# Patient Record
Sex: FEMALE | Race: WHITE | NOT HISPANIC OR LATINO | Employment: OTHER | URBAN - METROPOLITAN AREA
[De-identification: names, ages, dates, MRNs, and addresses within clinical notes are randomized per-mention and may not be internally consistent; named-entity substitution may affect disease eponyms.]

---

## 2017-08-24 ENCOUNTER — OFFICE VISIT (OUTPATIENT)
Dept: OBSTETRICS AND GYNECOLOGY | Facility: CLINIC | Age: 77
End: 2017-08-24

## 2017-08-24 VITALS — WEIGHT: 291 LBS | BODY MASS INDEX: 53.55 KG/M2 | HEIGHT: 62 IN

## 2017-08-24 DIAGNOSIS — Z13.9 SCREENING: ICD-10-CM

## 2017-08-24 DIAGNOSIS — R35.0 URINARY FREQUENCY: ICD-10-CM

## 2017-08-24 DIAGNOSIS — R32 URINARY INCONTINENCE, UNSPECIFIED TYPE: Primary | ICD-10-CM

## 2017-08-24 LAB
BILIRUB BLD-MCNC: NEGATIVE MG/DL
CLARITY, POC: CLEAR
COLOR UR: YELLOW
GLUCOSE UR STRIP-MCNC: NEGATIVE MG/DL
KETONES UR QL: NEGATIVE
LEUKOCYTE EST, POC: NEGATIVE
NITRITE UR-MCNC: NEGATIVE MG/ML
PH UR: 5 [PH] (ref 5–8)
PROT UR STRIP-MCNC: NEGATIVE MG/DL
RBC # UR STRIP: NEGATIVE /UL
SP GR UR: 1 (ref 1–1.03)
UROBILINOGEN UR QL: NORMAL

## 2017-08-24 PROCEDURE — 99203 OFFICE O/P NEW LOW 30 MIN: CPT | Performed by: OBSTETRICS & GYNECOLOGY

## 2017-08-24 PROCEDURE — 81002 URINALYSIS NONAUTO W/O SCOPE: CPT | Performed by: OBSTETRICS & GYNECOLOGY

## 2017-08-24 RX ORDER — FENTANYL 25 UG/H
1 PATCH TRANSDERMAL
Status: ON HOLD | COMMUNITY
End: 2019-04-16 | Stop reason: SDUPTHER

## 2017-08-24 RX ORDER — PREGABALIN 150 MG/1
150 CAPSULE ORAL 2 TIMES DAILY
Status: ON HOLD | COMMUNITY
End: 2020-05-19 | Stop reason: SDUPTHER

## 2017-08-24 RX ORDER — ALENDRONATE SODIUM 35 MG/1
35 TABLET ORAL
COMMUNITY
End: 2019-04-10

## 2017-08-24 RX ORDER — QUETIAPINE FUMARATE 50 MG/1
50 TABLET, FILM COATED ORAL NIGHTLY
COMMUNITY

## 2017-08-24 RX ORDER — LEVOTHYROXINE SODIUM 0.05 MG/1
50 TABLET ORAL DAILY
COMMUNITY

## 2017-08-24 RX ORDER — FLUOXETINE HYDROCHLORIDE 60 MG/1
60 TABLET, FILM COATED ORAL; ORAL DAILY
COMMUNITY

## 2017-08-24 RX ORDER — PANTOPRAZOLE SODIUM 40 MG/1
40 TABLET, DELAYED RELEASE ORAL DAILY
COMMUNITY

## 2017-08-24 RX ORDER — NYSTATIN 100000 [USP'U]/G
POWDER TOPICAL
COMMUNITY
End: 2019-04-10

## 2017-08-24 RX ORDER — ASPIRIN 81 MG/1
81 TABLET, CHEWABLE ORAL
COMMUNITY
Start: 2017-06-15

## 2017-08-24 RX ORDER — BUSPIRONE HYDROCHLORIDE 30 MG/1
30 TABLET ORAL 2 TIMES DAILY
COMMUNITY

## 2017-08-24 RX ORDER — HYOSCYAMINE SULFATE EXTENDED-RELEASE 0.38 MG/1
0.38 TABLET ORAL 2 TIMES DAILY
COMMUNITY
End: 2019-02-18 | Stop reason: HOSPADM

## 2017-08-24 RX ORDER — HYDROCODONE BITARTRATE AND ACETAMINOPHEN 7.5; 325 MG/1; MG/1
1 TABLET ORAL EVERY 6 HOURS PRN
Status: ON HOLD | COMMUNITY
End: 2019-04-16 | Stop reason: SDUPTHER

## 2017-08-24 RX ORDER — FERROUS SULFATE 325(65) MG
325 TABLET ORAL
COMMUNITY
End: 2019-04-10

## 2017-08-24 RX ORDER — TORSEMIDE 100 MG/1
TABLET ORAL
COMMUNITY
Start: 2017-03-13 | End: 2019-04-10

## 2017-08-24 RX ORDER — POTASSIUM CHLORIDE 750 MG/1
10 TABLET, EXTENDED RELEASE ORAL DAILY
COMMUNITY
End: 2019-04-10

## 2017-08-24 RX ORDER — LOPERAMIDE HYDROCHLORIDE 2 MG/1
2 CAPSULE ORAL 4 TIMES DAILY PRN
COMMUNITY
End: 2019-04-10

## 2017-08-24 RX ORDER — ERGOCALCIFEROL 1.25 MG/1
CAPSULE ORAL
COMMUNITY
Start: 2014-10-15 | End: 2019-04-10

## 2017-08-24 NOTE — PROGRESS NOTES
"PROBLEM VISIT    Chief Complaint: Urinary Incontinence      Rose Cheema is a 76 y.o. patient who presents with complaints of urinary incontinence that has gradually worsened throughout the yeas. She saw a urogyn 'years' ago and was dx with bladder spasms and has been taking levbid for years. If pt doesn't take med then she has increased bladder spasms. The med does not help with urinary incontinence. Pt reports that she dribbles urine all the time. When she stands her entire bladder will empty. At times she feels as though she cannot empty her bladder all the way except she has to strain. Hx of ELSIE. Had a BSO 3 years ago bc of ovarian cyst. Pt has a hx of bladder surgery with ELSIE. Has never been on a bladder medicine. Pt is not sexually active.   Chief Complaint   Patient presents with   • Urinary Frequency             The following portions of the patient's history were reviewed and updated as appropriate: allergies, current medications and problem list.    Review of Systems   Endocrine: Positive for polyuria.   Genitourinary: Negative for dysuria, pelvic pain and vaginal bleeding.       Ht 62\" (157.5 cm)  Wt 291 lb (132 kg)  Breastfeeding? No  BMI 53.22 kg/m2    Physical Exam   Constitutional: She appears well-developed and well-nourished. No distress.   Morbid obesity. Pt has difficulty walking    Genitourinary: There is no rash, tenderness, lesion or injury on the right labia. There is no rash, tenderness, lesion or injury on the left labia. Right adnexum displays no mass, no tenderness and no fullness. Left adnexum displays no mass, no tenderness and no fullness. No erythema, tenderness or bleeding in the vagina. No foreign body in the vagina. No signs of injury around the vagina. No vaginal discharge found.   Genitourinary Comments: Vaginal cuff palpated. No anterior prolapse noted. Urethra normal appearing.   Lymphadenopathy:   Bilateral LE lymphedema   Skin: She is not diaphoretic.   Psychiatric: She has " a normal mood and affect. Her behavior is normal. Judgment and thought content normal.   Vitals reviewed.        Assessment/Plan   Rose was seen today for urinary frequency.    Diagnoses and all orders for this visit:    Urinary incontinence, unspecified type  -     Urine Culture    Screening  -     POC Urinalysis Dipstick  -     Urine Culture    Urinary frequency      77yo with urinary incontinence and urinary frequency    Check UA/Ucx to evaluate for infection  Gave month worth of samples of Myrbetriq 25mg  Pt to fu in 4 weeks to see if med is helping.             Return in about 4 weeks (around 9/21/2017).      Maggi Proctor,     8/30/2017  1:23 PM

## 2017-08-27 LAB
BACTERIA UR CULT: ABNORMAL
BACTERIA UR CULT: ABNORMAL
OTHER ANTIBIOTIC SUSC ISLT: ABNORMAL

## 2017-08-27 RX ORDER — NITROFURANTOIN 25; 75 MG/1; MG/1
100 CAPSULE ORAL 2 TIMES DAILY
Qty: 14 CAPSULE | Refills: 0 | Status: SHIPPED | OUTPATIENT
Start: 2017-08-27 | End: 2017-09-03

## 2017-08-30 PROBLEM — R35.0 URINARY FREQUENCY: Status: ACTIVE | Noted: 2017-08-30

## 2017-09-21 ENCOUNTER — OFFICE VISIT (OUTPATIENT)
Dept: OBSTETRICS AND GYNECOLOGY | Facility: CLINIC | Age: 77
End: 2017-09-21

## 2017-09-21 VITALS — HEIGHT: 62 IN | WEIGHT: 291 LBS | BODY MASS INDEX: 53.55 KG/M2

## 2017-09-21 DIAGNOSIS — R35.0 URINARY FREQUENCY: ICD-10-CM

## 2017-09-21 DIAGNOSIS — R32 URINARY INCONTINENCE, UNSPECIFIED TYPE: Primary | ICD-10-CM

## 2017-09-21 PROCEDURE — 99213 OFFICE O/P EST LOW 20 MIN: CPT | Performed by: OBSTETRICS & GYNECOLOGY

## 2017-09-21 RX ORDER — FLUOXETINE HYDROCHLORIDE 20 MG/1
20 CAPSULE ORAL 2 TIMES DAILY
COMMUNITY
Start: 2017-09-08 | End: 2019-02-12

## 2017-09-21 RX ORDER — POTASSIUM CHLORIDE 750 MG/1
CAPSULE, EXTENDED RELEASE ORAL
COMMUNITY
Start: 2017-09-04 | End: 2019-02-12 | Stop reason: SDUPTHER

## 2017-09-21 RX ORDER — ALBUTEROL SULFATE 90 UG/1
AEROSOL, METERED RESPIRATORY (INHALATION) EVERY 4 HOURS PRN
Status: ON HOLD | COMMUNITY
Start: 2017-06-15 | End: 2020-05-14

## 2017-09-28 NOTE — PROGRESS NOTES
"PROBLEM VISIT    Chief Complaint: Urinary incontinence and urinary frequency      Rose Cheema is a 77 y.o. patient who presents for follow up of urinary incontinence and and urinary frequency. Pt was seen 1 montha ago and UCx revealed UTI. Pt took the antibiotics. Pt was started on Myrbetriq 25mg and reports that her symptoms are improved. She is losing less urine and her frequency is less. Pt is very please with her results. Pt reports no side effects from meds. Pt is interested in trying Myrbetriq 50mg.  Chief Complaint   Patient presents with   • Follow-up             The following portions of the patient's history were reviewed and updated as appropriate: allergies, current medications and problem list.    Review of Systems   Endocrine: Positive for polyuria.   Genitourinary: Positive for frequency. Negative for dysuria and flank pain.       Ht 62\" (157.5 cm)  Wt 291 lb (132 kg)  Breastfeeding? No  BMI 53.22 kg/m2    Physical Exam   Constitutional: She is oriented to person, place, and time. She appears well-developed and well-nourished. She appears distressed.   Pt is morbidly obese and uses a walker to assist her   Neurological: She is alert and oriented to person, place, and time.   Skin: She is diaphoretic.   Psychiatric: She has a normal mood and affect. Her behavior is normal. Judgment and thought content normal.   Vitals reviewed.        Assessment/Plan   Rose was seen today for follow-up.    Diagnoses and all orders for this visit:    Urinary incontinence, unspecified type    Urinary frequency    Other orders  -     Mirabegron ER (MYRBETRIQ) 50 MG tablet sustained-release 24 hour 24 hr tablet; Take 50 mg by mouth Daily.    76yo for fu of urinary frequency and incontinence    Pt is doing well on Myrbetriq 25mg daily.  Pt reports improvement in symptoms  No side effects on med noticed.  Will increase to Myrbetriq 50mg daily. Samples given and script written.  If pt is not happy or experiences urinary " retention then will return to Myrbetriq 25mg daily.  Pt agrees with plans.  FU 1 yr/prn             No Follow-up on file.      Maggi Proctor,     9/28/2017  5:16 AM

## 2018-10-16 RX ORDER — MIRABEGRON 50 MG/1
TABLET, FILM COATED, EXTENDED RELEASE ORAL
Qty: 30 TABLET | Refills: 10 | Status: SHIPPED | OUTPATIENT
Start: 2018-10-16 | End: 2019-11-06 | Stop reason: SDUPTHER

## 2019-02-12 ENCOUNTER — APPOINTMENT (OUTPATIENT)
Dept: CT IMAGING | Facility: HOSPITAL | Age: 79
End: 2019-02-12

## 2019-02-12 ENCOUNTER — APPOINTMENT (OUTPATIENT)
Dept: GENERAL RADIOLOGY | Facility: HOSPITAL | Age: 79
End: 2019-02-12

## 2019-02-12 ENCOUNTER — HOSPITAL ENCOUNTER (INPATIENT)
Facility: HOSPITAL | Age: 79
LOS: 6 days | Discharge: HOME HEALTH | End: 2019-02-18
Attending: EMERGENCY MEDICINE | Admitting: INTERNAL MEDICINE

## 2019-02-12 DIAGNOSIS — R79.89 ELEVATED LFTS: ICD-10-CM

## 2019-02-12 DIAGNOSIS — N20.0 KIDNEY STONE ON RIGHT SIDE: ICD-10-CM

## 2019-02-12 DIAGNOSIS — N39.0 ACUTE UTI: ICD-10-CM

## 2019-02-12 DIAGNOSIS — R60.0 BILATERAL LEG EDEMA: ICD-10-CM

## 2019-02-12 DIAGNOSIS — K56.600 PARTIAL SMALL BOWEL OBSTRUCTION (HCC): ICD-10-CM

## 2019-02-12 DIAGNOSIS — R10.84 GENERALIZED ABDOMINAL PAIN: Primary | ICD-10-CM

## 2019-02-12 DIAGNOSIS — K83.8 DILATION OF BILIARY TRACT: ICD-10-CM

## 2019-02-12 DIAGNOSIS — Z74.09 IMPAIRED FUNCTIONAL MOBILITY, BALANCE, GAIT, AND ENDURANCE: ICD-10-CM

## 2019-02-12 PROBLEM — M79.7 FIBROMYALGIA: Chronic | Status: ACTIVE | Noted: 2019-02-12

## 2019-02-12 PROBLEM — I27.20 PULMONARY HYPERTENSION (HCC): Chronic | Status: ACTIVE | Noted: 2019-02-12

## 2019-02-12 PROBLEM — Z87.19 HX SBO: Chronic | Status: ACTIVE | Noted: 2019-02-12

## 2019-02-12 LAB
ALBUMIN SERPL-MCNC: 3.1 G/DL (ref 3.5–5.2)
ALBUMIN/GLOB SERPL: 1 G/DL
ALP SERPL-CCNC: 565 U/L (ref 39–117)
ALT SERPL W P-5'-P-CCNC: 107 U/L (ref 1–33)
ANION GAP SERPL CALCULATED.3IONS-SCNC: 13.2 MMOL/L
APAP SERPL-MCNC: <5 MCG/ML (ref 10–30)
AST SERPL-CCNC: 78 U/L (ref 1–32)
BACTERIA UR QL AUTO: ABNORMAL /HPF
BASOPHILS # BLD AUTO: 0.01 10*3/MM3 (ref 0–0.2)
BASOPHILS NFR BLD AUTO: 0.1 % (ref 0–1.5)
BILIRUB CONJ SERPL-MCNC: 1.9 MG/DL (ref 0–0.3)
BILIRUB SERPL-MCNC: 3.1 MG/DL (ref 0.1–1.2)
BILIRUB UR QL STRIP: NEGATIVE
BUN BLD-MCNC: 26 MG/DL (ref 8–23)
BUN/CREAT SERPL: 25.7 (ref 7–25)
CALCIUM SPEC-SCNC: 8.7 MG/DL (ref 8.6–10.5)
CHLORIDE SERPL-SCNC: 100 MMOL/L (ref 98–107)
CLARITY UR: ABNORMAL
CO2 SERPL-SCNC: 24.8 MMOL/L (ref 22–29)
COLOR UR: ABNORMAL
CREAT BLD-MCNC: 1.01 MG/DL (ref 0.57–1)
CRP SERPL-MCNC: 9.12 MG/DL (ref 0–0.5)
DEPRECATED RDW RBC AUTO: 52.6 FL (ref 37–54)
EOSINOPHIL # BLD AUTO: 0.07 10*3/MM3 (ref 0–0.4)
EOSINOPHIL NFR BLD AUTO: 0.8 % (ref 0.3–6.2)
ERYTHROCYTE [DISTWIDTH] IN BLOOD BY AUTOMATED COUNT: 14.8 % (ref 12.3–15.4)
GFR SERPL CREATININE-BSD FRML MDRD: 53 ML/MIN/1.73
GGT SERPL-CCNC: 374 U/L (ref 5–36)
GLOBULIN UR ELPH-MCNC: 3 GM/DL
GLUCOSE BLD-MCNC: 102 MG/DL (ref 65–99)
GLUCOSE BLDC GLUCOMTR-MCNC: 108 MG/DL (ref 70–130)
GLUCOSE BLDC GLUCOMTR-MCNC: 109 MG/DL (ref 70–130)
GLUCOSE UR STRIP-MCNC: NEGATIVE MG/DL
HCT VFR BLD AUTO: 37.8 % (ref 34–46.6)
HGB BLD-MCNC: 12.2 G/DL (ref 12–15.9)
HGB UR QL STRIP.AUTO: NEGATIVE
HYALINE CASTS UR QL AUTO: ABNORMAL /LPF
IMM GRANULOCYTES # BLD AUTO: 0.03 10*3/MM3 (ref 0–0.05)
IMM GRANULOCYTES NFR BLD AUTO: 0.4 % (ref 0–0.5)
KETONES UR QL STRIP: NEGATIVE
LEUKOCYTE ESTERASE UR QL STRIP.AUTO: ABNORMAL
LYMPHOCYTES # BLD AUTO: 0.99 10*3/MM3 (ref 0.7–3.1)
LYMPHOCYTES NFR BLD AUTO: 11.8 % (ref 19.6–45.3)
MCH RBC QN AUTO: 31 PG (ref 26.6–33)
MCHC RBC AUTO-ENTMCNC: 32.3 G/DL (ref 31.5–35.7)
MCV RBC AUTO: 96.2 FL (ref 79–97)
MONOCYTES # BLD AUTO: 0.62 10*3/MM3 (ref 0.1–0.9)
MONOCYTES NFR BLD AUTO: 7.4 % (ref 5–12)
NEUTROPHILS # BLD AUTO: 6.69 10*3/MM3 (ref 1.4–7)
NEUTROPHILS NFR BLD AUTO: 79.5 % (ref 42.7–76)
NITRITE UR QL STRIP: NEGATIVE
NRBC BLD AUTO-RTO: 0 /100 WBC (ref 0–0)
NT-PROBNP SERPL-MCNC: 307.9 PG/ML (ref 0–1800)
PH UR STRIP.AUTO: 8.5 [PH] (ref 5–8)
PLATELET # BLD AUTO: 167 10*3/MM3 (ref 140–450)
PMV BLD AUTO: 11.4 FL (ref 6–12)
POTASSIUM BLD-SCNC: 3.6 MMOL/L (ref 3.5–5.2)
PROT SERPL-MCNC: 6.1 G/DL (ref 6–8.5)
PROT UR QL STRIP: ABNORMAL
RBC # BLD AUTO: 3.93 10*6/MM3 (ref 3.77–5.28)
RBC # UR: ABNORMAL /HPF
REF LAB TEST METHOD: ABNORMAL
SODIUM BLD-SCNC: 138 MMOL/L (ref 136–145)
SP GR UR STRIP: 1.01 (ref 1–1.03)
SQUAMOUS #/AREA URNS HPF: ABNORMAL /HPF
TROPONIN T SERPL-MCNC: <0.01 NG/ML (ref 0–0.03)
UROBILINOGEN UR QL STRIP: ABNORMAL
WBC NRBC COR # BLD: 8.41 10*3/MM3 (ref 3.4–10.8)
WBC UR QL AUTO: ABNORMAL /HPF

## 2019-02-12 PROCEDURE — 73560 X-RAY EXAM OF KNEE 1 OR 2: CPT

## 2019-02-12 PROCEDURE — 25010000002 IOPAMIDOL 61 % SOLUTION: Performed by: EMERGENCY MEDICINE

## 2019-02-12 PROCEDURE — 87077 CULTURE AEROBIC IDENTIFY: CPT | Performed by: EMERGENCY MEDICINE

## 2019-02-12 PROCEDURE — 81001 URINALYSIS AUTO W/SCOPE: CPT | Performed by: EMERGENCY MEDICINE

## 2019-02-12 PROCEDURE — 82248 BILIRUBIN DIRECT: CPT | Performed by: INTERNAL MEDICINE

## 2019-02-12 PROCEDURE — 87186 SC STD MICRODIL/AGAR DIL: CPT | Performed by: EMERGENCY MEDICINE

## 2019-02-12 PROCEDURE — 99285 EMERGENCY DEPT VISIT HI MDM: CPT

## 2019-02-12 PROCEDURE — 87086 URINE CULTURE/COLONY COUNT: CPT | Performed by: EMERGENCY MEDICINE

## 2019-02-12 PROCEDURE — 87040 BLOOD CULTURE FOR BACTERIA: CPT | Performed by: EMERGENCY MEDICINE

## 2019-02-12 PROCEDURE — 71046 X-RAY EXAM CHEST 2 VIEWS: CPT

## 2019-02-12 PROCEDURE — 36415 COLL VENOUS BLD VENIPUNCTURE: CPT | Performed by: EMERGENCY MEDICINE

## 2019-02-12 PROCEDURE — 82977 ASSAY OF GGT: CPT | Performed by: INTERNAL MEDICINE

## 2019-02-12 PROCEDURE — 83880 ASSAY OF NATRIURETIC PEPTIDE: CPT | Performed by: EMERGENCY MEDICINE

## 2019-02-12 PROCEDURE — 93010 ELECTROCARDIOGRAM REPORT: CPT | Performed by: INTERNAL MEDICINE

## 2019-02-12 PROCEDURE — 74177 CT ABD & PELVIS W/CONTRAST: CPT

## 2019-02-12 PROCEDURE — 82962 GLUCOSE BLOOD TEST: CPT

## 2019-02-12 PROCEDURE — 25010000002 PIPERACILLIN SOD-TAZOBACTAM PER 1 G: Performed by: EMERGENCY MEDICINE

## 2019-02-12 PROCEDURE — 86140 C-REACTIVE PROTEIN: CPT | Performed by: EMERGENCY MEDICINE

## 2019-02-12 PROCEDURE — 85025 COMPLETE CBC W/AUTO DIFF WBC: CPT | Performed by: EMERGENCY MEDICINE

## 2019-02-12 PROCEDURE — 93005 ELECTROCARDIOGRAM TRACING: CPT | Performed by: EMERGENCY MEDICINE

## 2019-02-12 PROCEDURE — 80307 DRUG TEST PRSMV CHEM ANLYZR: CPT | Performed by: EMERGENCY MEDICINE

## 2019-02-12 PROCEDURE — 84484 ASSAY OF TROPONIN QUANT: CPT | Performed by: EMERGENCY MEDICINE

## 2019-02-12 PROCEDURE — 80053 COMPREHEN METABOLIC PANEL: CPT | Performed by: EMERGENCY MEDICINE

## 2019-02-12 RX ORDER — SODIUM CHLORIDE 0.9 % (FLUSH) 0.9 %
1-10 SYRINGE (ML) INJECTION AS NEEDED
Status: DISCONTINUED | OUTPATIENT
Start: 2019-02-12 | End: 2019-02-18 | Stop reason: HOSPADM

## 2019-02-12 RX ORDER — NYSTATIN 100000 [USP'U]/G
POWDER TOPICAL EVERY 12 HOURS SCHEDULED
Status: DISCONTINUED | OUTPATIENT
Start: 2019-02-13 | End: 2019-02-18 | Stop reason: HOSPADM

## 2019-02-12 RX ORDER — DOCUSATE SODIUM 100 MG/1
100 CAPSULE, LIQUID FILLED ORAL 2 TIMES DAILY PRN
Status: DISCONTINUED | OUTPATIENT
Start: 2019-02-12 | End: 2019-02-18 | Stop reason: HOSPADM

## 2019-02-12 RX ORDER — HYDROMORPHONE HYDROCHLORIDE 1 MG/ML
0.5 INJECTION, SOLUTION INTRAMUSCULAR; INTRAVENOUS; SUBCUTANEOUS
Status: DISCONTINUED | OUTPATIENT
Start: 2019-02-12 | End: 2019-02-17

## 2019-02-12 RX ORDER — SODIUM CHLORIDE 0.9 % (FLUSH) 0.9 %
3 SYRINGE (ML) INJECTION EVERY 12 HOURS SCHEDULED
Status: DISCONTINUED | OUTPATIENT
Start: 2019-02-13 | End: 2019-02-18 | Stop reason: HOSPADM

## 2019-02-12 RX ORDER — SODIUM CHLORIDE 0.9 % (FLUSH) 0.9 %
10 SYRINGE (ML) INJECTION AS NEEDED
Status: DISCONTINUED | OUTPATIENT
Start: 2019-02-12 | End: 2019-02-12

## 2019-02-12 RX ORDER — FENTANYL 25 UG/H
1 PATCH TRANSDERMAL
Status: DISCONTINUED | OUTPATIENT
Start: 2019-02-13 | End: 2019-02-18 | Stop reason: HOSPADM

## 2019-02-12 RX ORDER — NALOXONE HCL 0.4 MG/ML
0.4 VIAL (ML) INJECTION
Status: DISCONTINUED | OUTPATIENT
Start: 2019-02-12 | End: 2019-02-17

## 2019-02-12 RX ORDER — CALCIUM CARBONATE 200(500)MG
1 TABLET,CHEWABLE ORAL 2 TIMES DAILY PRN
Status: DISCONTINUED | OUTPATIENT
Start: 2019-02-12 | End: 2019-02-18 | Stop reason: HOSPADM

## 2019-02-12 RX ORDER — SODIUM CHLORIDE 9 MG/ML
125 INJECTION, SOLUTION INTRAVENOUS CONTINUOUS
Status: DISCONTINUED | OUTPATIENT
Start: 2019-02-12 | End: 2019-02-12

## 2019-02-12 RX ORDER — ALBUTEROL SULFATE 2.5 MG/3ML
2.5 SOLUTION RESPIRATORY (INHALATION) EVERY 4 HOURS PRN
Status: DISCONTINUED | OUTPATIENT
Start: 2019-02-12 | End: 2019-02-18 | Stop reason: HOSPADM

## 2019-02-12 RX ORDER — SODIUM CHLORIDE 9 MG/ML
50 INJECTION, SOLUTION INTRAVENOUS CONTINUOUS
Status: DISCONTINUED | OUTPATIENT
Start: 2019-02-13 | End: 2019-02-14

## 2019-02-12 RX ORDER — QUETIAPINE FUMARATE 50 MG/1
50 TABLET, FILM COATED ORAL NIGHTLY
Status: DISCONTINUED | OUTPATIENT
Start: 2019-02-13 | End: 2019-02-18 | Stop reason: HOSPADM

## 2019-02-12 RX ORDER — BISACODYL 10 MG
10 SUPPOSITORY, RECTAL RECTAL DAILY PRN
Status: DISCONTINUED | OUTPATIENT
Start: 2019-02-12 | End: 2019-02-18 | Stop reason: HOSPADM

## 2019-02-12 RX ORDER — ASPIRIN 81 MG/1
81 TABLET, CHEWABLE ORAL DAILY
Status: DISCONTINUED | OUTPATIENT
Start: 2019-02-13 | End: 2019-02-18 | Stop reason: HOSPADM

## 2019-02-12 RX ORDER — PREGABALIN 75 MG/1
150 CAPSULE ORAL 2 TIMES DAILY
Status: DISCONTINUED | OUTPATIENT
Start: 2019-02-12 | End: 2019-02-18 | Stop reason: HOSPADM

## 2019-02-12 RX ORDER — BUSPIRONE HYDROCHLORIDE 15 MG/1
30 TABLET ORAL 2 TIMES DAILY
Status: DISCONTINUED | OUTPATIENT
Start: 2019-02-13 | End: 2019-02-18 | Stop reason: HOSPADM

## 2019-02-12 RX ORDER — ONDANSETRON 2 MG/ML
4 INJECTION INTRAMUSCULAR; INTRAVENOUS EVERY 6 HOURS PRN
Status: DISCONTINUED | OUTPATIENT
Start: 2019-02-12 | End: 2019-02-18 | Stop reason: HOSPADM

## 2019-02-12 RX ADMIN — TAZOBACTAM SODIUM AND PIPERACILLIN SODIUM 3.38 G: 375; 3 INJECTION, SOLUTION INTRAVENOUS at 22:38

## 2019-02-12 RX ADMIN — SODIUM CHLORIDE 125 ML/HR: 9 INJECTION, SOLUTION INTRAVENOUS at 19:49

## 2019-02-12 RX ADMIN — IOPAMIDOL 85 ML: 612 INJECTION, SOLUTION INTRAVENOUS at 18:51

## 2019-02-12 NOTE — ED PROVIDER NOTES
EMERGENCY DEPARTMENT ENCOUNTER    CHIEF COMPLAINT  Chief Complaint: Bilateral knee pain  History given by: Pt  History limited by: none  Room Number: 18/18  PMD: Cheng Guevara MD      HPI:  Pt is a 78 y.o. female who presents complaining of bilateral knee pain for several months. Per family, pt has been having a harder time getting around. Pt uses a walker to ambulate at home. Pt states she has been falling more the past 2 days. Pt denies head injury or LOC. Pt denies Hx of DM. Pt reports Hx of diffuse arthritis and sees pain management. Pt lives with . Pt was in [facility]  weeks ago for 5 days for SBO and pancreatitis. Hx of lymphedema. Also having abdominal pain today.         Duration:  Several months  Onset: gradual  Timing: constant  Location: bilateral knee  Radiation: none  Quality: pain  Intensity/Severity: mild  Progression: unchanged  Associated Symptoms: BLE swelling  Aggravating Factors: movement  Alleviating Factors: rest  Previous Episodes: Pt c/o bilateral knee pain for several months.   Treatment before arrival: none    PAST MEDICAL HISTORY  Active Ambulatory Problems     Diagnosis Date Noted   • Urinary incontinence 08/24/2017   • Urinary frequency 08/30/2017     Resolved Ambulatory Problems     Diagnosis Date Noted   • No Resolved Ambulatory Problems     Past Medical History:   Diagnosis Date   • Anemia    • Anxiety    • Arthritis    • CHF (congestive heart failure) (CMS/HCC)    • Coronary artery disease    • Depression    • Disease of thyroid gland    • Fibromyalgia    • GERD (gastroesophageal reflux disease)    • Hypertension    • Moderate tricuspid valve stenosis    • Osteoporosis    • Peripheral neuropathy    • Pulmonary hypertension (CMS/HCC)    • SBO (small bowel obstruction) (CMS/HCC)    • Stenosis of mitral valve        PAST SURGICAL HISTORY  Past Surgical History:   Procedure Laterality Date   • CARDIAC CATHETERIZATION     • CHOLECYSTECTOMY     • EXPLORATORY LAPAROTOMY      • GASTRIC BYPASS     • HERNIA REPAIR      inguinal and ventral   • HYSTERECTOMY     • OVARIAN CYST REMOVAL         FAMILY HISTORY  History reviewed. No pertinent family history.    SOCIAL HISTORY  Social History     Socioeconomic History   • Marital status: Single     Spouse name: Not on file   • Number of children: Not on file   • Years of education: Not on file   • Highest education level: Not on file   Social Needs   • Financial resource strain: Not on file   • Food insecurity - worry: Not on file   • Food insecurity - inability: Not on file   • Transportation needs - medical: Not on file   • Transportation needs - non-medical: Not on file   Occupational History   • Not on file   Tobacco Use   • Smoking status: Former Smoker   Substance and Sexual Activity   • Alcohol use: No   • Drug use: No   • Sexual activity: No   Other Topics Concern   • Not on file   Social History Narrative   • Not on file       ALLERGIES  Aspartame and Cephalexin    REVIEW OF SYSTEMS  Review of Systems   Constitutional: Negative for fever.   HENT: Negative for sore throat.    Eyes: Negative.    Respiratory: Negative for cough and shortness of breath.    Cardiovascular: Positive for leg swelling (BLE). Negative for chest pain.   Gastrointestinal: Negative for abdominal pain, diarrhea and vomiting.   Genitourinary: Negative for dysuria.   Musculoskeletal: Positive for arthralgias (bilateral knee pain for several months). Negative for neck pain.   Skin: Negative for rash.   Neurological: Negative for weakness, numbness and headaches.   Hematological: Negative.    Psychiatric/Behavioral: Negative.    All other systems reviewed and are negative.      PHYSICAL EXAM  ED Triage Vitals   Temp Heart Rate Resp BP SpO2   02/12/19 1317 02/12/19 1315 02/12/19 1315 02/12/19 1315 02/12/19 1317   97 °F (36.1 °C) 60 14 148/83 98 %      Temp src Heart Rate Source Patient Position BP Location FiO2 (%)   02/12/19 1317 -- -- -- --   Tympanic            Physical Exam   Constitutional: She is oriented to person, place, and time. No distress.   HENT:   Head: Normocephalic and atraumatic.   Eyes: EOM are normal. Pupils are equal, round, and reactive to light.   Neck: Normal range of motion. Neck supple.   Cardiovascular: Normal rate, regular rhythm and intact distal pulses.   Pulmonary/Chest: Effort normal and breath sounds normal. No respiratory distress. She has no wheezes. She has no rales.   Abdominal: Soft. Bowel sounds are normal. She exhibits no mass. There is tenderness (diffuse ). There is no rebound and no guarding.   Musculoskeletal: Normal range of motion. She exhibits edema (BLE).   Bilateral knee tenderness. No effusion.    Neurological: She is alert and oriented to person, place, and time. She has normal sensation and normal strength.   Skin: Skin is warm and dry. No rash noted. There is erythema (mild to BLE).   Psychiatric: Mood and affect normal.   Nursing note and vitals reviewed.      LAB RESULTS  Lab Results (last 24 hours)     Procedure Component Value Units Date/Time    POC Glucose Once [[id number]]  (Normal) Collected:  02/12/19 1321    Specimen:  Blood Updated:  02/12/19 1323     Glucose 108 mg/dL     POC Glucose Once [[id number]]  (Normal) Collected:  02/12/19 1336    Specimen:  Blood Updated:  02/12/19 1337     Glucose 109 mg/dL     Urinalysis With Microscopic If Indicated (No Culture) - Urine, Clean Catch [[id number]]  (Abnormal) Collected:  02/12/19 1422    Specimen:  Urine, Clean Catch Updated:  02/12/19 1506     Color, UA Dark Yellow     Appearance, UA Cloudy     pH, UA 8.5     Specific Gravity, UA 1.010     Glucose, UA Negative     Ketones, UA Negative     Bilirubin, UA Negative     Blood, UA Negative     Protein, UA Trace     Leuk Esterase, UA Large (3+)     Nitrite, UA Negative     Urobilinogen, UA 1.0 E.U./dL    Urinalysis, Microscopic Only - Urine, Clean Catch [[id number]]  (Abnormal) Collected:  02/12/19 1422    Specimen:  Urine,  Clean Catch Updated:  02/12/19 1506     RBC, UA 3-5 /HPF      WBC, UA 21-30 /HPF      Bacteria, UA 2+ /HPF      Squamous Epithelial Cells, UA 7-12 /HPF      Hyaline Casts, UA 7-12 /LPF      Methodology Manual Light Microscopy    Urine Culture - Urine, Urine, Clean Catch [[id number]] Collected:  02/12/19 1422    Specimen:  Urine, Clean Catch Updated:  02/12/19 1730    CBC & Differential [[id number]] Collected:  02/12/19 1454    Specimen:  Blood Updated:  02/12/19 1514    Narrative:       The following orders were created for panel order CBC & Differential.  Procedure                               Abnormality         Status                     ---------                               -----------         ------                     CBC Auto Differential[[id number]]        Abnormal            Final result                 Please view results for these tests on the individual orders.    Comprehensive Metabolic Panel [[id number]]  (Abnormal) Collected:  02/12/19 1454    Specimen:  Blood Updated:  02/12/19 1538     Glucose 102 mg/dL      BUN 26 mg/dL      Creatinine 1.01 mg/dL      Sodium 138 mmol/L      Potassium 3.6 mmol/L      Chloride 100 mmol/L      CO2 24.8 mmol/L      Calcium 8.7 mg/dL      Total Protein 6.1 g/dL      Albumin 3.10 g/dL      ALT (SGPT) 107 U/L      AST (SGOT) 78 U/L      Alkaline Phosphatase 565 U/L      Total Bilirubin 3.1 mg/dL      eGFR Non African Amer 53 mL/min/1.73      Globulin 3.0 gm/dL      A/G Ratio 1.0 g/dL      BUN/Creatinine Ratio 25.7     Anion Gap 13.2 mmol/L     Narrative:       The MDRD GFR formula is only valid for adults with stable renal function between ages 18 and 70.    C-reactive Protein [[id number]]  (Abnormal) Collected:  02/12/19 1454    Specimen:  Blood Updated:  02/12/19 1538     C-Reactive Protein 9.12 mg/dL     BNP [[id number]]  (Normal) Collected:  02/12/19 1454    Specimen:  Blood Updated:  02/12/19 1534     proBNP 307.9 pg/mL     Narrative:       Among patients with dyspnea,  NT-proBNP is highly sensitive for the detection of acute congestive heart failure. In addition NT-proBNP of <300 pg/ml effectively rules out acute congestive heart failure with 99% negative predictive value.    Troponin [[id number]]  (Normal) Collected:  02/12/19 1454    Specimen:  Blood Updated:  02/12/19 1538     Troponin T <0.010 ng/mL     Narrative:       Troponin T Reference Range:  <= 0.03 ng/mL-   Negative for AMI  >0.03 ng/mL-     Abnormal for myocardial necrosis.  Clinicians would have to utilize clinical acumen, EKG, Troponin and serial changes to determine if it is an Acute Myocardial Infarction or myocardial injury due to an underlying chronic condition.     CBC Auto Differential [[id number]]  (Abnormal) Collected:  02/12/19 1454    Specimen:  Blood Updated:  02/12/19 1514     WBC 8.41 10*3/mm3      RBC 3.93 10*6/mm3      Hemoglobin 12.2 g/dL      Hematocrit 37.8 %      MCV 96.2 fL      MCH 31.0 pg      MCHC 32.3 g/dL      RDW 14.8 %      RDW-SD 52.6 fl      MPV 11.4 fL      Platelets 167 10*3/mm3      Neutrophil % 79.5 %      Lymphocyte % 11.8 %      Monocyte % 7.4 %      Eosinophil % 0.8 %      Basophil % 0.1 %      Immature Grans % 0.4 %      Neutrophils, Absolute 6.69 10*3/mm3      Lymphocytes, Absolute 0.99 10*3/mm3      Monocytes, Absolute 0.62 10*3/mm3      Eosinophils, Absolute 0.07 10*3/mm3      Basophils, Absolute 0.01 10*3/mm3      Immature Grans, Absolute 0.03 10*3/mm3      nRBC 0.0 /100 WBC     Acetaminophen Level [[id number]]  (Abnormal) Collected:  02/12/19 1454    Specimen:  Blood Updated:  02/12/19 1846     Acetaminophen <5.0 mcg/mL           I ordered the above labs and reviewed the results    RADIOLOGY  CT Abdomen Pelvis With Contrast   Final Result           1. Proximal small bowel shows mild abnormal distention with air-fluid   levels, up to the level of anastomosis at the midline abdomen, may   represent early or partial obstruction. Ventral hernias containing small   portions of  bowel, as described. Colonic diverticulosis.   2. Increased biliary ductal dilatation could represent an obstructive   process does not identify identified on this exam, MRCP could be   obtained for further evaluation if indicated.    3. Nonobstructive right nephrolithiasis.   4. Appearance of skin thickening in the anterior pelvis, adjacent   stranding,  correlate clinically to exclude possibility of cellulitis.       Discussed by telephone Dr. Torres at 1909, 2/12/2019.       This report was finalized on 2/12/2019 7:14 PM by Dr. Benny Lerma M.D.          XR Knee 1 or 2 View Bilateral   Final Result   1. Negative chest x-ray.   2. Advanced osteophytic change of both knees. No acute abnormality   identified in either knee.       This report was finalized on 2/12/2019 6:07 PM by Dr. Terry Brothers M.D.          XR Chest 2 View   Final Result   1. Negative chest x-ray.   2. Advanced osteophytic change of both knees. No acute abnormality   identified in either knee.       This report was finalized on 2/12/2019 6:07 PM by Dr. Terry Brothers M.D.               I ordered the above noted radiological studies. Interpreted by radiologist. Discussed with radiologist (). Reviewed by me in PACS.       PROCEDURES  Procedures    EKG          EKG time: 1415  Rhythm/Rate: sinus tach, 110  P waves and OK: Nml  QRS, axis: LAD, LVH   ST and T waves: nonspecific ST changes     Interpreted Contemporaneously by me, independently viewed  No previous      PROGRESS AND CONSULTS       1728  Pt recheck. Notified pt of lab work, including CMP that shows elevated LFTs. Discussed plan to further evaluate with CT abd/pelvis. Pt is agreeable.     1912  Per Dr. Lerma, Radiology CT abd/pelvis shows Increased periductal dilatation of bile duct but can't see definite stone. proximal small bowel dilation in midline that could be early partial SBO. 1 cm non obstructed stone in right kidney. anterior pelvic wall skin thickening.      1928  Rechecked pt. Pt is resting comfortably. There is no erythema to abdominal wall. Pt states she can take Penicillin w/o difficulty. Notified pt of lab work, including UA that shows UTI, and CT abd/pevlis results. Discussed the plan to admit the pt for further workup. Pt understands and agrees with the plan, all questions answered.    1931  Ordered IV Zosyn    8:11 PM  Discussed with Dr. Trejo, [facility] who agrees to admit the pt.     MEDICAL DECISION MAKING  Results were reviewed/discussed with the patient and they were also made aware of online access. Pt also made aware that some labs, such as cultures, will not be resulted during ER visit and follow up with PMD is necessary.     MDM  Number of Diagnoses or Management Options  Acute UTI:   Bilateral leg edema:   Dilation of biliary tract:   Elevated LFTs:   Generalized abdominal pain:   Kidney stone on right side:   Partial small bowel obstruction (CMS/HCC)- early:      Amount and/or Complexity of Data Reviewed  Clinical lab tests: reviewed and ordered (CMP: ALT-107, ASST-78, Alkaline phosphatase-565; BNP-307; CRP-9.12; Acetaminophen <5; troponin<0.01; UA: 2+ bacteria, 3-5 RBC, 21-30 WBC;)  Tests in the radiology section of CPT®: reviewed and ordered (CT abd/pelvis shows Increased periductal dilatation of bile duct.  can't see definite stone. proximal small bowel dilation in midline that could be early partial SBO. 1 cm non obstructed stone in right kidney. anterior pelvic wall skin thickening.)  Tests in the medicine section of CPT®: ordered and reviewed (See EKG results in procedure. )  Discussion of test results with the performing providers: yes (Dr. Lerma, Radiology)  Independent visualization of images, tracings, or specimens: yes           DIAGNOSIS  Final diagnoses:   Generalized abdominal pain   Elevated LFTs   Dilation of biliary tract   Acute UTI   Partial small bowel obstruction (CMS/HCC)- early   Bilateral leg edema   Kidney stone on right side        DISPOSITION  ADMISSION    Discussed treatment plan and reason for admission with pt/family and admitting physician.  Pt/family voiced understanding of the plan for admission for further testing/treatment as needed.         Latest Documented Vital Signs:  As of 7:53 PM  BP- 142/86 HR- 101 Temp- 98.4 °F (36.9 °C) (Oral) O2 sat- 95%    --  Documentation assistance provided by blair Duran for Dr. Cooper.  Information recorded by the scribe was done at my direction and has been verified and validated by me.         Wilber Duran  02/12/19 1953       Wilber Duran  02/12/19 1956       Jordan Cooper MD  02/12/19 2014

## 2019-02-12 NOTE — ED TRIAGE NOTES
Pt to ED via EMS from home for right knee pain for several months, pain worse today. Also reports worsening bilateral lower extremity edema. EMS also states pt's blood glucose 78, ems gave oral glucose and blood sugar dropped to 58, pt given 1amp d50, blood sugar 97. Blood glucose 108 upon arrival to ED.

## 2019-02-13 ENCOUNTER — APPOINTMENT (OUTPATIENT)
Dept: MRI IMAGING | Facility: HOSPITAL | Age: 79
End: 2019-02-13

## 2019-02-13 PROBLEM — G89.4 CHRONIC PAIN SYNDROME: Chronic | Status: ACTIVE | Noted: 2019-02-13

## 2019-02-13 PROBLEM — E07.9 DISEASE OF THYROID GLAND: Chronic | Status: ACTIVE | Noted: 2019-02-13

## 2019-02-13 PROBLEM — N39.0 ACUTE UTI (URINARY TRACT INFECTION): Status: ACTIVE | Noted: 2019-02-13

## 2019-02-13 PROBLEM — D64.9 ANEMIA: Status: ACTIVE | Noted: 2019-02-13

## 2019-02-13 PROBLEM — E16.2 HYPOGLYCEMIA: Status: ACTIVE | Noted: 2019-02-13
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
## 2019-02-13 PROBLEM — Z90.49 HX OF CHOLECYSTECTOMY: Chronic | Status: ACTIVE | Noted: 2019-02-13

## 2019-02-13 LAB
ALBUMIN SERPL-MCNC: 2.6 G/DL (ref 3.5–5.2)
ALBUMIN/GLOB SERPL: 1.1 G/DL
ALP SERPL-CCNC: 430 U/L (ref 39–117)
ALT SERPL W P-5'-P-CCNC: 74 U/L (ref 1–33)
ANION GAP SERPL CALCULATED.3IONS-SCNC: 11.2 MMOL/L
AST SERPL-CCNC: 43 U/L (ref 1–32)
BASOPHILS # BLD AUTO: 0.01 10*3/MM3 (ref 0–0.2)
BASOPHILS NFR BLD AUTO: 0.2 % (ref 0–1.5)
BILIRUB SERPL-MCNC: 1.8 MG/DL (ref 0.1–1.2)
BUN BLD-MCNC: 19 MG/DL (ref 8–23)
BUN/CREAT SERPL: 24.4 (ref 7–25)
CALCIUM SPEC-SCNC: 7.9 MG/DL (ref 8.6–10.5)
CHLORIDE SERPL-SCNC: 103 MMOL/L (ref 98–107)
CK SERPL-CCNC: 107 U/L (ref 20–180)
CO2 SERPL-SCNC: 23.8 MMOL/L (ref 22–29)
CREAT BLD-MCNC: 0.78 MG/DL (ref 0.57–1)
DEPRECATED RDW RBC AUTO: 51 FL (ref 37–54)
EOSINOPHIL # BLD AUTO: 0.07 10*3/MM3 (ref 0–0.4)
EOSINOPHIL NFR BLD AUTO: 1.1 % (ref 0.3–6.2)
ERYTHROCYTE [DISTWIDTH] IN BLOOD BY AUTOMATED COUNT: 14.6 % (ref 12.3–15.4)
GFR SERPL CREATININE-BSD FRML MDRD: 72 ML/MIN/1.73
GLOBULIN UR ELPH-MCNC: 2.4 GM/DL
GLUCOSE BLD-MCNC: 91 MG/DL (ref 65–99)
GLUCOSE BLDC GLUCOMTR-MCNC: 115 MG/DL (ref 70–130)
GLUCOSE BLDC GLUCOMTR-MCNC: 83 MG/DL (ref 70–130)
GLUCOSE BLDC GLUCOMTR-MCNC: 84 MG/DL (ref 70–130)
GLUCOSE BLDC GLUCOMTR-MCNC: 89 MG/DL (ref 70–130)
HCT VFR BLD AUTO: 33.3 % (ref 34–46.6)
HGB BLD-MCNC: 10.4 G/DL (ref 12–15.9)
IMM GRANULOCYTES # BLD AUTO: 0.02 10*3/MM3 (ref 0–0.05)
IMM GRANULOCYTES NFR BLD AUTO: 0.3 % (ref 0–0.5)
LYMPHOCYTES # BLD AUTO: 1.29 10*3/MM3 (ref 0.7–3.1)
LYMPHOCYTES NFR BLD AUTO: 20.1 % (ref 19.6–45.3)
MAGNESIUM SERPL-MCNC: 1.9 MG/DL (ref 1.6–2.4)
MCH RBC QN AUTO: 30.2 PG (ref 26.6–33)
MCHC RBC AUTO-ENTMCNC: 31.2 G/DL (ref 31.5–35.7)
MCV RBC AUTO: 96.8 FL (ref 79–97)
MONOCYTES # BLD AUTO: 0.67 10*3/MM3 (ref 0.1–0.9)
MONOCYTES NFR BLD AUTO: 10.5 % (ref 5–12)
NEUTROPHILS # BLD AUTO: 4.35 10*3/MM3 (ref 1.4–7)
NEUTROPHILS NFR BLD AUTO: 67.8 % (ref 42.7–76)
NRBC BLD AUTO-RTO: 0 /100 WBC (ref 0–0)
PHOSPHATE SERPL-MCNC: 3.4 MG/DL (ref 2.5–4.5)
PLATELET # BLD AUTO: 151 10*3/MM3 (ref 140–450)
PMV BLD AUTO: 12.1 FL (ref 6–12)
POTASSIUM BLD-SCNC: 3.3 MMOL/L (ref 3.5–5.2)
PROT SERPL-MCNC: 5 G/DL (ref 6–8.5)
RBC # BLD AUTO: 3.44 10*6/MM3 (ref 3.77–5.28)
SODIUM BLD-SCNC: 138 MMOL/L (ref 136–145)
TSH SERPL DL<=0.05 MIU/L-ACNC: 0.71 MIU/ML (ref 0.27–4.2)
WBC NRBC COR # BLD: 6.41 10*3/MM3 (ref 3.4–10.8)

## 2019-02-13 PROCEDURE — 97162 PT EVAL MOD COMPLEX 30 MIN: CPT

## 2019-02-13 PROCEDURE — A9577 INJ MULTIHANCE: HCPCS | Performed by: HOSPITALIST

## 2019-02-13 PROCEDURE — 83735 ASSAY OF MAGNESIUM: CPT | Performed by: INTERNAL MEDICINE

## 2019-02-13 PROCEDURE — 84100 ASSAY OF PHOSPHORUS: CPT | Performed by: INTERNAL MEDICINE

## 2019-02-13 PROCEDURE — 97110 THERAPEUTIC EXERCISES: CPT

## 2019-02-13 PROCEDURE — 25010000002 HYDROMORPHONE PER 4 MG: Performed by: INTERNAL MEDICINE

## 2019-02-13 PROCEDURE — 97166 OT EVAL MOD COMPLEX 45 MIN: CPT | Performed by: OCCUPATIONAL THERAPIST

## 2019-02-13 PROCEDURE — 80053 COMPREHEN METABOLIC PANEL: CPT | Performed by: INTERNAL MEDICINE

## 2019-02-13 PROCEDURE — 99222 1ST HOSP IP/OBS MODERATE 55: CPT | Performed by: INTERNAL MEDICINE

## 2019-02-13 PROCEDURE — 82962 GLUCOSE BLOOD TEST: CPT

## 2019-02-13 PROCEDURE — 0 GADOBENATE DIMEGLUMINE 529 MG/ML SOLUTION: Performed by: HOSPITALIST

## 2019-02-13 PROCEDURE — 74183 MRI ABD W/O CNTR FLWD CNTR: CPT

## 2019-02-13 PROCEDURE — 85025 COMPLETE CBC W/AUTO DIFF WBC: CPT | Performed by: INTERNAL MEDICINE

## 2019-02-13 PROCEDURE — 84443 ASSAY THYROID STIM HORMONE: CPT | Performed by: INTERNAL MEDICINE

## 2019-02-13 PROCEDURE — 25010000003 POTASSIUM CHLORIDE 10 MEQ/100ML SOLUTION: Performed by: HOSPITALIST

## 2019-02-13 PROCEDURE — 99221 1ST HOSP IP/OBS SF/LOW 40: CPT | Performed by: SURGERY

## 2019-02-13 PROCEDURE — 94799 UNLISTED PULMONARY SVC/PX: CPT

## 2019-02-13 PROCEDURE — 25010000003 AMPICILLIN-SULBACTAM PER 1.5 G: Performed by: INTERNAL MEDICINE

## 2019-02-13 PROCEDURE — 97535 SELF CARE MNGMENT TRAINING: CPT | Performed by: OCCUPATIONAL THERAPIST

## 2019-02-13 PROCEDURE — 82550 ASSAY OF CK (CPK): CPT | Performed by: INTERNAL MEDICINE

## 2019-02-13 RX ORDER — POTASSIUM CHLORIDE 1.5 G/1.77G
40 POWDER, FOR SOLUTION ORAL AS NEEDED
Status: DISCONTINUED | OUTPATIENT
Start: 2019-02-13 | End: 2019-02-18 | Stop reason: HOSPADM

## 2019-02-13 RX ORDER — TORSEMIDE 20 MG/1
40 TABLET ORAL DAILY
Status: DISCONTINUED | OUTPATIENT
Start: 2019-02-13 | End: 2019-02-18 | Stop reason: HOSPADM

## 2019-02-13 RX ORDER — POTASSIUM CHLORIDE 7.45 MG/ML
10 INJECTION INTRAVENOUS
Status: DISCONTINUED | OUTPATIENT
Start: 2019-02-13 | End: 2019-02-18 | Stop reason: HOSPADM

## 2019-02-13 RX ORDER — NICOTINE POLACRILEX 4 MG
15 LOZENGE BUCCAL
Status: DISCONTINUED | OUTPATIENT
Start: 2019-02-13 | End: 2019-02-18 | Stop reason: HOSPADM

## 2019-02-13 RX ORDER — POTASSIUM CHLORIDE 750 MG/1
40 CAPSULE, EXTENDED RELEASE ORAL AS NEEDED
Status: DISCONTINUED | OUTPATIENT
Start: 2019-02-13 | End: 2019-02-18 | Stop reason: HOSPADM

## 2019-02-13 RX ORDER — DEXTROSE MONOHYDRATE 25 G/50ML
25 INJECTION, SOLUTION INTRAVENOUS
Status: DISCONTINUED | OUTPATIENT
Start: 2019-02-13 | End: 2019-02-18 | Stop reason: HOSPADM

## 2019-02-13 RX ADMIN — HYDROMORPHONE HYDROCHLORIDE 0.5 MG: 1 INJECTION, SOLUTION INTRAMUSCULAR; INTRAVENOUS; SUBCUTANEOUS at 18:51

## 2019-02-13 RX ADMIN — PREGABALIN 150 MG: 75 CAPSULE ORAL at 01:39

## 2019-02-13 RX ADMIN — SODIUM CHLORIDE, PRESERVATIVE FREE 3 ML: 5 INJECTION INTRAVENOUS at 22:52

## 2019-02-13 RX ADMIN — BUSPIRONE HYDROCHLORIDE 30 MG: 15 TABLET ORAL at 08:40

## 2019-02-13 RX ADMIN — PREGABALIN 150 MG: 75 CAPSULE ORAL at 22:32

## 2019-02-13 RX ADMIN — PREGABALIN 150 MG: 75 CAPSULE ORAL at 08:40

## 2019-02-13 RX ADMIN — NYSTATIN: 100000 POWDER TOPICAL at 01:39

## 2019-02-13 RX ADMIN — AMPICILLIN SODIUM AND SULBACTAM SODIUM 1.5 G: 1; .5 INJECTION, POWDER, FOR SOLUTION INTRAMUSCULAR; INTRAVENOUS at 15:12

## 2019-02-13 RX ADMIN — HYDROMORPHONE HYDROCHLORIDE 0.5 MG: 1 INJECTION, SOLUTION INTRAMUSCULAR; INTRAVENOUS; SUBCUTANEOUS at 11:28

## 2019-02-13 RX ADMIN — AMPICILLIN SODIUM AND SULBACTAM SODIUM 1.5 G: 1; .5 INJECTION, POWDER, FOR SOLUTION INTRAMUSCULAR; INTRAVENOUS at 03:44

## 2019-02-13 RX ADMIN — BUSPIRONE HYDROCHLORIDE 30 MG: 15 TABLET ORAL at 22:32

## 2019-02-13 RX ADMIN — SODIUM CHLORIDE 50 ML/HR: 9 INJECTION, SOLUTION INTRAVENOUS at 03:47

## 2019-02-13 RX ADMIN — SODIUM CHLORIDE, PRESERVATIVE FREE 3 ML: 5 INJECTION INTRAVENOUS at 09:00

## 2019-02-13 RX ADMIN — ASPIRIN 81 MG: 81 TABLET, CHEWABLE ORAL at 08:40

## 2019-02-13 RX ADMIN — POTASSIUM CHLORIDE 10 MEQ: 7.46 INJECTION, SOLUTION INTRAVENOUS at 17:18

## 2019-02-13 RX ADMIN — ALBUTEROL SULFATE 2.5 MG: 2.5 SOLUTION RESPIRATORY (INHALATION) at 09:02

## 2019-02-13 RX ADMIN — BUSPIRONE HYDROCHLORIDE 30 MG: 15 TABLET ORAL at 01:39

## 2019-02-13 RX ADMIN — AMPICILLIN SODIUM AND SULBACTAM SODIUM 1.5 G: 1; .5 INJECTION, POWDER, FOR SOLUTION INTRAMUSCULAR; INTRAVENOUS at 22:31

## 2019-02-13 RX ADMIN — TORSEMIDE 40 MG: 20 TABLET ORAL at 17:18

## 2019-02-13 RX ADMIN — POTASSIUM CHLORIDE 40 MEQ: 750 CAPSULE, EXTENDED RELEASE ORAL at 18:42

## 2019-02-13 RX ADMIN — NYSTATIN: 100000 POWDER TOPICAL at 22:32

## 2019-02-13 RX ADMIN — FENTANYL 1 PATCH: 25 PATCH TRANSDERMAL at 22:48

## 2019-02-13 RX ADMIN — GADOBENATE DIMEGLUMINE 20 ML: 529 INJECTION, SOLUTION INTRAVENOUS at 21:43

## 2019-02-13 RX ADMIN — AMPICILLIN SODIUM AND SULBACTAM SODIUM 1.5 G: 1; .5 INJECTION, POWDER, FOR SOLUTION INTRAMUSCULAR; INTRAVENOUS at 08:40

## 2019-02-13 RX ADMIN — NYSTATIN: 100000 POWDER TOPICAL at 09:00

## 2019-02-13 NOTE — PLAN OF CARE
Problem: Patient Care Overview  Goal: Plan of Care Review  Outcome: Ongoing (interventions implemented as appropriate)   02/13/19 1510   Coping/Psychosocial   Plan of Care Reviewed With patient   Plan of Care Review   Progress improving   OTHER   Outcome Summary Pt came into ED on 2/12 due to B knee pain, 2 recent falls and B LE swelling. Pt reports she amb at home with RW and needs Nicolette for ADLs such as bathing. Pt sat EOB Nicolette and was able to stand minAx2. Pt amb with walker and CGAx2 towards door, Pt c/o knee pain, worse on the R. Pt demo increase SOA during amb and when returning to bed. Pt would benefit from skilled PT in order to improve functional mobility and decreased endurance.

## 2019-02-13 NOTE — CONSULTS
[facility]  Initial Inpatient Consult Note    Referring Provider: Dr Trejo    Reason for Consultation: Abnormal CT scan of bile ducts, elevated liver enzymes    Subjective     History of present illness:    78 y.o. female with complicated PMHx. She presented to ER yesterday evening c/o knee pain and generalized weakness. Pt was reportedly hospitalized at [facility] last week for partial SBO, and for that reason Ct scan A/P was performed in ER.     Report as follows:    1. Proximal small bowel shows mild abnormal distention with air-fluid   levels, up to the level of anastomosis at the midline abdomen, may   represent early or partial obstruction. Ventral hernias containing small   portions of bowel, as described. Colonic diverticulosis.   2. Increased biliary ductal dilatation could represent an obstructive   process does not identify identified on this exam, MRCP could be   obtained for further evaluation if indicated.    3. Nonobstructive right nephrolithiasis.   4. Appearance of skin thickening in the anterior pelvis, adjacent   stranding,  correlate clinically to exclude possibility of cellulitis.     Pt was subsequently admitted for these CT findings.  Labs shows slight elevation of AST/ALT and TB of 1.8. ALP is 400.  She reports some vague lower abdominal pain, nothing localizable to RUQ.  She states she had medium sized BM last evening.  No nausea/vomiting.        Past Medical History:  Past Medical History:   Diagnosis Date   • Anemia    • Anxiety    • Arthritis    • CHF (congestive heart failure) (CMS/HCC)    • Coronary artery disease    • Depression    • Disease of thyroid gland    • Fibromyalgia    • GERD (gastroesophageal reflux disease)    • Hypertension    • Moderate tricuspid valve stenosis    • Osteoporosis    • Peripheral neuropathy    • Pulmonary hypertension (CMS/HCC)    • SBO (small bowel obstruction) (CMS/HCC)    • Stenosis of mitral valve      Past Surgical  History:  Past Surgical History:   Procedure Laterality Date   • CARDIAC CATHETERIZATION     • CHOLECYSTECTOMY     • EXPLORATORY LAPAROTOMY     • GASTRIC BYPASS     • HERNIA REPAIR      inguinal and ventral   • HYSTERECTOMY     • OVARIAN CYST REMOVAL        Social History:   Social History     Tobacco Use   • Smoking status: Former Smoker   Substance Use Topics   • Alcohol use: No      Family History:  History reviewed. No pertinent family history.    Home Meds:  Medications Prior to Admission   Medication Sig Dispense Refill Last Dose   • alendronate (FOSAMAX) 35 MG tablet Take 35 mg by mouth.      • aspirin 81 MG chewable tablet Chew 81 mg.      • busPIRone (BUSPAR) 30 MG tablet Take 30 mg by mouth 2 (Two) Times a Day.      • fentaNYL (DURAGESIC) 25 MCG/HR patch Place 1 patch on the skin.      • ferrous sulfate 325 (65 FE) MG tablet Take 325 mg by mouth Daily With Breakfast.      • FLUoxetine (PROzac) 40 MG capsule Take 60 mg by mouth 2 (Two) Times a Day.      • HYDROcodone-acetaminophen (NORCO) 7.5-325 MG per tablet Take 1 tablet by mouth Every 6 (Six) Hours As Needed.      • hyoscyamine (LEVBID) 0.375 MG 12 hr tablet Take 0.375 mg by mouth 2 (Two) Times a Day.      • loperamide (IMODIUM) 2 MG capsule Take 2 mg by mouth 4 (Four) Times a Day As Needed.      • MYRBETRIQ 50 MG tablet sustained-release 24 hour 24 hr tablet TAKE ONE TABLET BY MOUTH DAILY 30 tablet 10    • nystatin (nystatin) 505451 UNIT/GM powder Apply  topically to the appropriate area as directed.      • pantoprazole (PROTONIX) 40 MG EC tablet Take 40 mg by mouth Daily.      • potassium chloride (K-DUR,KLOR-CON) 10 MEQ CR tablet Take 10 mEq by mouth Daily.      • pregabalin (LYRICA) 150 MG capsule Take 150 mg by mouth 2 (Two) Times a Day.      • QUEtiapine (SEROquel) 50 MG tablet Take 50 mg by mouth Every Night.      • torsemide (DEMADEX) 100 MG tablet TAKE ONE-HALF TABLET BY MOUTH DAILY ALTERNATIING WITH ONE-HALF TABLET BY MOUTH TWICE A DAY EVERY  OTHER DAY      • VENTOLIN  (90 Base) MCG/ACT inhaler Every 4 (Four) Hours As Needed.      • vitamin D (ERGOCALCIFEROL) 10945 units capsule capsule TAKE ONE CAPSULE BY MOUTH EVERY MONDAY FOR VITAMIN SUPPLEMENT      • Calcium Carbonate-Vitamin D 500-125 MG-UNIT tablet Take  by mouth.      • levothyroxine (SYNTHROID, LEVOTHROID) 50 MCG tablet Take 50 mcg by mouth Daily.        Current Meds:     ampicillin-sulbactam 1.5 g Intravenous Q6H   aspirin 81 mg Oral Daily   busPIRone 30 mg Oral BID   fentaNYL 1 patch Transdermal Q3 Days   nystatin  Topical Q12H   pregabalin 150 mg Oral BID   QUEtiapine 50 mg Oral Nightly   sodium chloride 3 mL Intravenous Q12H     Allergies:  Allergies   Allergen Reactions   • Aspartame Other (See Comments)     CAUSES MIGRAINES   • Cephalexin Rash     Review of Systems  All systems were reviewed and negative except for:  Constitution:  positive for fatigue  Gastrointestinal: positive for  pain  Musculoskeletal: positive for  joint pain     Objective     Vital Signs  Temp:  [97 °F (36.1 °C)-99.5 °F (37.5 °C)] 98.4 °F (36.9 °C)  Heart Rate:  [] 82  Resp:  [14-20] 18  BP: (102-151)/(60-86) 117/73  Physical Exam:  General Appearance:    Alert, cooperative, in no acute distress   Head:    Normocephalic, without obvious abnormality, atraumatic   Eyes:            Lids and lashes normal, conjunctivae and sclerae normal, no   icterus   Throat:   No oral lesions, no thrush, oral mucosa moist   Neck:   No adenopathy, supple, trachea midline, no thyromegaly, no   carotid bruit, no JVD   Lungs:     Clear to auscultation,respirations regular, even and                   unlabored    Heart:    Regular rhythm and normal rate, normal S1 and S2, no            murmur, no gallop, no rub, no click   Chest Wall:    No abnormalities observed   Abdomen:     Morbidly obese, exam limited by body habitus.  Abdomen overall soft, non-surgical exam   Rectal:     Deferred   Extremities:   no edema, no cyanosis,  no redness   Skin:   No bleeding, bruising or rash   Lymph nodes:   No palpable adenopathy   Psychiatric:  Judgement and insight: normal   Orientation to person place and time: normal   Mood and affect: normal   Results Review:   I reviewed the patient's new clinical results.  I reviewed the patient's new imaging results and agree with the interpretation.    Results from last 7 days   Lab Units 02/13/19  0509 02/12/19  1454   WBC 10*3/mm3 6.41 8.41   HEMOGLOBIN g/dL 10.4* 12.2   HEMATOCRIT % 33.3* 37.8   PLATELETS 10*3/mm3 151 167     Results from last 7 days   Lab Units 02/13/19  0509 02/12/19  1454   SODIUM mmol/L 138 138   POTASSIUM mmol/L 3.3* 3.6   CHLORIDE mmol/L 103 100   CO2 mmol/L 23.8 24.8   BUN mg/dL 19 26*   CREATININE mg/dL 0.78 1.01*   CALCIUM mg/dL 7.9* 8.7   BILIRUBIN mg/dL 1.8* 3.1*   ALK PHOS U/L 430* 565*   ALT (SGPT) U/L 74* 107*   AST (SGOT) U/L 43* 78*   GLUCOSE mg/dL 91 102*         No results found for: LIPASE    Radiology:  CT Abdomen Pelvis With Contrast   Final Result           1. Proximal small bowel shows mild abnormal distention with air-fluid   levels, up to the level of anastomosis at the midline abdomen, may   represent early or partial obstruction. Ventral hernias containing small   portions of bowel, as described. Colonic diverticulosis.   2. Increased biliary ductal dilatation could represent an obstructive   process does not identify identified on this exam, MRCP could be   obtained for further evaluation if indicated.    3. Nonobstructive right nephrolithiasis.   4. Appearance of skin thickening in the anterior pelvis, adjacent   stranding,  correlate clinically to exclude possibility of cellulitis.       Discussed by telephone Dr. Torres at 1909, 2/12/2019.       This report was finalized on 2/12/2019 7:14 PM by Dr. Benny Lerma M.D.          XR Knee 1 or 2 View Bilateral   Final Result   1. Negative chest x-ray.   2. Advanced osteophytic change of both knees. No acute  abnormality   identified in either knee.       This report was finalized on 2/12/2019 6:07 PM by Dr. Terry Brothers M.D.          XR Chest 2 View   Final Result   1. Negative chest x-ray.   2. Advanced osteophytic change of both knees. No acute abnormality   identified in either knee.       This report was finalized on 2/12/2019 6:07 PM by Dr. eTrry Brothers M.D.              Assessment/Plan   Assessment:   1.  Elevated liver enzymes with dilated CBC on CT scan  2.  ? Partial SBO  3.  Morbid obesity with hx of remote Sophy-y gastric bypass  4.  Hx of IBS-D    Plan:   Will arrange for MRI/MRCP for further evaluation of biliary dilation on CT scan.  Her LFTs are improving since admission.  Her hx of Sophy gastric bypass will make ERCP technically difficult if required  Await surgical consultation regarding possible partial SBO, although clinically benign exam  Await records from [facility]    I discussed the patients findings and my recommendations with patient.         Krish Sierra M.D.  [facility]  [address] [place] [address]  Office: [phone]

## 2019-02-13 NOTE — PROGRESS NOTES
Discharge Planning Assessment  [facility]     Patient Name: Rose Cheema  MRN: [id number]  Today's Date: 2/13/2019    Admit Date: 2/12/2019    Discharge Needs Assessment     Row Name 02/13/19 1511       Living Environment    Lives With  spouse    Current Living Arrangements  home/apartment/condo    Primary Care Provided by  self    Provides Primary Care For  no one, unable/limited ability to care for self    Family Caregiver if Needed  child(arlene), adult    Family Caregiver Names  son helps with shopping and appointments Miriam [phone]    Quality of Family Relationships  helpful    Able to Return to Prior Arrangements  other (see comments) Will likely need SNF       Resource/Environmental Concerns    Resource/Environmental Concerns  none       Transition Planning    Patient/Family Anticipates Transition to  inpatient rehabilitation facility    Patient/Family Anticipated Services at Transition  skilled nursing    Transportation Anticipated  family or friend will provide       Discharge Needs Assessment    Readmission Within the Last 30 Days  no previous admission in last 30 days    Concerns to be Addressed  basic needs    Equipment Currently Used at Home  walker, rolling;bipap/cpap;nebulizer;grab bar;shower chair    Anticipated Changes Related to Illness  inability to care for self    Discharge Facility/Level of Care Needs  nursing facility, skilled        Discharge Plan     Row Name 02/13/19 1511       Plan    Plan  [facility] vs. return home with  and [facility] following.    Patient/Family in Agreement with Plan  yes    Plan Comments  Spoke with patient at bedside.  Patient lives with  but she states he is not well, and that they both struggle at home with ADL.  Son Miriam Argueta [phone] does shopping and takes them for appointments.  Patient has a walker, shower chair, grab bars, nebulizer and C-pap from [place].  Patient is current with [facility] - spoke with Rachelle and she will  follow.  Patient is interested in Meals on Wheels - Rachelle is aware and will make a note for SW.  Patient is aware that she will likely need SNF at [place].  She has been to [facility] in the past but does not want to return.  She is interested in [place] - spoke with Prerna and she will evaluate. Packet started and in CCP office.  CCP will follow.  BHumeniuk RN        Destination      Service Provider Request Status Selected Services Address Phone Number Fax Number    [facility] Pending - Request Sent N/A [address], [facility] [address] [phone]         Home Medical Care      Service Provider Request Status Selected Services Address Phone Number Fax Number    [facility] Accepted N/A [address], [address], [facility] [address] [phone]      [facility] Resources      No service coordination in this encounter.          Demographic Summary     Row Name 02/13/19 1509       General Information    Admission Type  inpatient    Arrived From  home    Referral Source  admission list    Reason for Consult  discharge planning    Preferred Language  English     Used During This Interaction  no        Functional Status     Row Name 02/13/19 1509       Functional Status    Usual Activity Tolerance  fair    Current Activity Tolerance  poor       Functional Status, IADL    Medications  independent    Meal Preparation  assistive person Asking about Meals on Wheels    Housekeeping  assistive person    Laundry  assistive person    Shopping  assistive person son drives for shopping and appointments       Mental Status    General Appearance WDL  WDL       Mental Status Summary    Recent Changes in Mental Status/Cognitive Functioning  no changes                Becky S. Humeniuk, RN

## 2019-02-13 NOTE — PROGRESS NOTES
"   LOS: 1 day   Patient Care Team:  Cheng Guevara MD as PCP - General (Internal Medicine)  Joe Clahoun MD as PCP - Claims Attributed    Chief Complaint: abd pain    Subjective     Feeling much better today. Had a BM earlier that seems to have relieved a lot of her pain. No N/V. Tolerating ice chips. She is chronically incontinent of urine. No dysuria today (urine is very dark). Worried that she is not receiving her daily diuretic.        Subjective:  Symptoms:  Improved.  She reports malaise, weakness and anorexia.  No shortness of breath, cough, chest pain, headache, chest pressure, diarrhea or anxiety.    Diet:  NPO.  No nausea or vomiting.    Activity level: Impaired due to weakness.    Pain:  She reports no pain.        History taken from: patient chart RN    Objective     Vital Signs  Temp:  [98.3 °F (36.8 °C)-99.5 °F (37.5 °C)] 98.3 °F (36.8 °C)  Heart Rate:  [] 80  Resp:  [15-20] 18  BP: (102-151)/(60-86) 128/71    Objective:  General Appearance:  Comfortable and in no acute distress.    Vital signs: (most recent): Blood pressure 128/71, pulse 80, temperature 98.3 °F (36.8 °C), temperature source Oral, resp. rate 18, height 157.5 cm (62\"), weight 124 kg (272 lb 6.4 oz), SpO2 94 %, not currently breastfeeding.  Vital signs are normal.  No fever.    Output: Producing urine and producing stool.    HEENT: Normal HEENT exam.    Lungs:  Normal effort and normal respiratory rate.  Breath sounds clear to auscultation.  She is not in respiratory distress.    Heart: Normal rate.  Regular rhythm.    Abdomen: Abdomen is soft.  (Morbidly obese).  Bowel sounds are normal.   There is no abdominal tenderness.     Extremities: There is dependent edema (1-2+ chronic in BLEs).    Pulses: Distal pulses are intact.    Neurological: Patient is alert and oriented to person, place and time.    Skin:  Warm and dry.  No rash.             Results Review:     I reviewed the patient's new clinical results.  I reviewed " the patient's new imaging results and agree with the interpretation.  I reviewed the patient's other test results and agree with the interpretation  I personally viewed and interpreted the patient's EKG/Telemetry data  Discussed with patient, RN, and CCP    Medication Review: reviewed and adjusted    Assessment/Plan       Generalized abdominal pain    Benign essential hypertension    Bilateral lower extremity edema (chronic)    COPD (chronic obstructive pulmonary disease) (CMS/HCC)    Obesity    Obstructive sleep apnea    Secondary pulmonary arterial hypertension (CMS/HCC)    Fibromyalgia    Hx SBO    Hx of cholecystectomy    Hypoglycemia    Fall    Disease of thyroid gland    Chronic pain syndrome    Acute UTI ([facility])    Anemia          Plan:   (Appreciate Dr. Sierra's attention to pt  MRCP pending re: elevated LFTs and dilated CBD on CT  Continue Unasyn for UTI, culture pending  Surg to see for possible pSBO on CT  Continue IVFs and NPO except for ice chips and sips with meds  Restart low dose of Torsemide  Replace K+ IV route, checked Mg++ and it is fine  Check anemia labs, suspect chronic disease and/or malabsorption given her h/o Sophy en Y bypass  ).       Terry Godoy MD  02/13/19  3:24 PM    Time: 25min

## 2019-02-13 NOTE — DISCHARGE PLACEMENT REQUEST
"Rose Cheema (78 y.o. Female)     Date of Birth Social Security Number Address Home Phone MRN    1940  [address] [address] [phone] [id number]    [employer] Marital Status          None Single       Admission Date Admission Type Admitting Provider Attending Provider Department, Room/Bed    2/12/19 Emergency Flori Trejo MD Benton, John B, MD [address], [address]    Discharge Date Discharge Disposition Discharge Destination                       Attending Provider:  Terry Godoy MD    Allergies:  Aspartame, Cephalexin    Isolation:  None   Infection:  None   Code Status:  No CPR    Ht:  157.5 cm (62\")   Wt:  124 kg (272 lb 6.4 oz)    Admission Cmt:  None   Principal Problem:  None                Active Insurance as of 2/12/2019     Primary Coverage     Payor Plan Insurance Group Employer/Plan Group    MEDICARE MEDICARE A & B      Payor Plan Address Payor Plan Phone Number Payor Plan Fax Number Effective Dates    [address] [phone]  5/1/2008 - None Entered    [facility] [address]       Subscriber Name Subscriber Birth Date Member ID       ROSE CHEEMA 1940 [id number]           Secondary Coverage     Payor Plan Insurance Group Employer/Plan Group    [facility][1] [id number]     Payor Plan Address Payor Plan Phone Number Payor Plan Fax Number Effective Dates    [address]   9/1/2004 - None Entered    [facility] [id number]       Subscriber Name Subscriber Birth Date Member ID       ROSE CHEEMA 1940 [id number]                 Emergency Contacts      (Rel.) Home Phone Work Phone Mobile Phone    Miriam Cheema (Son) [phone] -- --        "

## 2019-02-13 NOTE — H&P
"PCP: Cheng Guevara MD    Chief complaint   Chief Complaint   Patient presents with   • Knee Pain   • Edema     bilateral lower extremity swelling       HPI  Patient is a 78 y.o. female presents with history of CHF, fibromyalgia who presents to the ER with hypoglycemia, bilateral knee pain acute on chronic, abdominal pain, bilateral lower extremity swelling.   Hypoglycemia- \"EMS also states pt's blood glucose 78, ems gave oral glucose and blood sugar dropped to 58, pt given 1amp d50, blood sugar 97. Blood glucose 108 upon arrival to ED. \" pt does not have a history of diabetes.    Patient has degenerative joint disease and sees Davie/Andrea.  But patient states that she fell and her knees gave her a warning and she just slowly went to the floor due to the knee pain but no dizziness no syncope and no loss of consciousness.    The bilateral lower extremity swelling has been going on for years it got worse a year ago and she was diagnosed with lymphedema and that seems to be unchanged after further questioning.    Abdominal pain is cramping over her lower abdomen, and is somewhat new but worsened.  Come to find out agent has abdominal pain and was at [facility] a few days ago with nausea and vomiting and she said that she had a small bowel obstruction and they gave her IV antibiotics but she wasn't sure what for and then she went to a nursing home and then she is been at home with her  for 2 nights and now back.  He said a general surgeon saw her there.  She says that she has IBS and she normally takes antidiarrhea medications a couple times a day and then she took too much of it and then she was constipated and then she said she passed a stool that was the size her hand and then had some diarrhea after that.  But she has not had any bowel movements in the last 1-2 days and she normally goes multiple times a day and she had some nausea.      PAST MEDICAL HISTORY  Past Medical History:   Diagnosis Date "   • Anemia    • Anxiety    • Arthritis    • CHF (congestive heart failure) (CMS/HCC)    • Coronary artery disease    • Depression    • Disease of thyroid gland    • Fibromyalgia    • GERD (gastroesophageal reflux disease)    • Hypertension    • Moderate tricuspid valve stenosis    • Osteoporosis    • Peripheral neuropathy    • Pulmonary hypertension (CMS/HCC)    • SBO (small bowel obstruction) (CMS/HCC)    • Stenosis of mitral valve        PAST SURGICAL HISTORY  Past Surgical History:   Procedure Laterality Date   • CARDIAC CATHETERIZATION     • CHOLECYSTECTOMY     • EXPLORATORY LAPAROTOMY     • GASTRIC BYPASS     • HERNIA REPAIR      inguinal and ventral   • HYSTERECTOMY     • OVARIAN CYST REMOVAL         FAMILY HISTORY  History reviewed. No pertinent family history.    SOCIAL HISTORY  Social History     Tobacco Use   • Smoking status: Former Smoker   Substance Use Topics   • Alcohol use: No   • Drug use: No       MEDICATIONS:  Medications Prior to Admission   Medication Sig Dispense Refill Last Dose   • alendronate (FOSAMAX) 35 MG tablet Take 35 mg by mouth.      • aspirin 81 MG chewable tablet Chew 81 mg.      • busPIRone (BUSPAR) 30 MG tablet Take 30 mg by mouth 2 (Two) Times a Day.      • fentaNYL (DURAGESIC) 25 MCG/HR patch Place 1 patch on the skin.      • ferrous sulfate 325 (65 FE) MG tablet Take 325 mg by mouth Daily With Breakfast.      • FLUoxetine (PROzac) 40 MG capsule Take 60 mg by mouth 2 (Two) Times a Day.      • HYDROcodone-acetaminophen (NORCO) 7.5-325 MG per tablet Take 1 tablet by mouth Every 6 (Six) Hours As Needed.      • hyoscyamine (LEVBID) 0.375 MG 12 hr tablet Take 0.375 mg by mouth 2 (Two) Times a Day.      • loperamide (IMODIUM) 2 MG capsule Take 2 mg by mouth 4 (Four) Times a Day As Needed.      • MYRBETRIQ 50 MG tablet sustained-release 24 hour 24 hr tablet TAKE ONE TABLET BY MOUTH DAILY 30 tablet 10    • nystatin (nystatin) 722884 UNIT/GM powder Apply  topically to the appropriate  "area as directed.      • pantoprazole (PROTONIX) 40 MG EC tablet Take 40 mg by mouth Daily.      • potassium chloride (K-DUR,KLOR-CON) 10 MEQ CR tablet Take 10 mEq by mouth Daily.      • pregabalin (LYRICA) 150 MG capsule Take 150 mg by mouth 2 (Two) Times a Day.      • QUEtiapine (SEROquel) 50 MG tablet Take 50 mg by mouth Every Night.      • torsemide (DEMADEX) 100 MG tablet TAKE ONE-HALF TABLET BY MOUTH DAILY ALTERNATIING WITH ONE-HALF TABLET BY MOUTH TWICE A DAY EVERY OTHER DAY      • VENTOLIN  (90 Base) MCG/ACT inhaler Every 4 (Four) Hours As Needed.      • vitamin D (ERGOCALCIFEROL) 52962 units capsule capsule TAKE ONE CAPSULE BY MOUTH EVERY MONDAY FOR VITAMIN SUPPLEMENT      • Calcium Carbonate-Vitamin D 500-125 MG-UNIT tablet Take  by mouth.      • levothyroxine (SYNTHROID, LEVOTHROID) 50 MCG tablet Take 50 mcg by mouth Daily.          Allergies:  Aspartame and Cephalexin    Review of Systems:  Constipation, uses a walker, chronic back pain, dysuria since d/c, chronic bilater arthritis pain,   Negative for following (except as per HPI):  Constitution: chills, fevers,   Eyes: change of vision, loss of vision and discharge  ENT: ear drainage, ear ringing and facial trauma  Respiratory: cough, pleuritic pain, shortness of air  Cardiovascular: chest pressure, pain, lower extremity edema, palpitations  Gastrointestinal: , , nausea, vomiting,   Integument: rash and wound  Hematologic / Lymphatic: excessive bleeding and easy bruising  Musculoskeletal: joint pain, joint stiffness, joint swelling and muscle pain  Neurological: headaches, numbness, seizures and tremors  Behavioral / Psych: anxiety, depression and hallucinations         Vital Signs  Temp:  [97 °F (36.1 °C)-99.5 °F (37.5 °C)] 99.5 °F (37.5 °C)  Heart Rate:  [] 85  Resp:  [14-20] 20  BP: (102-151)/(60-86) 134/84  Flowsheet Rows      First Filed Value   Admission Height  157.5 cm (62\") Documented at 02/12/2019 1400   Admission Weight  127 kg " (280 lb) Documented at 02/12/2019 1400         Body mass index is 49.79 kg/m².    Physical Exam:  General Appearance:    Alert, cooperative, in no acute distress   Head:    Normocephalic, without obvious abnormality, atraumatic   Eyes:         conjunctivae and sclerae normal, no icterus, PERRLA   ENT:    Ears grossly intact, oral mucosa dry, dentures   Neck:   No adenopathy, supple, trachea midline,    Back:     Normal to inspection, range of motion normal   Lungs:     Clear to auscultation,respirations regular, even and                   unlabored    Heart:    Regular rhythm and normal rate,  no murmur, normal S1 and S2,   Abdomen:     Normal bowel sounds, no masses,  soft non-tender, non-distended,    Extremities:   Moves all extremities , no cyanosis, 2+ lymphedema with chronic hyperemia,             Pulses:   Pulses palpable and equal bilaterally   Skin:   No bleeding, rash, bruising ; varicose veins ble, surg scar over right ankle   Neurologic:    Psych:   Cranial nerves 2 - 12 grossly intact, sensation grossly intact,     Moves all extremities well, equal bilateral strength 3/5    Alert and Oriented x 3, flat Affect   LABS:  Admission on 02/12/2019   Component Date Value Ref Range Status   • Glucose 02/12/2019 108  70 - 130 mg/dL Final   • Glucose 02/12/2019 109  70 - 130 mg/dL Final   • Glucose 02/12/2019 102* 65 - 99 mg/dL Final   • BUN 02/12/2019 26* 8 - 23 mg/dL Final   • Creatinine 02/12/2019 1.01* 0.57 - 1.00 mg/dL Final   • Sodium 02/12/2019 138  136 - 145 mmol/L Final   • Potassium 02/12/2019 3.6  3.5 - 5.2 mmol/L Final   • Chloride 02/12/2019 100  98 - 107 mmol/L Final   • CO2 02/12/2019 24.8  22.0 - 29.0 mmol/L Final   • Calcium 02/12/2019 8.7  8.6 - 10.5 mg/dL Final   • Total Protein 02/12/2019 6.1  6.0 - 8.5 g/dL Final   • Albumin 02/12/2019 3.10* 3.50 - 5.20 g/dL Final   • ALT (SGPT) 02/12/2019 107* 1 - 33 U/L Final   • AST (SGOT) 02/12/2019 78* 1 - 32 U/L Final   • Alkaline Phosphatase 02/12/2019  565* 39 - 117 U/L Final   • Total Bilirubin 02/12/2019 3.1* 0.1 - 1.2 mg/dL Final   • eGFR Non African Amer 02/12/2019 53* >60 mL/min/1.73 Final   • Globulin 02/12/2019 3.0  gm/dL Final   • A/G Ratio 02/12/2019 1.0  g/dL Final   • BUN/Creatinine Ratio 02/12/2019 25.7* 7.0 - 25.0 Final   • Anion Gap 02/12/2019 13.2  mmol/L Final   • Color, UA 02/12/2019 Dark Yellow* Yellow, Straw Final   • Appearance, UA 02/12/2019 Cloudy* Clear Final   • pH, UA 02/12/2019 8.5* 5.0 - 8.0 Final   • Specific Gravity, UA 02/12/2019 1.010  1.005 - 1.030 Final   • Glucose, UA 02/12/2019 Negative  Negative Final   • Ketones, UA 02/12/2019 Negative  Negative Final   • Bilirubin, UA 02/12/2019 Negative  Negative Final   • Blood, UA 02/12/2019 Negative  Negative Final   • Protein, UA 02/12/2019 Trace* Negative Final   • Leuk Esterase, UA 02/12/2019 Large (3+)* Negative Final   • Nitrite, UA 02/12/2019 Negative  Negative Final   • Urobilinogen, UA 02/12/2019 1.0 E.U./dL  0.2 - 1.0 E.U./dL Final   • C-Reactive Protein 02/12/2019 9.12* 0.00 - 0.50 mg/dL Final   • proBNP 02/12/2019 307.9  0.0-1,800.0 pg/mL Final   • Troponin T 02/12/2019 <0.010  0.000 - 0.030 ng/mL Final   • WBC 02/12/2019 8.41  3.40 - 10.80 10*3/mm3 Final   • RBC 02/12/2019 3.93  3.77 - 5.28 10*6/mm3 Final   • Hemoglobin 02/12/2019 12.2  12.0 - 15.9 g/dL Final   • Hematocrit 02/12/2019 37.8  34.0 - 46.6 % Final   • MCV 02/12/2019 96.2  79.0 - 97.0 fL Final   • MCH 02/12/2019 31.0  26.6 - 33.0 pg Final   • MCHC 02/12/2019 32.3  31.5 - 35.7 g/dL Final   • RDW 02/12/2019 14.8  12.3 - 15.4 % Final   • RDW-SD 02/12/2019 52.6  37.0 - 54.0 fl Final   • MPV 02/12/2019 11.4  6.0 - 12.0 fL Final   • Platelets 02/12/2019 167  140 - 450 10*3/mm3 Final   • Neutrophil % 02/12/2019 79.5* 42.7 - 76.0 % Final   • Lymphocyte % 02/12/2019 11.8* 19.6 - 45.3 % Final   • Monocyte % 02/12/2019 7.4  5.0 - 12.0 % Final   • Eosinophil % 02/12/2019 0.8  0.3 - 6.2 % Final   • Basophil % 02/12/2019 0.1  0.0  - 1.5 % Final   • Immature Grans % 02/12/2019 0.4  0.0 - 0.5 % Final   • Neutrophils, Absolute 02/12/2019 6.69  1.40 - 7.00 10*3/mm3 Final   • Lymphocytes, Absolute 02/12/2019 0.99  0.70 - 3.10 10*3/mm3 Final   • Monocytes, Absolute 02/12/2019 0.62  0.10 - 0.90 10*3/mm3 Final   • Eosinophils, Absolute 02/12/2019 0.07  0.00 - 0.40 10*3/mm3 Final   • Basophils, Absolute 02/12/2019 0.01  0.00 - 0.20 10*3/mm3 Final   • Immature Grans, Absolute 02/12/2019 0.03  0.00 - 0.05 10*3/mm3 Final   • nRBC 02/12/2019 0.0  0.0 - 0.0 /100 WBC Final   • RBC, UA 02/12/2019 3-5* None Seen, 0-2 /HPF Final   • WBC, UA 02/12/2019 21-30* None Seen, 0-2 /HPF Final   • Bacteria, UA 02/12/2019 2+* None Seen /HPF Final   • Squamous Epithelial Cells, UA 02/12/2019 7-12* None Seen, 0-2 /HPF Final   • Hyaline Casts, UA 02/12/2019 7-12  None Seen /LPF Final   • Methodology 02/12/2019 Manual Light Microscopy   Final   • Acetaminophen 02/12/2019 <5.0* 10.0 - 30.0 mcg/mL Final       DIAGNOSTICS:  Xr Chest 2 View    Result Date: 2/12/2019  1. Negative chest x-ray. 2. Advanced osteophytic change of both knees. No acute abnormality identified in either knee.  This report was finalized on 2/12/2019 6:07 PM by Dr. Terry Brothers M.D.      Ct Abdomen Pelvis With Contrast    Result Date: 2/12/2019    1. Proximal small bowel shows mild abnormal distention with air-fluid levels, up to the level of anastomosis at the midline abdomen, may represent early or partial obstruction. Ventral hernias containing small portions of bowel, as described. Colonic diverticulosis. 2. Increased biliary ductal dilatation could represent an obstructive process does not identify identified on this exam, MRCP could be obtained for further evaluation if indicated. 3. Nonobstructive right nephrolithiasis. 4. Appearance of skin thickening in the anterior pelvis, adjacent stranding,  correlate clinically to exclude possibility of cellulitis.  Discussed by telephone Dr. Torres at  [id number], 2/12/2019.  This report was finalized on 2/12/2019 7:14 PM by Dr. Benny Lerma M.D.      Xr Knee 1 Or 2 View Bilateral    Result Date: 2/12/2019  1. Negative chest x-ray. 2. Advanced osteophytic change of both knees. No acute abnormality identified in either knee.  This report was finalized on 2/12/2019 6:07 PM by Dr. Terry Brothers M.D.             Results Review:   I reviewed the patient's new clinical results.  Discussed with ER physician  I personally viewed and interpreted the patient's EKG/Telemetry data  Old records reviewed     ASSESSMENT AND PLAN     + Generalized abdominal pain/  Hx SBO  -likely multifactorial  -poss early or partial bowel obstruction and patient possibly was diagnosed with that at [facility] last week (patient is a horrible historian and no records here yet)  -UTI- restart Zosyn in the ER due to patient having a cephalosporin allergy, we'll continue Unasyn  -h/o constipation/IBS- monitor  -consult gen surgery, monitor flatus, bm's , abdominal exams  -npo  -get old records to compare    +Elev LFT's, hyperbilirubinemia, Increased biliary ductal dilatation could represent an obstructive process /  Hx of cholecystectomy  -could be obstructive process  -will order GGT and fractionated bili  -get records from hospitalization a couple weeks ago  -consult GI  -Need to get an MRCP    +hypoglycemia  -Unclear the etiology  -Patient may not have been eating well since recent discharge  -monitor and place on hypoglycemia protocol    +  Pulmonary hypertension / COPD /Obstructive sleep apnea  -stable, continue medical management     + Benign essential hypertension/ Bilateral lower extremity edema/ Diastolic dysfunction    -appears to be chronic; bnp not elevated therefore will not diuresis and seems to be mostly lymphedema       + Fibromyalgia/Obesity    -sTable, seems that she goes to pain management as on a fentanyl patch      +DVT proph: scd  +Medical decision maker: son kayode    I discussed  the patients findings and my recommendations with the patient and/or family.  Please reference all orders placed.    Flori Trejo MD  02/12/19  11:21 PM

## 2019-02-13 NOTE — PLAN OF CARE
Problem: Patient Care Overview  Goal: Plan of Care Review   02/13/19 9368   Coping/Psychosocial   Plan of Care Reviewed With patient   Plan of Care Review   Progress improving   OTHER   Outcome Summary pt evaluated for OT, pt was dep w. toileting care in bed, pt could roll well to the side, but not up for getting to EOB as she wants her nebulizer tx first. Pt completed some AROM UE ex. she continues to benefit from OT to address self-care, strength, and endurance

## 2019-02-13 NOTE — THERAPY EVALUATION
Acute Care - Occupational Therapy Initial Evaluation  [facility]     Patient Name: Rose Cheema  : 1940  MRN: [id number]  Today's Date: 2019  Onset of Illness/Injury or Date of Surgery: 19          Admit Date: 2019       ICD-10-CM ICD-9-CM   1. Generalized abdominal pain R10.84 789.07   2. Elevated LFTs R94.5 790.6   3. Dilation of biliary tract K83.8 576.8   4. Acute UTI N39.0 599.0   5. Partial small bowel obstruction (CMS/HCC)- early K56.600 560.9   6. Bilateral leg edema R60.0 782.3   7. Kidney stone on right side N20.0 592.0     Patient Active Problem List   Diagnosis   • Urinary incontinence   • Urinary frequency   • Generalized abdominal pain   • Pulmonary hypertension    • Benign essential hypertension   • Bilateral lower extremity edema   • CAD (coronary artery disease), native coronary artery   • COPD (chronic obstructive pulmonary disease) (CMS/HCC)   • Diastolic dysfunction   • Obesity   • Obstructive sleep apnea   • Secondary pulmonary arterial hypertension (CMS/HCC)   • H/O: hysterectomy   • Fibromyalgia   • Hx SBO   • Hx of cholecystectomy   • Hypoglycemia   • Fall     Past Medical History:   Diagnosis Date   • Anemia    • Anxiety    • Arthritis    • CHF (congestive heart failure) (CMS/HCC)    • Coronary artery disease    • Depression    • Disease of thyroid gland    • Fibromyalgia    • GERD (gastroesophageal reflux disease)    • Hypertension    • Moderate tricuspid valve stenosis    • Osteoporosis    • Peripheral neuropathy    • Pulmonary hypertension (CMS/HCC)    • SBO (small bowel obstruction) (CMS/HCC)    • Stenosis of mitral valve      Past Surgical History:   Procedure Laterality Date   • CARDIAC CATHETERIZATION     • CHOLECYSTECTOMY     • EXPLORATORY LAPAROTOMY     • GASTRIC BYPASS     • HERNIA REPAIR      inguinal and ventral   • HYSTERECTOMY     • OVARIAN CYST REMOVAL            OT ASSESSMENT FLOWSHEET (last 72 hours)      Occupational Therapy Evaluation     Row  Name 02/13/19 1219                   OT Evaluation Time/Intention    Subjective Information  complains of wanting a breathing treatment part way through session  -        Document Type  evaluation  -        Mode of Treatment  individual therapy;occupational therapy  -        Patient Effort  adequate  -        Comment  when attempting to get pt up, pt then states she wants to stay in bed get breathing treatment and will get up later. pt apologizes to OT  -           General Information    Patient Profile Reviewed?  yes  -        Onset of Illness/Injury or Date of Surgery  02/12/19  -        Patient Observations  alert;cooperative;agree to therapy  -        Patient/Family Observations  pt supine in bed  -        Prior Level of Function  ADL's;transfer;min assist:;independent:  -        Equipment Currently Used at Home  walker, rolling  -        Pertinent History of Current Functional Problem  abdominal pain, LE swelling that she has had for years, B knee pain  -        Existing Precautions/Restrictions  fall  -        Benefits Reviewed  patient:;improve function;increase independence;increase strength;increase balance  -           Cognitive Assessment/Intervention- PT/OT    Orientation Status (Cognition)  oriented x 3  -        Follows Commands (Cognition)  follows one step commands;over 90% accuracy  -           Bed Mobility Assessment/Treatment    Bed Mobility Assessment/Treatment  rolling left;rolling right  -        Rolling Left [place] (Bed Mobility)  set up;minimum assist (75% patient effort);supervision  -        Rolling Right [place] (Bed Mobility)  set up;minimum assist (75% patient effort)  -        Comment (Bed Mobility)  started to get pt to EOB then states she would rather wait until she gets the nebulizer tx which  she does at home 4x a day  -           ADL Assessment/Intervention    BADL Assessment/Intervention  toileting  -           Toileting  Assessment/Training    [place] Level (Toileting)  toileting skills;set up;dependent (less than 25% patient effort);perform perineal hygiene;change pad/brief  -KP        Toileting Position  supine  -KP           General ROM    GENERAL ROM COMMENTS  R UE 2/3 L UE 8/8  -KP           MMT (Manual Muscle Testing)    General MMT Comments  R UE 3/5 L UE 3+/5  -KP           Motor Assessment/Interventions    Additional Documentation  Therapeutic Exercise Interventions (Group);Therapeutic Exercise (Group);Fine Motor Testing & Training (Group);Functional Endurance Training (Group)  -KP           Therapeutic Exercise    Upper Extremity (Therapeutic Exercise)  bicep curl, left;bicep curl, right  -KP        Upper Extremity Range of Motion (Therapeutic Exercise)  shoulder flexion/extension, left;shoulder flexion/extension, right  -KP        Exercise Type (Therapeutic Exercise)  AAROM (active assistive range of motion);AROM (active range of motion)  -KP        Position (Therapeutic Exercise)  supine  -KP        Sets/Reps (Therapeutic Exercise)  10x1  -KP        Expected Outcome (Therapeutic Exercise)  improve performance, BADLs;improve functional tolerance, self-care activity  -KP           Fine Motor Testing & Training    Comment, Fine Motor Coordination  opposition in tact  -KP           Functional Endurance Training    Comment, Functional Endurance  fair +  -KP           Sensory Assessment/Intervention    Sensory General Assessment  no sensation deficits identified  -KP           Positioning and Restraints    Pre-Treatment Position  in bed  -KP        Post Treatment Position  bed  -KP        In Bed  side lying right;call light within reach;encouraged to call for assist;exit alarm on  -KP           Pain Assessment    Additional Documentation  Pain Scale: Numbers Pre/Post-Treatment (Group)  -KP           Pain Scale: Numbers Pre/Post-Treatment    Pain Scale: Numbers, Pretreatment  0/10 - no pain  -KP        Pain Scale: Numbers,  Post-Treatment  0/10 - no pain  -KP           Plan of Care Review    Plan of Care Reviewed With  patient  -KP           Clinical Impression (OT)    Criteria for Skilled Therapeutic Interventions Met (OT Eval)  yes;treatment indicated  -KP        Rehab Potential (OT Eval)  good, to achieve stated therapy goals  -KP        Therapy Frequency (OT Eval)  5 times/wk  -KP        Anticipated Discharge Disposition (OT)  skilled nursing facility;inpatient rehabilitation facility;home with assist pending progress  -KP           OT Goals    Toileting Goal Selection (OT)  toileting, OT goal 1  -KP        Strength Goal Selection (OT)  strength, OT goal 1  -KP        Activity Tolerance Goal Selection (OT)  activity tolerance, OT goal 1  -KP        Additional Documentation  Activity Tolerance Goal Selection (OT) (Row);Strength Goal Selection (OT) (Row)  -KP           Toileting Goal 1 (OT)    Activity/Device (Toileting Goal 1, OT)  toileting skills, all;grab bar/safety frame  -KP        [place] Level/Cues Needed (Toileting Goal 1, OT)  set-up required;moderate assist (50-74% patient effort)  -KP        Time Frame (Toileting Goal 1, OT)  long term goal (LTG);by discharge  -KP        Progress/Outcome (Toileting Goal 1, OT)  goal ongoing  -KP           Strength Goal 1 (OT)    Strength Goal 1 (OT)  to incr B UE to 3+/5 to 4/5 to incr self-care skills and endurance  -KP        Time Frame (Strength Goal 1, OT)  long term goal (LTG);by discharge  -KP        Progress/Outcome (Strength Goal 1, OT)  goal ongoing  -KP            Activity Tolerance Goal 1 (OT)    Activity Tolerance Goal 1 (OT)  to incr self care skills  -KP        Activity Level (Endurance Goal 1, OT)  20 min activity  -KP        Time Frame (Activity Tolerance Goal 1, OT)  long term goal (LTG);by discharge  -KP        Progress/Outcome (Activity Tolerance Goal 1, OT)  goal ongoing  -KP          User Key  (r) = Recorded By, (t) = Taken By, (c) = Cosigned By    Initials Name  Effective Dates     Kelly Virgen OTFERNANDA 06/08/18 -          Occupational Therapy Education     Title: PT OT SLP Therapies (Done)     Topic: Occupational Therapy (Done)     Point: ADL training (Done)     Description: Instruct learner(s) on proper safety adaptation and remediation techniques during self care or transfers.   Instruct in proper use of assistive devices.    Learning Progress Summary           Patient Acceptance, E,TB, VU,DU by  at 2/13/2019 12:28 PM    Comment:  ed pt on role of OT, benefit of therapy, POC w. OT ed on safety w. ADLs                   Point: Precautions (Done)     Description: Instruct learner(s) on prescribed precautions during self-care and functional transfers.    Learning Progress Summary           Patient Acceptance, E,TB, VU,DU by  at 2/13/2019 12:28 PM    Comment:  ed pt on role of OT, benefit of therapy, POC w. OT ed on safety w. ADLs                   Point: Body mechanics (Done)     Description: Instruct learner(s) on proper positioning and spine alignment during self-care, functional mobility activities and/or exercises.    Learning Progress Summary           Patient Acceptance, E,TB, VU,DU by  at 2/13/2019 12:28 PM    Comment:  ed pt on role of OT, benefit of therapy, POC w. OT ed on safety w. ADLs                               User Key     Initials Effective Dates Name Provider Type Discipline     06/08/18 -  Kelly Virgen, OTR Occupational Therapist OT                  OT Recommendation and Plan  Outcome Summary/Treatment Plan (OT)  Anticipated Discharge Disposition (OT): skilled nursing facility, inpatient rehabilitation facility, home with assist(pending progress)  Therapy Frequency (OT Eval): 5 times/wk  Plan of Care Review  Plan of Care Reviewed With: patient  Plan of Care Reviewed With: patient  Outcome Summary: pt evaluated for OT, pt was dep w. toileting care in bed, pt could roll well to the side, but not up for getting to EOB as she wants  her nebulizer tx first. Pt completed some AROM UE ex. she continues to benefit from OT to address self-care, strength, and endurance    Outcome Measures     Row Name 02/13/19 1229             How much help from another is currently needed...    Putting on and taking off regular lower body clothing?  1  -KP      Bathing (including washing, rinsing, and drying)  1  -KP      Toileting (which includes using toilet bed pan or urinal)  1  -KP      Putting on and taking off regular upper body clothing  2  -KP      Taking care of personal grooming (such as brushing teeth)  3  -KP      Eating meals  3  -      Score  11  -         Functional Assessment    Outcome Measure Options  AM-PAC 6 Clicks Daily Activity (OT)  -        User Key  (r) = Recorded By, (t) = Taken By, (c) = Cosigned By    Initials Name Provider Type    Kelly Pereira OTR Occupational Therapist          Time Calculation:   Time Calculation- OT     Row Name 02/13/19 1230             Time Calculation- OT    OT Start Time  0836  -      OT Stop Time  0858  -      OT Time Calculation (min)  22 min  -      Total Timed Code Minutes- OT  15 minute(s)  -      OT Received On  02/13/19  -      OT Goal Re-Cert Due Date  02/20/19  -        User Key  (r) = Recorded By, (t) = Taken By, (c) = Cosigned By    Initials Name Provider Type    Kelly Pereira OTR Occupational Therapist        Therapy Suggested Charges     Code   Minutes Charges    None           Therapy Charges for Today     Code Description Service Date Service Provider Modifiers Qty    [id number]  OT SELF CARE/MGMT/TRAIN EA 15 MIN 2/13/2019 Kelly Virgen OTR GO 1    [id number]  OT EVAL MOD COMPLEXITY 2 2/13/2019 Kelly Virgen OTR GO 1               LAUREN Pike  2/13/2019

## 2019-02-13 NOTE — PLAN OF CARE
Problem: Fall Risk (Adult)  Goal: Identify Related Risk Factors and Signs and Symptoms  Outcome: Outcome(s) achieved Date Met: 02/13/19    Goal: Absence of Fall  Outcome: Ongoing (interventions implemented as appropriate)      Problem: Patient Care Overview  Goal: Plan of Care Review  Outcome: Ongoing (interventions implemented as appropriate)   02/13/19 0610   Coping/Psychosocial   Plan of Care Reviewed With patient   Plan of Care Review   Progress no change   OTHER   Outcome Summary Pt admitted from ED last night with several diagnoses. Among them abdominal pain, elevated LFTs, lower leg swelling, a possible bowel obstruction, and an acute UTI and kidney stone, GI and general surgery consulted, pt on fluids for now, given antibiotics, pt stable, no major complaints at present, will continue to monitor     Goal: Individualization and Mutuality  Outcome: Ongoing (interventions implemented as appropriate)    Goal: Discharge Needs Assessment  Outcome: Ongoing (interventions implemented as appropriate)    Goal: Interprofessional Rounds/Family Conf  Outcome: Ongoing (interventions implemented as appropriate)      Problem: Skin Injury Risk (Adult)  Goal: Identify Related Risk Factors and Signs and Symptoms  Outcome: Ongoing (interventions implemented as appropriate)    Goal: Skin Health and Integrity  Outcome: Ongoing (interventions implemented as appropriate)      Problem: Urinary Tract Infection (Adult)  Goal: Signs and Symptoms of Listed Potential Problems Will be Absent, Minimized or Managed (Urinary Tract Infection)  Outcome: Ongoing (interventions implemented as appropriate)

## 2019-02-13 NOTE — PLAN OF CARE
Problem: Fall Risk (Adult)  Goal: Absence of Fall  Outcome: Ongoing (interventions implemented as appropriate)      Problem: Patient Care Overview  Goal: Plan of Care Review  Outcome: Ongoing (interventions implemented as appropriate)   02/13/19 9576   Coping/Psychosocial   Plan of Care Reviewed With patient   Plan of Care Review   Progress improving   OTHER   Outcome Summary Pt reports decreased pain this afternoon after receiving dilaudid this AM. Pt up to bedside commode and had large bowel movement. Pt NPO, can have ice chips. Pt scheduled for MRCP. Pt started on torsemide. Replacing potassium per protocol.     Goal: Individualization and Mutuality  Outcome: Ongoing (interventions implemented as appropriate)    Goal: Discharge Needs Assessment  Outcome: Ongoing (interventions implemented as appropriate)    Goal: Interprofessional Rounds/Family Conf  Outcome: Ongoing (interventions implemented as appropriate)      Problem: Skin Injury Risk (Adult)  Goal: Identify Related Risk Factors and Signs and Symptoms  Outcome: Ongoing (interventions implemented as appropriate)    Goal: Skin Health and Integrity  Outcome: Ongoing (interventions implemented as appropriate)      Problem: Urinary Tract Infection (Adult)  Goal: Signs and Symptoms of Listed Potential Problems Will be Absent, Minimized or Managed (Urinary Tract Infection)  Outcome: Ongoing (interventions implemented as appropriate)

## 2019-02-13 NOTE — THERAPY EVALUATION
Acute Care - Physical Therapy Initial Evaluation  [facility]     Patient Name: Rose Cheema  : 1940  MRN: [id number]  Today's Date: 2019   Onset of Illness/Injury or Date of Surgery: (P) 19  Date of Referral to PT: (P) 19  Referring Physician: Godoy (P)      Admit Date: 2019    Visit Dx:     ICD-10-CM ICD-9-CM   1. Generalized abdominal pain R10.84 789.07   2. Elevated LFTs R94.5 790.6   3. Dilation of biliary tract K83.8 576.8   4. Acute UTI N39.0 599.0   5. Partial small bowel obstruction (CMS/HCC)- early K56.600 560.9   6. Bilateral leg edema R60.0 782.3   7. Kidney stone on right side N20.0 592.0   8. Impaired functional mobility, balance, gait, and endurance Z74.09 V49.89     Patient Active Problem List   Diagnosis   • Urinary incontinence   • Urinary frequency   • Generalized abdominal pain   • Pulmonary hypertension    • Benign essential hypertension   • Bilateral lower extremity edema   • CAD (coronary artery disease), native coronary artery   • COPD (chronic obstructive pulmonary disease) (CMS/HCC)   • Diastolic dysfunction   • Obesity   • Obstructive sleep apnea   • Secondary pulmonary arterial hypertension (CMS/HCC)   • H/O: hysterectomy   • Fibromyalgia   • Hx SBO   • Hx of cholecystectomy   • Hypoglycemia   • Fall     Past Medical History:   Diagnosis Date   • Anemia    • Anxiety    • Arthritis    • CHF (congestive heart failure) (CMS/HCC)    • Coronary artery disease    • Depression    • Disease of thyroid gland    • Fibromyalgia    • GERD (gastroesophageal reflux disease)    • Hypertension    • Moderate tricuspid valve stenosis    • Osteoporosis    • Peripheral neuropathy    • Pulmonary hypertension (CMS/HCC)    • SBO (small bowel obstruction) (CMS/HCC)    • Stenosis of mitral valve      Past Surgical History:   Procedure Laterality Date   • CARDIAC CATHETERIZATION     • CHOLECYSTECTOMY     • EXPLORATORY LAPAROTOMY     • GASTRIC BYPASS     • HERNIA REPAIR       inguinal and ventral   • HYSTERECTOMY     • OVARIAN CYST REMOVAL          PT ASSESSMENT (last 12 hours)      Physical Therapy Evaluation     Row Name 02/13/19 1443          PT Evaluation Time/Intention    Subjective Information  complains of;weakness;fatigue  (Pended)   -     Document Type  evaluation  (Pended)   -RM     Mode of Treatment  physical therapy  (Pended)   -RM     Patient Effort  good  (Pended)   -RM     Symptoms Noted During/After Treatment  shortness of breath;fatigue  (Pended)   -     Row Name 02/13/19 1443          General Information    Onset of Illness/Injury or Date of Surgery  02/12/19  (Pended)   -     Referring Physician  Walt  (Pended)   -RM     Patient Observations  alert;cooperative;agree to therapy  (Pended)   -RM     Patient/Family Observations  Pt supine in bed, no acute distress  (Pended)   -RM     Prior Level of Function  min assist:;gait;independent:;ADL's;transfer  (Pended)   -RM     Equipment Currently Used at Home  walker, rolling  (Pended)   -RM     Pertinent History of Current Functional Problem  Pt admitted due to B knee pain for months, two recent falls and B LE swelling.   (Pended)   -RM     Existing Precautions/Restrictions  fall  (Pended)   -RM     Barriers to Rehab  none identified  (Pended)   -     Row Name 02/13/19 1443          Relationship/Environment    Primary Source of Support/Comfort  spouse  (Pended)   -RM     Lives With  spouse  (Pended)   -RM     Family Caregiver if Needed  spouse  (Pended)   -RM     Row Name 02/13/19 1443          Resource/Environmental Concerns    Current Living Arrangements  home/apartment/condo  (Pended)   -     Row Name 02/13/19 1443          Cognitive Assessment/Intervention- PT/OT    Orientation Status (Cognition)  oriented x 3  (Pended)   -RM     Follows Commands (Cognition)  follows one step commands;75-90% accuracy  (Pended)   -RM     Safety Deficit (Cognitive)  safety precautions follow-through/compliance;safety precautions  awareness  (Pended)   -     Personal Safety Interventions  fall prevention program maintained;gait belt;nonskid shoes/slippers when out of bed;supervised activity  (Pended)   -     Row Name 02/13/19 1443          Safety Issues, Functional Mobility    Safety Issues Affecting Function (Mobility)  safety precautions follow-through/compliance;safety precaution awareness  (Pended)   -     Impairments Affecting Function (Mobility)  balance;endurance/activity tolerance;coordination;strength  (Pended)   -     Row Name 02/13/19 1443          Bed Mobility Assessment/Treatment    Bed Mobility Assessment/Treatment  sit-supine;supine-sit  (Pended)   -RM     Supine-Sit [place] (Bed Mobility)  minimum assist (75% patient effort)  (Pended)   -     Sit-Supine [place] (Bed Mobility)  minimum assist (75% patient effort);2 person assist  (Pended)   -     Bed Mobility, Safety Issues  decreased use of legs for bridging/pushing;decreased use of arms for pushing/pulling  (Pended)   -     Row Name 02/13/19 1443          Transfer Assessment/Treatment    Transfer Assessment/Treatment  sit-stand transfer;stand-sit transfer  (Pended)   -     Sit-Stand [place] (Transfers)  minimum assist (75% patient effort);2 person assist;nonverbal cues (demo/gesture);verbal cues  (Pended)   -     Stand-Sit [place] (Transfers)  nonverbal cues (demo/gesture);contact guard;verbal cues  (Pended)   -     Row Name 02/13/19 1443          Sit-Stand Transfer    Assistive Device (Sit-Stand Transfers)  walker, front-wheeled  (Pended)   -     Row Name 02/13/19 1443          Stand-Sit Transfer    Assistive Device (Stand-Sit Transfers)  walker, front-wheeled  (Pended)   -     Row Name 02/13/19 1443          Gait/Stairs Assessment/Training    Gait/Stairs Assessment/Training  gait/ambulation assistive device  (Pended)   -     [place] Level (Gait)  minimum assist (75% patient effort);2 person assist  (Pended)   -      Assistive Device (Gait)  walker, front-wheeled  (Pended)   -RM     Distance in Feet (Gait)  10  (Pended)   -RM     Pattern (Gait)  step-to  (Pended)   -RM     Deviations/Abnormal Patterns (Gait)  amanda decreased;gait speed decreased;stride length decreased;base of support, wide;bilateral deviations  (Pended)   -RM     Bilateral Gait Deviations  forward flexed posture;heel strike decreased;weight shift ability decreased  (Pended)   -RM     Comment (Gait/Stairs)  Pt demo forward lean and needs increased cues for saftey with RW. Pt reports increased weakness and pain in knees during amb. Pt needed standing rest break.  (Pended)   -RM     [facility] Name 02/13/19 1443          General ROM    GENERAL ROM COMMENTS  WFL  (Pended)   -RM     Row Name 02/13/19 1443          MMT (Manual Muscle Testing)    General MMT Comments  Gross weakness 3+/5  (Pended)   -RM     Row Name 02/13/19 1443          Motor Assessment/Intervention    Additional Documentation  Balance (Group)  (Pended)   -RM     Row Name 02/13/19 1443          Balance    Balance  static sitting balance;static standing balance  (Pended)   -RM     Row Name 02/13/19 1443          Static Sitting Balance    Level of [place] (Unsupported Sitting, Static Balance)  supervision  (Pended)   -RM     Sitting Position (Unsupported Sitting, Static Balance)  sitting on edge of bed  (Pended)   -RM     Time Able to Maintain Position (Unsupported Sitting, Static Balance)  2 to 3 minutes  (Pended)   -RM     Row Name 02/13/19 1443          Static Standing Balance    Level of [place] (Supported Standing, Static Balance)  contact guard assist;2 person assist  (Pended)   -RM     Time Able to Maintain Position (Supported Standing, Static Balance)  45 to 60 seconds  (Pended)   -RM     Assistive Device Utilized (Supported Standing, Static Balance)  walker, rolling  (Pended)   -RM     Row Name 02/13/19 1443          Pain Scale: Numbers Pre/Post-Treatment    Pain Scale: Numbers,  Pretreatment  7/10  (Pended)   -RM     Pain Scale: Numbers, Post-Treatment  7/10  (Pended)   -RM     Pain Location  knee  (Pended)   -RM     Pre/Post Treatment Pain Comment  Pt reports no pain at rest but pain in R knee during amb worse than L knee.  (Pended)   -RM     Row Name 02/13/19 1443          Plan of Care Review    Plan of Care Reviewed With  patient  (Pended)   -RM     Row Name 02/13/19 1443          Physical Therapy Clinical Impression    Date of Referral to PT  02/13/19  (Pended)   -RM     Patient/Family Goals Statement (PT Clinical Impression)  to return home  (Pended)   -RM     Criteria for Skilled Interventions Met (PT Clinical Impression)  treatment indicated  (Pended)   -RM     Impairments Found (describe specific impairments)  aerobic capacity/endurance;gait, locomotion, and balance;muscle performance  (Pended)   -RM     Functional Limitations in Following Categories (Describe Specific Limitations)  self-care;home management  (Pended)   -RM     Rehab Potential (PT Clinical Summary)  good, to achieve stated therapy goals  (Pended)   -RM     Row Name 02/13/19 1443          Physical Therapy Goals    Bed Mobility Goal Selection (PT)  bed mobility, PT goal 1  (Pended)   -RM     Transfer Goal Selection (PT)  transfer, PT goal 1  (Pended)   -RM     Gait Training Goal Selection (PT)  gait training, PT goal 1  (Pended)   -RM     [facility] Name 02/13/19 1443          Bed Mobility Goal 1 (PT)    Activity/Assistive Device (Bed Mobility Goal 1, PT)  bed mobility activities, all  (Pended)   -RM     [place] Level/Cues Needed (Bed Mobility Goal 1, PT)  supervision required  (Pended)   -RM     Time Frame (Bed Mobility Goal 1, PT)  1 week  (Pended)   -RM     Row Name 02/13/19 1443          Transfer Goal 1 (PT)    Activity/Assistive Device (Transfer Goal 1, PT)  sit-to-stand/stand-to-sit  (Pended)   -RM     [place] Level/Cues Needed (Transfer Goal 1, PT)  standby assist  (Pended)   -RM     Time Frame (Transfer  Goal 1, PT)  1 week  (Pended)   -RM     Row Name 02/13/19 1443          Gait Training Goal 1 (PT)    Activity/Assistive Device (Gait Training Goal 1, PT)  gait (walking locomotion);walker, rolling  (Pended)   -RM     [place] Level (Gait Training Goal 1, PT)  contact guard assist  (Pended)   -RM     Distance (Gait Goal 1, PT)  150  (Pended)   -RM     Time Frame (Gait Training Goal 1, PT)  1 week  (Pended)   -RM     Row Name 02/13/19 1443          Positioning and Restraints    Pre-Treatment Position  in bed  (Pended)   -RM     Post Treatment Position  bed  (Pended)   -RM     In Bed  notified nsg;supine;call light within reach;encouraged to call for assist;exit alarm on  (Pended)   -RM       User Key  (r) = Recorded By, (t) = Taken By, (c) = Cosigned By    Initials Name Provider Type     Evie Jennings, PT Student PT Student        Physical Therapy Education     Title: PT OT SLP Therapies (In Progress)     Topic: Physical Therapy (In Progress)     Point: Mobility training (Done)     Learning Progress Summary           Patient Acceptance, E,TB, VU by  at 2/13/2019  3:10 PM    Comment:  Pt educated on RW safety                   Point: Body mechanics (Done)     Learning Progress Summary           Patient Acceptance, E,TB, VU by  at 2/13/2019  3:10 PM    Comment:  Pt educated on RW safety                               User Key     Initials Effective Dates Name Provider Type [facility] 01/04/19 -  Evie Jennings, PT Student PT Student PT              PT Recommendation and Plan  Anticipated Discharge Disposition (PT): (P) home with home health, skilled nursing facility(Depending on progress)  Planned Therapy Interventions (PT Eval): (P) balance training, bed mobility training, gait training, home exercise program, patient/family education, strengthening, transfer training  Therapy Frequency (PT Clinical Impression): (P) daily  Outcome Summary/Treatment Plan (PT)  Anticipated Discharge Disposition (PT):  (P) home with home health, skilled nursing facility(Depending on progress)  Plan of Care Reviewed With: (P) patient  Progress: (P) improving  Outcome Summary: (P) Pt came into ED on 2/12 due to B knee pain, 2 recent falls and B LE swelling. Pt reports she amb at home with RW and needs Nicolette for ADLs such as bathing. Pt sat EOB Nicolette and was able to stand minAx2. Pt amb with walker and CGAx2 towards door, Pt c/o knee pain, worse on the R. Pt demo increase SOA during amb and when returning to bed. Pt would benefit from skilled PT in order to improve functional mobility and decreased endurance.   Outcome Measures     Row Name 02/13/19 1500 02/13/19 1229          How much help from another person do you currently need...    Turning from your back to your side while in flat bed without using bedrails?  3  (Pended)   -RM  --     Moving from lying on back to sitting on the side of a flat bed without bedrails?  2  (Pended)   -RM  --     Moving to and from a bed to a chair (including a wheelchair)?  3  (Pended)   -RM  --     Standing up from a chair using your arms (e.g., wheelchair, bedside chair)?  3  (Pended)   -RM  --     Climbing 3-5 steps with a railing?  1  (Pended)   -RM  --     To walk in hospital room?  3  (Pended)   -RM  --     AM-PAC 6 Clicks Score  15  (Pended)   -RM  --        How much help from another is currently needed...    Putting on and taking off regular lower body clothing?  --  1  -KP     Bathing (including washing, rinsing, and drying)  --  1  -KP     Toileting (which includes using toilet bed pan or urinal)  --  1  -KP     Putting on and taking off regular upper body clothing  --  2  -KP     Taking care of personal grooming (such as brushing teeth)  --  3  -KP     Eating meals  --  3  -KP     Score  --  11  -KP        Functional Assessment    Outcome Measure Options  AM-PAC 6 Clicks Basic Mobility (PT)  (Pended)   -RM  AM-PAC 6 Clicks Daily Activity (OT)  -KP       User Key  (r) = Recorded By, (t) =  Taken By, (c) = Cosigned By    Initials Name Provider Type    Kelly Pereira, OTR Occupational Therapist     Evie Jennings, PT Student PT Student         Time Calculation:   PT Charges     Row Name 02/13/19 1514             Time Calculation    Start Time  1443  (Pended)   -RM      Stop Time  1457  (Pended)   -RM      Time Calculation (min)  14 min  (Pended)   -RM      PT Received On  02/13/19  (Pended)   -RM      PT - Next Appointment  02/14/19  (Pended)   -RM      PT Goal Re-Cert Due Date  02/20/19  (Pended)   -RM         Time Calculation- PT    Total Timed Code Minutes- PT  8 minute(s)  (Pended)   -RM        User Key  (r) = Recorded By, (t) = Taken By, (c) = Cosigned By    Initials Name Provider Type     Evie Jennings, PT Student PT Student        Therapy Suggested Charges     Code   Minutes Charges    None           Therapy Charges for Today     Code Description Service Date Service Provider Modifiers Qty    74324361097 HC PT EVAL MOD COMPLEXITY 2 2/13/2019 Evie Jennings, PT Student GP 1    79019312042 HC PT THER PROC EA 15 MIN 2/13/2019 Evie Jennings, PT Student GP 1    74531625083 HC PT THER SUPP EA 15 MIN 2/13/2019 Evie Jennings, PT Student GP 1          PT G-Codes  Outcome Measure Options: (P) AM-PAC 6 Clicks Basic Mobility (PT)  AM-PAC 6 Clicks Score: (P) 15  Score: 11      Evie Jennings PT Student  2/13/2019

## 2019-02-13 NOTE — ED NOTES
Unable to obtain blood cultures.  Lab called for blood draw.  Will started antibiotics after blood cultures drawn.      Shantell Deleon RN  02/12/19 2007

## 2019-02-13 NOTE — CONSULTS
Inpatient General Surgery Consult  Consult performed by: Fernando Zapata Jr., MD  Consult ordered by: Flori Trejo MD          Patient Care Team:  Cheng Guevara MD as PCP - General (Internal Medicine)  Joe Calhoun MD as PCP - Claims Attributed    Chief complaint: Abdominal pain    Subjective     History of Present Illness     The patient is a very pleasant 78-year-old female who has had multiple abdominal surgeries including a gastric bypass, exploratory laparotomy, hysterectomy, ventral hernia repair followed by removal of infected mesh who was recently hospitalized at [facility] with a small bowel obstruction.  This resolved with conservative management and she was discharged to a nursing home.  She had a large bowel movement there and felt much better.  She then presented to the emergency room with knee pain.  She also admitted to abdominal pain and a CT scan of the abdomen and pelvis was performed that suggested a partial small bowel obstruction.  She has moved her bowels today and currently has no abdominal pain.  She is hungry.    Review of Systems   Constitutional: Positive for activity change and appetite change. Negative for fatigue and fever.   HENT: Negative for trouble swallowing and voice change.    Respiratory: Negative for chest tightness and shortness of breath.    Cardiovascular: Negative for chest pain and palpitations.   Gastrointestinal: Positive for abdominal pain and constipation. Negative for blood in stool, diarrhea, nausea and vomiting.   Endocrine: Negative for cold intolerance and heat intolerance.   Genitourinary: Negative for dysuria and flank pain.   Neurological: Negative for dizziness and light-headedness.   Hematological: Negative for adenopathy. Does not bruise/bleed easily.   Psychiatric/Behavioral: Negative for agitation and confusion.        Past Medical History:   Diagnosis Date   • Anemia    • Anxiety    • Arthritis    • CHF (congestive  heart failure) (CMS/HCC)    • Coronary artery disease    • Depression    • Disease of thyroid gland    • Fibromyalgia    • GERD (gastroesophageal reflux disease)    • Hypertension    • Moderate tricuspid valve stenosis    • Osteoporosis    • Peripheral neuropathy    • Pulmonary hypertension (CMS/HCC)    • SBO (small bowel obstruction) (CMS/HCC)    • Stenosis of mitral valve    ,   Past Surgical History:   Procedure Laterality Date   • CARDIAC CATHETERIZATION     • CHOLECYSTECTOMY     • EXPLORATORY LAPAROTOMY     • GASTRIC BYPASS     • HERNIA REPAIR      inguinal and ventral   • HYSTERECTOMY     • OVARIAN CYST REMOVAL     , History reviewed. No pertinent family history.,   Social History     Tobacco Use   • Smoking status: Former Smoker   Substance Use Topics   • Alcohol use: No   • Drug use: No    and Allergies:  Aspartame and Cephalexin    Objective      Vital Signs  Temp:  [98.3 °F (36.8 °C)-99.5 °F (37.5 °C)] 98.3 °F (36.8 °C)  Heart Rate:  [] 80  Resp:  [18-20] 18  BP: (117-151)/(71-86) 128/71    Physical Exam   Constitutional: She is oriented to person, place, and time. She appears well-developed and well-nourished.  Non-toxic appearance. No distress.   HENT:   Head: Normocephalic and atraumatic.   Eyes: EOM are normal. No scleral icterus. Right eye exhibits normal extraocular motion. Left eye exhibits normal extraocular motion.   Neck: Normal range of motion. Neck supple. No JVD present. No tracheal deviation present.   Cardiovascular: Normal rate and regular rhythm.   Pulmonary/Chest: Effort normal and breath sounds normal. No respiratory distress. She exhibits no tenderness.   Abdominal: Soft. Normal appearance and bowel sounds are normal. She exhibits no distension. There is no hepatosplenomegaly. There is no tenderness. No hernia.   Neurological: She is alert and oriented to person, place, and time.   Skin: Skin is warm and dry.   Psychiatric: She has a normal mood and affect. Her behavior is normal.  Judgment and thought content normal.       Results Review:    I reviewed the patient's new clinical results.        Assessment/Plan       Generalized abdominal pain    Benign essential hypertension    Bilateral lower extremity edema (chronic)    COPD (chronic obstructive pulmonary disease) (CMS/HCC)    Obesity    Obstructive sleep apnea    Secondary pulmonary arterial hypertension (CMS/HCC)    Fibromyalgia    Hx SBO    Hx of cholecystectomy    Hypoglycemia    Fall    Disease of thyroid gland    Chronic pain syndrome    Acute UTI ([place])    Anemia      Assessment & Plan     1.  Partial small bowel obstruction: The patient had a CT scan of the abdomen and pelvis findings suggesting a partial small bowel obstruction which may be a resolving small bowel obstruction.  At this point she is having bowel function with no clinical evidence of an ongoing small bowel obstruction.  A diet will be started.    I discussed the patients findings and my recommendations with patient    Fernando Zapata Jr., MD  02/13/19  6:09 PM

## 2019-02-14 LAB
ALBUMIN SERPL-MCNC: 2.9 G/DL (ref 3.5–5.2)
ALBUMIN/GLOB SERPL: 1 G/DL
ALP SERPL-CCNC: 445 U/L (ref 39–117)
ALT SERPL W P-5'-P-CCNC: 68 U/L (ref 1–33)
ANION GAP SERPL CALCULATED.3IONS-SCNC: 12.2 MMOL/L
AST SERPL-CCNC: 41 U/L (ref 1–32)
BACTERIA SPEC AEROBE CULT: ABNORMAL
BASOPHILS # BLD AUTO: 0.03 10*3/MM3 (ref 0–0.2)
BASOPHILS NFR BLD AUTO: 0.5 % (ref 0–1.5)
BILIRUB SERPL-MCNC: 1.6 MG/DL (ref 0.1–1.2)
BUN BLD-MCNC: 13 MG/DL (ref 8–23)
BUN/CREAT SERPL: 16.9 (ref 7–25)
CALCIUM SPEC-SCNC: 8.4 MG/DL (ref 8.6–10.5)
CHLORIDE SERPL-SCNC: 101 MMOL/L (ref 98–107)
CO2 SERPL-SCNC: 27.8 MMOL/L (ref 22–29)
CREAT BLD-MCNC: 0.77 MG/DL (ref 0.57–1)
DEPRECATED RDW RBC AUTO: 51.6 FL (ref 37–54)
EOSINOPHIL # BLD AUTO: 0.19 10*3/MM3 (ref 0–0.4)
EOSINOPHIL NFR BLD AUTO: 2.9 % (ref 0.3–6.2)
ERYTHROCYTE [DISTWIDTH] IN BLOOD BY AUTOMATED COUNT: 14.3 % (ref 12.3–15.4)
FERRITIN SERPL-MCNC: 728.6 NG/ML (ref 13–150)
FOLATE SERPL-MCNC: 19.93 NG/ML (ref 4.78–24.2)
GFR SERPL CREATININE-BSD FRML MDRD: 73 ML/MIN/1.73
GLOBULIN UR ELPH-MCNC: 3 GM/DL
GLUCOSE BLD-MCNC: 99 MG/DL (ref 65–99)
GLUCOSE BLDC GLUCOMTR-MCNC: 116 MG/DL (ref 70–130)
GLUCOSE BLDC GLUCOMTR-MCNC: 125 MG/DL (ref 70–130)
GLUCOSE BLDC GLUCOMTR-MCNC: 95 MG/DL (ref 70–130)
GLUCOSE BLDC GLUCOMTR-MCNC: 96 MG/DL (ref 70–130)
HCT VFR BLD AUTO: 37.2 % (ref 34–46.6)
HGB BLD-MCNC: 11.6 G/DL (ref 12–15.9)
IMM GRANULOCYTES # BLD AUTO: 0.05 10*3/MM3 (ref 0–0.05)
IMM GRANULOCYTES NFR BLD AUTO: 0.8 % (ref 0–0.5)
IRON 24H UR-MRATE: 55 MCG/DL (ref 37–145)
IRON SATN MFR SERPL: 30 % (ref 20–50)
LYMPHOCYTES # BLD AUTO: 1.03 10*3/MM3 (ref 0.7–3.1)
LYMPHOCYTES NFR BLD AUTO: 15.9 % (ref 19.6–45.3)
MCH RBC QN AUTO: 30.4 PG (ref 26.6–33)
MCHC RBC AUTO-ENTMCNC: 31.2 G/DL (ref 31.5–35.7)
MCV RBC AUTO: 97.6 FL (ref 79–97)
MONOCYTES # BLD AUTO: 0.65 10*3/MM3 (ref 0.1–0.9)
MONOCYTES NFR BLD AUTO: 10 % (ref 5–12)
NEUTROPHILS # BLD AUTO: 4.53 10*3/MM3 (ref 1.4–7)
NEUTROPHILS NFR BLD AUTO: 69.9 % (ref 42.7–76)
NRBC BLD AUTO-RTO: 0 /100 WBC (ref 0–0)
PLATELET # BLD AUTO: 145 10*3/MM3 (ref 140–450)
PMV BLD AUTO: 11.5 FL (ref 6–12)
POTASSIUM BLD-SCNC: 3.7 MMOL/L (ref 3.5–5.2)
PROT SERPL-MCNC: 5.9 G/DL (ref 6–8.5)
RBC # BLD AUTO: 3.81 10*6/MM3 (ref 3.77–5.28)
SODIUM BLD-SCNC: 141 MMOL/L (ref 136–145)
TIBC SERPL-MCNC: 185 MCG/DL
TRANSFERRIN SERPL-MCNC: 124 MG/DL (ref 200–360)
VIT B12 BLD-MCNC: >2000 PG/ML (ref 211–946)
WBC NRBC COR # BLD: 6.48 10*3/MM3 (ref 3.4–10.8)

## 2019-02-14 PROCEDURE — 82962 GLUCOSE BLOOD TEST: CPT

## 2019-02-14 PROCEDURE — 84466 ASSAY OF TRANSFERRIN: CPT | Performed by: HOSPITALIST

## 2019-02-14 PROCEDURE — 25010000003 AMPICILLIN-SULBACTAM PER 1.5 G: Performed by: INTERNAL MEDICINE

## 2019-02-14 PROCEDURE — 80053 COMPREHEN METABOLIC PANEL: CPT | Performed by: INTERNAL MEDICINE

## 2019-02-14 PROCEDURE — 82607 VITAMIN B-12: CPT | Performed by: HOSPITALIST

## 2019-02-14 PROCEDURE — 83540 ASSAY OF IRON: CPT | Performed by: HOSPITALIST

## 2019-02-14 PROCEDURE — 82728 ASSAY OF FERRITIN: CPT | Performed by: HOSPITALIST

## 2019-02-14 PROCEDURE — 82746 ASSAY OF FOLIC ACID SERUM: CPT | Performed by: HOSPITALIST

## 2019-02-14 PROCEDURE — 25010000002 HYDROMORPHONE PER 4 MG: Performed by: INTERNAL MEDICINE

## 2019-02-14 PROCEDURE — 85025 COMPLETE CBC W/AUTO DIFF WBC: CPT | Performed by: INTERNAL MEDICINE

## 2019-02-14 PROCEDURE — 99232 SBSQ HOSP IP/OBS MODERATE 35: CPT | Performed by: INTERNAL MEDICINE

## 2019-02-14 PROCEDURE — 97110 THERAPEUTIC EXERCISES: CPT

## 2019-02-14 PROCEDURE — 99231 SBSQ HOSP IP/OBS SF/LOW 25: CPT | Performed by: SURGERY

## 2019-02-14 RX ADMIN — AMPICILLIN SODIUM AND SULBACTAM SODIUM 1.5 G: 1; .5 INJECTION, POWDER, FOR SOLUTION INTRAMUSCULAR; INTRAVENOUS at 15:51

## 2019-02-14 RX ADMIN — NYSTATIN: 100000 POWDER TOPICAL at 10:00

## 2019-02-14 RX ADMIN — SODIUM CHLORIDE 50 ML/HR: 9 INJECTION, SOLUTION INTRAVENOUS at 13:21

## 2019-02-14 RX ADMIN — BUSPIRONE HYDROCHLORIDE 30 MG: 15 TABLET ORAL at 21:35

## 2019-02-14 RX ADMIN — PREGABALIN 150 MG: 75 CAPSULE ORAL at 10:00

## 2019-02-14 RX ADMIN — HYDROMORPHONE HYDROCHLORIDE 0.5 MG: 1 INJECTION, SOLUTION INTRAMUSCULAR; INTRAVENOUS; SUBCUTANEOUS at 11:52

## 2019-02-14 RX ADMIN — HYDROMORPHONE HYDROCHLORIDE 0.5 MG: 1 INJECTION, SOLUTION INTRAMUSCULAR; INTRAVENOUS; SUBCUTANEOUS at 23:17

## 2019-02-14 RX ADMIN — SODIUM CHLORIDE, PRESERVATIVE FREE 3 ML: 5 INJECTION INTRAVENOUS at 23:18

## 2019-02-14 RX ADMIN — ASPIRIN 81 MG: 81 TABLET, CHEWABLE ORAL at 10:01

## 2019-02-14 RX ADMIN — NYSTATIN: 100000 POWDER TOPICAL at 21:35

## 2019-02-14 RX ADMIN — TORSEMIDE 40 MG: 20 TABLET ORAL at 10:00

## 2019-02-14 RX ADMIN — AMPICILLIN SODIUM AND SULBACTAM SODIUM 1.5 G: 1; .5 INJECTION, POWDER, FOR SOLUTION INTRAMUSCULAR; INTRAVENOUS at 04:11

## 2019-02-14 RX ADMIN — AMPICILLIN SODIUM AND SULBACTAM SODIUM 1.5 G: 1; .5 INJECTION, POWDER, FOR SOLUTION INTRAMUSCULAR; INTRAVENOUS at 10:00

## 2019-02-14 RX ADMIN — SODIUM CHLORIDE, PRESERVATIVE FREE 3 ML: 5 INJECTION INTRAVENOUS at 10:00

## 2019-02-14 RX ADMIN — AMPICILLIN SODIUM AND SULBACTAM SODIUM 1.5 G: 1; .5 INJECTION, POWDER, FOR SOLUTION INTRAMUSCULAR; INTRAVENOUS at 21:35

## 2019-02-14 RX ADMIN — HYDROMORPHONE HYDROCHLORIDE 0.5 MG: 1 INJECTION, SOLUTION INTRAMUSCULAR; INTRAVENOUS; SUBCUTANEOUS at 04:11

## 2019-02-14 RX ADMIN — PREGABALIN 150 MG: 75 CAPSULE ORAL at 21:35

## 2019-02-14 RX ADMIN — BUSPIRONE HYDROCHLORIDE 30 MG: 15 TABLET ORAL at 10:00

## 2019-02-14 NOTE — PLAN OF CARE
Problem: Patient Care Overview  Goal: Plan of Care Review  Outcome: Ongoing (interventions implemented as appropriate)   02/14/19 7178   Coping/Psychosocial   Plan of Care Reviewed With patient   Plan of Care Review   Progress improving   OTHER   Outcome Summary Pt agreeable to therapy this pm. Pt reports some mild pain in her LEs but she reports has improved from this morning. Pt was able to stand with Nicolette and amb with CGAx2 with cues for walker saftey. Pt increased amb distance and reports increased fatigue after amb today. Will continue to progress as tolerated.

## 2019-02-14 NOTE — PLAN OF CARE
Problem: Fall Risk (Adult)  Goal: Absence of Fall  Outcome: Ongoing (interventions implemented as appropriate)      Problem: Patient Care Overview  Goal: Plan of Care Review  Outcome: Ongoing (interventions implemented as appropriate)   02/14/19 0521   Coping/Psychosocial   Plan of Care Reviewed With patient   Plan of Care Review   Progress no change   OTHER   Outcome Summary Pt only requested IV pain med once; MRCP completed; Pt up to bsc x 1; Purewick in place; no s/s of distress        Problem: Skin Injury Risk (Adult)  Goal: Identify Related Risk Factors and Signs and Symptoms  Outcome: Ongoing (interventions implemented as appropriate)    Goal: Skin Health and Integrity  Outcome: Ongoing (interventions implemented as appropriate)      Problem: Urinary Tract Infection (Adult)  Goal: Signs and Symptoms of Listed Potential Problems Will be Absent, Minimized or Managed (Urinary Tract Infection)  Outcome: Ongoing (interventions implemented as appropriate)

## 2019-02-14 NOTE — PROGRESS NOTES
Chief Complaint:    Partial small bowel obstruction    Subjective:    The patient is feeling much better.  Her biggest complaint is that she is fatigued and weak.  She is tolerating a diet and having bowel function.    Objective:    Temp:  [97.7 °F (36.5 °C)-98.5 °F (36.9 °C)] 97.7 °F (36.5 °C)  Heart Rate:  [80-86] 86  Resp:  [16-18] 16  BP: (128-144)/(67-90) 137/90    Physical Exam   Constitutional: She is cooperative. She does not appear ill. No distress.   Abdominal: Soft. There is generalized tenderness (Mildly tender).   Neurological: She is alert.       Results:    WBC is 6.48.  MRCP shows apparent choledocholithiasis.    Assessment/Plan:    The patient is feeling better with no evidence of an ongoing partial small bowel obstruction.  Her diet will be advanced.    The patient has apparent choledocholithiasis that is complicated by a previous gastric bypass.  Management will be left up to gastroenterology and hopefully an ERCP wasn't possible despite the altered anatomy.    I will follow peripherally.    Fernando Zapata Jr., M.D.

## 2019-02-14 NOTE — PROGRESS NOTES
[facility], [place], operated by [facility]  Inpatient Progress Note    Reason for Follow Up:  Abnormal CAT scan of bile ducts, elevated liver tests, partial small bowel obstruction    Subjective     Interval History:   surgery note reviewed, conservative management at this time    Current Facility-Administered Medications:   •  albuterol (PROVENTIL) nebulizer solution 0.083% 2.5 mg/3mL, 2.5 mg, Nebulization, Q4H PRN, Flori Trejo MD, 2.5 mg at 02/13/19 0902  •  ampicillin-sulbactam 1.5g/100 mL 0.9% NS (MBP), 1.5 g, Intravenous, Q6H, Flori Trejo MD, 1.5 g at 02/14/19 0411  •  aspirin chewable tablet 81 mg, 81 mg, Oral, Daily, Flori Trejo MD, 81 mg at 02/13/19 0840  •  bisacodyl (DULCOLAX) suppository 10 mg, 10 mg, Rectal, Daily PRN, Flori Trejo MD  •  busPIRone (BUSPAR) tablet 30 mg, 30 mg, Oral, BID, Flori Trejo MD, 30 mg at 02/13/19 2232  •  calcium carbonate (TUMS) chewable tablet 500 mg (200 mg elemental), 1 tablet, Oral, BID PRN, Flori Trejo MD  •  dextrose (D50W) 25 g/ 50mL Intravenous Solution 25 g, 25 g, Intravenous, Q15 Min PRN, Flori Trejo MD  •  dextrose (GLUTOSE) oral gel 15 g, 15 g, Oral, Q15 Min PRN, lFori Trejo MD  •  docusate sodium (COLACE) capsule 100 mg, 100 mg, Oral, BID PRN, Flori Trejo MD  •  fentaNYL (DURAGESIC) 25 MCG/HR patch 1 patch, 1 patch, Transdermal, Q3 Days, Flori Trejo MD, 1 patch at 02/13/19 2248  •  glucagon (human recombinant) (GLUCAGEN DIAGNOSTIC) injection 1 mg, 1 mg, Subcutaneous, PRN, Flori Trejo MD  •  HYDROmorphone (DILAUDID) injection 0.5 mg, 0.5 mg, Intravenous, Q3H PRN, 0.5 mg at 02/14/19 0411 **AND** naloxone (NARCAN) injection 0.4 mg, 0.4 mg, Intravenous, Q5 Min PRN, Flori Trejo MD  •  nystatin (MYCOSTATIN) powder, , Topical, Q12H, Flori Trejo MD  •  ondansetron (ZOFRAN) injection 4 mg, 4 mg, Intravenous, Q6H PRN, Neil  Flori Arnett MD  •  potassium chloride (MICRO-K) CR capsule 40 mEq, 40 mEq, Oral, PRN, 40 mEq at 02/13/19 1842 **OR** potassium chloride (KLOR-CON) packet 40 mEq, 40 mEq, Oral, PRN **OR** potassium chloride 10 mEq in 100 mL IVPB, 10 mEq, Intravenous, Q1H PRN, Terry Godoy MD, Last Rate: 100 mL/hr at 02/13/19 1718, 10 mEq at 02/13/19 1718  •  pregabalin (LYRICA) capsule 150 mg, 150 mg, Oral, BID, Flori Trejo MD, 150 mg at 02/13/19 2232  •  QUEtiapine (SEROquel) tablet 50 mg, 50 mg, Oral, Nightly, Floir Trejo MD  •  sodium chloride 0.9 % flush 1-10 mL, 1-10 mL, Intravenous, PRN, Flori Trejo MD  •  sodium chloride 0.9 % flush 3 mL, 3 mL, Intravenous, Q12H, Flori Trejo MD, 3 mL at 02/13/19 2252  •  sodium chloride 0.9 % infusion, 50 mL/hr, Intravenous, Continuous, Flori Trejo MD, Last Rate: 50 mL/hr at 02/13/19 0347, 50 mL/hr at 02/13/19 0347  •  torsemide (DEMADEX) tablet 40 mg, 40 mg, Oral, Daily, Terry Godoy MD, 40 mg at 02/13/19 1718  Review of Systems:    She has weakness and fatigue all other systems reviewed and negative    Objective     Vital Signs  Temp:  [97.7 °F (36.5 °C)-98.5 °F (36.9 °C)] 97.7 °F (36.5 °C)  Heart Rate:  [80-86] 86  Resp:  [16-18] 16  BP: (128-144)/(67-90) 137/90  Body mass index is 48.29 kg/m².    Intake/Output Summary (Last 24 hours) at 2/14/2019 0932  Last data filed at 2/14/2019 0632  Gross per 24 hour   Intake 100 ml   Output 1800 ml   Net -1700 ml     No intake/output data recorded.     Physical Exam:   General: patient awake, alert and cooperative   Eyes: Normal lids and lashes, no scleral icterus   Neck: supple, normal ROM   Skin: warm and dry, not jaundiced   Cardiovascular: regular rhythm and rate, no murmurs auscultated   Pulm: clear to auscultation bilaterally, regular and unlabored   Abdomen: soft, nontender, nondistended; normal bowel sounds   Rectal: deferred   Extremities: no rash or edema   Psychiatric:  Normal mood and behavior; memory intact     Results Review:     I reviewed the patient's new clinical results.    Results from last 7 days   Lab Units 02/14/19  0512 02/13/19  0509 02/12/19  1454   WBC 10*3/mm3 6.48 6.41 8.41   HEMOGLOBIN g/dL 11.6* 10.4* 12.2   HEMATOCRIT % 37.2 33.3* 37.8   PLATELETS 10*3/mm3 145 151 167     Results from last 7 days   Lab Units 02/14/19  0512 02/13/19  0509 02/12/19  1454   SODIUM mmol/L 141 138 138   POTASSIUM mmol/L 3.7 3.3* 3.6   CHLORIDE mmol/L 101 103 100   CO2 mmol/L 27.8 23.8 24.8   BUN mg/dL 13 19 26*   CREATININE mg/dL 0.77 0.78 1.01*   CALCIUM mg/dL 8.4* 7.9* 8.7   BILIRUBIN mg/dL 1.6* 1.8* 3.1*   ALK PHOS U/L 445* 430* 565*   ALT (SGPT) U/L 68* 74* 107*   AST (SGOT) U/L 41* 43* 78*   GLUCOSE mg/dL 99 91 102*         No results found for: LIPASE    Radiology:  CT Abdomen Pelvis With Contrast   Final Result           1. Proximal small bowel shows mild abnormal distention with air-fluid   levels, up to the level of anastomosis at the midline abdomen, may   represent early or partial obstruction. Ventral hernias containing small   portions of bowel, as described. Colonic diverticulosis.   2. Increased biliary ductal dilatation could represent an obstructive   process does not identify identified on this exam, MRCP could be   obtained for further evaluation if indicated.    3. Nonobstructive right nephrolithiasis.   4. Appearance of skin thickening in the anterior pelvis, adjacent   stranding,  correlate clinically to exclude possibility of cellulitis.       Discussed by telephone Dr. Torres at 1909, 2/12/2019.       This report was finalized on 2/12/2019 7:14 PM by Dr. Benny Lerma M.D.          XR Knee 1 or 2 View Bilateral   Final Result   1. Negative chest x-ray.   2. Advanced osteophytic change of both knees. No acute abnormality   identified in either knee.       This report was finalized on 2/12/2019 6:07 PM by Dr. Terry Brothers M.D.          XR Chest 2  View   Final Result   1. Negative chest x-ray.   2. Advanced osteophytic change of both knees. No acute abnormality   identified in either knee.       This report was finalized on 2/12/2019 6:07 PM by Dr. Terry Brothers M.D.          MRI abdomen w wo contrast mrcp    (Results Pending)       Assessment/Plan     Patient Active Problem List   Diagnosis   • Urinary incontinence   • Urinary frequency   • Generalized abdominal pain   • Pulmonary hypertension    • Benign essential hypertension   • Bilateral lower extremity edema (chronic)   • CAD (coronary artery disease), native coronary artery   • COPD (chronic obstructive pulmonary disease) (CMS/HCC)   • Diastolic dysfunction   • Obesity   • Obstructive sleep apnea   • Secondary pulmonary arterial hypertension (CMS/HCC)   • H/O: hysterectomy   • Fibromyalgia   • Hx SBO   • Hx of cholecystectomy   • Hypoglycemia   • Fall   • Disease of thyroid gland   • Chronic pain syndrome   • Acute UTI (Proteus)   • Anemia       Assessment:   1.  Elevated liver enzymes with dilated CBC on CT scan  2.  ? Partial SBO, Gen. surgery following  3.  Morbid obesity with hx of remote Sophy-y gastric bypass  4.  Hx of IBS-D     Plan:   Will arrange for MRI/MRCP for further evaluation of biliary dilation on CT scan.  Her LFTs are improving since admission... I  Believe it's been Done just has not been read  Her hx of Sophy gastric bypass will make ERCP technically difficult if required  Surgical consultation has been obtained and they are following her abdominal exam  Await records from [facility]  Follow liver tests daily    I discussed the patients findings and my recommendations with patient and nursing staff.    Krish Ritter MD

## 2019-02-14 NOTE — THERAPY TREATMENT NOTE
Acute Care - Physical Therapy Treatment Note  [facility]     Patient Name: Rose Cheema  : 1940  MRN: [id number]  Today's Date: 2019  Onset of Illness/Injury or Date of Surgery: 19  Date of Referral to PT: 19  Referring Physician: Walt    Admit Date: 2019    Visit Dx:    ICD-10-CM ICD-9-CM   1. Generalized abdominal pain R10.84 789.07   2. Elevated LFTs R94.5 790.6   3. Dilation of biliary tract K83.8 576.8   4. Acute UTI N39.0 599.0   5. Partial small bowel obstruction (CMS/HCC)- early K56.600 560.9   6. Bilateral leg edema R60.0 782.3   7. Kidney stone on right side N20.0 592.0   8. Impaired functional mobility, balance, gait, and endurance Z74.09 V49.89     Patient Active Problem List   Diagnosis   • Urinary incontinence   • Urinary frequency   • Generalized abdominal pain   • Pulmonary hypertension    • Benign essential hypertension   • Bilateral lower extremity edema (chronic)   • CAD (coronary artery disease), native coronary artery   • COPD (chronic obstructive pulmonary disease) (CMS/HCC)   • Diastolic dysfunction   • Obesity   • Obstructive sleep apnea   • Secondary pulmonary arterial hypertension (CMS/HCC)   • H/O: hysterectomy   • Fibromyalgia   • Hx SBO   • Hx of cholecystectomy   • Hypoglycemia   • Fall   • Disease of thyroid gland   • Chronic pain syndrome   • Acute UTI (Proteus)   • Anemia       Therapy Treatment    Rehabilitation Treatment Summary     Row Name 19 1522             Treatment Time/Intention    Discipline  physical therapist  (Pended)   -RM      Document Type  therapy note (daily note)  (Pended)   -RM      Subjective Information  complains of;pain  (Pended)   -RM      Mode of Treatment  physical therapy  (Pended)   -RM      Patient/Family Observations  Pt sitting in chair, no acute distress  (Pended)   -RM      Patient Effort  good  (Pended)   -RM      Existing Precautions/Restrictions  fall  (Pended)   -RM      Recorded by [RM] Evie Jennings,  PT Student 02/14/19 1543      Row Name 02/14/19 1522             Cognitive Assessment/Intervention- PT/OT    Orientation Status (Cognition)  oriented x 3  (Pended)   -RM      Follows Commands (Cognition)  follows one step commands  (Pended)   -RM      Safety Deficit (Cognitive)  safety precautions follow-through/compliance  (Pended)   -RM      Personal Safety Interventions  fall prevention program maintained;gait belt;nonskid shoes/slippers when out of bed;supervised activity  (Pended)   -RM      Recorded by [RM] Evie Jennings, PT Student 02/14/19 1543      Row Name 02/14/19 1522             Bed Mobility Assessment/Treatment    Supine-Sit [place] (Bed Mobility)  not tested  (Pended)   -RM      Sit-Supine [place] (Bed Mobility)  not tested  (Pended)   -RM      Recorded by [RM] Evie Jennings, PT Student 02/14/19 1543      Row Name 02/14/19 1522             Sit-Stand Transfer    Sit-Stand [place] (Transfers)  minimum assist (75% patient effort);contact guard;nonverbal cues (demo/gesture)  (Pended)   -RM      Assistive Device (Sit-Stand Transfers)  walker, front-wheeled  (Pended)   -RM      Recorded by [RM] Evie Jennings, PT Student 02/14/19 1543      Row Name 02/14/19 1522             Stand-Sit Transfer    Stand-Sit [place] (Transfers)  minimum assist (75% patient effort)  (Pended)   -RM      Assistive Device (Stand-Sit Transfers)  walker, front-wheeled  (Pended)   -RM      Recorded by [RM] Evie Jennings, PT Student 02/14/19 1543      Row Name 02/14/19 1522             Gait/Stairs Assessment/Training    Gait/Stairs Assessment/Training  gait/ambulation assistive device  (Pended)   -RM      [place] Level (Gait)  contact guard;2 person assist  (Pended)   -RM      Assistive Device (Gait)  walker, front-wheeled  (Pended)   -RM      Distance in Feet (Gait)  25  (Pended)   -RM      Pattern (Gait)  step-to  (Pended)   -RM      Deviations/Abnormal Patterns (Gait)  amanda decreased;gait  speed decreased;stride length decreased  (Pended)   -RM      Bilateral Gait Deviations  heel strike decreased;forward flexed posture;weight shift ability decreased  (Pended)   -RM      Comment (Gait/Stairs)  Pt demo increased step length compared to previous treatment. Pt demo increase amb distance but still needing safety cues.  (Pended)   -RM      Recorded by [] Evie Jennings, PT Student 02/14/19 1543      Row Name 02/14/19 1522             Positioning and Restraints    Pre-Treatment Position  sitting in chair/recliner  (Pended)   -RM      Post Treatment Position  chair  (Pended)   -RM      In Chair  sitting;call light within reach;encouraged to call for assist;with other staff  (Pended)   -RM      Recorded by [] Evie Jennings, PT Student 02/14/19 1543      Row Name 02/14/19 1522             Pain Assessment    Additional Documentation  Pain Scale: Word Pre/Post-Treatment (Group)  (Pended)   -RM      Recorded by [] Evie Jennings, PT Student 02/14/19 1543      Row Name 02/14/19 1522             Pain Scale: Word Pre/Post-Treatment    Pain: Word Scale, Pretreatment  2 - mild pain  (Pended)   -RM      Pain: Word Scale, Post-Treatment  2 - mild pain  (Pended)   -RM      Pre/Post Treatment Pain Comment  Pt pain with LE.  (Pended)   -RM      Recorded by [] Evie Jennings, PT Student 02/14/19 1545      Row Name 02/14/19 1522             Plan of Care Review    Plan of Care Reviewed With  patient  (Pended)   -RM      Recorded by [] Evie Jennings, PT Student 02/14/19 1543        User Key  (r) = Recorded By, (t) = Taken By, (c) = Cosigned By    Initials Name Effective Dates Discipline     Evie Jennings, PT Student 01/04/19 -  PT                   Physical Therapy Education     Title: PT OT SLP Therapies (In Progress)     Topic: Physical Therapy (In Progress)     Point: Mobility training (Done)     Learning Progress Summary           Patient Acceptance, E,TB, VU by  at 2/14/2019  3:45 PM     Comment:  RW safety    Acceptance, E,TB, VU by  at 2/13/2019  3:10 PM    Comment:  Pt educated on RW safety                   Point: Body mechanics (Done)     Learning Progress Summary           Patient Acceptance, E,TB, VU by  at 2/14/2019  3:45 PM    Comment:  RW safety    Acceptance, E,TB, VU by  at 2/13/2019  3:10 PM    Comment:  Pt educated on RW safety                               User Key     Initials Effective Dates Name Provider Type Discipline     01/04/19 -  Evie Jennings, PT Student PT Student PT                PT Recommendation and Plan  Anticipated Discharge Disposition (PT): home with home health, skilled nursing facility(Depending on progress)  Planned Therapy Interventions (PT Eval): balance training, bed mobility training, gait training, home exercise program, patient/family education, strengthening, transfer training  Therapy Frequency (PT Clinical Impression): daily  Outcome Summary/Treatment Plan (PT)  Anticipated Discharge Disposition (PT): home with home health, skilled nursing facility(Depending on progress)  Plan of Care Reviewed With: (P) patient  Progress: (P) improving  Outcome Summary: (P) Pt agreeable to therapy this pm. Pt reports some mild pain in her LEs but she reports has improved from this morning. Pt was able to stand with Nicolette and amb with CGAx2 with cues for walker saftey. Pt increased amb distance and reports increased fatigue after amb today. Will continue to progress as tolerated.   Outcome Measures     Row Name 02/14/19 1500 02/13/19 1500 02/13/19 1229       How much help from another person do you currently need...    Turning from your back to your side while in flat bed without using bedrails?  3  (Pended)   -RM  3  -EJ (r) RM (t) EJ (c)  --    Moving from lying on back to sitting on the side of a flat bed without bedrails?  2  (Pended)   -RM  2  -EJ (r) RM (t) EJ (c)  --    Moving to and from a bed to a chair (including a wheelchair)?  3  (Pended)   -RM  3   -EJ (r) RM (t) EJ (c)  --    Standing up from a chair using your arms (e.g., wheelchair, bedside chair)?  3  (Pended)   -RM  3  -EJ (r) RM (t) EJ (c)  --    Climbing 3-5 steps with a railing?  1  (Pended)   -RM  1  -EJ (r) RM (t) EJ (c)  --    To walk in hospital room?  3  (Pended)   -RM  3  -EJ (r) RM (t) EJ (c)  --    AM-PAC 6 Clicks Score  15  (Pended)   -RM  15  -EJ (r) RM (t)  --       How much help from another is currently needed...    Putting on and taking off regular lower body clothing?  --  --  1  -KP    Bathing (including washing, rinsing, and drying)  --  --  1  -KP    Toileting (which includes using toilet bed pan or urinal)  --  --  1  -KP    Putting on and taking off regular upper body clothing  --  --  2  -KP    Taking care of personal grooming (such as brushing teeth)  --  --  3  -KP    Eating meals  --  --  3  -KP    Score  --  --  11  -KP       Functional Assessment    Outcome Measure Options  AM-PAC 6 Clicks Basic Mobility (PT)  (Pended)   -RM  AM-PAC 6 Clicks Basic Mobility (PT)  -EJ (r) RM (t) EJ (c)  AM-PAC 6 Clicks Daily Activity (OT)  -KP      User Key  (r) = Recorded By, (t) = Taken By, (c) = Cosigned By    Initials Name Provider Type     Kelly Virgen, OTR Occupational Therapist    Jia Interiano, PT Physical Therapist    Evie Hope, PT Student PT Student         Time Calculation:   PT Charges     Row Name 02/14/19 1550             Time Calculation    Start Time  1522  (Pended)   -RM      Stop Time  1535  (Pended)   -RM      Time Calculation (min)  13 min  (Pended)   -RM      PT Received On  02/14/19  (Pended)   -RM      PT - Next Appointment  02/15/19  (Pended)   -RM        User Key  (r) = Recorded By, (t) = Taken By, (c) = Cosigned By    Initials Name Provider Type    Evie Hope, PT Student PT Student        Therapy Suggested Charges     Code   Minutes Charges    None           Therapy Charges for Today     Code Description Service Date Service  Provider Modifiers Qty    08516517830 HC PT EVAL MOD COMPLEXITY 2 2/13/2019 Evie Jennings, PT Student GP 1    57874138506 HC PT THER PROC EA 15 MIN 2/13/2019 Evie Jennings, PT Student GP 1    97667532964 HC PT THER SUPP EA 15 MIN 2/13/2019 Evie Jennings, PT Student GP 1    39218281439 HC PT THER PROC EA 15 MIN 2/14/2019 Evie Jennings, PT Student GP 1    41124746646 HC PT THER SUPP EA 15 MIN 2/14/2019 Evie Jennings, PT Student GP 1          PT G-Codes  Outcome Measure Options: (P) AM-PAC 6 Clicks Basic Mobility (PT)  AM-PAC 6 Clicks Score: (P) 15  Score: 11    Evie Jennings, PT Student  2/14/2019

## 2019-02-15 ENCOUNTER — ANESTHESIA EVENT (OUTPATIENT)
Dept: GASTROENTEROLOGY | Facility: HOSPITAL | Age: 79
End: 2019-02-15

## 2019-02-15 ENCOUNTER — APPOINTMENT (OUTPATIENT)
Dept: GENERAL RADIOLOGY | Facility: HOSPITAL | Age: 79
End: 2019-02-15

## 2019-02-15 ENCOUNTER — ANESTHESIA (OUTPATIENT)
Dept: GASTROENTEROLOGY | Facility: HOSPITAL | Age: 79
End: 2019-02-15

## 2019-02-15 LAB
ALBUMIN SERPL-MCNC: 2.7 G/DL (ref 3.5–5.2)
ALBUMIN/GLOB SERPL: 1 G/DL
ALP SERPL-CCNC: 537 U/L (ref 39–117)
ALT SERPL W P-5'-P-CCNC: 62 U/L (ref 1–33)
ANION GAP SERPL CALCULATED.3IONS-SCNC: 13 MMOL/L
AST SERPL-CCNC: 43 U/L (ref 1–32)
BASOPHILS # BLD AUTO: 0.02 10*3/MM3 (ref 0–0.2)
BASOPHILS NFR BLD AUTO: 0.3 % (ref 0–1.5)
BILIRUB SERPL-MCNC: 1 MG/DL (ref 0.1–1.2)
BUN BLD-MCNC: 11 MG/DL (ref 8–23)
BUN/CREAT SERPL: 14.1 (ref 7–25)
CALCIUM SPEC-SCNC: 8.1 MG/DL (ref 8.6–10.5)
CHLORIDE SERPL-SCNC: 100 MMOL/L (ref 98–107)
CO2 SERPL-SCNC: 28 MMOL/L (ref 22–29)
CREAT BLD-MCNC: 0.78 MG/DL (ref 0.57–1)
DEPRECATED RDW RBC AUTO: 49.7 FL (ref 37–54)
EOSINOPHIL # BLD AUTO: 0.21 10*3/MM3 (ref 0–0.4)
EOSINOPHIL NFR BLD AUTO: 3.3 % (ref 0.3–6.2)
ERYTHROCYTE [DISTWIDTH] IN BLOOD BY AUTOMATED COUNT: 14.2 % (ref 12.3–15.4)
GFR SERPL CREATININE-BSD FRML MDRD: 72 ML/MIN/1.73
GLOBULIN UR ELPH-MCNC: 2.8 GM/DL
GLUCOSE BLD-MCNC: 95 MG/DL (ref 65–99)
GLUCOSE BLDC GLUCOMTR-MCNC: 124 MG/DL (ref 70–130)
GLUCOSE BLDC GLUCOMTR-MCNC: 87 MG/DL (ref 70–130)
GLUCOSE BLDC GLUCOMTR-MCNC: 93 MG/DL (ref 70–130)
HCT VFR BLD AUTO: 36.6 % (ref 34–46.6)
HGB BLD-MCNC: 11.5 G/DL (ref 12–15.9)
IMM GRANULOCYTES # BLD AUTO: 0.03 10*3/MM3 (ref 0–0.05)
IMM GRANULOCYTES NFR BLD AUTO: 0.5 % (ref 0–0.5)
INR PPP: 1.03 (ref 0.9–1.1)
LYMPHOCYTES # BLD AUTO: 1.5 10*3/MM3 (ref 0.7–3.1)
LYMPHOCYTES NFR BLD AUTO: 23.9 % (ref 19.6–45.3)
MCH RBC QN AUTO: 30 PG (ref 26.6–33)
MCHC RBC AUTO-ENTMCNC: 31.4 G/DL (ref 31.5–35.7)
MCV RBC AUTO: 95.6 FL (ref 79–97)
MONOCYTES # BLD AUTO: 0.54 10*3/MM3 (ref 0.1–0.9)
MONOCYTES NFR BLD AUTO: 8.6 % (ref 5–12)
NEUTROPHILS # BLD AUTO: 3.97 10*3/MM3 (ref 1.4–7)
NEUTROPHILS NFR BLD AUTO: 63.4 % (ref 42.7–76)
NRBC BLD AUTO-RTO: 0 /100 WBC (ref 0–0)
PLATELET # BLD AUTO: 144 10*3/MM3 (ref 140–450)
PMV BLD AUTO: 11.7 FL (ref 6–12)
POTASSIUM BLD-SCNC: 3.4 MMOL/L (ref 3.5–5.2)
PROT SERPL-MCNC: 5.5 G/DL (ref 6–8.5)
PROTHROMBIN TIME: 13.3 SECONDS (ref 11.7–14.2)
RBC # BLD AUTO: 3.83 10*6/MM3 (ref 3.77–5.28)
SODIUM BLD-SCNC: 141 MMOL/L (ref 136–145)
WBC NRBC COR # BLD: 6.27 10*3/MM3 (ref 3.4–10.8)

## 2019-02-15 PROCEDURE — 25010000002 PROPOFOL 10 MG/ML EMULSION: Performed by: NURSE ANESTHETIST, CERTIFIED REGISTERED

## 2019-02-15 PROCEDURE — 0FJB8ZZ INSPECTION OF HEPATOBILIARY DUCT, VIA NATURAL OR ARTIFICIAL OPENING ENDOSCOPIC: ICD-10-PCS | Performed by: INTERNAL MEDICINE

## 2019-02-15 PROCEDURE — 25010000002 HYDROMORPHONE PER 4 MG: Performed by: INTERNAL MEDICINE

## 2019-02-15 PROCEDURE — 74328 X-RAY BILE DUCT ENDOSCOPY: CPT

## 2019-02-15 PROCEDURE — 85610 PROTHROMBIN TIME: CPT | Performed by: INTERNAL MEDICINE

## 2019-02-15 PROCEDURE — 85025 COMPLETE CBC W/AUTO DIFF WBC: CPT | Performed by: INTERNAL MEDICINE

## 2019-02-15 PROCEDURE — 99231 SBSQ HOSP IP/OBS SF/LOW 25: CPT | Performed by: SURGERY

## 2019-02-15 PROCEDURE — C1769 GUIDE WIRE: HCPCS | Performed by: INTERNAL MEDICINE

## 2019-02-15 PROCEDURE — 94799 UNLISTED PULMONARY SVC/PX: CPT

## 2019-02-15 PROCEDURE — 25010000003 AMPICILLIN-SULBACTAM PER 1.5 G: Performed by: INTERNAL MEDICINE

## 2019-02-15 PROCEDURE — 82962 GLUCOSE BLOOD TEST: CPT

## 2019-02-15 PROCEDURE — 80053 COMPREHEN METABOLIC PANEL: CPT | Performed by: INTERNAL MEDICINE

## 2019-02-15 PROCEDURE — 25010000002 IOPAMIDOL 61 % SOLUTION: Performed by: INTERNAL MEDICINE

## 2019-02-15 PROCEDURE — 43260 ERCP W/SPECIMEN COLLECTION: CPT | Performed by: INTERNAL MEDICINE

## 2019-02-15 RX ORDER — SODIUM CHLORIDE, SODIUM LACTATE, POTASSIUM CHLORIDE, CALCIUM CHLORIDE 600; 310; 30; 20 MG/100ML; MG/100ML; MG/100ML; MG/100ML
30 INJECTION, SOLUTION INTRAVENOUS CONTINUOUS PRN
Status: DISCONTINUED | OUTPATIENT
Start: 2019-02-15 | End: 2019-02-18 | Stop reason: HOSPADM

## 2019-02-15 RX ORDER — PROPOFOL 10 MG/ML
VIAL (ML) INTRAVENOUS CONTINUOUS PRN
Status: DISCONTINUED | OUTPATIENT
Start: 2019-02-15 | End: 2019-02-15

## 2019-02-15 RX ORDER — LIDOCAINE HYDROCHLORIDE 20 MG/ML
INJECTION, SOLUTION INFILTRATION; PERINEURAL AS NEEDED
Status: DISCONTINUED | OUTPATIENT
Start: 2019-02-15 | End: 2019-02-15 | Stop reason: SURG

## 2019-02-15 RX ORDER — IPRATROPIUM BROMIDE AND ALBUTEROL SULFATE 2.5; .5 MG/3ML; MG/3ML
3 SOLUTION RESPIRATORY (INHALATION)
Status: COMPLETED | OUTPATIENT
Start: 2019-02-15 | End: 2019-02-15

## 2019-02-15 RX ORDER — PROPOFOL 10 MG/ML
VIAL (ML) INTRAVENOUS CONTINUOUS PRN
Status: DISCONTINUED | OUTPATIENT
Start: 2019-02-15 | End: 2019-02-15 | Stop reason: SURG

## 2019-02-15 RX ADMIN — NYSTATIN: 100000 POWDER TOPICAL at 08:46

## 2019-02-15 RX ADMIN — TORSEMIDE 40 MG: 20 TABLET ORAL at 08:45

## 2019-02-15 RX ADMIN — PREGABALIN 150 MG: 75 CAPSULE ORAL at 20:14

## 2019-02-15 RX ADMIN — PREGABALIN 150 MG: 75 CAPSULE ORAL at 08:47

## 2019-02-15 RX ADMIN — AMPICILLIN SODIUM AND SULBACTAM SODIUM 1.5 G: 1; .5 INJECTION, POWDER, FOR SOLUTION INTRAMUSCULAR; INTRAVENOUS at 08:45

## 2019-02-15 RX ADMIN — FENTANYL 1 PATCH: 25 PATCH TRANSDERMAL at 13:56

## 2019-02-15 RX ADMIN — AMPICILLIN SODIUM AND SULBACTAM SODIUM 1.5 G: 1; .5 INJECTION, POWDER, FOR SOLUTION INTRAMUSCULAR; INTRAVENOUS at 20:14

## 2019-02-15 RX ADMIN — ASPIRIN 81 MG: 81 TABLET, CHEWABLE ORAL at 08:45

## 2019-02-15 RX ADMIN — SODIUM CHLORIDE, POTASSIUM CHLORIDE, SODIUM LACTATE AND CALCIUM CHLORIDE: 600; 310; 30; 20 INJECTION, SOLUTION INTRAVENOUS at 11:11

## 2019-02-15 RX ADMIN — AMPICILLIN SODIUM AND SULBACTAM SODIUM 1.5 G: 1; .5 INJECTION, POWDER, FOR SOLUTION INTRAMUSCULAR; INTRAVENOUS at 14:00

## 2019-02-15 RX ADMIN — SODIUM CHLORIDE, PRESERVATIVE FREE 3 ML: 5 INJECTION INTRAVENOUS at 20:15

## 2019-02-15 RX ADMIN — BUSPIRONE HYDROCHLORIDE 30 MG: 15 TABLET ORAL at 20:14

## 2019-02-15 RX ADMIN — SODIUM CHLORIDE, PRESERVATIVE FREE 3 ML: 5 INJECTION INTRAVENOUS at 08:46

## 2019-02-15 RX ADMIN — IPRATROPIUM BROMIDE AND ALBUTEROL SULFATE 3 ML: 2.5; .5 SOLUTION RESPIRATORY (INHALATION) at 10:59

## 2019-02-15 RX ADMIN — PROPOFOL 180 MCG/KG/MIN: 10 INJECTION, EMULSION INTRAVENOUS at 11:38

## 2019-02-15 RX ADMIN — NYSTATIN: 100000 POWDER TOPICAL at 20:15

## 2019-02-15 RX ADMIN — SODIUM CHLORIDE, POTASSIUM CHLORIDE, SODIUM LACTATE AND CALCIUM CHLORIDE 30 ML/HR: 600; 310; 30; 20 INJECTION, SOLUTION INTRAVENOUS at 10:56

## 2019-02-15 RX ADMIN — BUSPIRONE HYDROCHLORIDE 30 MG: 15 TABLET ORAL at 08:45

## 2019-02-15 RX ADMIN — LIDOCAINE HYDROCHLORIDE 60 MG: 20 INJECTION, SOLUTION INFILTRATION; PERINEURAL at 11:20

## 2019-02-15 RX ADMIN — HYDROMORPHONE HYDROCHLORIDE 0.5 MG: 1 INJECTION, SOLUTION INTRAMUSCULAR; INTRAVENOUS; SUBCUTANEOUS at 08:58

## 2019-02-15 RX ADMIN — HYDROMORPHONE HYDROCHLORIDE 0.5 MG: 1 INJECTION, SOLUTION INTRAMUSCULAR; INTRAVENOUS; SUBCUTANEOUS at 20:14

## 2019-02-15 RX ADMIN — AMPICILLIN SODIUM AND SULBACTAM SODIUM 1.5 G: 1; .5 INJECTION, POWDER, FOR SOLUTION INTRAMUSCULAR; INTRAVENOUS at 03:20

## 2019-02-15 NOTE — PLAN OF CARE
Problem: Fall Risk (Adult)  Goal: Absence of Fall  Outcome: Ongoing (interventions implemented as appropriate)      Problem: Patient Care Overview  Goal: Plan of Care Review  Outcome: Ongoing (interventions implemented as appropriate)   02/15/19 040   Coping/Psychosocial   Plan of Care Reviewed With patient   Plan of Care Review   Progress improving   OTHER   Outcome Summary Pt to get ERCP today; IV pain med given x 1; ERCP handout / education given; no c/o nausea; no s/s of distress       Problem: Skin Injury Risk (Adult)  Goal: Identify Related Risk Factors and Signs and Symptoms  Outcome: Outcome(s) achieved Date Met: 02/15/19    Goal: Skin Health and Integrity  Outcome: Ongoing (interventions implemented as appropriate)      Problem: Urinary Tract Infection (Adult)  Goal: Signs and Symptoms of Listed Potential Problems Will be Absent, Minimized or Managed (Urinary Tract Infection)  Outcome: Ongoing (interventions implemented as appropriate)

## 2019-02-15 NOTE — PROGRESS NOTES
"   LOS: 2 days   Patient Care Team:  Cheng Guevara MD as PCP - General (Internal Medicine)  Joe Calhoun MD as PCP - Claims Attributed    Chief Complaint: abd pain    Subjective     Feeling much better today.   Had MRI,       Knee Pain          Subjective:  Symptoms:  Improved.  She reports malaise, weakness and anorexia.  No shortness of breath, cough, chest pain, headache, chest pressure, diarrhea or anxiety.    Diet:  NPO.  No nausea or vomiting.    Activity level: Impaired due to weakness.    Pain:  She reports no pain.        History taken from: patient chart RN    Objective     Vital Signs  Temp:  [97.7 °F (36.5 °C)-98.5 °F (36.9 °C)] 97.7 °F (36.5 °C)  Heart Rate:  [83-86] 86  Resp:  [16-18] 16  BP: (123-144)/(67-90) 123/67    Objective:  General Appearance:  Comfortable and in no acute distress.    Vital signs: (most recent): Blood pressure 123/67, pulse 86, temperature 97.7 °F (36.5 °C), temperature source Oral, resp. rate 16, height 157.5 cm (62\"), weight 120 kg (264 lb), SpO2 93 %, not currently breastfeeding.  Vital signs are normal.  No fever.    Output: Producing urine and producing stool.    HEENT: Normal HEENT exam.    Lungs:  Normal effort and normal respiratory rate.  Breath sounds clear to auscultation.  She is not in respiratory distress.    Heart: Normal rate.  Regular rhythm.    Abdomen: Abdomen is soft.  (Morbidly obese).  Bowel sounds are normal.   There is no abdominal tenderness.     Extremities: There is dependent edema (1-2+ chronic in BLEs).    Pulses: Distal pulses are intact.    Neurological: Patient is alert and oriented to person, place and time.    Skin:  Warm and dry.  No rash.             Results Review:     I reviewed the patient's new clinical results.  I reviewed the patient's new imaging results and agree with the interpretation.  I reviewed the patient's other test results and agree with the interpretation  I personally viewed and interpreted the patient's " EKG/Telemetry data  Discussed with patient, RN, and CCP    Medication Review: reviewed and adjusted    Assessment/Plan       Generalized abdominal pain    Benign essential hypertension    Bilateral lower extremity edema (chronic)    COPD (chronic obstructive pulmonary disease) (CMS/HCC)    Obesity    Obstructive sleep apnea    Secondary pulmonary arterial hypertension (CMS/HCC)    Fibromyalgia    Hx SBO    Hx of cholecystectomy    Hypoglycemia    Fall    Disease of thyroid gland    Chronic pain syndrome    Acute UTI (Proteus)    Anemia          Plan:   (Appreciate Dr. Sierra's attention to pt  MRCP/ MRI done   Continue Unasyn for UTI, culture pending  Surg to see for possible pSBO on CT  Continue IVFs and NPO except for ice chips and sips with meds  Restart low dose of Torsemide  Replace K+ IV route, checked Mg++ and it is fine  Check anemia labs, suspect chronic disease and/or malabsorption given her h/o Sophy en Y bypass  ).       Aman Jackson MD  02/14/19  8:25 PM

## 2019-02-15 NOTE — PLAN OF CARE
Problem: Patient Care Overview  Goal: Plan of Care Review  Outcome: Ongoing (interventions implemented as appropriate)   02/14/19 3013   Coping/Psychosocial   Plan of Care Reviewed With patient   Plan of Care Review   Progress improving   OTHER   Outcome Summary Walked with PT, OOB to chair, hilary diet, plan for ERCP tomorrow.      Goal: Individualization and Mutuality  Outcome: Ongoing (interventions implemented as appropriate)    Goal: Discharge Needs Assessment  Outcome: Ongoing (interventions implemented as appropriate)    Goal: Interprofessional Rounds/Family Conf  Outcome: Ongoing (interventions implemented as appropriate)      Problem: Skin Injury Risk (Adult)  Goal: Identify Related Risk Factors and Signs and Symptoms  Outcome: Ongoing (interventions implemented as appropriate)    Goal: Skin Health and Integrity  Outcome: Ongoing (interventions implemented as appropriate)      Problem: Urinary Tract Infection (Adult)  Goal: Signs and Symptoms of Listed Potential Problems Will be Absent, Minimized or Managed (Urinary Tract Infection)  Outcome: Ongoing (interventions implemented as appropriate)

## 2019-02-15 NOTE — SIGNIFICANT NOTE
02/15/19 1344   Rehab Treatment   Discipline physical therapy assistant   Reason Treatment Not Performed patient/family declined treatment, not feeling well  (Pt politely declined treatment this afternoon. Pt just got back from Endo is wanting to rest. Will work with PT tomorrow. Check back with pt tomorrow.)   Recommendation   PT - Next Appointment 02/16/19

## 2019-02-15 NOTE — ANESTHESIA PREPROCEDURE EVALUATION
Anesthesia Evaluation     Patient summary reviewed and Nursing notes reviewed   NPO Solid Status: > 8 hours  NPO Liquid Status: < 2 hours           Airway   Mallampati: II  TM distance: >3 FB  Neck ROM: limited  Possible difficult intubation  Dental    (+) edentulous    Pulmonary - normal exam   (+) COPD, sleep apnea on CPAP, decreased breath sounds, wheezes,   Cardiovascular     ECG reviewed  Rhythm: regular  Rate: normal    (+) hypertension (denied), valvular problems/murmurs TI and MR, CHF,     ROS comment: Lymphedema  History of pulmonary htn    Neuro/Psych  (+) psychiatric history Anxiety and Depression,     GI/Hepatic/Renal/Endo    (+) morbid obesity,      Musculoskeletal     Abdominal    Substance History      OB/GYN          Other                        Anesthesia Plan    ASA 3     MAC     Anesthetic plan, all risks, benefits, and alternatives have been provided, discussed and informed consent has been obtained with: patient.

## 2019-02-15 NOTE — ANESTHESIA POSTPROCEDURE EVALUATION
"Patient: Rose Cheema    Procedure Summary     Date:  02/15/19 Room / Location:  [facility] ENDOSCOPY 5 / [facility] ENDOSCOPY    Anesthesia Start:  1111 Anesthesia Stop:  1224    Procedure:  ENDOSCOPIC RETROGRADE CHOLANGIOPANCREATOGRAPHY WITH PARTIAL CHOLANGIOGRAM (N/A ) Diagnosis:       Generalized abdominal pain      Bile duct calculus      (Generalized abdominal pain [R10.84])    Surgeon:  Gerald Herr MD Provider:  Heydi Roland MD    Anesthesia Type:  MAC ASA Status:  3          Anesthesia Type: MAC  Last vitals  BP   131/89 (02/15/19 1251)   Temp   36.8 °C (98.3 °F) (02/15/19 1042)   Pulse   79 (02/15/19 1251)   Resp   16 (02/15/19 1251)     SpO2   98 % (02/15/19 1251)     Post Anesthesia Care and Evaluation    Patient location during evaluation: PACU  Patient participation: complete - patient participated  Level of consciousness: awake  Pain score: 0  Pain management: adequate  Airway patency: patent  Anesthetic complications: No anesthetic complications    Cardiovascular status: acceptable  Respiratory status: acceptable  Hydration status: acceptable    Comments: Blood pressure 131/89, pulse 79, temperature 36.8 °C (98.3 °F), temperature source Oral, resp. rate 16, height 152.4 cm (60\"), weight 122 kg (270 lb), SpO2 98 %, not currently breastfeeding.    No anesthesia care post op    "

## 2019-02-15 NOTE — PROGRESS NOTES
Chief Complaint:    Choledocholithiasis    Subjective:    The patient is generally feeling well.  She had an ERCP this morning but the common bile duct could not be cleared based on the altered anatomy from the previous gastric bypass.    Objective:    Temp:  [97.6 °F (36.4 °C)-98.8 °F (37.1 °C)] 97.6 °F (36.4 °C)  Heart Rate:  [79-92] 83  Resp:  [11-16] 16  BP: (117-138)/(66-89) 138/87    Physical Exam   Constitutional: She is cooperative. She does not appear ill. No distress.   Abdominal: Soft. There is no tenderness.   Neurological: She is alert.       Results:    Liver function tests are improved with ALT 62, AST 43, alkaline phosphatase 53, total bilirubin 1.0.    Assessment/Plan:    The patient has choledocholithiasis but no evidence of complete common bile duct obstruction based on the improving LFTs.  The common bile duct could not be cleared with the ERCP today.  She has had multiple abdominal surgeries and previous cholecystectomy.  Since she has a benign abdomen and normalizing LFTs, if the choledocholithiasis needs to be cleared she may require referred to Dr. Martines.    Fernando Zapata Jr., M.D.

## 2019-02-15 NOTE — BRIEF OP NOTE
ENDOSCOPIC RETROGRADE CHOLANGIOPANCREATOGRAPHY  Progress Note    Rose Cheema  2/12/2019 - 2/15/2019    Pre-op Diagnosis:   Generalized abdominal pain [R10.84]       Post-Op Diagnosis Codes:     * Generalized abdominal pain [R10.84]     * Bile duct calculus [K80.50]    Procedure/CPT® Codes:      Procedure(s):  ENDOSCOPIC RETROGRADE CHOLANGIOPANCREATOGRAPHY WITH PARTIAL CHOLANGIOGRAM    Surgeon(s):  Gerald Herr MD    Anesthesia: Monitor Anesthesia Care    Staff:   Radiology Technologist: Kimmie Butt; Kirsten Marhsall, RRT  Endo Technician: Shari Carr  Endo Nurse: Bijal Ventura RN    Estimated Blood Loss: minimal    Urine Voided: * No values recorded between 2/15/2019 11:07 AM and 2/15/2019 12:23 PM *    Specimens:                None      Drains:   External Urinary Catheter (Active)   Site Assessment Clean;Skin intact 2/15/2019  8:58 AM   Application/Removal external catheter changed 2/15/2019  7:45 AM   Collection Container Wall suction 2/15/2019  8:58 AM   Securement Method Other (Comment) 2/15/2019  8:58 AM   Output (mL) 900 mL 2/15/2019 10:52 AM       Findings: ERCP performed using the [place] colonoscope.  Patient status post gastric bypass pylorus was reached superficial cannulation of the ampulla was performed showing a 6 x 3 mm stone distally but deep cannulation could not be achieved due to the angle of the scope at the ampulla and the path of the common duct.  Procedure was aborted.    Complications: None      Gerald Herr MD     Date: 2/15/2019  Time: 12:27 PM

## 2019-02-15 NOTE — PROGRESS NOTES
Name: Rose Cheema ADMIT: 2019   : 1940  PCP: Cheng Guevara MD    MRN: [id number] LOS: 3 days   AGE/SEX: 78 y.o. female    ROOM: [facility]/   Subjective   ERCP attempted today  No more new complaints        I have reviewed past medical history, family history, social history and allergies.  No changes from admission note.      Review of Systems   Constitutional: Positive for fatigue. Negative for fever.   Respiratory: Negative for shortness of breath and wheezing.    Gastrointestinal: Negative for nausea and vomiting.   Genitourinary: Negative for dysuria and urgency.          Objective   Vital Signs  Temp:  [97.6 °F (36.4 °C)-98.8 °F (37.1 °C)] 97.6 °F (36.4 °C)  Heart Rate:  [79-92] 83  Resp:  [11-16] 16  BP: (117-138)/(66-89) 138/87  SpO2:  [91 %-98 %] 91 %  on  Flow (L/min):  [4] 4;   Device (Oxygen Therapy): room air  Body mass index is 52.73 kg/m².    Intake/Output Summary (Last 24 hours) at 2/15/2019 1552  Last data filed at 2/15/2019 1505  Gross per 24 hour   Intake 1000 ml   Output 2650 ml   Net -1650 ml       Physical Exam   Constitutional: She is oriented to person, place, and time. She appears well-developed and well-nourished. No distress.   HENT:   Head: Normocephalic and atraumatic.   Eyes: Pupils are equal, round, and reactive to light. No scleral icterus.   Cardiovascular: Normal rate and regular rhythm.   Pulmonary/Chest: Effort normal. No respiratory distress. She has no decreased breath sounds. She has no wheezes.   Abdominal: Soft. Bowel sounds are normal. There is no hepatosplenomegaly.   Neurological: She is alert and oriented to person, place, and time.   Skin: No cyanosis. Nails show no clubbing.   Psychiatric: She has a normal mood and affect. Her behavior is normal.       Results Review:    Results from last 7 days   Lab Units 02/15/19  0312 19  0512 19  0509   WBC 10*3/mm3 6.27 6.48 6.41   HEMOGLOBIN g/dL 11.5* 11.6* 10.4*   HEMATOCRIT % 36.6 37.2 33.3*    PLATELETS 10*3/mm3 144 145 151     Results from last 7 days   Lab Units 02/15/19  0312 02/14/19  0512 02/13/19  0509   SODIUM mmol/L 141 141 138   POTASSIUM mmol/L 3.4* 3.7 3.3*   CHLORIDE mmol/L 100 101 103   CO2 mmol/L 28.0 27.8 23.8   BUN mg/dL 11 13 19   CREATININE mg/dL 0.78 0.77 0.78   CALCIUM mg/dL 8.1* 8.4* 7.9*   BILIRUBIN mg/dL 1.0 1.6* 1.8*   ALK PHOS U/L 537* 445* 430*   ALT (SGPT) U/L 62* 68* 74*   AST (SGOT) U/L 43* 41* 43*   GLUCOSE mg/dL 95 99 91     Results from last 7 days   Lab Units 02/15/19  0312   INR  1.03     Hemoglobin A1C:No results found for: HGBA1C  Glucose Range:  Glucose   Date/Time Value Ref Range Status   02/15/2019 1547 93 70 - 130 mg/dL Final   02/15/2019 0545 87 70 - 130 mg/dL Final   02/14/2019 2128 125 70 - 130 mg/dL Final   02/14/2019 1601 95 70 - 130 mg/dL Final   02/14/2019 1124 116 70 - 130 mg/dL Final   02/14/2019 0601 96 70 - 130 mg/dL Final         ampicillin-sulbactam 1.5 g Intravenous Q6H   aspirin 81 mg Oral Daily   busPIRone 30 mg Oral BID   fentaNYL 1 patch Transdermal Q3 Days   nystatin  Topical Q12H   pregabalin 150 mg Oral BID   QUEtiapine 50 mg Oral Nightly   sodium chloride 3 mL Intravenous Q12H   torsemide 40 mg Oral Daily       lactated ringers 30 mL/hr Last Rate: 30 mL/hr (02/15/19 1056)   Diet Regular; Cardiac  Assessment/Plan       Assessment/Plan      Active Hospital Problems    Diagnosis Date Noted   • **Generalized abdominal pain [R10.84] 02/12/2019   • Hx of cholecystectomy [Z90.49] 02/13/2019   • Hypoglycemia [E16.2] 02/13/2019   • Fall [W19.XXXA] 02/13/2019   • Disease of thyroid gland [E07.9] 02/13/2019   • Chronic pain syndrome [G89.4] 02/13/2019   • Acute UTI (Proteus) [N39.0] 02/13/2019   • Anemia [D64.9] 02/13/2019   • Fibromyalgia [M79.7] 02/12/2019   • Bilateral lower extremity edema (chronic) [R60.0] 05/17/2018   • Obstructive sleep apnea [G47.33] 09/22/2014   • Secondary pulmonary arterial hypertension (CMS/HCC) [I27.21] 09/22/2014   •  Obesity [E66.9] 09/22/2014   • COPD (chronic obstructive pulmonary disease) (CMS/HCC) [J44.9] 09/22/2014   • Benign essential hypertension [I10] 09/22/2014      Resolved Hospital Problems   No resolved problems to display.     ERCP attempted  Cholelithiasis  Continue to monitor labs and liver function  Urinary tract infection continue Unasyn for Proteus infection          Aman Jackson MD  [facility]  02/15/19

## 2019-02-16 LAB
ALBUMIN SERPL-MCNC: 2.7 G/DL (ref 3.5–5.2)
ALBUMIN/GLOB SERPL: 0.9 G/DL
ALP SERPL-CCNC: 462 U/L (ref 39–117)
ALT SERPL W P-5'-P-CCNC: 50 U/L (ref 1–33)
ANION GAP SERPL CALCULATED.3IONS-SCNC: 12.8 MMOL/L
AST SERPL-CCNC: 31 U/L (ref 1–32)
BASOPHILS # BLD AUTO: 0.03 10*3/MM3 (ref 0–0.2)
BASOPHILS NFR BLD AUTO: 0.5 % (ref 0–1.5)
BILIRUB SERPL-MCNC: 0.9 MG/DL (ref 0.1–1.2)
BUN BLD-MCNC: 9 MG/DL (ref 8–23)
BUN/CREAT SERPL: 10.6 (ref 7–25)
CALCIUM SPEC-SCNC: 8.1 MG/DL (ref 8.6–10.5)
CHLORIDE SERPL-SCNC: 97 MMOL/L (ref 98–107)
CO2 SERPL-SCNC: 30.2 MMOL/L (ref 22–29)
CREAT BLD-MCNC: 0.85 MG/DL (ref 0.57–1)
DEPRECATED RDW RBC AUTO: 49.2 FL (ref 37–54)
EOSINOPHIL # BLD AUTO: 0.31 10*3/MM3 (ref 0–0.4)
EOSINOPHIL NFR BLD AUTO: 4.9 % (ref 0.3–6.2)
ERYTHROCYTE [DISTWIDTH] IN BLOOD BY AUTOMATED COUNT: 13.9 % (ref 12.3–15.4)
GFR SERPL CREATININE-BSD FRML MDRD: 65 ML/MIN/1.73
GLOBULIN UR ELPH-MCNC: 3.1 GM/DL
GLUCOSE BLD-MCNC: 91 MG/DL (ref 65–99)
GLUCOSE BLDC GLUCOMTR-MCNC: 114 MG/DL (ref 70–130)
GLUCOSE BLDC GLUCOMTR-MCNC: 125 MG/DL (ref 70–130)
GLUCOSE BLDC GLUCOMTR-MCNC: 143 MG/DL (ref 70–130)
GLUCOSE BLDC GLUCOMTR-MCNC: 91 MG/DL (ref 70–130)
HCT VFR BLD AUTO: 37.1 % (ref 34–46.6)
HGB BLD-MCNC: 11.9 G/DL (ref 12–15.9)
IMM GRANULOCYTES # BLD AUTO: 0.04 10*3/MM3 (ref 0–0.05)
IMM GRANULOCYTES NFR BLD AUTO: 0.6 % (ref 0–0.5)
LYMPHOCYTES # BLD AUTO: 1.62 10*3/MM3 (ref 0.7–3.1)
LYMPHOCYTES NFR BLD AUTO: 25.7 % (ref 19.6–45.3)
MCH RBC QN AUTO: 31 PG (ref 26.6–33)
MCHC RBC AUTO-ENTMCNC: 32.1 G/DL (ref 31.5–35.7)
MCV RBC AUTO: 96.6 FL (ref 79–97)
MONOCYTES # BLD AUTO: 0.61 10*3/MM3 (ref 0.1–0.9)
MONOCYTES NFR BLD AUTO: 9.7 % (ref 5–12)
NEUTROPHILS # BLD AUTO: 3.69 10*3/MM3 (ref 1.4–7)
NEUTROPHILS NFR BLD AUTO: 58.6 % (ref 42.7–76)
NRBC BLD AUTO-RTO: 0 /100 WBC (ref 0–0)
PLATELET # BLD AUTO: 143 10*3/MM3 (ref 140–450)
PMV BLD AUTO: 11.3 FL (ref 6–12)
POTASSIUM BLD-SCNC: 3.5 MMOL/L (ref 3.5–5.2)
PROT SERPL-MCNC: 5.8 G/DL (ref 6–8.5)
RBC # BLD AUTO: 3.84 10*6/MM3 (ref 3.77–5.28)
SODIUM BLD-SCNC: 140 MMOL/L (ref 136–145)
WBC NRBC COR # BLD: 6.3 10*3/MM3 (ref 3.4–10.8)

## 2019-02-16 PROCEDURE — 94799 UNLISTED PULMONARY SVC/PX: CPT

## 2019-02-16 PROCEDURE — 82962 GLUCOSE BLOOD TEST: CPT

## 2019-02-16 PROCEDURE — 99232 SBSQ HOSP IP/OBS MODERATE 35: CPT | Performed by: INTERNAL MEDICINE

## 2019-02-16 PROCEDURE — 80053 COMPREHEN METABOLIC PANEL: CPT | Performed by: INTERNAL MEDICINE

## 2019-02-16 PROCEDURE — 85025 COMPLETE CBC W/AUTO DIFF WBC: CPT | Performed by: INTERNAL MEDICINE

## 2019-02-16 PROCEDURE — 25010000003 AMPICILLIN-SULBACTAM PER 1.5 G: Performed by: INTERNAL MEDICINE

## 2019-02-16 PROCEDURE — 99231 SBSQ HOSP IP/OBS SF/LOW 25: CPT | Performed by: SURGERY

## 2019-02-16 PROCEDURE — 25010000002 HYDROMORPHONE PER 4 MG: Performed by: INTERNAL MEDICINE

## 2019-02-16 PROCEDURE — 97110 THERAPEUTIC EXERCISES: CPT

## 2019-02-16 RX ADMIN — QUETIAPINE FUMARATE 50 MG: 50 TABLET, FILM COATED ORAL at 23:41

## 2019-02-16 RX ADMIN — SODIUM CHLORIDE, PRESERVATIVE FREE 3 ML: 5 INJECTION INTRAVENOUS at 08:25

## 2019-02-16 RX ADMIN — AMPICILLIN SODIUM AND SULBACTAM SODIUM 1.5 G: 1; .5 INJECTION, POWDER, FOR SOLUTION INTRAMUSCULAR; INTRAVENOUS at 02:33

## 2019-02-16 RX ADMIN — NYSTATIN: 100000 POWDER TOPICAL at 08:24

## 2019-02-16 RX ADMIN — TORSEMIDE 40 MG: 20 TABLET ORAL at 08:23

## 2019-02-16 RX ADMIN — ASPIRIN 81 MG: 81 TABLET, CHEWABLE ORAL at 08:23

## 2019-02-16 RX ADMIN — HYDROMORPHONE HYDROCHLORIDE 0.5 MG: 1 INJECTION, SOLUTION INTRAMUSCULAR; INTRAVENOUS; SUBCUTANEOUS at 06:36

## 2019-02-16 RX ADMIN — PREGABALIN 150 MG: 75 CAPSULE ORAL at 08:23

## 2019-02-16 RX ADMIN — BUSPIRONE HYDROCHLORIDE 30 MG: 15 TABLET ORAL at 08:23

## 2019-02-16 RX ADMIN — AMPICILLIN SODIUM AND SULBACTAM SODIUM 1.5 G: 1; .5 INJECTION, POWDER, FOR SOLUTION INTRAMUSCULAR; INTRAVENOUS at 08:24

## 2019-02-16 RX ADMIN — ALBUTEROL SULFATE 2.5 MG: 2.5 SOLUTION RESPIRATORY (INHALATION) at 07:08

## 2019-02-16 RX ADMIN — BUSPIRONE HYDROCHLORIDE 30 MG: 15 TABLET ORAL at 23:39

## 2019-02-16 RX ADMIN — POTASSIUM CHLORIDE 40 MEQ: 750 CAPSULE, EXTENDED RELEASE ORAL at 10:32

## 2019-02-16 RX ADMIN — POTASSIUM CHLORIDE 40 MEQ: 750 CAPSULE, EXTENDED RELEASE ORAL at 14:56

## 2019-02-16 RX ADMIN — SODIUM CHLORIDE, PRESERVATIVE FREE 3 ML: 5 INJECTION INTRAVENOUS at 23:40

## 2019-02-16 RX ADMIN — NYSTATIN: 100000 POWDER TOPICAL at 23:39

## 2019-02-16 RX ADMIN — PREGABALIN 150 MG: 75 CAPSULE ORAL at 23:39

## 2019-02-16 RX ADMIN — AMPICILLIN SODIUM AND SULBACTAM SODIUM 1.5 G: 1; .5 INJECTION, POWDER, FOR SOLUTION INTRAMUSCULAR; INTRAVENOUS at 14:58

## 2019-02-16 RX ADMIN — HYDROMORPHONE HYDROCHLORIDE 0.5 MG: 1 INJECTION, SOLUTION INTRAMUSCULAR; INTRAVENOUS; SUBCUTANEOUS at 18:46

## 2019-02-16 RX ADMIN — AMPICILLIN SODIUM AND SULBACTAM SODIUM 1.5 G: 1; .5 INJECTION, POWDER, FOR SOLUTION INTRAMUSCULAR; INTRAVENOUS at 23:40

## 2019-02-16 NOTE — PROGRESS NOTES
[facility]  Inpatient Progress Note    Reason for Follow Up:  Elevated liver enzymes, abnormal bile duct imaging    Subjective     Interval History:   Tolerating her breakfast.  No nausea or vomiting.  Liver enzymes better.  Only mild right upper quadrant pain.    Current Facility-Administered Medications:   •  albuterol (PROVENTIL) nebulizer solution 0.083% 2.5 mg/3mL, 2.5 mg, Nebulization, Q4H PRN, Flori Trejo MD, 2.5 mg at 02/16/19 0708  •  ampicillin-sulbactam 1.5g/100 mL 0.9% NS (MBP), 1.5 g, Intravenous, Q6H, Flori Trejo MD, 1.5 g at 02/16/19 0824  •  aspirin chewable tablet 81 mg, 81 mg, Oral, Daily, Flori Trejo MD, 81 mg at 02/16/19 0823  •  bisacodyl (DULCOLAX) suppository 10 mg, 10 mg, Rectal, Daily PRN, Flori Trejo MD  •  busPIRone (BUSPAR) tablet 30 mg, 30 mg, Oral, BID, Flori Trejo MD, 30 mg at 02/16/19 0823  •  calcium carbonate (TUMS) chewable tablet 500 mg (200 mg elemental), 1 tablet, Oral, BID PRN, Flori Trejo MD  •  dextrose (D50W) 25 g/ 50mL Intravenous Solution 25 g, 25 g, Intravenous, Q15 Min PRN, Flori Trejo MD  •  dextrose (GLUTOSE) oral gel 15 g, 15 g, Oral, Q15 Min PRN, Flori Trejo MD  •  docusate sodium (COLACE) capsule 100 mg, 100 mg, Oral, BID PRN, Flori Trejo MD  •  fentaNYL (DURAGESIC) 25 MCG/HR patch 1 patch, 1 patch, Transdermal, Q3 Days, Flori Trejo MD, 1 patch at 02/15/19 1356  •  glucagon (human recombinant) (GLUCAGEN DIAGNOSTIC) injection 1 mg, 1 mg, Subcutaneous, PRN, Flori Trejo MD  •  HYDROmorphone (DILAUDID) injection 0.5 mg, 0.5 mg, Intravenous, Q3H PRN, 0.5 mg at 02/16/19 0636 **AND** naloxone (NARCAN) injection 0.4 mg, 0.4 mg, Intravenous, Q5 Min PRN, Flori Trejo MD  •  lactated ringers infusion, 30 mL/hr, Intravenous, Continuous PRN, Gerald Herr MD, Last Rate: 30 mL/hr at 02/15/19 1056  •   nystatin (MYCOSTATIN) powder, , Topical, Q12H, Flori Trejo MD  •  ondansetron (ZOFRAN) injection 4 mg, 4 mg, Intravenous, Q6H PRN, Flori Trejo MD  •  potassium chloride (MICRO-K) CR capsule 40 mEq, 40 mEq, Oral, PRN, 40 mEq at 02/13/19 1842 **OR** potassium chloride (KLOR-CON) packet 40 mEq, 40 mEq, Oral, PRN **OR** potassium chloride 10 mEq in 100 mL IVPB, 10 mEq, Intravenous, Q1H PRN, Terry Godoy MD, Last Rate: 100 mL/hr at 02/13/19 1718, 10 mEq at 02/13/19 1718  •  pregabalin (LYRICA) capsule 150 mg, 150 mg, Oral, BID, Flori Trejo MD, 150 mg at 02/16/19 0823  •  QUEtiapine (SEROquel) tablet 50 mg, 50 mg, Oral, Nightly, Flori Trejo MD  •  sodium chloride 0.9 % flush 1-10 mL, 1-10 mL, Intravenous, PRN, Flori Trejo MD  •  sodium chloride 0.9 % flush 3 mL, 3 mL, Intravenous, Q12H, Flori Trejo MD, 3 mL at 02/16/19 0825  •  torsemide (DEMADEX) tablet 40 mg, 40 mg, Oral, Daily, Terry Godoy MD, 40 mg at 02/16/19 0823  Review of Systems:   All systems reviewed and negative except for: GI: RUQ pain    Objective     Vital Signs  Temp:  [97.6 °F (36.4 °C)-98.3 °F (36.8 °C)] 97.9 °F (36.6 °C)  Heart Rate:  [78-92] 89  Resp:  [11-16] 16  BP: (118-149)/(66-89) 133/66  Body mass index is 50.02 kg/m².    Intake/Output Summary (Last 24 hours) at 2/16/2019 0849  Last data filed at 2/16/2019 0634  Gross per 24 hour   Intake 640 ml   Output 2600 ml   Net -1960 ml     No intake/output data recorded.     Physical Exam:   General: patient awake, alert and cooperative   Eyes: Normal lids and lashes, no scleral icterus   Neck: supple, normal ROM   Skin: warm and dry, not jaundiced   Cardiovascular: regular rhythm and rate, no murmurs auscultated   Pulm: clear to auscultation bilaterally, regular and unlabored   Abdomen: soft, obese, slightly tender RUQ, nondistended; normal bowel sounds   Rectal: deferred   Extremities: no rash or edema   Psychiatric: Normal  mood and behavior; memory intact     Results Review:     I reviewed the patient's new clinical results.    Results from last 7 days   Lab Units 02/16/19  0639 02/15/19  0312 02/14/19  0512   WBC 10*3/mm3 6.30 6.27 6.48   HEMOGLOBIN g/dL 11.9* 11.5* 11.6*   HEMATOCRIT % 37.1 36.6 37.2   PLATELETS 10*3/mm3 143 144 145     Results from last 7 days   Lab Units 02/16/19  0355 02/15/19  0312 02/14/19  0512   SODIUM mmol/L 140 141 141   POTASSIUM mmol/L 3.5 3.4* 3.7   CHLORIDE mmol/L 97* 100 101   CO2 mmol/L 30.2* 28.0 27.8   BUN mg/dL 9 11 13   CREATININE mg/dL 0.85 0.78 0.77   CALCIUM mg/dL 8.1* 8.1* 8.4*   BILIRUBIN mg/dL 0.9 1.0 1.6*   ALK PHOS U/L 462* 537* 445*   ALT (SGPT) U/L 50* 62* 68*   AST (SGOT) U/L 31 43* 41*   GLUCOSE mg/dL 91 95 99     Results from last 7 days   Lab Units 02/15/19  0312   INR  1.03     No results found for: LIPASE    Radiology:  FL ercp biliary duct only   Final Result       As described.       This report was finalized on 2/15/2019 1:26 PM by Dr. Benny Lerma M.D.          MRI abdomen w wo contrast mrcp   Final Result   Limited exam secondary to extensive motion artifact. The   postcontrast images are nondiagnostic.   1. There is moderate intrahepatic biliary dilatation of the dilated   proximal common bile duct. At least 2 well-defined rounded filling   defects are seen within the mid common bile duct and consistent with   choledocholithiasis. Biliary sludge and/or small stones are also   suspected more distally. The ampulla is seen to bulge into the duodenum,   and this may be a normal variant or represent an ampullary polyp.    2. Status post gastric bypass with dilatation of the biliopancreatic   limb again identified.       These findings were discussed with Dr. Ritter by telephone.       This report was finalized on 2/15/2019 10:01 AM by Dr. Kamini Pineda M.D.          CT Abdomen Pelvis With Contrast   Final Result           1. Proximal small bowel shows mild abnormal  distention with air-fluid   levels, up to the level of anastomosis at the midline abdomen, may   represent early or partial obstruction. Ventral hernias containing small   portions of bowel, as described. Colonic diverticulosis.   2. Increased biliary ductal dilatation could represent an obstructive   process does not identify identified on this exam, MRCP could be   obtained for further evaluation if indicated.    3. Nonobstructive right nephrolithiasis.   4. Appearance of skin thickening in the anterior pelvis, adjacent   stranding,  correlate clinically to exclude possibility of cellulitis.       Discussed by telephone Dr. Torres at 1909, 2/12/2019.       This report was finalized on 2/12/2019 7:14 PM by Dr. Benny Lerma M.D.          XR Knee 1 or 2 View Bilateral   Final Result   1. Negative chest x-ray.   2. Advanced osteophytic change of both knees. No acute abnormality   identified in either knee.       This report was finalized on 2/12/2019 6:07 PM by Dr. Terry Brothers M.D.          XR Chest 2 View   Final Result   1. Negative chest x-ray.   2. Advanced osteophytic change of both knees. No acute abnormality   identified in either knee.       This report was finalized on 2/12/2019 6:07 PM by Dr. Terry Brothers M.D.              Assessment/Plan     Patient Active Problem List   Diagnosis   • Urinary incontinence   • Urinary frequency   • Generalized abdominal pain   • Pulmonary hypertension    • Benign essential hypertension   • Bilateral lower extremity edema (chronic)   • CAD (coronary artery disease), native coronary artery   • COPD (chronic obstructive pulmonary disease) (CMS/HCC)   • Diastolic dysfunction   • Obesity   • Obstructive sleep apnea   • Secondary pulmonary arterial hypertension (CMS/HCC)   • H/O: hysterectomy   • Fibromyalgia   • Hx SBO   • Hx of cholecystectomy   • Hypoglycemia   • Fall   • Disease of thyroid gland   • Chronic pain syndrome   • Acute UTI (Proteus)   • Anemia        Impression  1.  Elevated liver enzymes: Improving.  Secondary to choledocholithiasis.  Likely she has either passed the stones or they are no longer obstructing  2.  Questionable partial small bowel obstruction: Surgery has been following.  She is now tolerating a diet, suspect associated with above  3.  Obesity with a history of Sophy-en-Y gastric bypass: She was unable to have an ERCP pleaded yesterday due to her postsurgical anatomy  4.  Right upper quadrant pain: Likely secondary to choledocholithiasis, improving    Plan  Continue to monitor symptoms, CMP  If pain recurs or her liver enzymes worsen again she may need to be evaluated by Dr. Martines given the postsurgical complications of her anatomy as per recommendations by Dr. Zapata    GI will sign off but remain available as needed in this patient's care.  Thank you.      I discussed the patients findings and my recommendations with patient.    Rose Reilly MD

## 2019-02-16 NOTE — PROGRESS NOTES
Name: Rose Cheema ADMIT: 2019   : 1940  PCP: Cheng Guevara MD    MRN: [id number] LOS: 4 days   AGE/SEX: 78 y.o. female    ROOM: [facility]/   Subjective   ERCP attempted today  No more new complaints  She is tolerating diet      I have reviewed past medical history, family history, social history and allergies.  No changes from admission note.      Review of Systems   Constitutional: Positive for fatigue. Negative for fever.   Respiratory: Negative for shortness of breath and wheezing.    Gastrointestinal: Negative for nausea and vomiting.   Genitourinary: Negative for dysuria and urgency.          Objective   Vital Signs  Temp:  [97.9 °F (36.6 °C)-98.4 °F (36.9 °C)] 98.4 °F (36.9 °C)  Heart Rate:  [78-92] 87  Resp:  [14-16] 16  BP: (129-149)/(66-82) 129/75  SpO2:  [90 %-94 %] 94 %  on   ;   Device (Oxygen Therapy): room air  Body mass index is 50.02 kg/m².    Intake/Output Summary (Last 24 hours) at 2019 1418  Last data filed at 2019 1319  Gross per 24 hour   Intake --   Output 2300 ml   Net -2300 ml       Physical Exam   Constitutional: She is oriented to person, place, and time. She appears well-developed and well-nourished. No distress.   HENT:   Head: Normocephalic and atraumatic.   Eyes: Pupils are equal, round, and reactive to light. No scleral icterus.   Cardiovascular: Normal rate and regular rhythm.   Pulmonary/Chest: Effort normal. No respiratory distress. She has no decreased breath sounds. She has no wheezes.   Abdominal: Soft. Bowel sounds are normal. There is no hepatosplenomegaly.   Neurological: She is alert and oriented to person, place, and time.   Skin: No cyanosis. Nails show no clubbing.   Psychiatric: She has a normal mood and affect. Her behavior is normal.       Results Review:    Results from last 7 days   Lab Units 19  0639 02/15/19  0312 19  0512   WBC 10*3/mm3 6.30 6.27 6.48   HEMOGLOBIN g/dL 11.9* 11.5* 11.6*   HEMATOCRIT % 37.1 36.6 37.2    PLATELETS 10*3/mm3 143 144 145     Results from last 7 days   Lab Units 02/16/19  0355 02/15/19  0312 02/14/19  0512   SODIUM mmol/L 140 141 141   POTASSIUM mmol/L 3.5 3.4* 3.7   CHLORIDE mmol/L 97* 100 101   CO2 mmol/L 30.2* 28.0 27.8   BUN mg/dL 9 11 13   CREATININE mg/dL 0.85 0.78 0.77   CALCIUM mg/dL 8.1* 8.1* 8.4*   BILIRUBIN mg/dL 0.9 1.0 1.6*   ALK PHOS U/L 462* 537* 445*   ALT (SGPT) U/L 50* 62* 68*   AST (SGOT) U/L 31 43* 41*   GLUCOSE mg/dL 91 95 99     Results from last 7 days   Lab Units 02/15/19  0312   INR  1.03     Hemoglobin A1C:No results found for: HGBA1C  Glucose Range:  Glucose   Date/Time Value Ref Range Status   02/16/2019 1054 114 70 - 130 mg/dL Final   02/16/2019 0604 125 70 - 130 mg/dL Final   02/15/2019 2026 124 70 - 130 mg/dL Final   02/15/2019 1547 93 70 - 130 mg/dL Final   02/15/2019 0545 87 70 - 130 mg/dL Final   02/14/2019 2128 125 70 - 130 mg/dL Final         ampicillin-sulbactam 1.5 g Intravenous Q6H   aspirin 81 mg Oral Daily   busPIRone 30 mg Oral BID   fentaNYL 1 patch Transdermal Q3 Days   nystatin  Topical Q12H   pregabalin 150 mg Oral BID   QUEtiapine 50 mg Oral Nightly   sodium chloride 3 mL Intravenous Q12H   torsemide 40 mg Oral Daily       lactated ringers 30 mL/hr Last Rate: 30 mL/hr (02/15/19 1056)   Diet Regular; Cardiac  Assessment/Plan       Assessment/Plan      Active Hospital Problems    Diagnosis Date Noted   • **Generalized abdominal pain [R10.84] 02/12/2019   • Hx of cholecystectomy [Z90.49] 02/13/2019   • Hypoglycemia [E16.2] 02/13/2019   • Fall [W19.XXXA] 02/13/2019   • Disease of thyroid gland [E07.9] 02/13/2019   • Chronic pain syndrome [G89.4] 02/13/2019   • Acute UTI (Proteus) [N39.0] 02/13/2019   • Anemia [D64.9] 02/13/2019   • Fibromyalgia [M79.7] 02/12/2019   • Bilateral lower extremity edema (chronic) [R60.0] 05/17/2018   • Obstructive sleep apnea [G47.33] 09/22/2014   • Secondary pulmonary arterial hypertension (CMS/HCC) [I27.21] 09/22/2014   •  Obesity [E66.9] 09/22/2014   • COPD (chronic obstructive pulmonary disease) (CMS/HCC) [J44.9] 09/22/2014   • Benign essential hypertension [I10] 09/22/2014      Resolved Hospital Problems   No resolved problems to display.     ERCP attempted  Cholelithiasis  Continue to monitor labs and liver function  Urinary tract infection continue Unasyn for Proteus infection    Close to be discharged when cleared by surgery      Aman Jackson MD  [facility]  02/16/19

## 2019-02-16 NOTE — THERAPY TREATMENT NOTE
Acute Care - Physical Therapy Treatment Note  [facility]     Patient Name: Rose Cheema  : 1940  MRN: [id number]  Today's Date: 2019  Onset of Illness/Injury or Date of Surgery: 19  Date of Referral to PT: 19  Referring Physician: Walt    Admit Date: 2019    Visit Dx:    ICD-10-CM ICD-9-CM   1. Generalized abdominal pain R10.84 789.07   2. Elevated LFTs R94.5 790.6   3. Dilation of biliary tract K83.8 576.8   4. Acute UTI N39.0 599.0   5. Partial small bowel obstruction (CMS/HCC)- early K56.600 560.9   6. Bilateral leg edema R60.0 782.3   7. Kidney stone on right side N20.0 592.0   8. Impaired functional mobility, balance, gait, and endurance Z74.09 V49.89     Patient Active Problem List   Diagnosis   • Urinary incontinence   • Urinary frequency   • Generalized abdominal pain   • Pulmonary hypertension    • Benign essential hypertension   • Bilateral lower extremity edema (chronic)   • CAD (coronary artery disease), native coronary artery   • COPD (chronic obstructive pulmonary disease) (CMS/HCC)   • Diastolic dysfunction   • Obesity   • Obstructive sleep apnea   • Secondary pulmonary arterial hypertension (CMS/HCC)   • H/O: hysterectomy   • Fibromyalgia   • Hx SBO   • Hx of cholecystectomy   • Hypoglycemia   • Fall   • Disease of thyroid gland   • Chronic pain syndrome   • Acute UTI ([facility])   • Anemia       Therapy Treatment    Rehabilitation Treatment Summary     Row Name 19 1631             Treatment Time/Intention    Discipline  physical therapist  -CS      Document Type  therapy note (daily note)  -CS      Subjective Information  complains of;fatigue  -CS      Mode of Treatment  physical therapy  -CS      Patient/Family Observations  Pt sitting up in chair, no acute distress  -CS      Therapy Frequency (PT Clinical Impression)  daily  -CS      Patient Effort  good  -CS      Existing Precautions/Restrictions  fall  -CS      Recorded by [CS] Narinder Gonzalez, PT 19  1633      Row Name 02/16/19 1631             Cognitive Assessment/Intervention- PT/OT    Orientation Status (Cognition)  oriented x 3  -CS      Follows Commands (Cognition)  follows one step commands  -CS      Safety Deficit (Cognitive)  safety precautions awareness  -CS      Personal Safety Interventions  fall prevention program maintained;gait belt;nonskid shoes/slippers when out of bed;supervised activity  -CS      Recorded by [CS] Narinder Gonzalez, PT 02/16/19 1633      Row Name 02/16/19 1631             Transfer Assessment/Treatment    Transfer Assessment/Treatment  sit-stand transfer;stand-sit transfer  -CS      Recorded by [CS] Narinder Gonzalez, PT 02/16/19 1633      Row Name 02/16/19 1631             Sit-Stand Transfer    Sit-Stand Starr (Transfers)  minimum assist (75% patient effort);contact guard;nonverbal cues (demo/gesture)  -CS      Assistive Device (Sit-Stand Transfers)  walker, front-wheeled  -CS      Recorded by [CS] Narinder Gonzalez, PT 02/16/19 1633      Row Name 02/16/19 1631             Stand-Sit Transfer    Stand-Sit Starr (Transfers)  minimum assist (75% patient effort)  -CS      Assistive Device (Stand-Sit Transfers)  walker, front-wheeled  -CS      Recorded by [CS] Narinder Gonzalez, PT 02/16/19 1633      Row Name 02/16/19 1631             Gait/Stairs Assessment/Training    Gait/Stairs Assessment/Training  gait/ambulation assistive device  -CS      Starr Level (Gait)  contact guard;2 person assist  -CS      Assistive Device (Gait)  walker, front-wheeled  -CS      Distance in Feet (Gait)  25  -CS      Pattern (Gait)  step-to  -CS      Deviations/Abnormal Patterns (Gait)  amanda decreased;gait speed decreased;stride length decreased  -CS      Bilateral Gait Deviations  heel strike decreased;forward flexed posture;weight shift ability decreased  -CS      Recorded by [CS] Narinder Gonzalez, PT 02/16/19 1633      Row Name 02/16/19 1631             Positioning and Restraints     Pre-Treatment Position  sitting in chair/recliner  -CS      Post Treatment Position  chair  -CS      In Chair  reclined;with nsg;call light within reach;encouraged to call for assist  -CS      Recorded by [CS] Narinder Gonzalez, PT 02/16/19 1633      Row Name 02/16/19 1631             Pain Scale: Numbers Pre/Post-Treatment    Pain Intervention(s)  Repositioned;Ambulation/increased activity  -CS      Recorded by [CS] Narinder Gonzalez, PT 02/16/19 1633      Row Name 02/16/19 1631             Pain Scale: Word Pre/Post-Treatment    Pain: Word Scale, Pretreatment  2 - mild pain  -CS      Pain: Word Scale, Post-Treatment  2 - mild pain  -CS      Recorded by [CS] Narinder Gonzalez, PT 02/16/19 1633      Row Name 02/16/19 1631             Coping    Observed Emotional State  accepting;calm;cooperative  -CS      Verbalized Emotional State  acceptance  -CS      Recorded by [CS] Narinder Gonzalez, PT 02/16/19 1633      Row Name 02/16/19 1631             Plan of Care Review    Plan of Care Reviewed With  patient  -CS      Recorded by [] Narinder Gonzalez, PT 02/16/19 1633      Row Name 02/16/19 1631             Outcome Summary/Treatment Plan (PT)    Anticipated Discharge Disposition (PT)  home with home health;skilled nursing facility  -CS      Recorded by [ANDREEA] Narinder Gonzalez, PT 02/16/19 1633        User Key  (r) = Recorded By, (t) = Taken By, (c) = Cosigned By    Initials Name Effective Dates Discipline     Narinder Gonzalez, PT 05/14/18 -  PT                   Physical Therapy Education     Title: PT OT SLP Therapies (Done)     Topic: Physical Therapy (Done)     Point: Mobility training (Done)     Learning Progress Summary           Patient Acceptance, E,TB, VU by CS at 2/16/2019  4:33 PM    Acceptance, E,TB, VU by RM at 2/14/2019  3:45 PM    Comment:  RW safety    Acceptance, E,TB, VU by RM at 2/13/2019  3:10 PM    Comment:  Pt educated on RW safety                   Point: Home exercise program (Done)     Learning Progress  Summary           Patient Acceptance, E,TB, VU by  at 2/16/2019  4:33 PM                   Point: Body mechanics (Done)     Learning Progress Summary           Patient Acceptance, E,TB, VU by CS at 2/16/2019  4:33 PM    Acceptance, E,TB, VU by  at 2/14/2019  3:45 PM    Comment:  RW safety    Acceptance, E,TB, VU by RM at 2/13/2019  3:10 PM    Comment:  Pt educated on RW safety                   Point: Precautions (Done)     Learning Progress Summary           Patient Acceptance, E,TB, VU by  at 2/16/2019  4:33 PM                               User Key     Initials Effective Dates Name Provider Type Discipline     05/14/18 -  Narinder Gonzalez, PT Physical Therapist PT     01/04/19 -  Evie Jennings, MARTA Student PT Student PT                PT Recommendation and Plan  Anticipated Discharge Disposition (PT): home with home health, skilled nursing facility  Therapy Frequency (PT Clinical Impression): daily  Outcome Summary/Treatment Plan (PT)  Anticipated Discharge Disposition (PT): home with home health, skilled nursing facility  Plan of Care Reviewed With: patient  Outcome Summary: Pt walked to the door and back, tolerated well.   Outcome Measures     Row Name 02/16/19 1600 02/14/19 1500          How much help from another person do you currently need...    Turning from your back to your side while in flat bed without using bedrails?  3  -CS  3  -EJ (r) RM (t) EJ (c)     Moving from lying on back to sitting on the side of a flat bed without bedrails?  2  -CS  2  -EJ (r) RM (t) EJ (c)     Moving to and from a bed to a chair (including a wheelchair)?  3  -CS  3  -EJ (r) RM (t) EJ (c)     Standing up from a chair using your arms (e.g., wheelchair, bedside chair)?  3  -CS  3  -EJ (r) RM (t) EJ (c)     Climbing 3-5 steps with a railing?  1  -CS  1  -EJ (r) RM (t) EJ (c)     To walk in hospital room?  3  -CS  3  -EJ (r) RM (t) EJ (c)     AM-PAC 6 Clicks Score  15  -CS  15  -RB (r) RM (t)        Functional  Assessment    Outcome Measure Options  AM-PAC 6 Clicks Basic Mobility (PT)  -CS  AM-PAC 6 Clicks Basic Mobility (PT)  -EJ (r) RM (t) EJ (c)       User Key  (r) = Recorded By, (t) = Taken By, (c) = Cosigned By    Initials Name Provider Type    EJ Jia Montes, PT Physical Therapist    RB Maciej Garay Jr., RN Registered Nurse    CS Narinder Gonzalez, PT Physical Therapist    RM Evie Jennings, PT Student PT Student         Time Calculation:   PT Charges     Row Name 02/16/19 1634             Time Calculation    Start Time  1608  -CS      Stop Time  1620  -CS      Time Calculation (min)  12 min  -CS      PT Received On  02/16/19  -CS      PT - Next Appointment  02/17/19  -        User Key  (r) = Recorded By, (t) = Taken By, (c) = Cosigned By    Initials Name Provider Type    Narinder Chopra, PT Physical Therapist        Therapy Suggested Charges     Code   Minutes Charges    None           Therapy Charges for Today     Code Description Service Date Service Provider Modifiers Qty    02935689862 HC PT THER PROC EA 15 MIN 2/16/2019 Narinder Gonzalez, PT GP 1          PT G-Codes  Outcome Measure Options: AM-PAC 6 Clicks Basic Mobility (PT)  AM-PAC 6 Clicks Score: 15  Score: 11    Narinder Gonzalez PT  2/16/2019

## 2019-02-16 NOTE — PLAN OF CARE
Problem: Patient Care Overview  Goal: Plan of Care Review  Outcome: Ongoing (interventions implemented as appropriate)   02/16/19 8298   Coping/Psychosocial   Plan of Care Reviewed With patient   Plan of Care Review   Progress improving   OTHER   Outcome Summary IV pain med given 1x, IV abx continued, on 2L NC while sleeping, VSS, will continue to monitor       Problem: Skin Injury Risk (Adult)  Goal: Skin Health and Integrity  Outcome: Ongoing (interventions implemented as appropriate)      Problem: Urinary Tract Infection (Adult)  Goal: Signs and Symptoms of Listed Potential Problems Will be Absent, Minimized or Managed (Urinary Tract Infection)  Outcome: Ongoing (interventions implemented as appropriate)

## 2019-02-16 NOTE — PLAN OF CARE
Problem: Patient Care Overview  Goal: Plan of Care Review  Outcome: Ongoing (interventions implemented as appropriate)   02/16/19 1279   Coping/Psychosocial   Plan of Care Reviewed With patient   OTHER   Outcome Summary Pt walked to the door and back, tolerated well.

## 2019-02-17 PROBLEM — N39.0 ACUTE UTI (URINARY TRACT INFECTION): Status: RESOLVED | Noted: 2019-02-13 | Resolved: 2019-02-17

## 2019-02-17 LAB
ALBUMIN SERPL-MCNC: 2.7 G/DL (ref 3.5–5.2)
ALBUMIN/GLOB SERPL: 1 G/DL
ALP SERPL-CCNC: 391 U/L (ref 39–117)
ALT SERPL W P-5'-P-CCNC: 39 U/L (ref 1–33)
ANION GAP SERPL CALCULATED.3IONS-SCNC: 11.8 MMOL/L
AST SERPL-CCNC: 18 U/L (ref 1–32)
BACTERIA SPEC AEROBE CULT: NORMAL
BACTERIA SPEC AEROBE CULT: NORMAL
BASOPHILS # BLD AUTO: 0.02 10*3/MM3 (ref 0–0.2)
BASOPHILS NFR BLD AUTO: 0.3 % (ref 0–1.5)
BILIRUB SERPL-MCNC: 0.8 MG/DL (ref 0.1–1.2)
BUN BLD-MCNC: 12 MG/DL (ref 8–23)
BUN/CREAT SERPL: 15.6 (ref 7–25)
CALCIUM SPEC-SCNC: 8.3 MG/DL (ref 8.6–10.5)
CHLORIDE SERPL-SCNC: 101 MMOL/L (ref 98–107)
CO2 SERPL-SCNC: 30.2 MMOL/L (ref 22–29)
CREAT BLD-MCNC: 0.77 MG/DL (ref 0.57–1)
DEPRECATED RDW RBC AUTO: 49.3 FL (ref 37–54)
EOSINOPHIL # BLD AUTO: 0.27 10*3/MM3 (ref 0–0.4)
EOSINOPHIL NFR BLD AUTO: 4.5 % (ref 0.3–6.2)
ERYTHROCYTE [DISTWIDTH] IN BLOOD BY AUTOMATED COUNT: 13.8 % (ref 12.3–15.4)
GFR SERPL CREATININE-BSD FRML MDRD: 73 ML/MIN/1.73
GLOBULIN UR ELPH-MCNC: 2.8 GM/DL
GLUCOSE BLD-MCNC: 89 MG/DL (ref 65–99)
GLUCOSE BLDC GLUCOMTR-MCNC: 141 MG/DL (ref 70–130)
GLUCOSE BLDC GLUCOMTR-MCNC: 77 MG/DL (ref 70–130)
GLUCOSE BLDC GLUCOMTR-MCNC: 83 MG/DL (ref 70–130)
GLUCOSE BLDC GLUCOMTR-MCNC: 89 MG/DL (ref 70–130)
HCT VFR BLD AUTO: 35.3 % (ref 34–46.6)
HGB BLD-MCNC: 11.2 G/DL (ref 12–15.9)
IMM GRANULOCYTES # BLD AUTO: 0.02 10*3/MM3 (ref 0–0.05)
IMM GRANULOCYTES NFR BLD AUTO: 0.3 % (ref 0–0.5)
LYMPHOCYTES # BLD AUTO: 1.8 10*3/MM3 (ref 0.7–3.1)
LYMPHOCYTES NFR BLD AUTO: 30 % (ref 19.6–45.3)
MAGNESIUM SERPL-MCNC: 1.7 MG/DL (ref 1.6–2.4)
MCH RBC QN AUTO: 30.9 PG (ref 26.6–33)
MCHC RBC AUTO-ENTMCNC: 31.7 G/DL (ref 31.5–35.7)
MCV RBC AUTO: 97.2 FL (ref 79–97)
MONOCYTES # BLD AUTO: 0.77 10*3/MM3 (ref 0.1–0.9)
MONOCYTES NFR BLD AUTO: 12.8 % (ref 5–12)
NEUTROPHILS # BLD AUTO: 3.12 10*3/MM3 (ref 1.4–7)
NEUTROPHILS NFR BLD AUTO: 52.1 % (ref 42.7–76)
NRBC BLD AUTO-RTO: 0 /100 WBC (ref 0–0)
PLATELET # BLD AUTO: 154 10*3/MM3 (ref 140–450)
PMV BLD AUTO: 11.6 FL (ref 6–12)
POTASSIUM BLD-SCNC: 3.5 MMOL/L (ref 3.5–5.2)
POTASSIUM BLD-SCNC: 4.6 MMOL/L (ref 3.5–5.2)
PROT SERPL-MCNC: 5.5 G/DL (ref 6–8.5)
RBC # BLD AUTO: 3.63 10*6/MM3 (ref 3.77–5.28)
SODIUM BLD-SCNC: 143 MMOL/L (ref 136–145)
WBC NRBC COR # BLD: 6 10*3/MM3 (ref 3.4–10.8)

## 2019-02-17 PROCEDURE — 94799 UNLISTED PULMONARY SVC/PX: CPT

## 2019-02-17 PROCEDURE — 84132 ASSAY OF SERUM POTASSIUM: CPT | Performed by: INTERNAL MEDICINE

## 2019-02-17 PROCEDURE — 85025 COMPLETE CBC W/AUTO DIFF WBC: CPT | Performed by: INTERNAL MEDICINE

## 2019-02-17 PROCEDURE — 99231 SBSQ HOSP IP/OBS SF/LOW 25: CPT | Performed by: SURGERY

## 2019-02-17 PROCEDURE — 80053 COMPREHEN METABOLIC PANEL: CPT | Performed by: INTERNAL MEDICINE

## 2019-02-17 PROCEDURE — 83735 ASSAY OF MAGNESIUM: CPT | Performed by: INTERNAL MEDICINE

## 2019-02-17 PROCEDURE — 82962 GLUCOSE BLOOD TEST: CPT

## 2019-02-17 RX ORDER — LEVOTHYROXINE SODIUM 0.05 MG/1
50 TABLET ORAL
Status: DISCONTINUED | OUTPATIENT
Start: 2019-02-18 | End: 2019-02-18 | Stop reason: HOSPADM

## 2019-02-17 RX ORDER — PANTOPRAZOLE SODIUM 40 MG/1
40 TABLET, DELAYED RELEASE ORAL EVERY MORNING
Status: DISCONTINUED | OUTPATIENT
Start: 2019-02-17 | End: 2019-02-18 | Stop reason: HOSPADM

## 2019-02-17 RX ORDER — FLUOXETINE HYDROCHLORIDE 20 MG/1
40 CAPSULE ORAL DAILY
Status: DISCONTINUED | OUTPATIENT
Start: 2019-02-17 | End: 2019-02-18 | Stop reason: HOSPADM

## 2019-02-17 RX ORDER — HYDROCODONE BITARTRATE AND ACETAMINOPHEN 7.5; 325 MG/1; MG/1
1 TABLET ORAL EVERY 6 HOURS PRN
Status: DISCONTINUED | OUTPATIENT
Start: 2019-02-17 | End: 2019-02-18 | Stop reason: HOSPADM

## 2019-02-17 RX ORDER — OXYBUTYNIN CHLORIDE 10 MG/1
10 TABLET, EXTENDED RELEASE ORAL DAILY
Status: DISCONTINUED | OUTPATIENT
Start: 2019-02-17 | End: 2019-02-18 | Stop reason: HOSPADM

## 2019-02-17 RX ADMIN — ALBUTEROL SULFATE 2.5 MG: 2.5 SOLUTION RESPIRATORY (INHALATION) at 00:19

## 2019-02-17 RX ADMIN — PREGABALIN 150 MG: 75 CAPSULE ORAL at 09:30

## 2019-02-17 RX ADMIN — NYSTATIN: 100000 POWDER TOPICAL at 09:34

## 2019-02-17 RX ADMIN — PANTOPRAZOLE SODIUM 40 MG: 40 TABLET, DELAYED RELEASE ORAL at 14:38

## 2019-02-17 RX ADMIN — FLUOXETINE HYDROCHLORIDE 40 MG: 20 CAPSULE ORAL at 14:38

## 2019-02-17 RX ADMIN — NYSTATIN: 100000 POWDER TOPICAL at 21:34

## 2019-02-17 RX ADMIN — OXYBUTYNIN CHLORIDE 10 MG: 10 TABLET, EXTENDED RELEASE ORAL at 16:54

## 2019-02-17 RX ADMIN — ASPIRIN 81 MG: 81 TABLET, CHEWABLE ORAL at 09:30

## 2019-02-17 RX ADMIN — BUSPIRONE HYDROCHLORIDE 30 MG: 15 TABLET ORAL at 21:34

## 2019-02-17 RX ADMIN — HYDROCODONE BITARTRATE AND ACETAMINOPHEN 1 TABLET: 7.5; 325 TABLET ORAL at 16:54

## 2019-02-17 RX ADMIN — POTASSIUM CHLORIDE 40 MEQ: 750 CAPSULE, EXTENDED RELEASE ORAL at 13:06

## 2019-02-17 RX ADMIN — PREGABALIN 150 MG: 75 CAPSULE ORAL at 21:34

## 2019-02-17 RX ADMIN — SODIUM CHLORIDE, PRESERVATIVE FREE 3 ML: 5 INJECTION INTRAVENOUS at 09:30

## 2019-02-17 RX ADMIN — SODIUM CHLORIDE, PRESERVATIVE FREE 3 ML: 5 INJECTION INTRAVENOUS at 21:34

## 2019-02-17 RX ADMIN — BUSPIRONE HYDROCHLORIDE 30 MG: 15 TABLET ORAL at 09:30

## 2019-02-17 RX ADMIN — TORSEMIDE 40 MG: 20 TABLET ORAL at 09:30

## 2019-02-17 RX ADMIN — POTASSIUM CHLORIDE 40 MEQ: 750 CAPSULE, EXTENDED RELEASE ORAL at 06:50

## 2019-02-17 NOTE — PLAN OF CARE
Problem: Fall Risk (Adult)  Goal: Absence of Fall  Outcome: Ongoing (interventions implemented as appropriate)      Problem: Patient Care Overview  Goal: Plan of Care Review  Outcome: Ongoing (interventions implemented as appropriate)   02/17/19 0567   Coping/Psychosocial   Plan of Care Reviewed With patient   Plan of Care Review   Progress improving   OTHER   Outcome Summary no complaints of pain, IV abx continued, PRN breathing treatment given 1x, VSS, will continue to monitor       Problem: Skin Injury Risk (Adult)  Goal: Skin Health and Integrity  Outcome: Ongoing (interventions implemented as appropriate)      Problem: Urinary Tract Infection (Adult)  Goal: Signs and Symptoms of Listed Potential Problems Will be Absent, Minimized or Managed (Urinary Tract Infection)  Outcome: Ongoing (interventions implemented as appropriate)

## 2019-02-17 NOTE — PLAN OF CARE
Problem: Fall Risk (Adult)  Goal: Absence of Fall  Outcome: Ongoing (interventions implemented as appropriate)      Problem: Patient Care Overview  Goal: Plan of Care Review  Outcome: Ongoing (interventions implemented as appropriate)   02/17/19 8356   Coping/Psychosocial   Plan of Care Reviewed With patient   Plan of Care Review   Progress no change   OTHER   Outcome Summary Pain meds switched to home Norco 7.5mg, given x1. K+ replaced again. VSS, will continue to monitor.      Goal: Individualization and Mutuality  Outcome: Ongoing (interventions implemented as appropriate)    Goal: Interprofessional Rounds/Family Conf  Outcome: Ongoing (interventions implemented as appropriate)      Problem: Skin Injury Risk (Adult)  Goal: Skin Health and Integrity  Outcome: Ongoing (interventions implemented as appropriate)      Problem: Urinary Tract Infection (Adult)  Goal: Signs and Symptoms of Listed Potential Problems Will be Absent, Minimized or Managed (Urinary Tract Infection)  Outcome: Ongoing (interventions implemented as appropriate)

## 2019-02-17 NOTE — PROGRESS NOTES
Name: Rose Cheema ADMIT: 2019   : 1940  PCP: Cheng Guevara MD    MRN: [id number] LOS: 5 days   AGE/SEX: 78 y.o. female    ROOM: E4/   Subjective   ERCP attempted today  No more new complaints  She is tolerating diet      I have reviewed past medical history, family history, social history and allergies.  No changes from admission note.      Review of Systems   Constitutional: Positive for fatigue. Negative for fever.   Respiratory: Negative for shortness of breath and wheezing.    Gastrointestinal: Negative for nausea and vomiting.   Genitourinary: Negative for dysuria and urgency.          Objective   Vital Signs  Temp:  [97.5 °F (36.4 °C)-98.4 °F (36.9 °C)] 97.6 °F (36.4 °C)  Heart Rate:  [78-96] 96  Resp:  [12-16] 16  BP: (114-129)/(55-76) 123/55  SpO2:  [94 %] 94 %  on  Flow (L/min):  [2] 2;   Device (Oxygen Therapy): nasal cannula  Body mass index is 50.11 kg/m².    Intake/Output Summary (Last 24 hours) at 2019 1302  Last data filed at 2019 1015  Gross per 24 hour   Intake 570 ml   Output 1100 ml   Net -530 ml       Physical Exam   Constitutional: She is oriented to person, place, and time. She appears well-developed and well-nourished. No distress.   HENT:   Head: Normocephalic and atraumatic.   Eyes: Pupils are equal, round, and reactive to light. No scleral icterus.   Cardiovascular: Normal rate and regular rhythm.   Pulmonary/Chest: Effort normal. No respiratory distress. She has no decreased breath sounds. She has no wheezes.   Abdominal: Soft. Bowel sounds are normal. There is no hepatosplenomegaly.   Neurological: She is alert and oriented to person, place, and time.   Skin: No cyanosis. Nails show no clubbing.   Psychiatric: She has a normal mood and affect. Her behavior is normal.       Results Review:    Results from last 7 days   Lab Units 19  0511 19  0639 02/15/19  0312   WBC 10*3/mm3 6.00 6.30 6.27   HEMOGLOBIN g/dL 11.2* 11.9* 11.5*   HEMATOCRIT %  35.3 37.1 36.6   PLATELETS 10*3/mm3 154 143 144     Results from last 7 days   Lab Units 02/17/19  0511 02/16/19  0355 02/15/19  0312   SODIUM mmol/L 143 140 141   POTASSIUM mmol/L 3.5 3.5 3.4*   CHLORIDE mmol/L 101 97* 100   CO2 mmol/L 30.2* 30.2* 28.0   BUN mg/dL 12 9 11   CREATININE mg/dL 0.77 0.85 0.78   CALCIUM mg/dL 8.3* 8.1* 8.1*   BILIRUBIN mg/dL 0.8 0.9 1.0   ALK PHOS U/L 391* 462* 537*   ALT (SGPT) U/L 39* 50* 62*   AST (SGOT) U/L 18 31 43*   GLUCOSE mg/dL 89 91 95     Results from last 7 days   Lab Units 02/15/19  0312   INR  1.03     Hemoglobin A1C:No results found for: HGBA1C  Glucose Range:  Glucose   Date/Time Value Ref Range Status   02/17/2019 1132 89 70 - 130 mg/dL Final   02/17/2019 0618 77 70 - 130 mg/dL Final   02/16/2019 2048 91 70 - 130 mg/dL Final   02/16/2019 1618 143 (H) 70 - 130 mg/dL Final   02/16/2019 1054 114 70 - 130 mg/dL Final   02/16/2019 0604 125 70 - 130 mg/dL Final         aspirin 81 mg Oral Daily   busPIRone 30 mg Oral BID   fentaNYL 1 patch Transdermal Q3 Days   nystatin  Topical Q12H   pregabalin 150 mg Oral BID   QUEtiapine 50 mg Oral Nightly   sodium chloride 3 mL Intravenous Q12H   torsemide 40 mg Oral Daily       lactated ringers 30 mL/hr Last Rate: 30 mL/hr (02/15/19 1056)   Diet Regular; Cardiac  Assessment/Plan       Assessment/Plan      Active Hospital Problems    Diagnosis Date Noted   • **Generalized abdominal pain [R10.84] 02/12/2019   • Hx of cholecystectomy [Z90.49] 02/13/2019   • Hypoglycemia [E16.2] 02/13/2019   • Fall [W19.XXXA] 02/13/2019   • Disease of thyroid gland [E07.9] 02/13/2019   • Chronic pain syndrome [G89.4] 02/13/2019   • Anemia [D64.9] 02/13/2019   • Fibromyalgia [M79.7] 02/12/2019   • Bilateral lower extremity edema (chronic) [R60.0] 05/17/2018   • Obstructive sleep apnea [G47.33] 09/22/2014   • Secondary pulmonary arterial hypertension (CMS/HCC) [I27.21] 09/22/2014   • Obesity [E66.9] 09/22/2014   • COPD (chronic obstructive pulmonary disease)  (CMS/HCC) [J44.9] 09/22/2014   • Benign essential hypertension [I10] 09/22/2014      Resolved Hospital Problems    Diagnosis Date Noted Date Resolved   • Acute UTI ([facility]) [N39.0] 02/13/2019 02/17/2019     ERCP attempted  Cholelithiasis  Continue to monitor labs and liver function  Urinary tract infection, treated  Discharge on Monday with a follow-up with Dr Martines as a out patient      Aman Jackson MD  [facility]  02/17/19

## 2019-02-17 NOTE — PROGRESS NOTES
"General Surgery  Rose Cheema  1940 02/17/19    CC:  \"i'm doing much better\"      Reason for consult/admit: SBO    HPI:  Patient states she is doing much better.  Tolerating a regular diet.  Not having right upper quadrant pain.  States she believes her problem was related to the SBO, she having had one before.  She does say that occasionally she has right upper quadrant pain that might be attributable to her common duct stones.  She reiterates that she could not have surgery.  When i mention that she might be getting close to be able to be discharged, she states she will need rehab because she is weak.     Diet:  regular    Temp:  [97.5 °F (36.4 °C)-98.4 °F (36.9 °C)] 97.6 °F (36.4 °C)  Heart Rate:  [78-96] 96  Resp:  [12-16] 16  BP: (114-129)/(55-76) 123/55    Physical Exam   Constitutional: She appears well-developed and well-nourished.   HENT:   Dentures back in so no difficulty with speaking   Neck: Neck supple.   Cardiovascular: Normal rate.   Pulmonary/Chest: Effort normal. No stridor. She has no wheezes.   Abdominal: Soft. She exhibits no distension and no mass. There is no tenderness. There is no guarding. A hernia is present.   Neurological: She is alert.   Skin: Skin is warm.   Psychiatric: She has a normal mood and affect.     Results from last 7 days   Lab Units 02/17/19  0511   WBC 10*3/mm3 6.00   HEMOGLOBIN g/dL 11.2*   HEMATOCRIT % 35.3   PLATELETS 10*3/mm3 154         Results from last 7 days   Lab Units 02/17/19  0511 02/16/19  0639 02/15/19  0312 02/14/19  0512 02/13/19  0509 02/12/19  1454   WBC 10*3/mm3 6.00 6.30 6.27 6.48 6.41 8.41   HEMOGLOBIN g/dL 11.2* 11.9* 11.5* 11.6* 10.4* 12.2   HEMATOCRIT % 35.3 37.1 36.6 37.2 33.3* 37.8   PLATELETS 10*3/mm3 154 143 144 145 151 167     Results from last 7 days   Lab Units 02/17/19  0511 02/16/19  0355 02/15/19  0312   SODIUM mmol/L 143 140 141   POTASSIUM mmol/L 3.5 3.5 3.4*   CHLORIDE mmol/L 101 97* 100   CO2 mmol/L 30.2* 30.2* 28.0   BUN mg/dL " 12 9 11   CREATININE mg/dL 0.77 0.85 0.78   CALCIUM mg/dL 8.3* 8.1* 8.1*   BILIRUBIN mg/dL 0.8 0.9 1.0   ALK PHOS U/L 391* 462* 537*   ALT (SGPT) U/L 39* 50* 62*   AST (SGOT) U/L 18 31 43*   GLUCOSE mg/dL 89 91 95     Assessment:  · SBO resolving  · 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  LFT's improving, even more so today.  No acute issue.  · Multiple hernias with complex abdomen    Plan  · Agree with the patient that her presenting problem was the SBO, which she has resolved.  · Patient does not wish to have surgery.  She believes Dr Zapata and Carmenza are going to tell her what her next steps are as to the common duct stones.  I believe that Dr Zapata wants to refer to Dr Martines.  I don't think there is anything urgent.  Dr Zapata can discuss with her further when he returns Tuesday or in follow up in the office.  i'll be available tomorrow if needed.    Shilpa Hart MD  02/17/19

## 2019-02-18 VITALS
WEIGHT: 257.4 LBS | BODY MASS INDEX: 50.53 KG/M2 | TEMPERATURE: 98.1 F | HEART RATE: 95 BPM | RESPIRATION RATE: 16 BRPM | HEIGHT: 60 IN | OXYGEN SATURATION: 91 % | DIASTOLIC BLOOD PRESSURE: 82 MMHG | SYSTOLIC BLOOD PRESSURE: 123 MMHG

## 2019-02-18 LAB
ALBUMIN SERPL-MCNC: 2.6 G/DL (ref 3.5–5.2)
ALBUMIN/GLOB SERPL: 0.9 G/DL
ALP SERPL-CCNC: 348 U/L (ref 39–117)
ALT SERPL W P-5'-P-CCNC: 35 U/L (ref 1–33)
ANION GAP SERPL CALCULATED.3IONS-SCNC: 10.5 MMOL/L
AST SERPL-CCNC: 20 U/L (ref 1–32)
BASOPHILS # BLD AUTO: 0.02 10*3/MM3 (ref 0–0.2)
BASOPHILS NFR BLD AUTO: 0.3 % (ref 0–1.5)
BILIRUB SERPL-MCNC: 0.7 MG/DL (ref 0.1–1.2)
BUN BLD-MCNC: 14 MG/DL (ref 8–23)
BUN/CREAT SERPL: 15.7 (ref 7–25)
CALCIUM SPEC-SCNC: 8.6 MG/DL (ref 8.6–10.5)
CHLORIDE SERPL-SCNC: 101 MMOL/L (ref 98–107)
CO2 SERPL-SCNC: 27.5 MMOL/L (ref 22–29)
CREAT BLD-MCNC: 0.89 MG/DL (ref 0.57–1)
DEPRECATED RDW RBC AUTO: 50.5 FL (ref 37–54)
EOSINOPHIL # BLD AUTO: 0.26 10*3/MM3 (ref 0–0.4)
EOSINOPHIL NFR BLD AUTO: 4.3 % (ref 0.3–6.2)
ERYTHROCYTE [DISTWIDTH] IN BLOOD BY AUTOMATED COUNT: 13.9 % (ref 12.3–15.4)
GFR SERPL CREATININE-BSD FRML MDRD: 62 ML/MIN/1.73
GLOBULIN UR ELPH-MCNC: 2.8 GM/DL
GLUCOSE BLD-MCNC: 93 MG/DL (ref 65–99)
GLUCOSE BLDC GLUCOMTR-MCNC: 79 MG/DL (ref 70–130)
GLUCOSE BLDC GLUCOMTR-MCNC: 80 MG/DL (ref 70–130)
HCT VFR BLD AUTO: 37.9 % (ref 34–46.6)
HGB BLD-MCNC: 11.7 G/DL (ref 12–15.9)
IMM GRANULOCYTES # BLD AUTO: 0.04 10*3/MM3 (ref 0–0.05)
IMM GRANULOCYTES NFR BLD AUTO: 0.7 % (ref 0–0.5)
LYMPHOCYTES # BLD AUTO: 1.48 10*3/MM3 (ref 0.7–3.1)
LYMPHOCYTES NFR BLD AUTO: 24.6 % (ref 19.6–45.3)
MCH RBC QN AUTO: 31 PG (ref 26.6–33)
MCHC RBC AUTO-ENTMCNC: 30.9 G/DL (ref 31.5–35.7)
MCV RBC AUTO: 100.3 FL (ref 79–97)
MONOCYTES # BLD AUTO: 0.63 10*3/MM3 (ref 0.1–0.9)
MONOCYTES NFR BLD AUTO: 10.5 % (ref 5–12)
NEUTROPHILS # BLD AUTO: 3.59 10*3/MM3 (ref 1.4–7)
NEUTROPHILS NFR BLD AUTO: 59.6 % (ref 42.7–76)
NRBC BLD AUTO-RTO: 0 /100 WBC (ref 0–0)
PLATELET # BLD AUTO: 162 10*3/MM3 (ref 140–450)
PMV BLD AUTO: 11.5 FL (ref 6–12)
POTASSIUM BLD-SCNC: 4.1 MMOL/L (ref 3.5–5.2)
PROT SERPL-MCNC: 5.4 G/DL (ref 6–8.5)
RBC # BLD AUTO: 3.78 10*6/MM3 (ref 3.77–5.28)
SODIUM BLD-SCNC: 139 MMOL/L (ref 136–145)
WBC NRBC COR # BLD: 6.02 10*3/MM3 (ref 3.4–10.8)

## 2019-02-18 PROCEDURE — 94799 UNLISTED PULMONARY SVC/PX: CPT

## 2019-02-18 PROCEDURE — 97110 THERAPEUTIC EXERCISES: CPT

## 2019-02-18 PROCEDURE — 85025 COMPLETE CBC W/AUTO DIFF WBC: CPT | Performed by: INTERNAL MEDICINE

## 2019-02-18 PROCEDURE — 82962 GLUCOSE BLOOD TEST: CPT

## 2019-02-18 PROCEDURE — 80053 COMPREHEN METABOLIC PANEL: CPT | Performed by: INTERNAL MEDICINE

## 2019-02-18 RX ADMIN — ALBUTEROL SULFATE 2.5 MG: 2.5 SOLUTION RESPIRATORY (INHALATION) at 00:43

## 2019-02-18 RX ADMIN — FENTANYL 1 PATCH: 25 PATCH TRANSDERMAL at 08:49

## 2019-02-18 RX ADMIN — ASPIRIN 81 MG: 81 TABLET, CHEWABLE ORAL at 08:49

## 2019-02-18 RX ADMIN — FLUOXETINE HYDROCHLORIDE 40 MG: 20 CAPSULE ORAL at 08:49

## 2019-02-18 RX ADMIN — SODIUM CHLORIDE, PRESERVATIVE FREE 3 ML: 5 INJECTION INTRAVENOUS at 08:50

## 2019-02-18 RX ADMIN — TORSEMIDE 40 MG: 20 TABLET ORAL at 08:49

## 2019-02-18 RX ADMIN — BUSPIRONE HYDROCHLORIDE 30 MG: 15 TABLET ORAL at 08:49

## 2019-02-18 RX ADMIN — LEVOTHYROXINE SODIUM 50 MCG: 50 TABLET ORAL at 06:11

## 2019-02-18 RX ADMIN — PREGABALIN 150 MG: 75 CAPSULE ORAL at 09:27

## 2019-02-18 RX ADMIN — QUETIAPINE FUMARATE 50 MG: 50 TABLET, FILM COATED ORAL at 00:52

## 2019-02-18 RX ADMIN — NYSTATIN: 100000 POWDER TOPICAL at 08:50

## 2019-02-18 RX ADMIN — PANTOPRAZOLE SODIUM 40 MG: 40 TABLET, DELAYED RELEASE ORAL at 06:11

## 2019-02-18 RX ADMIN — OXYBUTYNIN CHLORIDE 10 MG: 10 TABLET, EXTENDED RELEASE ORAL at 08:49

## 2019-02-18 NOTE — SIGNIFICANT NOTE
02/18/19 1043   PT Discharge Summary   Reason for Discharge Discharge from facility   Discharge Destination Home with assist;Home with home health

## 2019-02-18 NOTE — PLAN OF CARE
Problem: Fall Risk (Adult)  Goal: Absence of Fall  Outcome: Ongoing (interventions implemented as appropriate)      Problem: Patient Care Overview  Goal: Plan of Care Review  Outcome: Ongoing (interventions implemented as appropriate)   02/18/19 5577   Coping/Psychosocial   Plan of Care Reviewed With patient   Plan of Care Review   Progress improving   OTHER   Outcome Summary VSS; no c/o pain or nausea; K+ WDL; no s/s of distress        Problem: Skin Injury Risk (Adult)  Goal: Skin Health and Integrity  Outcome: Ongoing (interventions implemented as appropriate)      Problem: Urinary Tract Infection (Adult)  Goal: Signs and Symptoms of Listed Potential Problems Will be Absent, Minimized or Managed (Urinary Tract Infection)  Outcome: Ongoing (interventions implemented as appropriate)

## 2019-02-18 NOTE — THERAPY DISCHARGE NOTE
Acute Care - Physical Therapy Treatment Note/Discharge  [facility]     Patient Name: Rose Cheema  : 1940  MRN: [id number]  Today's Date: 2019  Onset of Illness/Injury or Date of Surgery: 19  Date of Referral to PT: 19  Referring Physician: Walt    Admit Date: 2019    Visit Dx:    ICD-10-CM ICD-9-CM   1. Generalized abdominal pain R10.84 789.07   2. Elevated LFTs R94.5 790.6   3. Dilation of biliary tract K83.8 576.8   4. Acute UTI N39.0 599.0   5. Partial small bowel obstruction (CMS/HCC)- early K56.600 560.9   6. Bilateral leg edema R60.0 782.3   7. Kidney stone on right side N20.0 592.0   8. Impaired functional mobility, balance, gait, and endurance Z74.09 V49.89     Patient Active Problem List   Diagnosis   • Urinary incontinence   • Urinary frequency   • Generalized abdominal pain   • Pulmonary hypertension    • Benign essential hypertension   • Bilateral lower extremity edema (chronic)   • CAD (coronary artery disease), native coronary artery   • COPD (chronic obstructive pulmonary disease) (CMS/HCC)   • Diastolic dysfunction   • Obesity   • Obstructive sleep apnea   • Secondary pulmonary arterial hypertension (CMS/HCC)   • H/O: hysterectomy   • Fibromyalgia   • Hx SBO   • Hx of cholecystectomy   • Hypoglycemia   • Fall   • Disease of thyroid gland   • Chronic pain syndrome   • Anemia       Physical Therapy Education     Title: PT OT SLP Therapies (Resolved)     Topic: Physical Therapy (Resolved)     Point: Mobility training (Resolved)     Learning Progress Summary           Patient Acceptance, E,TB, VU by CS at 2019  4:33 PM    Acceptance, E,TB, VU by RM at 2019  3:45 PM    Comment:  RW safety    Acceptance, E,TB, VU by RM at 2019  3:10 PM    Comment:  Pt educated on RW safety                   Point: Home exercise program (Resolved)     Learning Progress Summary           Patient Acceptance, E,TB, VU by CS at 2019  4:33 PM                   Point: Body  mechanics (Resolved)     Learning Progress Summary           Patient Acceptance, E,TB, VU by CS at 2/16/2019  4:33 PM    Acceptance, E,TB, VU by  at 2/14/2019  3:45 PM    Comment:  RW safety    Acceptance, E,TB, VU by  at 2/13/2019  3:10 PM    Comment:  Pt educated on RW safety                   Point: Precautions (Resolved)     Learning Progress Summary           Patient Acceptance, E,TB, VU by  at 2/16/2019  4:33 PM                               User Key     Initials Effective Dates Name Provider Type Discipline     05/14/18 -  Narinder Gonzalez, PT Physical Therapist PT     01/04/19 -  Evie Jennings PT Student PT Student PT                Therapy Treatment  Rehabilitation Treatment Summary     Row Name 02/18/19 1100             Treatment Time/Intention    Discipline  physical therapist  -EJ      Document Type  therapy note (daily note);discharge treatment  -EJ      Subjective Information  no complaints  -EJ      Mode of Treatment  physical therapy  -EJ      Patient/Family Observations  sitting up in chair, son present in room  -EJ      Patient Effort  good  -EJ      Existing Precautions/Restrictions  fall  -EJ      Recorded by [EJ] Jia Montes, PT 02/18/19 1122      Row Name 02/18/19 1100             Cognitive Assessment/Intervention- PT/OT    Personal Safety Interventions  fall prevention program maintained;gait belt;nonskid shoes/slippers when out of bed;supervised activity  -EJ      Recorded by [EJ] Jia Montes, PT 02/18/19 1122      Row Name 02/18/19 1100             Bed Mobility Assessment/Treatment    Comment (Bed Mobility)  up in chair  -EJ      Recorded by [EJ] Jia Montes, PT 02/18/19 1122      Row Name 02/18/19 1100             Sit-Stand Transfer    Sit-Stand Hickman (Transfers)  verbal cues;contact guard  -EJ      Assistive Device (Sit-Stand Transfers)  walker, front-wheeled  -EJ      Recorded by [EJ] Jia Montes, PT 02/18/19 1122      Row Name 02/18/19  1100             Stand-Sit Transfer    Stand-Sit [place] (Transfers)  verbal cues;contact guard  -EJ      Assistive Device (Stand-Sit Transfers)  walker, front-wheeled  -EJ      Recorded by [EJ] Jia Montes, PT 02/18/19 1122      Row Name 02/18/19 1100             Gait/Stairs Assessment/Training    Pleasants Level (Gait)  verbal cues;contact guard  -EJ      Assistive Device (Gait)  walker, front-wheeled  -EJ      Distance in Feet (Gait)  70  -EJ      Deviations/Abnormal Patterns (Gait)  amanda decreased;base of support, wide;stride length decreased  -EJ      Bilateral Gait Deviations  forward flexed posture;heel strike decreased  -EJ      Comment (Gait/Stairs)  cues to step into walker when ambulating, cues for upright posture  -EJ      Recorded by [EJ] Jia Montes, PT 02/18/19 1122      Row Name 02/18/19 1100             Positioning and Restraints    Pre-Treatment Position  sitting in chair/recliner  -EJ      Post Treatment Position  chair  -EJ      In Chair  notified nsg;sitting;call light within reach;encouraged to call for assist;with family/caregiver  -EJ      Recorded by [EJ] Jia Montes, PT 02/18/19 1122      Row Name 02/18/19 1100             Pain Scale: Numbers Pre/Post-Treatment    Pain Scale: Numbers, Pretreatment  0/10 - no pain  -EJ      Pain Scale: Numbers, Post-Treatment  0/10 - no pain  -EJ      Recorded by [EJ] Jia Montes, PT 02/18/19 1122        User Key  (r) = Recorded By, (t) = Taken By, (c) = Cosigned By    Initials Name Effective Dates Discipline    EJ Jia Montes, PT 04/03/18 -  PT             PT Recommendation and Plan     Plan of Care Reviewed With: patient, son  Progress: improving  Outcome Summary: Pt DC to home today. She was able to increase ambulation distance and requiring less assistance with all mobility. Safety cues given for upright posture and to step into walker when ambulationg. Pt/son voice no concerns about DC to home. Pt does have  caregivers lined up to assist at home as needed.    Outcome Measures     Row Name 02/18/19 1100 02/16/19 1600          How much help from another person do you currently need...    Turning from your back to your side while in flat bed without using bedrails?  3  -EJ  3  -CS     Moving from lying on back to sitting on the side of a flat bed without bedrails?  3  -EJ  2  -CS     Moving to and from a bed to a chair (including a wheelchair)?  3  -EJ  3  -CS     Standing up from a chair using your arms (e.g., wheelchair, bedside chair)?  3  -EJ  3  -CS     Climbing 3-5 steps with a railing?  2  -EJ  1  -CS     To walk in hospital room?  3  -EJ  3  -CS     AM-PAC 6 Clicks Score  17  -EJ  15  -CS        Functional Assessment    Outcome Measure Options  AM-PAC 6 Clicks Basic Mobility (PT)  -EJ  AM-PAC 6 Clicks Basic Mobility (PT)  -CS       User Key  (r) = Recorded By, (t) = Taken By, (c) = Cosigned By    Initials Name Provider Type    Jia Interiano, PT Physical Therapist    CS Narinder Gonzalez, PT Physical Therapist           Time Calculation:   PT Charges     Row Name 02/18/19 1124             Time Calculation    Start Time  1100  -EJ      Stop Time  1118  -EJ      Time Calculation (min)  18 min  -EJ      PT Received On  02/18/19  -EJ        User Key  (r) = Recorded By, (t) = Taken By, (c) = Cosigned By    Initials Name Provider Type    Jia Interiano, PT Physical Therapist        Therapy Suggested Charges     Code   Minutes Charges    None             Therapy Charges for Today     Code Description Service Date Service Provider Modifiers Qty    90790956857 HC PT THER PROC EA 15 MIN 2/18/2019 Jia Montes, PT GP 1          PT G-Codes  Outcome Measure Options: AM-PAC 6 Clicks Basic Mobility (PT)  AM-PAC 6 Clicks Score: 17  Score: 11    PT Discharge Summary  Reason for Discharge: Discharge from facility  Discharge Destination: Home with assist, Home with home health    Jia Montes  PT  2/18/2019

## 2019-02-18 NOTE — PROGRESS NOTES
Continued Stay Note  [facility]     Patient Name: Rose Cheema  MRN: [id number]  Today's Date: 2/18/2019    Admit Date: 2/12/2019    Discharge Plan     Row Name 02/18/19 1120       Plan    Plan  Home with [facility]    Patient/Family in Agreement with Plan  yes    Plan Comments  Discharge order noted.  Spoke with pt and she wants to go home with [facility].  PT on floor and walked pt to assess home safety.  PT found pt safe to go home.  Pt used rolling walker prior to admission and will conitinue to use at home.  [facility] aware of discharge and will follow.  Eugene Singh RN-BC        Discharge Codes    No documentation.       Expected Discharge Date and Time     Expected Discharge Date Expected Discharge Time    Feb 18, 2019             Eugene Singh RN

## 2019-02-18 NOTE — PROGRESS NOTES
Case Management Discharge Note    Final Note: Pt discharged home with shamika HERNANDEZ.  Transported home by son in private vehicle    Destination      No service has been selected for the patient.      Durable Medical Equipment      No service has been selected for the patient.      Dialysis/Infusion      No service has been selected for the patient.      Home Medical Care - Selection Complete      Service Provider Request Status Selected Services Address Phone Number Fax Number    [facility] [address] [place], [address] [address] [phone]      Community Resources      No service has been selected for the patient.             Final Discharge Disposition Code: 06 - home with home health care

## 2019-02-18 NOTE — DISCHARGE SUMMARY
Name: Rose Cheema  Age: 78 y.o.  Sex: female  :  1940  MRN: [id number]         Primary Care Physician: Cheng Guevara MD      Date of Admission:  2019  Date of Discharge:  2019      CHIEF COMPLAINT     Generalized abdominal pain      DISCHARGE DIAGNOSIS  Active Hospital Problems    Diagnosis Date Noted   • **Generalized abdominal pain [R10.84] 2019   • Hx of cholecystectomy [Z90.49] 2019   • Hypoglycemia [E16.2] 2019   • Fall [W19.XXXA] 2019   • Disease of thyroid gland [E07.9] 2019   • Chronic pain syndrome [G89.4] 2019   • Anemia [D64.9] 2019   • Fibromyalgia [M79.7] 2019   • Bilateral lower extremity edema (chronic) [R60.0] 2018   • Obstructive sleep apnea [G47.33] 2014   • Secondary pulmonary arterial hypertension (CMS/HCC) [I27.21] 2014   • Obesity [E66.9] 2014   • COPD (chronic obstructive pulmonary disease) (CMS/HCC) [J44.9] 2014   • Benign essential hypertension [I10] 2014      Resolved Hospital Problems    Diagnosis Date Noted Date Resolved   • Acute UTI (Proteus) [N39.0] 2019       SECONDARY DIAGNOSES  Past Medical History:   Diagnosis Date   • Anemia    • Anxiety    • Arthritis    • CHF (congestive heart failure) (CMS/HCC)    • Coronary artery disease    • Depression    • Disease of thyroid gland    • Fibromyalgia    • GERD (gastroesophageal reflux disease)    • Hypertension    • Moderate tricuspid valve stenosis    • Osteoporosis    • Peripheral neuropathy    • Pulmonary hypertension (CMS/HCC)    • SBO (small bowel obstruction) (CMS/HCC)    • Stenosis of mitral valve        CONSULTS   Consulting Physician(s)     Provider Relationship Specialty    Fernando Zapata Jr., MD Consulting Physician General Surgery    Gerald Herr MD Consulting Physician Gastroenterology          PROCEDURES PERFORMED  MRI of Abd  CT of Abd  ERCP attempted but not successful due to distortion of  anatomy.  Patient had previous gastric bypass surgery        HOSPITAL COURSE  This is a 78-year-old female who was admitted with a history of having generalized abdominal pain and found to have UTI with Proteus which is been treated with antibiotics.  She received Unasyn and completed her antibiotic course.  In addition she also had CT changes consistent with small bowel obstruction but it resolved with conservative management.  She was noted to have elevated liver enzymes and dilated common bile duct.  She was seen by both surgery Dr. Fernando Zapata and Gerald Herr..  She underwent ERCP with showed stones but was unable to access because of the distortion of the bile duct due to her previous surgery.  Her liver enzymes have resolved and she has no evidence of obstruction.  Patient is tolerating diet and back to her baseline.  Dr. Fernando Zapata feels that she may in need to see Dr. Martines.  I have made a appointment for her to see Dr. Fernando Zapata in about a week in his office to make arrangements for follow-up with the Dr Martines      PHYSICAL EXAM  Temp:  [97.6 °F (36.4 °C)-98.1 °F (36.7 °C)] 98.1 °F (36.7 °C)  Heart Rate:  [85-95] 95  Resp:  [16] 16  BP: (104-123)/(61-82) 123/82  Body mass index is 50.27 kg/m².  Physical Exam  HEENT: Unremarkable, pupils are round equal and reacting to light   NECK: No lymphadenopathy, throat is clear,   RESPRATORY SYSTEM: Breath sounds are equal on both sides and are normal, no wheezes or crackles  CARDIOVASULAR SYSTEM: Heart rate is regular without murmur  ABDOMEN: Soft, no ascites, no hepatosplenomegaly.  EXTREMITIES: No cyanosis, clubbing or edema    CONDITION ON DISCHARGE  Stable.      DISCHARGE DISPOSITION   Home      ALLERGIES  Allergies   Allergen Reactions   • Aspartame Other (See Comments)     CAUSES MIGRAINES   • Cephalexin Rash     Tolerated piperacillin-tazobactam (Zosyn) and ampicillin-sulbactam (Unasyn) during Feb 2019 admission.       RECENT LABS  Results  from last 7 days   Lab Units 02/18/19  0513 02/17/19  0511 02/16/19  0639   WBC 10*3/mm3 6.02 6.00 6.30   HEMOGLOBIN g/dL 11.7* 11.2* 11.9*   HEMATOCRIT % 37.9 35.3 37.1   PLATELETS 10*3/mm3 162 154 143     Results from last 7 days   Lab Units 02/18/19  0513 02/17/19 2025 02/17/19  0511 02/16/19  0355   SODIUM mmol/L 139  --  143 140   POTASSIUM mmol/L 4.1 4.6 3.5 3.5   CHLORIDE mmol/L 101  --  101 97*   CO2 mmol/L 27.5  --  30.2* 30.2*   BUN mg/dL 14  --  12 9   CREATININE mg/dL 0.89  --  0.77 0.85   GLUCOSE mg/dL 93  --  89 91   CALCIUM mg/dL 8.6  --  8.3* 8.1*     Results from last 7 days   Lab Units 02/15/19  0312   INR  1.03       DIET;  Diet Order   Procedures   • Diet Regular; Cardiac       DISCHARGE MEDICATIONS     Your medication list      CONTINUE taking these medications      Instructions Last Dose Given Next Dose Due   alendronate 35 MG tablet  Commonly known as:  FOSAMAX      Take 35 mg by mouth.       aspirin 81 MG chewable tablet      Chew 81 mg.       busPIRone 30 MG tablet  Commonly known as:  BUSPAR      Take 30 mg by mouth 2 (Two) Times a Day.       Calcium Carbonate-Vitamin D 500-125 MG-UNIT tablet      Take  by mouth.       fentaNYL 25 MCG/HR patch  Commonly known as:  DURAGESIC      Place 1 patch on the skin.       ferrous sulfate 325 (65 FE) MG tablet      Take 325 mg by mouth Daily With Breakfast.       FLUoxetine 40 MG capsule  Commonly known as:  PROzac      Take 60 mg by mouth 2 (Two) Times a Day.       HYDROcodone-acetaminophen 7.5-325 MG per tablet  Commonly known as:  NORCO      Take 1 tablet by mouth Every 6 (Six) Hours As Needed.       levothyroxine 50 MCG tablet  Commonly known as:  SYNTHROID, LEVOTHROID      Take 50 mcg by mouth Daily.       loperamide 2 MG capsule  Commonly known as:  IMODIUM      Take 2 mg by mouth 4 (Four) Times a Day As Needed.       MYRBETRIQ 50 MG tablet sustained-release 24 hour 24 hr tablet  Generic drug:  Mirabegron ER      TAKE ONE TABLET BY MOUTH DAILY        nystatin 375019 UNIT/GM powder  Generic drug:  nystatin      Apply  topically to the appropriate area as directed.       pantoprazole 40 MG EC tablet  Commonly known as:  PROTONIX      Take 40 mg by mouth Daily.       potassium chloride 10 MEQ CR tablet  Commonly known as:  K-DUR,KLOR-CON      Take 10 mEq by mouth Daily.       pregabalin 150 MG capsule  Commonly known as:  LYRICA      Take 150 mg by mouth 2 (Two) Times a Day.       QUEtiapine 50 MG tablet  Commonly known as:  SEROquel      Take 50 mg by mouth Every Night.       torsemide 100 MG tablet  Commonly known as:  DEMADEX      TAKE ONE-HALF TABLET BY MOUTH DAILY ALTERNATIING WITH ONE-HALF TABLET BY MOUTH TWICE A DAY EVERY OTHER DAY       VENTOLIN  (90 Base) MCG/ACT inhaler  Generic drug:  albuterol sulfate HFA      Every 4 (Four) Hours As Needed.       vitamin D 05036 units capsule capsule  Commonly known as:  ERGOCALCIFEROL      TAKE ONE CAPSULE BY MOUTH EVERY MONDAY FOR VITAMIN SUPPLEMENT          STOP taking these medications    hyoscyamine 0.375 MG 12 hr tablet  Commonly known as:  LEVBID              No future appointments.  Follow-up Information     Cheng Guevara MD Follow up.    Specialty:  Internal Medicine  Contact information:  [address] [place] [address] [address]  [facility] [address] [phone]             Fernando Zapata Jr., MD Follow up in 1 week(s).    Specialty:  General Surgery  Contact information:  [facility]  [facility] [address]  [facility] [address] [phone]                      CODE STATUS  Code Status and Medical Interventions:   Ordered at: 02/13/19 0005     Limited Support to NOT Include:    Dialysis    Artificial Nutrition    Intubation     Code Status:    No CPR     Medical Interventions (Level of Support Prior to Arrest):    Limited           Aman Jackson MD  [facility]  02/18/19      Time: greater than 35 minutes.

## 2019-02-18 NOTE — PLAN OF CARE
Problem: Patient Care Overview  Goal: Plan of Care Review  Outcome: Outcome(s) achieved Date Met: 02/18/19 02/18/19 1122   Coping/Psychosocial   Plan of Care Reviewed With patient;son   Plan of Care Review   Progress improving   OTHER   Outcome Summary Pt DC to home today. She was able to increase ambulation distance and requiring less assistance with all mobility. Safety cues given for upright posture and to step into walker when ambulationg. Pt/son voice no concerns about DC to home. Pt does have caregivers lined up to assist at home as needed.

## 2019-02-19 ENCOUNTER — READMISSION MANAGEMENT (OUTPATIENT)
Dept: CALL CENTER | Facility: HOSPITAL | Age: 79
End: 2019-02-19

## 2019-02-20 ENCOUNTER — READMISSION MANAGEMENT (OUTPATIENT)
Dept: CALL CENTER | Facility: HOSPITAL | Age: 79
End: 2019-02-20

## 2019-02-20 NOTE — OUTREACH NOTE
Medical Week 1 Survey      Responses   Facility patient discharged from?  [place]   Does the patient have one of the following disease processes/diagnoses(primary or secondary)?  Other   Is there a successful TCM telephone encounter documented?  No   Week 1 attempt successful?  Yes   Call start time  1247   Call end time  1249   Discharge diagnosis  Generalized abdominal pain   Meds reviewed with patient/caregiver?  Yes   Is the patient having any side effects they believe may be caused by any medication additions or changes?  No   Does the patient have all medications ordered at discharge?  Yes   Is the patient taking all medications as directed (includes completed medication regime)?  Yes   Does the patient have a primary care provider?   Yes   Does the patient have an appointment with their PCP within 7 days of discharge?  Yes   Has the patient kept scheduled appointments due by today?  Yes   Comments  feb 26   What is the Home health agency?   [facility]   Psychosocial issues?  No   Did the patient receive a copy of their discharge instructions?  Yes   What is the patient's perception of their health status since discharge?  Improving   Is the patient/caregiver able to teach back signs and symptoms related to disease process for when to call PCP?  Yes   Is the patient/caregiver able to teach back signs and symptoms related to disease process for when to call 911?  Yes   Is the patient/caregiver able to teach back the hierarchy of who to call/visit for symptoms/problems? PCP, Specialist, Home health nurse, Urgent Care, ED, 911  Yes   Week 1 call completed?  Yes          Edelmira Jaeger RN

## 2019-02-20 NOTE — OUTREACH NOTE
Prep Survey      Responses   Facility patient discharged from?  [facility]   Is patient eligible?  Yes   Discharge diagnosis  Generalized abdominal pain   Does the patient have one of the following disease processes/diagnoses(primary or secondary)?  Other   Does the patient have Home health ordered?  Yes   What is the Home health agency?   [facility]    Is there a DME ordered?  No   Prep survey completed?  Yes          Nelly Tovar RN

## 2019-02-26 ENCOUNTER — TRANSCRIBE ORDERS (OUTPATIENT)
Dept: SURGERY | Facility: CLINIC | Age: 79
End: 2019-02-26

## 2019-02-26 ENCOUNTER — OFFICE VISIT (OUTPATIENT)
Dept: SURGERY | Facility: CLINIC | Age: 79
End: 2019-02-26

## 2019-02-26 VITALS — HEART RATE: 84 BPM | BODY MASS INDEX: 52.03 KG/M2 | WEIGHT: 266.4 LBS | OXYGEN SATURATION: 92 %

## 2019-02-26 DIAGNOSIS — K80.50 CHOLEDOCHOLITHIASIS: Primary | ICD-10-CM

## 2019-02-26 DIAGNOSIS — Z87.19 HX SBO: Primary | Chronic | ICD-10-CM

## 2019-02-26 PROCEDURE — 99214 OFFICE O/P EST MOD 30 MIN: CPT | Performed by: SURGERY

## 2019-02-28 NOTE — PROGRESS NOTES
Subjective   Rose Cheema is a 78 y.o. female who returns to the office after a recent hospitalization during which time she was diagnosed with choledocholithiasis.    History of Present Illness     The patient has multiple medical problems and multiple abdominal surgeries including a gastric bypass who was noted to have elevation of her liver function tests on recent hospitalization.  An MRCP was performed that showed choledocholithiasis.  Dr. Herr attempted an ERCP and was able to get to the ampulla of Vater despite her altered anatomy but could not deeply cannulate the common bile duct adequately to remove the common bile duct stones.  In the meantime, her liver function tests had slowly normalized.  She now returns to the office in follow-up for surgical options to relieve the choledocholithiasis.    The patient is currently having no abdominal pain.  Her appetite is good and her bowel function is normal.  She is not having any signs of jaundice nor dark urine.  She is debilitated with minimal mobility and has a number of medical problems.    Review of Systems   Constitutional: Positive for activity change. Negative for appetite change, fatigue and fever.   HENT: Negative for trouble swallowing and voice change.    Respiratory: Negative for chest tightness and shortness of breath.    Cardiovascular: Negative for chest pain and palpitations.   Gastrointestinal: Negative for abdominal pain, blood in stool, constipation, diarrhea, nausea and vomiting.   Endocrine: Negative for cold intolerance and heat intolerance.   Genitourinary: Negative for dysuria and flank pain.   Neurological: Negative for dizziness and light-headedness.   Hematological: Negative for adenopathy. Does not bruise/bleed easily.   Psychiatric/Behavioral: Negative for agitation and confusion.     Past Medical History:   Diagnosis Date   • Anemia    • Anxiety    • Arthritis    • CHF (congestive heart failure) (CMS/[facility])    • Coronary artery  disease    • Depression    • Disease of thyroid gland    • Fibromyalgia    • GERD (gastroesophageal reflux disease)    • Hypertension    • Moderate tricuspid valve stenosis    • Osteoporosis    • Peripheral neuropathy    • Pulmonary hypertension (CMS/HCC)    • SBO (small bowel obstruction) (CMS/HCC)    • Stenosis of mitral valve      Past Surgical History:   Procedure Laterality Date   • BILATERAL OOPHORECTOMY     • CARDIAC CATHETERIZATION     • CHOLECYSTECTOMY     • ERCP N/A 2/15/2019    Procedure: ENDOSCOPIC RETROGRADE CHOLANGIOPANCREATOGRAPHY WITH PARTIAL CHOLANGIOGRAM;  Surgeon: Gerald Herr MD;  Location: [facility] ENDOSCOPY;  Service: Gastroenterology   • EXPLORATORY LAPAROTOMY     • GASTRIC BYPASS     • HERNIA REPAIR      inguinal and ventral   • HYSTERECTOMY     • OVARIAN CYST REMOVAL       History reviewed. No pertinent family history.  Social History     Socioeconomic History   • Marital status:      Spouse name: Not on file   • Number of children: Not on file   • Years of education: Not on file   • Highest education level: Not on file   Social Needs   • Financial resource strain: Not on file   • Food insecurity - worry: Not on file   • Food insecurity - inability: Not on file   • Transportation needs - medical: Not on file   • Transportation needs - non-medical: Not on file   Occupational History   • Not on file   Tobacco Use   • Smoking status: Former Smoker   • Smokeless tobacco: Never Used   Substance and Sexual Activity   • Alcohol use: No   • Drug use: No   • Sexual activity: No   Other Topics Concern   • Not on file   Social History Narrative   • Not on file       Objective   Physical Exam   Constitutional: She is oriented to person, place, and time. She appears well-developed and well-nourished.  Non-toxic appearance.   Eyes: EOM are normal. No scleral icterus.   Pulmonary/Chest: Effort normal. No respiratory distress.   Abdominal: Soft. Normal appearance. There is no tenderness.    Neurological: She is alert and oriented to person, place, and time.   Skin: Skin is warm and dry.   Psychiatric: She has a normal mood and affect. Her behavior is normal. Judgment and thought content normal.       Assessment/Plan       The encounter diagnosis was Choledocholithiasis.    The patient has known choledocholithiasis but was not able to have a successful ERCP based on altered anatomy from a previous gastric bypass.  She is high risk for any surgical procedure.  There is no clinical evidence of ongoing choledocholithiasis and, therefore, no urgent need to decompress the common bile duct.  She will be referred to Dr. Martines for evaluation of the choledocholithiasis.  It is hoped that he will be able to successfully perform the ERCP if it is necessary.

## 2019-03-02 ENCOUNTER — READMISSION MANAGEMENT (OUTPATIENT)
Dept: CALL CENTER | Facility: HOSPITAL | Age: 79
End: 2019-03-02

## 2019-03-02 NOTE — OUTREACH NOTE
Medical Week 2 Survey      Responses   Facility patient discharged from?  [place]   Does the patient have one of the following disease processes/diagnoses(primary or secondary)?  Other   Week 2 attempt successful?  Yes   Call start time  0911   Call end time  0912   Meds reviewed with patient/caregiver?  Yes   Is the patient taking all medications as directed (includes completed medication regime)?  Yes   Has the patient kept scheduled appointments due by today?  Yes   What is the patient's perception of their health status since discharge?  Improving   Week 2 Call Completed?  Yes   Graduated  Yes   Did the patient feel the follow up calls were helpful during their recovery period?  Yes   Was the number of calls appropriate?  Yes          Padmini Rooney RN

## 2019-04-10 ENCOUNTER — APPOINTMENT (OUTPATIENT)
Dept: GENERAL RADIOLOGY | Facility: HOSPITAL | Age: 79
End: 2019-04-10

## 2019-04-10 ENCOUNTER — HOSPITAL ENCOUNTER (INPATIENT)
Facility: HOSPITAL | Age: 79
LOS: 5 days | Discharge: NURSING, REHAB OR LONG-TERM CARE | End: 2019-04-16
Attending: EMERGENCY MEDICINE | Admitting: INTERNAL MEDICINE

## 2019-04-10 DIAGNOSIS — A41.9 SEPSIS, DUE TO UNSPECIFIED ORGANISM: ICD-10-CM

## 2019-04-10 DIAGNOSIS — N39.0 URINARY TRACT INFECTION WITHOUT HEMATURIA, SITE UNSPECIFIED: ICD-10-CM

## 2019-04-10 DIAGNOSIS — J18.9 PNEUMONIA OF RIGHT LOWER LOBE DUE TO INFECTIOUS ORGANISM: Primary | ICD-10-CM

## 2019-04-10 DIAGNOSIS — J96.01 ACUTE RESPIRATORY FAILURE WITH HYPOXIA (HCC): ICD-10-CM

## 2019-04-10 DIAGNOSIS — B34.8 RHINOVIRUS INFECTION: ICD-10-CM

## 2019-04-10 LAB
ALBUMIN SERPL-MCNC: 3 G/DL (ref 3.5–5.2)
ALBUMIN/GLOB SERPL: 0.9 G/DL
ALP SERPL-CCNC: 96 U/L (ref 39–117)
ALT SERPL W P-5'-P-CCNC: 14 U/L (ref 1–33)
ANION GAP SERPL CALCULATED.3IONS-SCNC: 18.3 MMOL/L
AST SERPL-CCNC: 27 U/L (ref 1–32)
B PARAPERT DNA SPEC QL NAA+PROBE: NOT DETECTED
B PERT DNA SPEC QL NAA+PROBE: NOT DETECTED
BACTERIA UR QL AUTO: ABNORMAL /HPF
BILIRUB SERPL-MCNC: 0.8 MG/DL (ref 0.2–1.2)
BILIRUB UR QL STRIP: NEGATIVE
BUN BLD-MCNC: 13 MG/DL (ref 8–23)
BUN/CREAT SERPL: 14.3 (ref 7–25)
C PNEUM DNA NPH QL NAA+NON-PROBE: NOT DETECTED
CALCIUM SPEC-SCNC: 9.1 MG/DL (ref 8.6–10.5)
CHLORIDE SERPL-SCNC: 102 MMOL/L (ref 98–107)
CLARITY UR: CLEAR
CO2 SERPL-SCNC: 17.7 MMOL/L (ref 22–29)
COLOR UR: YELLOW
CREAT BLD-MCNC: 0.91 MG/DL (ref 0.57–1)
D-LACTATE SERPL-SCNC: 1.7 MMOL/L (ref 0.5–2)
D-LACTATE SERPL-SCNC: 2.2 MMOL/L (ref 0.5–2)
DEPRECATED RDW RBC AUTO: 55.8 FL (ref 37–54)
ERYTHROCYTE [DISTWIDTH] IN BLOOD BY AUTOMATED COUNT: 14.2 % (ref 12.3–15.4)
FLUAV H1 2009 PAND RNA NPH QL NAA+PROBE: NOT DETECTED
FLUAV H1 HA GENE NPH QL NAA+PROBE: NOT DETECTED
FLUAV H3 RNA NPH QL NAA+PROBE: NOT DETECTED
FLUAV SUBTYP SPEC NAA+PROBE: NOT DETECTED
FLUBV RNA ISLT QL NAA+PROBE: NOT DETECTED
GFR SERPL CREATININE-BSD FRML MDRD: 60 ML/MIN/1.73
GLOBULIN UR ELPH-MCNC: 3.3 GM/DL
GLUCOSE BLD-MCNC: 140 MG/DL (ref 65–99)
GLUCOSE UR STRIP-MCNC: NEGATIVE MG/DL
HADV DNA SPEC NAA+PROBE: NOT DETECTED
HCOV 229E RNA SPEC QL NAA+PROBE: NOT DETECTED
HCOV HKU1 RNA SPEC QL NAA+PROBE: NOT DETECTED
HCOV NL63 RNA SPEC QL NAA+PROBE: NOT DETECTED
HCOV OC43 RNA SPEC QL NAA+PROBE: NOT DETECTED
HCT VFR BLD AUTO: 39.8 % (ref 34–46.6)
HGB BLD-MCNC: 11.6 G/DL (ref 12–15.9)
HGB UR QL STRIP.AUTO: ABNORMAL
HMPV RNA NPH QL NAA+NON-PROBE: NOT DETECTED
HOLD SPECIMEN: NORMAL
HPIV1 RNA SPEC QL NAA+PROBE: NOT DETECTED
HPIV2 RNA SPEC QL NAA+PROBE: NOT DETECTED
HPIV3 RNA NPH QL NAA+PROBE: NOT DETECTED
HPIV4 P GENE NPH QL NAA+PROBE: NOT DETECTED
HYALINE CASTS UR QL AUTO: ABNORMAL /LPF
KETONES UR QL STRIP: ABNORMAL
LEUKOCYTE ESTERASE UR QL STRIP.AUTO: ABNORMAL
LYMPHOCYTES # BLD MANUAL: 0.52 10*3/MM3 (ref 0.7–3.1)
LYMPHOCYTES NFR BLD MANUAL: 2.8 % (ref 19.6–45.3)
LYMPHOCYTES NFR BLD MANUAL: 3.6 % (ref 5–12)
M PNEUMO IGG SER IA-ACNC: NOT DETECTED
MACROCYTES BLD QL SMEAR: ABNORMAL
MCH RBC QN AUTO: 30.8 PG (ref 26.6–33)
MCHC RBC AUTO-ENTMCNC: 29.1 G/DL (ref 31.5–35.7)
MCV RBC AUTO: 105.6 FL (ref 79–97)
MONOCYTES # BLD AUTO: 0.67 10*3/MM3 (ref 0.1–0.9)
NEUTROPHILS # BLD AUTO: 17.33 10*3/MM3 (ref 1.4–7)
NEUTROPHILS NFR BLD MANUAL: 93.6 % (ref 42.7–76)
NITRITE UR QL STRIP: POSITIVE
NT-PROBNP SERPL-MCNC: 3584 PG/ML (ref 5–1800)
PH UR STRIP.AUTO: <=5 [PH] (ref 5–8)
PLAT MORPH BLD: NORMAL
PLATELET # BLD AUTO: 150 10*3/MM3 (ref 140–450)
PMV BLD AUTO: 10.8 FL (ref 6–12)
POTASSIUM BLD-SCNC: 4.4 MMOL/L (ref 3.5–5.2)
PROCALCITONIN SERPL-MCNC: 0.56 NG/ML (ref 0.1–0.25)
PROT SERPL-MCNC: 6.3 G/DL (ref 6–8.5)
PROT UR QL STRIP: NEGATIVE
RBC # BLD AUTO: 3.77 10*6/MM3 (ref 3.77–5.28)
RBC # UR: ABNORMAL /HPF
REF LAB TEST METHOD: ABNORMAL
RHINOVIRUS RNA SPEC NAA+PROBE: DETECTED
RSV RNA NPH QL NAA+NON-PROBE: NOT DETECTED
SODIUM BLD-SCNC: 138 MMOL/L (ref 136–145)
SP GR UR STRIP: 1.02 (ref 1–1.03)
SQUAMOUS #/AREA URNS HPF: ABNORMAL /HPF
TROPONIN T SERPL-MCNC: <0.01 NG/ML (ref 0–0.03)
UROBILINOGEN UR QL STRIP: ABNORMAL
WBC MORPH BLD: NORMAL
WBC NRBC COR # BLD: 18.52 10*3/MM3 (ref 3.4–10.8)
WBC UR QL AUTO: ABNORMAL /HPF

## 2019-04-10 PROCEDURE — 80053 COMPREHEN METABOLIC PANEL: CPT | Performed by: EMERGENCY MEDICINE

## 2019-04-10 PROCEDURE — 87040 BLOOD CULTURE FOR BACTERIA: CPT | Performed by: EMERGENCY MEDICINE

## 2019-04-10 PROCEDURE — 87486 CHLMYD PNEUM DNA AMP PROBE: CPT | Performed by: EMERGENCY MEDICINE

## 2019-04-10 PROCEDURE — 85025 COMPLETE CBC W/AUTO DIFF WBC: CPT | Performed by: EMERGENCY MEDICINE

## 2019-04-10 PROCEDURE — 87581 M.PNEUMON DNA AMP PROBE: CPT | Performed by: EMERGENCY MEDICINE

## 2019-04-10 PROCEDURE — 25010000002 ENOXAPARIN PER 10 MG: Performed by: INTERNAL MEDICINE

## 2019-04-10 PROCEDURE — 94799 UNLISTED PULMONARY SVC/PX: CPT

## 2019-04-10 PROCEDURE — 36415 COLL VENOUS BLD VENIPUNCTURE: CPT

## 2019-04-10 PROCEDURE — 85007 BL SMEAR W/DIFF WBC COUNT: CPT | Performed by: EMERGENCY MEDICINE

## 2019-04-10 PROCEDURE — 81001 URINALYSIS AUTO W/SCOPE: CPT | Performed by: EMERGENCY MEDICINE

## 2019-04-10 PROCEDURE — 93005 ELECTROCARDIOGRAM TRACING: CPT | Performed by: EMERGENCY MEDICINE

## 2019-04-10 PROCEDURE — 71046 X-RAY EXAM CHEST 2 VIEWS: CPT

## 2019-04-10 PROCEDURE — 87633 RESP VIRUS 12-25 TARGETS: CPT | Performed by: EMERGENCY MEDICINE

## 2019-04-10 PROCEDURE — 25010000002 PIPERACILLIN SOD-TAZOBACTAM PER 1 G: Performed by: EMERGENCY MEDICINE

## 2019-04-10 PROCEDURE — 84484 ASSAY OF TROPONIN QUANT: CPT | Performed by: EMERGENCY MEDICINE

## 2019-04-10 PROCEDURE — G0378 HOSPITAL OBSERVATION PER HR: HCPCS

## 2019-04-10 PROCEDURE — 84145 PROCALCITONIN (PCT): CPT | Performed by: EMERGENCY MEDICINE

## 2019-04-10 PROCEDURE — 25010000002 PIPERACILLIN SOD-TAZOBACTAM PER 1 G: Performed by: INTERNAL MEDICINE

## 2019-04-10 PROCEDURE — 94640 AIRWAY INHALATION TREATMENT: CPT

## 2019-04-10 PROCEDURE — 83605 ASSAY OF LACTIC ACID: CPT | Performed by: EMERGENCY MEDICINE

## 2019-04-10 PROCEDURE — 87798 DETECT AGENT NOS DNA AMP: CPT | Performed by: EMERGENCY MEDICINE

## 2019-04-10 PROCEDURE — 99284 EMERGENCY DEPT VISIT MOD MDM: CPT

## 2019-04-10 PROCEDURE — 83880 ASSAY OF NATRIURETIC PEPTIDE: CPT | Performed by: EMERGENCY MEDICINE

## 2019-04-10 PROCEDURE — 25010000002 VANCOMYCIN 10 G RECONSTITUTED SOLUTION: Performed by: EMERGENCY MEDICINE

## 2019-04-10 PROCEDURE — 93010 ELECTROCARDIOGRAM REPORT: CPT | Performed by: INTERNAL MEDICINE

## 2019-04-10 RX ORDER — IPRATROPIUM BROMIDE AND ALBUTEROL SULFATE 2.5; .5 MG/3ML; MG/3ML
3 SOLUTION RESPIRATORY (INHALATION)
Status: DISCONTINUED | OUTPATIENT
Start: 2019-04-10 | End: 2019-04-16 | Stop reason: HOSPADM

## 2019-04-10 RX ORDER — HYDROCODONE BITARTRATE AND ACETAMINOPHEN 5; 325 MG/1; MG/1
1 TABLET ORAL EVERY 6 HOURS PRN
Status: DISCONTINUED | OUTPATIENT
Start: 2019-04-10 | End: 2019-04-16 | Stop reason: HOSPADM

## 2019-04-10 RX ORDER — IPRATROPIUM BROMIDE AND ALBUTEROL SULFATE 2.5; .5 MG/3ML; MG/3ML
3 SOLUTION RESPIRATORY (INHALATION)
Status: DISCONTINUED | OUTPATIENT
Start: 2019-04-10 | End: 2019-04-13

## 2019-04-10 RX ORDER — SODIUM CHLORIDE 0.9 % (FLUSH) 0.9 %
10 SYRINGE (ML) INJECTION AS NEEDED
Status: DISCONTINUED | OUTPATIENT
Start: 2019-04-10 | End: 2019-04-16 | Stop reason: HOSPADM

## 2019-04-10 RX ORDER — TORSEMIDE 100 MG/1
50 TABLET ORAL EVERY OTHER DAY
COMMUNITY
End: 2019-04-16 | Stop reason: HOSPADM

## 2019-04-10 RX ORDER — ACETAMINOPHEN 500 MG
1000 TABLET ORAL ONCE
Status: DISCONTINUED | OUTPATIENT
Start: 2019-04-10 | End: 2019-04-10

## 2019-04-10 RX ORDER — BISACODYL 10 MG
10 SUPPOSITORY, RECTAL RECTAL DAILY PRN
Status: DISCONTINUED | OUTPATIENT
Start: 2019-04-10 | End: 2019-04-16 | Stop reason: HOSPADM

## 2019-04-10 RX ORDER — SODIUM CHLORIDE 0.9 % (FLUSH) 0.9 %
3-10 SYRINGE (ML) INJECTION AS NEEDED
Status: DISCONTINUED | OUTPATIENT
Start: 2019-04-10 | End: 2019-04-16 | Stop reason: HOSPADM

## 2019-04-10 RX ORDER — ACETAMINOPHEN 325 MG/1
650 TABLET ORAL EVERY 4 HOURS PRN
Status: DISCONTINUED | OUTPATIENT
Start: 2019-04-10 | End: 2019-04-16 | Stop reason: HOSPADM

## 2019-04-10 RX ORDER — PREGABALIN 75 MG/1
150 CAPSULE ORAL EVERY 12 HOURS SCHEDULED
Status: DISCONTINUED | OUTPATIENT
Start: 2019-04-10 | End: 2019-04-16 | Stop reason: HOSPADM

## 2019-04-10 RX ORDER — BISACODYL 5 MG/1
5 TABLET, DELAYED RELEASE ORAL DAILY PRN
Status: DISCONTINUED | OUTPATIENT
Start: 2019-04-10 | End: 2019-04-16 | Stop reason: HOSPADM

## 2019-04-10 RX ORDER — ACETAMINOPHEN 650 MG/1
650 SUPPOSITORY RECTAL ONCE
Status: COMPLETED | OUTPATIENT
Start: 2019-04-10 | End: 2019-04-10

## 2019-04-10 RX ORDER — ALBUTEROL SULFATE 2.5 MG/3ML
2.5 SOLUTION RESPIRATORY (INHALATION) ONCE
Status: COMPLETED | OUTPATIENT
Start: 2019-04-10 | End: 2019-04-10

## 2019-04-10 RX ORDER — SODIUM CHLORIDE 0.9 % (FLUSH) 0.9 %
3 SYRINGE (ML) INJECTION EVERY 12 HOURS SCHEDULED
Status: DISCONTINUED | OUTPATIENT
Start: 2019-04-10 | End: 2019-04-16 | Stop reason: HOSPADM

## 2019-04-10 RX ORDER — TORSEMIDE 100 MG/1
50 TABLET ORAL DAILY
COMMUNITY

## 2019-04-10 RX ADMIN — SODIUM CHLORIDE, POTASSIUM CHLORIDE, SODIUM LACTATE AND CALCIUM CHLORIDE 500 ML: 600; 310; 30; 20 INJECTION, SOLUTION INTRAVENOUS at 12:51

## 2019-04-10 RX ADMIN — ACETAMINOPHEN 650 MG: 650 SUPPOSITORY RECTAL at 13:06

## 2019-04-10 RX ADMIN — ENOXAPARIN SODIUM 40 MG: 40 INJECTION SUBCUTANEOUS at 18:05

## 2019-04-10 RX ADMIN — PREGABALIN 150 MG: 75 CAPSULE ORAL at 20:02

## 2019-04-10 RX ADMIN — TAZOBACTAM SODIUM AND PIPERACILLIN SODIUM 3.38 G: 375; 3 INJECTION, SOLUTION INTRAVENOUS at 20:02

## 2019-04-10 RX ADMIN — IPRATROPIUM BROMIDE AND ALBUTEROL SULFATE 3 ML: 2.5; .5 SOLUTION RESPIRATORY (INHALATION) at 20:43

## 2019-04-10 RX ADMIN — IPRATROPIUM BROMIDE AND ALBUTEROL SULFATE 3 ML: 2.5; .5 SOLUTION RESPIRATORY (INHALATION) at 23:09

## 2019-04-10 RX ADMIN — TAZOBACTAM SODIUM AND PIPERACILLIN SODIUM 3.38 G: 375; 3 INJECTION, SOLUTION INTRAVENOUS at 14:14

## 2019-04-10 RX ADMIN — IPRATROPIUM BROMIDE AND ALBUTEROL SULFATE 3 ML: 2.5; .5 SOLUTION RESPIRATORY (INHALATION) at 16:33

## 2019-04-10 RX ADMIN — VANCOMYCIN HYDROCHLORIDE 2500 MG: 10 INJECTION, POWDER, LYOPHILIZED, FOR SOLUTION INTRAVENOUS at 15:17

## 2019-04-10 RX ADMIN — ALBUTEROL SULFATE 2.5 MG: 2.5 SOLUTION RESPIRATORY (INHALATION) at 13:25

## 2019-04-10 NOTE — PROGRESS NOTES
Clinical Pharmacy Services: Medication History    Rose Cheema is a 78 y.o. female presenting to [facility] for Pneumonia of right lower lobe due to infectious organism (CMS/[facility]) [J18.1]    She  has a past medical history of Anemia, Anxiety, Arthritis, CHF (congestive heart failure) (CMS/HCC), Coronary artery disease, Depression, Disease of thyroid gland, Fibromyalgia, GERD (gastroesophageal reflux disease), Hypertension, Moderate tricuspid valve stenosis, Osteoporosis, Peripheral neuropathy, Pulmonary hypertension (CMS/HCC), SBO (small bowel obstruction) (CMS/[facility]), and Stenosis of mitral valve.    Allergies as of 04/10/2019 - Reviewed 04/10/2019   Allergen Reaction Noted   • Aspartame Other (See Comments) 02/14/2014   • Cephalexin Rash 02/13/2014       Medication information was obtained from: pt med list  Pharmacy and Phone Number: [facility]     Prior to Admission Medications       Prescriptions Last Dose Informant Patient Reported? Taking?    aspirin 81 MG chewable tablet  Other Yes Yes    Chew 81 mg.    busPIRone (BUSPAR) 30 MG tablet  Other Yes Yes    Take 30 mg by mouth 2 (Two) Times a Day.    fentaNYL (DURAGESIC) 25 MCG/HR patch  Other Yes Yes    Place 1 patch on the skin as directed by provider Every 72 (Seventy-Two) Hours.    FLUoxetine (PROzac) 60 MG tablet  Other Yes Yes    Take 60 mg by mouth Daily.    HYDROcodone-acetaminophen (NORCO) 7.5-325 MG per tablet  Other Yes Yes    Take 1 tablet by mouth Every 6 (Six) Hours As Needed.    levothyroxine (SYNTHROID, LEVOTHROID) 50 MCG tablet  Other Yes Yes    Take 50 mcg by mouth Daily.    MYRBETRIQ 50 MG tablet sustained-release 24 hour 24 hr tablet  Other No Yes    TAKE ONE TABLET BY MOUTH DAILY    pantoprazole (PROTONIX) 40 MG EC tablet  Other Yes Yes    Take 40 mg by mouth Daily.    pregabalin (LYRICA) 150 MG capsule  Other Yes Yes    Take 150 mg by mouth 2 (Two) Times a Day.    QUEtiapine (SEROquel) 50 MG tablet  Other Yes Yes    Take  50 mg by mouth Every Night.    torsemide (DEMADEX) 100 MG tablet  Other Yes Yes    Take 50 mg by mouth Daily. Takes 50 mg daily and every other day takes a second 50 mg dose    torsemide (DEMADEX) 100 MG tablet  Other Yes Yes    Take 50 mg by mouth Every Other Day. Takes 50 mg daily and every other day takes a second 50 mg dose    umeclidinium-vilanterol (ANORO ELLIPTA) 62.5-25 MCG/INH aerosol powder  inhaler  Other Yes Yes    Inhale 2 puffs Daily.    VENTOLIN  (90 Base) MCG/ACT inhaler  Other Yes Yes    Every 4 (Four) Hours As Needed.                Medication notes:     This medication list is complete to the best of my knowledge as of 4/10/2019    Please call if questions.    Michelle Javier, PharmD, [facility]  4/10/2019 3:32 PM

## 2019-04-10 NOTE — ED PROVIDER NOTES
EMERGENCY DEPARTMENT ENCOUNTER    CHIEF COMPLAINT  Chief Complaint: SOA  History given by: patient, EMS  History limited by: nothing  Room Number: 10/10  PMD: Cheng Guevara MD      HPI:  Pt is a 78 y.o. female who presents complaining of SOA that began a few days ago and has been progressively worsening since then. Her SOA is worse on exertion. She also complains of a cough and worsening BLE edema. When EMS arrived, her sats were 82% on room air. They placed her on 4L and her sats increased to 94%. She does not typically wear O2 at baseline. They also gave Pt a duo-neb which helped minimally. She was not aware that she had a fever prior to presentation. She has no other complaints at this time.     Duration: several days  Onset: gradual  Timing: constant  Location: n/a  Radiation: n/a  Quality: n/a  Intensity/Severity: moderate  Progression: worsening  Associated Symptoms: cough, low O2 sats  Aggravating Factors: exertion  Alleviating Factors: rest  Previous Episodes: none  Treatment before arrival: Pt was placed on 4L of O2 and was given a duo-neb.    PAST MEDICAL HISTORY  Active Ambulatory Problems     Diagnosis Date Noted   • Urinary incontinence 08/24/2017   • Urinary frequency 08/30/2017   • Generalized abdominal pain 02/12/2019   • Pulmonary hypertension  02/12/2019   • Benign essential hypertension 09/22/2014   • Bilateral lower extremity edema (chronic) 05/17/2018   • CAD (coronary artery disease), native coronary artery 11/02/2015   • COPD (chronic obstructive pulmonary disease) (CMS/[facility]) 09/22/2014   • Diastolic dysfunction 12/22/2014   • Obesity 09/22/2014   • Obstructive sleep apnea 09/22/2014   • Secondary pulmonary arterial hypertension (CMS/[facility]) 09/22/2014   • H/O: hysterectomy 09/22/2014   • Fibromyalgia 02/12/2019   • Hx SBO 02/12/2019   • Hx of cholecystectomy 02/13/2019   • Hypoglycemia 02/13/2019   • Fall 02/13/2019   • Disease of thyroid gland 02/13/2019   • Chronic pain syndrome 02/13/2019    • Anemia 02/13/2019     Resolved Ambulatory Problems     Diagnosis Date Noted   • Acute UTI ([facility]) 02/13/2019     Past Medical History:   Diagnosis Date   • Anemia    • Anxiety    • Arthritis    • CHF (congestive heart failure) (CMS/HCC)    • Coronary artery disease    • Depression    • Disease of thyroid gland    • Fibromyalgia    • GERD (gastroesophageal reflux disease)    • Hypertension    • Moderate tricuspid valve stenosis    • Osteoporosis    • Peripheral neuropathy    • Pulmonary hypertension (CMS/HCC)    • SBO (small bowel obstruction) (CMS/HCC)    • Stenosis of mitral valve        PAST SURGICAL HISTORY  Past Surgical History:   Procedure Laterality Date   • BILATERAL OOPHORECTOMY     • CARDIAC CATHETERIZATION     • CHOLECYSTECTOMY     • ERCP N/A 2/15/2019    Procedure: ENDOSCOPIC RETROGRADE CHOLANGIOPANCREATOGRAPHY WITH PARTIAL CHOLANGIOGRAM;  Surgeon: Gerald Herr MD;  Location: [facility] ENDOSCOPY;  Service: Gastroenterology   • EXPLORATORY LAPAROTOMY     • GASTRIC BYPASS     • HERNIA REPAIR      inguinal and ventral   • HYSTERECTOMY     • OVARIAN CYST REMOVAL         FAMILY HISTORY  No family history on file.    SOCIAL HISTORY  Social History     Socioeconomic History   • Marital status:      Spouse name: Not on file   • Number of children: Not on file   • Years of education: Not on file   • Highest education level: Not on file   Tobacco Use   • Smoking status: Former Smoker   • Smokeless tobacco: Never Used   Substance and Sexual Activity   • Alcohol use: No   • Drug use: No   • Sexual activity: No       ALLERGIES  Aspartame and Cephalexin    REVIEW OF SYSTEMS  Review of Systems   Constitutional: Negative for fever.   HENT: Negative for sore throat.    Eyes: Negative.    Respiratory: Positive for cough and shortness of breath.         Low O2 sats   Cardiovascular: Positive for leg swelling (bilateral). Negative for chest pain.   Gastrointestinal: Negative for abdominal pain, diarrhea  and vomiting.   Genitourinary: Negative for dysuria.   Musculoskeletal: Negative for neck pain.   Skin: Negative for rash.   Neurological: Negative for weakness, numbness and headaches.   Hematological: Negative.    Psychiatric/Behavioral: Negative.    All other systems reviewed and are negative.      PHYSICAL EXAM  ED Triage Vitals [04/10/19 1209]   Temp Heart Rate Resp BP SpO2   (!) 101.2 °F (38.4 °C) 95 18 124/52 96 %      Temp src Heart Rate Source Patient Position BP Location FiO2 (%)   -- -- -- -- --       Physical Exam   Constitutional: She is oriented to person, place, and time. No distress.   HENT:   Head: Normocephalic and atraumatic.   Eyes: EOM are normal. Pupils are equal, round, and reactive to light.   Neck: Normal range of motion. Neck supple.   Cardiovascular: Normal rate, regular rhythm and normal heart sounds.   BLE edema   Pulmonary/Chest: She is in respiratory distress (mild). She has decreased breath sounds. She has rhonchi (scattered, bilaterally).   Abdominal: Soft. There is no tenderness. There is no rebound and no guarding.   obese   Musculoskeletal: Normal range of motion. She exhibits no edema.   Neurological: She is alert and oriented to person, place, and time. She has normal sensation and normal strength.   Skin: Skin is warm and dry. No rash noted.   Dry scaly skin with mild erythema to both lower legs.   Psychiatric: Mood and affect normal.   Nursing note and vitals reviewed.      LAB RESULTS  Lab Results (last 24 hours)     Procedure Component Value Units Date/Time    Lactic Acid, Plasma [[id number]]  (Abnormal) Collected:  04/10/19 1248    Specimen:  Blood Updated:  04/10/19 1327     Lactate 2.2 mmol/L     Lactic Acid, Reflex Timer (This will reflex a repeat order 3-3:15 hours after ordered.) [[id number]] Collected:  04/10/19 1248    Specimen:  Blood Updated:  04/10/19 1327    CBC & Differential [[id number]] Collected:  04/10/19 1249    Specimen:  Blood Updated:  04/10/19 1328     Narrative:       The following orders were created for panel order CBC & Differential.  Procedure                               Abnormality         Status                     ---------                               -----------         ------                     CBC Auto Differential[[id number]]        Abnormal            Final result                 Please view results for these tests on the individual orders.    Comprehensive Metabolic Panel [[id number]]  (Abnormal) Collected:  04/10/19 1249    Specimen:  Blood Updated:  04/10/19 1356     Glucose 140 mg/dL      BUN 13 mg/dL      Creatinine 0.91 mg/dL      Sodium 138 mmol/L      Potassium 4.4 mmol/L      Chloride 102 mmol/L      CO2 17.7 mmol/L      Calcium 9.1 mg/dL      Total Protein 6.3 g/dL      Albumin 3.00 g/dL      ALT (SGPT) 14 U/L      AST (SGOT) 27 U/L      Alkaline Phosphatase 96 U/L      Total Bilirubin 0.8 mg/dL      eGFR Non African Amer 60 mL/min/1.73      Globulin 3.3 gm/dL      A/G Ratio 0.9 g/dL      BUN/Creatinine Ratio 14.3     Anion Gap 18.3 mmol/L     Narrative:       GFR Normal >60  Chronic Kidney Disease <60  Kidney Failure <15    Troponin [[id number]]  (Normal) Collected:  04/10/19 1249    Specimen:  Blood Updated:  04/10/19 1356     Troponin T <0.010 ng/mL     Narrative:       Troponin T Reference Range:  <= 0.03 ng/mL-   Negative for AMI  >0.03 ng/mL-     Abnormal for myocardial necrosis.  Clinicians would have to utilize clinical acumen, EKG, Troponin and serial changes to determine if it is an Acute Myocardial Infarction or myocardial injury due to an underlying chronic condition.     BNP [[id number]]  (Abnormal) Collected:  04/10/19 1249    Specimen:  Blood Updated:  04/10/19 1331     proBNP 3,584.0 pg/mL     Narrative:       Among patients with dyspnea, NT-proBNP is highly sensitive for the detection of acute congestive heart failure. In addition NT-proBNP of <300 pg/ml effectively rules out acute congestive heart failure with 99% negative  "predictive value.    Blood Culture - Blood, Hand, Right [[id number]] Collected:  04/10/19 1249    Specimen:  Blood from Hand, Right Updated:  04/10/19 1256    Blood Culture - Blood, Arm, Right [[id number]] Collected:  04/10/19 1249    Specimen:  Blood from Arm, Right Updated:  04/10/19 1253    Procalcitonin [[id number]]  (Abnormal) Collected:  04/10/19 1249    Specimen:  Blood Updated:  04/10/19 1340     Procalcitonin 0.56 ng/mL     Narrative:       As a Marker for Sepsis (Non-Neonates):   1. <0.5 ng/mL represents a low risk of severe sepsis and/or septic shock.  1. >2 ng/mL represents a high risk of severe sepsis and/or septic shock.    As a Marker for Lower Respiratory Tract Infections that require antibiotic therapy:  PCT on Admission     Antibiotic Therapy             6-12 Hrs later  > 0.5                Strongly Recommended            >0.25 - <0.5         Recommended  0.1 - 0.25           Discouraged                   Remeasure/reassess PCT  <0.1                 Strongly Discouraged          Remeasure/reassess PCT      As 28 day mortality risk marker: \"Change in Procalcitonin Result\" (> 80 % or <=80 %) if Day 0 (or Day 1) and Day 4 values are available. Refer to http://www.[employer]-pct-calculator.com/   Change in PCT <=80 %   A decrease of PCT levels below or equal to 80 % defines a positive change in PCT test result representing a higher risk for 28-day all-cause mortality of patients diagnosed with severe sepsis or septic shock.  Change in PCT > 80 %   A decrease of PCT levels of more than 80 % defines a negative change in PCT result representing a lower risk for 28-day all-cause mortality of patients diagnosed with severe sepsis or septic shock.                CBC Auto Differential [[id number]]  (Abnormal) Collected:  04/10/19 1249    Specimen:  Blood Updated:  04/10/19 1328     WBC 18.52 10*3/mm3      RBC 3.77 10*6/mm3      Hemoglobin 11.6 g/dL      Hematocrit 39.8 %      .6 fL      MCH 30.8 pg      MCHC " 29.1 g/dL      RDW 14.2 %      RDW-SD 55.8 fl      MPV 10.8 fL      Platelets 150 10*3/mm3     Manual Differential [[id number]]  (Abnormal) Collected:  04/10/19 1249    Specimen:  Blood Updated:  04/10/19 1328     Neutrophil % 93.6 %      Lymphocyte % 2.8 %      Monocyte % 3.6 %      Neutrophils Absolute 17.33 10*3/mm3      Lymphocytes Absolute 0.52 10*3/mm3      Monocytes Absolute 0.67 10*3/mm3      Macrocytes Mod/2+     WBC Morphology Normal     Platelet Morphology Normal    Respiratory Panel, PCR - Swab, Nasopharynx [[id number]]  (Abnormal) Collected:  04/10/19 1307    Specimen:  Swab from Nasopharynx Updated:  04/10/19 1424     ADENOVIRUS, PCR Not Detected     Coronavirus 229E Not Detected     Coronavirus HKU1 Not Detected     Coronavirus NL63 Not Detected     Coronavirus OC43 Not Detected     Human Metapneumovirus Not Detected     Human Rhinovirus/Enterovirus Detected     Influenza B PCR Not Detected     Parainfluenza Virus 1 Not Detected     Parainfluenza Virus 2 Not Detected     Parainfluenza Virus 3 Not Detected     Parainfluenza Virus 4 Not Detected     Bordetella pertussis pcr Not Detected     Influenza A H1 2009 PCR Not Detected     Chlamydophila pneumoniae PCR Not Detected     Mycoplasma pneumo by PCR Not Detected     Influenza A PCR Not Detected     Influenza A H3 Not Detected     Influenza A H1 Not Detected     RSV, PCR Not Detected     Bordetella parapertussis PCR Not Detected    Urinalysis With Microscopic If Indicated (No Culture) - Urine, Catheter [[id number]]  (Abnormal) Collected:  04/10/19 1307    Specimen:  Urine, Catheter Updated:  04/10/19 1358     Color, UA Yellow     Appearance, UA Clear     pH, UA <=5.0     Specific Gravity, UA 1.018     Glucose, UA Negative     Ketones, UA 15 mg/dL (1+)     Bilirubin, UA Negative     Blood, UA Trace     Protein, UA Negative     Leuk Esterase, UA Small (1+)     Nitrite, UA Positive     Urobilinogen, UA 0.2 E.U./dL    Urinalysis, Microscopic Only - Urine,  Catheter [[id number]]  (Abnormal) Collected:  04/10/19 1307    Specimen:  Urine, Catheter Updated:  04/10/19 1401     RBC, UA 0-2 /HPF      WBC, UA 3-5 /HPF      Bacteria, UA 4+ /HPF      Squamous Epithelial Cells, UA 0-2 /HPF      Hyaline Casts, UA 3-6 /LPF      Methodology Automated Microscopy          I ordered the above labs and reviewed the results    RADIOLOGY  XR Chest 2 View   Final Result         Reviewed CXR which shows right lower lobe pneumonia. Independently viewed by me. Interpreted by radiologist.    I ordered the above noted radiological studies. Interpreted by radiologist. Reviewed by me in PACS.       PROCEDURES  Critical Care  Performed by: Senthil Young MD  Authorized by: Senthil Young MD     Critical care provider statement:     Critical care time (minutes):  35    Critical care was necessary to treat or prevent imminent or life-threatening deterioration of the following conditions:  Sepsis and respiratory failure    Critical care was time spent personally by me on the following activities:  Development of treatment plan with patient or surrogate, discussions with consultants, evaluation of patient's response to treatment, examination of patient, obtaining history from patient or surrogate, ordering and performing treatments and interventions, ordering and review of laboratory studies, ordering and review of radiographic studies, pulse oximetry, re-evaluation of patient's condition and review of old charts          EKG          EKG time: 1240  Rhythm/Rate: SR, 86  P waves and ID: normal  QRS, axis: LAD, normal QRS  ST and T waves: nonspecific T wave changes diffusely     Interpreted Contemporaneously by me, independently viewed  changed compared to prior 2/12/19, nonspecific T wave changes were not present at that time.    PROGRESS AND CONSULTS       1230  Ordered labs, EKG, UA, and CXR for further evaluation. Ordered proventil for her SOA, tylenol for her fever, and IV fluids for  hydration.    1405  Ordered zosyn and vancomycin to treat.    1422  Placed call to pulmonology for consult.   Rechecked Pt who is resting comfortably. Informed Pt that she has pneumonia in her right lower lung. Informed her that we have started abx and I have placed a call for her admission. Pt understands and agrees to all plans. All questions answered.   O2 sats 95% on 2L.    1450  Discussed Pt's case with Dr. Ramsey (pulmonology) who agrees to admit pt to telemetry for further evaluation and treatment.     MEDICAL DECISION MAKING  Results were reviewed/discussed with the patient and they were also made aware of online access. Pt also made aware that some labs, such as cultures, will not be resulted during ER visit and follow up with PMD is necessary.     MDM  Number of Diagnoses or Management Options  Acute respiratory failure with hypoxia (CMS/HCC):   Pneumonia of right lower lobe due to infectious organism (CMS/HCC):   Rhinovirus infection:   Sepsis, due to unspecified organism (CMS/HCC):   Urinary tract infection without hematuria, site unspecified:   Diagnosis management comments: Patient presented the ER complaint of shortness of breath and fever.  She was hypoxic on room air.  X-ray showed right lower lobe infiltrate.  Respiratory panel was positive for rhinovirus.  Patient was admitted here approximately 2 months ago and was given antibiotics at that time for UTI.  She will be treated for healthcare associated pneumonia with IV vancomycin and Zosyn.  Oxygen saturations were normal on 2-1/2 L.  Patient was not tachycardic or hypotensive.  She was given IV fluids.  Case was discussed with Dr. Ramsey and he agreed to admit the patient.  Critical care was performed on this patient.       Amount and/or Complexity of Data Reviewed  Clinical lab tests: ordered and reviewed (OwsWLG=2949.0, WBC=18.52, lactate=2.2 nitrite UA=positive, bacteria UA=4+, rhinovirus positive)  Tests in the radiology section of CPT®: ordered  and reviewed (CXR shows right lower lobe pneumonia.)  Tests in the medicine section of CPT®: ordered and reviewed (See EKG note.)  Decide to obtain previous medical records or to obtain history from someone other than the patient: yes  Obtain history from someone other than the patient: yes (EMS)  Review and summarize past medical records: yes (Pt was admitted 2/19 for abdominal pain, UTI and small bowel obstruction.)    Critical Care  Total time providing critical care: 30-74 minutes         DIAGNOSIS  Final diagnoses:   Pneumonia of right lower lobe due to infectious organism (CMS/HCC)   Urinary tract infection without hematuria, site unspecified   Acute respiratory failure with hypoxia (CMS/HCC)   Sepsis, due to unspecified organism (CMS/HCC)   Rhinovirus infection       DISPOSITION  ADMISSION    Discussed treatment plan and reason for admission with pt/family and admitting physician.  Pt/family voiced understanding of the plan for admission for further testing/treatment as needed.       Latest Documented Vital Signs:  As of 3:19 PM  BP- 157/69 HR- 91 Temp- 100 °F (37.8 °C) (Tympanic) O2 sat- 94%    --  Documentation assistance provided by blair Ceja for Dr. Young.  Information recorded by the scrmann was done at my direction and has been verified and validated by me.     Celeste Ceja  04/10/19 2296       Senthil Young MD  04/10/19 9129

## 2019-04-10 NOTE — ED NOTES
Pt to ED via EMS s/p 2 falls OOB this am,  reported to son she slid OOB onto floor onto butt, denies pain or RICHTER. Son states informed she fell again at 0900. Pt confused at baseline but more confused this am, slurred speech on arrival. States she may be more slurred d/t no dentures in. Discussed NPO status with patient and son. Pt is a/o x 2, +2 BLE edema with redness, dry MM. Denies urinary complaints but son reports recently admitted here for UTI.      Sejal Peraza, RN  04/10/19 2162

## 2019-04-10 NOTE — H&P
[place] [facility]  HISTORY AND PHYSICAL   [facility]        Patient Identification:  Name: Rose Cheema  Age: 78 y.o.  Sex: female  :  1940  MRN: [id number]                     Primary Care Physician: Cheng Guevara MD    Chief Complaint:  SOA    History of Present Illness:   78-year-old female presents with complaints of shortness of breath and cough.  Shortness of breath is moderate to severe with exertion.  Has also had associated increasing lower extremity edema.  Saturations in the emergency room 82% on room air.  Required 4 L to maintain saturations in the 90s.  She does not usually use oxygen at home.  Received DuoNeb treatment with minimal help.  Chest x-ray confirms pneumonia.  Will require admission to the hospital for further evaluation and treatment.  Recent hospitalization so we will treat as healthcare associated pneumonia.  Discussed with ER physician by telephone.    Past Medical History:  Past Medical History:   Diagnosis Date   • Anemia    • Anxiety    • Arthritis    • CHF (congestive heart failure) (CMS/HCC)    • Coronary artery disease    • Depression    • Disease of thyroid gland    • Fibromyalgia    • GERD (gastroesophageal reflux disease)    • Hypertension    • Moderate tricuspid valve stenosis    • Osteoporosis    • Peripheral neuropathy    • Pulmonary hypertension (CMS/HCC)    • SBO (small bowel obstruction) (CMS/HCC)    • Stenosis of mitral valve      Past Surgical History:  Past Surgical History:   Procedure Laterality Date   • BILATERAL OOPHORECTOMY     • CARDIAC CATHETERIZATION     • CHOLECYSTECTOMY     • ERCP N/A 2/15/2019    Procedure: ENDOSCOPIC RETROGRADE CHOLANGIOPANCREATOGRAPHY WITH PARTIAL CHOLANGIOGRAM;  Surgeon: Gerald Herr MD;  Location: [facility];  Service: Gastroenterology   • EXPLORATORY LAPAROTOMY     • GASTRIC BYPASS     • HERNIA REPAIR      inguinal and ventral   • HYSTERECTOMY     • OVARIAN CYST REMOVAL        Home  "Meds:    (Not in a hospital admission)    Allergies:  Allergies   Allergen Reactions   • Aspartame Other (See Comments)     CAUSES MIGRAINES   • Cephalexin Rash     Tolerated piperacillin-tazobactam (Zosyn) and ampicillin-sulbactam (Unasyn) during 2019 admission.     Immunizations:    There is no immunization history on file for this patient.  Social History:   Social History     Social History Narrative   • Not on file     Social History     Tobacco Use   • Smoking status: Former Smoker   • Smokeless tobacco: Never Used   Substance Use Topics   • Alcohol use: No     Family History:  No family history on file.     Review of Systems  Positive subjective fevers  Denies nausea or vomiting  No new vision or hearing changes  No chest pain  Positive cough with shortness of breath and wheezing  No diarrhea, hematemesis or hematochezia, no dysuria or frequency  No musculoskeletal complaints  No heat or cold intolerance  No skin rashes  No dizziness or confusion.  No seizure activity  No new anxiety or depression  12 system review of systems performed and all else negative    Objective:  tMax 24 hrs: Temp (24hrs), Av.2 °F (38.4 °C), Min:101.2 °F (38.4 °C), Max:101.2 °F (38.4 °C)    Vitals Ranges:   Temp:  [101.2 °F (38.4 °C)] 101.2 °F (38.4 °C)  Heart Rate:  [80-95] 86  Resp:  [18-24] 24  BP: (124-134)/(52-67) 130/67      Exam:  /67   Pulse 86   Temp (!) 101.2 °F (38.4 °C)   Resp 24   Ht 152.4 cm (60\")   Wt 128 kg (282 lb 6.4 oz)   SpO2 95%   BMI 55.15 kg/m²     GENERAL APPEARANCE:   · Well developed  · Well nourished  · No acute distress   EYES:    · PERRL                                                                           · Conjunctiva normal  · Sclera non-icteric.  HENT:   · Atraumatic, normocephalic  · External ears and nose normal  · Moist mucous membranes and no ulcers  NECK:  · Thyroid not enlarged  · Trachea midline   RESPIRATORY:    · Nonlabored breathing   · Coarse bilateral breath " sounds  · No rales.  Mild expiratory wheezing  · No dullness  CARDIOVASCULAR:    · Tachycardic  · Normal S1, S2  · No murmur  · Lower extremity edema: none    GI:   · Bowel sounds normal  · Abdomen soft , non-distended, non-tender  · No abdominal masses.    MUSCULOSKELETAL:  · Normal movement of extremities  · No tenderness, no deformities  · No clubbing or cyanosis   Skin:    · No visible rashes  · No palpable nodules  PSYCHIATRIC:  · Speech and behavior appropriate  · Normal mood and affect  · Oriented to person, place and time  NEUROLOGIC:  .    Data Review:  04/10/2019 1307  04/10/2019 1424  Respiratory Panel, PCR - Swab, Nasopharynx [[id number]]   (Abnormal)   Swab from Nasopharynx     Final result  ADENOVIRUS, PCR Not Detected   Coronavirus 229E Not Detected   Coronavirus HKU1 Not Detected   Coronavirus NL63 Not Detected   Coronavirus OC43 Not Detected   Human Metapneumovirus Not Detected   Human Rhinovirus/Enterovirus Detected Abnormal    Influenza B PCR Not Detected   Parainfluenza Virus 1 Not Detected   Parainfluenza Virus 2 Not Detected   Parainfluenza Virus 3 Not Detected   Parainfluenza Virus 4 Not Detected   Bordetella pertussis pcr Not Detected   Influenza A H1 2009 PCR Not Detected   Chlamydophila pneumoniae PCR Not Detected   Mycoplasma pneumo by PCR Not Detected   Influenza A PCR Not Detected   Influenza A H3 Not Detected   Influenza A H1 Not Detected   RSV, PCR Not Detected   Bordetella parapertussis PCR Not Detected             04/10/2019 1307  04/10/2019 1358  Urinalysis With Microscopic If Indicated (No Culture) - Urine, Catheter [[id number]]   (Abnormal)   Urine, Catheter     Final result  Color, UA Yellow   Appearance, UA Clear   pH, UA <=5.0   Specific Gravity, UA 1.018   Glucose, UA Negative   Ketones, UA 15 mg/dL (1+) Abnormal    Bilirubin, UA Negative   Blood, UA Trace Abnormal    Protein, UA Negative   Leukocytes, UA Small (1+) Abnormal    Nitrite, UA Positive Abnormal    Urobilinogen, UA  0.2 E.U./dL          04/10/2019 1307  04/10/2019 1401  Urinalysis, Microscopic Only - Urine, Catheter [[id number]]   (Abnormal)   Urine, Catheter     Final result  RBC, UA 0-2 /HPF   WBC, UA 3-5 Abnormal  /HPF   Bacteria, UA 4+ Abnormal  /HPF   Squamous Epithelial Cells, UA 0-2 /HPF   Hyaline Casts, UA 3-6 /LPF   Methodology: Automated Microscopy           04/10/2019 1249  04/10/2019 1356  Comprehensive Metabolic Panel [[id number]]    (Abnormal)   Blood     Final result  Glucose 140 Abnormally high  mg/dL   BUN 13 mg/dL   Creatinine 0.91 mg/dL   Sodium 138 mmol/L   Potassium 4.4 mmol/L   Chloride 102 mmol/L   CO2 17.7 Abnormally low  mmol/L   Calcium 9.1 mg/dL   Total Protein 6.3 g/dL   Albumin 3.00 Abnormally low  g/dL   ALT (SGPT) 14 U/L   AST (SGOT) 27 U/L   Alkaline Phosphatase 96 U/L   Total Bilirubin 0.8 mg/dL   eGFR Non  Am 60 Abnormally low  mL/min/1.73   Globulin 3.3 gm/dL   A/G Ratio 0.9 g/dL   BUN/Creatinine Ratio 14.3    Anion Gap 18.3 mmol/L          04/10/2019 1249  04/10/2019 1356  Troponin [[id number]]    Blood     Final result  Troponin T <0.010 ng/mL          04/10/2019 1249  04/10/2019 1331  BNP [[id number]]    (Abnormal)   Blood     Final result  proBNP 3,584.0 Abnormally high  pg/mL          04/10/2019 1249  04/10/2019 1256  Blood Culture - Blood, Hand, Right [[id number]]   Blood from Hand, Right     In process  No result data             04/10/2019 1249  04/10/2019 1253  Blood Culture - Blood, Arm, Right [[id number]]   Blood from Arm, Right     In process  No result data             04/10/2019 1249  04/10/2019 1340  Procalcitonin [[id number]]    (Abnormal)   Blood     Final result  Procalcitonin 0.56 Abnormally high  ng/mL          04/10/2019 1249  04/10/2019 1328  CBC Auto Differential [[id number]]   (Abnormal)   Blood     Final result  WBC 18.52 Abnormally high  10*3/mm3   RBC 3.77 10*6/mm3   Hemoglobin 11.6 Abnormally low  g/dL   Hematocrit 39.8 %   .6 Abnormally high  fL   MCH 30.8 pg    MCHC 29.1 Abnormally low  g/dL   RDW 14.2 %   RDW-SD 55.8 Abnormally high  fl   MPV 10.8 fL   Platelets 150 10*3/mm3          04/10/2019 1249  04/10/2019 1328  Manual Differential [[id number]]   (Abnormal)   Blood     Final result  Neutrophil Rel % 93.6 Abnormally high  %   Lymphocyte Rel % 2.8 Abnormally low  %   Monocyte Rel % 3.6 Abnormally low  %   Neutrophils Absolute 17.33 Abnormally high  10*3/mm3   Lymphocytes Absolute 0.52 Abnormally low  10*3/mm3   Monocytes Absolute 0.67 10*3/mm3   Macrocytes Mod/2+    WBC Morphology Normal    Platelet Morphology Normal           04/10/2019 1248  04/10/2019 1327  Lactic Acid, Plasma [[id number]]   (Abnormal)   Blood     Final result  Lactate 2.2 Critically high  mmol/L          04/10/2019 1248  04/10/2019 1327  Lactic Acid, Reflex Timer (This will reflex a repeat order 3-3:15 hours after ordered.) [[id number]]   Blood     In process  No result data               chest X-ray viewed confirms pneumonia    Assessment:  Acute hypoxemic respiratory failure  Healthcare associated pneumonia  Lactic acidosis and sepsis  URI with rhinovirus positive on respiratory viral panel  COPD with mild exacerbation  Pulmonary hypertension  Chronic CHF and mitral valve stenosis  Anxiety with depression and fibromyalgia    Plan:  Admit to the hospital.  Continue antibiotics for healthcare associated pneumonia coverage as initiated in the emergency room.  Narrow based on culture results.  Sputum and blood sent for culture.  Bronchodilators for wheezing.  Recheck lactic acid level in several hours.  DVT prophylaxis.    Joe Calhoun MD  [facility], [facility]  Pulmonary and Critical Care Medicine    4/10/2019  2:57 PM

## 2019-04-10 NOTE — PROGRESS NOTES
"Pharmacokinetic Consult - Vancomycin Dosing (Initial Note)    Rose Cheema is a 78 y.o. female 152.4 cm (60\") 128 kg (282 lb 6.4 oz)   Pharmacy consulted to dose vancomycin for Pneumonia x6 days.  Pharmacy dosing vancomycin per Dr. Calhoun's request.   Goal trough: 15 - 20 mcg/mL   Additional Abx Therapy: Piperacillin-tazobactam 3.375 IV Q8H EI for PNA (Day 1)    Anti-Infectives (From admission, onward)    Ordered     Dose/Rate Route Frequency Start Stop    04/10/19 1503  piperacillin-tazobactam (ZOSYN) 3.375 g in iso-osmotic dextrose 50 ml (premix)     Ordering Provider:  Joe Calhoun MD    3.375 g  over 4 Hours Intravenous Every 8 Hours 04/10/19 2130 04/17/19 2129    04/10/19 1503  piperacillin-tazobactam (ZOSYN) 3.375 g in iso-osmotic dextrose 50 ml (premix)     Ordering Provider:  Joe Calhoun MD    3.375 g  over 30 Minutes Intravenous Once 04/10/19 1505      04/10/19 1503  Pharmacy to dose vancomycin     Ordering Provider:  Joe Calhoun MD     Does not apply Continuous PRN 04/10/19 1500 04/16/19 1459    04/10/19 1405  piperacillin-tazobactam (ZOSYN) 3.375 g in iso-osmotic dextrose 50 ml (premix)     Ordering Provider:  Senthil Young MD    3.375 g  over 30 Minutes Intravenous Once 04/10/19 1407 04/10/19 1434    04/10/19 1405  vancomycin 2500 mg/500 mL 0.9% NS IVPB ([facility])     Ordering Provider:  Senthil Young MD    20 mg/kg × 128 kg Intravenous Once 04/10/19 1407             Relevant clinical data and objective history reviewed:    Lab Results   Component Value Date    CREATININE 0.91 04/10/2019     Estimated Creatinine Clearance: 65.2 mL/min (by C-G formula based on SCr of 0.91 mg/dL).    Lab Results   Component Value Date    WBC 18.52 (H) 04/10/2019     Temp Readings from Last 1 Encounters:   04/10/19 (!) 101.2 °F (38.4 °C)       Baseline culture/source/susceptibility:   4/10 Blood Culture (2/2): NGTD  4/10 RVP + for Rhino/Entero  4/10 Procal: 0.56  4/10 WBC: " 18.52    Radiology  4/10  Chest XR: Right lower lobe pneumonia    Assessment/Plan  Given the patient's PMH, clinical status (Tmax: 101.2, elevated RR), loading dose of 2500 mg given in the ED, weight, and age, will start vancomycin 2000 mg IV Q24H (15.6 mg/kg).  Vancomycin level due on 4/13 @1330. Recommend adequate hydration given additional administration of piperacillin-tazobactam. Will monitor serum creatinine and vancomycin levels and adjust as needed.Thank you Dr. Calhoun  for this consult.     Dion Mayfield, PharmD, BCPS  04/10/19 3:16 PM

## 2019-04-10 NOTE — PLAN OF CARE
Problem: Patient Care Overview  Goal: Plan of Care Review   04/10/19 1042   Coping/Psychosocial   Plan of Care Reviewed With patient;stephani   Plan of Care Review   Progress no change   OTHER   Outcome Summary patent admitted to the unit with dx of pna. no acute distress noted will continue to monitor.     Goal: Individualization and Mutuality  Outcome: Ongoing (interventions implemented as appropriate)    Goal: Discharge Needs Assessment  Outcome: Ongoing (interventions implemented as appropriate)    Goal: Interprofessional Rounds/Family Conf  Outcome: Ongoing (interventions implemented as appropriate)      Problem: Fall Risk (Adult)  Goal: Identify Related Risk Factors and Signs and Symptoms  Outcome: Ongoing (interventions implemented as appropriate)    Goal: Absence of Fall  Outcome: Ongoing (interventions implemented as appropriate)      Problem: Skin Injury Risk (Adult)  Goal: Identify Related Risk Factors and Signs and Symptoms  Outcome: Ongoing (interventions implemented as appropriate)    Goal: Skin Health and Integrity  Outcome: Ongoing (interventions implemented as appropriate)      Problem: Pain, Chronic (Adult)  Goal: Identify Related Risk Factors and Signs and Symptoms  Outcome: Ongoing (interventions implemented as appropriate)    Goal: Acceptable Pain/Comfort Level and Functional Ability  Outcome: Ongoing (interventions implemented as appropriate)      Problem: Pneumonia (Adult)  Goal: Signs and Symptoms of Listed Potential Problems Will be Absent, Minimized or Managed (Pneumonia)  Outcome: Ongoing (interventions implemented as appropriate)

## 2019-04-11 LAB
ANION GAP SERPL CALCULATED.3IONS-SCNC: 9.5 MMOL/L
BUN BLD-MCNC: 15 MG/DL (ref 8–23)
BUN/CREAT SERPL: 21.4 (ref 7–25)
CALCIUM SPEC-SCNC: 8 MG/DL (ref 8.6–10.5)
CHLORIDE SERPL-SCNC: 103 MMOL/L (ref 98–107)
CO2 SERPL-SCNC: 25.5 MMOL/L (ref 22–29)
CREAT BLD-MCNC: 0.7 MG/DL (ref 0.57–1)
DEPRECATED RDW RBC AUTO: 53.3 FL (ref 37–54)
ERYTHROCYTE [DISTWIDTH] IN BLOOD BY AUTOMATED COUNT: 14.3 % (ref 12.3–15.4)
GFR SERPL CREATININE-BSD FRML MDRD: 81 ML/MIN/1.73
GLUCOSE BLD-MCNC: 103 MG/DL (ref 65–99)
HCT VFR BLD AUTO: 32.3 % (ref 34–46.6)
HGB BLD-MCNC: 9.9 G/DL (ref 12–15.9)
MCH RBC QN AUTO: 30.7 PG (ref 26.6–33)
MCHC RBC AUTO-ENTMCNC: 30.7 G/DL (ref 31.5–35.7)
MCV RBC AUTO: 100 FL (ref 79–97)
PLATELET # BLD AUTO: 129 10*3/MM3 (ref 140–450)
PMV BLD AUTO: 11.4 FL (ref 6–12)
POTASSIUM BLD-SCNC: 3.2 MMOL/L (ref 3.5–5.2)
POTASSIUM BLD-SCNC: 4.2 MMOL/L (ref 3.5–5.2)
RBC # BLD AUTO: 3.23 10*6/MM3 (ref 3.77–5.28)
SODIUM BLD-SCNC: 138 MMOL/L (ref 136–145)
WBC NRBC COR # BLD: 16.48 10*3/MM3 (ref 3.4–10.8)

## 2019-04-11 PROCEDURE — 97110 THERAPEUTIC EXERCISES: CPT

## 2019-04-11 PROCEDURE — 25010000002 PIPERACILLIN SOD-TAZOBACTAM PER 1 G: Performed by: INTERNAL MEDICINE

## 2019-04-11 PROCEDURE — 84132 ASSAY OF SERUM POTASSIUM: CPT | Performed by: INTERNAL MEDICINE

## 2019-04-11 PROCEDURE — 80048 BASIC METABOLIC PNL TOTAL CA: CPT | Performed by: INTERNAL MEDICINE

## 2019-04-11 PROCEDURE — 36415 COLL VENOUS BLD VENIPUNCTURE: CPT | Performed by: INTERNAL MEDICINE

## 2019-04-11 PROCEDURE — 97162 PT EVAL MOD COMPLEX 30 MIN: CPT

## 2019-04-11 PROCEDURE — 92610 EVALUATE SWALLOWING FUNCTION: CPT

## 2019-04-11 PROCEDURE — 94799 UNLISTED PULMONARY SVC/PX: CPT

## 2019-04-11 PROCEDURE — 25010000002 VANCOMYCIN 10 G RECONSTITUTED SOLUTION: Performed by: INTERNAL MEDICINE

## 2019-04-11 PROCEDURE — 85027 COMPLETE CBC AUTOMATED: CPT | Performed by: INTERNAL MEDICINE

## 2019-04-11 PROCEDURE — 25010000002 ENOXAPARIN PER 10 MG: Performed by: INTERNAL MEDICINE

## 2019-04-11 RX ORDER — QUETIAPINE FUMARATE 50 MG/1
50 TABLET, FILM COATED ORAL NIGHTLY
Status: DISCONTINUED | OUTPATIENT
Start: 2019-04-11 | End: 2019-04-16 | Stop reason: HOSPADM

## 2019-04-11 RX ORDER — FLUOXETINE HYDROCHLORIDE 20 MG/1
60 CAPSULE ORAL DAILY
Status: DISCONTINUED | OUTPATIENT
Start: 2019-04-11 | End: 2019-04-16 | Stop reason: HOSPADM

## 2019-04-11 RX ORDER — BUSPIRONE HYDROCHLORIDE 15 MG/1
30 TABLET ORAL 2 TIMES DAILY
Status: DISCONTINUED | OUTPATIENT
Start: 2019-04-11 | End: 2019-04-16 | Stop reason: HOSPADM

## 2019-04-11 RX ORDER — LEVOTHYROXINE SODIUM 0.05 MG/1
50 TABLET ORAL DAILY
Status: DISCONTINUED | OUTPATIENT
Start: 2019-04-11 | End: 2019-04-16 | Stop reason: HOSPADM

## 2019-04-11 RX ORDER — NYSTATIN 100000 [USP'U]/G
POWDER TOPICAL EVERY 12 HOURS SCHEDULED
Status: DISCONTINUED | OUTPATIENT
Start: 2019-04-11 | End: 2019-04-16 | Stop reason: HOSPADM

## 2019-04-11 RX ORDER — OXYBUTYNIN CHLORIDE 10 MG/1
10 TABLET, EXTENDED RELEASE ORAL DAILY
Status: DISCONTINUED | OUTPATIENT
Start: 2019-04-11 | End: 2019-04-16 | Stop reason: HOSPADM

## 2019-04-11 RX ORDER — POTASSIUM CHLORIDE 7.45 MG/ML
10 INJECTION INTRAVENOUS
Status: DISCONTINUED | OUTPATIENT
Start: 2019-04-11 | End: 2019-04-16 | Stop reason: HOSPADM

## 2019-04-11 RX ORDER — POTASSIUM CHLORIDE 750 MG/1
40 CAPSULE, EXTENDED RELEASE ORAL AS NEEDED
Status: DISCONTINUED | OUTPATIENT
Start: 2019-04-11 | End: 2019-04-16 | Stop reason: HOSPADM

## 2019-04-11 RX ORDER — PANTOPRAZOLE SODIUM 40 MG/1
40 TABLET, DELAYED RELEASE ORAL DAILY
Status: DISCONTINUED | OUTPATIENT
Start: 2019-04-11 | End: 2019-04-16 | Stop reason: HOSPADM

## 2019-04-11 RX ORDER — POTASSIUM CHLORIDE 1.5 G/1.77G
40 POWDER, FOR SOLUTION ORAL AS NEEDED
Status: DISCONTINUED | OUTPATIENT
Start: 2019-04-11 | End: 2019-04-16 | Stop reason: HOSPADM

## 2019-04-11 RX ORDER — ASPIRIN 81 MG/1
81 TABLET, CHEWABLE ORAL DAILY
Status: DISCONTINUED | OUTPATIENT
Start: 2019-04-11 | End: 2019-04-16 | Stop reason: HOSPADM

## 2019-04-11 RX ADMIN — ENOXAPARIN SODIUM 40 MG: 40 INJECTION SUBCUTANEOUS at 15:08

## 2019-04-11 RX ADMIN — PREGABALIN 150 MG: 75 CAPSULE ORAL at 20:24

## 2019-04-11 RX ADMIN — ASPIRIN 81 MG: 81 TABLET, CHEWABLE ORAL at 15:08

## 2019-04-11 RX ADMIN — IPRATROPIUM BROMIDE AND ALBUTEROL SULFATE 3 ML: 2.5; .5 SOLUTION RESPIRATORY (INHALATION) at 07:06

## 2019-04-11 RX ADMIN — IPRATROPIUM BROMIDE AND ALBUTEROL SULFATE 3 ML: 2.5; .5 SOLUTION RESPIRATORY (INHALATION) at 15:04

## 2019-04-11 RX ADMIN — POTASSIUM CHLORIDE 40 MEQ: 750 CAPSULE, EXTENDED RELEASE ORAL at 15:07

## 2019-04-11 RX ADMIN — PANTOPRAZOLE SODIUM 40 MG: 40 TABLET, DELAYED RELEASE ORAL at 15:07

## 2019-04-11 RX ADMIN — POTASSIUM CHLORIDE 40 MEQ: 750 CAPSULE, EXTENDED RELEASE ORAL at 19:36

## 2019-04-11 RX ADMIN — NYSTATIN: 100000 POWDER TOPICAL at 20:25

## 2019-04-11 RX ADMIN — IPRATROPIUM BROMIDE AND ALBUTEROL SULFATE 3 ML: 2.5; .5 SOLUTION RESPIRATORY (INHALATION) at 19:23

## 2019-04-11 RX ADMIN — IPRATROPIUM BROMIDE AND ALBUTEROL SULFATE 3 ML: 2.5; .5 SOLUTION RESPIRATORY (INHALATION) at 23:12

## 2019-04-11 RX ADMIN — BUSPIRONE HYDROCHLORIDE 30 MG: 15 TABLET ORAL at 15:08

## 2019-04-11 RX ADMIN — OXYBUTYNIN 10 MG: 10 TABLET, FILM COATED, EXTENDED RELEASE ORAL at 15:08

## 2019-04-11 RX ADMIN — LEVOTHYROXINE SODIUM 50 MCG: 50 TABLET ORAL at 15:08

## 2019-04-11 RX ADMIN — FLUOXETINE HYDROCHLORIDE 60 MG: 20 CAPSULE ORAL at 15:08

## 2019-04-11 RX ADMIN — NYSTATIN: 100000 POWDER TOPICAL at 10:00

## 2019-04-11 RX ADMIN — HYDROCODONE BITARTRATE AND ACETAMINOPHEN 1 TABLET: 5; 325 TABLET ORAL at 19:36

## 2019-04-11 RX ADMIN — IPRATROPIUM BROMIDE AND ALBUTEROL SULFATE 3 ML: 2.5; .5 SOLUTION RESPIRATORY (INHALATION) at 03:58

## 2019-04-11 RX ADMIN — BUSPIRONE HYDROCHLORIDE 30 MG: 15 TABLET ORAL at 20:25

## 2019-04-11 RX ADMIN — QUETIAPINE FUMARATE 50 MG: 50 TABLET, FILM COATED ORAL at 20:24

## 2019-04-11 RX ADMIN — IPRATROPIUM BROMIDE AND ALBUTEROL SULFATE 3 ML: 2.5; .5 SOLUTION RESPIRATORY (INHALATION) at 10:33

## 2019-04-11 RX ADMIN — HYDROCODONE BITARTRATE AND ACETAMINOPHEN 1 TABLET: 5; 325 TABLET ORAL at 10:00

## 2019-04-11 RX ADMIN — HYDROCODONE BITARTRATE AND ACETAMINOPHEN 1 TABLET: 5; 325 TABLET ORAL at 03:56

## 2019-04-11 RX ADMIN — TAZOBACTAM SODIUM AND PIPERACILLIN SODIUM 3.38 G: 375; 3 INJECTION, SOLUTION INTRAVENOUS at 18:05

## 2019-04-11 RX ADMIN — PREGABALIN 150 MG: 75 CAPSULE ORAL at 10:00

## 2019-04-11 RX ADMIN — TAZOBACTAM SODIUM AND PIPERACILLIN SODIUM 3.38 G: 375; 3 INJECTION, SOLUTION INTRAVENOUS at 04:47

## 2019-04-11 RX ADMIN — VANCOMYCIN HYDROCHLORIDE 2000 MG: 10 INJECTION, POWDER, LYOPHILIZED, FOR SOLUTION INTRAVENOUS at 15:10

## 2019-04-11 RX ADMIN — Medication 1 DROP: at 20:25

## 2019-04-11 NOTE — PROGRESS NOTES
Discharge Planning Assessment  [facility]     Patient Name: Rose Cheema  MRN: [id number]  Today's Date: 4/11/2019    Admit Date: 4/10/2019    Discharge Needs Assessment     Row Name 04/11/19 0844       Living Environment    Lives With  spouse    Name(s) of Who Lives With Patient  Spouse  ( Austin Cheema)    Current Living Arrangements  home/apartment/condo    Primary Care Provided by  self    Provides Primary Care For  no one    Family Caregiver if Needed  spouse    Family Caregiver Names  Spouse  ( Austin Cheema) and  Son  ( Mario Cheema [phone])    Quality of Family Relationships  involved;supportive    Able to Return to Prior Arrangements  yes    Living Arrangement Comments  Pt lives in a single story house with spouse ( Austin Cheema)       Resource/Environmental Concerns    Resource/Environmental Concerns  none    Transportation Concerns  car, none       Transition Planning    Patient/Family Anticipates Transition to  home with family    Patient/Family Anticipated Services at Transition  none    Transportation Anticipated  family or friend will provide       Discharge Needs Assessment    Concerns to be Addressed  denies needs/concerns at this time    Equipment Currently Used at Home  bath bench;commode;grab bar;rollator;shower chair;walker, rolling    Anticipated Changes Related to Illness  none    Equipment Needed After Discharge  bath bench;commode;rollator;shower chair;walker, rolling    Offered/Gave Vendor List  no        Discharge Plan     Row Name 04/11/19 0848       Plan    Plan  Plan home with [employer] ZACHARY Grace RN    Plan Comments  FACE SHEET VERIFIED/ AMEZCUA SIGNED.  Spoke to pt at bedside.  Pt's PCP is Dr. Cheng Guevara.  Pt lives in a single story house with spouse  ( Austin Cheema).  Pt is independent with ADL's.  Pt has a bath bench, BSC, grab bars, rollator, shower chair, and rolling walker for home use.  Pt gets prescriptions at [facility] in [place].  Pt denies any issues affording  medications.  Pt is current with Natasha HERNANDEZBobo Post  [phone]) called to follow.  Pt has been in [facility].  Pt states she prefers not to return.  Plan home with Natasha Grace, DAYAN        Destination      No service coordination in this encounter.      Durable Medical Equipment      No service coordination in this encounter.      Dialysis/Infusion      No service coordination in this encounter.      Home Medical Care      Service Provider Request Status Selected Services Address Phone Number Fax Number    [facility] Pending - Request Sent N/A [address], [address], [place] [address] [phone]      Therapy      No service coordination in this encounter.      Community Resources      No service coordination in this encounter.          Demographic Summary     Row Name 04/11/19 0843       General Information    Admission Type  observation    Arrived From  emergency department    Required Notices Provided  Observation Status Notice    Referral Source  admission list    Reason for Consult  discharge planning    Preferred Language  English     Used During This Interaction  no        Functional Status     Row Name 04/11/19 0843       Functional Status    Usual Activity Tolerance  moderate    Current Activity Tolerance  fair       Functional Status, IADL    Medications  independent    Meal Preparation  independent    Housekeeping  independent    Laundry  independent    Shopping  independent       Mental Status    General Appearance WDL  WDL        Psychosocial    No documentation.       Abuse/Neglect    No documentation.       Legal    No documentation.       Substance Abuse    No documentation.       Patient Forms    No documentation.           Patti Grace, RN

## 2019-04-11 NOTE — PLAN OF CARE
"Problem: Patient Care Overview  Goal: Plan of Care Review   04/11/19 1526   Coping/Psychosocial   Plan of Care Reviewed With patient   Plan of Care Review   Progress improving   OTHER   Outcome Summary Pt seen for bedside swallow. Pt admitted to dysphagia, but uanble to describe difficulty. Pt reported, \"it's like I swallow without thinking\". SLP explained that the swallow reflex is often spontaneous. Oral holding appreciated with liquids, voice clear post swallow. Slow oral transit with puree, though judged to be functional. No oral residue post swallow. Rotary mastication pattern with mech soft and regular. Laryngeal elevation palpable and timely. Trace lingual post swallow with regular, patient cleared with liquid wash. Pt was observed to take large pills with applesauce without distress. Pt appears safe for regular and thins. If MD suspects silent aspiration, please order VFSS.          "

## 2019-04-11 NOTE — THERAPY EVALUATION
Acute Care - Physical Therapy Initial Evaluation  [facility]     Patient Name: Rose Cheema  : 1940  MRN: [id number]  Today's Date: 2019   Onset of Illness/Injury or Date of Surgery: 04/10/19            Admit Date: 4/10/2019    Visit Dx:     ICD-10-CM ICD-9-CM   1. Pneumonia of right lower lobe due to infectious organism (CMS/[facility]) J18.1 486   2. Urinary tract infection without hematuria, site unspecified N39.0 599.0   3. Acute respiratory failure with hypoxia (CMS/[facility]) J96.01 518.81   4. Sepsis, due to unspecified organism (CMS/[facility]) A41.9 038.9     995.91   5. Rhinovirus infection B34.8 079.3     Patient Active Problem List   Diagnosis   • Urinary incontinence   • Urinary frequency   • Generalized abdominal pain   • Pulmonary hypertension    • Benign essential hypertension   • Bilateral lower extremity edema (chronic)   • CAD (coronary artery disease), native coronary artery   • COPD (chronic obstructive pulmonary disease) (CMS/[facility])   • Diastolic dysfunction   • Obesity   • Obstructive sleep apnea   • Secondary pulmonary arterial hypertension (CMS/HCC)   • H/O: hysterectomy   • Fibromyalgia   • Hx SBO   • Hx of cholecystectomy   • Hypoglycemia   • Fall   • Disease of thyroid gland   • Chronic pain syndrome   • Anemia   • Pneumonia of right lower lobe due to infectious organism (CMS/HCC)     Past Medical History:   Diagnosis Date   • Anemia    • Anxiety    • Arthritis    • CHF (congestive heart failure) (CMS/HCC)    • Coronary artery disease    • Depression    • Disease of thyroid gland    • Fibromyalgia    • GERD (gastroesophageal reflux disease)    • Hypertension    • Moderate tricuspid valve stenosis    • Osteoporosis    • Peripheral neuropathy    • Pulmonary hypertension (CMS/HCC)    • SBO (small bowel obstruction) (CMS/[facility])    • Stenosis of mitral valve      Past Surgical History:   Procedure Laterality Date   • BILATERAL OOPHORECTOMY     • CARDIAC CATHETERIZATION     • CHOLECYSTECTOMY     • ERCP  N/A 2/15/2019    Procedure: ENDOSCOPIC RETROGRADE CHOLANGIOPANCREATOGRAPHY WITH PARTIAL CHOLANGIOGRAM;  Surgeon: Gerald Herr MD;  Location: [facility] ENDOSCOPY;  Service: Gastroenterology   • EXPLORATORY LAPAROTOMY     • GASTRIC BYPASS     • HERNIA REPAIR      inguinal and ventral   • HYSTERECTOMY     • OVARIAN CYST REMOVAL          PT ASSESSMENT (last 12 hours)      Physical Therapy Evaluation     Row Name 04/11/19 1146          PT Evaluation Time/Intention    Subjective Information  complains of;fatigue;weakness  -AR     Document Type  evaluation  -AR     Mode of Treatment  physical therapy  -AR     Patient Effort  good  -AR     Symptoms Noted During/After Treatment  fatigue  -AR     Row Name 04/11/19 1146          General Information    Patient Profile Reviewed?  yes  -AR     Onset of Illness/Injury or Date of Surgery  04/10/19  -AR     Prior Level of Function  min assist: rollator, WC for longer distances, assist w/ groceries, +fal  -AR     Equipment Currently Used at Home  rollator;hospital bed  -AR     Pertinent History of Current Functional Problem  pneumonia  -AR     Existing Precautions/Restrictions  fall  -AR     Barriers to Rehab  none identified  -AR     Row Name 04/11/19 1146          Relationship/Environment    Lives With  spouse  -AR     Row Name 04/11/19 1146          Resource/Environmental Concerns    Current Living Arrangements  home/apartment/condo  -AR     Row Name 04/11/19 1146          Cognitive Assessment/Intervention- PT/OT    Orientation Status (Cognition)  oriented to;person;place  -AR     Follows Commands (Cognition)  follows two step commands  -AR     Safety Deficit (Cognitive)  mild deficit  -AR     Row Name 04/11/19 1146          Bed Mobility Assessment/Treatment    Bed Mobility Assessment/Treatment  supine-sit;sit-supine  -AR     Supine-Sit [place] (Bed Mobility)  minimum assist (75% patient effort)  -AR     Sit-Supine [place] (Bed Mobility)  moderate assist (50%  patient effort)  -AR     Assistive Device (Bed Mobility)  bed rails;head of bed elevated  -AR     Row Name 04/11/19 1146          Transfer Assessment/Treatment    Transfer Assessment/Treatment  sit-stand transfer;stand-sit transfer  -AR     Comment (Transfers)  UE support on bedrail  -AR     Sit-Stand [place] (Transfers)  contact guard  -AR     Stand-Sit [place] (Transfers)  contact guard  -AR     Row Name 04/11/19 1146          Gait/Stairs Assessment/Training    Gait/Stairs Assessment/Training  gait pattern;distance ambulated  -AR     [place] Level (Gait)  minimum assist (75% patient effort)  -AR     Assistive Device (Gait)  -- bedrail  -AR     Distance in Feet (Gait)  1  -AR     Pattern (Gait)  swing-to  -AR     Deviations/Abnormal Patterns (Gait)  gait speed decreased;festinating/shuffling  -AR     Bilateral Gait Deviations  heel strike decreased;forward flexed posture  -AR     Row Name 04/11/19 1146          General ROM    GENERAL ROM COMMENTS  B LE WFL, R knee painful  -AR     Row Name 04/11/19 1146          MMT (Manual Muscle Testing)    General MMT Comments  B LE grossly >/=+3/5  -AR     Row Name 04/11/19 1146          Motor Assessment/Intervention    Additional Documentation  Balance (Group);Balance Interventions (Group)  -AR     Row Name 04/11/19 1146          Balance    Balance  static sitting balance;static standing balance  -AR     Row Name 04/11/19 1146          Static Sitting Balance    Level of [place] (Unsupported Sitting, Static Balance)  supervision  -AR     Sitting Position (Unsupported Sitting, Static Balance)  sitting on edge of bed  -AR     Time Able to Maintain Position (Unsupported Sitting, Static Balance)  more than 5 minutes  -AR     Row Name 04/11/19 1146          Static Standing Balance    Level of [place] (Supported Standing, Static Balance)  minimal assist, 75% patient effort  -AR     Time Able to Maintain Position (Supported Standing, Static Balance)  30 to 45  seconds  -AR     Assistive Device Utilized (Supported Standing, Static Balance)  -- bedrail  -AR     Row Name 04/11/19 1146          Pain Assessment    Additional Documentation  Pain Scale: Numbers Pre/Post-Treatment (Group)  -AR     Row Name 04/11/19 1146          Pain Scale: Numbers Pre/Post-Treatment    Pain Scale: Numbers, Pretreatment  3/10  -AR     Pain Scale: Numbers, Post-Treatment  3/10  -AR     Pain Location - Side  Right  -AR     Pain Location  knee  -AR     Pain Intervention(s)  Repositioned  -AR     Row Name 04/11/19 1146          Physical Therapy Clinical Impression    Patient/Family Goals Statement (PT Clinical Impression)  DC home  -AR     Criteria for Skilled Interventions Met (PT Clinical Impression)  yes  -AR     Pathology/Pathophysiology Noted (Describe Specifically for Each System)  musculoskeletal  -AR     Impairments Found (describe specific impairments)  aerobic capacity/endurance;gait, locomotion, and balance  -AR     Rehab Potential (PT Clinical Summary)  good, to achieve stated therapy goals  -AR     Row Name 04/11/19 1146          Vital Signs    O2 Delivery Pre Treatment  supplemental O2  -AR     Row Name 04/11/19 1146          Physical Therapy Goals    Bed Mobility Goal Selection (PT)  bed mobility, PT goal 1  -AR     Transfer Goal Selection (PT)  transfer, PT goal 1  -AR     Gait Training Goal Selection (PT)  gait training, PT goal 1  -AR     Row Name 04/11/19 1146          Bed Mobility Goal 1 (PT)    Activity/Assistive Device (Bed Mobility Goal 1, PT)  bed mobility activities, all  -AR     [place] Level/Cues Needed (Bed Mobility Goal 1, PT)  supervision required  -AR     Time Frame (Bed Mobility Goal 1, PT)  1 week  -AR     Row Name 04/11/19 1146          Transfer Goal 1 (PT)    Activity/Assistive Device (Transfer Goal 1, PT)  sit-to-stand/stand-to-sit;walker, rolling  -AR     [place] Level/Cues Needed (Transfer Goal 1, PT)  standby assist  -AR     Time Frame (Transfer Goal  1, PT)  1 week  -AR     Row Name 04/11/19 1146          Gait Training Goal 1 (PT)    Activity/Assistive Device (Gait Training Goal 1, PT)  gait (walking locomotion);walker, rolling  -AR     [place] Level (Gait Training Goal 1, PT)  contact guard assist  -AR     Distance (Gait Goal 1, PT)  100  -AR     Time Frame (Gait Training Goal 1, PT)  1 week  -AR     Row Name 04/11/19 1146          Positioning and Restraints    Pre-Treatment Position  in bed  -AR     Post Treatment Position  bed  -AR     In Bed  notified nsg;supine;call light within reach;encouraged to call for assist;exit alarm on;with family/caregiver  -AR       User Key  (r) = Recorded By, (t) = Taken By, (c) = Cosigned By    Initials Name Provider Type    AR Marixa Jones PT Physical Therapist        Physical Therapy Education     Title: PT OT SLP Therapies (In Progress)     Topic: Physical Therapy (In Progress)     Point: Mobility training (In Progress)     Learning Progress Summary           Patient Acceptance, E, NR by AR at 4/11/2019  1:49 PM                   Point: Home exercise program (In Progress)     Learning Progress Summary           Patient Acceptance, E, NR by AR at 4/11/2019  1:49 PM                   Point: Body mechanics (In Progress)     Learning Progress Summary           Patient Acceptance, E, NR by AR at 4/11/2019  1:49 PM                   Point: Precautions (In Progress)     Learning Progress Summary           Patient Acceptance, E, NR by AR at 4/11/2019  1:49 PM                               User Key     Initials Effective Dates Name Provider Type Discipline    AR 04/03/18 -  Marixa Jones, PT Physical Therapist PT              PT Recommendation and Plan  Anticipated Discharge Disposition (PT): skilled nursing facility  Planned Therapy Interventions (PT Eval): balance training, bed mobility training, gait training, home exercise program, patient/family education, ROM (range of motion), stair training, strengthening,  transfer training  Therapy Frequency (PT Clinical Impression): daily  Outcome Summary/Treatment Plan (PT)  Anticipated Discharge Disposition (PT): skilled nursing facility  Plan of Care Reviewed With: patient  Outcome Summary: Pt admitted 4/10 w/ pneumonia.  Pt reports living w/ spouse,  using rollator, h/o falls.  Today pt demonstrates generalized weakness, impaired activity tolerance and independence w/ mobility but able to stand at EOB w/ min A using bedrail.  Performed few exercises w/ education for HEP.  Educated on activity recommendations and PT POC while inpatient.  Currently recommend DC to SNU.       Time Calculation:   PT Charges     Row Name 04/11/19 1351             Time Calculation    Start Time  1148  -AR      Stop Time  1210  -AR      Time Calculation (min)  22 min  -AR      PT Received On  04/11/19  -AR      PT - Next Appointment  04/12/19  -AR      PT Goal Re-Cert Due Date  04/18/19  -AR        User Key  (r) = Recorded By, (t) = Taken By, (c) = Cosigned By    Initials Name Provider Type    AR Marixa Jones, PT Physical Therapist        Therapy Charges for Today     Code Description Service Date Service Provider Modifiers Qty    42185450374 HC PT EVAL MOD COMPLEXITY 2 4/11/2019 Marixa Jones, PT GP 1    25885737103 HC PT THER PROC EA 15 MIN 4/11/2019 Marixa Jones, PT GP 1          PT G-Codes  AM-PAC 6 Clicks Score: 12      Marixa Jones PT  4/11/2019

## 2019-04-11 NOTE — PROGRESS NOTES
Continued Stay Note  [facility]     Patient Name: Rose Cheema  MRN: [id number]  Today's Date: 4/11/2019    Admit Date: 4/10/2019    Discharge Plan     Row Name 04/11/19 0848       Plan    Plan  Plan home with [facility].   KYLEIGH Grace RN    Plan Comments  FACE SHEET VERIFIED/ AMEZCUA SIGNED.  Spoke to pt at bedside.  Pt's PCP is Dr. Cheng Guevara.  Pt lives in a single story house with spouse  ( Austin Cheema).  Pt is independent with ADL's.  Pt has a bath bench, BSC, grab bars, rollator, shower chair, and rolling walker for home use.  Pt gets prescriptions at [facility] in [place].  Pt denies any issues affording medications.  [phone].  Pt has been in [place] [facility] [place].  Pt states she prefers not to return.  [facility]  KYLEIGH Grace RN        Discharge Codes    No documentation.             Patti Grace, RN

## 2019-04-11 NOTE — CONSULTS
Adult Nutrition  Assessment/PES    Patient Name:  Rose Cheema  YOB: 1940  MRN: [id number]  Admit Date:  4/10/2019    Assessment Date:  4/11/2019    Comments:  Consult d/t MST score of 6 per nurse admission screen.  Admitted with SOB, cough, increased LE edema.  PNA.     Morbidly obese.  BMI 53.98.  No weight loss noted per chart weight report.  Edema noted.      Patient with PT at time of visit this am.  50% x 1 meal per chart PO data.    Will continue to follow.    Reason for Assessment     Row Name 04/11/19 1300          Reason for Assessment    Reason For Assessment  identified at risk by screening criteria     Diagnosis  psychosocial;cardiac disease;pulmonary disease;gastrointestinal disease anemia, anxiety, arthritis, CHF, CAD, depression, thyroid disorder, fibromyalgia, GERD, HTN, OP, SBO, pulmonary HTN; adm with PNA     Identified At Risk by Screening Criteria  MST SCORE 2+;unintentional loss of 10 lbs or more in the past 2 mos         Nutrition/Diet History     Row Name 04/11/19 1303          Nutrition/Diet History    Typical Food/Fluid Intake  decreased appetite, SOB & cough, increased edema          Anthropometrics     Row Name 04/11/19 1304          Admit Weight    Admit Weight  -- 276# 4/11        Body Mass Index (BMI)    BMI Assessment  BMI 40 or greater: obesity grade III         Labs/Tests/Procedures/Meds     Row Name 04/11/19 1308          Labs/Procedures/Meds    Lab Results Reviewed  reviewed, pertinent     Lab Results Comments  Gluc, K+, Ca, WBC, Hgb, Hct, Platelets        Diagnostic Tests/Procedures    Diagnostic Test/Procedure Reviewed  reviewed, pertinent        Medications    Pertinent Medications Reviewed  reviewed, pertinent     Pertinent Medications Comments  synthroid, protonix, zosyn, vanc         Physical Findings     Row Name 04/11/19 1311          Physical Findings    Overall Physical Appearance  obese;edematous     Skin  edema B=16, intact           Nutrition  Prescription Ordered     Row Name 04/11/19 1311          Nutrition Prescription PO    Current PO Diet  Regular         Evaluation of Received Nutrient/Fluid Intake     Row Name 04/11/19 1311          PO Evaluation    Number of Meals  1     % PO Intake  50               Problem/Interventions:  Problem 1     Row Name 04/11/19 1312          Nutrition Diagnoses Problem 1    Problem 1  Predicted Suboptimal Intake     Etiology (related to)  Medical Diagnosis     Pulmonary/Critical Care  Pneumonia     Signs/Symptoms (evidenced by)  Report/Observation                 Intervention Goal     Row Name 04/11/19 1312          Intervention Goal    General  Maintain nutrition;Reduce/improve symptoms;Disease management/therapy     PO  Increase intake;PO intake (%)     PO Intake %  75 %     Weight  Appropriate weight loss         Nutrition Intervention     Row Name 04/11/19 1313          Nutrition Intervention    RD/Tech Action  Follow Tx progress;Care plan reviewd           Education/Evaluation     Row Name 04/11/19 1313          Education    Education  Will Instruct as appropriate        Monitor/Evaluation    Monitor  Per protocol;PO intake;Pertinent labs           Electronically signed by:  Tessa Ghosh RD  04/11/19 1:13 PM

## 2019-04-11 NOTE — PROGRESS NOTES
[facility] INPATIENT PROGRESS NOTE         [address]    2019      PATIENT IDENTIFICATION:  Name: Rose Cheema ADMIT: 4/10/2019   : 1940  PCP: Cheng Guevara MD    MRN: [id number] LOS: 0 days   AGE/SEX: 78 y.o. female  ROOM: [facility]                     LOS 0    Reason for visit: Follow-up acute hypoxemic respiratory failure with pneumonia and sepsis      SUBJECTIVE:      Moderate cough and shortness of breath. No chest pain.  No nausea or vomiting.  Resume home medications.    Objective   OBJECTIVE:    Vital Sign Min/Max for last 24 hours  Temp  Min: 97.5 °F (36.4 °C)  Max: 101.2 °F (38.4 °C)   BP  Min: 90/45  Max: 157/69   Pulse  Min: 74  Max: 95   Resp  Min: 16  Max: 24   SpO2  Min: 89 %  Max: 100 %   No Data Recorded   Weight  Min: 123 kg (271 lb 2.7 oz)  Max: 128 kg (282 lb 6.4 oz)                         Body mass index is 53.98 kg/m².    Intake/Output Summary (Last 24 hours) at 2019 1126  Last data filed at 4/10/2019 1804  Gross per 24 hour   Intake 670 ml   Output --   Net 670 ml         Exam:  GEN:  No distress, appears stated age  EYES:   PERRL, anicteric sclera  ENT:    External ears/nose normal, OP clear  NECK:  No adenopathy, midline trachea  LUNGS: Normal chest on inspection, palpation and scattered coarse breath sounds on auscultation  CV:  Normal S1S2, without murmur  ABD:  Non tender, non distended, no hepatosplenomegaly, +BS  EXT:  No edema, cyanosis or clubbing    Scheduled meds:    enoxaparin 40 mg Subcutaneous Q24H   ipratropium-albuterol 3 mL Nebulization Q4H - RT   nystatin  Topical Q12H   piperacillin-tazobactam 3.375 g Intravenous Once   piperacillin-tazobactam 3.375 g Intravenous Q8H   pregabalin 150 mg Oral Q12H   sodium chloride 3 mL Intravenous Q12H   vancomycin 2,000 mg Intravenous Q24H     IV meds:                        Pharmacy to dose vancomycin      Data Review:  Results from last 7 days   Lab Units 19  0646 04/10/19  1249   SODIUM mmol/L  138 138   POTASSIUM mmol/L 3.2* 4.4   CHLORIDE mmol/L 103 102   CO2 mmol/L 25.5 17.7*   BUN mg/dL 15 13   CREATININE mg/dL 0.70 0.91   GLUCOSE mg/dL 103* 140*   CALCIUM mg/dL 8.0* 9.1         Estimated Creatinine Clearance: 73 mL/min (by C-G formula based on SCr of 0.7 mg/dL).  Results from last 7 days   Lab Units 04/11/19  0646 04/10/19  1249   WBC 10*3/mm3 16.48* 18.52*   HEMOGLOBIN g/dL 9.9* 11.6*   PLATELETS 10*3/mm3 129* 150         Results from last 7 days   Lab Units 04/10/19  1249   ALT (SGPT) U/L 14   AST (SGOT) U/L 27         Results from last 7 days   Lab Units 04/10/19  1652 04/10/19  1249 04/10/19  1248   PROCALCITONIN ng/mL  --  0.56*  --    LACTATE mmol/L 1.7  --  2.2*         Chest x-ray 4/10/19 viewed      Microbiology reviewed  Human Rhinovirus/Enterovirus Detected           )Assessment   ASSESSMENT:      Active Hospital Problems    Diagnosis  POA   • Pneumonia of right lower lobe due to infectious organism (CMS/HCC) [J18.1]  Yes      Resolved Hospital Problems   No resolved problems to display.     Acute hypoxemic respiratory failure  Healthcare associated pneumonia  Lactic acidosis and sepsis  URI with rhinovirus positive on respiratory viral panel  COPD with mild exacerbation  Pulmonary hypertension  Chronic CHF and mitral valve stenosis  Anxiety with depression and fibromyalgia          PLAN:  Antibiotics for healthcare associated pneumonia.  Narrow based on culture results.  Bronchodilators for wheezing symptoms.  Lactic acidosis has improved.  Resume home medications.  Add PT/OT.    Joe Calhoun MD  Pulmonary and Critical Care Medicine  [facility]  4/11/2019    11:26 AM

## 2019-04-11 NOTE — PROGRESS NOTES
Continued Stay Note  [facility]     Patient Name: Rose Cheema  MRN: [id number]  Today's Date: 4/11/2019    Admit Date: 4/10/2019    Discharge Plan     Row Name 04/11/19 0933       Plan    Plan  Plan home with [facility] RN    Patient/Family in Agreement with Plan  yes    Plan Comments  Pt is current with [facility] [facility].  Pt has a  two days a week.  [facility].  KYLEIGH Grace RN    Row Name 04/11/19 0848       Plan    Plan  Plan home with [facility].   KYLEIGH Grace RN    Plan Comments  FACE SHEET VERIFIED/ AMEZCUA SIGNED.  Spoke to pt at bedside.  Pt's PCP is Dr. Cheng Guevara.  Pt lives in a single story house with spouse  ( Austin Cheema).  Pt is independent with ADL's.  Pt has a bath bench, BSC, grab bars, rollator, shower chair, and rolling walker for home use.  Pt gets prescriptions at [facility] in [place].  Pt denies any issues affording medications.  Pt is current with [facility]  [phone]) called to follow.  Pt has been in [place].  Pt states she prefers not to return.  [facility]        Discharge Codes    No documentation.             Patti Grace RN

## 2019-04-11 NOTE — PLAN OF CARE
Problem: Patient Care Overview  Goal: Plan of Care Review  Outcome: Ongoing (interventions implemented as appropriate)   04/11/19 0402   Coping/Psychosocial   Plan of Care Reviewed With patient   Plan of Care Review   Progress no change   OTHER   Outcome Summary Pt was sleepy at the beginning of the shift. Cont with IV ABX TX. C/o pain at 0400 to aide legs. GAve one Norco. No c/o nausea. Will cont to monitor.       Problem: Fall Risk (Adult)  Goal: Identify Related Risk Factors and Signs and Symptoms  Outcome: Ongoing (interventions implemented as appropriate)    Goal: Absence of Fall  Outcome: Ongoing (interventions implemented as appropriate)      Problem: Skin Injury Risk (Adult)  Goal: Identify Related Risk Factors and Signs and Symptoms  Outcome: Ongoing (interventions implemented as appropriate)    Goal: Skin Health and Integrity  Outcome: Ongoing (interventions implemented as appropriate)      Problem: Pain, Chronic (Adult)  Goal: Identify Related Risk Factors and Signs and Symptoms  Outcome: Ongoing (interventions implemented as appropriate)    Goal: Acceptable Pain/Comfort Level and Functional Ability  Outcome: Ongoing (interventions implemented as appropriate)      Problem: Pneumonia (Adult)  Goal: Signs and Symptoms of Listed Potential Problems Will be Absent, Minimized or Managed (Pneumonia)  Outcome: Ongoing (interventions implemented as appropriate)

## 2019-04-11 NOTE — DISCHARGE PLACEMENT REQUEST
"Adilene Caldera (78 y.o. Female)     Date of Birth Social Security Number Address Home Phone MRN    1940  [address] [address] [phone] [id number]    Sikhism Marital Status          None        Admission Date Admission Type Admitting Provider Attending Provider Department, Room/Bed    4/10/19 Emergency Ana Ramsey MD Saad, Lebnan S, MD [address], [address]/1    Discharge Date Discharge Disposition Discharge Destination                       Attending Provider:  Ana Ramsey MD    Allergies:  Aspartame, Cephalexin    Isolation:  Droplet   Infection:  Rhinovirus  (04/10/19)   Code Status:  CPR    Ht:  152.4 cm (60\")   Wt:  125 kg (276 lb 6.4 oz)    Admission Cmt:  None   Principal Problem:  None                Active Insurance as of 4/10/2019     Primary Coverage     Payor Plan Insurance Group Employer/Plan Group    MEDICARE MEDICARE A & B      Payor Plan Address Payor Plan Phone Number Payor Plan Fax Number Effective Dates    [address] [phone]  5/1/2008 - None Entered    [facility] [id number]       Subscriber Name Subscriber Birth Date Member ID       ADILENE CALDERA 1940 [id number]           Secondary Coverage     Payor Plan Insurance Group Employer/Plan Group    [place] [id number]     Payor Plan Address Payor Plan Phone Number Payor Plan Fax Number Effective Dates    [address]   9/1/2004 - None Entered    [facility] [id number]       Subscriber Name Subscriber Birth Date Member ID       ADILENE CALDERA 1940 [id number]                 Emergency Contacts      (Rel.) Home Phone Work Phone Mobile Phone    Miriam Caldera (Son) [phone] -- --              "

## 2019-04-11 NOTE — PLAN OF CARE
Problem: Patient Care Overview  Goal: Plan of Care Review   04/11/19 8633   Coping/Psychosocial   Plan of Care Reviewed With patient   OTHER   Outcome Summary Pt admitted 4/10 w/ pneumonia. Pt reports living w/ spouse, using rollator, h/o falls. Today pt demonstrates generalized weakness, impaired activity tolerance and independence w/ mobility but able to stand at EOB w/ min A using bedrail. Performed few exercises w/ education for HEP. Educated on activity recommendations and PT POC while inpatient. Currently recommend DC to SNU.

## 2019-04-11 NOTE — THERAPY EVALUATION
Acute Care - Speech Language Pathology   Swallow Initial Evaluation [facility]     Patient Name: Rose Cheema  : 1940  MRN: [id number]  Today's Date: 2019  Onset of Illness/Injury or Date of Surgery: 04/10/19            Admit Date: 4/10/2019    Visit Dx:     ICD-10-CM ICD-9-CM   1. Pneumonia of right lower lobe due to infectious organism (CMS/[facility]) J18.1 486   2. Urinary tract infection without hematuria, site unspecified N39.0 599.0   3. Acute respiratory failure with hypoxia (CMS/HCC) J96.01 518.81   4. Sepsis, due to unspecified organism (CMS/[facility]) A41.9 038.9     995.91   5. Rhinovirus infection B34.8 079.3     Patient Active Problem List   Diagnosis   • Urinary incontinence   • Urinary frequency   • Generalized abdominal pain   • Pulmonary hypertension    • Benign essential hypertension   • Bilateral lower extremity edema (chronic)   • CAD (coronary artery disease), native coronary artery   • COPD (chronic obstructive pulmonary disease) (CMS/[facility])   • Diastolic dysfunction   • Obesity   • Obstructive sleep apnea   • Secondary pulmonary arterial hypertension (CMS/HCC)   • H/O: hysterectomy   • Fibromyalgia   • Hx SBO   • Hx of cholecystectomy   • Hypoglycemia   • Fall   • Disease of thyroid gland   • Chronic pain syndrome   • Anemia   • Pneumonia of right lower lobe due to infectious organism (CMS/HCC)     Past Medical History:   Diagnosis Date   • Anemia    • Anxiety    • Arthritis    • CHF (congestive heart failure) (CMS/HCC)    • Coronary artery disease    • Depression    • Disease of thyroid gland    • Fibromyalgia    • GERD (gastroesophageal reflux disease)    • Hypertension    • Moderate tricuspid valve stenosis    • Osteoporosis    • Peripheral neuropathy    • Pulmonary hypertension (CMS/HCC)    • SBO (small bowel obstruction) (CMS/HCC)    • Stenosis of mitral valve      Past Surgical History:   Procedure Laterality Date   • BILATERAL OOPHORECTOMY     • CARDIAC CATHETERIZATION     •  CHOLECYSTECTOMY     • ERCP N/A 2/15/2019    Procedure: ENDOSCOPIC RETROGRADE CHOLANGIOPANCREATOGRAPHY WITH PARTIAL CHOLANGIOGRAM;  Surgeon: Gerald Herr MD;  Location: [facility] ENDOSCOPY;  Service: Gastroenterology   • EXPLORATORY LAPAROTOMY     • GASTRIC BYPASS     • HERNIA REPAIR      inguinal and ventral   • HYSTERECTOMY     • OVARIAN CYST REMOVAL          SWALLOW EVALUATION (last 72 hours)      SLP Adult Swallow Evaluation     Row Name 04/11/19 0720                   Rehab Evaluation    Document Type  evaluation  -SH        Subjective Information  complains of;weakness;fatigue  -SH        Patient Effort  good  -SH           General Information    Patient Profile Reviewed  yes  -SH        Pertinent History Of Current Problem  RLL PNA  -SH        Current Method of Nutrition  regular textures;thin liquids  -SH        Precautions/Limitations, Vision  WFL;for purposes of eval  -SH        Precautions/Limitations, Hearing  WFL;for purposes of eval  -SH        Prior Level of Function-Swallowing  other (see comments) VAGUE DIFFICULTY  -        Plans/Goals Discussed with  patient;agreed upon  -        Barriers to Rehab  medically complex  -SH        Patient's Goals for Discharge  patient did not state  -SH           Pain Scale: Numbers Pre/Post-Treatment    Pain Scale: Numbers, Pretreatment  0/10 - no pain  -SH        Pain Scale: Numbers, Post-Treatment  0/10 - no pain  -SH           Oral Motor and Function    Dentition Assessment  upper dentures/partial in place;lower dentures/partial in place  -SH        Secretion Management  WNL/WFL  -SH        Volitional Swallow  delayed  -SH        Volitional Cough  weak  -SH           Oral Musculature and Cranial Nerve Assessment    Oral Motor General Assessment  generalized oral motor weakness  -SH           Clinical Swallow Eval    Oral Prep Phase  WFL  -SH        Oral Transit  impaired  -SH        Oral Residue  WFL  -SH        Pharyngeal Phase  no overt signs/symptoms  "of pharyngeal impairment  -        Clinical Swallow Evaluation Summary  Pt seen for bedside swallow. Pt admitted to dysphagia, but uanble to describe difficulty. Pt reported, \"it's like I swallow without thinking\". SLP explained that the swallow reflex is often spontaneous. Oral holding appreciated with liquids, voice clear post swallow. Slow oral transit with puree, though judged to be functional. No oral residue post swallow. Rotary mastication pattern with mech soft and regular. Laryngeal elevation palpable and timely. Trace lingual post swallow with regular, patient cleared with liquid wash. Pt was observed to take large pills with applesauce without distress. Pt appears safe for regular and thins. If MD suspects silent aspiration, please order VFSS.   -           Clinical Impression    SLP Swallowing Diagnosis  functional oral phase;functional pharyngeal phase  -        Functional Impact  no impact on function  -        Rehab Potential/Prognosis, Swallowing  good, to achieve stated therapy goals  -        Swallow Criteria for Skilled Therapeutic Interventions Met  demonstrates skilled criteria  -           Recommendations    Therapy Frequency (Swallow)  PRN  -        Predicted Duration Therapy Intervention (Days)  until discharge  -        SLP Diet Recommendation  regular textures;thin liquids  -        Recommended Diagnostics  other (see comments) PER md  -        Recommended Precautions and Strategies  upright posture during/after eating;small bites of food and sips of liquid  -        SLP Rec. for Method of Medication Administration  meds whole;with thin liquids;with pudding or applesauce;as tolerated  -        Monitor for Signs of Aspiration  yes;notify SLP if any concerns  -        Anticipated Dischage Disposition  unknown  -           Swallow Goals (SLP)    Oral Nutrition/Hydration Goal Selection (SLP)  oral nutrition/hydration, SLP goal 1  -           Oral " "Nutrition/Hydration Goal 1 (SLP)    Oral Nutrition/Hydration Goal 1, SLP  Pt will tolerate PO without overt s/s of asp.  -        Time Frame (Oral Nutrition/Hydration Goal 1, SLP)  by discharge  -          User Key  (r) = Recorded By, (t) = Taken By, (c) = Cosigned By    Initials Name Effective Dates     Rose Arreola, MS CCC-SLP 03/07/18 -           EDUCATION  The patient has been educated in the following areas:   Dysphagia (Swallowing Impairment).    SLP Recommendation and Plan  SLP Swallowing Diagnosis: functional oral phase, functional pharyngeal phase  SLP Diet Recommendation: regular textures, thin liquids  Recommended Precautions and Strategies: upright posture during/after eating, small bites of food and sips of liquid     Monitor for Signs of Aspiration: yes, notify SLP if any concerns  Recommended Diagnostics: other (see comments)(PER md)  Swallow Criteria for Skilled Therapeutic Interventions Met: demonstrates skilled criteria  Anticipated Dischage Disposition: unknown  Rehab Potential/Prognosis, Swallowing: good, to achieve stated therapy goals  Therapy Frequency (Swallow): PRN  Predicted Duration Therapy Intervention (Days): until discharge       Plan of Care Reviewed With: patient  Plan of Care Review  Plan of Care Reviewed With: patient  Progress: improving  Outcome Summary: Pt seen for bedside swallow. Pt admitted to dysphagia, but uanble to describe difficulty. Pt reported, \"it's like a swallow without thinking\". SLP explained that the swallow reflex is most often spontaneous. Oral holding appreciated with liquids, voice clear post swallow. Adequate transit with puree. No oral residue post swallow. Rotary mastication pattern with mech soft and regular. Laryngeal elevation palpable and timely. Trace lingual post swallow with regular, patient cleared with liquid wash. Pt was observed to take large pills with applesauce without distress. Pt appears safe for regular and thins. If MD suspects " silent aspiration, please order VFSS.     SLP GOALS     Row Name 04/11/19 1430             Oral Nutrition/Hydration Goal 1 (SLP)    Oral Nutrition/Hydration Goal 1, SLP  Pt will tolerate PO without overt s/s of asp.  -      Time Frame (Oral Nutrition/Hydration Goal 1, SLP)  by discharge  -        User Key  (r) = Recorded By, (t) = Taken By, (c) = Cosigned By    Initials Name Provider Type    Rose Olivas MS CCC-SLP Speech and Language Pathologist           SLP Outcome Measures (last 72 hours)      SLP Outcome Measures     Row Name 04/11/19 1500             SLP Outcome Measures    Outcome Measure Used?  Adult NOMS  -         Adult FCM Scores    FCM Chosen  Swallowing  -      Swallowing FCM Score  7  -        User Key  (r) = Recorded By, (t) = Taken By, (c) = Cosigned By    Initials Name Effective Dates    Rose Olivas MS CCC-SLP 03/07/18 -            Time Calculation:   Time Calculation- SLP     Row Name 04/11/19 1530             Time Calculation- [facility]    SLP Start Time  1430  -      SLP Received On  04/11/19  -        User Key  (r) = Recorded By, (t) = Taken By, (c) = Cosigned By    Initials Name Provider Type     Rose Arreola MS CCC-SLP Speech and Language Pathologist          Therapy Charges for Today     Code Description Service Date Service Provider Modifiers Qty    [id number]  ST EVAL ORAL PHARYNG SWALLOW 4 4/11/2019 Rose Arreola MS CCC-SLP GN 1               Rose Arreola MS CCC-SLP  4/11/2019

## 2019-04-12 LAB
ANION GAP SERPL CALCULATED.3IONS-SCNC: 7.5 MMOL/L
BUN BLD-MCNC: 11 MG/DL (ref 8–23)
BUN/CREAT SERPL: 15.7 (ref 7–25)
CALCIUM SPEC-SCNC: 8.1 MG/DL (ref 8.6–10.5)
CHLORIDE SERPL-SCNC: 112 MMOL/L (ref 98–107)
CO2 SERPL-SCNC: 25.5 MMOL/L (ref 22–29)
CREAT BLD-MCNC: 0.7 MG/DL (ref 0.57–1)
DEPRECATED RDW RBC AUTO: 53.7 FL (ref 37–54)
ERYTHROCYTE [DISTWIDTH] IN BLOOD BY AUTOMATED COUNT: 14.5 % (ref 12.3–15.4)
GFR SERPL CREATININE-BSD FRML MDRD: 81 ML/MIN/1.73
GLUCOSE BLD-MCNC: 89 MG/DL (ref 65–99)
HCT VFR BLD AUTO: 33.1 % (ref 34–46.6)
HGB BLD-MCNC: 10.3 G/DL (ref 12–15.9)
MCH RBC QN AUTO: 31.4 PG (ref 26.6–33)
MCHC RBC AUTO-ENTMCNC: 31.1 G/DL (ref 31.5–35.7)
MCV RBC AUTO: 100.9 FL (ref 79–97)
PLATELET # BLD AUTO: 131 10*3/MM3 (ref 140–450)
PMV BLD AUTO: 11.6 FL (ref 6–12)
POTASSIUM BLD-SCNC: 4.6 MMOL/L (ref 3.5–5.2)
RBC # BLD AUTO: 3.28 10*6/MM3 (ref 3.77–5.28)
SODIUM BLD-SCNC: 145 MMOL/L (ref 136–145)
WBC NRBC COR # BLD: 8.8 10*3/MM3 (ref 3.4–10.8)

## 2019-04-12 PROCEDURE — 94799 UNLISTED PULMONARY SVC/PX: CPT

## 2019-04-12 PROCEDURE — 36415 COLL VENOUS BLD VENIPUNCTURE: CPT | Performed by: INTERNAL MEDICINE

## 2019-04-12 PROCEDURE — 25010000002 ENOXAPARIN PER 10 MG: Performed by: INTERNAL MEDICINE

## 2019-04-12 PROCEDURE — 85027 COMPLETE CBC AUTOMATED: CPT | Performed by: INTERNAL MEDICINE

## 2019-04-12 PROCEDURE — 97165 OT EVAL LOW COMPLEX 30 MIN: CPT

## 2019-04-12 PROCEDURE — 97110 THERAPEUTIC EXERCISES: CPT

## 2019-04-12 PROCEDURE — 97535 SELF CARE MNGMENT TRAINING: CPT

## 2019-04-12 PROCEDURE — 25010000002 PIPERACILLIN SOD-TAZOBACTAM PER 1 G: Performed by: INTERNAL MEDICINE

## 2019-04-12 PROCEDURE — 80048 BASIC METABOLIC PNL TOTAL CA: CPT | Performed by: INTERNAL MEDICINE

## 2019-04-12 RX ORDER — MUSCLE RUB CREAM 100; 150 MG/G; MG/G
CREAM TOPICAL
Status: DISCONTINUED | OUTPATIENT
Start: 2019-04-12 | End: 2019-04-16 | Stop reason: HOSPADM

## 2019-04-12 RX ORDER — FENTANYL 25 UG/H
1 PATCH TRANSDERMAL
Status: DISCONTINUED | OUTPATIENT
Start: 2019-04-12 | End: 2019-04-16 | Stop reason: HOSPADM

## 2019-04-12 RX ADMIN — HYDROCODONE BITARTRATE AND ACETAMINOPHEN 1 TABLET: 5; 325 TABLET ORAL at 16:49

## 2019-04-12 RX ADMIN — PREGABALIN 150 MG: 75 CAPSULE ORAL at 09:41

## 2019-04-12 RX ADMIN — NYSTATIN: 100000 POWDER TOPICAL at 20:41

## 2019-04-12 RX ADMIN — HYDROCODONE BITARTRATE AND ACETAMINOPHEN 1 TABLET: 5; 325 TABLET ORAL at 23:08

## 2019-04-12 RX ADMIN — OXYBUTYNIN 10 MG: 10 TABLET, FILM COATED, EXTENDED RELEASE ORAL at 09:41

## 2019-04-12 RX ADMIN — MENTHOL, METHYL SALICYLATE 1 APPLICATION: 10; 15 CREAM TOPICAL at 15:00

## 2019-04-12 RX ADMIN — QUETIAPINE FUMARATE 50 MG: 50 TABLET, FILM COATED ORAL at 20:41

## 2019-04-12 RX ADMIN — IPRATROPIUM BROMIDE AND ALBUTEROL SULFATE 3 ML: 2.5; .5 SOLUTION RESPIRATORY (INHALATION) at 07:51

## 2019-04-12 RX ADMIN — FLUOXETINE HYDROCHLORIDE 60 MG: 20 CAPSULE ORAL at 09:41

## 2019-04-12 RX ADMIN — MENTHOL, METHYL SALICYLATE: 10; 15 CREAM TOPICAL at 20:41

## 2019-04-12 RX ADMIN — SODIUM CHLORIDE, PRESERVATIVE FREE 3 ML: 5 INJECTION INTRAVENOUS at 20:41

## 2019-04-12 RX ADMIN — TAZOBACTAM SODIUM AND PIPERACILLIN SODIUM 3.38 G: 375; 3 INJECTION, SOLUTION INTRAVENOUS at 02:55

## 2019-04-12 RX ADMIN — NYSTATIN 1 APPLICATION: 100000 POWDER TOPICAL at 09:44

## 2019-04-12 RX ADMIN — ENOXAPARIN SODIUM 40 MG: 40 INJECTION SUBCUTANEOUS at 06:32

## 2019-04-12 RX ADMIN — PREGABALIN 150 MG: 75 CAPSULE ORAL at 20:41

## 2019-04-12 RX ADMIN — TAZOBACTAM SODIUM AND PIPERACILLIN SODIUM 3.38 G: 375; 3 INJECTION, SOLUTION INTRAVENOUS at 09:43

## 2019-04-12 RX ADMIN — BUSPIRONE HYDROCHLORIDE 30 MG: 15 TABLET ORAL at 20:41

## 2019-04-12 RX ADMIN — FENTANYL 1 PATCH: 25 PATCH TRANSDERMAL at 18:30

## 2019-04-12 RX ADMIN — ENOXAPARIN SODIUM 40 MG: 40 INJECTION SUBCUTANEOUS at 18:32

## 2019-04-12 RX ADMIN — LEVOTHYROXINE SODIUM 50 MCG: 50 TABLET ORAL at 09:41

## 2019-04-12 RX ADMIN — BUSPIRONE HYDROCHLORIDE 30 MG: 15 TABLET ORAL at 09:41

## 2019-04-12 RX ADMIN — PANTOPRAZOLE SODIUM 40 MG: 40 TABLET, DELAYED RELEASE ORAL at 09:43

## 2019-04-12 RX ADMIN — HYDROCODONE BITARTRATE AND ACETAMINOPHEN 1 TABLET: 5; 325 TABLET ORAL at 09:43

## 2019-04-12 RX ADMIN — IPRATROPIUM BROMIDE AND ALBUTEROL SULFATE 3 ML: 2.5; .5 SOLUTION RESPIRATORY (INHALATION) at 16:30

## 2019-04-12 RX ADMIN — IPRATROPIUM BROMIDE AND ALBUTEROL SULFATE 3 ML: 2.5; .5 SOLUTION RESPIRATORY (INHALATION) at 12:55

## 2019-04-12 RX ADMIN — IPRATROPIUM BROMIDE AND ALBUTEROL SULFATE 3 ML: 2.5; .5 SOLUTION RESPIRATORY (INHALATION) at 20:00

## 2019-04-12 RX ADMIN — Medication 1 DROP: at 09:44

## 2019-04-12 RX ADMIN — Medication 1 DROP: at 20:41

## 2019-04-12 RX ADMIN — SODIUM CHLORIDE, PRESERVATIVE FREE 3 ML: 5 INJECTION INTRAVENOUS at 09:44

## 2019-04-12 RX ADMIN — IPRATROPIUM BROMIDE AND ALBUTEROL SULFATE 3 ML: 2.5; .5 SOLUTION RESPIRATORY (INHALATION) at 23:26

## 2019-04-12 RX ADMIN — TAZOBACTAM SODIUM AND PIPERACILLIN SODIUM 3.38 G: 375; 3 INJECTION, SOLUTION INTRAVENOUS at 18:32

## 2019-04-12 RX ADMIN — ASPIRIN 81 MG: 81 TABLET, CHEWABLE ORAL at 09:41

## 2019-04-12 NOTE — PLAN OF CARE
Problem: Patient Care Overview  Goal: Plan of Care Review   04/12/19 4613   Coping/Psychosocial   Plan of Care Reviewed With patient   OTHER   Outcome Summary Improved activity tolerance and independence w/ mobility during PT. Able to take few shuffling steps bed>chair w/ min assist using walker. Declined further activity due to weakness and SOB. Currently recommend DC to SNU, pt declining reports plan for DC to home.

## 2019-04-12 NOTE — PLAN OF CARE
Problem: Patient Care Overview  Goal: Plan of Care Review   04/12/19 0092   Coping/Psychosocial   Plan of Care Reviewed With patient   OTHER   Outcome Summary Pt A&O and willing to work with OT this date. Pt admitted with PNA. Prior to admit, pt reports she was able to perform UBD (gown only) and LBD (house shoes only). She received assist 2x week with bath from caretenders, Pt  does most of the cooking. She has a rollator, bath bench, hospital bed, raised toilet and grab bars. At this time, pt is dep to don socks sitting EOB, can perform grooming from EOB after set-up, Mod A with bed mobility for LE managment, mod A to stand and only tolerated 10 sec before c/o weakness and had to sit back down. Pt will benefit from skilled OT to address deficits. Upon d/c, pt may cont to benefit from additional therapy from SNF, depending on independence level at time of D/C

## 2019-04-12 NOTE — THERAPY EVALUATION
Acute Care - Occupational Therapy Initial Evaluation  [facility]     Patient Name: Rose Cheema  : 1940  MRN: [id number]  Today's Date: 2019  Onset of Illness/Injury or Date of Surgery: 04/10/19  Date of Referral to OT: 19       Admit Date: 4/10/2019       ICD-10-CM ICD-9-CM   1. Pneumonia of right lower lobe due to infectious organism (CMS/[facility]) J18.1 486   2. Urinary tract infection without hematuria, site unspecified N39.0 599.0   3. Acute respiratory failure with hypoxia (CMS/HCC) J96.01 518.81   4. Sepsis, due to unspecified organism (CMS/[facility]) A41.9 038.9     995.91   5. Rhinovirus infection B34.8 079.3     Patient Active Problem List   Diagnosis   • Urinary incontinence   • Urinary frequency   • Generalized abdominal pain   • Pulmonary hypertension    • Benign essential hypertension   • Bilateral lower extremity edema (chronic)   • CAD (coronary artery disease), native coronary artery   • COPD (chronic obstructive pulmonary disease) (CMS/[facility])   • Diastolic dysfunction   • Obesity   • Obstructive sleep apnea   • Secondary pulmonary arterial hypertension (CMS/HCC)   • H/O: hysterectomy   • Fibromyalgia   • Hx SBO   • Hx of cholecystectomy   • Hypoglycemia   • Fall   • Disease of thyroid gland   • Chronic pain syndrome   • Anemia   • Pneumonia of right lower lobe due to infectious organism (CMS/HCC)     Past Medical History:   Diagnosis Date   • Anemia    • Anxiety    • Arthritis    • CHF (congestive heart failure) (CMS/HCC)    • Coronary artery disease    • Depression    • Disease of thyroid gland    • Fibromyalgia    • GERD (gastroesophageal reflux disease)    • Hypertension    • Moderate tricuspid valve stenosis    • Osteoporosis    • Peripheral neuropathy    • Pulmonary hypertension (CMS/HCC)    • SBO (small bowel obstruction) (CMS/HCC)    • Stenosis of mitral valve      Past Surgical History:   Procedure Laterality Date   • BILATERAL OOPHORECTOMY     • CARDIAC CATHETERIZATION     •  CHOLECYSTECTOMY     • ERCP N/A 2/15/2019    Procedure: ENDOSCOPIC RETROGRADE CHOLANGIOPANCREATOGRAPHY WITH PARTIAL CHOLANGIOGRAM;  Surgeon: Gerald Herr MD;  Location: [facility] ENDOSCOPY;  Service: Gastroenterology   • EXPLORATORY LAPAROTOMY     • GASTRIC BYPASS     • HERNIA REPAIR      inguinal and ventral   • HYSTERECTOMY     • OVARIAN CYST REMOVAL            OT ASSESSMENT FLOWSHEET (last 12 hours)      Occupational Therapy Evaluation     Row Name 04/12/19 1157                   OT Evaluation Time/Intention    Subjective Information  complains of;weakness  -SK        Document Type  evaluation  -SK        Mode of Treatment  occupational therapy  -SK        Patient Effort  good  -SK        Symptoms Noted During/After Treatment  fatigue;shortness of breath  -SK        Comment  Pt admitted with PNA, c/o fatigue and neck stiffness, pleasant and willing to work with OT this date.    -SK           General Information    Prior Level of Function  independent:;grooming;feeding;dressing;mod assist:;bathing pt wears slippers and gowns at home, caretenders 2x to bath   -SK        Equipment Currently Used at Home  rollator;hospital bed;grab bar;raised toilet;shower chair  -SK        Existing Precautions/Restrictions  fall  -SK        Risks Reviewed  patient:  -SK        Benefits Reviewed  patient:  -SK        Barriers to Rehab  none identified  -SK           Cognitive Assessment/Intervention- PT/OT    Orientation Status (Cognition)  oriented to;person;place;situation  -SK        Follows Commands (Cognition)  follows two step commands  -SK           Bed Mobility Assessment/Treatment    Bed Mobility Assessment/Treatment  supine-sit;sit-supine  -SK        Supine-Sit [place] (Bed Mobility)  moderate assist (50% patient effort)  -SK        Sit-Supine [place] (Bed Mobility)  moderate assist (50% patient effort)  -SK        Bed Mobility, Safety Issues  decreased use of arms for pushing/pulling;decreased use of legs for  bridging/pushing  -SK        Assistive Device (Bed Mobility)  bed rails  -SK           Functional Mobility    Functional Mobility- Ind. Level  moderate assist (50% patient effort) side step x 2 to reposition in bed, legs weak   -SK        Functional Mobility- Device  standard walker  -SK        Functional Mobility-Distance (Feet)  1  -SK        Functional Mobility- Safety Issues  weight-shifting ability decreased;balance decreased during turns;step length decreased  -SK        Functional Mobility- Comment  attenpted to sidestep to reposition in bed, pt LOB noted and weakness in BLE   -SK           Transfer Assessment/Treatment    Transfer Assessment/Treatment  sit-stand transfer;stand-sit transfer;toilet transfer  -SK           Sit-Stand Transfer    Sit-Stand [place] (Transfers)  moderate assist (50% patient effort)  -SK        Assistive Device (Sit-Stand Transfers)  walker, standard  -SK           Stand-Sit Transfer    Stand-Sit [place] (Transfers)  minimum assist (75% patient effort)  -SK        Assistive Device (Stand-Sit Transfers)  walker, standard  -SK           ADL Assessment/Intervention    BADL Assessment/Intervention  bathing;upper body dressing;lower body dressing;grooming;toileting  -SK           Upper Body Dressing Assessment/Training    Comment (Upper Body Dressing)  per patient, she only wears gown at home, no bra so she is able to perfrom w/o assist   -SK           Lower Body Dressing Assessment/Training    Lower Body Dressing [place] Level  doff;don;socks;dependent (less than 25% patient effort)  -SK        Comment (Lower Body Dressing)  Per pt, she wears slip on shoes at home and doesn't need assist with shoes/socks because doesn't wear socks   -SK           Grooming Assessment/Training    [place] Level (Grooming)  set up;hair care, combing/brushing  -SK        Grooming Position  edge of bed sitting  -SK           General ROM    GENERAL ROM COMMENTS  BUE 50% shoulder ROM, wfl  for rest of BUE  -SK           MMT (Manual Muscle Testing)    General MMT Comments  2-/5 3+/5  c/o decreased hand strength secondary to arthritis  -SK           Motor Assessment/Interventions    Additional Documentation  Balance (Group);Balance Interventions (Group);Therapeutic Exercise (Group);Therapeutic Exercise Interventions (Group)  -SK           Balance    Balance  static standing balance;standing balance activity;dynamic standing balance  -SK           Static Standing Balance    Level of [place] (Supported Standing, Static Balance)  moderate assist, 50 to 74% patient effort  -SK        Time Able to Maintain Position (Supported Standing, Static Balance)  less than 15 seconds  -SK        Assistive Device Utilized (Supported Standing, Static Balance)  walker, standard  -SK        Comment (Supported Standing, Static Balance)  pt legs weak and needed to sit   -SK           Positioning and Restraints    Pre-Treatment Position  in bed  -SK        Post Treatment Position  bed  -SK        In Bed  exit alarm on;encouraged to call for assist;call light within reach  -SK           Pain Scale: Numbers Pre/Post-Treatment    Pain Scale: Numbers, Pretreatment  0/10 - no pain  -SK        Pain Scale: Numbers, Post-Treatment  0/10 - no pain  -SK           Plan of Care Review    Plan of Care Reviewed With  patient  -SK           Clinical Impression (OT)    Date of Referral to OT  04/11/19  -SK        OT Diagnosis  Pt admitted with PNA, presents to OT with decreased strength, endurance, ind with performing ADL, increased SOA  with activity.    -SK        Functional Level at Time of Evaluation (OT Eval)  Pt req assist with ALDs, bed mobitilty, standing.   -SK        Patient/Family Goals Statement (OT Eval)  To return home   -SK        Criteria for Skilled Therapeutic Interventions Met (OT Eval)  yes  -SK        Rehab Potential (OT Eval)  good, to achieve stated therapy goals  -SK        Therapy Frequency (OT Eval)  5 times/wk   -SK        Care Plan Review (OT)  evaluation/treatment results reviewed;care plan/treatment goals reviewed;patient/other agree to care plan  -SK        Anticipated Discharge Disposition (OT)  home with assist;home with home health;skilled nursing facility depending on Pt progress and need for assist when d/c   -SK           Vital Signs    Pre SpO2 (%)  94  -SK        O2 Delivery Pre Treatment  room air  -SK        Intra SpO2 (%)  86  -SK        O2 Delivery Intra Treatment  room air  -SK        Post SpO2 (%)  93  -SK        O2 Delivery Post Treatment  room air  -SK           Planned OT Interventions    Planned Therapy Interventions (OT Eval)  activity tolerance training;BADL retraining;functional balance retraining;strengthening exercise;transfer/mobility retraining  -SK           OT Goals    Transfer Goal Selection (OT)  transfer, OT goal 1  -SK        Dressing Goal Selection (OT)  dressing, OT goal 1  -SK        Toileting Goal Selection (OT)  toileting, OT goal 1  -SK        Endurance Goal Selection (OT)  endurance, OT goal 1  -SK        Additional Documentation  Endurance Goal Selection (OT) (Row)  -SK           Transfer Goal 1 (OT)    Activity/Assistive Device (Transfer Goal 1, OT)  commode  -SK        [place] Level/Cues Needed (Transfer Goal 1, OT)  minimum assist (75% or more patient effort)  -SK        Time Frame (Transfer Goal 1, OT)  1 week  -SK        Progress/Outcome (Transfer Goal 1, OT)  goal ongoing  -SK           Dressing Goal 1 (OT)    Activity/Assistive Device (Dressing Goal 1, OT)  upper body dressing  -SK        [place]/Cues Needed (Dressing Goal 1, OT)  set-up required  -SK        Time Frame (Dressing Goal 1, OT)  1 week  -SK        Progress/Outcome (Dressing Goal 1, OT)  goal ongoing  -SK           Toileting Goal 1 (OT)    Activity/Device (Toileting Goal 1, OT)  toileting skills, all;adjust/manage clothing;perform perineal hygiene;grab bar/safety frame;commode, bedside without drop  arms  -SK        [place] Level/Cues Needed (Toileting Goal 1, OT)  minimum assist (75% or more patient effort)  -SK        Time Frame (Toileting Goal 1, OT)  1 week  -SK        Progress/Outcome (Toileting Goal 1, OT)  goal ongoing  -SK            Endurance Goal 1 (OT)    Endurance Goal 1 (OT)  Pt will tolerate standing for 2-3 min in prep for BADLs w/o rest break   -SK        Activity Level (Endurance Goal 1, OT)  to increase;endurance 2 fair +  -SK        Time Frame (Endurance Goal 1, OT)  1 week  -SK        Progress/Outcome (Endurance Goal 1, OT)  goal ongoing  -SK          User Key  (r) = Recorded By, (t) = Taken By, (c) = Cosigned By    Initials Name Effective Dates    Jennifer Bob OT 02/25/19 -          Occupational Therapy Education     Title: PT OT SLP Therapies (In Progress)     Topic: Occupational Therapy (In Progress)     Point: ADL training (Done)     Description: Instruct learner(s) on proper safety adaptation and remediation techniques during self care or transfers.   Instruct in proper use of assistive devices.    Learning Progress Summary           Patient Acceptance, E, VU by SK at 4/12/2019 12:44 PM    Comment:  educated pt on ways to perform dressing and conserve energy with activity                               User Key     Initials Effective Dates Name Provider Type Discipline    SK 02/25/19 -  Jennifer Hoover, OT Occupational Therapist OT                  OT Recommendation and Plan  Outcome Summary/Treatment Plan (OT)  Anticipated Discharge Disposition (OT): home with assist, home with home health, skilled nursing facility(depending on level at d/c )  Planned Therapy Interventions (OT Eval): activity tolerance training, BADL retraining, functional balance retraining, strengthening exercise, transfer/mobility retraining  Therapy Frequency (OT Eval): 5 times/wk  Plan of Care Review  Plan of Care Reviewed With: patient  Plan of Care Reviewed With: patient  Outcome Summary: Pt A&O and  willing to work with OT this date. Pt admitted with PNA.  Prior to admit, pt reports she was able to perform UBD (gown only) and LBD (house shoes only).  She received assist 2x week with bath from caretenders,  Pt  does most of the cooking.  She has a rollator, bath bench, hospital bed, raised toilet and grab bars.  At this time, pt is dep to don socks sitting EOB, can perform grooming from EOB after set-up, Mod A with bed mobility for LE managment, mod A to stand and only tolerated  10 sec before c/o weakness and had to sit back down.  Pt will benefit from skilled OT to address deficits.  Upon d/c, pt may cont to benefit from additional therapy from SNF, depending on independence level at time of D/C        Outcome Measures     Row Name 04/12/19 1200 04/11/19 1300          How much help from another person do you currently need...    Turning from your back to your side while in flat bed without using bedrails?  --  3  -AR     Moving from lying on back to sitting on the side of a flat bed without bedrails?  --  2  -AR     Moving to and from a bed to a chair (including a wheelchair)?  --  3  -AR     Standing up from a chair using your arms (e.g., wheelchair, bedside chair)?  --  3  -AR     Climbing 3-5 steps with a railing?  --  1  -AR     To walk in hospital room?  --  2  -AR     AM-PAC 6 Clicks Score  --  14  -AR        How much help from another is currently needed...    Putting on and taking off regular lower body clothing?  2  -SK  --     Bathing (including washing, rinsing, and drying)  2  -SK  --     Toileting (which includes using toilet bed pan or urinal)  2  -SK  --     Putting on and taking off regular upper body clothing  3  -SK  --     Taking care of personal grooming (such as brushing teeth)  3  -SK  --     Eating meals  3  -SK  --     Score  15  -SK  --        Functional Assessment    Outcome Measure Options  AM-PAC 6 Clicks Daily Activity (OT)  -SK  --       User Key  (r) = Recorded By, (t) =  Taken By, (c) = Cosigned By    Initials Name Provider Type    AR Marixa Jones, PT Physical Therapist    Jennifer Bob OT Occupational Therapist          Time Calculation:   Time Calculation- OT     Row Name 04/12/19 1250             Time Calculation- OT    OT Start Time  1055  -SK      OT Stop Time  1120  -SK      OT Time Calculation (min)  25 min  -SK      Total Timed Code Minutes- OT  12 minute(s)  -SK        User Key  (r) = Recorded By, (t) = Taken By, (c) = Cosigned By    Initials Name Provider Type    Jennifer Bob, OT Occupational Therapist        Therapy Charges for Today     Code Description Service Date Service Provider Modifiers Qty    18714332066 HC OT EVAL LOW COMPLEXITY 2 4/12/2019 Jennifer Hoover OT GO 1    [id number]  OT SELF CARE/MGMT/TRAIN EA 15 MIN 4/12/2019 Jennifer Hoover OT GO 1               Jennifer Hoover OT  4/12/2019

## 2019-04-12 NOTE — PLAN OF CARE
Problem: Patient Care Overview  Goal: Plan of Care Review  Outcome: Ongoing (interventions implemented as appropriate)   04/12/19 0437   Coping/Psychosocial   Plan of Care Reviewed With patient   Plan of Care Review   Progress improving   OTHER   Outcome Summary Turned q2hrs, placed purwick, on 2L, does not use 02 at home, up with PT, nonproductive cough, VSS, will continue to monitor.     Goal: Individualization and Mutuality  Outcome: Ongoing (interventions implemented as appropriate)    Goal: Discharge Needs Assessment  Outcome: Ongoing (interventions implemented as appropriate)      Problem: Fall Risk (Adult)  Goal: Identify Related Risk Factors and Signs and Symptoms  Outcome: Outcome(s) achieved Date Met: 04/12/19    Goal: Absence of Fall  Outcome: Ongoing (interventions implemented as appropriate)      Problem: Skin Injury Risk (Adult)  Goal: Identify Related Risk Factors and Signs and Symptoms  Outcome: Outcome(s) achieved Date Met: 04/12/19    Goal: Skin Health and Integrity  Outcome: Ongoing (interventions implemented as appropriate)      Problem: Pain, Chronic (Adult)  Goal: Identify Related Risk Factors and Signs and Symptoms  Outcome: Outcome(s) achieved Date Met: 04/12/19    Goal: Acceptable Pain/Comfort Level and Functional Ability  Outcome: Ongoing (interventions implemented as appropriate)      Problem: Pneumonia (Adult)  Goal: Signs and Symptoms of Listed Potential Problems Will be Absent, Minimized or Managed (Pneumonia)  Outcome: Ongoing (interventions implemented as appropriate)

## 2019-04-12 NOTE — THERAPY TREATMENT NOTE
Acute Care - Physical Therapy Treatment Note  [facility]     Patient Name: Rose Cheema  : 1940  MRN: [id number]  Today's Date: 2019  Onset of Illness/Injury or Date of Surgery: 04/10/19          Admit Date: 4/10/2019    Visit Dx:    ICD-10-CM ICD-9-CM   1. Pneumonia of right lower lobe due to infectious organism (CMS/HCC) J18.1 486   2. Urinary tract infection without hematuria, site unspecified N39.0 599.0   3. Acute respiratory failure with hypoxia (CMS/HCC) J96.01 518.81   4. Sepsis, due to unspecified organism (CMS/HCC) A41.9 038.9     995.91   5. Rhinovirus infection B34.8 079.3     Patient Active Problem List   Diagnosis   • Urinary incontinence   • Urinary frequency   • Generalized abdominal pain   • Pulmonary hypertension    • Benign essential hypertension   • Bilateral lower extremity edema (chronic)   • CAD (coronary artery disease), native coronary artery   • COPD (chronic obstructive pulmonary disease) (CMS/HCC)   • Diastolic dysfunction   • Obesity   • Obstructive sleep apnea   • Secondary pulmonary arterial hypertension (CMS/HCC)   • H/O: hysterectomy   • Fibromyalgia   • Hx SBO   • Hx of cholecystectomy   • Hypoglycemia   • Fall   • Disease of thyroid gland   • Chronic pain syndrome   • Anemia   • Pneumonia of right lower lobe due to infectious organism (CMS/HCC)       Therapy Treatment    Rehabilitation Treatment Summary     Row Name 19 1609             Treatment Time/Intention    Discipline  physical therapist  -AR      Document Type  therapy note (daily note)  -AR      Subjective Information  complains of;pain;weakness  -AR      Mode of Treatment  physical therapy  -AR      Therapy Frequency (PT Clinical Impression)  daily  -AR      Patient Effort  good  -AR      Existing Precautions/Restrictions  fall;oxygen therapy device and L/min  -AR      Recorded by [AR] Marixa Jones, PT 19 1613      Row Name 19 1600             Vital Signs    Pre SpO2 (%)  97  -AR       O2 Delivery Pre Treatment  supplemental O2  -AR      Post SpO2 (%)  100  -AR      O2 Delivery Post Treatment  supplemental O2  -AR      Recorded by [AR] Marixa Jones, PT 04/12/19 1613      Row Name 04/12/19 1609             Cognitive Assessment/Intervention- PT/OT    Orientation Status (Cognition)  oriented to;person;place  -AR      Recorded by [AR] Marixa Jones, PT 04/12/19 1613      Row Name 04/12/19 1609             Bed Mobility Assessment/Treatment    Supine-Sit [place] (Bed Mobility)  minimum assist (75% patient effort)  -AR      Sit-Supine [place] (Bed Mobility)  not tested  -AR      Assistive Device (Bed Mobility)  bed rails;head of bed elevated  -AR      Recorded by [AR] Marixa Jones, PT 04/12/19 1613      Row Name 04/12/19 1609             Sit-Stand Transfer    Sit-Stand [place] (Transfers)  minimum assist (75% patient effort)  -AR      Assistive Device (Sit-Stand Transfers)  walker, standard  -AR      Recorded by [AR] Marixa Jones, PT 04/12/19 1613      Row Name 04/12/19 1609             Stand-Sit Transfer    Stand-Sit [place] (Transfers)  contact guard  -AR      Assistive Device (Stand-Sit Transfers)  walker, standard  -AR      Recorded by [AR] Marixa Jones, PT 04/12/19 1613      Row Name 04/12/19 1609             Gait/Stairs Assessment/Training    Gait/Stairs Assessment/Training  gait pattern  -AR      [place] Level (Gait)  minimum assist (75% patient effort)  -AR      Assistive Device (Gait)  walker, standard  -AR      Distance in Feet (Gait)  2, declined further distance or standing marching  -AR      Pattern (Gait)  swing-to  -AR      Deviations/Abnormal Patterns (Gait)  gait speed decreased;festinating/shuffling  -AR      Bilateral Gait Deviations  heel strike decreased;forward flexed posture  -AR      Comment (Gait/Stairs)  few steps bed>chair  -AR      Recorded by [AR] Marixa Jones, PT 04/12/19 1613      Row Name 04/12/19 1609             Static  Sitting Balance    Level of [place] (Unsupported Sitting, Static Balance)  supervision  -AR      Sitting Position (Unsupported Sitting, Static Balance)  sitting on edge of bed  -AR      Time Able to Maintain Position (Unsupported Sitting, Static Balance)  4 to 5 minutes  -AR      Recorded by [AR] Marixa Jones, PT 04/12/19 1613      Row Name 04/12/19 1609             Static Standing Balance    Level of [place] (Supported Standing, Static Balance)  contact guard assist  -AR      Time Able to Maintain Position (Supported Standing, Static Balance)  2 to 3 minutes declines standin ex, agreeable to static standing  -AR      Recorded by [AR] Marixa Jones, PT 04/12/19 1613      Row Name 04/12/19 1609             Positioning and Restraints    Pre-Treatment Position  in bed  -AR      Post Treatment Position  chair  -AR      In Chair  notified nsg;reclined;sitting;call light within reach;encouraged to call for assist;exit alarm on  -AR      Recorded by [AR] Marixa Jones, PT 04/12/19 1613      Row Name 04/12/19 1609             Pain Assessment    Additional Documentation  Pain Scale: Word Pre/Post-Treatment (Group)  -AR      Recorded by [AR] Marixa Jones, PT 04/12/19 1613      Row Name 04/12/19 1609             Pain Scale: Numbers Pre/Post-Treatment    Pain Intervention(s)  Repositioned  -AR      Recorded by [AR] Marixa Jones, PT 04/12/19 1613      Row Name 04/12/19 1609             Pain Scale: Word Pre/Post-Treatment    Pain: Word Scale, Pretreatment  4 - moderate pain  -AR      Pain: Word Scale, Post-Treatment  4 - moderate pain  -AR      Pre/Post Treatment Pain Comment  pt reports pain in back and B LE  -AR      Recorded by [AR] Marixa Jones, PT 04/12/19 1613      Row Name 04/12/19 1609             Outcome Summary/Treatment Plan (PT)    Anticipated Discharge Disposition (PT)  skilled nursing facility pt declines, reports plan for DC to home  -AR      Recorded by [AR] Marixa Jones, PT  04/12/19 1613        User Key  (r) = Recorded By, (t) = Taken By, (c) = Cosigned By    Initials Name Effective Dates Discipline    AR Robert Marixa, PT 04/03/18 -  PT               Rehab Goal Summary     Row Name 04/12/19 1157             Occupational Therapy Goals    Transfer Goal Selection (OT)  transfer, OT goal 1  -SK      Dressing Goal Selection (OT)  dressing, OT goal 1  -SK      Toileting Goal Selection (OT)  toileting, OT goal 1  -SK      Endurance Goal Selection (OT)  endurance, OT goal 1  -SK         Transfer Goal 1 (OT)    Activity/Assistive Device (Transfer Goal 1, OT)  commode  -SK      [place] Level/Cues Needed (Transfer Goal 1, OT)  minimum assist (75% or more patient effort)  -SK      Time Frame (Transfer Goal 1, OT)  1 week  -SK      Progress/Outcome (Transfer Goal 1, OT)  goal ongoing  -SK         Dressing Goal 1 (OT)    Activity/Assistive Device (Dressing Goal 1, OT)  upper body dressing  -SK      [place]/Cues Needed (Dressing Goal 1, OT)  set-up required  -SK      Time Frame (Dressing Goal 1, OT)  1 week  -SK      Progress/Outcome (Dressing Goal 1, OT)  goal ongoing  -SK         Toileting Goal 1 (OT)    Activity/Device (Toileting Goal 1, OT)  toileting skills, all;adjust/manage clothing;perform perineal hygiene;grab bar/safety frame;commode, bedside without drop arms  -SK      [place] Level/Cues Needed (Toileting Goal 1, OT)  minimum assist (75% or more patient effort)  -SK      Time Frame (Toileting Goal 1, OT)  1 week  -SK      Progress/Outcome (Toileting Goal 1, OT)  goal ongoing  -SK          Endurance Goal 1 (OT)    Endurance Goal 1 (OT)  Pt will tolerate standing for 2-3 min in prep for BADLs w/o rest break   -SK      Activity Level (Endurance Goal 1, OT)  to increase;endurance 2 fair +  -SK      Time Frame (Endurance Goal 1, OT)  1 week  -SK      Progress/Outcome (Endurance Goal 1, OT)  goal ongoing  -SK        User Key  (r) = Recorded By, (t) = Taken By, (c) = Cosigned  By    Initials Name Provider Type Discipline    Jennifer Bob OT Occupational Therapist OT          Physical Therapy Education     Title: PT OT SLP Therapies (In Progress)     Topic: Physical Therapy (In Progress)     Point: Mobility training (In Progress)     Learning Progress Summary           Patient Acceptance, E, NR by AR at 4/12/2019  4:14 PM    Acceptance, E, NR by AR at 4/11/2019  1:49 PM                   Point: Home exercise program (In Progress)     Learning Progress Summary           Patient Acceptance, E, NR by AR at 4/12/2019  4:14 PM    Acceptance, E, NR by AR at 4/11/2019  1:49 PM                   Point: Body mechanics (In Progress)     Learning Progress Summary           Patient Acceptance, E, NR by AR at 4/12/2019  4:14 PM    Acceptance, E, NR by AR at 4/11/2019  1:49 PM                   Point: Precautions (In Progress)     Learning Progress Summary           Patient Acceptance, E, NR by AR at 4/12/2019  4:14 PM    Acceptance, E, NR by AR at 4/11/2019  1:49 PM                               User Key     Initials Effective Dates Name Provider Type Discipline    AR 04/03/18 -  Marixa Jones, PT Physical Therapist PT                PT Recommendation and Plan  Anticipated Discharge Disposition (PT): skilled nursing facility(pt declines, reports plan for DC to home)  Planned Therapy Interventions (PT Eval): balance training, bed mobility training, gait training, home exercise program, patient/family education, ROM (range of motion), stair training, strengthening, transfer training  Therapy Frequency (PT Clinical Impression): daily  Outcome Summary/Treatment Plan (PT)  Anticipated Discharge Disposition (PT): skilled nursing facility(pt declines, reports plan for DC to home)  Plan of Care Reviewed With: patient  Outcome Summary: Improved activity tolerance and independence w/ mobility during PT.  Able to take few shuffling steps bed>chair w/ min assist using walker.  Declined further activity due  to weakness and SOB.  Currently recommend DC to SNU, pt declining reports plan for DC to home.   Outcome Measures     Row Name 04/12/19 1600 04/12/19 1200 04/11/19 1300       How much help from another person do you currently need...    Turning from your back to your side while in flat bed without using bedrails?  3  -AR  --  3  -AR    Moving from lying on back to sitting on the side of a flat bed without bedrails?  3  -AR  --  2  -AR    Moving to and from a bed to a chair (including a wheelchair)?  3  -AR  --  3  -AR    Standing up from a chair using your arms (e.g., wheelchair, bedside chair)?  3  -AR  --  3  -AR    Climbing 3-5 steps with a railing?  1  -AR  --  1  -AR    To walk in hospital room?  2  -AR  --  2  -AR    AM-PAC 6 Clicks Score  15  -AR  --  14  -AR       How much help from another is currently needed...    Putting on and taking off regular lower body clothing?  --  2  -SK  --    Bathing (including washing, rinsing, and drying)  --  2  -SK  --    Toileting (which includes using toilet bed pan or urinal)  --  2  -SK  --    Putting on and taking off regular upper body clothing  --  3  -SK  --    Taking care of personal grooming (such as brushing teeth)  --  3  -SK  --    Eating meals  --  3  -SK  --    Score  --  15  -SK  --       Functional Assessment    Outcome Measure Options  --  AM-PAC 6 Clicks Daily Activity (OT)  -SK  --      User Key  (r) = Recorded By, (t) = Taken By, (c) = Cosigned By    Initials Name Provider Type    Marixa Caro, PT Physical Therapist    Jennifer Bob, OT Occupational Therapist         Time Calculation:   PT Charges     Row Name 04/12/19 1615             Time Calculation    Start Time  1559  -AR      Stop Time  1615  -AR      Time Calculation (min)  16 min  -AR      PT Received On  04/12/19  -AR      PT - Next Appointment  04/13/19  -AR        User Key  (r) = Recorded By, (t) = Taken By, (c) = Cosigned By    Initials Name Provider Type    Marixa Caro, PT  Physical Therapist        Therapy Charges for Today     Code Description Service Date Service Provider Modifiers Qty    74560355158 HC PT EVAL MOD COMPLEXITY 2 4/11/2019 Marixa Jones, PT GP 1    89046674606 HC PT THER PROC EA 15 MIN 4/11/2019 Marixa Jones, PT GP 1    79719456746 HC PT THER PROC EA 15 MIN 4/12/2019 Marixa Jones, PT GP 1          PT G-Codes  Outcome Measure Options: AM-PAC 6 Clicks Daily Activity (OT)  AM-PAC 6 Clicks Score: 15  Score: 15    Marixa Jones, PT  4/12/2019

## 2019-04-12 NOTE — PLAN OF CARE
Problem: Patient Care Overview  Goal: Plan of Care Review  Outcome: Ongoing (interventions implemented as appropriate)   04/12/19 9782   Coping/Psychosocial   Plan of Care Reviewed With patient   Plan of Care Review   Progress improving   OTHER   Outcome Summary Pt balanced rest with activity throughout day. C/O pain, PO pain medication given with relief noted. Titi-Escobar ordered for muscle pain. Vanc D/C. Zosyn continued. Purewick in place. Pt to work with PT. Denies SOA, N/V/D. No acute s/s of distress noted. Monitored closely

## 2019-04-12 NOTE — PLAN OF CARE
Problem: Patient Care Overview  Goal: Plan of Care Review  Outcome: Ongoing (interventions implemented as appropriate)   04/12/19 6619   Plan of Care Review   Progress improving   OTHER   Outcome Summary Pt weaned to room air. Will continue to monitor.

## 2019-04-13 ENCOUNTER — APPOINTMENT (OUTPATIENT)
Dept: GENERAL RADIOLOGY | Facility: HOSPITAL | Age: 79
End: 2019-04-13

## 2019-04-13 LAB
ANION GAP SERPL CALCULATED.3IONS-SCNC: 8.7 MMOL/L
BUN BLD-MCNC: 8 MG/DL (ref 8–23)
BUN/CREAT SERPL: 11.9 (ref 7–25)
CALCIUM SPEC-SCNC: 8.1 MG/DL (ref 8.6–10.5)
CHLORIDE SERPL-SCNC: 108 MMOL/L (ref 98–107)
CO2 SERPL-SCNC: 26.3 MMOL/L (ref 22–29)
CREAT BLD-MCNC: 0.67 MG/DL (ref 0.57–1)
DEPRECATED RDW RBC AUTO: 54.5 FL (ref 37–54)
ERYTHROCYTE [DISTWIDTH] IN BLOOD BY AUTOMATED COUNT: 14.5 % (ref 12.3–15.4)
GFR SERPL CREATININE-BSD FRML MDRD: 86 ML/MIN/1.73
GLUCOSE BLD-MCNC: 83 MG/DL (ref 65–99)
HCT VFR BLD AUTO: 34.6 % (ref 34–46.6)
HGB BLD-MCNC: 10.5 G/DL (ref 12–15.9)
MCH RBC QN AUTO: 31.1 PG (ref 26.6–33)
MCHC RBC AUTO-ENTMCNC: 30.3 G/DL (ref 31.5–35.7)
MCV RBC AUTO: 102.4 FL (ref 79–97)
PLATELET # BLD AUTO: 145 10*3/MM3 (ref 140–450)
PMV BLD AUTO: 11.1 FL (ref 6–12)
POTASSIUM BLD-SCNC: 4.2 MMOL/L (ref 3.5–5.2)
RBC # BLD AUTO: 3.38 10*6/MM3 (ref 3.77–5.28)
SODIUM BLD-SCNC: 143 MMOL/L (ref 136–145)
WBC NRBC COR # BLD: 5.66 10*3/MM3 (ref 3.4–10.8)

## 2019-04-13 PROCEDURE — 71046 X-RAY EXAM CHEST 2 VIEWS: CPT

## 2019-04-13 PROCEDURE — 94799 UNLISTED PULMONARY SVC/PX: CPT

## 2019-04-13 PROCEDURE — 80048 BASIC METABOLIC PNL TOTAL CA: CPT | Performed by: INTERNAL MEDICINE

## 2019-04-13 PROCEDURE — 36415 COLL VENOUS BLD VENIPUNCTURE: CPT | Performed by: INTERNAL MEDICINE

## 2019-04-13 PROCEDURE — 25010000002 ENOXAPARIN PER 10 MG: Performed by: INTERNAL MEDICINE

## 2019-04-13 PROCEDURE — 97110 THERAPEUTIC EXERCISES: CPT

## 2019-04-13 PROCEDURE — 85027 COMPLETE CBC AUTOMATED: CPT | Performed by: INTERNAL MEDICINE

## 2019-04-13 PROCEDURE — 25010000002 PIPERACILLIN SOD-TAZOBACTAM PER 1 G: Performed by: INTERNAL MEDICINE

## 2019-04-13 RX ORDER — ARFORMOTEROL TARTRATE 15 UG/2ML
15 SOLUTION RESPIRATORY (INHALATION)
Status: DISCONTINUED | OUTPATIENT
Start: 2019-04-13 | End: 2019-04-16 | Stop reason: HOSPADM

## 2019-04-13 RX ADMIN — NYSTATIN 500000 UNITS: 100000 SUSPENSION ORAL at 12:32

## 2019-04-13 RX ADMIN — NYSTATIN: 100000 POWDER TOPICAL at 09:05

## 2019-04-13 RX ADMIN — Medication 1 DROP: at 08:03

## 2019-04-13 RX ADMIN — HYDROCODONE BITARTRATE AND ACETAMINOPHEN 1 TABLET: 5; 325 TABLET ORAL at 18:52

## 2019-04-13 RX ADMIN — TAZOBACTAM SODIUM AND PIPERACILLIN SODIUM 3.38 G: 375; 3 INJECTION, SOLUTION INTRAVENOUS at 18:45

## 2019-04-13 RX ADMIN — QUETIAPINE FUMARATE 50 MG: 50 TABLET, FILM COATED ORAL at 21:25

## 2019-04-13 RX ADMIN — Medication 1 DROP: at 21:25

## 2019-04-13 RX ADMIN — ARFORMOTEROL TARTRATE 15 MCG: 15 SOLUTION RESPIRATORY (INHALATION) at 19:17

## 2019-04-13 RX ADMIN — SODIUM CHLORIDE, PRESERVATIVE FREE 3 ML: 5 INJECTION INTRAVENOUS at 09:05

## 2019-04-13 RX ADMIN — NYSTATIN 500000 UNITS: 100000 SUSPENSION ORAL at 21:24

## 2019-04-13 RX ADMIN — NYSTATIN: 100000 POWDER TOPICAL at 21:25

## 2019-04-13 RX ADMIN — TAZOBACTAM SODIUM AND PIPERACILLIN SODIUM 3.38 G: 375; 3 INJECTION, SOLUTION INTRAVENOUS at 03:05

## 2019-04-13 RX ADMIN — ASPIRIN 81 MG: 81 TABLET, CHEWABLE ORAL at 08:00

## 2019-04-13 RX ADMIN — ENOXAPARIN SODIUM 40 MG: 40 INJECTION SUBCUTANEOUS at 06:03

## 2019-04-13 RX ADMIN — BUSPIRONE HYDROCHLORIDE 30 MG: 15 TABLET ORAL at 21:25

## 2019-04-13 RX ADMIN — MENTHOL, METHYL SALICYLATE: 10; 15 CREAM TOPICAL at 21:25

## 2019-04-13 RX ADMIN — HYDROCODONE BITARTRATE AND ACETAMINOPHEN 1 TABLET: 5; 325 TABLET ORAL at 12:32

## 2019-04-13 RX ADMIN — PREGABALIN 150 MG: 75 CAPSULE ORAL at 21:25

## 2019-04-13 RX ADMIN — FLUOXETINE HYDROCHLORIDE 60 MG: 20 CAPSULE ORAL at 08:01

## 2019-04-13 RX ADMIN — NYSTATIN 500000 UNITS: 100000 SUSPENSION ORAL at 17:49

## 2019-04-13 RX ADMIN — TORSEMIDE 50 MG: 20 TABLET ORAL at 12:30

## 2019-04-13 RX ADMIN — OXYBUTYNIN 10 MG: 10 TABLET, FILM COATED, EXTENDED RELEASE ORAL at 08:01

## 2019-04-13 RX ADMIN — PANTOPRAZOLE SODIUM 40 MG: 40 TABLET, DELAYED RELEASE ORAL at 08:01

## 2019-04-13 RX ADMIN — PREGABALIN 150 MG: 75 CAPSULE ORAL at 08:01

## 2019-04-13 RX ADMIN — LEVOTHYROXINE SODIUM 50 MCG: 50 TABLET ORAL at 08:00

## 2019-04-13 RX ADMIN — ENOXAPARIN SODIUM 40 MG: 40 INJECTION SUBCUTANEOUS at 17:49

## 2019-04-13 RX ADMIN — IPRATROPIUM BROMIDE AND ALBUTEROL SULFATE 3 ML: 2.5; .5 SOLUTION RESPIRATORY (INHALATION) at 07:39

## 2019-04-13 RX ADMIN — TAZOBACTAM SODIUM AND PIPERACILLIN SODIUM 3.38 G: 375; 3 INJECTION, SOLUTION INTRAVENOUS at 09:53

## 2019-04-13 RX ADMIN — SODIUM CHLORIDE, PRESERVATIVE FREE 3 ML: 5 INJECTION INTRAVENOUS at 21:25

## 2019-04-13 RX ADMIN — BUSPIRONE HYDROCHLORIDE 30 MG: 15 TABLET ORAL at 08:01

## 2019-04-13 NOTE — PROGRESS NOTES
[facility]  [phone]  Alexsander Gillis MD    Subjective:  LOS: 2    She appears to be comfortable.  Remains on supplemental oxygen.  No acute distress.  Denies major cough or wheezing.    Objective   Vital Signs past 24hrs    Temp range: Temp (24hrs), Av.2 °F (36.8 °C), Min:97.9 °F (36.6 °C), Max:98.5 °F (36.9 °C)    BP range: BP: (121-151)/(62-93) 142/68  Pulse range: Heart Rate:  [75-92] 75  Resp rate range: Resp:  [16-20] 16    Device (Oxygen Therapy): nasal cannulaFlow (L/min):  [1-4] 2  Oxygen range:SpO2:  [90 %-100 %] 98 %      125 kg (276 lb 6.4 oz); Body mass index is 53.98 kg/m².    Intake/Output Summary (Last 24 hours) at 2019 0902  Last data filed at 2019 0709  Gross per 24 hour   Intake 410 ml   Output 2500 ml   Net -2090 ml       Physical Exam   Constitutional: She appears well-developed.   Cardiovascular: Normal rate and regular rhythm.   No murmur heard.  Pulmonary/Chest: Effort normal. No respiratory distress. She has decreased breath sounds. She has no wheezes. She has no rales.   Abdominal: Soft. Bowel sounds are normal. She exhibits no mass. There is no tenderness.   Musculoskeletal: She exhibits no edema.   Skin: No rash noted.     Results Review:    I have reviewed the laboratory and imaging data since the last note by [facility] physician.  My annotations are noted in assessment and plan.    Medication Review:  I have reviewed the current MAR.  My annotations are noted in assessment and plan.       Plan   PCCM Problems  Acute hypoxemic respiratory failure  Healthcare associated pneumonia  Lactic acidosis and sepsis  URI with rhinovirus positive on respiratory viral panel  COPD with mild exacerbation  Pulmonary hypertension  Chronic CHF and mitral valve stenosis  Anxiety with depression and fibromyalgia  Chronic opioid use    Plan of Treatment  She appears clinically improved.  Continue with Zosyn today.  Check chest x-ray today.  If improved will plan to switch to oral  antibiotics tomorrow and possibly plan for discharge in 1 or 2 days.    She has chronic CHF and is on Demadex at home.  Resume same.    COPD with mild exacerbation on admission and now improved with no wheezing.  Stop every 4 hours nebs and add Brovana twice daily.    Pulmonary hypertension likely from underlying mitral stenosis.  No further workup at this time.    Patient is noted to be on chronic fentanyl patch which will further impair respiration.    She is not on oxygen at home.  Will check walking oximetry tomorrow for evaluation.    Physical therapy is recommended discharge to skilled nursing facility.  Patient has declined and wants to go home with home health.  Will clarify again close to time of discharge.    Alexsander Gillis MD  04/13/19  9:02 AM    Part of this note may be an electronic transcription/translation of spoken language to printed text using the Dragon Dictation System.

## 2019-04-13 NOTE — THERAPY TREATMENT NOTE
Acute Care - Physical Therapy Treatment Note  [facility]     Patient Name: Rose Cheema  : 1940  MRN: [id number]  Today's Date: 2019  Onset of Illness/Injury or Date of Surgery: 04/10/19          Admit Date: 4/10/2019    Visit Dx:    ICD-10-CM ICD-9-CM   1. Pneumonia of right lower lobe due to infectious organism (CMS/HCC) J18.1 486   2. Urinary tract infection without hematuria, site unspecified N39.0 599.0   3. Acute respiratory failure with hypoxia (CMS/HCC) J96.01 518.81   4. Sepsis, due to unspecified organism (CMS/HCC) A41.9 038.9     995.91   5. Rhinovirus infection B34.8 079.3     Patient Active Problem List   Diagnosis   • Urinary incontinence   • Urinary frequency   • Generalized abdominal pain   • Pulmonary hypertension    • Benign essential hypertension   • Bilateral lower extremity edema (chronic)   • CAD (coronary artery disease), native coronary artery   • COPD (chronic obstructive pulmonary disease) (CMS/HCC)   • Diastolic dysfunction   • Obesity   • Obstructive sleep apnea   • Secondary pulmonary arterial hypertension (CMS/HCC)   • H/O: hysterectomy   • Fibromyalgia   • Hx SBO   • Hx of cholecystectomy   • Hypoglycemia   • Fall   • Disease of thyroid gland   • Chronic pain syndrome   • Anemia   • Pneumonia of right lower lobe due to infectious organism (CMS/HCC)       Therapy Treatment    Rehabilitation Treatment Summary     Row Name 19 1236             Treatment Time/Intention    Discipline  physical therapist  -EE      Document Type  therapy note (daily note)  -EE      Subjective Information  complains of;pain;weakness  -EE      Mode of Treatment  physical therapy;individual therapy  -EE      Patient/Family Observations  Pt supine in bed in no acute distress  -EE      Existing Precautions/Restrictions  fall;oxygen therapy device and L/min  -EE      Recorded by [EE] Elida Chinchilla, PT 19 1250      Row Name 19 1236             Vital Signs    Pre SpO2 (%)  100  -EE       O2 Delivery Pre Treatment  supplemental O2  -EE      O2 Delivery Intra Treatment  supplemental O2  -EE      Post SpO2 (%)  100  -EE      O2 Delivery Post Treatment  supplemental O2  -EE      Pre Patient Position  Supine  -EE      Intra Patient Position  Standing  -EE      Post Patient Position  Sitting  -EE      Recorded by [EE] Elida Chinchilla, PT 04/13/19 1250      Row Name 04/13/19 1236             Cognitive Assessment/Intervention    Additional Documentation  Cognitive Assessment/Intervention (Group)  -EE      Recorded by [EE] Elida Chinchilla, PT 04/13/19 1250      Row Name 04/13/19 1236             Cognitive Assessment/Intervention- PT/OT    Orientation Status (Cognition)  oriented x 3  -EE      Follows Commands (Cognition)  follows two step commands  -EE      Safety Deficit (Cognitive)  mild deficit  -EE      Personal Safety Interventions  fall prevention program maintained;gait belt;muscle strengthening facilitated;nonskid shoes/slippers when out of bed;supervised activity  -EE      Recorded by [EE] Elida Chinchilla, PT 04/13/19 1250      Row Name 04/13/19 1236             Safety Issues, Functional Mobility    Impairments Affecting Function (Mobility)  balance;endurance/activity tolerance;strength  -EE      Recorded by [EE] Elida Chinchilla, PT 04/13/19 1250      Row Name 04/13/19 1236             Bed Mobility Assessment/Treatment    Supine-Sit [place] (Bed Mobility)  contact guard;verbal cues  -EE      Bed Mobility, Safety Issues  decreased use of arms for pushing/pulling;decreased use of legs for bridging/pushing  -EE      Assistive Device (Bed Mobility)  bed rails;head of bed elevated  -EE      Recorded by [EE] Elida Chinchilla, PT 04/13/19 1250      Row Name 04/13/19 1236             Sit-Stand Transfer    Sit-Stand [place] (Transfers)  minimum assist (75% patient effort);verbal cues  -EE      Assistive Device (Sit-Stand Transfers)  walker, front-wheeled  -EE      Recorded by [EE] Elida Chinchilla, PT 04/13/19 1250       Row Name 04/13/19 1236             Stand-Sit Transfer    Stand-Sit [place] (Transfers)  contact guard;verbal cues  -EE      Assistive Device (Stand-Sit Transfers)  walker, front-wheeled  -EE      Recorded by [EE] Elida Chinchilla, PT 04/13/19 1250      Row Name 04/13/19 1236             Gait/Stairs Assessment/Training    [place] Level (Gait)  minimum assist (75% patient effort);verbal cues  -EE      Assistive Device (Gait)  walker, front-wheeled  -EE      Distance in Feet (Gait)  5  -EE      Deviations/Abnormal Patterns (Gait)  amanda decreased;stride length decreased;festinating/shuffling  -EE      Bilateral Gait Deviations  forward flexed posture;heel strike decreased  -EE      Recorded by [EE] Elida Chinchilla, PT 04/13/19 1250      Row Name 04/13/19 1236             Motor Skills Assessment/Interventions    Additional Documentation  Therapeutic Exercise (Group)  -EE      Recorded by [EE] Elida Chinchilla, PT 04/13/19 1251      Row Name 04/13/19 1236             Therapeutic Exercise    Lower Extremity Range of Motion (Therapeutic Exercise)  ankle dorsiflexion/plantar flexion, bilateral  -EE      Exercise Type (Therapeutic Exercise)  AROM (active range of motion)  -EE      Sets/Reps (Therapeutic Exercise)  1/10  -EE      Recorded by [EE] Elida Chinchilla, PT 04/13/19 1251      Row Name 04/13/19 1236             Positioning and Restraints    Pre-Treatment Position  in bed  -EE      Post Treatment Position  chair  -EE      In Chair  reclined;call light within reach;encouraged to call for assist;exit alarm on;legs elevated;notified nsg  -EE      Recorded by [EE] Elida Chinchilla, PT 04/13/19 1250      Row Name 04/13/19 1236             Pain Scale: Numbers Pre/Post-Treatment    Pain Scale: Numbers, Pretreatment  5/10  -EE      Pain Scale: Numbers, Post-Treatment  5/10  -EE      Pain Location - Side  Left  -EE      Pain Location  neck  -EE      Pain Intervention(s)  Repositioned  -EE      Recorded by [EE] Elida Chinchilla, PT  04/13/19 1250        User Key  (r) = Recorded By, (t) = Taken By, (c) = Cosigned By    Initials Name Effective Dates Discipline    EE Elida Chinchilla, PT 04/03/18 -  PT                   Physical Therapy Education     Title: PT OT SLP Therapies (In Progress)     Topic: Physical Therapy (In Progress)     Point: Mobility training (In Progress)     Learning Progress Summary           Patient Acceptance, E,TB,D, NR by  at 4/13/2019 12:50 PM    Acceptance, E, NR by AR at 4/12/2019  4:14 PM    Acceptance, E, NR by AR at 4/11/2019  1:49 PM                   Point: Home exercise program (In Progress)     Learning Progress Summary           Patient Acceptance, E, NR by AR at 4/12/2019  4:14 PM    Acceptance, E, NR by AR at 4/11/2019  1:49 PM                   Point: Body mechanics (In Progress)     Learning Progress Summary           Patient Acceptance, E,TB,D, NR by  at 4/13/2019 12:50 PM    Acceptance, E, NR by AR at 4/12/2019  4:14 PM    Acceptance, E, NR by AR at 4/11/2019  1:49 PM                   Point: Precautions (In Progress)     Learning Progress Summary           Patient Acceptance, E, NR by AR at 4/12/2019  4:14 PM    Acceptance, E, NR by AR at 4/11/2019  1:49 PM                               User Key     Initials Effective Dates Name Provider Type Discipline     04/03/18 -  Elida Chinchilla, PT Physical Therapist PT    AR 04/03/18 -  Marixa Jones PT Physical Therapist PT                PT Recommendation and Plan     Plan of Care Reviewed With: patient  Outcome Summary: Pt able to maintain activity level; however, continues to fatigue quickly with upright mobility. Continue to recommend DC to SNF at this time, as pt currently only walking short distances in room. Will continue to strengthen and increase activity as tolerated.   Outcome Measures     Row Name 04/13/19 1200 04/12/19 1600 04/12/19 1200       How much help from another person do you currently need...    Turning from your back to your side while  in flat bed without using bedrails?  3  -EE  3  -AR  --    Moving from lying on back to sitting on the side of a flat bed without bedrails?  3  -EE  3  -AR  --    Moving to and from a bed to a chair (including a wheelchair)?  3  -EE  3  -AR  --    Standing up from a chair using your arms (e.g., wheelchair, bedside chair)?  3  -EE  3  -AR  --    Climbing 3-5 steps with a railing?  1  -EE  1  -AR  --    To walk in hospital room?  2  -EE  2  -AR  --    AM-PAC 6 Clicks Score  15  -EE  15  -AR  --       How much help from another is currently needed...    Putting on and taking off regular lower body clothing?  --  --  2  -SK    Bathing (including washing, rinsing, and drying)  --  --  2  -SK    Toileting (which includes using toilet bed pan or urinal)  --  --  2  -SK    Putting on and taking off regular upper body clothing  --  --  3  -SK    Taking care of personal grooming (such as brushing teeth)  --  --  3  -SK    Eating meals  --  --  3  -SK    Score  --  --  15  -SK       Functional Assessment    Outcome Measure Options  AM-PAC 6 Clicks Basic Mobility (PT)  -EE  --  AM-PAC 6 Clicks Daily Activity (OT)  -SK    Row Name 04/11/19 1300             How much help from another person do you currently need...    Turning from your back to your side while in flat bed without using bedrails?  3  -AR      Moving from lying on back to sitting on the side of a flat bed without bedrails?  2  -AR      Moving to and from a bed to a chair (including a wheelchair)?  3  -AR      Standing up from a chair using your arms (e.g., wheelchair, bedside chair)?  3  -AR      Climbing 3-5 steps with a railing?  1  -AR      To walk in hospital room?  2  -AR      AM-PAC 6 Clicks Score  14  -AR        User Key  (r) = Recorded By, (t) = Taken By, (c) = Cosigned By    Initials Name Provider Type    Elida Schulte, PT Physical Therapist    Marixa Caro, PT Physical Therapist    SK Jennifer Hoover, OT Occupational Therapist         Time  Calculation:   PT Charges     Row Name 04/13/19 1252             Time Calculation    Start Time  1155  -EE      Stop Time  1215  -EE      Time Calculation (min)  20 min  -EE      PT Received On  04/13/19  -EE      PT - Next Appointment  04/14/19  -EE         Time Calculation- PT    Total Timed Code Minutes- PT  20 minute(s)  -EE        User Key  (r) = Recorded By, (t) = Taken By, (c) = Cosigned By    Initials Name Provider Type    EE Elida Chinchilla, PT Physical Therapist        Therapy Charges for Today     Code Description Service Date Service Provider Modifiers Qty    23203387037 HC PT THER PROC EA 15 MIN 4/13/2019 Elida Chinchilla, PT GP 1          PT G-Codes  Outcome Measure Options: AM-PAC 6 Clicks Basic Mobility (PT)  AM-PAC 6 Clicks Score: 15  Score: 15    Elida Chinchilla PT  4/13/2019

## 2019-04-13 NOTE — PLAN OF CARE
Problem: Patient Care Overview  Goal: Plan of Care Review  Outcome: Ongoing (interventions implemented as appropriate)   04/13/19 7485   Coping/Psychosocial   Plan of Care Reviewed With patient   Plan of Care Review   Progress improving   OTHER   Outcome Summary Patient A&O. Vital signs are stable. On room air. Pt. complained of pain. Pain med given. No s/s of distress. Up in chair. Will continue to monitor.        Problem: Fall Risk (Adult)  Goal: Absence of Fall  Outcome: Ongoing (interventions implemented as appropriate)      Problem: Skin Injury Risk (Adult)  Goal: Skin Health and Integrity  Outcome: Ongoing (interventions implemented as appropriate)      Problem: Pain, Chronic (Adult)  Goal: Acceptable Pain/Comfort Level and Functional Ability  Outcome: Ongoing (interventions implemented as appropriate)      Problem: Pneumonia (Adult)  Goal: Signs and Symptoms of Listed Potential Problems Will be Absent, Minimized or Managed (Pneumonia)  Outcome: Ongoing (interventions implemented as appropriate)

## 2019-04-13 NOTE — PLAN OF CARE
Problem: Patient Care Overview  Goal: Plan of Care Review  Outcome: Ongoing (interventions implemented as appropriate)   04/13/19 0416   Coping/Psychosocial   Plan of Care Reviewed With patient   Plan of Care Review   Progress improving   OTHER   Outcome Summary Complained of pain X1, medicated with relief, drops 02 sats while sleeping, uses 1-2L at night, does not use 02 at home, turn q2hr, OOB to chair during days, IV ABX as ordered, VSS, will continue to monitor.     Goal: Individualization and Mutuality  Outcome: Ongoing (interventions implemented as appropriate)    Goal: Discharge Needs Assessment  Outcome: Ongoing (interventions implemented as appropriate)      Problem: Fall Risk (Adult)  Goal: Absence of Fall  Outcome: Ongoing (interventions implemented as appropriate)      Problem: Skin Injury Risk (Adult)  Goal: Skin Health and Integrity  Outcome: Ongoing (interventions implemented as appropriate)      Problem: Pneumonia (Adult)  Goal: Signs and Symptoms of Listed Potential Problems Will be Absent, Minimized or Managed (Pneumonia)  Outcome: Ongoing (interventions implemented as appropriate)

## 2019-04-13 NOTE — PLAN OF CARE
Problem: Patient Care Overview  Goal: Plan of Care Review   04/13/19 0800   Coping/Psychosocial   Plan of Care Reviewed With patient   OTHER   Outcome Summary Pt able to maintain activity level; however, continues to fatigue quickly with upright mobility. Continue to recommend DC to SNF at this time, as pt currently only walking short distances in room. Will continue to strengthen and increase activity as tolerated.

## 2019-04-14 LAB
ANION GAP SERPL CALCULATED.3IONS-SCNC: 12.3 MMOL/L
BUN BLD-MCNC: 11 MG/DL (ref 8–23)
BUN/CREAT SERPL: 12.8 (ref 7–25)
CALCIUM SPEC-SCNC: 8.5 MG/DL (ref 8.6–10.5)
CHLORIDE SERPL-SCNC: 103 MMOL/L (ref 98–107)
CO2 SERPL-SCNC: 27.7 MMOL/L (ref 22–29)
CREAT BLD-MCNC: 0.86 MG/DL (ref 0.57–1)
DEPRECATED RDW RBC AUTO: 51.1 FL (ref 37–54)
ERYTHROCYTE [DISTWIDTH] IN BLOOD BY AUTOMATED COUNT: 14 % (ref 12.3–15.4)
GFR SERPL CREATININE-BSD FRML MDRD: 64 ML/MIN/1.73
GLUCOSE BLD-MCNC: 83 MG/DL (ref 65–99)
HCT VFR BLD AUTO: 37.1 % (ref 34–46.6)
HGB BLD-MCNC: 11.9 G/DL (ref 12–15.9)
MCH RBC QN AUTO: 31.5 PG (ref 26.6–33)
MCHC RBC AUTO-ENTMCNC: 32.1 G/DL (ref 31.5–35.7)
MCV RBC AUTO: 98.1 FL (ref 79–97)
NT-PROBNP SERPL-MCNC: 2552 PG/ML (ref 5–1800)
PLATELET # BLD AUTO: 171 10*3/MM3 (ref 140–450)
PMV BLD AUTO: 11.3 FL (ref 6–12)
POTASSIUM BLD-SCNC: 4 MMOL/L (ref 3.5–5.2)
RBC # BLD AUTO: 3.78 10*6/MM3 (ref 3.77–5.28)
SODIUM BLD-SCNC: 143 MMOL/L (ref 136–145)
WBC NRBC COR # BLD: 5.57 10*3/MM3 (ref 3.4–10.8)

## 2019-04-14 PROCEDURE — 25010000002 ENOXAPARIN PER 10 MG: Performed by: INTERNAL MEDICINE

## 2019-04-14 PROCEDURE — 94799 UNLISTED PULMONARY SVC/PX: CPT

## 2019-04-14 PROCEDURE — 85027 COMPLETE CBC AUTOMATED: CPT | Performed by: INTERNAL MEDICINE

## 2019-04-14 PROCEDURE — 97110 THERAPEUTIC EXERCISES: CPT

## 2019-04-14 PROCEDURE — 36415 COLL VENOUS BLD VENIPUNCTURE: CPT | Performed by: INTERNAL MEDICINE

## 2019-04-14 PROCEDURE — 25010000002 PIPERACILLIN SOD-TAZOBACTAM PER 1 G: Performed by: INTERNAL MEDICINE

## 2019-04-14 PROCEDURE — 80048 BASIC METABOLIC PNL TOTAL CA: CPT | Performed by: INTERNAL MEDICINE

## 2019-04-14 PROCEDURE — 83880 ASSAY OF NATRIURETIC PEPTIDE: CPT | Performed by: INTERNAL MEDICINE

## 2019-04-14 RX ORDER — AMOXICILLIN AND CLAVULANATE POTASSIUM 875; 125 MG/1; MG/1
1 TABLET, FILM COATED ORAL EVERY 12 HOURS SCHEDULED
Status: DISCONTINUED | OUTPATIENT
Start: 2019-04-14 | End: 2019-04-16 | Stop reason: HOSPADM

## 2019-04-14 RX ADMIN — BUSPIRONE HYDROCHLORIDE 30 MG: 15 TABLET ORAL at 21:08

## 2019-04-14 RX ADMIN — NYSTATIN 500000 UNITS: 100000 SUSPENSION ORAL at 12:32

## 2019-04-14 RX ADMIN — QUETIAPINE FUMARATE 50 MG: 50 TABLET, FILM COATED ORAL at 21:08

## 2019-04-14 RX ADMIN — ENOXAPARIN SODIUM 40 MG: 40 INJECTION SUBCUTANEOUS at 06:14

## 2019-04-14 RX ADMIN — Medication 1 DROP: at 21:09

## 2019-04-14 RX ADMIN — NYSTATIN: 100000 POWDER TOPICAL at 21:09

## 2019-04-14 RX ADMIN — HYDROCODONE BITARTRATE AND ACETAMINOPHEN 1 TABLET: 5; 325 TABLET ORAL at 14:41

## 2019-04-14 RX ADMIN — SODIUM CHLORIDE, PRESERVATIVE FREE 3 ML: 5 INJECTION INTRAVENOUS at 21:10

## 2019-04-14 RX ADMIN — NYSTATIN: 100000 POWDER TOPICAL at 08:28

## 2019-04-14 RX ADMIN — ASPIRIN 81 MG: 81 TABLET, CHEWABLE ORAL at 08:26

## 2019-04-14 RX ADMIN — OXYBUTYNIN 10 MG: 10 TABLET, FILM COATED, EXTENDED RELEASE ORAL at 08:26

## 2019-04-14 RX ADMIN — NYSTATIN 500000 UNITS: 100000 SUSPENSION ORAL at 18:24

## 2019-04-14 RX ADMIN — LEVOTHYROXINE SODIUM 50 MCG: 50 TABLET ORAL at 08:26

## 2019-04-14 RX ADMIN — NYSTATIN 500000 UNITS: 100000 SUSPENSION ORAL at 08:27

## 2019-04-14 RX ADMIN — TAZOBACTAM SODIUM AND PIPERACILLIN SODIUM 3.38 G: 375; 3 INJECTION, SOLUTION INTRAVENOUS at 02:51

## 2019-04-14 RX ADMIN — SODIUM CHLORIDE, PRESERVATIVE FREE 3 ML: 5 INJECTION INTRAVENOUS at 08:29

## 2019-04-14 RX ADMIN — AMOXICILLIN AND CLAVULANATE POTASSIUM 1 TABLET: 875; 125 TABLET, FILM COATED ORAL at 21:09

## 2019-04-14 RX ADMIN — FLUOXETINE HYDROCHLORIDE 60 MG: 20 CAPSULE ORAL at 08:26

## 2019-04-14 RX ADMIN — TAZOBACTAM SODIUM AND PIPERACILLIN SODIUM 3.38 G: 375; 3 INJECTION, SOLUTION INTRAVENOUS at 10:10

## 2019-04-14 RX ADMIN — BUSPIRONE HYDROCHLORIDE 30 MG: 15 TABLET ORAL at 08:26

## 2019-04-14 RX ADMIN — TORSEMIDE 50 MG: 20 TABLET ORAL at 08:26

## 2019-04-14 RX ADMIN — ARFORMOTEROL TARTRATE 15 MCG: 15 SOLUTION RESPIRATORY (INHALATION) at 09:36

## 2019-04-14 RX ADMIN — PREGABALIN 150 MG: 75 CAPSULE ORAL at 21:08

## 2019-04-14 RX ADMIN — HYDROCODONE BITARTRATE AND ACETAMINOPHEN 1 TABLET: 5; 325 TABLET ORAL at 08:25

## 2019-04-14 RX ADMIN — PREGABALIN 150 MG: 75 CAPSULE ORAL at 08:26

## 2019-04-14 RX ADMIN — ARFORMOTEROL TARTRATE 15 MCG: 15 SOLUTION RESPIRATORY (INHALATION) at 20:02

## 2019-04-14 RX ADMIN — HYDROCODONE BITARTRATE AND ACETAMINOPHEN 1 TABLET: 5; 325 TABLET ORAL at 23:04

## 2019-04-14 RX ADMIN — ENOXAPARIN SODIUM 40 MG: 40 INJECTION SUBCUTANEOUS at 18:24

## 2019-04-14 RX ADMIN — PANTOPRAZOLE SODIUM 40 MG: 40 TABLET, DELAYED RELEASE ORAL at 08:27

## 2019-04-14 RX ADMIN — NYSTATIN 500000 UNITS: 100000 SUSPENSION ORAL at 21:08

## 2019-04-14 NOTE — PLAN OF CARE
Problem: Patient Care Overview  Goal: Plan of Care Review  Outcome: Ongoing (interventions implemented as appropriate)   04/14/19 2862   Coping/Psychosocial   Plan of Care Reviewed With patient   OTHER   Outcome Summary Medicated for pain X1 with relief, turn q2hr, up to chair TID, purewick in place, IV ABX as ordered, slept most of the night, VSS, will continue to monitor.     Goal: Individualization and Mutuality  Outcome: Ongoing (interventions implemented as appropriate)    Goal: Discharge Needs Assessment  Outcome: Ongoing (interventions implemented as appropriate)      Problem: Fall Risk (Adult)  Goal: Absence of Fall  Outcome: Ongoing (interventions implemented as appropriate)      Problem: Skin Injury Risk (Adult)  Goal: Skin Health and Integrity  Outcome: Ongoing (interventions implemented as appropriate)

## 2019-04-14 NOTE — THERAPY TREATMENT NOTE
Acute Care - Physical Therapy Treatment Note  [facility]     Patient Name: Rose Cheema  : 1940  MRN: [id number]  Today's Date: 2019  Onset of Illness/Injury or Date of Surgery: 04/10/19          Admit Date: 4/10/2019    Visit Dx:    ICD-10-CM ICD-9-CM   1. Pneumonia of right lower lobe due to infectious organism (CMS/[facility]) J18.1 486   2. Urinary tract infection without hematuria, site unspecified N39.0 599.0   3. Acute respiratory failure with hypoxia (CMS/[facility]) J96.01 518.81   4. Sepsis, due to unspecified organism (CMS/[facility]) A41.9 038.9     995.91   5. Rhinovirus infection B34.8 079.3     Patient Active Problem List   Diagnosis   • Urinary incontinence   • Urinary frequency   • Generalized abdominal pain   • Pulmonary hypertension    • Benign essential hypertension   • Bilateral lower extremity edema (chronic)   • CAD (coronary artery disease), native coronary artery   • COPD (chronic obstructive pulmonary disease) (CMS/[facility])   • Diastolic dysfunction   • Obesity   • Obstructive sleep apnea   • Secondary pulmonary arterial hypertension (CMS/HCC)   • H/O: hysterectomy   • Fibromyalgia   • Hx SBO   • Hx of cholecystectomy   • Hypoglycemia   • Fall   • Disease of thyroid gland   • Chronic pain syndrome   • Anemia   • Pneumonia of right lower lobe due to infectious organism (CMS/HCC)       Therapy Treatment    Rehabilitation Treatment Summary     Row Name 19 1313             Treatment Time/Intention    Discipline  physical therapist  -      Document Type  therapy note (daily note)  -      Subjective Information  no complaints  -      Mode of Treatment  physical therapy;individual therapy  -      Patient/Family Observations  pt sitting in chair; external catheter; no acute distress  -      Therapy Frequency (PT Clinical Impression)  daily  -      Patient Effort  good  -      Existing Precautions/Restrictions  fall  -      Treatment Considerations/Comments  Brief; pt reports she has  incontinence and knows when she transfers or shifts her weight she will urinate. If possible stand with external catheter still on suction then remove to ambulate with brief.  (Significant)   -KH      Recorded by [GERA] Kelly Sidhu, PT 04/14/19 1322      Row Name 04/14/19 1313             Cognitive Assessment/Intervention    Additional Documentation  Cognitive Assessment/Intervention (Group)  -KH      Recorded by [KH] Kelly Sidhu, PT 04/14/19 1322      Row Name 04/14/19 1313             Cognitive Assessment/Intervention- PT/OT    Affect/Mental Status (Cognitive)  WNL  -KH      Orientation Status (Cognition)  oriented x 4  -KH      Follows Commands (Cognition)  WNL  -KH      Cognitive Function (Cognitive)  WNL  -KH      Safety Deficit (Cognitive)  insight into deficits/self awareness  -KH      Personal Safety Interventions  fall prevention program maintained;gait belt;nonskid shoes/slippers when out of bed  -KH      Recorded by [KH] Kelly Sidhu, PT 04/14/19 1322      Row Name 04/14/19 1313             Safety Issues, Functional Mobility    Safety Issues Affecting Function (Mobility)  insight into deficits/self awareness  -KH      Impairments Affecting Function (Mobility)  balance;endurance/activity tolerance;strength  -KH      Recorded by [KH] Kelly Sidhu, PT 04/14/19 1322      Row Name 04/14/19 1313             Bed Mobility Assessment/Treatment    Supine-Sit [place] (Bed Mobility)  not tested  -KH      Sit-Supine [place] (Bed Mobility)  not tested  -KH      Comment (Bed Mobility)  up in chair  -KH      Recorded by [KH] Kelly Sidhu, PT 04/14/19 1322      Row Name 04/14/19 1313             Transfer Assessment/Treatment    Transfer Assessment/Treatment  sit-stand transfer;stand-sit transfer  -KH      Recorded by [KH] Kelly Sidhu, PT 04/14/19 1322      Row Name 04/14/19 1313             Sit-Stand Transfer    Sit-Stand [place] (Transfers)  contact guard;minimum assist (75%  patient effort);verbal cues;nonverbal cues (demo/gesture)  -      Assistive Device (Sit-Stand Transfers)  walker, front-wheeled  -KH      Recorded by [] Kelly Sidhu, PT 04/14/19 1322      Row Name 04/14/19 1313             Stand-Sit Transfer    Stand-Sit [place] (Transfers)  contact guard  -      Assistive Device (Stand-Sit Transfers)  walker, 4-wheeled  -KH      Recorded by [] Kelly Sidhu, PT 04/14/19 1322      Row Name 04/14/19 1313             Gait/Stairs Assessment/Training    Gait/Stairs Assessment/Training  gait/ambulation independence  -      [place] Level (Gait)  contact guard;minimum assist (75% patient effort);verbal cues;nonverbal cues (demo/gesture)  -      Assistive Device (Gait)  walker, front-wheeled  -KH      Distance in Feet (Gait)  40  -KH      Deviations/Abnormal Patterns (Gait)  amanda decreased;gait speed decreased;stride length decreased  -      Bilateral Gait Deviations  forward flexed posture  -      Comment (Gait/Stairs)  Pt's R knee buckled x1 but was able to recover since using RWX  (Significant)   -KH      Recorded by [] Kelly Sidhu, PT 04/14/19 1322      Row Name 04/14/19 1313             Motor Skills Assessment/Interventions    Additional Documentation  Balance (Group);Therapeutic Exercise (Group);Therapeutic Exercise Interventions (Group)  -KH      Recorded by [] Kelly Sidhu, PT 04/14/19 1322      Row Name 04/14/19 1313             Therapeutic Exercise    Lower Extremity (Therapeutic Exercise)  marching while standing  -      Exercise Type (Therapeutic Exercise)  AROM (active range of motion)  -KH      Position (Therapeutic Exercise)  standing  -KH      Sets/Reps (Therapeutic Exercise)  10  -KH      Recorded by [GERA] Kelly Sidhu, PT 04/14/19 1322      Row Name 04/14/19 1313             Balance    Balance  static standing balance  -KH      Recorded by [] Kelly Sidhu, PT 04/14/19 1322      Row Name 04/14/19 1313              Static Standing Balance    Level of [place] (Supported Standing, Static Balance)  supervision  -GERA      Time Able to Maintain Position (Supported Standing, Static Balance)  more than 5 minutes  -KH      Assistive Device Utilized (Supported Standing, Static Balance)  walker, rolling  -KH      Recorded by [GERA] Kelly Sidhu, PT 04/14/19 1322      Row Name 04/14/19 1313             Positioning and Restraints    Pre-Treatment Position  sitting in chair/recliner  -KH      Post Treatment Position  chair  -KH      In Chair  reclined;call light within reach;encouraged to call for assist;with nsg  -KH      Recorded by [GERA] Kelly Sidhu, PT 04/14/19 1322      Row Name 04/14/19 1313             Pain Assessment    Additional Documentation  Pain Scale: Numbers Pre/Post-Treatment (Group)  -GERA      Recorded by [GERA] Kelly Sidhu, PT 04/14/19 1322      Row Name 04/14/19 1313             Pain Scale: Numbers Pre/Post-Treatment    Pain Scale: Numbers, Pretreatment  0/10 - no pain  -GERA      Pain Scale: Numbers, Post-Treatment  0/10 - no pain  -GERA      Recorded by [GERA] Kelly Sidhu, PT 04/14/19 1322      Row Name 04/14/19 1313             Outcome Summary/Treatment Plan (PT)    Anticipated Discharge Disposition (PT)  skilled nursing facility;home with home health;home with assist pending pt progress  -      Recorded by [GERA] Kelly Sidhu, PT 04/14/19 1322        User Key  (r) = Recorded By, (t) = Taken By, (c) = Cosigned By    Initials Name Effective Dates Discipline    Kelly Bansal, PT 04/03/18 -  PT                   Physical Therapy Education     Title: PT OT SLP Therapies (In Progress)     Topic: Physical Therapy (In Progress)     Point: Mobility training (Done)     Learning Progress Summary           Patient Acceptance, E, VU by GERA at 4/14/2019  1:22 PM    Acceptance, E,TB,D, NR by RYAN at 4/13/2019 12:50 PM    Acceptance, E, NR by AR at 4/12/2019  4:14 PM    Acceptance, E, NR by AR at 4/11/2019  1:49 PM                    Point: Home exercise program (Done)     Learning Progress Summary           Patient Acceptance, E, VU by  at 4/14/2019  1:22 PM    Acceptance, E, NR by AR at 4/12/2019  4:14 PM    Acceptance, E, NR by AR at 4/11/2019  1:49 PM                   Point: Body mechanics (In Progress)     Learning Progress Summary           Patient Acceptance, E,TB,D, NR by  at 4/13/2019 12:50 PM    Acceptance, E, NR by AR at 4/12/2019  4:14 PM    Acceptance, E, NR by AR at 4/11/2019  1:49 PM                   Point: Precautions (In Progress)     Learning Progress Summary           Patient Acceptance, E, NR by AR at 4/12/2019  4:14 PM    Acceptance, E, NR by AR at 4/11/2019  1:49 PM                               User Key     Initials Effective Dates Name Provider Type Discipline     04/03/18 -  Elida Chinchilla, PT Physical Therapist PT     04/03/18 -  Kelly Sidhu, PT Physical Therapist PT    AR 04/03/18 -  Marixa Jones, PT Physical Therapist PT                PT Recommendation and Plan  Anticipated Discharge Disposition (PT): skilled nursing facility, home with home health, home with assist(pending pt progress)  Therapy Frequency (PT Clinical Impression): daily  Outcome Summary/Treatment Plan (PT)  Anticipated Discharge Disposition (PT): skilled nursing facility, home with home health, home with assist(pending pt progress)  Outcome Summary: Pt is progressing with therapy. Pt ambualted 40 ft. with RWX and CGA-min A . Pt performed prolonged standing and marching in standing with RWX. Pt will benefit from continued PT.   Outcome Measures     Row Name 04/14/19 1324 04/13/19 1200 04/12/19 1600       How much help from another person do you currently need...    Turning from your back to your side while in flat bed without using bedrails?  3  -KH  3  -EE  3  -AR    Moving from lying on back to sitting on the side of a flat bed without bedrails?  3  -KH  3  -EE  3  -AR    Moving to and from a bed to a chair  (including a wheelchair)?  3  -KH  3  -EE  3  -AR    Standing up from a chair using your arms (e.g., wheelchair, bedside chair)?  3  -KH  3  -EE  3  -AR    Climbing 3-5 steps with a railing?  1  -KH  1  -EE  1  -AR    To walk in hospital room?  3  -KH  2  -EE  2  -AR    AM-PAC 6 Clicks Score  16  -KH  15  -EE  15  -AR       Functional Assessment    Outcome Measure Options  AM-PAC 6 Clicks Basic Mobility (PT)  -KH  AM-PAC 6 Clicks Basic Mobility (PT)  -EE  --    Row Name 04/12/19 1200             How much help from another is currently needed...    Putting on and taking off regular lower body clothing?  2  -SK      Bathing (including washing, rinsing, and drying)  2  -SK      Toileting (which includes using toilet bed pan or urinal)  2  -SK      Putting on and taking off regular upper body clothing  3  -SK      Taking care of personal grooming (such as brushing teeth)  3  -SK      Eating meals  3  -SK      Score  15  -SK         Functional Assessment    Outcome Measure Options  AM-PAC 6 Clicks Daily Activity (OT)  -SK        User Key  (r) = Recorded By, (t) = Taken By, (c) = Cosigned By    Initials Name Provider Type    EE Elida Chinchilla, PT Physical Therapist    Kelly Bansal, PT Physical Therapist    Marixa Caro, PT Physical Therapist    SK Jennifer Hoover, OT Occupational Therapist         Time Calculation:   PT Charges     Row Name 04/14/19 1325             Time Calculation    Start Time  1126  -      Stop Time  1157  -      Time Calculation (min)  31 min  -      PT Received On  04/14/19  -      PT - Next Appointment  04/15/19  -        User Key  (r) = Recorded By, (t) = Taken By, (c) = Cosigned By    Initials Name Provider Type    Kelly Bansal, PT Physical Therapist        Therapy Charges for Today     Code Description Service Date Service Provider Modifiers Qty    15076542373 HC PT THER PROC EA 15 MIN 4/14/2019 Kelly Sidhu, PT GP 2          PT G-Codes  Outcome Measure Options:  AM-[facility] 6 Clicks Basic Mobility (PT)  -PAC 6 Clicks Score: 16  Score: 15    Kelly Sidhu, PT  4/14/2019

## 2019-04-14 NOTE — PROGRESS NOTES
[facility]  [phone]  Alexsander Gillis MD    Subjective:  LOS: 3    She feels better.  She is extremely keen not to go to rehab.  She was ambulating with a walker with the help of physical therapy when I walked into the room.    Objective   Vital Signs past 24hrs    Temp range: Temp (24hrs), Av.6 °F (36.4 °C), Min:97.3 °F (36.3 °C), Max:98.2 °F (36.8 °C)    BP range: BP: (121-169)/(66-95) 160/68  Pulse range: Heart Rate:  [61-92] 75  Resp rate range: Resp:  [14-18] 16    Device (Oxygen Therapy): nasal cannulaFlow (L/min):  [1.5] 1.5  Oxygen range:SpO2:  [95 %-100 %] 95 %      125 kg (276 lb 6.4 oz); Body mass index is 53.98 kg/m².    Intake/Output Summary (Last 24 hours) at 2019 1210  Last data filed at 2019 1028  Gross per 24 hour   Intake 770 ml   Output 4900 ml   Net -4130 ml       Physical Exam   Constitutional: She appears well-developed.   Cardiovascular: Normal rate and regular rhythm.   No murmur heard.  Pulmonary/Chest: Effort normal. No respiratory distress. She has decreased breath sounds. She has no wheezes. She has no rales.   Abdominal: Soft. Bowel sounds are normal. She exhibits no mass. There is no tenderness.   Musculoskeletal: She exhibits no edema.   Skin: No rash noted.     Results Review:    I have reviewed the laboratory and imaging data since the last note by [facility] physician.  My annotations are noted in assessment and plan.    Medication Review:  I have reviewed the current MAR.  My annotations are noted in assessment and plan.       Plan   PCCM Problems  Acute hypoxemic respiratory failure  Healthcare associated pneumonia  Lactic acidosis and sepsis  URI with rhinovirus positive on respiratory viral panel  COPD with mild exacerbation  Pulmonary hypertension  Chronic CHF and mitral valve stenosis  Anxiety with depression and fibromyalgia  Chronic opioid use    Plan of Treatment  Clinically improved.  On Zosyn.  Switch to Augmentin today.  Labs are better.  Finish out  total 7 days.    She has chronic CHF and is on Demadex at home.  Resumed same.    COPD with mild exacerbation on admission and now improved with no wheezing.  Stopped every 4 hours nebs and now Brovana twice daily.    Pulmonary hypertension likely from underlying mitral stenosis.  No further workup at this time.    Patient is noted to be on chronic fentanyl patch which will further impair respiration.    She is not on oxygen at home.  Will check walking oximetry for evaluation before discharge.    Physical therapy is recommended discharge to skilled nursing facility.  Patient has declined and wants to go home with home health.  Will check again with patient before discharge.    Alexsander Gillis MD  04/14/19  12:10 PM    Part of this note may be an electronic transcription/translation of spoken language to printed text using the Dragon Dictation System.

## 2019-04-14 NOTE — PLAN OF CARE
Problem: Patient Care Overview  Goal: Plan of Care Review   04/14/19 1323   OTHER   Outcome Summary Pt is progressing with therapy. Pt ambualted 40 ft. with RWX and CGA-min A . Pt performed prolonged standing and marching in standing with RWX. Pt will benefit from continued PT.

## 2019-04-14 NOTE — PROGRESS NOTES
[facility]  [phone]  Alexsander Gillis MD    Subjective:  LOS: 3    She appears to be comfortable.  Remains on supplemental oxygen.  No acute distress.  Denies major cough or wheezing.    Objective   Vital Signs past 24hrs    Temp range: Temp (24hrs), Av.6 °F (36.4 °C), Min:97.3 °F (36.3 °C), Max:98.2 °F (36.8 °C)    BP range: BP: (121-169)/(66-95) 160/68  Pulse range: Heart Rate:  [61-92] 75  Resp rate range: Resp:  [14-18] 16    Device (Oxygen Therapy): nasal cannulaFlow (L/min):  [1.5] 1.5  Oxygen range:SpO2:  [95 %-100 %] 95 %      125 kg (276 lb 6.4 oz); Body mass index is 53.98 kg/m².    Intake/Output Summary (Last 24 hours) at 2019 1146  Last data filed at 2019 1028  Gross per 24 hour   Intake 770 ml   Output 4900 ml   Net -4130 ml       Physical Exam   Constitutional: She appears well-developed.   Cardiovascular: Normal rate and regular rhythm.   No murmur heard.  Pulmonary/Chest: Effort normal. No respiratory distress. She has decreased breath sounds. She has no wheezes. She has no rales.   Abdominal: Soft. Bowel sounds are normal. She exhibits no mass. There is no tenderness.   Musculoskeletal: She exhibits no edema.   Skin: No rash noted.     Results Review:    I have reviewed the laboratory and imaging data since the last note by [facility] physician.  My annotations are noted in assessment and plan.    Medication Review:  I have reviewed the current MAR.  My annotations are noted in assessment and plan.       Plan   PCCM Problems  Acute hypoxemic respiratory failure  Healthcare associated pneumonia  Lactic acidosis and sepsis  URI with rhinovirus positive on respiratory viral panel  COPD with mild exacerbation  Pulmonary hypertension  Chronic CHF and mitral valve stenosis  Anxiety with depression and fibromyalgia  Chronic opioid use    Plan of Treatment  She appears clinically improved.  Continue with Zosyn today.  Check chest x-ray today.  If improved will plan to switch to oral  antibiotics tomorrow and possibly plan for discharge in 1 or 2 days.    She has chronic CHF and is on Demadex at home.  Resume same.    COPD with mild exacerbation on admission and now improved with no wheezing.  Stop every 4 hours nebs and add Brovana twice daily.    Pulmonary hypertension likely from underlying mitral stenosis.  No further workup at this time.    Patient is noted to be on chronic fentanyl patch which will further impair respiration.    She is not on oxygen at home.  Will check walking oximetry tomorrow for evaluation.    Physical therapy is recommended discharge to skilled nursing facility.  Patient has declined and wants to go home with home health.  Will clarify again close to time of discharge.    Alexsander Gillis MD  04/14/19  11:46 AM    Part of this note may be an electronic transcription/translation of spoken language to printed text using the Dragon Dictation System.

## 2019-04-14 NOTE — PLAN OF CARE
Problem: Patient Care Overview  Goal: Plan of Care Review  Outcome: Ongoing (interventions implemented as appropriate)   04/14/19 4412   Coping/Psychosocial   Plan of Care Reviewed With patient   Plan of Care Review   Progress no change   OTHER   Outcome Summary generalized pain, up to chair most of day, d/c IV abx     Goal: Individualization and Mutuality  Outcome: Ongoing (interventions implemented as appropriate)    Goal: Discharge Needs Assessment  Outcome: Ongoing (interventions implemented as appropriate)    Goal: Interprofessional Rounds/Family Conf  Outcome: Ongoing (interventions implemented as appropriate)      Problem: Fall Risk (Adult)  Goal: Absence of Fall  Outcome: Ongoing (interventions implemented as appropriate)      Problem: Skin Injury Risk (Adult)  Goal: Skin Health and Integrity  Outcome: Ongoing (interventions implemented as appropriate)      Problem: Pain, Chronic (Adult)  Goal: Acceptable Pain/Comfort Level and Functional Ability  Outcome: Ongoing (interventions implemented as appropriate)      Problem: Pneumonia (Adult)  Goal: Signs and Symptoms of Listed Potential Problems Will be Absent, Minimized or Managed (Pneumonia)  Outcome: Ongoing (interventions implemented as appropriate)

## 2019-04-15 LAB
ANION GAP SERPL CALCULATED.3IONS-SCNC: 12.1 MMOL/L
BACTERIA SPEC AEROBE CULT: NORMAL
BACTERIA SPEC AEROBE CULT: NORMAL
BUN BLD-MCNC: 13 MG/DL (ref 8–23)
BUN/CREAT SERPL: 13.3 (ref 7–25)
CALCIUM SPEC-SCNC: 8.3 MG/DL (ref 8.6–10.5)
CHLORIDE SERPL-SCNC: 102 MMOL/L (ref 98–107)
CO2 SERPL-SCNC: 28.9 MMOL/L (ref 22–29)
CREAT BLD-MCNC: 0.98 MG/DL (ref 0.57–1)
DEPRECATED RDW RBC AUTO: 50.7 FL (ref 37–54)
ERYTHROCYTE [DISTWIDTH] IN BLOOD BY AUTOMATED COUNT: 13.8 % (ref 12.3–15.4)
GFR SERPL CREATININE-BSD FRML MDRD: 55 ML/MIN/1.73
GLUCOSE BLD-MCNC: 86 MG/DL (ref 65–99)
HCT VFR BLD AUTO: 36.2 % (ref 34–46.6)
HGB BLD-MCNC: 11.1 G/DL (ref 12–15.9)
MCH RBC QN AUTO: 30.7 PG (ref 26.6–33)
MCHC RBC AUTO-ENTMCNC: 30.7 G/DL (ref 31.5–35.7)
MCV RBC AUTO: 100 FL (ref 79–97)
PLATELET # BLD AUTO: 191 10*3/MM3 (ref 140–450)
PMV BLD AUTO: 10.8 FL (ref 6–12)
POTASSIUM BLD-SCNC: 3.8 MMOL/L (ref 3.5–5.2)
RBC # BLD AUTO: 3.62 10*6/MM3 (ref 3.77–5.28)
SODIUM BLD-SCNC: 143 MMOL/L (ref 136–145)
WBC NRBC COR # BLD: 5.99 10*3/MM3 (ref 3.4–10.8)

## 2019-04-15 PROCEDURE — 94799 UNLISTED PULMONARY SVC/PX: CPT

## 2019-04-15 PROCEDURE — 25010000002 ENOXAPARIN PER 10 MG: Performed by: INTERNAL MEDICINE

## 2019-04-15 PROCEDURE — 80048 BASIC METABOLIC PNL TOTAL CA: CPT | Performed by: INTERNAL MEDICINE

## 2019-04-15 PROCEDURE — 97110 THERAPEUTIC EXERCISES: CPT

## 2019-04-15 PROCEDURE — 85027 COMPLETE CBC AUTOMATED: CPT | Performed by: INTERNAL MEDICINE

## 2019-04-15 RX ADMIN — LEVOTHYROXINE SODIUM 50 MCG: 50 TABLET ORAL at 09:54

## 2019-04-15 RX ADMIN — TORSEMIDE 50 MG: 20 TABLET ORAL at 09:51

## 2019-04-15 RX ADMIN — ENOXAPARIN SODIUM 40 MG: 40 INJECTION SUBCUTANEOUS at 06:47

## 2019-04-15 RX ADMIN — FENTANYL 1 PATCH: 25 PATCH TRANSDERMAL at 17:18

## 2019-04-15 RX ADMIN — NYSTATIN 500000 UNITS: 100000 SUSPENSION ORAL at 20:44

## 2019-04-15 RX ADMIN — BUSPIRONE HYDROCHLORIDE 30 MG: 15 TABLET ORAL at 20:43

## 2019-04-15 RX ADMIN — PREGABALIN 150 MG: 75 CAPSULE ORAL at 09:51

## 2019-04-15 RX ADMIN — ARFORMOTEROL TARTRATE 15 MCG: 15 SOLUTION RESPIRATORY (INHALATION) at 07:48

## 2019-04-15 RX ADMIN — QUETIAPINE FUMARATE 50 MG: 50 TABLET, FILM COATED ORAL at 20:43

## 2019-04-15 RX ADMIN — NYSTATIN 500000 UNITS: 100000 SUSPENSION ORAL at 14:09

## 2019-04-15 RX ADMIN — AMOXICILLIN AND CLAVULANATE POTASSIUM 1 TABLET: 875; 125 TABLET, FILM COATED ORAL at 09:51

## 2019-04-15 RX ADMIN — Medication 1 DROP: at 20:44

## 2019-04-15 RX ADMIN — NYSTATIN 500000 UNITS: 100000 SUSPENSION ORAL at 09:51

## 2019-04-15 RX ADMIN — Medication 1 DROP: at 09:55

## 2019-04-15 RX ADMIN — SODIUM CHLORIDE, PRESERVATIVE FREE 3 ML: 5 INJECTION INTRAVENOUS at 09:55

## 2019-04-15 RX ADMIN — ENOXAPARIN SODIUM 40 MG: 40 INJECTION SUBCUTANEOUS at 17:18

## 2019-04-15 RX ADMIN — ASPIRIN 81 MG: 81 TABLET, CHEWABLE ORAL at 09:51

## 2019-04-15 RX ADMIN — PREGABALIN 150 MG: 75 CAPSULE ORAL at 20:50

## 2019-04-15 RX ADMIN — NYSTATIN: 100000 POWDER TOPICAL at 20:44

## 2019-04-15 RX ADMIN — NYSTATIN 500000 UNITS: 100000 SUSPENSION ORAL at 17:19

## 2019-04-15 RX ADMIN — SODIUM CHLORIDE, PRESERVATIVE FREE 3 ML: 5 INJECTION INTRAVENOUS at 20:44

## 2019-04-15 RX ADMIN — AMOXICILLIN AND CLAVULANATE POTASSIUM 1 TABLET: 875; 125 TABLET, FILM COATED ORAL at 20:43

## 2019-04-15 RX ADMIN — PANTOPRAZOLE SODIUM 40 MG: 40 TABLET, DELAYED RELEASE ORAL at 09:51

## 2019-04-15 RX ADMIN — BUSPIRONE HYDROCHLORIDE 30 MG: 15 TABLET ORAL at 09:51

## 2019-04-15 RX ADMIN — HYDROCODONE BITARTRATE AND ACETAMINOPHEN 1 TABLET: 5; 325 TABLET ORAL at 23:22

## 2019-04-15 RX ADMIN — HYDROCODONE BITARTRATE AND ACETAMINOPHEN 1 TABLET: 5; 325 TABLET ORAL at 12:31

## 2019-04-15 RX ADMIN — FLUOXETINE HYDROCHLORIDE 60 MG: 20 CAPSULE ORAL at 09:51

## 2019-04-15 RX ADMIN — NYSTATIN: 100000 POWDER TOPICAL at 09:55

## 2019-04-15 RX ADMIN — OXYBUTYNIN 10 MG: 10 TABLET, FILM COATED, EXTENDED RELEASE ORAL at 09:51

## 2019-04-15 NOTE — PROGRESS NOTES
"  Daily Progress Note.   [address]  4/15/2019    Patient:  Name:  Rose Cheema  MRN:  [id number]  1940  78 y.o.  female         CC: follow up viral urti, bacterial pna, ahrf    Interval History:  Doing better today btu still fairly weak  No worsening cough  No chest pain  No fever overnight    ROS: No fever, no diarrhea, no chest pain  PMFSSH: no change    Physical Exam:  /64   Pulse 74   Temp 98.2 °F (36.8 °C) (Oral)   Resp 18   Ht 152.4 cm (60\")   Wt 125 kg (276 lb 6.4 oz)   SpO2 99%   BMI 53.98 kg/m²   Body mass index is 53.98 kg/m².    Intake/Output Summary (Last 24 hours) at 4/15/2019 1150  Last data filed at 4/15/2019 1136  Gross per 24 hour   Intake 210 ml   Output 3500 ml   Net -3290 ml     GENERAL:  nontoxic, Alert  EYES: EOMI, nonicteric sclera  NECK: trach midline, no thyromegaly noted no mass  HEAD/THROAT:  NCAT, OP clear no JVD MMM  PULMONARY:    CTAB, no wheeze, no crackles, no rhonchi, symmetric air entry  CARDIAC:  RRR no MRG, S1 S2  ABD: no HSM, no mass non tender BS+ obese  EXT: no c/c/e, pulses symmetric 2+ bilaterally  NEURO:  CNs II-XII intact, sensation intact  SKIN: warm and dry  PSYCH: appropriate mood, oriented  LYMPH: no cervical LAD      Data Review:  Notable Labs:  Results from last 7 days   Lab Units 04/15/19  0716 04/14/19  0815 04/13/19  0712 04/12/19  0717 04/11/19  0646 04/10/19  1249   WBC 10*3/mm3 5.99 5.57 5.66 8.80 16.48* 18.52*   HEMOGLOBIN g/dL 11.1* 11.9* 10.5* 10.3* 9.9* 11.6*   PLATELETS 10*3/mm3 191 171 145 131* 129* 150     Results from last 7 days   Lab Units 04/15/19  0716 04/14/19  0815 04/13/19  0712 04/12/19  0717 04/11/19  2211 04/11/19  0646 04/10/19  1249   SODIUM mmol/L 143 143 143 145  --  138 138   POTASSIUM mmol/L 3.8 4.0 4.2 4.6 4.2 3.2* 4.4   CHLORIDE mmol/L 102 103 108* 112*  --  103 102   CO2 mmol/L 28.9 27.7 26.3 25.5  --  25.5 17.7*   BUN mg/dL 13 11 8 11  --  15 13   CREATININE mg/dL 0.98 0.86 0.67 0.70  --  0.70 " 0.91   GLUCOSE mg/dL 86 83 83 89  --  103* 140*   CALCIUM mg/dL 8.3* 8.5* 8.1* 8.1*  --  8.0* 9.1   Estimated Creatinine Clearance: 59.6 mL/min (by C-G formula based on SCr of 0.98 mg/dL).    Imaging:  reviewed    Scheduled meds:      amoxicillin-clavulanate 1 tablet Oral Q12H   arformoterol 15 mcg Nebulization BID - RT   aspirin 81 mg Oral Daily   busPIRone 30 mg Oral BID   carbamide peroxide 1 drop Right Ear BID   enoxaparin 40 mg Subcutaneous Q12H   fentaNYL 1 patch Transdermal Q72H   FLUoxetine 60 mg Oral Daily   levothyroxine 50 mcg Oral Daily   nystatin 5 mL Oral 4x Daily   nystatin  Topical Q12H   oxybutynin XL 10 mg Oral Daily   pantoprazole 40 mg Oral Daily   pregabalin 150 mg Oral Q12H   QUEtiapine 50 mg Oral Nightly   sodium chloride 3 mL Intravenous Q12H   torsemide 50 mg Oral Daily       ASSESSMENT  /  PLAN:  PCCM Problems  Acute hypoxemic respiratory failure  Healthcare associated pneumonia  Lactic acidosis and sepsis  URI with rhinovirus positive on respiratory viral panel  COPD with mild exacerbation  Pulmonary hypertension  Chronic CHF and mitral valve stenosis  Anxiety with depression and fibromyalgia  Chronic opioid use      Cont augmentin complete 7 days  Cont diuretics  Placement - pt agreeable to go to facility for rehab per pt recs     Dc pending snf placement.        Saturnino Jj MD  [facility]  04/15/19  11:50 AM

## 2019-04-15 NOTE — DISCHARGE PLACEMENT REQUEST
"Rose Caldera (78 y.o. Female)     Date of Birth Social Security Number Address Home Phone MRN    1940  [id number] [address] [phone] [id number]    Tenriism Marital Status          Seventh Day Conf[employer]ianist        Admission Date Admission Type Admitting Provider Attending Provider Department, Room/Bed    4/10/19 Emergency Ana Ramsey MD Kellie, Brandon John, MD [address], [address]1    Discharge Date Discharge Disposition Discharge Destination                       Attending Provider:  Saturnino Jj MD    Allergies:  Aspartame, Cephalexin    Isolation:  Droplet   Infection:  Rhinovirus  (04/10/19)   Code Status:  CPR    Ht:  152.4 cm (60\")   Wt:  125 kg (276 lb 6.4 oz)    Admission Cmt:  None   Principal Problem:  None                Active Insurance as of 4/10/2019     Primary Coverage     Payor Plan Insurance Group Employer/Plan Group    MEDICARE MEDICARE A & B      Payor Plan Address Payor Plan Phone Number Payor Plan Fax Number Effective Dates    [address] [phone]  5/1/2008 - None Entered    [facility] [id number]       Subscriber Name Subscriber Birth Date Member ID       ROSE CALDERA 1940 [id number]           Secondary Coverage     Payor Plan Insurance Group Employer/Plan Group    [place] [id number]     Payor Plan Address Payor Plan Phone Number Payor Plan Fax Number Effective Dates    [address]   9/1/2004 - None Entered    [facility] [id number]       Subscriber Name Subscriber Birth Date Member ID       ROSE CALDERA 1940 [id number]                 Emergency Contacts      (Rel.) Home Phone Work Phone Mobile Phone    Mario Caldera (Son) [phone] -- --              "

## 2019-04-15 NOTE — PLAN OF CARE
Problem: Patient Care Overview  Goal: Plan of Care Review  Outcome: Ongoing (interventions implemented as appropriate)   04/15/19 4985   Coping/Psychosocial   Plan of Care Reviewed With patient   Plan of Care Review   Progress no change   OTHER   Outcome Summary pt cooperated with physcial therapy with assistance from walker, c/o pain in right knee, PRN pain meds given 1x this shift, room air, VSS. No s/s of distress. Will continue to monitor.        Problem: Fall Risk (Adult)  Goal: Absence of Fall  Outcome: Ongoing (interventions implemented as appropriate)      Problem: Skin Injury Risk (Adult)  Goal: Skin Health and Integrity  Outcome: Ongoing (interventions implemented as appropriate)      Problem: Pain, Chronic (Adult)  Goal: Acceptable Pain/Comfort Level and Functional Ability  Outcome: Ongoing (interventions implemented as appropriate)      Problem: Pneumonia (Adult)  Goal: Signs and Symptoms of Listed Potential Problems Will be Absent, Minimized or Managed (Pneumonia)  Outcome: Ongoing (interventions implemented as appropriate)

## 2019-04-15 NOTE — PRE-PROCEDURE NOTE
PROGRESS NOTE    Subjective:       Patient ID: Faisal Aguirre III is a 52 y.o. male.    Chief Complaint:  Follow-up and Results  follow up colon cancer    History of Present Illness:   Faisal Aguirre III is a 52 y.o. male who presents with a history of colon cancer.  Patient completed treatment five weeks ago.  No new complaints.        Family and Social history reviewed and is unchanged from 1/25/2017.       ROS:  Review of Systems   Constitutional: Negative for fever and unexpected weight change.   HENT: Negative for nosebleeds.    Respiratory: Negative for chest tightness and shortness of breath.    Cardiovascular: Negative for chest pain.   Gastrointestinal: Negative for abdominal pain and blood in stool.   Genitourinary: Negative for hematuria.   Skin: Negative for rash.   Hematological: Does not bruise/bleed easily.          Current Outpatient Prescriptions:     amlodipine (NORVASC) 10 MG tablet, , Disp: , Rfl:     AMLODIPINE BESYLATE, BULK, MISC, Take 10 mg by mouth once daily at 6am. , Disp: , Rfl:     capecitabine (XELODA) 150 MG tablet, Take 2,500 mg by mouth 2 (two) times daily. Takes for 14 days then off 7 days, Disp: , Rfl:     dronabinol (MARINOL) 10 MG capsule, Take 10 mg by mouth once daily., Disp: , Rfl:     finasteride (PROSCAR) 5 mg tablet, , Disp: , Rfl:     furosemide (LASIX) 40 MG tablet, Take 40 mg by mouth once daily. , Disp: , Rfl:     lactobacillus acidophilus & bulgar (LACTINEX) 100 million cell packet, Take 1 packet (1 each total) by mouth 2 (two) times daily., Disp: , Rfl:     MARINOL 5 mg capsule, , Disp: , Rfl:     omeprazole (PRILOSEC) 20 MG capsule, Take 20 mg by mouth once daily. , Disp: , Rfl:     ondansetron (ZOFRAN) 8 MG tablet, Take 8 mg by mouth every 8 (eight) hours., Disp: , Rfl: 0    potassium chloride (MICRO-K) 10 MEQ CpSR, Take 20 mEq by mouth once daily. , Disp: , Rfl:     promethazine (PHENERGAN) 25  4/15/2019 RT Note    Walking oximetry not completed yet.  Patient declined walk today, stating she felt really bad and did not think she could complete walk.  Patient's nurse states there are no plans for discharge at this time and patient just had a thoracentesis.  She states she will talk to Dr. Gillis and we will reevaluate exercise oximetry order.  EDELMIRA Tadeo, Respiratory Therapy   MG tablet, Take 25 mg by mouth every 6 (six) hours., Disp: , Rfl: 0    simvastatin (ZOCOR) 40 MG tablet, , Disp: , Rfl:     tamsulosin (FLOMAX) 0.4 mg Cp24, Take 0.4 mg by mouth once daily. , Disp: , Rfl:     venlafaxine (EFFEXOR-XR) 75 MG 24 hr capsule, , Disp: , Rfl:     VENLAFAXINE HCL (VENLAFAXINE ORAL), Take 150 mg by mouth once daily at 6am. , Disp: , Rfl:         Objective:       Physical Examination:     There were no vitals taken for this visit.    Physical Exam   Constitutional: He is oriented to person, place, and time. He appears well-developed and well-nourished.   HENT:   Head: Normocephalic and atraumatic.   Right Ear: External ear normal.   Left Ear: External ear normal.   Mouth/Throat: Oropharynx is clear and moist.   Eyes: Conjunctivae are normal. Pupils are equal, round, and reactive to light. No scleral icterus.   Neck: Normal range of motion. Neck supple.   Cardiovascular: Normal rate, regular rhythm and normal heart sounds.  Exam reveals no gallop and no friction rub.    No murmur heard.  Pulmonary/Chest: Effort normal and breath sounds normal. No respiratory distress. He has no rales. He exhibits no tenderness.   Abdominal: Soft. Bowel sounds are normal. He exhibits no distension and no mass. There is no hepatosplenomegaly. There is no tenderness. There is no rebound and no guarding.   Musculoskeletal: He exhibits no edema.   Lymphadenopathy:        Head (right side): No tonsillar adenopathy present.        Head (left side): No tonsillar adenopathy present.     He has no cervical adenopathy.     He has no axillary adenopathy.        Right: No supraclavicular adenopathy present.        Left: No supraclavicular adenopathy present.   Neurological: He is alert and oriented to person, place, and time.   Psychiatric: He has a normal mood and affect. His behavior is normal. Judgment and thought content normal.   Vitals reviewed.      Labs:   No results found for this or any previous visit (from  the past 336 hour(s)).  CMP  Sodium   Date Value Ref Range Status   10/09/2017 138 134 - 144 mmol/L      Potassium   Date Value Ref Range Status   10/09/2017 3.8 3.5 - 5.0 mmol/L      Chloride   Date Value Ref Range Status   10/09/2017 106 98 - 110 mmol/L      CO2   Date Value Ref Range Status   10/09/2017 22.5 (L) 22.8 - 31.6 mmol/L      Glucose   Date Value Ref Range Status   10/09/2017 98 70 - 99 mg/dL      BUN, Bld   Date Value Ref Range Status   10/09/2017 9 8 - 20 mg/dL      Creatinine   Date Value Ref Range Status   10/09/2017 0.82 0.60 - 1.40 mg/dL      Calcium   Date Value Ref Range Status   10/09/2017 9.3 7.7 - 10.4 mg/dL      Total Protein   Date Value Ref Range Status   10/09/2017 7.9 6.0 - 8.2 g/dL      Albumin   Date Value Ref Range Status   10/09/2017 4.0 3.1 - 4.7 g/dL      Total Bilirubin   Date Value Ref Range Status   10/09/2017 0.6 0.3 - 1.0 mg/dL      Alkaline Phosphatase   Date Value Ref Range Status   10/09/2017 80 40 - 104 IU/L      AST   Date Value Ref Range Status   10/09/2017 30 10 - 40 IU/L      ALT   Date Value Ref Range Status   05/11/2017 50 (H) 10 - 44 U/L Final     Anion Gap   Date Value Ref Range Status   05/11/2017 11 8 - 16 mmol/L Final     eGFR if    Date Value Ref Range Status   05/11/2017 >60 >60 mL/min/1.73 m^2 Final     eGFR if non    Date Value Ref Range Status   05/11/2017 >60 >60 mL/min/1.73 m^2 Final     Comment:     Calculation used to obtain the estimated glomerular filtration  rate (eGFR) is the CKD-EPI equation. Since race is unknown   in our information system, the eGFR values for   -American and Non--American patients are given   for each creatinine result.       No results found for: CEA  No results found for: PSA        Assessment/Plan:     Problem List Items Addressed This Visit     Malignant neoplasm of sigmoid colon (Chronic)     Patient is doing well.  Is complete therapy and PET scan is negative.  CEA is 2.1 and  anemia is improving.  Labs othewise look ok.  Will continue to watch patient with scans again in 3 months then repeat PET in 6 months given the initial hypermetabolism of the ? Lesions in the RLQ.  Discussed all this in detail with the patient today.           Relevant Orders    CT Abdomen Pelvis With Contrast    CT Chest With Contrast    CBC auto differential    Comprehensive metabolic panel    CEA          Discussion:   High complexity patient due to complex chemo regimen and high risk medications.     Return in about 3 months (around 2/28/2018).      Electronically signed by Delvin Martinez

## 2019-04-15 NOTE — PROGRESS NOTES
Adult Nutrition  Assessment/PES    Patient Name:  Rose Cheema  YOB: 1940  MRN: [id number]  Admit Date:  4/10/2019    Assessment Date:  4/15/2019    Comments:  Follow up.  Patient eating 100% at meals per chart PO data.  Will continue to follow as needed.    Reason for Assessment     Row Name 04/15/19 1619          Reason for Assessment    Reason For Assessment  follow-up protocol           Anthropometrics     Row Name 04/15/19 1619          Admit Weight    Admit Weight  -- 276# 4/11        Body Mass Index (BMI)    BMI Assessment  BMI 40 or greater: obesity grade III         Labs/Tests/Procedures/Meds     Row Name 04/15/19 1619          Labs/Procedures/Meds    Lab Results Reviewed  reviewed, pertinent     Lab Results Comments  Ca, Hgb, GFR        Diagnostic Tests/Procedures    Diagnostic Test/Procedure Reviewed  reviewed, pertinent        Medications    Pertinent Medications Reviewed  reviewed, pertinent     Pertinent Medications Comments  augmentin, synthroid, protonix, demadex         Physical Findings     Row Name 04/15/19 1620          Physical Findings    Overall Physical Appearance  obese           Nutrition Prescription Ordered     Row Name 04/15/19 1620          Nutrition Prescription PO    Current PO Diet  Regular         Evaluation of Received Nutrient/Fluid Intake     Row Name 04/15/19 1620          PO Evaluation    Number of Meals  3     % PO Intake  100               Problem/Interventions:            Intervention Goal     Row Name 04/15/19 1621          Intervention Goal    General  Maintain nutrition;Reduce/improve symptoms;Disease management/therapy     PO  Maintain intake     Weight  Appropriate weight loss         Nutrition Intervention     Row Name 04/15/19 1621          Nutrition Intervention    RD/Tech Action  Follow Tx progress;Care plan reviewd           Education/Evaluation     Row Name 04/15/19 1621          Education    Education  Will Instruct as appropriate         Monitor/Evaluation    Monitor  Per protocol;PO intake           Electronically signed by:  Tessa Ghosh RD  04/15/19 4:21 PM

## 2019-04-15 NOTE — THERAPY TREATMENT NOTE
Acute Care - Physical Therapy Treatment Note  [facility]     Patient Name: Rose Cheema  : 1940  MRN: [id number]  Today's Date: 4/15/2019  Onset of Illness/Injury or Date of Surgery: 04/10/19          Admit Date: 4/10/2019    Visit Dx:    ICD-10-CM ICD-9-CM   1. Pneumonia of right lower lobe due to infectious organism (CMS/[facility]) J18.1 486   2. Urinary tract infection without hematuria, site unspecified N39.0 599.0   3. Acute respiratory failure with hypoxia (CMS/HCC) J96.01 518.81   4. Sepsis, due to unspecified organism (CMS/[facility]) A41.9 038.9     995.91   5. Rhinovirus infection B34.8 079.3     Patient Active Problem List   Diagnosis   • Urinary incontinence   • Urinary frequency   • Generalized abdominal pain   • Pulmonary hypertension    • Benign essential hypertension   • Bilateral lower extremity edema (chronic)   • CAD (coronary artery disease), native coronary artery   • COPD (chronic obstructive pulmonary disease) (CMS/[facility])   • Diastolic dysfunction   • Obesity   • Obstructive sleep apnea   • Secondary pulmonary arterial hypertension (CMS/HCC)   • H/O: hysterectomy   • Fibromyalgia   • Hx SBO   • Hx of cholecystectomy   • Hypoglycemia   • Fall   • Disease of thyroid gland   • Chronic pain syndrome   • Anemia   • Pneumonia of right lower lobe due to infectious organism (CMS/HCC)       Therapy Treatment    Rehabilitation Treatment Summary     Row Name 04/15/19 1155             Treatment Time/Intention    Discipline  physical therapy assistant  -EH      Document Type  therapy note (daily note)  -EH      Subjective Information  no complaints  -EH      Mode of Treatment  physical therapy  -      Patient/Family Observations  pt reclined in chair  -      Care Plan Review  patient/other agree to care plan  -      Therapy Frequency (PT Clinical Impression)  daily  -EH      Patient Effort  good  -      Existing Precautions/Restrictions  fall  -      Treatment Considerations/Comments  pt requires  brief on before ambulation dut to pt being incontinent. pt will urinate upon standing.   (Significant)   -EH      Recorded by [] Esther Haley, [facility] 04/15/19 1159      Row Name 04/15/19 1159             Cognitive Assessment/Intervention- PT/OT    Orientation Status (Cognition)  oriented x 4  -EH      Follows Commands (Cognition)  WNL  -EH      Cognitive Function (Cognitive)  WNL  -EH      Safety Deficit (Cognitive)  insight into deficits/self awareness  -EH      Personal Safety Interventions  fall prevention program maintained;gait belt;nonskid shoes/slippers when out of bed  -EH      Recorded by [] Esther Haley, [facility] 04/15/19 1159      Row Name 04/15/19 1155             Bed Mobility Assessment/Treatment    Supine-Sit [place] (Bed Mobility)  not tested  -      Sit-Supine [place] (Bed Mobility)  not tested  -EH      Recorded by [] Esther Haley, [facility] 04/15/19 1159      Row Name 04/15/19 1158             Sit-Stand Transfer    Sit-Stand [place] (Transfers)  contact guard  -EH      Assistive Device (Sit-Stand Transfers)  walker, standard  -EH      Recorded by [] Esther Haley, [facility] 04/15/19 1159      Row Name 04/15/19 1158             Stand-Sit Transfer    Stand-Sit [place] (Transfers)  contact guard  -EH      Assistive Device (Stand-Sit Transfers)  walker, standard  -EH      Recorded by [] Esther Haley, [facility] 04/15/19 1159      Row Name 04/15/19 1155             Gait/Stairs Assessment/Training    [place]wich Level (Gait)  contact guard  -EH      Assistive Device (Gait)  walker, standard  -EH      Distance in Feet (Gait)  50  -EH      Deviations/Abnormal Patterns (Gait)  amanda decreased;stride length decreased;gait speed decreased  -EH      Bilateral Gait Deviations  forward flexed posture  -EH      Recorded by [] Esther Haley, [facility] 04/15/19 1159      Row Name 04/15/19 1154             Static Standing Balance    Level of [place] (Supported Standing, Static Balance)  supervision  -       Time Able to Maintain Position (Supported Standing, Static Balance)  more than 5 minutes  -      Assistive Device Utilized (Supported Standing, Static Balance)  walker, standard  -EH      Comment (Supported Standing, Static Balance)  pt stood while getting brief donned and doffed.   -      Recorded by [] Esther Haley, [facility] 04/15/19 1159      Row Name 04/15/19 1158             Positioning and Restraints    Pre-Treatment Position  sitting in chair/recliner  -EH      Post Treatment Position  chair  -EH      In Chair  reclined;call light within reach;encouraged to call for assist;with family/caregiver  -EH      Recorded by [] Esther Haley, [facility] 04/15/19 1159        User Key  (r) = Recorded By, (t) = Taken By, (c) = Cosigned By    Initials Name Effective Dates Discipline     Esther Haley, [facility] 08/19/18 -  PT                   Physical Therapy Education     Title: PT OT SLP Therapies (In Progress)     Topic: Physical Therapy (Done)     Point: Mobility training (Done)     Learning Progress Summary           Patient Acceptance, E,D, VU,NR by  at 4/15/2019 11:54 AM    Acceptance, E, VU by  at 4/14/2019  1:22 PM    Acceptance, E,TB,D, NR by EE at 4/13/2019 12:50 PM    Acceptance, E, NR by AR at 4/12/2019  4:14 PM    Acceptance, E, NR by AR at 4/11/2019  1:49 PM                   Point: Home exercise program (Done)     Learning Progress Summary           Patient Acceptance, E,D, VU,NR by  at 4/15/2019 11:54 AM    Acceptance, E, VU by  at 4/14/2019  1:22 PM    Acceptance, E, NR by AR at 4/12/2019  4:14 PM    Acceptance, E, NR by AR at 4/11/2019  1:49 PM                   Point: Body mechanics (Done)     Learning Progress Summary           Patient Acceptance, E,D, VU,NR by  at 4/15/2019 11:54 AM    Acceptance, E,TB,D, NR by EE at 4/13/2019 12:50 PM    Acceptance, E, NR by AR at 4/12/2019  4:14 PM    Acceptance, E, NR by AR at 4/11/2019  1:49 PM                   Point: Precautions (Done)     Learning  Progress Summary           Patient Acceptance, E,D, VU,NR by  at 4/15/2019 11:54 AM    Acceptance, E, NR by AR at 4/12/2019  4:14 PM    Acceptance, E, NR by AR at 4/11/2019  1:49 PM                               User Key     Initials Effective Dates Name Provider Type Discipline     04/03/18 -  Elida Chinchilla, PT Physical Therapist PT     04/03/18 -  Kelly Sidhu, PT Physical Therapist PT    AR 04/03/18 -  Marixa Jones, PT Physical Therapist PT     08/19/18 -  Esther Haley PTA Physical Therapy Assistant PT                PT Recommendation and Plan  Therapy Frequency (PT Clinical Impression): daily  Plan of Care Reviewed With: patient  Progress: improving  Outcome Summary: pt tolerated treatment with no complaints. Pt able to transfer sit to/from stand with CGA, standard walker. Pt ambulated 50 feet with standard walker, CGA. Pt able to stand for more than 5 minutes to get brief donned and doffed. Pt is progressing well with PT.   Outcome Measures     Row Name 04/15/19 1100 04/14/19 1324 04/13/19 1200       How much help from another person do you currently need...    Turning from your back to your side while in flat bed without using bedrails?  3  -EH  3  -KH  3  -EE    Moving from lying on back to sitting on the side of a flat bed without bedrails?  3  -  3  -KH  3  -EE    Moving to and from a bed to a chair (including a wheelchair)?  3  -  3  -KH  3  -EE    Standing up from a chair using your arms (e.g., wheelchair, bedside chair)?  3  -  3  -KH  3  -EE    Climbing 3-5 steps with a railing?  1  -  1  -KH  1  -EE    To walk in hospital room?  3  -EH  3  -KH  2  -EE    AM-PAC 6 Clicks Score  16  -  16  -KH  15  -EE       Functional Assessment    Outcome Measure Options  --  AM-PAC 6 Clicks Basic Mobility (PT)  -KH  AM-PAC 6 Clicks Basic Mobility (PT)  -EE    Row Name 04/12/19 1600             How much help from another person do you currently need...    Turning from your back to your side  while in flat bed without using bedrails?  3  -AR      Moving from lying on back to sitting on the side of a flat bed without bedrails?  3  -AR      Moving to and from a bed to a chair (including a wheelchair)?  3  -AR      Standing up from a chair using your arms (e.g., wheelchair, bedside chair)?  3  -AR      Climbing 3-5 steps with a railing?  1  -AR      To walk in hospital room?  2  -AR      AM-PAC 6 Clicks Score  15  -AR        User Key  (r) = Recorded By, (t) = Taken By, (c) = Cosigned By    Initials Name Provider Type    Elida Schulte, PT Physical Therapist     Kelly Sidhu, PT Physical Therapist    AR Marixa Jones, PT Physical Therapist    Esther Walton PTA Physical Therapy Assistant         Time Calculation:   PT Charges     Row Name 04/15/19 1153             Time Calculation    Start Time  1128  -      Stop Time  1151  -      Time Calculation (min)  23 min  -      PT Received On  04/15/19  -      PT - Next Appointment  04/16/19  -         Time Calculation- PT    Total Timed Code Minutes- PT  23 minute(s)  -        User Key  (r) = Recorded By, (t) = Taken By, (c) = Cosigned By    Initials Name Provider Type    Esther Walton PTA Physical Therapy Assistant        Therapy Charges for Today     Code Description Service Date Service Provider Modifiers Qty    [id number]  PT THER PROC EA 15 MIN 4/15/2019 Esther Haley PTA GP 2          PT G-Codes  Outcome Measure Options: AM-PAC 6 Clicks Basic Mobility (PT)  AM-PAC 6 Clicks Score: 16  Score: 15    Esther Haley PTA  4/15/2019

## 2019-04-15 NOTE — PLAN OF CARE
Problem: Patient Care Overview  Goal: Plan of Care Review  Outcome: Ongoing (interventions implemented as appropriate)   04/15/19 2328   Coping/Psychosocial   Plan of Care Reviewed With patient   Plan of Care Review   Progress improving   OTHER   Outcome Summary pt tolerated treatment with no complaints. Pt able to transfer sit to/from stand with CGA, standard walker. Pt ambulated 50 feet with standard walker, CGA. Pt able to stand for more than 5 minutes to get brief donned and doffed. Pt is progressing well with PT.

## 2019-04-15 NOTE — PROGRESS NOTES
Continued Stay Note  [facility]     Patient Name: Rose Cheema  MRN: [id number]  Today's Date: 4/15/2019    Admit Date: 4/10/2019    Discharge Plan     Row Name 04/15/19 1359       Plan    Plan  Plan [facility] for skilled care.   KYLEIGH Grace, RN    Patient/Family in Agreement with Plan  yes    Plan Comments  Spoke to pt at bedside.  Pt states MD suggests SNF.  Pt provided a SNF List and [facility].  Pt requested referral to [facility].  Kaila ( [phone]) called to follow.  Plan [facility] for skilled care.  KYLEIGH Grace RN        Discharge Codes    No documentation.             Patti Grace, RN

## 2019-04-16 VITALS
HEIGHT: 60 IN | RESPIRATION RATE: 18 BRPM | WEIGHT: 254.5 LBS | DIASTOLIC BLOOD PRESSURE: 73 MMHG | OXYGEN SATURATION: 92 % | TEMPERATURE: 97.2 F | BODY MASS INDEX: 49.97 KG/M2 | SYSTOLIC BLOOD PRESSURE: 138 MMHG | HEART RATE: 81 BPM

## 2019-04-16 LAB
ANION GAP SERPL CALCULATED.3IONS-SCNC: 12 MMOL/L
BUN BLD-MCNC: 16 MG/DL (ref 8–23)
BUN/CREAT SERPL: 19 (ref 7–25)
CALCIUM SPEC-SCNC: 8.2 MG/DL (ref 8.6–10.5)
CHLORIDE SERPL-SCNC: 100 MMOL/L (ref 98–107)
CO2 SERPL-SCNC: 30 MMOL/L (ref 22–29)
CREAT BLD-MCNC: 0.84 MG/DL (ref 0.57–1)
DEPRECATED RDW RBC AUTO: 49.1 FL (ref 37–54)
ERYTHROCYTE [DISTWIDTH] IN BLOOD BY AUTOMATED COUNT: 13.7 % (ref 12.3–15.4)
GFR SERPL CREATININE-BSD FRML MDRD: 66 ML/MIN/1.73
GLUCOSE BLD-MCNC: 87 MG/DL (ref 65–99)
HCT VFR BLD AUTO: 36.6 % (ref 34–46.6)
HGB BLD-MCNC: 11.5 G/DL (ref 12–15.9)
MCH RBC QN AUTO: 30.6 PG (ref 26.6–33)
MCHC RBC AUTO-ENTMCNC: 31.4 G/DL (ref 31.5–35.7)
MCV RBC AUTO: 97.3 FL (ref 79–97)
PLATELET # BLD AUTO: 192 10*3/MM3 (ref 140–450)
PMV BLD AUTO: 11.1 FL (ref 6–12)
POTASSIUM BLD-SCNC: 3.3 MMOL/L (ref 3.5–5.2)
RBC # BLD AUTO: 3.76 10*6/MM3 (ref 3.77–5.28)
SODIUM BLD-SCNC: 142 MMOL/L (ref 136–145)
WBC NRBC COR # BLD: 5.98 10*3/MM3 (ref 3.4–10.8)

## 2019-04-16 PROCEDURE — 80048 BASIC METABOLIC PNL TOTAL CA: CPT | Performed by: INTERNAL MEDICINE

## 2019-04-16 PROCEDURE — 97110 THERAPEUTIC EXERCISES: CPT

## 2019-04-16 PROCEDURE — 25010000002 ENOXAPARIN PER 10 MG: Performed by: INTERNAL MEDICINE

## 2019-04-16 PROCEDURE — 85027 COMPLETE CBC AUTOMATED: CPT | Performed by: INTERNAL MEDICINE

## 2019-04-16 PROCEDURE — 94799 UNLISTED PULMONARY SVC/PX: CPT

## 2019-04-16 PROCEDURE — 36415 COLL VENOUS BLD VENIPUNCTURE: CPT | Performed by: INTERNAL MEDICINE

## 2019-04-16 RX ORDER — ALBUTEROL SULFATE 2.5 MG/3ML
2.5 SOLUTION RESPIRATORY (INHALATION) 4 TIMES DAILY PRN
Qty: 375 VIAL | Refills: 0
Start: 2019-04-16

## 2019-04-16 RX ORDER — AMOXICILLIN AND CLAVULANATE POTASSIUM 875; 125 MG/1; MG/1
1 TABLET, FILM COATED ORAL EVERY 12 HOURS SCHEDULED
Qty: 4 TABLET | Refills: 0 | Status: SHIPPED | OUTPATIENT
Start: 2019-04-16 | End: 2019-04-18

## 2019-04-16 RX ORDER — HYDROCODONE BITARTRATE AND ACETAMINOPHEN 7.5; 325 MG/1; MG/1
1 TABLET ORAL EVERY 6 HOURS PRN
Qty: 10 TABLET | Refills: 0 | Status: SHIPPED | OUTPATIENT
Start: 2019-04-16 | End: 2020-05-19 | Stop reason: HOSPADM

## 2019-04-16 RX ORDER — FENTANYL 25 UG/H
1 PATCH TRANSDERMAL
Qty: 3 PATCH | Refills: 0 | Status: ON HOLD | OUTPATIENT
Start: 2019-04-16 | End: 2020-05-19 | Stop reason: SDUPTHER

## 2019-04-16 RX ADMIN — LEVOTHYROXINE SODIUM 50 MCG: 50 TABLET ORAL at 08:51

## 2019-04-16 RX ADMIN — HYDROCODONE BITARTRATE AND ACETAMINOPHEN 1 TABLET: 5; 325 TABLET ORAL at 16:52

## 2019-04-16 RX ADMIN — OXYBUTYNIN 10 MG: 10 TABLET, FILM COATED, EXTENDED RELEASE ORAL at 08:51

## 2019-04-16 RX ADMIN — PANTOPRAZOLE SODIUM 40 MG: 40 TABLET, DELAYED RELEASE ORAL at 08:51

## 2019-04-16 RX ADMIN — NYSTATIN 500000 UNITS: 100000 SUSPENSION ORAL at 12:23

## 2019-04-16 RX ADMIN — ASPIRIN 81 MG: 81 TABLET, CHEWABLE ORAL at 08:51

## 2019-04-16 RX ADMIN — PREGABALIN 150 MG: 75 CAPSULE ORAL at 08:51

## 2019-04-16 RX ADMIN — AMOXICILLIN AND CLAVULANATE POTASSIUM 1 TABLET: 875; 125 TABLET, FILM COATED ORAL at 08:51

## 2019-04-16 RX ADMIN — SODIUM CHLORIDE, PRESERVATIVE FREE 3 ML: 5 INJECTION INTRAVENOUS at 08:59

## 2019-04-16 RX ADMIN — TORSEMIDE 50 MG: 20 TABLET ORAL at 08:51

## 2019-04-16 RX ADMIN — NYSTATIN 500000 UNITS: 100000 SUSPENSION ORAL at 08:51

## 2019-04-16 RX ADMIN — BUSPIRONE HYDROCHLORIDE 30 MG: 15 TABLET ORAL at 08:51

## 2019-04-16 RX ADMIN — FLUOXETINE HYDROCHLORIDE 60 MG: 20 CAPSULE ORAL at 08:51

## 2019-04-16 RX ADMIN — ENOXAPARIN SODIUM 40 MG: 40 INJECTION SUBCUTANEOUS at 06:20

## 2019-04-16 RX ADMIN — NYSTATIN: 100000 POWDER TOPICAL at 08:51

## 2019-04-16 RX ADMIN — ARFORMOTEROL TARTRATE 15 MCG: 15 SOLUTION RESPIRATORY (INHALATION) at 11:27

## 2019-04-16 RX ADMIN — HYDROCODONE BITARTRATE AND ACETAMINOPHEN 1 TABLET: 5; 325 TABLET ORAL at 09:23

## 2019-04-16 NOTE — PLAN OF CARE
Problem: Patient Care Overview  Goal: Plan of Care Review   04/16/19 1102   Coping/Psychosocial   Plan of Care Reviewed With patient   OTHER   Outcome Summary Great progress towards goals during PT today. Able to ambulate 145' w/ contact assist using RW, still limited by fatigue and weakness. Reviewed activity/walking recommendations whie inpatient. Currently still recommend DC to SNU.

## 2019-04-16 NOTE — DISCHARGE SUMMARY
PHYSICIAN DISCHARGE SUMMARY                                                                          [facility]    Patient Identification:  Name: Rose Cheema  Age: 78 y.o.  Sex: female  :  1940  MRN: [id number]  Primary Care Physician: Cheng Guevara MD    Admit date: 4/10/2019  Discharge date and time: 2019  Discharged Condition: fair    Discharge Diagnoses:  Pneumonia of right lower lobe due to infectious organism (CMS/HCC)   Acute hypoxemic respiratory failure  Healthcare associated pneumonia  Lactic acidosis and sepsis  URI with rhinovirus positive on respiratory viral panel  COPD with mild exacerbation  Pulmonary hypertension  Chronic CHF and mitral valve stenosis  Anxiety with depression and fibromyalgia  Chronic opioid use        Hospital Course: Rose Cheema presented to [facility] with shortness of breath and cough.  Her saturation in the emergency room was 80% on room air she required 4 L to maintain saturation.  She was admitted to telemetry and treated with antibiotics and bronchodilators.  She was found to have rhinovirus positive respiratory viral panel.  She had viral sepsis and lactic acidosis.  She improved and her clinical course and was transitioned over to antibiotics by mouth.  She will be discharged to rehab facility for ongoing conditioning.    Consults:   IP CONSULT TO PULMONOLOGY  IP CONSULT TO CASE MANAGEMENT   IP CONSULT TO NUTRITION SERVICES  IP CONSULT TO SPIRITUAL CARE    Significant Diagnostic Studies:   Results from last 7 days   Lab Units 19  0626 04/15/19  0716 19  0815 19  0712 19  0717 19  0646 04/10/19  1249   WBC 10*3/mm3 5.98 5.99 5.57 5.66 8.80 16.48* 18.52*   HEMOGLOBIN g/dL 11.5* 11.1* 11.9* 10.5* 10.3* 9.9* 11.6*   PLATELETS 10*3/mm3 192 191 171 145 131* 129* 150     Results from last 7 days   Lab Units 19  0626  04/15/19  0716 04/14/19  0815 04/13/19  0712 04/12/19  0717 04/11/19  2211 04/11/19  0646 04/10/19  1249   SODIUM mmol/L 142 143 143 143 145  --  138 138   POTASSIUM mmol/L 3.3* 3.8 4.0 4.2 4.6 4.2 3.2* 4.4   CHLORIDE mmol/L 100 102 103 108* 112*  --  103 102   CO2 mmol/L 30.0* 28.9 27.7 26.3 25.5  --  25.5 17.7*   BUN mg/dL 16 13 11 8 11  --  15 13   CREATININE mg/dL 0.84 0.98 0.86 0.67 0.70  --  0.70 0.91   GLUCOSE mg/dL 87 86 83 83 89  --  103* 140*   CALCIUM mg/dL 8.2* 8.3* 8.5* 8.1* 8.1*  --  8.0* 9.1   Estimated Creatinine Clearance: 65.9 mL/min (by C-G formula based on SCr of 0.84 mg/dL).    Discharge Exam:  Temp:  [97.5 °F (36.4 °C)-98.1 °F (36.7 °C)] 97.9 °F (36.6 °C)  Heart Rate:  [67-94] 83  Resp:  [18-20] 20  BP: (113-148)/(48-81) 138/72  GENERAL:  nontoxic, Alert  EYES: EOMI, nonicteric sclera  NECK: trach midline, no thyromegaly noted no mass  HEAD/THROAT:  NCAT, OP clear no JVD MMM  PULMONARY:    CTAB, no wheeze, no crackles, no rhonchi, symmetric air entry  CARDIAC:  RRR no MRG, S1 S2  ABD: no HSM, no mass non tender BS+ obese  EXT: no c/c/e, pulses symmetric 2+ bilaterally  NEURO:  CNs II-XII intact, sensation intact  SKIN: warm and dry  PSYCH: appropriate mood, oriented  LYMPH: no cervical LAD         Disposition:  Skilled nursing facility    Patient Instructions:   See emar    Medication Reconciliation: Please see electronically completed Med Rec.    Total time spent discharging patient including evaluation, medication reconciliation, arranging follow up, and post hospitalization instructions and education total time exceeds 30 minutes.    Signed:  Saturnino Jj MD  4/16/2019  10:40 AM

## 2019-04-16 NOTE — PLAN OF CARE
Problem: Patient Care Overview  Goal: Plan of Care Review  Outcome: Ongoing (interventions implemented as appropriate)   04/16/19 0503   Coping/Psychosocial   Plan of Care Reviewed With patient   Plan of Care Review   Progress no change   OTHER   Outcome Summary pt resting well thru the night, sats staying above 90's on room air, no s/sx of discomfort, received x1 dose of prn pain med for c/o generalized pain. nad. nsr on tele. vss, nad, resp e/u. lungs sounds diminished but clear.      Goal: Individualization and Mutuality  Outcome: Ongoing (interventions implemented as appropriate)    Goal: Interprofessional Rounds/Family Conf  Outcome: Ongoing (interventions implemented as appropriate)      Problem: Fall Risk (Adult)  Goal: Absence of Fall  Outcome: Ongoing (interventions implemented as appropriate)      Problem: Skin Injury Risk (Adult)  Goal: Skin Health and Integrity  Outcome: Ongoing (interventions implemented as appropriate)      Problem: Pain, Chronic (Adult)  Goal: Acceptable Pain/Comfort Level and Functional Ability  Outcome: Ongoing (interventions implemented as appropriate)      Problem: Pneumonia (Adult)  Goal: Signs and Symptoms of Listed Potential Problems Will be Absent, Minimized or Managed (Pneumonia)  Outcome: Ongoing (interventions implemented as appropriate)

## 2019-04-16 NOTE — THERAPY TREATMENT NOTE
Acute Care - Physical Therapy Treatment Note  [facility]     Patient Name: Rose Cheema  : 1940  MRN: [id number]  Today's Date: 2019  Onset of Illness/Injury or Date of Surgery: 04/10/19          Admit Date: 4/10/2019    Visit Dx:    ICD-10-CM ICD-9-CM   1. Pneumonia of right lower lobe due to infectious organism (CMS/HCC) J18.1 486   2. Urinary tract infection without hematuria, site unspecified N39.0 599.0   3. Acute respiratory failure with hypoxia (CMS/HCC) J96.01 518.81   4. Sepsis, due to unspecified organism (CMS/HCC) A41.9 038.9     995.91   5. Rhinovirus infection B34.8 079.3     Patient Active Problem List   Diagnosis   • Urinary incontinence   • Urinary frequency   • Generalized abdominal pain   • Pulmonary hypertension    • Benign essential hypertension   • Bilateral lower extremity edema (chronic)   • CAD (coronary artery disease), native coronary artery   • COPD (chronic obstructive pulmonary disease) (CMS/HCC)   • Diastolic dysfunction   • Obesity   • Obstructive sleep apnea   • Secondary pulmonary arterial hypertension (CMS/HCC)   • H/O: hysterectomy   • Fibromyalgia   • Hx SBO   • Hx of cholecystectomy   • Hypoglycemia   • Fall   • Disease of thyroid gland   • Chronic pain syndrome   • Anemia   • Pneumonia of right lower lobe due to infectious organism (CMS/HCC)       Therapy Treatment    Rehabilitation Treatment Summary     Row Name 19 1058             Treatment Time/Intention    Discipline  physical therapist  -AR      Document Type  therapy note (daily note)  -AR      Subjective Information  no complaints  -AR      Mode of Treatment  physical therapy  -AR      Therapy Frequency (PT Clinical Impression)  daily  -AR      Patient Effort  good  -AR      Existing Precautions/Restrictions  fall  -AR      Recorded by [AR] Marixa Jones, PT 19 1102      Row Name 19 1058             Vital Signs    Pre SpO2 (%)  95  -AR      O2 Delivery Pre Treatment  room air  -AR       Intra SpO2 (%)  88  -AR      O2 Delivery Intra Treatment  room air  -AR      Post SpO2 (%)  92  -AR      O2 Delivery Post Treatment  room air  -AR      Recorded by [AR] Marixa Jones, PT 04/16/19 1102      Row Name 04/16/19 1058             Cognitive Assessment/Intervention- PT/OT    Orientation Status (Cognition)  oriented x 3  -AR      Follows Commands (Cognition)  WNL  -AR      Safety Deficit (Cognitive)  mild deficit  -AR      Recorded by [AR] Marixa Jones, PT 04/16/19 1102      Row Name 04/16/19 1058             Bed Mobility Assessment/Treatment    Comment (Bed Mobility)  not tested, up in chair  -AR      Recorded by [AR] Marixa Jones, PT 04/16/19 1102      Row Name 04/16/19 1058             Transfer Assessment/Treatment    Transfer Assessment/Treatment  sit-stand transfer;stand-sit transfer  -AR      Recorded by [AR] Marixa Jones, PT 04/16/19 1102      Row Name 04/16/19 1058             Sit-Stand Transfer    Sit-Stand [place] (Transfers)  contact guard  -AR      Assistive Device (Sit-Stand Transfers)  walker, front-wheeled  -AR      Recorded by [AR] Marixa Jones, PT 04/16/19 1102      Row Name 04/16/19 1058             Stand-Sit Transfer    Stand-Sit [place] (Transfers)  contact guard  -AR      Assistive Device (Stand-Sit Transfers)  walker, front-wheeled  -AR      Recorded by [AR] Marixa Jones, PT 04/16/19 1102      Row Name 04/16/19 1058             Gait/Stairs Assessment/Training    Gait/Stairs Assessment/Training  distance ambulated;gait pattern  -AR      [place] Level (Gait)  contact guard  -AR      Assistive Device (Gait)  walker, front-wheeled  -AR      Distance in Feet (Gait)  145  -AR      Pattern (Gait)  step-through  -AR      Deviations/Abnormal Patterns (Gait)  gait speed decreased;festinating/shuffling  -AR      Bilateral Gait Deviations  forward flexed posture  -AR      Comment (Gait/Stairs)  limited by fatigue  -AR      Recorded by [AR] Marixa Jones, PT  04/16/19 1102      Row Name 04/16/19 1058             Positioning and Restraints    Pre-Treatment Position  sitting in chair/recliner  -AR      Post Treatment Position  chair  -AR      In Chair  reclined;sitting;call light within reach;encouraged to call for assist;exit alarm on  -AR      Recorded by [AR] Marixa Jones, PT 04/16/19 1102      Row Name 04/16/19 1058             Pain Scale: Numbers Pre/Post-Treatment    Pain Scale: Numbers, Pretreatment  0/10 - no pain  -AR      Pain Scale: Numbers, Post-Treatment  0/10 - no pain  -AR      Pain Intervention(s)  Repositioned  -AR      Recorded by [AR] Marixa Jones, PT 04/16/19 1102      Row Name 04/16/19 1058             Outcome Summary/Treatment Plan (PT)    Anticipated Discharge Disposition (PT)  skilled nursing facility Currently recommend DC to SNu  -AR      Recorded by [AR] Marixa Jones, PT 04/16/19 1102        User Key  (r) = Recorded By, (t) = Taken By, (c) = Cosigned By    Initials Name Effective Dates Discipline    AR Marixa Jones, PT 04/03/18 -  PT                   Physical Therapy Education     Title: PT OT SLP Therapies (In Progress)     Topic: Physical Therapy (In Progress)     Point: Mobility training (In Progress)     Learning Progress Summary           Patient Acceptance, E, NR by AR at 4/16/2019 11:02 AM    Acceptance, E, VU by DANA at 4/15/2019  2:41 PM    Acceptance, E,D, VU,NR by  at 4/15/2019 11:54 AM    Acceptance, E, VU by GERA at 4/14/2019  1:22 PM    Acceptance, E,TB,D, NR by  at 4/13/2019 12:50 PM    Acceptance, E, NR by AR at 4/12/2019  4:14 PM    Acceptance, E, NR by AR at 4/11/2019  1:49 PM   Family Acceptance, E, VU by  at 4/15/2019  2:41 PM                   Point: Home exercise program (In Progress)     Learning Progress Summary           Patient Acceptance, E, NR by AR at 4/16/2019 11:02 AM    Acceptance, E, VU by  at 4/15/2019  2:41 PM    Acceptance, E,D, VU,NR by  at 4/15/2019 11:54 AM    Acceptance, E, VU by GERA  at 4/14/2019  1:22 PM    Acceptance, E, NR by AR at 4/12/2019  4:14 PM    Acceptance, E, NR by AR at 4/11/2019  1:49 PM   Family Acceptance, E, VU by  at 4/15/2019  2:41 PM                   Point: Body mechanics (In Progress)     Learning Progress Summary           Patient Acceptance, E, NR by AR at 4/16/2019 11:02 AM    Acceptance, E, VU by  at 4/15/2019  2:41 PM    Acceptance, E,D, VU,NR by  at 4/15/2019 11:54 AM    Acceptance, E,TB,D, NR by  at 4/13/2019 12:50 PM    Acceptance, E, NR by AR at 4/12/2019  4:14 PM    Acceptance, E, NR by AR at 4/11/2019  1:49 PM   Family Acceptance, E, VU by  at 4/15/2019  2:41 PM                   Point: Precautions (In Progress)     Learning Progress Summary           Patient Acceptance, E, NR by AR at 4/16/2019 11:02 AM    Acceptance, E, VU by  at 4/15/2019  2:41 PM    Acceptance, E,D, VU,NR by  at 4/15/2019 11:54 AM    Acceptance, E, NR by AR at 4/12/2019  4:14 PM    Acceptance, E, NR by AR at 4/11/2019  1:49 PM   Family Acceptance, E, VU by  at 4/15/2019  2:41 PM                               User Key     Initials Effective Dates Name Provider Type Discipline     04/03/18 -  Elida Chinchilla, PT Physical Therapist PT     04/03/18 -  Kelly Sidhu, PT Physical Therapist PT    AR 04/03/18 -  Marixa Jones, PT Physical Therapist PT     08/19/18 -  Esther Haley, PTA Physical Therapy Assistant PT     02/15/18 -  Rose Huber, RN Registered Nurse Nurse                PT Recommendation and Plan  Anticipated Discharge Disposition (PT): skilled nursing facility(Currently recommend DC to SNu)  Planned Therapy Interventions (PT Eval): balance training, bed mobility training, gait training, home exercise program, patient/family education, ROM (range of motion), stair training, strengthening, transfer training  Therapy Frequency (PT Clinical Impression): daily  Outcome Summary/Treatment Plan (PT)  Anticipated Discharge Disposition (PT): skilled nursing  facility(Currently recommend DC to SNu)  Plan of Care Reviewed With: patient  Outcome Summary: Great progress towards goals during PT today.  Able to ambulate 145' w/ contact assist using RW, still limited by fatigue and weakness.  Reviewed activity/walking recommendations whie inpatient. Currently still recommend DC to SNU.    Outcome Measures     Row Name 04/16/19 1100 04/15/19 1100 04/14/19 1324       How much help from another person do you currently need...    Turning from your back to your side while in flat bed without using bedrails?  3  -AR  3  -EH  3  -KH    Moving from lying on back to sitting on the side of a flat bed without bedrails?  3  -AR  3  -EH  3  -KH    Moving to and from a bed to a chair (including a wheelchair)?  3  -AR  3  -EH  3  -KH    Standing up from a chair using your arms (e.g., wheelchair, bedside chair)?  3  -AR  3  -EH  3  -KH    Climbing 3-5 steps with a railing?  2  -AR  1  -EH  1  -KH    To walk in hospital room?  3  -AR  3  -EH  3  -KH    AM-PAC 6 Clicks Score  17  -AR  16  -EH  16  -KH       Functional Assessment    Outcome Measure Options  --  --  AM-PAC 6 Clicks Basic Mobility (PT)  -KH    Row Name 04/13/19 1200             How much help from another person do you currently need...    Turning from your back to your side while in flat bed without using bedrails?  3  -EE      Moving from lying on back to sitting on the side of a flat bed without bedrails?  3  -EE      Moving to and from a bed to a chair (including a wheelchair)?  3  -EE      Standing up from a chair using your arms (e.g., wheelchair, bedside chair)?  3  -EE      Climbing 3-5 steps with a railing?  1  -EE      To walk in hospital room?  2  -EE      AM-PAC 6 Clicks Score  15  -EE         Functional Assessment    Outcome Measure Options  AM-PAC 6 Clicks Basic Mobility (PT)  -EE        User Key  (r) = Recorded By, (t) = Taken By, (c) = Cosigned By    Initials Name Provider Type    Elida Schulte, PT Physical  Therapist    Kelly Bansal, PT Physical Therapist    Marixa Caro, PT Physical Therapist    Esther Walton, PTA Physical Therapy Assistant         Time Calculation:   PT Charges     Row Name 04/16/19 1104             Time Calculation    Start Time  1049  -AR      Stop Time  1105  -AR      Time Calculation (min)  16 min  -AR      PT Received On  04/16/19  -AR      PT - Next Appointment  04/17/19  -AR        User Key  (r) = Recorded By, (t) = Taken By, (c) = Cosigned By    Initials Name Provider Type    Marixa Caro, PT Physical Therapist        Therapy Charges for Today     Code Description Service Date Service Provider Modifiers Qty    83356226557 HC PT THER PROC EA 15 MIN 4/16/2019 Marixa Jones, PT GP 1          PT G-Codes  Outcome Measure Options: AM-PAC 6 Clicks Basic Mobility (PT)  AM-PAC 6 Clicks Score: 17  Score: 15    Marixa Jones PT  4/16/2019

## 2019-04-16 NOTE — THERAPY TREATMENT NOTE
Acute Care - Occupational Therapy Treatment Note  [facility]     Patient Name: Rose Cheema  : 1940  MRN: [id number]  Today's Date: 2019  Onset of Illness/Injury or Date of Surgery: 04/10/19  Date of Referral to OT: 19       Admit Date: 4/10/2019       ICD-10-CM ICD-9-CM   1. Pneumonia of right lower lobe due to infectious organism (CMS/[facility]) J18.1 486   2. Urinary tract infection without hematuria, site unspecified N39.0 599.0   3. Acute respiratory failure with hypoxia (CMS/HCC) J96.01 518.81   4. Sepsis, due to unspecified organism (CMS/[facility]) A41.9 038.9     995.91   5. Rhinovirus infection B34.8 079.3     Patient Active Problem List   Diagnosis   • Urinary incontinence   • Urinary frequency   • Generalized abdominal pain   • Pulmonary hypertension    • Benign essential hypertension   • Bilateral lower extremity edema (chronic)   • CAD (coronary artery disease), native coronary artery   • COPD (chronic obstructive pulmonary disease) (CMS/[facility])   • Diastolic dysfunction   • Obesity   • Obstructive sleep apnea   • Secondary pulmonary arterial hypertension (CMS/HCC)   • H/O: hysterectomy   • Fibromyalgia   • Hx SBO   • Hx of cholecystectomy   • Hypoglycemia   • Fall   • Disease of thyroid gland   • Chronic pain syndrome   • Anemia   • Pneumonia of right lower lobe due to infectious organism (CMS/HCC)     Past Medical History:   Diagnosis Date   • Anemia    • Anxiety    • Arthritis    • CHF (congestive heart failure) (CMS/HCC)    • Coronary artery disease    • Depression    • Disease of thyroid gland    • Fibromyalgia    • GERD (gastroesophageal reflux disease)    • Hypertension    • Moderate tricuspid valve stenosis    • Osteoporosis    • Peripheral neuropathy    • Pulmonary hypertension (CMS/HCC)    • SBO (small bowel obstruction) (CMS/HCC)    • Stenosis of mitral valve      Past Surgical History:   Procedure Laterality Date   • BILATERAL OOPHORECTOMY     • CARDIAC CATHETERIZATION     •  "CHOLECYSTECTOMY     • ERCP N/A 2/15/2019    Procedure: ENDOSCOPIC RETROGRADE CHOLANGIOPANCREATOGRAPHY WITH PARTIAL CHOLANGIOGRAM;  Surgeon: Gerald Herr MD;  Location: [facility] ENDOSCOPY;  Service: Gastroenterology   • EXPLORATORY LAPAROTOMY     • GASTRIC BYPASS     • HERNIA REPAIR      inguinal and ventral   • HYSTERECTOMY     • OVARIAN CYST REMOVAL         Therapy Treatment    Rehabilitation Treatment Summary     Row Name 04/16/19 1244 04/16/19 1058          Treatment Time/Intention    Discipline  occupational therapist  -SK  physical therapist  -AR     Document Type  therapy note (daily note)  -SK  therapy note (daily note)  -AR     Subjective Information  no complaints  -SK  no complaints  -AR     Mode of Treatment  occupational therapy  -SK  physical therapy  -AR     Patient/Family Observations  \"I feel much stronger today.\"  -SK  --     Care Plan Review  care plan/treatment goals reviewed;patient/other agree to care plan  -SK  --     Therapy Frequency (PT Clinical Impression)  --  daily  -AR     Therapy Frequency (OT Eval)  5 times/wk  -SK  --     Patient Effort  good  -SK  good  -AR     Comment  Pt sitting up in chair with son in room, no signs of distress noted   -SK  --     Existing Precautions/Restrictions  fall  -SK  fall  -AR     Patient Response to Treatment  Pt sitting up in chair and willing to work with OT today, pt demo increased overall strength and endurance, tolerating BUE AROM ex, working on core strength and functional standing with only CGA with standard wx in front of her.    -SK  --     Recorded by [SK] Jennifer Hoover, OT 04/16/19 1251 [AR] Marixa Jones, PT 04/16/19 1102     Row Name 04/16/19 1244 04/16/19 1058          Vital Signs    Pre SpO2 (%)  95  -SK  95  -AR     O2 Delivery Pre Treatment  --  room air  -AR     Intra SpO2 (%)  89  -SK  88  -AR     O2 Delivery Intra Treatment  --  room air  -AR     Post SpO2 (%)  93  -SK  92  -AR     O2 Delivery Post Treatment  --  room air  " -AR     Recorded by [SK] Jennifer Hoover, OT 04/16/19 1251 [AR] Marixa Jones, PT 04/16/19 1102     Row Name 04/16/19 1244 04/16/19 1058          Cognitive Assessment/Intervention- PT/OT    Affect/Mental Status (Cognitive)  WNL  -SK  --     Orientation Status (Cognition)  oriented x 4  -SK  oriented x 3  -AR     Follows Commands (Cognition)  WNL  -SK  WNL  -AR     Cognitive Function (Cognitive)  WNL  -SK  --     Safety Deficit (Cognitive)  --  mild deficit  -AR     Personal Safety Interventions  gait belt;fall prevention program maintained;nonskid shoes/slippers when out of bed;supervised activity  -SK  --     Recorded by [SK] Jennifer Hoover, OT 04/16/19 1251 [AR] Marixa Jones, PT 04/16/19 1102     Row Name 04/16/19 1244             Safety Issues, Functional Mobility    Impairments Affecting Function (Mobility)  balance;endurance/activity tolerance;strength  -SK      Recorded by [SK] Jennifer Hoover, OT 04/16/19 1251      Row Name 04/16/19 1058             Bed Mobility Assessment/Treatment    Comment (Bed Mobility)  not tested, up in chair  -AR      Recorded by [AR] Marixa Jones, PT 04/16/19 1102      Row Name 04/16/19 1058             Transfer Assessment/Treatment    Transfer Assessment/Treatment  sit-stand transfer;stand-sit transfer  -AR      Recorded by [AR] Marixa Jones, PT 04/16/19 1102      Row Name 04/16/19 1244 04/16/19 1058          Sit-Stand Transfer    Sit-Stand [place] (Transfers)  contact guard  -SK  contact guard  -AR     Assistive Device (Sit-Stand Transfers)  walker, front-wheeled  -SK  walker, front-wheeled  -AR     Recorded by [SK] Jennifer Hoover, OT 04/16/19 1251 [AR] Marixa Jones, PT 04/16/19 1102     Row Name 04/16/19 1244 04/16/19 1058          Stand-Sit Transfer    Stand-Sit [place] (Transfers)  contact guard  -SK  contact guard  -AR     Assistive Device (Stand-Sit Transfers)  walker, front-wheeled  -SK  walker, front-wheeled  -AR     Recorded by [SK] [place]ne[place],  JANET Rodriguez 04/16/19 1251 [AR] Marixa Jones, PT 04/16/19 1102     Row Name 04/16/19 1058             Gait/Stairs Assessment/Training    Gait/Stairs Assessment/Training  distance ambulated;gait pattern  -AR      [place] Level (Gait)  contact guard  -AR      Assistive Device (Gait)  walker, front-wheeled  -AR      Distance in Feet (Gait)  145  -AR      Pattern (Gait)  step-through  -AR      Deviations/Abnormal Patterns (Gait)  gait speed decreased;festinating/shuffling  -AR      Bilateral Gait Deviations  forward flexed posture  -AR      Comment (Gait/Stairs)  limited by fatigue  -AR      Recorded by [AR] Marixa Jones, PT 04/16/19 1102      Row Name 04/16/19 1244             Therapeutic Exercise    Upper Extremity Range of Motion (Therapeutic Exercise)  shoulder flexion/extension, bilateral;shoulder abduction/adduction, bilateral;shoulder horizontal abduction/adduction, bilateral;elbow flexion/extension, bilateral  -SK      Exercise Type (Therapeutic Exercise)  AROM (active range of motion)  -SK      Position (Therapeutic Exercise)  seated  -SK      Sets/Reps (Therapeutic Exercise)  2x5  -SK      Expected Outcome (Therapeutic Exercise)  improve functional tolerance, self-care activity;improve performance, BADLs  -SK      Comment (Therapeutic Exercise)  Demonstrate increaesed strength and endurance this session, also focused on using core muscles to sit up tall   -SK      Recorded by [SK] Jennifer Hoover OT 04/16/19 1251      Row Name 04/16/19 1244             Static Standing Balance    Level of [place] (Supported Standing, Static Balance)  contact guard assist  -SK      Time Able to Maintain Position (Supported Standing, Static Balance)  3 to 4 minutes  -SK      Assistive Device Utilized (Supported Standing, Static Balance)  walker, rolling  -SK      Comment (Supported Standing, Static Balance)  improvements noted with static standing in prep for ADLs   -SK      Recorded by [SK] Jennifer Hoover, OT  04/16/19 1251      Row Name 04/16/19 1244 04/16/19 1058          Positioning and Restraints    Pre-Treatment Position  sitting in chair/recliner  -SK  sitting in chair/recliner  -AR     Post Treatment Position  chair  -SK  chair  -AR     In Chair  call light within reach;encouraged to call for assist;exit alarm on;with family/caregiver  -SK  reclined;sitting;call light within reach;encouraged to call for assist;exit alarm on  -AR     Recorded by [SK] Jennifer Hoover, OT 04/16/19 1251 [AR] Marixa Jones, PT 04/16/19 1102     Row Name 04/16/19 1244 04/16/19 1058          Pain Scale: Numbers Pre/Post-Treatment    Pain Scale: Numbers, Pretreatment  0/10 - no pain  -SK  0/10 - no pain  -AR     Pain Scale: Numbers, Post-Treatment  0/10 - no pain  -SK  0/10 - no pain  -AR     Pain Intervention(s)  --  Repositioned  -AR     Recorded by [SK] Jennifer Hoover, OT 04/16/19 1251 [AR] Marixa Jones, PT 04/16/19 1102     Row Name 04/16/19 1244             Outcome Summary/Treatment Plan (OT)    Daily Summary of Progress (OT)  progress toward functional goals is good  -SK      Barriers to Overall Progress (OT)  none  -SK      Plan for Continued Treatment (OT)  Pt sitting up in chair and willing to work with OT today, pt demo increased overall strength and endurance, tolerating BUE AROM ex, working on core strength and functional standing with only CGA with standard wx in front of her.  Pt will cont to benefit from skilled OT and recommend SNF at d/c to increase independence, activity tolerance and endurance upon returning home  -SK      Anticipated Discharge Disposition (OT)  skilled nursing facility  -SK      Recorded by [SK] Jennifer Hoover, OT 04/16/19 1251      Row Name 04/16/19 1058             Outcome Summary/Treatment Plan (PT)    Anticipated Discharge Disposition (PT)  skilled nursing facility Currently recommend DC to SNu  -AR      Recorded by [AR] Marixa Jones, PT 04/16/19 1102        User Key  (r) = Recorded By,  (t) = Taken By, (c) = Cosigned By    Initials Name Effective Dates Discipline    AR Marixa Jones, PT 04/03/18 -  PT    Jennifer Bob OT 02/25/19 -  OT             Occupational Therapy Education     Title: PT OT SLP Therapies (In Progress)     Topic: Occupational Therapy (Done)     Point: ADL training (Done)     Description: Instruct learner(s) on proper safety adaptation and remediation techniques during self care or transfers.   Instruct in proper use of assistive devices.    Learning Progress Summary           Patient Acceptance, E, VU by  at 4/15/2019  2:41 PM    Acceptance, E, VU by SK at 4/12/2019 12:44 PM    Comment:  educated pt on ways to perform dressing and conserve energy with activity   Family Acceptance, E, VU by  at 4/15/2019  2:41 PM                   Point: Home exercise program (Done)     Description: Instruct learner(s) on appropriate technique for monitoring, assisting and/or progressing therapeutic exercises/activities.    Learning Progress Summary           Patient Acceptance, E, VU by  at 4/15/2019  2:41 PM   Family Acceptance, E, VU by  at 4/15/2019  2:41 PM                   Point: Precautions (Done)     Description: Instruct learner(s) on prescribed precautions during self-care and functional transfers.    Learning Progress Summary           Patient Acceptance, E, VU by  at 4/15/2019  2:41 PM   Family Acceptance, E, VU by  at 4/15/2019  2:41 PM                   Point: Body mechanics (Done)     Description: Instruct learner(s) on proper positioning and spine alignment during self-care, functional mobility activities and/or exercises.    Learning Progress Summary           Patient Acceptance, E, VU by  at 4/15/2019  2:41 PM   Family Acceptance, E, VU by  at 4/15/2019  2:41 PM                               User Key     Initials Effective Dates Name Provider Type Discipline     02/15/18 -  Rose Huber, RN Registered Nurse Nurse    SK 02/25/19 -  Jennifer Hoover OT  Occupational Therapist OT                OT Recommendation and Plan  Outcome Summary/Treatment Plan (OT)  Daily Summary of Progress (OT): progress toward functional goals is good  Barriers to Overall Progress (OT): none  Plan for Continued Treatment (OT): Pt sitting up in chair and willing to work with OT today, pt demo increased overall strength and endurance, tolerating BUE AROM ex, working on core strength and functional standing with only CGA with standard wx in front of her.  Pt will cont to benefit from skilled OT and recommend SNF at d/c to increase independence, activity tolerance and endurance upon returning home  Anticipated Discharge Disposition (OT): skilled nursing facility  Planned Therapy Interventions (OT Eval): activity tolerance training, BADL retraining, functional balance retraining, strengthening exercise, transfer/mobility retraining  Therapy Frequency (OT Eval): 5 times/wk  Daily Summary of Progress (OT): progress toward functional goals is good  Plan of Care Review  Plan of Care Reviewed With: patient  Plan of Care Reviewed With: patient  Outcome Summary: Pt sitting up in chair and willing to work with OT today, pt demo increased overall strength and endurance, tolerating BUE AROM ex, working on core strength and functional standing with only CGA with standard wx in front of her.  Pt will cont to benefit from skilled OT and recommend SNF at d/c to increase independence, activity tolerance and endurance upon returning home.    Outcome Measures     Row Name 04/16/19 1200 04/16/19 1100 04/15/19 1100       How much help from another person do you currently need...    Turning from your back to your side while in flat bed without using bedrails?  --  3  -AR  3  -EH    Moving from lying on back to sitting on the side of a flat bed without bedrails?  --  3  -AR  3  -EH    Moving to and from a bed to a chair (including a wheelchair)?  --  3  -AR  3  -EH    Standing up from a chair using your arms  (e.g., wheelchair, bedside chair)?  --  3  -AR  3  -EH    Climbing 3-5 steps with a railing?  --  2  -AR  1  -EH    To walk in hospital room?  --  3  -AR  3  -EH    AM-PAC 6 Clicks Score  --  17  -AR  16  -EH       How much help from another is currently needed...    Putting on and taking off regular lower body clothing?  2  -SK  --  --    Bathing (including washing, rinsing, and drying)  3  -SK  --  --    Toileting (which includes using toilet bed pan or urinal)  3  -SK  --  --    Putting on and taking off regular upper body clothing  3  -SK  --  --    Taking care of personal grooming (such as brushing teeth)  3  -SK  --  --    Eating meals  4  -SK  --  --    Score  18  -SK  --  --    Row Name 04/14/19 1324             How much help from another person do you currently need...    Turning from your back to your side while in flat bed without using bedrails?  3  -KH      Moving from lying on back to sitting on the side of a flat bed without bedrails?  3  -KH      Moving to and from a bed to a chair (including a wheelchair)?  3  -KH      Standing up from a chair using your arms (e.g., wheelchair, bedside chair)?  3  -KH      Climbing 3-5 steps with a railing?  1  -KH      To walk in hospital room?  3  -KH      AM-PAC 6 Clicks Score  16  -KH         Functional Assessment    Outcome Measure Options  AM-PAC 6 Clicks Basic Mobility (PT)  -        User Key  (r) = Recorded By, (t) = Taken By, (c) = Cosigned By    Initials Name Provider Type     Kelly Sidhu, PT Physical Therapist    Mraixa Caro, PT Physical Therapist    EH Esther Haley, PTA Physical Therapy Assistant    SK Jennifer Hoover, OT Occupational Therapist           Time Calculation:   Time Calculation- OT     Row Name 04/16/19 1251             Time Calculation- OT    OT Start Time  1103  -SK      OT Stop Time  1121  -SK      OT Time Calculation (min)  18 min  -SK      Total Timed Code Minutes- OT  18 minute(s)  -SK        User Key  (r) = Recorded  By, (t) = Taken By, (c) = Cosigned By    Initials Name Provider Type    Jennifer Bob OT Occupational Therapist        Therapy Charges for Today     Code Description Service Date Service Provider Modifiers Qty    [id number] HC OT THER PROC EA 15 MIN 4/16/2019 Jennifer Hoover OT GO 1               Jennifer Hoover OT  4/16/2019

## 2019-04-16 NOTE — PLAN OF CARE
Problem: Patient Care Overview  Goal: Plan of Care Review   04/16/19 5022   Coping/Psychosocial   Plan of Care Reviewed With patient   Plan of Care Review   Progress improving   OTHER   Outcome Summary Pt sitting up in chair and willing to work with OT today, pt demo increased overall strength and endurance, tolerating BUE AROM ex, working on core strength and functional standing with only CGA with standard wx in front of her. Pt will cont to benefit from skilled OT and recommend SNF at d/c to increase independence, activity tolerance and endurance upon returning home.

## 2019-04-16 NOTE — PROGRESS NOTES
Continued Stay Note  [facility]     Patient Name: Rose Cheema  MRN: [id number]  Today's Date: 4/16/2019    Admit Date: 4/10/2019    Discharge Plan     Row Name 04/16/19 1434       Plan    Plan  Plan [facility] for skilled care.  KYLEIGH Grace RN    Patient/Family in Agreement with Plan  yes    Plan Comments  Call placed to [facility]  ( [phone]) regarding bed availability .   [facility]  states pt can be accepted today or tomorrow.  Plan [facility] for skilled care.  KYLEIGH Grace RN        Discharge Codes    No documentation.             Patti Grace, RN

## 2019-04-16 NOTE — NURSING NOTE
Called report to [facility], spoke with Katiuska GALLOWAY, report given. Prescriptions and AVS printed, copied and put in chart as well as sent with patient to facility. Patients son is providing transportation.

## 2019-04-17 NOTE — PROGRESS NOTES
Case Management Discharge Note    Final Note: Pt discharged to [facility] for skilled care on 4/16.   KYLEIGH Grace RN    Destination - Selection Complete      Service Provider Request Status Selected Services Address Phone Number Fax Number    [facility] Selected Skilled Nursing [address] [id number] [phone]      Durable Medical Equipment      No service has been selected for the patient.      Dialysis/Infusion      No service has been selected for the patient.      Home Medical Care      No service has been selected for the patient.      Therapy      No service has been selected for the patient.      Community Resources      No service has been selected for the patient.        Transportation Services  Other: Other(Private Auto)    Final Discharge Disposition Code: 03 - skilled nursing facility (SNF)

## 2019-05-07 ENCOUNTER — APPOINTMENT (OUTPATIENT)
Dept: GENERAL RADIOLOGY | Facility: HOSPITAL | Age: 79
End: 2019-05-07

## 2019-05-07 ENCOUNTER — HOSPITAL ENCOUNTER (INPATIENT)
Facility: HOSPITAL | Age: 79
LOS: 3 days | Discharge: HOME | End: 2019-05-10
Attending: EMERGENCY MEDICINE | Admitting: HOSPITALIST

## 2019-05-07 DIAGNOSIS — B96.20 E. COLI BACTEREMIA: ICD-10-CM
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
## 2019-05-07 DIAGNOSIS — R78.81 E. COLI BACTEREMIA: ICD-10-CM

## 2019-05-07 DIAGNOSIS — N39.0 ACUTE UTI: Primary | ICD-10-CM

## 2019-05-07 DIAGNOSIS — R53.1 GENERALIZED WEAKNESS: ICD-10-CM

## 2019-05-07 DIAGNOSIS — R50.9 FEVER AND CHILLS: ICD-10-CM

## 2019-05-07 LAB
ALBUMIN SERPL-MCNC: 4 G/DL (ref 3.5–5.2)
ALBUMIN/GLOB SERPL: 1.1 G/DL
ALP SERPL-CCNC: 150 U/L (ref 39–117)
ALT SERPL W P-5'-P-CCNC: 16 U/L (ref 1–33)
ANION GAP SERPL CALCULATED.3IONS-SCNC: 14.4 MMOL/L
AST SERPL-CCNC: 20 U/L (ref 1–32)
B PARAPERT DNA SPEC QL NAA+PROBE: NOT DETECTED
B PERT DNA SPEC QL NAA+PROBE: NOT DETECTED
BACTERIA UR QL AUTO: ABNORMAL /HPF
BASOPHILS # BLD AUTO: 0.03 10*3/MM3 (ref 0–0.2)
BASOPHILS NFR BLD AUTO: 0.3 % (ref 0–1.5)
BILIRUB SERPL-MCNC: 0.6 MG/DL (ref 0.2–1.2)
BILIRUB UR QL STRIP: NEGATIVE
BUN BLD-MCNC: 24 MG/DL (ref 8–23)
BUN/CREAT SERPL: 25 (ref 7–25)
C PNEUM DNA NPH QL NAA+NON-PROBE: NOT DETECTED
CALCIUM SPEC-SCNC: 9.5 MG/DL (ref 8.6–10.5)
CHLORIDE SERPL-SCNC: 95 MMOL/L (ref 98–107)
CLARITY UR: ABNORMAL
CO2 SERPL-SCNC: 27.6 MMOL/L (ref 22–29)
COLOR UR: YELLOW
CREAT BLD-MCNC: 0.96 MG/DL (ref 0.57–1)
D-LACTATE SERPL-SCNC: 1.1 MMOL/L (ref 0.5–2)
DEPRECATED RDW RBC AUTO: 48.8 FL (ref 37–54)
EOSINOPHIL # BLD AUTO: 0.11 10*3/MM3 (ref 0–0.4)
EOSINOPHIL NFR BLD AUTO: 0.9 % (ref 0.3–6.2)
ERYTHROCYTE [DISTWIDTH] IN BLOOD BY AUTOMATED COUNT: 13.7 % (ref 12.3–15.4)
FLUAV H1 2009 PAND RNA NPH QL NAA+PROBE: NOT DETECTED
FLUAV H1 HA GENE NPH QL NAA+PROBE: NOT DETECTED
FLUAV H3 RNA NPH QL NAA+PROBE: NOT DETECTED
FLUAV SUBTYP SPEC NAA+PROBE: NOT DETECTED
FLUBV RNA ISLT QL NAA+PROBE: NOT DETECTED
GFR SERPL CREATININE-BSD FRML MDRD: 57 ML/MIN/1.73
GLOBULIN UR ELPH-MCNC: 3.6 GM/DL
GLUCOSE BLD-MCNC: 106 MG/DL (ref 65–99)
GLUCOSE UR STRIP-MCNC: NEGATIVE MG/DL
HADV DNA SPEC NAA+PROBE: NOT DETECTED
HCOV 229E RNA SPEC QL NAA+PROBE: NOT DETECTED
HCOV HKU1 RNA SPEC QL NAA+PROBE: NOT DETECTED
HCOV NL63 RNA SPEC QL NAA+PROBE: NOT DETECTED
HCOV OC43 RNA SPEC QL NAA+PROBE: NOT DETECTED
HCT VFR BLD AUTO: 41.2 % (ref 34–46.6)
HGB BLD-MCNC: 12.9 G/DL (ref 12–15.9)
HGB UR QL STRIP.AUTO: ABNORMAL
HMPV RNA NPH QL NAA+NON-PROBE: NOT DETECTED
HPIV1 RNA SPEC QL NAA+PROBE: NOT DETECTED
HPIV2 RNA SPEC QL NAA+PROBE: NOT DETECTED
HPIV3 RNA NPH QL NAA+PROBE: NOT DETECTED
HPIV4 P GENE NPH QL NAA+PROBE: NOT DETECTED
HYALINE CASTS UR QL AUTO: ABNORMAL /LPF
IMM GRANULOCYTES # BLD AUTO: 0.08 10*3/MM3 (ref 0–0.05)
IMM GRANULOCYTES NFR BLD AUTO: 0.7 % (ref 0–0.5)
INR PPP: 1.03 (ref 0.9–1.1)
KETONES UR QL STRIP: NEGATIVE
LEUKOCYTE ESTERASE UR QL STRIP.AUTO: ABNORMAL
LIPASE SERPL-CCNC: 12 U/L (ref 13–60)
LYMPHOCYTES # BLD AUTO: 1.53 10*3/MM3 (ref 0.7–3.1)
LYMPHOCYTES NFR BLD AUTO: 12.8 % (ref 19.6–45.3)
M PNEUMO IGG SER IA-ACNC: NOT DETECTED
MCH RBC QN AUTO: 30.4 PG (ref 26.6–33)
MCHC RBC AUTO-ENTMCNC: 31.3 G/DL (ref 31.5–35.7)
MCV RBC AUTO: 97.2 FL (ref 79–97)
MONOCYTES # BLD AUTO: 0.99 10*3/MM3 (ref 0.1–0.9)
MONOCYTES NFR BLD AUTO: 8.3 % (ref 5–12)
NEUTROPHILS # BLD AUTO: 9.17 10*3/MM3 (ref 1.7–7)
NEUTROPHILS NFR BLD AUTO: 77 % (ref 42.7–76)
NITRITE UR QL STRIP: NEGATIVE
NRBC BLD AUTO-RTO: 0.1 /100 WBC (ref 0–0.2)
PH UR STRIP.AUTO: <=5 [PH] (ref 5–8)
PLATELET # BLD AUTO: 192 10*3/MM3 (ref 140–450)
PMV BLD AUTO: 11.4 FL (ref 6–12)
POTASSIUM BLD-SCNC: 3.5 MMOL/L (ref 3.5–5.2)
PROCALCITONIN SERPL-MCNC: 0.44 NG/ML (ref 0.1–0.25)
PROT SERPL-MCNC: 7.6 G/DL (ref 6–8.5)
PROT UR QL STRIP: NEGATIVE
PROTHROMBIN TIME: 13.2 SECONDS (ref 11.7–14.2)
RBC # BLD AUTO: 4.24 10*6/MM3 (ref 3.77–5.28)
RBC # UR: ABNORMAL /HPF
REF LAB TEST METHOD: ABNORMAL
RHINOVIRUS RNA SPEC NAA+PROBE: NOT DETECTED
RSV RNA NPH QL NAA+NON-PROBE: NOT DETECTED
SODIUM BLD-SCNC: 137 MMOL/L (ref 136–145)
SP GR UR STRIP: 1.01 (ref 1–1.03)
SQUAMOUS #/AREA URNS HPF: ABNORMAL /HPF
UROBILINOGEN UR QL STRIP: ABNORMAL
WBC NRBC COR # BLD: 11.91 10*3/MM3 (ref 3.4–10.8)
WBC UR QL AUTO: ABNORMAL /HPF

## 2019-05-07 PROCEDURE — 85025 COMPLETE CBC W/AUTO DIFF WBC: CPT | Performed by: EMERGENCY MEDICINE

## 2019-05-07 PROCEDURE — 87186 SC STD MICRODIL/AGAR DIL: CPT | Performed by: EMERGENCY MEDICINE

## 2019-05-07 PROCEDURE — 87150 DNA/RNA AMPLIFIED PROBE: CPT | Performed by: EMERGENCY MEDICINE

## 2019-05-07 PROCEDURE — 87581 M.PNEUMON DNA AMP PROBE: CPT | Performed by: EMERGENCY MEDICINE

## 2019-05-07 PROCEDURE — 87486 CHLMYD PNEUM DNA AMP PROBE: CPT | Performed by: EMERGENCY MEDICINE

## 2019-05-07 PROCEDURE — 25010000002 PIPERACILLIN SOD-TAZOBACTAM PER 1 G: Performed by: EMERGENCY MEDICINE

## 2019-05-07 PROCEDURE — 80053 COMPREHEN METABOLIC PANEL: CPT | Performed by: EMERGENCY MEDICINE

## 2019-05-07 PROCEDURE — 81001 URINALYSIS AUTO W/SCOPE: CPT | Performed by: EMERGENCY MEDICINE

## 2019-05-07 PROCEDURE — 83690 ASSAY OF LIPASE: CPT | Performed by: EMERGENCY MEDICINE

## 2019-05-07 PROCEDURE — 87798 DETECT AGENT NOS DNA AMP: CPT | Performed by: EMERGENCY MEDICINE

## 2019-05-07 PROCEDURE — 83605 ASSAY OF LACTIC ACID: CPT | Performed by: EMERGENCY MEDICINE

## 2019-05-07 PROCEDURE — 85610 PROTHROMBIN TIME: CPT | Performed by: EMERGENCY MEDICINE

## 2019-05-07 PROCEDURE — 71046 X-RAY EXAM CHEST 2 VIEWS: CPT

## 2019-05-07 PROCEDURE — 87086 URINE CULTURE/COLONY COUNT: CPT | Performed by: EMERGENCY MEDICINE

## 2019-05-07 PROCEDURE — 99284 EMERGENCY DEPT VISIT MOD MDM: CPT

## 2019-05-07 PROCEDURE — 87088 URINE BACTERIA CULTURE: CPT | Performed by: EMERGENCY MEDICINE

## 2019-05-07 PROCEDURE — 84145 PROCALCITONIN (PCT): CPT | Performed by: EMERGENCY MEDICINE

## 2019-05-07 PROCEDURE — 87040 BLOOD CULTURE FOR BACTERIA: CPT | Performed by: EMERGENCY MEDICINE

## 2019-05-07 PROCEDURE — 87633 RESP VIRUS 12-25 TARGETS: CPT | Performed by: EMERGENCY MEDICINE

## 2019-05-07 RX ORDER — HYDROCODONE BITARTRATE AND ACETAMINOPHEN 7.5; 325 MG/1; MG/1
1 TABLET ORAL ONCE
Status: COMPLETED | OUTPATIENT
Start: 2019-05-07 | End: 2019-05-07

## 2019-05-07 RX ORDER — SODIUM CHLORIDE 0.9 % (FLUSH) 0.9 %
10 SYRINGE (ML) INJECTION AS NEEDED
Status: DISCONTINUED | OUTPATIENT
Start: 2019-05-07 | End: 2019-05-10 | Stop reason: HOSPADM

## 2019-05-07 RX ORDER — ACETAMINOPHEN 500 MG
1000 TABLET ORAL ONCE
Status: COMPLETED | OUTPATIENT
Start: 2019-05-07 | End: 2019-05-07

## 2019-05-07 RX ADMIN — HYDROCODONE BITARTRATE AND ACETAMINOPHEN 1 TABLET: 7.5; 325 TABLET ORAL at 21:40

## 2019-05-07 RX ADMIN — ACETAMINOPHEN 1000 MG: 500 TABLET, FILM COATED ORAL at 20:54

## 2019-05-07 RX ADMIN — TAZOBACTAM SODIUM AND PIPERACILLIN SODIUM 3.38 G: 375; 3 INJECTION, SOLUTION INTRAVENOUS at 21:24

## 2019-05-08 PROBLEM — I87.2 VENOUS STASIS DERMATITIS OF BOTH LOWER EXTREMITIES: Chronic | Status: ACTIVE | Noted: 2019-05-08

## 2019-05-08 PROBLEM — R78.81 GRAM-NEGATIVE BACTEREMIA: Status: ACTIVE | Noted: 2019-05-08

## 2019-05-08 PROBLEM — I50.32 CHRONIC DIASTOLIC CHF (CONGESTIVE HEART FAILURE) (HCC): Chronic | Status: ACTIVE | Noted: 2019-05-08

## 2019-05-08 PROBLEM — F11.20 OPIOID DEPENDENCE (HCC): Chronic | Status: ACTIVE | Noted: 2019-05-08

## 2019-05-08 LAB
ANION GAP SERPL CALCULATED.3IONS-SCNC: 13.8 MMOL/L
BACTERIA BLD CULT: ABNORMAL
BUN BLD-MCNC: 22 MG/DL (ref 8–23)
BUN/CREAT SERPL: 21 (ref 7–25)
CALCIUM SPEC-SCNC: 8.6 MG/DL (ref 8.6–10.5)
CHLORIDE SERPL-SCNC: 98 MMOL/L (ref 98–107)
CO2 SERPL-SCNC: 27.2 MMOL/L (ref 22–29)
CREAT BLD-MCNC: 1.05 MG/DL (ref 0.57–1)
DEPRECATED RDW RBC AUTO: 47.2 FL (ref 37–54)
ERYTHROCYTE [DISTWIDTH] IN BLOOD BY AUTOMATED COUNT: 13.2 % (ref 12.3–15.4)
GFR SERPL CREATININE-BSD FRML MDRD: 51 ML/MIN/1.73
GLUCOSE BLD-MCNC: 91 MG/DL (ref 65–99)
HCT VFR BLD AUTO: 39.9 % (ref 34–46.6)
HGB BLD-MCNC: 12.5 G/DL (ref 12–15.9)
MCH RBC QN AUTO: 30.3 PG (ref 26.6–33)
MCHC RBC AUTO-ENTMCNC: 31.3 G/DL (ref 31.5–35.7)
MCV RBC AUTO: 96.8 FL (ref 79–97)
PLATELET # BLD AUTO: 174 10*3/MM3 (ref 140–450)
PMV BLD AUTO: 11.3 FL (ref 6–12)
POTASSIUM BLD-SCNC: 3.5 MMOL/L (ref 3.5–5.2)
RBC # BLD AUTO: 4.12 10*6/MM3 (ref 3.77–5.28)
SODIUM BLD-SCNC: 139 MMOL/L (ref 136–145)
WBC NRBC COR # BLD: 9.08 10*3/MM3 (ref 3.4–10.8)

## 2019-05-08 PROCEDURE — 85027 COMPLETE CBC AUTOMATED: CPT | Performed by: NURSE PRACTITIONER

## 2019-05-08 PROCEDURE — 25010000002 PIPERACILLIN SOD-TAZOBACTAM PER 1 G: Performed by: NURSE PRACTITIONER

## 2019-05-08 PROCEDURE — 97110 THERAPEUTIC EXERCISES: CPT

## 2019-05-08 PROCEDURE — 99223 1ST HOSP IP/OBS HIGH 75: CPT | Performed by: INTERNAL MEDICINE

## 2019-05-08 PROCEDURE — 97162 PT EVAL MOD COMPLEX 30 MIN: CPT

## 2019-05-08 PROCEDURE — 25010000002 PIPERACILLIN SOD-TAZOBACTAM PER 1 G: Performed by: INTERNAL MEDICINE

## 2019-05-08 PROCEDURE — 80048 BASIC METABOLIC PNL TOTAL CA: CPT | Performed by: NURSE PRACTITIONER

## 2019-05-08 PROCEDURE — 94760 N-INVAS EAR/PLS OXIMETRY 1: CPT

## 2019-05-08 PROCEDURE — 94799 UNLISTED PULMONARY SVC/PX: CPT

## 2019-05-08 PROCEDURE — 94640 AIRWAY INHALATION TREATMENT: CPT

## 2019-05-08 PROCEDURE — 87040 BLOOD CULTURE FOR BACTERIA: CPT | Performed by: INTERNAL MEDICINE

## 2019-05-08 RX ORDER — ONDANSETRON 4 MG/1
4 TABLET, FILM COATED ORAL EVERY 6 HOURS PRN
Status: DISCONTINUED | OUTPATIENT
Start: 2019-05-08 | End: 2019-05-10 | Stop reason: HOSPADM

## 2019-05-08 RX ORDER — ASPIRIN 81 MG/1
81 TABLET, CHEWABLE ORAL DAILY
Status: DISCONTINUED | OUTPATIENT
Start: 2019-05-08 | End: 2019-05-10 | Stop reason: HOSPADM

## 2019-05-08 RX ORDER — ACETAMINOPHEN 325 MG/1
650 TABLET ORAL EVERY 4 HOURS PRN
Status: DISCONTINUED | OUTPATIENT
Start: 2019-05-08 | End: 2019-05-10 | Stop reason: HOSPADM

## 2019-05-08 RX ORDER — FENTANYL 25 UG/H
1 PATCH TRANSDERMAL
Status: DISCONTINUED | OUTPATIENT
Start: 2019-05-08 | End: 2019-05-10 | Stop reason: HOSPADM

## 2019-05-08 RX ORDER — IPRATROPIUM BROMIDE AND ALBUTEROL SULFATE 2.5; .5 MG/3ML; MG/3ML
3 SOLUTION RESPIRATORY (INHALATION)
Status: DISCONTINUED | OUTPATIENT
Start: 2019-05-08 | End: 2019-05-10 | Stop reason: HOSPADM

## 2019-05-08 RX ORDER — ONDANSETRON 2 MG/ML
4 INJECTION INTRAMUSCULAR; INTRAVENOUS EVERY 6 HOURS PRN
Status: DISCONTINUED | OUTPATIENT
Start: 2019-05-08 | End: 2019-05-10 | Stop reason: HOSPADM

## 2019-05-08 RX ORDER — TRIAMCINOLONE ACETONIDE 1 MG/G
CREAM TOPICAL EVERY 12 HOURS SCHEDULED
Status: DISCONTINUED | OUTPATIENT
Start: 2019-05-08 | End: 2019-05-10 | Stop reason: HOSPADM

## 2019-05-08 RX ORDER — PANTOPRAZOLE SODIUM 40 MG/1
40 TABLET, DELAYED RELEASE ORAL DAILY
Status: DISCONTINUED | OUTPATIENT
Start: 2019-05-08 | End: 2019-05-10 | Stop reason: HOSPADM

## 2019-05-08 RX ORDER — QUETIAPINE FUMARATE 50 MG/1
50 TABLET, FILM COATED ORAL NIGHTLY
Status: DISCONTINUED | OUTPATIENT
Start: 2019-05-08 | End: 2019-05-10 | Stop reason: HOSPADM

## 2019-05-08 RX ORDER — SODIUM CHLORIDE 0.9 % (FLUSH) 0.9 %
3-10 SYRINGE (ML) INJECTION AS NEEDED
Status: DISCONTINUED | OUTPATIENT
Start: 2019-05-08 | End: 2019-05-10 | Stop reason: HOSPADM

## 2019-05-08 RX ORDER — FLUOXETINE HYDROCHLORIDE 20 MG/1
60 CAPSULE ORAL DAILY
Status: DISCONTINUED | OUTPATIENT
Start: 2019-05-08 | End: 2019-05-10 | Stop reason: HOSPADM

## 2019-05-08 RX ORDER — OXYBUTYNIN CHLORIDE 10 MG/1
10 TABLET, EXTENDED RELEASE ORAL DAILY
Status: DISCONTINUED | OUTPATIENT
Start: 2019-05-08 | End: 2019-05-10 | Stop reason: HOSPADM

## 2019-05-08 RX ORDER — LEVOTHYROXINE SODIUM 0.05 MG/1
50 TABLET ORAL
Status: DISCONTINUED | OUTPATIENT
Start: 2019-05-08 | End: 2019-05-10 | Stop reason: HOSPADM

## 2019-05-08 RX ORDER — SODIUM CHLORIDE 0.9 % (FLUSH) 0.9 %
3 SYRINGE (ML) INJECTION EVERY 12 HOURS SCHEDULED
Status: DISCONTINUED | OUTPATIENT
Start: 2019-05-08 | End: 2019-05-10 | Stop reason: HOSPADM

## 2019-05-08 RX ORDER — HYDROCODONE BITARTRATE AND ACETAMINOPHEN 7.5; 325 MG/1; MG/1
1 TABLET ORAL EVERY 6 HOURS PRN
Status: DISCONTINUED | OUTPATIENT
Start: 2019-05-08 | End: 2019-05-10 | Stop reason: HOSPADM

## 2019-05-08 RX ORDER — SODIUM CHLORIDE 0.9 % (FLUSH) 0.9 %
3 SYRINGE (ML) INJECTION EVERY 12 HOURS SCHEDULED
Status: DISCONTINUED | OUTPATIENT
Start: 2019-05-08 | End: 2019-05-08

## 2019-05-08 RX ORDER — PREGABALIN 75 MG/1
150 CAPSULE ORAL 2 TIMES DAILY
Status: DISCONTINUED | OUTPATIENT
Start: 2019-05-08 | End: 2019-05-10 | Stop reason: HOSPADM

## 2019-05-08 RX ORDER — BUSPIRONE HYDROCHLORIDE 15 MG/1
30 TABLET ORAL 2 TIMES DAILY
Status: DISCONTINUED | OUTPATIENT
Start: 2019-05-08 | End: 2019-05-10 | Stop reason: HOSPADM

## 2019-05-08 RX ORDER — IPRATROPIUM BROMIDE AND ALBUTEROL SULFATE 2.5; .5 MG/3ML; MG/3ML
3 SOLUTION RESPIRATORY (INHALATION) EVERY 4 HOURS PRN
Status: DISCONTINUED | OUTPATIENT
Start: 2019-05-08 | End: 2019-05-10 | Stop reason: HOSPADM

## 2019-05-08 RX ORDER — SODIUM CHLORIDE 0.9 % (FLUSH) 0.9 %
1-10 SYRINGE (ML) INJECTION AS NEEDED
Status: DISCONTINUED | OUTPATIENT
Start: 2019-05-08 | End: 2019-05-10 | Stop reason: HOSPADM

## 2019-05-08 RX ADMIN — TAZOBACTAM SODIUM AND PIPERACILLIN SODIUM 3.38 G: 375; 3 INJECTION, SOLUTION INTRAVENOUS at 03:37

## 2019-05-08 RX ADMIN — ASPIRIN 81 MG: 81 TABLET, CHEWABLE ORAL at 10:18

## 2019-05-08 RX ADMIN — TAZOBACTAM SODIUM AND PIPERACILLIN SODIUM 3.38 G: 375; 3 INJECTION, SOLUTION INTRAVENOUS at 10:30

## 2019-05-08 RX ADMIN — PREGABALIN 150 MG: 75 CAPSULE ORAL at 20:57

## 2019-05-08 RX ADMIN — TRIAMCINOLONE ACETONIDE: 1 CREAM TOPICAL at 20:57

## 2019-05-08 RX ADMIN — BUSPIRONE HYDROCHLORIDE 30 MG: 15 TABLET ORAL at 10:19

## 2019-05-08 RX ADMIN — BUSPIRONE HYDROCHLORIDE 30 MG: 15 TABLET ORAL at 20:57

## 2019-05-08 RX ADMIN — FENTANYL 1 PATCH: 25 PATCH TRANSDERMAL at 09:12

## 2019-05-08 RX ADMIN — TAZOBACTAM SODIUM AND PIPERACILLIN SODIUM 3.38 G: 375; 3 INJECTION, SOLUTION INTRAVENOUS at 17:17

## 2019-05-08 RX ADMIN — QUETIAPINE FUMARATE 50 MG: 50 TABLET, FILM COATED ORAL at 02:38

## 2019-05-08 RX ADMIN — IPRATROPIUM BROMIDE AND ALBUTEROL SULFATE 3 ML: 2.5; .5 SOLUTION RESPIRATORY (INHALATION) at 14:28

## 2019-05-08 RX ADMIN — PREGABALIN 150 MG: 75 CAPSULE ORAL at 09:12

## 2019-05-08 RX ADMIN — OXYBUTYNIN CHLORIDE 10 MG: 10 TABLET, EXTENDED RELEASE ORAL at 10:18

## 2019-05-08 RX ADMIN — TRIAMCINOLONE ACETONIDE: 1 CREAM TOPICAL at 10:18

## 2019-05-08 RX ADMIN — SODIUM CHLORIDE, PRESERVATIVE FREE 3 ML: 5 INJECTION INTRAVENOUS at 09:09

## 2019-05-08 RX ADMIN — PANTOPRAZOLE SODIUM 40 MG: 40 TABLET, DELAYED RELEASE ORAL at 10:16

## 2019-05-08 RX ADMIN — IPRATROPIUM BROMIDE AND ALBUTEROL SULFATE 3 ML: 2.5; .5 SOLUTION RESPIRATORY (INHALATION) at 23:28

## 2019-05-08 RX ADMIN — SODIUM CHLORIDE, PRESERVATIVE FREE 3 ML: 5 INJECTION INTRAVENOUS at 20:58

## 2019-05-08 RX ADMIN — LEVOTHYROXINE SODIUM 50 MCG: 50 TABLET ORAL at 15:01

## 2019-05-08 RX ADMIN — TORSEMIDE 50 MG: 20 TABLET ORAL at 10:19

## 2019-05-08 RX ADMIN — FLUOXETINE HYDROCHLORIDE 60 MG: 20 CAPSULE ORAL at 10:19

## 2019-05-08 RX ADMIN — ACETAMINOPHEN 650 MG: 325 TABLET, FILM COATED ORAL at 06:46

## 2019-05-08 RX ADMIN — HYDROCODONE BITARTRATE AND ACETAMINOPHEN 1 TABLET: 7.5; 325 TABLET ORAL at 17:19

## 2019-05-08 RX ADMIN — QUETIAPINE FUMARATE 50 MG: 50 TABLET, FILM COATED ORAL at 23:41

## 2019-05-09 PROBLEM — B96.20 E. COLI BACTEREMIA: Status: ACTIVE | Noted: 2019-05-09

## 2019-05-09 PROBLEM — E87.6 HYPOKALEMIA: Status: ACTIVE | Noted: 2019-05-09

## 2019-05-09 PROBLEM — R78.81 E. COLI BACTEREMIA: Status: ACTIVE | Noted: 2019-05-09

## 2019-05-09 LAB
ANION GAP SERPL CALCULATED.3IONS-SCNC: 10.8 MMOL/L
BACTERIA SPEC AEROBE CULT: ABNORMAL
BASOPHILS # BLD AUTO: 0.03 10*3/MM3 (ref 0–0.2)
BASOPHILS NFR BLD AUTO: 0.5 % (ref 0–1.5)
BUN BLD-MCNC: 21 MG/DL (ref 8–23)
BUN/CREAT SERPL: 19.4 (ref 7–25)
CALCIUM SPEC-SCNC: 7.7 MG/DL (ref 8.6–10.5)
CHLORIDE SERPL-SCNC: 92 MMOL/L (ref 98–107)
CO2 SERPL-SCNC: 29.2 MMOL/L (ref 22–29)
CREAT BLD-MCNC: 1.08 MG/DL (ref 0.57–1)
DEPRECATED RDW RBC AUTO: 48 FL (ref 37–54)
EOSINOPHIL # BLD AUTO: 0.14 10*3/MM3 (ref 0–0.4)
EOSINOPHIL NFR BLD AUTO: 2.2 % (ref 0.3–6.2)
ERYTHROCYTE [DISTWIDTH] IN BLOOD BY AUTOMATED COUNT: 13.4 % (ref 12.3–15.4)
GFR SERPL CREATININE-BSD FRML MDRD: 49 ML/MIN/1.73
GLUCOSE BLD-MCNC: 94 MG/DL (ref 65–99)
GLUCOSE BLDC GLUCOMTR-MCNC: 107 MG/DL (ref 70–130)
GLUCOSE BLDC GLUCOMTR-MCNC: 76 MG/DL (ref 70–130)
HCT VFR BLD AUTO: 32.7 % (ref 34–46.6)
HGB BLD-MCNC: 10.1 G/DL (ref 12–15.9)
IMM GRANULOCYTES # BLD AUTO: 0.03 10*3/MM3 (ref 0–0.05)
IMM GRANULOCYTES NFR BLD AUTO: 0.5 % (ref 0–0.5)
LYMPHOCYTES # BLD AUTO: 1.79 10*3/MM3 (ref 0.7–3.1)
LYMPHOCYTES NFR BLD AUTO: 28 % (ref 19.6–45.3)
MAGNESIUM SERPL-MCNC: 2.1 MG/DL (ref 1.6–2.4)
MCH RBC QN AUTO: 30.1 PG (ref 26.6–33)
MCHC RBC AUTO-ENTMCNC: 30.9 G/DL (ref 31.5–35.7)
MCV RBC AUTO: 97.6 FL (ref 79–97)
MONOCYTES # BLD AUTO: 0.8 10*3/MM3 (ref 0.1–0.9)
MONOCYTES NFR BLD AUTO: 12.5 % (ref 5–12)
NEUTROPHILS # BLD AUTO: 3.6 10*3/MM3 (ref 1.7–7)
NEUTROPHILS NFR BLD AUTO: 56.3 % (ref 42.7–76)
NRBC BLD AUTO-RTO: 0 /100 WBC (ref 0–0.2)
PLATELET # BLD AUTO: 156 10*3/MM3 (ref 140–450)
PMV BLD AUTO: 11.2 FL (ref 6–12)
POTASSIUM BLD-SCNC: 2.9 MMOL/L (ref 3.5–5.2)
RBC # BLD AUTO: 3.35 10*6/MM3 (ref 3.77–5.28)
SODIUM BLD-SCNC: 132 MMOL/L (ref 136–145)
WBC NRBC COR # BLD: 6.39 10*3/MM3 (ref 3.4–10.8)

## 2019-05-09 PROCEDURE — 82962 GLUCOSE BLOOD TEST: CPT

## 2019-05-09 PROCEDURE — 85025 COMPLETE CBC W/AUTO DIFF WBC: CPT | Performed by: HOSPITALIST

## 2019-05-09 PROCEDURE — 94799 UNLISTED PULMONARY SVC/PX: CPT

## 2019-05-09 PROCEDURE — 99232 SBSQ HOSP IP/OBS MODERATE 35: CPT | Performed by: INTERNAL MEDICINE

## 2019-05-09 PROCEDURE — 83735 ASSAY OF MAGNESIUM: CPT | Performed by: HOSPITALIST

## 2019-05-09 PROCEDURE — 80048 BASIC METABOLIC PNL TOTAL CA: CPT | Performed by: HOSPITALIST

## 2019-05-09 PROCEDURE — 25010000002 PIPERACILLIN SOD-TAZOBACTAM PER 1 G: Performed by: INTERNAL MEDICINE

## 2019-05-09 PROCEDURE — 97110 THERAPEUTIC EXERCISES: CPT

## 2019-05-09 RX ORDER — POTASSIUM CHLORIDE 750 MG/1
40 CAPSULE, EXTENDED RELEASE ORAL AS NEEDED
Status: DISCONTINUED | OUTPATIENT
Start: 2019-05-09 | End: 2019-05-10 | Stop reason: HOSPADM

## 2019-05-09 RX ORDER — POTASSIUM CHLORIDE 1.5 G/1.77G
40 POWDER, FOR SOLUTION ORAL AS NEEDED
Status: DISCONTINUED | OUTPATIENT
Start: 2019-05-09 | End: 2019-05-10 | Stop reason: HOSPADM

## 2019-05-09 RX ADMIN — SODIUM CHLORIDE, PRESERVATIVE FREE 3 ML: 5 INJECTION INTRAVENOUS at 20:31

## 2019-05-09 RX ADMIN — FLUOXETINE HYDROCHLORIDE 60 MG: 20 CAPSULE ORAL at 10:10

## 2019-05-09 RX ADMIN — ASPIRIN 81 MG: 81 TABLET, CHEWABLE ORAL at 10:10

## 2019-05-09 RX ADMIN — TAZOBACTAM SODIUM AND PIPERACILLIN SODIUM 3.38 G: 375; 3 INJECTION, SOLUTION INTRAVENOUS at 10:19

## 2019-05-09 RX ADMIN — OXYBUTYNIN CHLORIDE 10 MG: 10 TABLET, EXTENDED RELEASE ORAL at 10:10

## 2019-05-09 RX ADMIN — BUSPIRONE HYDROCHLORIDE 30 MG: 15 TABLET ORAL at 10:10

## 2019-05-09 RX ADMIN — TORSEMIDE 50 MG: 20 TABLET ORAL at 10:10

## 2019-05-09 RX ADMIN — POTASSIUM CHLORIDE 40 MEQ: 750 CAPSULE, EXTENDED RELEASE ORAL at 15:57

## 2019-05-09 RX ADMIN — QUETIAPINE FUMARATE 50 MG: 50 TABLET, FILM COATED ORAL at 23:52

## 2019-05-09 RX ADMIN — PREGABALIN 150 MG: 75 CAPSULE ORAL at 10:10

## 2019-05-09 RX ADMIN — PANTOPRAZOLE SODIUM 40 MG: 40 TABLET, DELAYED RELEASE ORAL at 10:10

## 2019-05-09 RX ADMIN — IPRATROPIUM BROMIDE AND ALBUTEROL SULFATE 3 ML: 2.5; .5 SOLUTION RESPIRATORY (INHALATION) at 20:51

## 2019-05-09 RX ADMIN — TAZOBACTAM SODIUM AND PIPERACILLIN SODIUM 3.38 G: 375; 3 INJECTION, SOLUTION INTRAVENOUS at 02:10

## 2019-05-09 RX ADMIN — POTASSIUM CHLORIDE 40 MEQ: 750 CAPSULE, EXTENDED RELEASE ORAL at 20:30

## 2019-05-09 RX ADMIN — HYDROCODONE BITARTRATE AND ACETAMINOPHEN 1 TABLET: 7.5; 325 TABLET ORAL at 10:10

## 2019-05-09 RX ADMIN — LEVOTHYROXINE SODIUM 50 MCG: 50 TABLET ORAL at 05:54

## 2019-05-09 RX ADMIN — TRIAMCINOLONE ACETONIDE: 1 CREAM TOPICAL at 20:32

## 2019-05-09 RX ADMIN — PREGABALIN 150 MG: 75 CAPSULE ORAL at 20:30

## 2019-05-09 RX ADMIN — TRIAMCINOLONE ACETONIDE 1 APPLICATION: 1 CREAM TOPICAL at 10:11

## 2019-05-09 RX ADMIN — HYDROCODONE BITARTRATE AND ACETAMINOPHEN 1 TABLET: 7.5; 325 TABLET ORAL at 20:40

## 2019-05-09 RX ADMIN — TAZOBACTAM SODIUM AND PIPERACILLIN SODIUM 3.38 G: 375; 3 INJECTION, SOLUTION INTRAVENOUS at 18:59

## 2019-05-09 RX ADMIN — BUSPIRONE HYDROCHLORIDE 30 MG: 15 TABLET ORAL at 20:30

## 2019-05-09 RX ADMIN — IPRATROPIUM BROMIDE AND ALBUTEROL SULFATE 3 ML: 2.5; .5 SOLUTION RESPIRATORY (INHALATION) at 09:27

## 2019-05-09 RX ADMIN — POTASSIUM CHLORIDE 40 MEQ: 750 CAPSULE, EXTENDED RELEASE ORAL at 10:09

## 2019-05-09 RX ADMIN — IPRATROPIUM BROMIDE AND ALBUTEROL SULFATE 3 ML: 2.5; .5 SOLUTION RESPIRATORY (INHALATION) at 15:48

## 2019-05-10 VITALS
HEART RATE: 87 BPM | WEIGHT: 268.4 LBS | TEMPERATURE: 97 F | SYSTOLIC BLOOD PRESSURE: 90 MMHG | RESPIRATION RATE: 18 BRPM | DIASTOLIC BLOOD PRESSURE: 57 MMHG | HEIGHT: 60 IN | OXYGEN SATURATION: 93 % | BODY MASS INDEX: 52.69 KG/M2

## 2019-05-10 PROBLEM — D63.8 ANEMIA OF CHRONIC DISEASE: Chronic | Status: ACTIVE | Noted: 2019-05-10

## 2019-05-10 LAB
ALBUMIN SERPL-MCNC: 2.6 G/DL (ref 3.5–5.2)
ALBUMIN/GLOB SERPL: 1 G/DL
ALP SERPL-CCNC: 89 U/L (ref 39–117)
ALT SERPL W P-5'-P-CCNC: 13 U/L (ref 1–33)
ANION GAP SERPL CALCULATED.3IONS-SCNC: 11.5 MMOL/L
AST SERPL-CCNC: 22 U/L (ref 1–32)
BACTERIA SPEC AEROBE CULT: ABNORMAL
BACTERIA SPEC AEROBE CULT: ABNORMAL
BASOPHILS # BLD AUTO: 0.02 10*3/MM3 (ref 0–0.2)
BASOPHILS NFR BLD AUTO: 0.4 % (ref 0–1.5)
BILIRUB SERPL-MCNC: 0.3 MG/DL (ref 0.2–1.2)
BUN BLD-MCNC: 19 MG/DL (ref 8–23)
BUN/CREAT SERPL: 18.4 (ref 7–25)
CALCIUM SPEC-SCNC: 8 MG/DL (ref 8.6–10.5)
CHLORIDE SERPL-SCNC: 101 MMOL/L (ref 98–107)
CO2 SERPL-SCNC: 28.5 MMOL/L (ref 22–29)
CREAT BLD-MCNC: 1.03 MG/DL (ref 0.57–1)
DEPRECATED RDW RBC AUTO: 48.9 FL (ref 37–54)
EOSINOPHIL # BLD AUTO: 0.21 10*3/MM3 (ref 0–0.4)
EOSINOPHIL NFR BLD AUTO: 4.1 % (ref 0.3–6.2)
ERYTHROCYTE [DISTWIDTH] IN BLOOD BY AUTOMATED COUNT: 13.3 % (ref 12.3–15.4)
FERRITIN SERPL-MCNC: 719 NG/ML (ref 13–150)
FOLATE SERPL-MCNC: 14.3 NG/ML (ref 4.78–24.2)
GFR SERPL CREATININE-BSD FRML MDRD: 52 ML/MIN/1.73
GLOBULIN UR ELPH-MCNC: 2.7 GM/DL
GLUCOSE BLD-MCNC: 89 MG/DL (ref 65–99)
GRAM STN SPEC: ABNORMAL
HCT VFR BLD AUTO: 31.7 % (ref 34–46.6)
HGB BLD-MCNC: 9.6 G/DL (ref 12–15.9)
IMM GRANULOCYTES # BLD AUTO: 0.02 10*3/MM3 (ref 0–0.05)
IMM GRANULOCYTES NFR BLD AUTO: 0.4 % (ref 0–0.5)
IRON 24H UR-MRATE: 43 MCG/DL (ref 37–145)
IRON SATN MFR SERPL: 23 % (ref 20–50)
LYMPHOCYTES # BLD AUTO: 1.49 10*3/MM3 (ref 0.7–3.1)
LYMPHOCYTES NFR BLD AUTO: 29 % (ref 19.6–45.3)
MCH RBC QN AUTO: 29.9 PG (ref 26.6–33)
MCHC RBC AUTO-ENTMCNC: 30.3 G/DL (ref 31.5–35.7)
MCV RBC AUTO: 98.8 FL (ref 79–97)
MONOCYTES # BLD AUTO: 0.57 10*3/MM3 (ref 0.1–0.9)
MONOCYTES NFR BLD AUTO: 11.1 % (ref 5–12)
NEUTROPHILS # BLD AUTO: 2.82 10*3/MM3 (ref 1.7–7)
NEUTROPHILS NFR BLD AUTO: 55 % (ref 42.7–76)
NRBC BLD AUTO-RTO: 0 /100 WBC (ref 0–0.2)
PLATELET # BLD AUTO: 158 10*3/MM3 (ref 140–450)
PMV BLD AUTO: 10.9 FL (ref 6–12)
POTASSIUM BLD-SCNC: 3.7 MMOL/L (ref 3.5–5.2)
PROT SERPL-MCNC: 5.3 G/DL (ref 6–8.5)
RBC # BLD AUTO: 3.21 10*6/MM3 (ref 3.77–5.28)
SODIUM BLD-SCNC: 141 MMOL/L (ref 136–145)
TIBC SERPL-MCNC: 189 MCG/DL (ref 298–536)
TRANSFERRIN SERPL-MCNC: 127 MG/DL (ref 200–360)
VIT B12 BLD-MCNC: 1544 PG/ML (ref 211–946)
WBC NRBC COR # BLD: 5.13 10*3/MM3 (ref 3.4–10.8)

## 2019-05-10 PROCEDURE — 85025 COMPLETE CBC W/AUTO DIFF WBC: CPT | Performed by: HOSPITALIST

## 2019-05-10 PROCEDURE — 84466 ASSAY OF TRANSFERRIN: CPT | Performed by: HOSPITALIST

## 2019-05-10 PROCEDURE — 25010000002 PIPERACILLIN SOD-TAZOBACTAM PER 1 G: Performed by: INTERNAL MEDICINE

## 2019-05-10 PROCEDURE — 82746 ASSAY OF FOLIC ACID SERUM: CPT | Performed by: HOSPITALIST

## 2019-05-10 PROCEDURE — 82607 VITAMIN B-12: CPT | Performed by: HOSPITALIST

## 2019-05-10 PROCEDURE — 80053 COMPREHEN METABOLIC PANEL: CPT | Performed by: HOSPITALIST

## 2019-05-10 PROCEDURE — 82728 ASSAY OF FERRITIN: CPT | Performed by: HOSPITALIST

## 2019-05-10 PROCEDURE — 94799 UNLISTED PULMONARY SVC/PX: CPT

## 2019-05-10 PROCEDURE — 99232 SBSQ HOSP IP/OBS MODERATE 35: CPT | Performed by: INTERNAL MEDICINE

## 2019-05-10 PROCEDURE — 83540 ASSAY OF IRON: CPT | Performed by: HOSPITALIST

## 2019-05-10 RX ORDER — LEVOFLOXACIN 750 MG/1
750 TABLET ORAL EVERY 24 HOURS
Qty: 4 TABLET | Refills: 0 | Status: SHIPPED | OUTPATIENT
Start: 2019-05-11 | End: 2019-05-15

## 2019-05-10 RX ORDER — LEVOFLOXACIN 750 MG/1
750 TABLET ORAL EVERY 24 HOURS
Status: DISCONTINUED | OUTPATIENT
Start: 2019-05-10 | End: 2019-05-10 | Stop reason: HOSPADM

## 2019-05-10 RX ADMIN — TAZOBACTAM SODIUM AND PIPERACILLIN SODIUM 3.38 G: 375; 3 INJECTION, SOLUTION INTRAVENOUS at 01:36

## 2019-05-10 RX ADMIN — BUSPIRONE HYDROCHLORIDE 30 MG: 15 TABLET ORAL at 08:10

## 2019-05-10 RX ADMIN — TAZOBACTAM SODIUM AND PIPERACILLIN SODIUM 3.38 G: 375; 3 INJECTION, SOLUTION INTRAVENOUS at 10:17

## 2019-05-10 RX ADMIN — PREGABALIN 150 MG: 75 CAPSULE ORAL at 08:10

## 2019-05-10 RX ADMIN — OXYBUTYNIN CHLORIDE 10 MG: 10 TABLET, EXTENDED RELEASE ORAL at 08:11

## 2019-05-10 RX ADMIN — FLUOXETINE HYDROCHLORIDE 60 MG: 20 CAPSULE ORAL at 08:11

## 2019-05-10 RX ADMIN — IPRATROPIUM BROMIDE AND ALBUTEROL SULFATE 3 ML: 2.5; .5 SOLUTION RESPIRATORY (INHALATION) at 08:49

## 2019-05-10 RX ADMIN — PANTOPRAZOLE SODIUM 40 MG: 40 TABLET, DELAYED RELEASE ORAL at 08:10

## 2019-05-10 RX ADMIN — ASPIRIN 81 MG: 81 TABLET, CHEWABLE ORAL at 08:10

## 2019-05-10 RX ADMIN — TORSEMIDE 50 MG: 20 TABLET ORAL at 08:11

## 2019-05-10 RX ADMIN — TRIAMCINOLONE ACETONIDE: 1 CREAM TOPICAL at 08:11

## 2019-05-10 RX ADMIN — LEVOTHYROXINE SODIUM 50 MCG: 50 TABLET ORAL at 06:46

## 2019-05-11 ENCOUNTER — READMISSION MANAGEMENT (OUTPATIENT)
Dept: CALL CENTER | Facility: HOSPITAL | Age: 79
End: 2019-05-11

## 2019-05-12 NOTE — OUTREACH NOTE
Prep Survey      Responses   Facility patient discharged from?  [facility]   Is patient eligible?  Yes   Discharge diagnosis  [place] UTI   Does the patient have one of the following disease processes/diagnoses(primary or secondary)?  Other   Does the patient have Home health ordered?  Yes   What is the Home health agency?   [facility]    Is there a DME ordered?  No   Prep survey completed?  Yes          Nelly Tovar RN

## 2019-05-13 ENCOUNTER — READMISSION MANAGEMENT (OUTPATIENT)
Dept: CALL CENTER | Facility: HOSPITAL | Age: 79
End: 2019-05-13

## 2019-05-13 LAB
BACTERIA SPEC AEROBE CULT: NORMAL
BACTERIA SPEC AEROBE CULT: NORMAL

## 2019-05-13 NOTE — OUTREACH NOTE
Medical Week 1 Survey      Responses   Facility patient discharged from?  [facility]   Does the patient have one of the following disease processes/diagnoses(primary or secondary)?  Other   Is there a successful TCM telephone encounter documented?  No   Week 1 attempt successful?  Yes   Call start time  1312   Call end time  1316   Discharge diagnosis  Acute UTI   Meds reviewed with patient/caregiver?  Yes   Is the patient having any side effects they believe may be caused by any medication additions or changes?  No   Does the patient have all medications ordered at discharge?  Yes   Is the patient taking all medications as directed (includes completed medication regime)?  Yes   Comments regarding appointments  appt with Dr Guevara on 5/20/19   Does the patient have a primary care provider?   Yes   Does the patient have an appointment with their PCP within 7 days of discharge?  Greater than 7 days   What is preventing the patient from scheduling follow up appointments within 7 days of discharge?  Earlier appointment not available   Nursing Interventions  Verified appointment date/time/provider   Has the patient kept scheduled appointments due by today?  N/A   What is the Home health agency?   [facility]    Has home health visited the patient within 72 hours of discharge?  Call prior to 72 hours   Psychosocial issues?  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  Yes   Nursing interventions  Reviewed instructions with patient   Is the patient/caregiver able to teach back signs and symptoms related to disease process for when to call PCP?  Yes   Is the patient/caregiver able to teach back signs and symptoms related to disease process for when to call 911?  Yes   Is the patient/caregiver able to teach back the hierarchy of who to call/visit for symptoms/problems? PCP, Specialist, Home health nurse, Urgent Care, ED, 911  Yes   Week 1 call completed?  Yes          Liliana CONRAD  Juan, RN

## 2019-05-22 ENCOUNTER — READMISSION MANAGEMENT (OUTPATIENT)
Dept: CALL CENTER | Facility: HOSPITAL | Age: 79
End: 2019-05-22

## 2019-05-22 NOTE — OUTREACH NOTE
Medical Week 2 Survey      Responses   Facility patient discharged from?  [facility]   Does the patient have one of the following disease processes/diagnoses(primary or secondary)?  Other   Week 2 attempt successful?  Yes   Call start time  1028   Discharge diagnosis  Acute UTI   Call end time  1033   Meds reviewed with patient/caregiver?  Yes   Is the patient having any side effects they believe may be caused by any medication additions or changes?  No   Does the patient have all medications ordered at discharge?  Yes   Is the patient taking all medications as directed (includes completed medication regime)?  Yes   Comments regarding appointments  appt with Dr Guevara on 5/27/19   Does the patient have a primary care provider?   Yes   Does the patient have an appointment with their PCP within 7 days of discharge?  Greater than 7 days   What is preventing the patient from scheduling follow up appointments within 7 days of discharge?  Earlier appointment not available   Has the patient kept scheduled appointments due by today?  N/A   What is the Home health agency?   [facility]    Has home health visited the patient within 72 hours of discharge?  Yes   Psychosocial issues?  No   Comments  pt states has had N and V in last 2 days, has decreased today, plans to discuss with  nurse today when she comes to collect U/A   Did the patient receive a copy of their discharge instructions?  Yes   Nursing interventions  Reviewed instructions with patient   What is the patient's perception of their health status since discharge?  Improving   Is the patient/caregiver able to teach back signs and symptoms related to disease process for when to call PCP?  Yes   Is the patient/caregiver able to teach back signs and symptoms related to disease process for when to call 911?  Yes   Is the patient/caregiver able to teach back the hierarchy of who to call/visit for symptoms/problems? PCP, Specialist, Home health nurse, Urgent Care, ED,  911  Yes   Additional teach back comments  pt states N and V may be symptom of kidney stone, states would not hesitate to go to ED if symptoms become worse, pt is RN   Week 2 Call Completed?  Yes          Liliana Martinez RN

## 2019-05-31 ENCOUNTER — READMISSION MANAGEMENT (OUTPATIENT)
Dept: CALL CENTER | Facility: HOSPITAL | Age: 79
End: 2019-05-31

## 2019-06-08 ENCOUNTER — READMISSION MANAGEMENT (OUTPATIENT)
Dept: CALL CENTER | Facility: HOSPITAL | Age: 79
End: 2019-06-08

## 2019-06-08 NOTE — OUTREACH NOTE
Medical Week 4 Survey      Responses   Facility patient discharged from?  [facility]   Does the patient have one of the following disease processes/diagnoses(primary or secondary)?  Other   Week 4 attempt successful?  Yes   Call start time  1726   Call end time  1727   Discharge diagnosis  Acute UTI   Meds reviewed with patient/caregiver?  Yes   Is the patient taking all medications as directed (includes completed medication regime)?  Yes   Has the patient kept scheduled appointments due by today?  Yes   Comments  Pt states doing fine.    What is the patient's perception of their health status since discharge?  Improving   Is the patient/caregiver able to teach back signs and symptoms related to disease process for when to call PCP?  Yes   Is the patient/caregiver able to teach back signs and symptoms related to disease process for when to call 911?  Yes   Is the patient/caregiver able to teach back the hierarchy of who to call/visit for symptoms/problems? PCP, Specialist, Home health nurse, Urgent Care, ED, 911  Yes   Week 4 Call Completed?  Yes   Would the patient like one additional call?  No   Graduated  Yes   Did the patient feel the follow up calls were helpful during their recovery period?  Yes   Was the number of calls appropriate?  Yes          Rocio Sommer RN

## 2019-08-03 NOTE — PROGRESS NOTES
[facility] INPATIENT PROGRESS NOTE         [address]    2019      PATIENT IDENTIFICATION:  Name: Rose Cheema ADMIT: 4/10/2019   : 1940  PCP: Cheng Guevara MD    MRN: [id number] LOS: 1 days   AGE/SEX: 78 y.o. female  ROOM: [facility]                     LOS 1    Reason for visit: Follow-up acute hypoxemic respiratory failure with pneumonia and sepsis      SUBJECTIVE:      Still with moderate shortness of breath and wheezing complaint. No chest pain. No productive cough. Complains of weakness. Starting to see physical therapy occupational therapy yesterday. If she’s continuing to improve at this rate may be able to be ready for discharge by .    Objective   OBJECTIVE:    Vital Sign Min/Max for last 24 hours  Temp  Min: 97.1 °F (36.2 °C)  Max: 97.4 °F (36.3 °C)   BP  Min: 105/56  Max: 120/63   Pulse  Min: 79  Max: 89   Resp  Min: 16  Max: 18   SpO2  Min: 91 %  Max: 100 %   No Data Recorded   No Data Recorded                         Body mass index is 53.98 kg/m².    Intake/Output Summary (Last 24 hours) at 2019 1111  Last data filed at 2019 0500  Gross per 24 hour   Intake 410 ml   Output 300 ml   Net 110 ml         Exam:  GEN:  No distress, appears stated age  EYES:   PERRL, anicteric sclera  ENT:    External ears/nose normal, OP clear  NECK:  No adenopathy, midline trachea  LUNGS: Normal chest on inspection, palpation and scattered coarse breath sounds on auscultation  CV:  Normal S1S2, without murmur  ABD:  Non tender, non distended, no hepatosplenomegaly, +BS  EXT:  No edema, cyanosis or clubbing    Scheduled meds:      aspirin 81 mg Oral Daily   busPIRone 30 mg Oral BID   carbamide peroxide 1 drop Right Ear BID   enoxaparin 40 mg Subcutaneous Q12H   FLUoxetine 60 mg Oral Daily   ipratropium-albuterol 3 mL Nebulization Q4H - RT   levothyroxine 50 mcg Oral Daily   nystatin  Topical Q12H   oxybutynin XL 10 mg Oral Daily   pantoprazole 40 mg Oral Daily    piperacillin-tazobactam 3.375 g Intravenous Once   piperacillin-tazobactam 3.375 g Intravenous Q8H   pregabalin 150 mg Oral Q12H   QUEtiapine 50 mg Oral Nightly   sodium chloride 3 mL Intravenous Q12H   vancomycin 2,000 mg Intravenous Q24H     IV meds:                          Pharmacy to dose vancomycin      Data Review:  Results from last 7 days   Lab Units 04/12/19  0717 04/11/19  2211 04/11/19  0646 04/10/19  1249   SODIUM mmol/L 145  --  138 138   POTASSIUM mmol/L 4.6 4.2 3.2* 4.4   CHLORIDE mmol/L 112*  --  103 102   CO2 mmol/L 25.5  --  25.5 17.7*   BUN mg/dL 11  --  15 13   CREATININE mg/dL 0.70  --  0.70 0.91   GLUCOSE mg/dL 89  --  103* 140*   CALCIUM mg/dL 8.1*  --  8.0* 9.1         Estimated Creatinine Clearance: 73 mL/min (by C-G formula based on SCr of 0.7 mg/dL).  Results from last 7 days   Lab Units 04/12/19  0717 04/11/19  0646 04/10/19  1249   WBC 10*3/mm3 8.80 16.48* 18.52*   HEMOGLOBIN g/dL 10.3* 9.9* 11.6*   PLATELETS 10*3/mm3 131* 129* 150         Results from last 7 days   Lab Units 04/10/19  1249   ALT (SGPT) U/L 14   AST (SGOT) U/L 27         Results from last 7 days   Lab Units 04/10/19  1652 04/10/19  1249 04/10/19  1248   PROCALCITONIN ng/mL  --  0.56*  --    LACTATE mmol/L 1.7  --  2.2*         Chest x-ray 4/10/19 viewed      Microbiology reviewed  Human Rhinovirus/Enterovirus Detected           )Assessment   ASSESSMENT:      Active Hospital Problems    Diagnosis  POA   • Pneumonia of right lower lobe due to infectious organism (CMS/HCC) [J18.1]  Yes      Resolved Hospital Problems   No resolved problems to display.     Acute hypoxemic respiratory failure  Healthcare associated pneumonia  Lactic acidosis and sepsis  URI with rhinovirus positive on respiratory viral panel  COPD with mild exacerbation  Pulmonary hypertension  Chronic CHF and mitral valve stenosis  Anxiety with depression and fibromyalgia          PLAN:  Antibiotics for healthcare associated pneumonia.  Narrow based on  culture results.  Bronchodilators for wheezing symptoms.  Lactic acidosis has improved.  PT/OT.    Joe Calhoun MD  Pulmonary and Critical Care Medicine  [facility], [facility]  4/12/2019    11:11 AM      home

## 2019-11-06 RX ORDER — MIRABEGRON 50 MG/1
TABLET, FILM COATED, EXTENDED RELEASE ORAL
Qty: 90 TABLET | Refills: 1 | Status: SHIPPED | OUTPATIENT
Start: 2019-11-06 | End: 2020-05-19 | Stop reason: HOSPADM

## 2020-05-14 ENCOUNTER — APPOINTMENT (OUTPATIENT)
Dept: CT IMAGING | Facility: HOSPITAL | Age: 80
End: 2020-05-14

## 2020-05-14 ENCOUNTER — APPOINTMENT (OUTPATIENT)
Dept: GENERAL RADIOLOGY | Facility: HOSPITAL | Age: 80
End: 2020-05-14

## 2020-05-14 ENCOUNTER — HOSPITAL ENCOUNTER (INPATIENT)
Facility: HOSPITAL | Age: 80
LOS: 5 days | Discharge: NURSING, REHAB OR LONG-TERM CARE | End: 2020-05-19
Attending: EMERGENCY MEDICINE | Admitting: INTERNAL MEDICINE

## 2020-05-14 DIAGNOSIS — F11.20 UNCOMPLICATED OPIOID DEPENDENCE (HCC): Chronic | ICD-10-CM

## 2020-05-14 DIAGNOSIS — J44.1 COPD WITH ACUTE EXACERBATION (HCC): ICD-10-CM

## 2020-05-14 DIAGNOSIS — M79.7 FIBROMYALGIA: Chronic | ICD-10-CM

## 2020-05-14 DIAGNOSIS — R09.02 HYPOXEMIA: ICD-10-CM

## 2020-05-14 DIAGNOSIS — R50.9 FEVER AND CHILLS: Primary | ICD-10-CM

## 2020-05-14 DIAGNOSIS — R06.03 ACUTE RESPIRATORY DISTRESS: ICD-10-CM

## 2020-05-14 PROBLEM — I50.33 ACUTE ON CHRONIC DIASTOLIC (CONGESTIVE) HEART FAILURE (HCC): Status: ACTIVE | Noted: 2019-05-08

## 2020-05-14 PROBLEM — E03.9 HYPOTHYROIDISM: Chronic | Status: ACTIVE | Noted: 2019-02-13

## 2020-05-14 PROBLEM — N18.30 CKD (CHRONIC KIDNEY DISEASE) STAGE 3, GFR 30-59 ML/MIN (HCC): Status: ACTIVE | Noted: 2020-05-14

## 2020-05-14 LAB
ALBUMIN SERPL-MCNC: 3.5 G/DL (ref 3.5–5.2)
ALBUMIN SERPL-MCNC: 4.5 G/DL (ref 3.5–5.2)
ALBUMIN/GLOB SERPL: 1.6 G/DL
ALP SERPL-CCNC: 130 U/L (ref 39–117)
ALP SERPL-CCNC: 140 U/L (ref 39–117)
ALT SERPL W P-5'-P-CCNC: 15 U/L (ref 1–33)
ALT SERPL W P-5'-P-CCNC: 24 U/L (ref 1–33)
ANION GAP SERPL CALCULATED.3IONS-SCNC: 10.3 MMOL/L (ref 5–15)
ANION GAP SERPL CALCULATED.3IONS-SCNC: 12.7 MMOL/L (ref 5–15)
APTT PPP: 33.2 SECONDS (ref 22.7–35.4)
AST SERPL-CCNC: 18 U/L (ref 1–32)
AST SERPL-CCNC: 46 U/L (ref 1–32)
B PARAPERT DNA SPEC QL NAA+PROBE: NOT DETECTED
B PERT DNA SPEC QL NAA+PROBE: NOT DETECTED
BACTERIA BLD CULT: ABNORMAL
BACTERIA UR QL AUTO: ABNORMAL /HPF
BASOPHILS # BLD AUTO: 0.03 10*3/MM3 (ref 0–0.2)
BASOPHILS NFR BLD AUTO: 0.2 % (ref 0–1.5)
BILIRUB CONJ SERPL-MCNC: 0.2 MG/DL (ref 0.2–0.3)
BILIRUB INDIRECT SERPL-MCNC: 0.3 MG/DL
BILIRUB SERPL-MCNC: 0.5 MG/DL (ref 0.2–1.2)
BILIRUB SERPL-MCNC: 0.5 MG/DL (ref 0.2–1.2)
BILIRUB UR QL STRIP: NEGATIVE
BOTTLE TYPE: ABNORMAL
BUN BLD-MCNC: 17 MG/DL (ref 8–23)
BUN BLD-MCNC: 17 MG/DL (ref 8–23)
BUN/CREAT SERPL: 16.8 (ref 7–25)
BUN/CREAT SERPL: 18.5 (ref 7–25)
C PNEUM DNA NPH QL NAA+NON-PROBE: NOT DETECTED
CALCIUM SPEC-SCNC: 8.3 MG/DL (ref 8.6–10.5)
CALCIUM SPEC-SCNC: 8.8 MG/DL (ref 8.6–10.5)
CHLORIDE SERPL-SCNC: 100 MMOL/L (ref 98–107)
CHLORIDE SERPL-SCNC: 105 MMOL/L (ref 98–107)
CLARITY UR: CLEAR
CO2 SERPL-SCNC: 21.7 MMOL/L (ref 22–29)
CO2 SERPL-SCNC: 25.3 MMOL/L (ref 22–29)
COLOR UR: YELLOW
CREAT BLD-MCNC: 0.92 MG/DL (ref 0.57–1)
CREAT BLD-MCNC: 1.01 MG/DL (ref 0.57–1)
D-LACTATE SERPL-SCNC: 1.6 MMOL/L (ref 0.5–2)
DEPRECATED RDW RBC AUTO: 45.5 FL (ref 37–54)
DEPRECATED RDW RBC AUTO: 48.1 FL (ref 37–54)
EOSINOPHIL # BLD AUTO: 0.07 10*3/MM3 (ref 0–0.4)
EOSINOPHIL NFR BLD AUTO: 0.5 % (ref 0.3–6.2)
ERYTHROCYTE [DISTWIDTH] IN BLOOD BY AUTOMATED COUNT: 12.7 % (ref 12.3–15.4)
ERYTHROCYTE [DISTWIDTH] IN BLOOD BY AUTOMATED COUNT: 13.1 % (ref 12.3–15.4)
FLUAV H1 2009 PAND RNA NPH QL NAA+PROBE: NOT DETECTED
FLUAV H1 HA GENE NPH QL NAA+PROBE: NOT DETECTED
FLUAV H3 RNA NPH QL NAA+PROBE: NOT DETECTED
FLUAV SUBTYP SPEC NAA+PROBE: NOT DETECTED
FLUBV RNA ISLT QL NAA+PROBE: NOT DETECTED
GFR SERPL CREATININE-BSD FRML MDRD: 53 ML/MIN/1.73
GFR SERPL CREATININE-BSD FRML MDRD: 59 ML/MIN/1.73
GLOBULIN UR ELPH-MCNC: 2.8 GM/DL
GLUCOSE BLD-MCNC: 146 MG/DL (ref 65–99)
GLUCOSE BLD-MCNC: 161 MG/DL (ref 65–99)
GLUCOSE UR STRIP-MCNC: NEGATIVE MG/DL
HADV DNA SPEC NAA+PROBE: NOT DETECTED
HCOV 229E RNA SPEC QL NAA+PROBE: NOT DETECTED
HCOV HKU1 RNA SPEC QL NAA+PROBE: NOT DETECTED
HCOV NL63 RNA SPEC QL NAA+PROBE: NOT DETECTED
HCOV OC43 RNA SPEC QL NAA+PROBE: NOT DETECTED
HCT VFR BLD AUTO: 38.5 % (ref 34–46.6)
HCT VFR BLD AUTO: 43.4 % (ref 34–46.6)
HGB BLD-MCNC: 12.6 G/DL (ref 12–15.9)
HGB BLD-MCNC: 14 G/DL (ref 12–15.9)
HGB UR QL STRIP.AUTO: NEGATIVE
HMPV RNA NPH QL NAA+NON-PROBE: NOT DETECTED
HPIV1 RNA SPEC QL NAA+PROBE: NOT DETECTED
HPIV2 RNA SPEC QL NAA+PROBE: NOT DETECTED
HPIV3 RNA NPH QL NAA+PROBE: NOT DETECTED
HPIV4 P GENE NPH QL NAA+PROBE: NOT DETECTED
HYALINE CASTS UR QL AUTO: ABNORMAL /LPF
IMM GRANULOCYTES # BLD AUTO: 0.06 10*3/MM3 (ref 0–0.05)
IMM GRANULOCYTES NFR BLD AUTO: 0.4 % (ref 0–0.5)
INR PPP: 1.02 (ref 0.9–1.1)
KETONES UR QL STRIP: ABNORMAL
LEUKOCYTE ESTERASE UR QL STRIP.AUTO: ABNORMAL
LYMPHOCYTES # BLD AUTO: 0.86 10*3/MM3 (ref 0.7–3.1)
LYMPHOCYTES NFR BLD AUTO: 5.6 % (ref 19.6–45.3)
M PNEUMO IGG SER IA-ACNC: NOT DETECTED
MCH RBC QN AUTO: 31.6 PG (ref 26.6–33)
MCH RBC QN AUTO: 31.6 PG (ref 26.6–33)
MCHC RBC AUTO-ENTMCNC: 32.3 G/DL (ref 31.5–35.7)
MCHC RBC AUTO-ENTMCNC: 32.7 G/DL (ref 31.5–35.7)
MCV RBC AUTO: 96.5 FL (ref 79–97)
MCV RBC AUTO: 98 FL (ref 79–97)
MONOCYTES # BLD AUTO: 0.82 10*3/MM3 (ref 0.1–0.9)
MONOCYTES NFR BLD AUTO: 5.3 % (ref 5–12)
NEUTROPHILS # BLD AUTO: 13.53 10*3/MM3 (ref 1.7–7)
NEUTROPHILS NFR BLD AUTO: 88 % (ref 42.7–76)
NITRITE UR QL STRIP: POSITIVE
NRBC BLD AUTO-RTO: 0 /100 WBC (ref 0–0.2)
NT-PROBNP SERPL-MCNC: 738.8 PG/ML (ref 5–1800)
PH UR STRIP.AUTO: 5.5 [PH] (ref 5–8)
PLATELET # BLD AUTO: 142 10*3/MM3 (ref 140–450)
PLATELET # BLD AUTO: 162 10*3/MM3 (ref 140–450)
PMV BLD AUTO: 10.5 FL (ref 6–12)
PMV BLD AUTO: 10.8 FL (ref 6–12)
POTASSIUM BLD-SCNC: 4.6 MMOL/L (ref 3.5–5.2)
POTASSIUM BLD-SCNC: 4.7 MMOL/L (ref 3.5–5.2)
PROCALCITONIN SERPL-MCNC: 0.13 NG/ML (ref 0.1–0.25)
PROCALCITONIN SERPL-MCNC: 3.17 NG/ML (ref 0.1–0.25)
PROT SERPL-MCNC: 6.4 G/DL (ref 6–8.5)
PROT SERPL-MCNC: 7.3 G/DL (ref 6–8.5)
PROT UR QL STRIP: NEGATIVE
PROTHROMBIN TIME: 13.1 SECONDS (ref 11.7–14.2)
RBC # BLD AUTO: 3.99 10*6/MM3 (ref 3.77–5.28)
RBC # BLD AUTO: 4.43 10*6/MM3 (ref 3.77–5.28)
RBC # UR: ABNORMAL /HPF
REF LAB TEST METHOD: ABNORMAL
RHINOVIRUS RNA SPEC NAA+PROBE: NOT DETECTED
RSV RNA NPH QL NAA+NON-PROBE: NOT DETECTED
SARS-COV-2 RNA RESP QL NAA+PROBE: NOT DETECTED
SODIUM BLD-SCNC: 137 MMOL/L (ref 136–145)
SODIUM BLD-SCNC: 138 MMOL/L (ref 136–145)
SP GR UR STRIP: 1.03 (ref 1–1.03)
SQUAMOUS #/AREA URNS HPF: ABNORMAL /HPF
TROPONIN T SERPL-MCNC: 0.01 NG/ML (ref 0–0.03)
UROBILINOGEN UR QL STRIP: ABNORMAL
WBC NRBC COR # BLD: 15.37 10*3/MM3 (ref 3.4–10.8)
WBC NRBC COR # BLD: 16.82 10*3/MM3 (ref 3.4–10.8)
WBC UR QL AUTO: ABNORMAL /HPF

## 2020-05-14 PROCEDURE — 94640 AIRWAY INHALATION TREATMENT: CPT

## 2020-05-14 PROCEDURE — 80076 HEPATIC FUNCTION PANEL: CPT | Performed by: INTERNAL MEDICINE

## 2020-05-14 PROCEDURE — 80053 COMPREHEN METABOLIC PANEL: CPT | Performed by: EMERGENCY MEDICINE

## 2020-05-14 PROCEDURE — 71250 CT THORAX DX C-: CPT

## 2020-05-14 PROCEDURE — 87077 CULTURE AEROBIC IDENTIFY: CPT | Performed by: EMERGENCY MEDICINE

## 2020-05-14 PROCEDURE — 87635 SARS-COV-2 COVID-19 AMP PRB: CPT | Performed by: EMERGENCY MEDICINE

## 2020-05-14 PROCEDURE — 93005 ELECTROCARDIOGRAM TRACING: CPT | Performed by: EMERGENCY MEDICINE

## 2020-05-14 PROCEDURE — 36415 COLL VENOUS BLD VENIPUNCTURE: CPT | Performed by: NURSE PRACTITIONER

## 2020-05-14 PROCEDURE — 71045 X-RAY EXAM CHEST 1 VIEW: CPT

## 2020-05-14 PROCEDURE — 25010000002 ENOXAPARIN PER 10 MG: Performed by: NURSE PRACTITIONER

## 2020-05-14 PROCEDURE — 84145 PROCALCITONIN (PCT): CPT | Performed by: INTERNAL MEDICINE

## 2020-05-14 PROCEDURE — 87086 URINE CULTURE/COLONY COUNT: CPT | Performed by: NURSE PRACTITIONER

## 2020-05-14 PROCEDURE — 99285 EMERGENCY DEPT VISIT HI MDM: CPT

## 2020-05-14 PROCEDURE — 94799 UNLISTED PULMONARY SVC/PX: CPT

## 2020-05-14 PROCEDURE — 85610 PROTHROMBIN TIME: CPT | Performed by: EMERGENCY MEDICINE

## 2020-05-14 PROCEDURE — 83880 ASSAY OF NATRIURETIC PEPTIDE: CPT | Performed by: EMERGENCY MEDICINE

## 2020-05-14 PROCEDURE — 87186 SC STD MICRODIL/AGAR DIL: CPT | Performed by: EMERGENCY MEDICINE

## 2020-05-14 PROCEDURE — 93010 ELECTROCARDIOGRAM REPORT: CPT | Performed by: INTERNAL MEDICINE

## 2020-05-14 PROCEDURE — 25010000002 PIPERACILLIN SOD-TAZOBACTAM PER 1 G: Performed by: INTERNAL MEDICINE

## 2020-05-14 PROCEDURE — 99223 1ST HOSP IP/OBS HIGH 75: CPT | Performed by: INTERNAL MEDICINE

## 2020-05-14 PROCEDURE — 81001 URINALYSIS AUTO W/SCOPE: CPT | Performed by: NURSE PRACTITIONER

## 2020-05-14 PROCEDURE — 0100U HC BIOFIRE FILMARRAY RESP PANEL 2: CPT | Performed by: EMERGENCY MEDICINE

## 2020-05-14 PROCEDURE — 87186 SC STD MICRODIL/AGAR DIL: CPT | Performed by: NURSE PRACTITIONER

## 2020-05-14 PROCEDURE — 25010000002 METHYLPREDNISOLONE PER 125 MG: Performed by: EMERGENCY MEDICINE

## 2020-05-14 PROCEDURE — 25010000002 ENOXAPARIN PER 10 MG: Performed by: INTERNAL MEDICINE

## 2020-05-14 PROCEDURE — 83605 ASSAY OF LACTIC ACID: CPT | Performed by: EMERGENCY MEDICINE

## 2020-05-14 PROCEDURE — 87040 BLOOD CULTURE FOR BACTERIA: CPT | Performed by: EMERGENCY MEDICINE

## 2020-05-14 PROCEDURE — 84484 ASSAY OF TROPONIN QUANT: CPT | Performed by: EMERGENCY MEDICINE

## 2020-05-14 PROCEDURE — 25010000002 METHYLPREDNISOLONE PER 40 MG: Performed by: NURSE PRACTITIONER

## 2020-05-14 PROCEDURE — 87088 URINE BACTERIA CULTURE: CPT | Performed by: NURSE PRACTITIONER

## 2020-05-14 PROCEDURE — 85730 THROMBOPLASTIN TIME PARTIAL: CPT | Performed by: EMERGENCY MEDICINE

## 2020-05-14 PROCEDURE — 85027 COMPLETE CBC AUTOMATED: CPT | Performed by: NURSE PRACTITIONER

## 2020-05-14 PROCEDURE — 84145 PROCALCITONIN (PCT): CPT | Performed by: EMERGENCY MEDICINE

## 2020-05-14 PROCEDURE — 87150 DNA/RNA AMPLIFIED PROBE: CPT | Performed by: EMERGENCY MEDICINE

## 2020-05-14 PROCEDURE — 85025 COMPLETE CBC W/AUTO DIFF WBC: CPT | Performed by: EMERGENCY MEDICINE

## 2020-05-14 RX ORDER — ASPIRIN 81 MG/1
81 TABLET, CHEWABLE ORAL DAILY
Status: DISCONTINUED | OUTPATIENT
Start: 2020-05-14 | End: 2020-05-19 | Stop reason: HOSPADM

## 2020-05-14 RX ORDER — SODIUM CHLORIDE 0.9 % (FLUSH) 0.9 %
10 SYRINGE (ML) INJECTION AS NEEDED
Status: DISCONTINUED | OUTPATIENT
Start: 2020-05-14 | End: 2020-05-19 | Stop reason: HOSPADM

## 2020-05-14 RX ORDER — ONDANSETRON 2 MG/ML
4 INJECTION INTRAMUSCULAR; INTRAVENOUS EVERY 6 HOURS PRN
Status: DISCONTINUED | OUTPATIENT
Start: 2020-05-14 | End: 2020-05-19 | Stop reason: HOSPADM

## 2020-05-14 RX ORDER — SODIUM CHLORIDE 0.9 % (FLUSH) 0.9 %
10 SYRINGE (ML) INJECTION EVERY 12 HOURS SCHEDULED
Status: DISCONTINUED | OUTPATIENT
Start: 2020-05-14 | End: 2020-05-19 | Stop reason: HOSPADM

## 2020-05-14 RX ORDER — PREGABALIN 100 MG/1
100 CAPSULE ORAL EVERY 12 HOURS SCHEDULED
Status: DISCONTINUED | OUTPATIENT
Start: 2020-05-14 | End: 2020-05-19 | Stop reason: HOSPADM

## 2020-05-14 RX ORDER — CALCIUM CARBONATE 200(500)MG
2 TABLET,CHEWABLE ORAL 2 TIMES DAILY PRN
Status: DISCONTINUED | OUTPATIENT
Start: 2020-05-14 | End: 2020-05-19 | Stop reason: HOSPADM

## 2020-05-14 RX ORDER — ACETAMINOPHEN 160 MG/5ML
650 SOLUTION ORAL EVERY 4 HOURS PRN
Status: DISCONTINUED | OUTPATIENT
Start: 2020-05-14 | End: 2020-05-17

## 2020-05-14 RX ORDER — OXYBUTYNIN CHLORIDE 10 MG/1
10 TABLET, EXTENDED RELEASE ORAL DAILY
Status: DISCONTINUED | OUTPATIENT
Start: 2020-05-14 | End: 2020-05-19 | Stop reason: HOSPADM

## 2020-05-14 RX ORDER — ONDANSETRON 4 MG/1
4 TABLET, FILM COATED ORAL EVERY 6 HOURS PRN
Status: DISCONTINUED | OUTPATIENT
Start: 2020-05-14 | End: 2020-05-19 | Stop reason: HOSPADM

## 2020-05-14 RX ORDER — LEVOTHYROXINE SODIUM 0.05 MG/1
50 TABLET ORAL
Status: DISCONTINUED | OUTPATIENT
Start: 2020-05-14 | End: 2020-05-19 | Stop reason: HOSPADM

## 2020-05-14 RX ORDER — FENTANYL 25 UG/H
1 PATCH TRANSDERMAL
Status: DISCONTINUED | OUTPATIENT
Start: 2020-05-14 | End: 2020-05-19 | Stop reason: HOSPADM

## 2020-05-14 RX ORDER — QUETIAPINE FUMARATE 50 MG/1
50 TABLET, FILM COATED ORAL NIGHTLY
Status: DISCONTINUED | OUTPATIENT
Start: 2020-05-14 | End: 2020-05-19 | Stop reason: HOSPADM

## 2020-05-14 RX ORDER — BUDESONIDE AND FORMOTEROL FUMARATE DIHYDRATE 160; 4.5 UG/1; UG/1
2 AEROSOL RESPIRATORY (INHALATION)
Status: DISCONTINUED | OUTPATIENT
Start: 2020-05-14 | End: 2020-05-19 | Stop reason: HOSPADM

## 2020-05-14 RX ORDER — NITROGLYCERIN 0.4 MG/1
0.4 TABLET SUBLINGUAL
Status: DISCONTINUED | OUTPATIENT
Start: 2020-05-14 | End: 2020-05-19 | Stop reason: HOSPADM

## 2020-05-14 RX ORDER — IPRATROPIUM BROMIDE AND ALBUTEROL SULFATE 2.5; .5 MG/3ML; MG/3ML
3 SOLUTION RESPIRATORY (INHALATION)
Status: DISCONTINUED | OUTPATIENT
Start: 2020-05-14 | End: 2020-05-14

## 2020-05-14 RX ORDER — HYDROCODONE BITARTRATE AND ACETAMINOPHEN 7.5; 325 MG/1; MG/1
1 TABLET ORAL EVERY 6 HOURS PRN
Status: DISCONTINUED | OUTPATIENT
Start: 2020-05-14 | End: 2020-05-19 | Stop reason: HOSPADM

## 2020-05-14 RX ORDER — IPRATROPIUM BROMIDE AND ALBUTEROL SULFATE 2.5; .5 MG/3ML; MG/3ML
3 SOLUTION RESPIRATORY (INHALATION) EVERY 4 HOURS PRN
Status: DISCONTINUED | OUTPATIENT
Start: 2020-05-14 | End: 2020-05-14

## 2020-05-14 RX ORDER — METHYLPREDNISOLONE SODIUM SUCCINATE 40 MG/ML
40 INJECTION, POWDER, LYOPHILIZED, FOR SOLUTION INTRAMUSCULAR; INTRAVENOUS EVERY 8 HOURS
Status: DISCONTINUED | OUTPATIENT
Start: 2020-05-14 | End: 2020-05-15

## 2020-05-14 RX ORDER — POTASSIUM CHLORIDE 750 MG/1
20 CAPSULE, EXTENDED RELEASE ORAL DAILY
Status: DISCONTINUED | OUTPATIENT
Start: 2020-05-14 | End: 2020-05-19 | Stop reason: HOSPADM

## 2020-05-14 RX ORDER — METHYLPREDNISOLONE SODIUM SUCCINATE 125 MG/2ML
125 INJECTION, POWDER, LYOPHILIZED, FOR SOLUTION INTRAMUSCULAR; INTRAVENOUS ONCE
Status: COMPLETED | OUTPATIENT
Start: 2020-05-14 | End: 2020-05-14

## 2020-05-14 RX ORDER — BUSPIRONE HYDROCHLORIDE 15 MG/1
30 TABLET ORAL 2 TIMES DAILY
Status: DISCONTINUED | OUTPATIENT
Start: 2020-05-14 | End: 2020-05-19 | Stop reason: HOSPADM

## 2020-05-14 RX ORDER — FLUOXETINE HYDROCHLORIDE 20 MG/1
60 CAPSULE ORAL DAILY
Status: DISCONTINUED | OUTPATIENT
Start: 2020-05-14 | End: 2020-05-19 | Stop reason: HOSPADM

## 2020-05-14 RX ORDER — POTASSIUM CHLORIDE 750 MG/1
30 CAPSULE, EXTENDED RELEASE ORAL DAILY
COMMUNITY

## 2020-05-14 RX ORDER — ACETAMINOPHEN 325 MG/1
650 TABLET ORAL EVERY 4 HOURS PRN
Status: DISCONTINUED | OUTPATIENT
Start: 2020-05-14 | End: 2020-05-19 | Stop reason: HOSPADM

## 2020-05-14 RX ORDER — BISACODYL 10 MG
10 SUPPOSITORY, RECTAL RECTAL DAILY PRN
Status: DISCONTINUED | OUTPATIENT
Start: 2020-05-14 | End: 2020-05-19 | Stop reason: HOSPADM

## 2020-05-14 RX ORDER — ACETAMINOPHEN 650 MG/1
650 SUPPOSITORY RECTAL EVERY 4 HOURS PRN
Status: DISCONTINUED | OUTPATIENT
Start: 2020-05-14 | End: 2020-05-17

## 2020-05-14 RX ADMIN — LEVOTHYROXINE SODIUM 50 MCG: 50 TABLET ORAL at 10:26

## 2020-05-14 RX ADMIN — TORSEMIDE 50 MG: 20 TABLET ORAL at 16:26

## 2020-05-14 RX ADMIN — HYDROCODONE BITARTRATE AND ACETAMINOPHEN 1 TABLET: 7.5; 325 TABLET ORAL at 11:28

## 2020-05-14 RX ADMIN — BUSPIRONE HYDROCHLORIDE 30 MG: 15 TABLET ORAL at 20:28

## 2020-05-14 RX ADMIN — TAZOBACTAM SODIUM AND PIPERACILLIN SODIUM 3.38 G: 375; 3 INJECTION, SOLUTION INTRAVENOUS at 16:28

## 2020-05-14 RX ADMIN — OXYBUTYNIN CHLORIDE 10 MG: 10 TABLET, EXTENDED RELEASE ORAL at 10:27

## 2020-05-14 RX ADMIN — HYDROCODONE BITARTRATE AND ACETAMINOPHEN 1 TABLET: 7.5; 325 TABLET ORAL at 21:43

## 2020-05-14 RX ADMIN — PREGABALIN 100 MG: 100 CAPSULE ORAL at 20:28

## 2020-05-14 RX ADMIN — BUDESONIDE AND FORMOTEROL FUMARATE DIHYDRATE 2 PUFF: 160; 4.5 AEROSOL RESPIRATORY (INHALATION) at 12:26

## 2020-05-14 RX ADMIN — SODIUM CHLORIDE, PRESERVATIVE FREE 10 ML: 5 INJECTION INTRAVENOUS at 03:35

## 2020-05-14 RX ADMIN — METHYLPREDNISOLONE SODIUM SUCCINATE 40 MG: 40 INJECTION, POWDER, FOR SOLUTION INTRAMUSCULAR; INTRAVENOUS at 10:28

## 2020-05-14 RX ADMIN — ASPIRIN 81 MG: 81 TABLET, CHEWABLE ORAL at 10:27

## 2020-05-14 RX ADMIN — METHYLPREDNISOLONE SODIUM SUCCINATE 125 MG: 125 INJECTION, POWDER, FOR SOLUTION INTRAMUSCULAR; INTRAVENOUS at 03:35

## 2020-05-14 RX ADMIN — POTASSIUM CHLORIDE 20 MEQ: 10 CAPSULE, COATED, EXTENDED RELEASE ORAL at 10:26

## 2020-05-14 RX ADMIN — SODIUM CHLORIDE, PRESERVATIVE FREE 10 ML: 5 INJECTION INTRAVENOUS at 11:28

## 2020-05-14 RX ADMIN — TORSEMIDE 50 MG: 20 TABLET ORAL at 10:27

## 2020-05-14 RX ADMIN — FLUOXETINE HYDROCHLORIDE 60 MG: 20 CAPSULE ORAL at 10:27

## 2020-05-14 RX ADMIN — TAZOBACTAM SODIUM AND PIPERACILLIN SODIUM 3.38 G: 375; 3 INJECTION, SOLUTION INTRAVENOUS at 11:28

## 2020-05-14 RX ADMIN — HYDROCODONE BITARTRATE AND ACETAMINOPHEN 1 TABLET: 7.5; 325 TABLET ORAL at 05:30

## 2020-05-14 RX ADMIN — ENOXAPARIN SODIUM 40 MG: 40 INJECTION SUBCUTANEOUS at 16:27

## 2020-05-14 RX ADMIN — ENOXAPARIN SODIUM 40 MG: 40 INJECTION SUBCUTANEOUS at 05:30

## 2020-05-14 RX ADMIN — FENTANYL 1 PATCH: 25 PATCH TRANSDERMAL at 16:28

## 2020-05-14 RX ADMIN — METHYLPREDNISOLONE SODIUM SUCCINATE 40 MG: 40 INJECTION, POWDER, FOR SOLUTION INTRAMUSCULAR; INTRAVENOUS at 20:29

## 2020-05-14 RX ADMIN — PREGABALIN 100 MG: 100 CAPSULE ORAL at 10:26

## 2020-05-14 RX ADMIN — BUSPIRONE HYDROCHLORIDE 30 MG: 15 TABLET ORAL at 10:28

## 2020-05-14 RX ADMIN — BUDESONIDE AND FORMOTEROL FUMARATE DIHYDRATE 2 PUFF: 160; 4.5 AEROSOL RESPIRATORY (INHALATION) at 17:18

## 2020-05-14 RX ADMIN — METHYLPREDNISOLONE SODIUM SUCCINATE 40 MG: 40 INJECTION, POWDER, FOR SOLUTION INTRAMUSCULAR; INTRAVENOUS at 05:29

## 2020-05-14 RX ADMIN — SODIUM CHLORIDE, PRESERVATIVE FREE 10 ML: 5 INJECTION INTRAVENOUS at 20:29

## 2020-05-14 NOTE — PROGRESS NOTES
"Pharmacy Consult - Lovenox    Rose Cheema has been consulted for pharmacy to dose enoxaparin for vte ppx  per Sejal Garcia, NELL request.         Relevant clinical data and objective history reviewed:  79 y.o. female 152.4 cm (60\")           Past Medical History:   Diagnosis Date   • Anemia    • Anxiety    • Arthritis    • CHF (congestive heart failure) (CMS/HCC)    • Coronary artery disease    • Depression    • Disease of thyroid gland    • Fibromyalgia    • GERD (gastroesophageal reflux disease)    • Hypertension    • Moderate tricuspid valve stenosis    • Osteoporosis    • Peripheral neuropathy    • Pulmonary hypertension (CMS/HCC)    • SBO (small bowel obstruction) (CMS/[facility])    • Stenosis of mitral valve      is allergic to aspartame and cephalexin.    Lab Results   Component Value Date    WBC 15.37 (H) 05/14/2020    HGB 14.0 05/14/2020    HCT 43.4 05/14/2020    MCV 98.0 (H) 05/14/2020     05/14/2020     Lab Results   Component Value Date    GLUCOSE 146 (H) 05/14/2020    CALCIUM 8.8 05/14/2020     05/14/2020    K 4.7 05/14/2020    CO2 25.3 05/14/2020     05/14/2020    BUN 17 05/14/2020    CREATININE 1.01 (H) 05/14/2020    EGFRIFNONA 53 (L) 05/14/2020    BCR 16.8 05/14/2020    ANIONGAP 12.7 05/14/2020       CrCl cannot be calculated (Unknown ideal weight.).       Assessment/Plan    Will start patient on 40mg  subcutaneous every 24  hours, adjusted for renal function. Labs to be ordered: scr . Pharmacy will continue to follow.     Fina Metcalf [facility]    "

## 2020-05-14 NOTE — CONSULTS
Referring Provider: NELL Watson    Subjective   History of present illness:    This very nice 79-year-old we are asked to provide evaluation and opinion regarding possible sepsis.    Patient reports that she was in her usual state of health until she developed acute onset of shaking chills yesterday associated with a fever to 102 and some mild worsening of her chronic dyspnea.  Due to her symptoms she sought care at the [facility] emergency department was found have a white count of 15,000.  She received analgesia and her shaking chills resolved and she has been afebrile and denies any additional shaking chills since that time.  She really feels about at baseline.  She has chronic lymphedema but this is not significantly changed as well as a incontinence of stool which is also not significantly changed.  Her neuropathy was bothering her quite a bit but that has calmed down again today.  She was started on steroids for potential COPD exacerbation.    Per review the past medical records, she has a history of gastric bypass and and 2019 was admitted with fever.  She was treated for Proteus UTI and also found to have stones that were unable to be accessed by ERCP but eventually passed.  She was also admitted in April for COPD exacerbation secondary to human rhinovirus/enterovirus and an May 2019 for E. coli bacteremia from an acute cystitis.    Past medical history: Coronary artery disease, anxiety/depression, hypothyroidism, fibromyalgia, GERD, hypertension, COPD, pulmonary hypertension, peripheral neuropathy, valvular heart disease, bilateral oophorectomy, gastric bypass, hernia repair, IBS    No family history of infectious diseases  Social history: She lives alone in [place], [place].  Recent  as her   2 weeks ago.  Non-smoker      Review of Systems  Pertinent items are noted in HPI, all other systems reviewed and negative    Objective     Physical Exam:   Vital  Signs   Temp:  [97.6 °F (36.4 °C)-102.5 °F (39.2 °C)] 98.5 °F (36.9 °C)  Heart Rate:  [62-80] 68  Resp:  [16-21] 18  BP: (110-144)/(62-93) 118/62    GENERAL: Awake and alert, in no acute distress.   HEENT: Oropharynx is clear. Hearing is grossly normal.   EYES: PERRL. No conjunctival injection. No lid lag.   LYMPHATICS: No lymphadenopathy of the neck or inguinal regions.   HEART: Regular rate and regular rhythm.  1-2+ peripheral edema.   LUNGS: Clear to auscultation anteriorly with normal respiratory effort.   GI: Soft, nontender, nondistended. No appreciable organomegaly.   SKIN: Warm and dry without cutaneous eruptions chronic venous stasis changes in the lower extremities  PSYCHIATRIC: Appropriate mood, affect, insight, and judgment.     Results Review:   I reviewed the patient's new clinical results.  Creatinine 0.92  White count 16.8  Hemoglobin 12.6  Platelets 142  Liver function test normal except for an alk phos of 140        Microbiology:  Blood cultures pending  Respiratory pathogen panel COVID-19 negative    Radiology:  Chest x-ray no acute abnormalities    Assessment/Plan   1.  Sepsis  2.  Possible COPD exacerbation      No clear source of infection.  Last year, she was hospitalized for viral COPD exacerbation, biliary sepsis and sepsis secondary to UTI.  She has no urinary symptoms and her liver function tests are normal so I think the latter 2 are unlikely.  We will repeat procalcitonin as well as liver function test to make sure these have not evolved.  Okay to hold on antibiotics for now given stability.  We will follow-up CT scan of the chest      Thank you for this consult.  We will continue to follow along and tailor antibiotics as the patient's clinical course evolves.      Delfino Pittman MD  05/14/20  8:52 AM

## 2020-05-14 NOTE — CONSULTS
CONSULT NOTE    Patient Identification:  Rose Cheema  79 y.o.  female  1940  [id number]            Requesting physician: Hospitalist    Reason for Consultation: COPD exacerbation    CC:     History of Present Illness:  79-year-old female with known history of COPD, chronic diastolic CHF pulmonary hypertension GERSON on CPAP and fibromyalgia follows my partner Dr. Calhoun admitted to the hospital with fever up to 102 started yesterday at home.  Patient tells me that she has chronic shortness of breath but she was worse in the last couple of days.  Also reports increased swelling of the lower extremities.  No purulent sputum no aspiration as such was reported.  No nausea vomiting diarrhea was reported.  Her respiratory viral panel and cover test were negative.      Review of Systems  Review of Systems   Constitutional: Positive for chills and fever.   HENT: Negative.    Eyes: Negative.    Respiratory: Positive for shortness of breath.    Cardiovascular: Positive for leg swelling (chronic BLE lymphedema).   Gastrointestinal: Negative.    Genitourinary: Positive for flank pain (left ).   Skin: Negative.    Neurological: Negative.    Psychiatric/Behavioral: Negative.  Past Medical History:  Past Medical History:   Diagnosis Date   • Anemia    • Anxiety    • Arthritis    • CHF (congestive heart failure) (CMS/HCC)    • Coronary artery disease    • Depression    • Disease of thyroid gland    • Fibromyalgia    • GERD (gastroesophageal reflux disease)    • Hypertension    • Moderate tricuspid valve stenosis    • Osteoporosis    • Peripheral neuropathy    • Pulmonary hypertension (CMS/HCC)    • SBO (small bowel obstruction) (CMS/HCC)    • Stenosis of mitral valve        Past Surgical History:  Past Surgical History:   Procedure Laterality Date   • BILATERAL OOPHORECTOMY     • CARDIAC CATHETERIZATION     • CHOLECYSTECTOMY     • ERCP N/A 2/15/2019    Procedure: ENDOSCOPIC RETROGRADE CHOLANGIOPANCREATOGRAPHY WITH PARTIAL  CHOLANGIOGRAM;  Surgeon: Gerald Herr MD;  Location: [facility] ENDOSCOPY;  Service: Gastroenterology   • EXPLORATORY LAPAROTOMY     • GASTRIC BYPASS     • HERNIA REPAIR      inguinal and ventral   • HYSTERECTOMY     • OVARIAN CYST REMOVAL          Home Meds:  Medications Prior to Admission   Medication Sig Dispense Refill Last Dose   • albuterol (PROVENTIL) (2.5 MG/3ML) 0.083% nebulizer solution Take 2.5 mg by nebulization 4 (Four) Times a Day As Needed for Wheezing. 375 vial 0 5/6/2019 at Unknown time   • aspirin 81 MG chewable tablet Chew 81 mg.   5/6/2019 at Unknown time   • busPIRone (BUSPAR) 30 MG tablet Take 30 mg by mouth 2 (Two) Times a Day.   5/6/2019 at Unknown time   • fentaNYL (DURAGESIC) 25 MCG/HR patch Place 1 patch on the skin as directed by provider Every 72 (Seventy-Two) Hours. 3 patch 0    • FLUoxetine (PROzac) 60 MG tablet Take 60 mg by mouth Daily.   5/6/2019 at Unknown time   • HYDROcodone-acetaminophen (NORCO) 7.5-325 MG per tablet Take 1 tablet by mouth Every 6 (Six) Hours As Needed for Severe Pain . 10 tablet 0 5/6/2019 at Unknown time   • levothyroxine (SYNTHROID, LEVOTHROID) 50 MCG tablet Take 50 mcg by mouth Daily.   5/6/2019 at Unknown time   • MYRBETRIQ 50 MG tablet sustained-release 24 hour 24 hr tablet TAKE ONE TABLET BY MOUTH DAILY 90 tablet 1    • pantoprazole (PROTONIX) 40 MG EC tablet Take 40 mg by mouth Daily.   5/6/2019 at Unknown time   • potassium chloride (MICRO-K) 10 MEQ CR capsule Take 20 mEq by mouth Daily.      • pregabalin (LYRICA) 150 MG capsule Take 150 mg by mouth 2 (Two) Times a Day.   5/6/2019 at Unknown time   • QUEtiapine (SEROquel) 50 MG tablet Take 50 mg by mouth Every Night.   5/6/2019 at Unknown time   • torsemide (DEMADEX) 100 MG tablet Take 50 mg by mouth Daily. Takes 50 mg daily and every other day takes a second 50 mg dose   5/6/2019 at Unknown time   • umeclidinium-vilanterol (ANORO ELLIPTA) 62.5-25 MCG/INH aerosol powder  inhaler Inhale 1 puff Daily.  "  5/6/2019 at Unknown time       Allergies:  Allergies   Allergen Reactions   • Aspartame Other (See Comments)     CAUSES MIGRAINES   • Cephalexin Rash     Tolerated piperacillin-tazobactam (Zosyn) and ampicillin-sulbactam (Unasyn) during Feb 2019 admission.       Social History:   Social History     Socioeconomic History   • Marital status:      Spouse name: Not on file   • Number of children: Not on file   • Years of education: Not on file   • Highest education level: Not on file   Tobacco Use   • Smoking status: Former Smoker   • Smokeless tobacco: Never Used   Substance and Sexual Activity   • Alcohol use: No   • Drug use: No   • Sexual activity: Never       Family History:  History reviewed. No pertinent family history.    Physical Exam:  /63 (BP Location: Left arm, Patient Position: Lying)   Pulse 65   Temp 98.7 °F (37.1 °C) (Oral)   Resp 16   Ht 152.4 cm (60\")   Wt 130 kg (286 lb 13.1 oz)   SpO2 95%   BMI 56.02 kg/m²  Body mass index is 56.02 kg/m². 95% 130 kg (286 lb 13.1 oz)  Physical Exam  Patient is examined using the personal protective equipment as per guidelines from infection control for this particular patient as enacted.  Hand hygiene was performed before and after patient interaction.  Well-developed normal body habitus  Eyes normal conjunctive a pupils reactive to light  ENT Mallampati between 3 and 4 normal nasal exam  Neck midline trachea no thyromegaly  Chest no labored breathing.  Diminished breath sounds no active wheezing or crackles at this time  CVS regular rate and rhythm 2+ lower extremity edema  Abdomen soft nontender hepatosplenomegaly  CNS intact normal sensory exam  Skin no rashes no nodules  Psych oriented to time place and person normal memory  Musculoskeletal no cyanosis no clubbing normal range of motion        LABS:  Lab Results   Component Value Date    CALCIUM 8.8 05/14/2020    PHOS 3.4 02/13/2019     Results from last 7 days   Lab Units 05/14/20  0156 "   SODIUM mmol/L 138   POTASSIUM mmol/L 4.7   CHLORIDE mmol/L 100   CO2 mmol/L 25.3   BUN mg/dL 17   CREATININE mg/dL 1.01*   GLUCOSE mg/dL 146*   CALCIUM mg/dL 8.8   WBC 10*3/mm3 15.37*   HEMOGLOBIN g/dL 14.0   PLATELETS 10*3/mm3 162   ALT (SGPT) U/L 15   AST (SGOT) U/L 18   PROBNP pg/mL 738.8   PROCALCITONIN ng/mL 0.13     Lab Results   Component Value Date    CKTOTAL 107 02/13/2019    TROPONINT 0.014 05/14/2020     Results from last 7 days   Lab Units 05/14/20  0156   TROPONIN T ng/mL 0.014         Results from last 7 days   Lab Units 05/14/20  0156   PROCALCITONIN ng/mL 0.13   LACTATE mmol/L 1.6         Results from last 7 days   Lab Units 05/14/20  0202   ADENOVIRUS DETECTION BY PCR  Not Detected   CORONAVIRUS 229E  Not Detected   CORONAVIRUS HKU1  Not Detected   CORONAVIRUS NL63  Not Detected   CORONAVIRUS OC43  Not Detected   HUMAN METAPNEUMOVIRUS  Not Detected   HUMAN RHINOVIRUS/ENTEROVIRUS  Not Detected   INFLUENZA B PCR  Not Detected   PARAINFLUENZA 1  Not Detected   PARAINFLUENZA VIRUS 2  Not Detected   PARAINFLUENZA VIRUS 3  Not Detected   PARAINFLUENZA VIRUS 4  Not Detected   BORDETELLA PERTUSSIS PCR  Not Detected   BORDETELLA PARAPERTUSSIS PCR  Not Detected   [id number]  Not Detected   CHLAMYDOPHILA PNEUMONIAE PCR  Not Detected   MYCOPLAMA PNEUMO PCR  Not Detected   INFLUENZA A H3  Not Detected   INFLUENZA A H1  Not Detected   RSV, PCR  Not Detected     Results from last 7 days   Lab Units 05/14/20  0156   INR  1.02         Lab Results   Component Value Date    TSH 0.708 02/13/2019     Estimated Creatinine Clearance: 58.4 mL/min (A) (by C-G formula based on SCr of 1.01 mg/dL (H)).         Imaging: I personally visualized the images of scans/x-rays performed within last 3 days.      Assessment:  Fever and chills  Acute exacerbation of COPD  Chronic diastolic CHF  Lower extremity edema  GERSON on CPAP  Suspect OHS  Morbid obesity  Chronic kidney disease  Fibromyalgia and opioid  dependence      Recommendations:  At this point we have a elderly female with known history of COPD follows my partner Dr. Calhoun.  Presented with fever but chest x-ray currently clear.  COVID-19 testing negative.  Procalcitonin noted to be negative.  Agree with current treatment of steroid and bronchodilators.  Do not see any indication for antibiotics from pulmonary point of view as such however she does have some leukocytosis and if she has persistent fever would recommend further work-up of fever including CT chest to evaluate further.  She does have increased swelling of lower extremity and currently on torsemide home dose.  This will be continued  GERSON on CPAP per patient.  She tells me she is religiously compliant with her home CPAP.  I have asked her to get her home CPAP and will resume her home CPAP and reviewed download  Long-term weight loss will be beneficial  Discussed plan of care in detail with the patient  We will continue to follow closely          Srini Bianchi MD  5/14/2020  06:16      Much of this encounter note is an electronic transcription/translation of spoken language to printed text using Dragon Software.

## 2020-05-14 NOTE — ED NOTES
Pt to triage via [employer] # [id number], for c/o short of air and fever. Pt alert and oriented. Pt denies any travel, although pt's son is . Pt provided with mask at triage. I wore face mask during patient encounter at triage desk.      Janice Talamantes RN  05/14/20 0141

## 2020-05-14 NOTE — ED NOTES
/  Nursing report ED to floor  Rose Cheema  79 y.o.  female    HPI (triage note):   Chief Complaint   Patient presents with   • Fever   • Shortness of Breath       Admitting doctor:   Mark Anthony Villanueva MD    Admitting diagnosis:   The primary encounter diagnosis was Fever and chills. Diagnoses of Acute respiratory distress, Hypoxemia, and COPD with acute exacerbation (CMS/HCC) were also pertinent to this visit.    Code status:   Current Code Status     Date Active Code Status Order ID Comments User Context       5/14/2020 0338 CPR [id number]  Sejal Garcia APRN ED       Questions for Current Code Status     Question Answer Comment    Code Status CPR     Medical Interventions (Level of Support Prior to Arrest) Full           Allergies:   Aspartame and Cephalexin    Weight:   There were no vitals filed for this visit.    Most recent vitals:   Vitals:    05/14/20 0315 05/14/20 0330 05/14/20 0340 05/14/20 0345   BP:  116/77 116/77    Pulse: 80 69 62 67   Resp:   16    Temp:   97.6 °F (36.4 °C)    TempSrc:   Tympanic    SpO2: 95% 94% 97% 93%   Height:           Active LDAs/IV Access:   Lines, Drains & Airways    Active LDAs     Name:   Placement date:   Placement time:   Site:   Days:    Peripheral IV 05/14/20 0154 Left Antecubital   05/14/20 0154    Antecubital   less than 1    External Urinary Catheter   05/14/20 0220    --   less than 1                Labs (abnormal labs have a star):   Labs Reviewed   COMPREHENSIVE METABOLIC PANEL - Abnormal; Notable for the following components:       Result Value    Glucose 146 (*)     Creatinine 1.01 (*)     Alkaline Phosphatase 140 (*)     eGFR Non  Amer 53 (*)     All other components within normal limits    Narrative:     GFR Normal >60  Chronic Kidney Disease <60  Kidney Failure <15     CBC WITH AUTO DIFFERENTIAL - Abnormal; Notable for the following components:    WBC 15.37 (*)     MCV 98.0 (*)     Neutrophil % 88.0 (*)     Lymphocyte % 5.6 (*)      "Neutrophils, Absolute 13.53 (*)     Immature Grans, Absolute 0.06 (*)     All other components within normal limits   RESPIRATORY PANEL, PCR - Normal    Narrative:     The coronavirus on the RVP is NOT COVID-19 and is NOT indicative of infection with COVID-19.    SARS-COV-2 PCR ([place] IN-HOUSE PERFORMED), NP SWAB IN TRANSPORT MEDIA - Normal   PROTIME-INR - Normal   APTT - Normal   BNP (IN-HOUSE) - Normal    Narrative:     Among patients with dyspnea, NT-proBNP is highly sensitive for the detection of acute congestive heart failure. In addition NT-proBNP of <300 pg/ml effectively rules out acute congestive heart failure with 99% negative predictive value.    Results may be falsely decreased if patient taking Biotin.     TROPONIN (IN-HOUSE) - Normal    Narrative:     Troponin T Reference Range:  <= 0.03 ng/mL-   Negative for AMI  >0.03 ng/mL-     Abnormal for myocardial necrosis.  Clinicians would have to utilize clinical acumen, EKG, Troponin and serial changes to determine if it is an Acute Myocardial Infarction or myocardial injury due to an underlying chronic condition.       Results may be falsely decreased if patient taking Biotin.     PROCALCITONIN - Normal    Narrative:     As a Marker for Sepsis (Non-Neonates):   1. <0.5 ng/mL represents a low risk of severe sepsis and/or septic shock.  1. >2 ng/mL represents a high risk of severe sepsis and/or septic shock.    As a Marker for Lower Respiratory Tract Infections that require antibiotic therapy:  PCT on Admission     Antibiotic Therapy             6-12 Hrs later  > 0.5                Strongly Recommended            >0.25 - <0.5         Recommended  0.1 - 0.25           Discouraged                   Remeasure/reassess PCT  <0.1                 Strongly Discouraged          Remeasure/reassess PCT      As 28 day mortality risk marker: \"Change in Procalcitonin Result\" (> 80 % or <=80 %) if Day 0 (or Day 1) and Day 4 values are available. Refer to " http://www.[facility]-pct-calculator.com/   Change in PCT <=80 %   A decrease of PCT levels below or equal to 80 % defines a positive change in PCT test result representing a higher risk for 28-day all-cause mortality of patients diagnosed with severe sepsis or septic shock.  Change in PCT > 80 %   A decrease of PCT levels of more than 80 % defines a negative change in PCT result representing a lower risk for 28-day all-cause mortality of patients diagnosed with severe sepsis or septic shock.                Results may be falsely decreased if patient taking Biotin.    LACTIC ACID, PLASMA - Normal   BLOOD CULTURE   BLOOD CULTURE   BASIC METABOLIC PANEL   CBC (NO DIFF)   URINALYSIS W/ CULTURE IF INDICATED   CBC AND DIFFERENTIAL    Narrative:     The following orders were created for panel order CBC & Differential.  Procedure                               Abnormality         Status                     ---------                               -----------         ------                     CBC Auto Differential[[id number]]        Abnormal            Final result                 Please view results for these tests on the individual orders.       EKG:   ECG 12 Lead   Preliminary Result   HEART RATE= 72  bpm   RR Interval= 834  ms   MD Interval=   ms   P Horizontal Axis=   deg   P Front Axis=   deg   QRSD Interval= 109  ms   QT Interval= 427  ms   QRS Axis= -45  deg   T Wave Axis= 29  deg   - ABNORMAL ECG -   Atrial fibrillation   Incomplete left bundle branch block   Anterior Q waves, possibly due to ILBBB   Electronically Signed By:    Date and Time of Study: 2020-05-14 02:06:17          Meds given in ED:   Medications   sodium chloride 0.9 % flush 10 mL (10 mL Intravenous Given 5/14/20 0335)   sodium chloride 0.9 % flush 10 mL (has no administration in time range)   sodium chloride 0.9 % flush 10 mL (has no administration in time range)   acetaminophen (TYLENOL) tablet 650 mg (has no administration in time range)     Or    acetaminophen (TYLENOL) 160 MG/5ML solution 650 mg (has no administration in time range)     Or   acetaminophen (TYLENOL) suppository 650 mg (has no administration in time range)   bisacodyl (DULCOLAX) suppository 10 mg (has no administration in time range)   ondansetron (ZOFRAN) tablet 4 mg (has no administration in time range)     Or   ondansetron (ZOFRAN) injection 4 mg (has no administration in time range)   calcium carbonate (TUMS) chewable tablet 500 mg (200 mg elemental) (has no administration in time range)   nitroglycerin (NITROSTAT) SL tablet 0.4 mg (has no administration in time range)   methylPREDNISolone sodium succinate (SOLU-Medrol) injection 125 mg (125 mg Intravenous Given 5/14/20 0660)       Imaging results:  No radiology results for the last day    Ambulatory status:   - bedrest    Social issues:   Social History     Socioeconomic History   • Marital status:      Spouse name: Not on file   • Number of children: Not on file   • Years of education: Not on file   • Highest education level: Not on file   Tobacco Use   • Smoking status: Former Smoker   • Smokeless tobacco: Never Used   Substance and Sexual Activity   • Alcohol use: No   • Drug use: No   • Sexual activity: Never        Kimmie Roldan RN  05/14/20 8357

## 2020-05-14 NOTE — H&P
Patient Name:  Rose Cheema  YOB: 1940  MRN:  [id number]  Admit Date:  5/14/2020  Patient Care Team:  Cheng Guevara MD as PCP - General (Internal Medicine)      Subjective   History Present Illness     Chief Complaint   Patient presents with   • Fever   • Shortness of Breath       Ms. Cheema is a 79 y.o. former smoker with a history of COPD, chronic diastolic congestive heart failure, pulmonary hypertension, coronary artery disease, obstructive sleep apnea, hypertension, hypothyroidism, and fibromyalgia that presents to [facility] complaining of fever, chills, and shortness of breath.  She reports the fevers began yesterday and reached 102 at the highest at home.  She reports chronic shortness of breath that has worsened since yesterday. She denies orthopnea, cough, and chest pain.  She reports chronic lymphedema, but denies increased swelling of the legs.  She denies loss of taste or smell and known sick contacts.  She reports incontinence, decreased urine, and left flank pain.  She describes the pain as intermittent, mild, and aching in nature.  She denies exacerbating and alleviating factors to the pain.   Work up in the ED revealed glucose 146, creat 1.01, GFR 53, alkaline phosphatase 140, and WBC 15.37.  RVP and COVID 19 tests were negative.  Chest x-ray was negative for an acute process.  Blood cultures were drawn and are pending.  She had a temperature of 102.5 on arrival to the ED.         History of Present Illness  Review of Systems   Constitutional: Positive for chills and fever.   HENT: Negative.    Eyes: Negative.    Respiratory: Positive for shortness of breath.    Cardiovascular: Positive for leg swelling (chronic BLE lymphedema).   Gastrointestinal: Negative.    Genitourinary: Positive for flank pain (left ).   Skin: Negative.    Neurological: Negative.    Psychiatric/Behavioral: Negative.         Personal History     Past Medical History:   Diagnosis Date   •  Anemia    • Anxiety    • Arthritis    • CHF (congestive heart failure) (CMS/HCC)    • Coronary artery disease    • Depression    • Disease of thyroid gland    • Fibromyalgia    • GERD (gastroesophageal reflux disease)    • Hypertension    • Moderate tricuspid valve stenosis    • Osteoporosis    • Peripheral neuropathy    • Pulmonary hypertension (CMS/HCC)    • SBO (small bowel obstruction) (CMS/HCC)    • Stenosis of mitral valve      Past Surgical History:   Procedure Laterality Date   • BILATERAL OOPHORECTOMY     • CARDIAC CATHETERIZATION     • CHOLECYSTECTOMY     • ERCP N/A 2/15/2019    Procedure: ENDOSCOPIC RETROGRADE CHOLANGIOPANCREATOGRAPHY WITH PARTIAL CHOLANGIOGRAM;  Surgeon: Gerald Herr MD;  Location: [facility] ENDOSCOPY;  Service: Gastroenterology   • EXPLORATORY LAPAROTOMY     • GASTRIC BYPASS     • HERNIA REPAIR      inguinal and ventral   • HYSTERECTOMY     • OVARIAN CYST REMOVAL       History reviewed. No pertinent family history.  Social History     Tobacco Use   • Smoking status: Former Smoker   • Smokeless tobacco: Never Used   Substance Use Topics   • Alcohol use: No   • Drug use: No       (Not in a hospital admission)  Allergies:    Allergies   Allergen Reactions   • Aspartame Other (See Comments)     CAUSES MIGRAINES   • Cephalexin Rash     Tolerated piperacillin-tazobactam (Zosyn) and ampicillin-sulbactam (Unasyn) during Feb 2019 admission.       Objective    Objective     Vital Signs  Temp:  [97.6 °F (36.4 °C)-102.5 °F (39.2 °C)] 97.6 °F (36.4 °C)  Heart Rate:  [62-80] 67  Resp:  [16-21] 16  BP: (116-144)/(73-93) 116/77  SpO2:  [93 %-100 %] 93 %  on  Flow (L/min):  [2-3] 2;   Device (Oxygen Therapy): nasal cannula  Body mass index is 52.42 kg/m².    Physical Exam   Constitutional: She is oriented to person, place, and time. She appears well-developed and well-nourished.   HENT:   Head: Normocephalic and atraumatic.   Eyes: Conjunctivae and EOM are normal.   Neck: Normal range of motion.  Neck supple.   Cardiovascular: Normal rate and regular rhythm.   No murmur heard.  Pulmonary/Chest: Effort normal. No respiratory distress. She has decreased breath sounds (throughout). She has rhonchi in the right lower field and the left lower field.   Abdominal: Soft. Bowel sounds are normal. She exhibits no distension. There is no tenderness.   Musculoskeletal: Normal range of motion. She exhibits edema (Chronic BLE lymphedema).   Neurological: She is alert and oriented to person, place, and time.   Skin: Skin is warm and dry.   Chronic venous changes of BLE   Psychiatric: She has a normal mood and affect. Her behavior is normal.   Nursing note and vitals reviewed.      Results Review:  I reviewed the patient's new clinical results.  I reviewed the patient's new imaging results and agree with the interpretation.  I reviewed the patient's other test results and agree with the interpretation  I personally viewed and interpreted the patient's EKG/Telemetry data  Discussed with ED provider.    Lab Results (last 24 hours)     Procedure Component Value Units Date/Time    CBC & Differential [[id number]] Collected:  05/14/20 0156    Specimen:  Blood Updated:  05/14/20 0214    Narrative:       The following orders were created for panel order CBC & Differential.  Procedure                               Abnormality         Status                     ---------                               -----------         ------                     CBC Auto Differential[[id number]]        Abnormal            Final result                 Please view results for these tests on the individual orders.    Comprehensive Metabolic Panel [[id number]]  (Abnormal) Collected:  05/14/20 0156    Specimen:  Blood Updated:  05/14/20 0237     Glucose 146 mg/dL      BUN 17 mg/dL      Creatinine 1.01 mg/dL      Sodium 138 mmol/L      Potassium 4.7 mmol/L      Chloride 100 mmol/L      CO2 25.3 mmol/L      Calcium 8.8 mg/dL      Total Protein 7.3 g/dL       Albumin 4.50 g/dL      ALT (SGPT) 15 U/L      AST (SGOT) 18 U/L      Alkaline Phosphatase 140 U/L      Total Bilirubin 0.5 mg/dL      eGFR Non African Amer 53 mL/min/1.73      Globulin 2.8 gm/dL      A/G Ratio 1.6 g/dL      BUN/Creatinine Ratio 16.8     Anion Gap 12.7 mmol/L     Narrative:       GFR Normal >60  Chronic Kidney Disease <60  Kidney Failure <15      Protime-INR [[id number]]  (Normal) Collected:  05/14/20 0156    Specimen:  Blood Updated:  05/14/20 0224     Protime 13.1 Seconds      INR 1.02    aPTT [[id number]]  (Normal) Collected:  05/14/20 0156    Specimen:  Blood Updated:  05/14/20 0224     PTT 33.2 seconds     BNP [[id number]]  (Normal) Collected:  05/14/20 0156    Specimen:  Blood Updated:  05/14/20 0235     proBNP 738.8 pg/mL     Narrative:       Among patients with dyspnea, NT-proBNP is highly sensitive for the detection of acute congestive heart failure. In addition NT-proBNP of <300 pg/ml effectively rules out acute congestive heart failure with 99% negative predictive value.    Results may be falsely decreased if patient taking Biotin.      Troponin [[id number]]  (Normal) Collected:  05/14/20 0156    Specimen:  Blood Updated:  05/14/20 0237     Troponin T 0.014 ng/mL     Narrative:       Troponin T Reference Range:  <= 0.03 ng/mL-   Negative for AMI  >0.03 ng/mL-     Abnormal for myocardial necrosis.  Clinicians would have to utilize clinical acumen, EKG, Troponin and serial changes to determine if it is an Acute Myocardial Infarction or myocardial injury due to an underlying chronic condition.       Results may be falsely decreased if patient taking Biotin.      Blood Culture - Blood, Arm, Left [[id number]] Collected:  05/14/20 0156    Specimen:  Blood from Arm, Left Updated:  05/14/20 0204    Procalcitonin [[id number]]  (Normal) Collected:  05/14/20 0156    Specimen:  Blood Updated:  05/14/20 0239     Procalcitonin 0.13 ng/mL     Narrative:       As a Marker for Sepsis (Non-Neonates):   1. <0.5  "ng/mL represents a low risk of severe sepsis and/or septic shock.  1. >2 ng/mL represents a high risk of severe sepsis and/or septic shock.    As a Marker for Lower Respiratory Tract Infections that require antibiotic therapy:  PCT on Admission     Antibiotic Therapy             6-12 Hrs later  > 0.5                Strongly Recommended            >0.25 - <0.5         Recommended  0.1 - 0.25           Discouraged                   Remeasure/reassess PCT  <0.1                 Strongly Discouraged          Remeasure/reassess PCT      As 28 day mortality risk marker: \"Change in Procalcitonin Result\" (> 80 % or <=80 %) if Day 0 (or Day 1) and Day 4 values are available. Refer to http://www.[facility] Healthcare[facility]ct-calculator.com/   Change in PCT <=80 %   A decrease of PCT levels below or equal to 80 % defines a positive change in PCT test result representing a higher risk for 28-day all-cause mortality of patients diagnosed with severe sepsis or septic shock.  Change in PCT > 80 %   A decrease of PCT levels of more than 80 % defines a negative change in PCT result representing a lower risk for 28-day all-cause mortality of patients diagnosed with severe sepsis or septic shock.                Results may be falsely decreased if patient taking Biotin.     Lactic Acid, Plasma [[id number]]  (Normal) Collected:  05/14/20 0156    Specimen:  Blood Updated:  05/14/20 0222     Lactate 1.6 mmol/L     CBC Auto Differential [[id number]]  (Abnormal) Collected:  05/14/20 0156    Specimen:  Blood Updated:  05/14/20 0214     WBC 15.37 10*3/mm3      RBC 4.43 10*6/mm3      Hemoglobin 14.0 g/dL      Hematocrit 43.4 %      MCV 98.0 fL      MCH 31.6 pg      MCHC 32.3 g/dL      RDW 13.1 %      RDW-SD 48.1 fl      MPV 10.5 fL      Platelets 162 10*3/mm3      Neutrophil % 88.0 %      Lymphocyte % 5.6 %      Monocyte % 5.3 %      Eosinophil % 0.5 %      Basophil % 0.2 %      Immature Grans % 0.4 %      Neutrophils, Absolute 13.53 10*3/mm3      Lymphocytes, " Absolute 0.86 10*3/mm3      Monocytes, Absolute 0.82 10*3/mm3      Eosinophils, Absolute 0.07 10*3/mm3      Basophils, Absolute 0.03 10*3/mm3      Immature Grans, Absolute 0.06 10*3/mm3      nRBC 0.0 /100 WBC     Respiratory Panel, PCR - Swab, Nasopharynx [[id number]]  (Normal) Collected:  05/14/20 0202    Specimen:  Swab from Nasopharynx Updated:  05/14/20 0319     ADENOVIRUS, PCR Not Detected     Coronavirus 229E Not Detected     Coronavirus HKU1 Not Detected     Coronavirus NL63 Not Detected     Coronavirus OC43 Not Detected     Human Metapneumovirus Not Detected     Human Rhinovirus/Enterovirus Not Detected     Influenza B PCR Not Detected     Parainfluenza Virus 1 Not Detected     Parainfluenza Virus 2 Not Detected     Parainfluenza Virus 3 Not Detected     Parainfluenza Virus 4 Not Detected     Bordetella pertussis pcr Not Detected     Influenza A H1 2009 PCR Not Detected     Chlamydophila pneumoniae PCR Not Detected     Mycoplasma pneumo by PCR Not Detected     Influenza A PCR Not Detected     Influenza A H3 Not Detected     Influenza A H1 Not Detected     RSV, PCR Not Detected     Bordetella parapertussis PCR Not Detected    Narrative:       The coronavirus on the RVP is NOT COVID-19 and is NOT indicative of infection with COVID-19.     SARS-CoV-2 PCR ([place] IN-HOUSE PERFORMED), NP SWAB IN TRANSPORT MEDIA - Swab, Nasopharynx [[id number]]  (Normal) Collected:  05/14/20 0202    Specimen:  Swab from Nasopharynx Updated:  05/14/20 0307     COVID19 Not Detected    Blood Culture - Blood, Hand, Left [[id number]] Collected:  05/14/20 0236    Specimen:  Blood from Hand, Left Updated:  05/14/20 0241          Imaging Results (Last 24 Hours)     Procedure Component Value Units Date/Time    XR Chest 1 View [[id number]] Collected:  05/14/20 0213     Updated:  05/14/20 0217    Narrative:       PORTABLE CHEST 05/14/2020 AT 1:53 AM     CLINICAL HISTORY: Dyspnea     Compared to the previous chest dated 05/07/2019.     The  lungs are somewhat poorly inflated but appear free of infiltrates.  There are no pleural effusions. The heart is mildly enlarged.     IMPRESSIONS: Mild cardiomegaly. No acute process is identified.     This report was finalized on 5/14/2020 2:14 AM by Dr. Jerome Duke M.D.                  ECG 12 Lead   Preliminary Result   HEART RATE= 72  bpm   RR Interval= 834  ms   MT Interval=   ms   P Horizontal Axis=   deg   P Front Axis=   deg   QRSD Interval= 109  ms   QT Interval= 427  ms   QRS Axis= -45  deg   T Wave Axis= 29  deg   - ABNORMAL ECG -   Atrial fibrillation   Incomplete left bundle branch block   Anterior Q waves, possibly due to ILBBB   Electronically Signed By:    Date and Time of Study: 2020-05-14 02:06:17           Assessment/Plan     Active Hospital Problems    Diagnosis POA   • **Fever and chills [R50.9] Yes   • Chronic diastolic CHF (congestive heart failure) (CMS/HCC) [I50.32] Yes   • Opioid dependence (CMS/HCC) [F11.20] Yes   • Hypothyroidism [E03.9] Unknown   • Pulmonary hypertension  [I27.20] Yes   • Fibromyalgia [M79.7] Yes   • CAD (coronary artery disease), native coronary artery [I25.10] Yes   • Benign essential hypertension [I10] Yes   • COPD (chronic obstructive pulmonary disease) (CMS/HCC) [J44.9] Yes   • Obstructive sleep apnea [G47.33] Yes     Fever and Chills  -No infiltrate seen on chest x-ray, will check CT of the chest  -Leukocytosis. She complains of mild left flank pain and decreased urine. Will check UA  -Procal and lactate ok  -Blood cultures pending  -Infectious disease consult  -RVP and COVID 19 test negative    COPD Exacerbation  -She received Solumedrol in the ED, will continue 40 mg IV q 8 H  -Continue home inhalers  -Initiate Symbicort until CT of the chest results  -Pulmonary consult  -IS  -Supplemental Oxygen as needed to keep sats greater than or equal to 92%    Chronic Diastolic Congestive Heart Failure/Hypertension  -No pleural effusion/vascular congestion on chest  x-ray  -Blood pressures stable  -Continue home Torsemide  -Daily weights  -Strict I&O  -Cardiac diet    Chronic Kidney Disease Stage 3  -Creat at baseline  -Avoid nephrotoxins  -Monitor renal function    Obstructive Sleep Apnea  -She did not bring her CPAP tp the hospital  -Continue with supplemental oxygen as needed    Hypothyroidism  -Continue Levothyroxine    Fibromyalgia with Opioid Dependence  -Continue home pain medication regimen    -I discussed the patients findings and my recommendations with patient.    VTE Prophylaxis - Lovenox 40 mg SC daily.  Code Status - Full code.       NELL Alexis  [facility]  05/14/20  04:01

## 2020-05-14 NOTE — PROGRESS NOTES
Discharge Planning Assessment  [facility]     Patient Name: Rose Cheema  MRN: [id number]  Today's Date: 5/14/2020    Admit Date: 5/14/2020    Discharge Needs Assessment     Row Name 05/14/20 1053       Living Environment    Lives With  alone    Current Living Arrangements  home/apartment/condo    Potentially Unsafe Housing Conditions  other (see comments) no concerns     Primary Care Provided by  self    Provides Primary Care For  no one    Family Caregiver if Needed  child(arlene), adult    Quality of Family Relationships  helpful;involved;supportive    Able to Return to Prior Arrangements  yes       Resource/Environmental Concerns    Resource/Environmental Concerns  none    Transportation Concerns  car, none       Transition Planning    Patient/Family Anticipates Transition to  home with help/services;inpatient rehabilitation facility    Patient/Family Anticipated Services at Transition  skilled nursing;home health care       Discharge Needs Assessment    Readmission Within the Last 30 Days  no previous admission in last 30 days    Concerns to be Addressed  care coordination/care conferences;discharge planning    Equipment Currently Used at Home  rollator;grab bar;shower chair    Anticipated Changes Related to Illness  none    Equipment Needed After Discharge  none    Outpatient/Agency/Support Group Needs  homecare agency;skilled nursing facility    Discharge Facility/Level of Care Needs  home with home health;nursing facility, skilled        Discharge Plan     Row Name 05/14/20 1053       Plan    Plan  Pending referral to [facility] vs. Home with [facility]      Provided Post Acute Provider Quality & Resource List?  Yes    Post Acute Provider Quality and Resource List  Home Health;Inpatient Rehab    Delivered To  Patient    Method of Delivery  In person    Patient/Family in Agreement with Plan  yes    Plan Comments  CCP met with patient at bedside. CCP role explained and discharge planning discussed. Face  sheet verified and IMM noted. Patient's PCP is Dr. Guevara. Patient lives alone, with one step to the entrance of her home. Patient's bedroom and bathroom are on the main level. Patient has grab bars and shower chair present in her bathroom. Patient uses a rollator for mobility. Patient has been to [facility] for SNF and has used [facility] health. Patient states she has a caregiver that comes in 2xs a week to assist her with housekeeping and bathing. Patient has CPAP and nebulizer at home. Patient states her spouse passed away two weeks ago and she has been discussing with her son about assisted living, independent living or long term care for the future. Patient states she does not feel like that is needed yet. Patient states if SNF is needed she would like [facility] and if she returns home would like to use [facility]. [facility] provided Medicare Ratings. Patient gave [facility] permission to speak with her son, Mario Cheema [phone]. CCP spoke with patient's son, he is in agreement for [facility] for short term rehab if it is needed. Patient's son states they have talked about assisted living but are not ready for the transition at this time. CCP offered resources; patient's son declined. CCP will follow for PT eval to determine if SNF is needed or if plan is home with [facility]. Minnie Bryan W         Destination      Coordination has not been started for this encounter.      Durable Medical Equipment      Coordination has not been started for this encounter.      Dialysis/Infusion      Coordination has not been started for this encounter.      Home Medical Care      Coordination has not been started for this encounter.      Therapy      Coordination has not been started for this encounter.      Community Resources      Coordination has not been started for this encounter.          Demographic Summary     Row Name 05/14/20 7638       General Information    Admission Type  inpatient    Arrived From   emergency department    Required Notices Provided  Important Message from Medicare    Referral Source  admission list    Reason for Consult  discharge planning    Preferred Language  English     Used During This Interaction  no        Functional Status     Row Name 05/14/20 1052       Functional Status    Usual Activity Tolerance  good    Current Activity Tolerance  moderate       Functional Status, IADL    Medications  assistive equipment    Meal Preparation  assistive equipment    Housekeeping  assistive equipment    Laundry  assistive equipment    Shopping  assistive equipment       Mental Status    General Appearance WDL  WDL       Mental Status Summary    Recent Changes in Mental Status/Cognitive Functioning  no changes        Psychosocial    No documentation.       Abuse/Neglect    No documentation.       Legal    No documentation.       Substance Abuse    No documentation.       Patient Forms    No documentation.           MEÑO Koch

## 2020-05-14 NOTE — PLAN OF CARE
Pt to floor complaints of soa with exertion and pain in legs (baseline). Vitals wnl. External cath placed. Will continue to monitor

## 2020-05-14 NOTE — DISCHARGE PLACEMENT REQUEST
"Adilene Caldera (79 y.o. Female)     Date of Birth Social Security Number Address Home Phone MRN    1940  [id number] [address] [phone] [id number]    Ep[employer]op[employer] Marital Status          [employer]ev[employer]        Admission Date Admission Type Admitting Provider Attending Provider Department, Room/Bed    5/14/20 Emergency Ra Daugherty MD Jackson, Alan David, MD [address], [address]    Discharge Date Discharge Disposition Discharge Destination                       Attending Provider:  Ra Daugherty MD    Allergies:  Aspartame, Cephalexin    Isolation:  None   Infection:  None   Code Status:  CPR    Ht:  152.4 cm (60\")   Wt:  130 kg (286 lb 13.1 oz)    Admission Cmt:  None   Principal Problem:  Fever and chills [R50.9]                 Active Insurance as of 5/14/2020     Primary Coverage     Payor Plan Insurance Group Employer/Plan Group    MEDICARE MEDICARE A & B      Payor Plan Address Payor Plan Phone Number Payor Plan Fax Number Effective Dates    [address] [phone]  5/1/2008 - None Entered    [facility] [id number]       Subscriber Name Subscriber Birth Date Member ID       ADILENE CALDERA 1940 [id number]           Secondary Coverage     Payor Plan Insurance Group Employer/Plan Group    [place] [id number]     Payor Plan Address Payor Plan Phone Number Payor Plan Fax Number Effective Dates    [address]   9/1/2004 - None Entered    [facility] [id number]       Subscriber Name Subscriber Birth Date Member ID       ADILENE CALDERA 1940 [id number]                 Emergency Contacts      (Rel.) Home Phone Work Phone Mobile Phone    Mario Caldera (Son) [phone] -- --            "

## 2020-05-14 NOTE — ED PROVIDER NOTES
EMERGENCY DEPARTMENT ENCOUNTER    CHIEF COMPLAINT  Chief Complaint: Fever/shortness of breath  History given by: Patient  History limited by: None  Room Number: 18/18  PMD: Cheng Guevara MD      HPI:  Pt is a 79 y.o. female who presents complaining of fevers and chills as well as shortness of breath.  The patient states the symptoms have been moderate in nature and gradual in onset and worsening throughout the evening tonight.  She denies chest pain or body aches.  Prior to arrival, she was given a nebulizer treatment and she took 2 Tylenol secondary to a fever at home.  She also has a history of COPD.  She states the shortness of breath has mildly improved following the treatment in route.  The patient has no known sick contacts.  The patient denies cough, chest pain, headache, blurry vision, dysuria or urinary symptoms.  She has had several episodes of COPD exacerbations requiring admission in the past as well as fever with UTI in the past.    PAST MEDICAL HISTORY  Active Ambulatory Problems     Diagnosis Date Noted   • Urinary incontinence 08/24/2017   • Urinary frequency 08/30/2017   • Generalized abdominal pain 02/12/2019   • Pulmonary hypertension  02/12/2019   • Benign essential hypertension 09/22/2014   • Bilateral lower extremity edema (chronic) 05/17/2018   • CAD (coronary artery disease), native coronary artery 11/02/2015   • COPD (chronic obstructive pulmonary disease) (CMS/HCC) 09/22/2014   • Diastolic dysfunction 12/22/2014   • Obesity 09/22/2014   • Obstructive sleep apnea 09/22/2014   • Secondary pulmonary arterial hypertension (CMS/[facility]) 09/22/2014   • H/O: hysterectomy 09/22/2014   • Fibromyalgia 02/12/2019   • Hx SBO 02/12/2019   • Hx of cholecystectomy 02/13/2019   • Hypoglycemia 02/13/2019   • Fall 02/13/2019   • Disease of thyroid gland 02/13/2019   • Chronic pain syndrome 02/13/2019   • Anemia 02/13/2019   • Pneumonia of right lower lobe due to infectious organism (CMS/[facility]) 04/10/2019   •  Acute UTI 05/07/2019   • Chronic diastolic CHF (congestive heart failure) (CMS/Former[facility]) 05/08/2019   • Opioid dependence (CMS/[facility]) 05/08/2019   • Venous stasis dermatitis of both lower extremities 05/08/2019   • Gram-negative bacteremia 05/08/2019   • Hypokalemia 05/09/2019   • E. coli bacteremia 05/09/2019   • Anemia of chronic disease 05/10/2019     Resolved Ambulatory Problems     Diagnosis Date Noted   • Acute UTI ([facility]) 02/13/2019     Past Medical History:   Diagnosis Date   • Anxiety    • Arthritis    • CHF (congestive heart failure) (CMS/[facility])    • Coronary artery disease    • Depression    • GERD (gastroesophageal reflux disease)    • Hypertension    • Moderate tricuspid valve stenosis    • Osteoporosis    • Peripheral neuropathy    • SBO (small bowel obstruction) (CMS/HCC)    • Stenosis of mitral valve        PAST SURGICAL HISTORY  Past Surgical History:   Procedure Laterality Date   • BILATERAL OOPHORECTOMY     • CARDIAC CATHETERIZATION     • CHOLECYSTECTOMY     • ERCP N/A 2/15/2019    Procedure: ENDOSCOPIC RETROGRADE CHOLANGIOPANCREATOGRAPHY WITH PARTIAL CHOLANGIOGRAM;  Surgeon: Gerald Herr MD;  Location: [facility] ENDOSCOPY;  Service: Gastroenterology   • EXPLORATORY LAPAROTOMY     • GASTRIC BYPASS     • HERNIA REPAIR      inguinal and ventral   • HYSTERECTOMY     • OVARIAN CYST REMOVAL         FAMILY HISTORY  History reviewed. No pertinent family history.    SOCIAL HISTORY  Social History     Socioeconomic History   • Marital status:      Spouse name: Not on file   • Number of children: Not on file   • Years of education: Not on file   • Highest education level: Not on file   Tobacco Use   • Smoking status: Former Smoker   • Smokeless tobacco: Never Used   Substance and Sexual Activity   • Alcohol use: No   • Drug use: No   • Sexual activity: Never       ALLERGIES  Aspartame and Cephalexin    REVIEW OF SYSTEMS  Review of Systems   Constitutional: Positive for chills and fever.   HENT:  Negative for sore throat.    Eyes: Negative.    Respiratory: Positive for shortness of breath. Negative for cough.    Cardiovascular: Negative for chest pain.   Gastrointestinal: Negative for abdominal pain, diarrhea and vomiting.   Genitourinary: Negative for dysuria.   Musculoskeletal: Negative for neck pain.   Skin: Negative for rash.   Allergic/Immunologic: Negative.    Neurological: Negative for weakness, numbness and headaches.   Hematological: Negative.    Psychiatric/Behavioral: Negative.    All other systems reviewed and are negative.      PHYSICAL EXAM  ED Triage Vitals   Temp Pulse Resp BP SpO2   -- -- -- -- --      Temp src Heart Rate Source Patient Position BP Location FiO2 (%)   -- -- -- -- --       Physical Exam   Constitutional: She is oriented to person, place, and time. No distress.   HENT:   Head: Normocephalic and atraumatic.   Eyes: Pupils are equal, round, and reactive to light. EOM are normal.   Neck: Normal range of motion. Neck supple.   Cardiovascular: Normal rate, regular rhythm and normal heart sounds.   Pulmonary/Chest: Effort normal. No respiratory distress. She has rhonchi in the right lower field and the left lower field.   Abdominal: Soft. There is no tenderness. There is no rebound and no guarding.   Musculoskeletal: Normal range of motion. She exhibits no edema.   Neurological: She is alert and oriented to person, place, and time. She has normal sensation and normal strength.   Skin: Skin is warm and dry. No rash noted.   Psychiatric: Mood and affect normal.   Nursing note and vitals reviewed.      LAB RESULTS  Lab Results (last 24 hours)     Procedure Component Value Units Date/Time    CBC & Differential [[id number]] Collected:  05/14/20 0156    Specimen:  Blood Updated:  05/14/20 0214    Narrative:       The following orders were created for panel order CBC & Differential.  Procedure                               Abnormality         Status                     ---------                                -----------         ------                     CBC Auto Differential[[id number]]        Abnormal            Final result                 Please view results for these tests on the individual orders.    Comprehensive Metabolic Panel [[id number]]  (Abnormal) Collected:  05/14/20 0156    Specimen:  Blood Updated:  05/14/20 0237     Glucose 146 mg/dL      BUN 17 mg/dL      Creatinine 1.01 mg/dL      Sodium 138 mmol/L      Potassium 4.7 mmol/L      Chloride 100 mmol/L      CO2 25.3 mmol/L      Calcium 8.8 mg/dL      Total Protein 7.3 g/dL      Albumin 4.50 g/dL      ALT (SGPT) 15 U/L      AST (SGOT) 18 U/L      Alkaline Phosphatase 140 U/L      Total Bilirubin 0.5 mg/dL      eGFR Non African Amer 53 mL/min/1.73      Globulin 2.8 gm/dL      A/G Ratio 1.6 g/dL      BUN/Creatinine Ratio 16.8     Anion Gap 12.7 mmol/L     Narrative:       GFR Normal >60  Chronic Kidney Disease <60  Kidney Failure <15      Protime-INR [[id number]]  (Normal) Collected:  05/14/20 0156    Specimen:  Blood Updated:  05/14/20 0224     Protime 13.1 Seconds      INR 1.02    aPTT [[id number]]  (Normal) Collected:  05/14/20 0156    Specimen:  Blood Updated:  05/14/20 0224     PTT 33.2 seconds     BNP [[id number]]  (Normal) Collected:  05/14/20 0156    Specimen:  Blood Updated:  05/14/20 0235     proBNP 738.8 pg/mL     Narrative:       Among patients with dyspnea, NT-proBNP is highly sensitive for the detection of acute congestive heart failure. In addition NT-proBNP of <300 pg/ml effectively rules out acute congestive heart failure with 99% negative predictive value.    Results may be falsely decreased if patient taking Biotin.      Troponin [[id number]]  (Normal) Collected:  05/14/20 0156    Specimen:  Blood Updated:  05/14/20 0237     Troponin T 0.014 ng/mL     Narrative:       Troponin T Reference Range:  <= 0.03 ng/mL-   Negative for AMI  >0.03 ng/mL-     Abnormal for myocardial necrosis.  Clinicians would have to utilize clinical  "acumen, EKG, Troponin and serial changes to determine if it is an Acute Myocardial Infarction or myocardial injury due to an underlying chronic condition.       Results may be falsely decreased if patient taking Biotin.      Blood Culture - Blood, Arm, Left [[id number]] Collected:  05/14/20 0156    Specimen:  Blood from Arm, Left Updated:  05/14/20 0204    Procalcitonin [[id number]]  (Normal) Collected:  05/14/20 0156    Specimen:  Blood Updated:  05/14/20 0239     Procalcitonin 0.13 ng/mL     Narrative:       As a Marker for Sepsis (Non-Neonates):   1. <0.5 ng/mL represents a low risk of severe sepsis and/or septic shock.  1. >2 ng/mL represents a high risk of severe sepsis and/or septic shock.    As a Marker for Lower Respiratory Tract Infections that require antibiotic therapy:  PCT on Admission     Antibiotic Therapy             6-12 Hrs later  > 0.5                Strongly Recommended            >0.25 - <0.5         Recommended  0.1 - 0.25           Discouraged                   Remeasure/reassess PCT  <0.1                 Strongly Discouraged          Remeasure/reassess PCT      As 28 day mortality risk marker: \"Change in Procalcitonin Result\" (> 80 % or <=80 %) if Day 0 (or Day 1) and Day 4 values are available. Refer to http://www.[facility]-pct-calculator.com/   Change in PCT <=80 %   A decrease of PCT levels below or equal to 80 % defines a positive change in PCT test result representing a higher risk for 28-day all-cause mortality of patients diagnosed with severe sepsis or septic shock.  Change in PCT > 80 %   A decrease of PCT levels of more than 80 % defines a negative change in PCT result representing a lower risk for 28-day all-cause mortality of patients diagnosed with severe sepsis or septic shock.                Results may be falsely decreased if patient taking Biotin.     Lactic Acid, Plasma [[id number]]  (Normal) Collected:  05/14/20 0156    Specimen:  Blood Updated:  05/14/20 0222     Lactate 1.6 " mmol/L     CBC Auto Differential [[id number]]  (Abnormal) Collected:  05/14/20 0156    Specimen:  Blood Updated:  05/14/20 0214     WBC 15.37 10*3/mm3      RBC 4.43 10*6/mm3      Hemoglobin 14.0 g/dL      Hematocrit 43.4 %      MCV 98.0 fL      MCH 31.6 pg      MCHC 32.3 g/dL      RDW 13.1 %      RDW-SD 48.1 fl      MPV 10.5 fL      Platelets 162 10*3/mm3      Neutrophil % 88.0 %      Lymphocyte % 5.6 %      Monocyte % 5.3 %      Eosinophil % 0.5 %      Basophil % 0.2 %      Immature Grans % 0.4 %      Neutrophils, Absolute 13.53 10*3/mm3      Lymphocytes, Absolute 0.86 10*3/mm3      Monocytes, Absolute 0.82 10*3/mm3      Eosinophils, Absolute 0.07 10*3/mm3      Basophils, Absolute 0.03 10*3/mm3      Immature Grans, Absolute 0.06 10*3/mm3      nRBC 0.0 /100 WBC     Respiratory Panel, PCR - Swab, Nasopharynx [[id number]]  (Normal) Collected:  05/14/20 0202    Specimen:  Swab from Nasopharynx Updated:  05/14/20 0319     ADENOVIRUS, PCR Not Detected     Coronavirus 229E Not Detected     Coronavirus HKU1 Not Detected     Coronavirus NL63 Not Detected     Coronavirus OC43 Not Detected     Human Metapneumovirus Not Detected     Human Rhinovirus/Enterovirus Not Detected     Influenza B PCR Not Detected     Parainfluenza Virus 1 Not Detected     Parainfluenza Virus 2 Not Detected     Parainfluenza Virus 3 Not Detected     Parainfluenza Virus 4 Not Detected     Bordetella pertussis pcr Not Detected     Influenza A H1 2009 PCR Not Detected     Chlamydophila pneumoniae PCR Not Detected     Mycoplasma pneumo by PCR Not Detected     Influenza A PCR Not Detected     Influenza A H3 Not Detected     Influenza A H1 Not Detected     RSV, PCR Not Detected     Bordetella parapertussis PCR Not Detected    Narrative:       The coronavirus on the RVP is NOT COVID-19 and is NOT indicative of infection with COVID-19.     SARS-CoV-2 PCR ([place] IN-HOUSE PERFORMED), NP SWAB IN TRANSPORT MEDIA - Swab, Nasopharynx [[id number]]  (Normal)  Collected:  05/14/20 0202    Specimen:  Swab from Nasopharynx Updated:  05/14/20 0307     COVID19 Not Detected    Blood Culture - Blood, Hand, Left [[id number]] Collected:  05/14/20 0236    Specimen:  Blood from Hand, Left Updated:  05/14/20 0241          I ordered the above labs and reviewed the results    RADIOLOGY  XR Chest 1 View   Final Result           I ordered the above noted radiological studies. Interpreted by radiologist.  Reviewed by me in PACS.       PROCEDURES  Procedures  EKG          EKG time: 0206  Rhythm/Rate: 72, atrial fib  P waves and WV: absent  QRS, axis: nml, nml  ST and T waves: nml     Interpreted Contemporaneously by me, independently viewed  unchanged compared to prior 4/10/19      PROGRESS AND CONSULTS     The patient was wearing a facemask upon entrance into the room and remained in such throughout their visit.  I was wearing PPE including a facemask as well as gloves at any point entering the room and throughout the visit    0250  Patient's breathing is much improved.  Her oxygen has been able to be weaned down to 2 L and sats are currently 96% on this 2 L.  Currently awaiting respiratory viral panel as well as COVID-19 testing.    0320  Patient states breathing continues to improve but she is still requiring oxygen as she is on 2 L by nasal cannula.  Discussed laboratory results showing a unremarkable chest x-ray as well as a negative COVID-19 testing.  Will give steroids secondary to the probable COPD exacerbation component to today's presentation.  Discussed the need for admission with the patient and she is agreeable.  Call will be placed [facility]    0330  Case discussed with NELL Watson for [facility] who agrees to admit the patient for Dr. Villanueva.  We did discuss and agreed upon discontinuation of her enhanced droplet isolation precautions      MEDICAL DECISION MAKING  Results were reviewed/discussed with the patient and they were also made aware of online access. Pt also made  aware that some labs, such as cultures, will not be resulted during ER visit and follow up with PMD is necessary.     MDM  Number of Diagnoses or Management Options  Acute respiratory distress:   COPD with acute exacerbation (CMS/HCC):   Fever and chills:   Hypoxemia:      Amount and/or Complexity of Data Reviewed  Clinical lab tests: ordered and reviewed  Tests in the radiology section of CPT®: ordered and reviewed  Tests in the medicine section of CPT®: ordered and reviewed  Review and summarize past medical records: yes (Patient was last admitted to the hospital on 5/7/2019 secondary to fever and E. coli bacteremia)  Discuss the patient with other providers: yes (NELL Watson for [facility] who will admit the patient for Dr. Villanueva)  Independent visualization of images, tracings, or specimens: yes (Unremarkable chest x-ray)           DIAGNOSIS  Final diagnoses:   Fever and chills   Acute respiratory distress   Hypoxemia   COPD with acute exacerbation (CMS/HCC)       DISPOSITION  ADMISSION    Discussed treatment plan and reason for admission with pt/family and admitting physician.  Pt/family voiced understanding of the plan for admission for further testing/treatment as needed.         Latest Documented Vital Signs:  As of 03:29  BP- 137/73 HR- 80 Temp- (!) 101.4 °F (38.6 °C) (Tympanic) O2 sat- 95%       Maikel Trejo MD  05/14/20 0330       Maikel Trejo MD  05/14/20 0331       Maikel Trejo MD  05/14/20 0339

## 2020-05-15 ENCOUNTER — APPOINTMENT (OUTPATIENT)
Dept: CT IMAGING | Facility: HOSPITAL | Age: 80
End: 2020-05-15

## 2020-05-15 PROBLEM — R78.81 BACTEREMIA: Status: ACTIVE | Noted: 2020-05-15

## 2020-05-15 LAB
ANION GAP SERPL CALCULATED.3IONS-SCNC: 15.9 MMOL/L (ref 5–15)
BUN BLD-MCNC: 22 MG/DL (ref 8–23)
BUN/CREAT SERPL: 20.4 (ref 7–25)
CALCIUM SPEC-SCNC: 8.3 MG/DL (ref 8.6–10.5)
CHLORIDE SERPL-SCNC: 98 MMOL/L (ref 98–107)
CO2 SERPL-SCNC: 23.1 MMOL/L (ref 22–29)
CREAT BLD-MCNC: 1.08 MG/DL (ref 0.57–1)
GFR SERPL CREATININE-BSD FRML MDRD: 49 ML/MIN/1.73
GLUCOSE BLD-MCNC: 175 MG/DL (ref 65–99)
NT-PROBNP SERPL-MCNC: 3331 PG/ML (ref 5–1800)
POTASSIUM BLD-SCNC: 4 MMOL/L (ref 3.5–5.2)
SODIUM BLD-SCNC: 137 MMOL/L (ref 136–145)

## 2020-05-15 PROCEDURE — 97162 PT EVAL MOD COMPLEX 30 MIN: CPT

## 2020-05-15 PROCEDURE — 25010000002 METHYLPREDNISOLONE PER 40 MG: Performed by: NURSE PRACTITIONER

## 2020-05-15 PROCEDURE — 83880 ASSAY OF NATRIURETIC PEPTIDE: CPT | Performed by: INTERNAL MEDICINE

## 2020-05-15 PROCEDURE — 25010000002 PIPERACILLIN SOD-TAZOBACTAM PER 1 G: Performed by: INTERNAL MEDICINE

## 2020-05-15 PROCEDURE — 80048 BASIC METABOLIC PNL TOTAL CA: CPT | Performed by: INTERNAL MEDICINE

## 2020-05-15 PROCEDURE — 94799 UNLISTED PULMONARY SVC/PX: CPT

## 2020-05-15 PROCEDURE — 99232 SBSQ HOSP IP/OBS MODERATE 35: CPT | Performed by: INTERNAL MEDICINE

## 2020-05-15 PROCEDURE — 25010000002 ENOXAPARIN PER 10 MG: Performed by: INTERNAL MEDICINE

## 2020-05-15 PROCEDURE — 25010000002 AMPICILLIN PER 500 MG: Performed by: INTERNAL MEDICINE

## 2020-05-15 PROCEDURE — 97110 THERAPEUTIC EXERCISES: CPT

## 2020-05-15 PROCEDURE — 25010000002 IOPAMIDOL 61 % SOLUTION: Performed by: INTERNAL MEDICINE

## 2020-05-15 PROCEDURE — 74177 CT ABD & PELVIS W/CONTRAST: CPT

## 2020-05-15 RX ADMIN — TAZOBACTAM SODIUM AND PIPERACILLIN SODIUM 4.5 G: 500; 4 INJECTION, SOLUTION INTRAVENOUS at 10:11

## 2020-05-15 RX ADMIN — SODIUM CHLORIDE, PRESERVATIVE FREE 10 ML: 5 INJECTION INTRAVENOUS at 09:18

## 2020-05-15 RX ADMIN — BUDESONIDE AND FORMOTEROL FUMARATE DIHYDRATE 2 PUFF: 160; 4.5 AEROSOL RESPIRATORY (INHALATION) at 07:55

## 2020-05-15 RX ADMIN — BUDESONIDE AND FORMOTEROL FUMARATE DIHYDRATE 2 PUFF: 160; 4.5 AEROSOL RESPIRATORY (INHALATION) at 20:18

## 2020-05-15 RX ADMIN — PREGABALIN 100 MG: 100 CAPSULE ORAL at 09:18

## 2020-05-15 RX ADMIN — AMPICILLIN SODIUM 2 G: 2 INJECTION, POWDER, FOR SOLUTION INTRAVENOUS at 17:42

## 2020-05-15 RX ADMIN — IOPAMIDOL 85 ML: 612 INJECTION, SOLUTION INTRAVENOUS at 13:58

## 2020-05-15 RX ADMIN — BUSPIRONE HYDROCHLORIDE 30 MG: 15 TABLET ORAL at 20:44

## 2020-05-15 RX ADMIN — PREGABALIN 100 MG: 100 CAPSULE ORAL at 20:44

## 2020-05-15 RX ADMIN — TAZOBACTAM SODIUM AND PIPERACILLIN SODIUM 3.38 G: 375; 3 INJECTION, SOLUTION INTRAVENOUS at 02:41

## 2020-05-15 RX ADMIN — SODIUM CHLORIDE, PRESERVATIVE FREE 10 ML: 5 INJECTION INTRAVENOUS at 20:44

## 2020-05-15 RX ADMIN — FLUOXETINE HYDROCHLORIDE 60 MG: 20 CAPSULE ORAL at 09:18

## 2020-05-15 RX ADMIN — POTASSIUM CHLORIDE 20 MEQ: 10 CAPSULE, COATED, EXTENDED RELEASE ORAL at 09:17

## 2020-05-15 RX ADMIN — AMPICILLIN SODIUM 2 G: 2 INJECTION, POWDER, FOR SOLUTION INTRAVENOUS at 12:46

## 2020-05-15 RX ADMIN — HYDROCODONE BITARTRATE AND ACETAMINOPHEN 1 TABLET: 7.5; 325 TABLET ORAL at 22:07

## 2020-05-15 RX ADMIN — ENOXAPARIN SODIUM 40 MG: 40 INJECTION SUBCUTANEOUS at 05:05

## 2020-05-15 RX ADMIN — TORSEMIDE 50 MG: 20 TABLET ORAL at 09:17

## 2020-05-15 RX ADMIN — ASPIRIN 81 MG: 81 TABLET, CHEWABLE ORAL at 09:18

## 2020-05-15 RX ADMIN — QUETIAPINE FUMARATE 50 MG: 50 TABLET, FILM COATED ORAL at 00:25

## 2020-05-15 RX ADMIN — LEVOTHYROXINE SODIUM 50 MCG: 50 TABLET ORAL at 05:05

## 2020-05-15 RX ADMIN — HYDROCODONE BITARTRATE AND ACETAMINOPHEN 1 TABLET: 7.5; 325 TABLET ORAL at 09:24

## 2020-05-15 RX ADMIN — BUSPIRONE HYDROCHLORIDE 30 MG: 15 TABLET ORAL at 09:18

## 2020-05-15 RX ADMIN — OXYBUTYNIN CHLORIDE 10 MG: 10 TABLET, EXTENDED RELEASE ORAL at 09:17

## 2020-05-15 RX ADMIN — ENOXAPARIN SODIUM 40 MG: 40 INJECTION SUBCUTANEOUS at 17:42

## 2020-05-15 RX ADMIN — METHYLPREDNISOLONE SODIUM SUCCINATE 40 MG: 40 INJECTION, POWDER, FOR SOLUTION INTRAMUSCULAR; INTRAVENOUS at 05:01

## 2020-05-15 NOTE — PLAN OF CARE
IV antx switched to Ampicillin for bacteremia. Afebrile. Voiding per purewick. CT Scan completed. Medicated for chronic joint pain x 1. Worked with PT. Telemetry SB.  Problem: Patient Care Overview  Goal: Plan of Care Review  Outcome: Ongoing (interventions implemented as appropriate)  Goal: Individualization and Mutuality  Outcome: Ongoing (interventions implemented as appropriate)  Goal: Discharge Needs Assessment  Outcome: Ongoing (interventions implemented as appropriate)  Goal: Interprofessional Rounds/Family Conf  Outcome: Ongoing (interventions implemented as appropriate)     Problem: Patient Care Overview  Goal: Plan of Care Review  Outcome: Ongoing (interventions implemented as appropriate)  Goal: Individualization and Mutuality  Outcome: Ongoing (interventions implemented as appropriate)  Goal: Discharge Needs Assessment  Outcome: Ongoing (interventions implemented as appropriate)  Goal: Interprofessional Rounds/Family Conf  Outcome: Ongoing (interventions implemented as appropriate)     Problem: Fall Risk (Adult)  Goal: Identify Related Risk Factors and Signs and Symptoms  Outcome: Ongoing (interventions implemented as appropriate)  Goal: Absence of Fall  Outcome: Ongoing (interventions implemented as appropriate)     Problem: Skin Injury Risk (Adult)  Goal: Identify Related Risk Factors and Signs and Symptoms  Outcome: Ongoing (interventions implemented as appropriate)  Goal: Skin Health and Integrity  Outcome: Ongoing (interventions implemented as appropriate)     Problem: Pain, Chronic (Adult)  Goal: Identify Related Risk Factors and Signs and Symptoms  Outcome: Ongoing (interventions implemented as appropriate)  Goal: Acceptable Pain/Comfort Level and Functional Ability  Outcome: Ongoing (interventions implemented as appropriate)     Problem: Sepsis/Septic Shock (Adult)  Goal: Signs and Symptoms of Listed Potential Problems Will be Absent, Minimized or Managed (Sepsis/Septic Shock)  Outcome: Ongoing  (interventions implemented as appropriate)

## 2020-05-15 NOTE — THERAPY EVALUATION
Patient Name: Rose Cheema  : 1940    MRN: [id number]                              Today's Date: 5/15/2020       Admit Date: 2020    Visit Dx:     ICD-10-CM ICD-9-CM   1. Fever and chills R50.9 780.60   2. Acute respiratory distress R06.03 518.82   3. Hypoxemia R09.02 799.02   4. COPD with acute exacerbation (CMS/[facility]) J44.1 491.21     Patient Active Problem List   Diagnosis   • Urinary incontinence   • Urinary frequency   • Generalized abdominal pain   • Pulmonary hypertension    • Benign essential hypertension   • Bilateral lower extremity edema (chronic)   • CAD (coronary artery disease), native coronary artery   • Chronic obstructive pulmonary disease with acute exacerbation (CMS/[facility])   • Diastolic dysfunction   • Obesity   • Obstructive sleep apnea   • Secondary pulmonary arterial hypertension (CMS/[facility])   • H/O: hysterectomy   • Fibromyalgia   • Hx SBO   • Hx of cholecystectomy   • Hypoglycemia   • Fall   • Hypothyroidism   • Chronic pain syndrome   • Anemia   • Pneumonia of right lower lobe due to infectious organism (CMS/[facility])   • Acute UTI   • Acute on chronic diastolic (congestive) heart failure (CMS/[facility])   • Opioid dependence (CMS/[facility])   • Venous stasis dermatitis of both lower extremities   • Gram-negative bacteremia   • Hypokalemia   • E. coli bacteremia   • Anemia of chronic disease   • Fever and chills   • CKD (chronic kidney disease) stage 3, GFR 30-59 ml/min (CMS/[facility])   • Bacteremia     Past Medical History:   Diagnosis Date   • Anemia    • Anxiety    • Arthritis    • CHF (congestive heart failure) (CMS/[facility])    • Coronary artery disease    • Depression    • Disease of thyroid gland    • Fibromyalgia    • GERD (gastroesophageal reflux disease)    • Hypertension    • Moderate tricuspid valve stenosis    • Osteoporosis    • Peripheral neuropathy    • Pulmonary hypertension (CMS/[facility])    • SBO (small bowel obstruction) (CMS/[facility])    • Stenosis of mitral valve      Past Surgical History:    Procedure Laterality Date   • BILATERAL OOPHORECTOMY     • CARDIAC CATHETERIZATION     • CHOLECYSTECTOMY     • ERCP N/A 2/15/2019    Procedure: ENDOSCOPIC RETROGRADE CHOLANGIOPANCREATOGRAPHY WITH PARTIAL CHOLANGIOGRAM;  Surgeon: Gerald Herr MD;  Location: [facility] ENDOSCOPY;  Service: Gastroenterology   • EXPLORATORY LAPAROTOMY     • GASTRIC BYPASS     • HERNIA REPAIR      inguinal and ventral   • HYSTERECTOMY     • OVARIAN CYST REMOVAL       General Information     Row Name 05/15/20 1259          PT Evaluation Time/Intention    Document Type  evaluation  -     Mode of Treatment  individual therapy;physical therapy  -     Row Name 05/15/20 1259          General Information    Prior Level of Function  independent:;gait;transfer;bed mobility walks with rollator, just short distances in home  -     Existing Precautions/Restrictions  fall  -     Barriers to Rehab  medically complex  -     Row Name 05/15/20 1259          Relationship/Environment    Lives With  alone pt reports her son and grandson are with her often  -     Row Name 05/15/20 1259          Resource/Environmental Concerns    Current Living Arrangements  home/apartment/condo  -     Row Name 05/15/20 1259          Cognitive Assessment/Intervention- PT/OT    Orientation Status (Cognition)  oriented x 3  -     Row Name 05/15/20 1259          Safety Issues, Functional Mobility    Impairments Affecting Function (Mobility)  balance;endurance/activity tolerance;strength;shortness of breath  -       User Key  (r) = Recorded By, (t) = Taken By, (c) = Cosigned By    Initials Name Provider Type     Ting Avila, PT Physical Therapist        Mobility     Row Name 05/15/20 1301          Bed Mobility Assessment/Treatment    Bed Mobility Assessment/Treatment  supine-sit;sit-supine  -     Supine-Sit [place] (Bed Mobility)  verbal cues;nonverbal cues (demo/gesture);minimum assist (75% patient effort)  -     Sit-Supine [place]  (Bed Mobility)  verbal cues;nonverbal cues (demo/gesture);minimum assist (75% patient effort)  -     Row Name 05/15/20 1301          Sit-Stand Transfer    Sit-Stand [place] (Transfers)  verbal cues;nonverbal cues (demo/gesture);minimum assist (75% patient effort)  -     Assistive Device (Sit-Stand Transfers)  walker, front-wheeled  -     Row Name 05/15/20 1301          Gait/Stairs Assessment/Training    [place] Level (Gait)  verbal cues;nonverbal cues (demo/gesture);minimum assist (75% patient effort)  -     Assistive Device (Gait)  walker, front-wheeled  -     Distance in Feet (Gait)  10  -     Deviations/Abnormal Patterns (Gait)  amanda decreased;gait speed decreased;stride length decreased  -     Bilateral Gait Deviations  forward flexed posture  -     Comment (Gait/Stairs)  pt reports she walks with FF posture and B knee flexion at baseline  -       User Key  (r) = Recorded By, (t) = Taken By, (c) = Cosigned By    Initials Name Provider Type     Ting Avila, PT Physical Therapist        Obj/Interventions     Row Name 05/15/20 1304          General ROM    GENERAL ROM COMMENTS  AROM WFL for age  -[facility] Name 05/15/20 1304          MMT (Manual Muscle Testing)    General MMT Comments  generalized weakness noted with functional mobility and gait, B LE grossly 3/5  -[facility] Name 05/15/20 1304          Therapeutic Exercise    Comment (Therapeutic Exercise)  10 reps B LE AP and LAQ  -[facility]     Row Name 05/15/20 1304          Static Standing Balance    Level of [place] (Supported Standing, Static Balance)  minimal assist, 75% patient effort  -     Assistive Device Utilized (Supported Standing, Static Balance)  walker, rolling  -     Row Name 05/15/20 1304          Dynamic Standing Balance    Level of [place], Reaches Outside Midline (Standing, Dynamic Balance)  minimal assist, 75% patient effort  -     Assistive Device Utilized (Supported Standing, Dynamic  Balance)  walker, rolling  -[facility]       User Key  (r) = Recorded By, (t) = Taken By, (c) = Cosigned By    Initials Name Provider Type     Ting Avila PT Physical Therapist        Goals/Plan     Row Name 05/15/20 1318          Bed Mobility Goal 1 (PT)    Activity/Assistive Device (Bed Mobility Goal 1, PT)  bed mobility activities, all  -[facility]     [place] Level/Cues Needed (Bed Mobility Goal 1, PT)  supervision required  -CH     Time Frame (Bed Mobility Goal 1, PT)  1 week  -     Row Name 05/15/20 1313          Transfer Goal 1 (PT)    Activity/Assistive Device (Transfer Goal 1, PT)  transfers, all;walker, rolling  -[facility]     [place] Level/Cues Needed (Transfer Goal 1, PT)  supervision required  -CH     Time Frame (Transfer Goal 1, PT)  1 week  -     Row Name 05/15/20 1313          Gait Training Goal 1 (PT)    Activity/Assistive Device (Gait Training Goal 1, PT)  gait (walking locomotion);walker, rolling  -CH     [place] Level (Gait Training Goal 1, PT)  supervision required  -CH     Distance (Gait Goal 1, PT)  100  -CH     Time Frame (Gait Training Goal 1, PT)  1 week  -       User Key  (r) = Recorded By, (t) = Taken By, (c) = Cosigned By    Initials Name Provider Type    Ting Pierre, PT Physical Therapist        Clinical Impression     Row Name 05/15/20 5493          Pain Assessment    Additional Documentation  Pain Scale: FACES Pre/Post-Treatment (Group)  -     Row Name 05/15/20 0312          Pain Scale: FACES Pre/Post-Treatment    Pain: FACES Scale, Pretreatment  2-->hurts little bit  -     Pain: FACES Scale, Post-Treatment  2-->hurts little bit  -     Pre/Post Treatment Pain Comment  Pt complains of LE pain due to neuropathy  -     Row Name 05/15/20 6017          Plan of Care Review    Plan of Care Reviewed With  patient  -     Outcome Summary  Pt is a 80 yo F who was admitted with SOA, fever and sepsis. Pt presents to PT with impaired functional mobility and gait  secondary to generalized weakness, impaired balance, and decreased activity tolerance. Pt may benefit from skilled PT to address strength, mobility, and gait.   -     Row Name 05/15/20 1306          Physical Therapy Clinical Impression    Patient/Family Goals Statement (PT Clinical Impression)  to return to [facility]  -     Criteria for Skilled Interventions Met (PT Clinical Impression)  treatment indicated  -     Rehab Potential (PT Clinical Summary)  good, to achieve stated therapy goals  -     Row Name 05/15/20 1306          Positioning and Restraints    Pre-Treatment Position  in bed  -     Post Treatment Position  bed  -     In Bed  supine;call light within reach;encouraged to call for assist;exit alarm on  -       User Key  (r) = Recorded By, (t) = Taken By, (c) = Cosigned By    Initials Name Provider Type    Ting Pierre PT Physical Therapist        Outcome Measures     Row Name 05/15/20 1315          How much help from another person do you currently need...    Turning from your back to your side while in flat bed without using bedrails?  3  -CH     Moving from lying on back to sitting on the side of a flat bed without bedrails?  3  -CH     Moving to and from a bed to a chair (including a wheelchair)?  3  -CH     Standing up from a chair using your arms (e.g., wheelchair, bedside chair)?  3  -CH     Climbing 3-5 steps with a railing?  1  -CH     To walk in hospital room?  2  -CH     AM-PAC 6 Clicks Score (PT)  15  [facility]     Row Name 05/15/20 1315          Functional Assessment    Outcome Measure Options  AM-PAC 6 Clicks Basic Mobility (PT)  -       User Key  (r) = Recorded By, (t) = Taken By, (c) = Cosigned By    Initials Name Provider Type    Ting Pierre PT Physical Therapist        Physical Therapy Education                 Title: PT OT SLP Therapies (Done)     Topic: Physical Therapy (Done)     Point: Mobility training (Done)     Description:   Instruct learner(s) on safety  and technique for assisting patient out of bed, chair or wheelchair.  Instruct in the proper use of assistive devices, such as walker, crutches, cane or brace.              Patient Friendly Description:   It's important to get you on your feet again, but we need to do so in a way that is safe for you. Falling has serious consequences, and your personal safety is the most important thing of all.        When it's time to get out of bed, one of us or a family member will sit next to you on the bed to give you support.     If your doctor or nurse tells you to use a walker, crutches, a cane, or a brace, be sure you use it every time you get out of bed, even if you think you don't need it.    Learning Progress Summary           Patient Acceptance, E,TB,D, VU,NR by  at 5/15/2020 1319                   Point: Home exercise program (Done)     Description:   Instruct learner(s) on appropriate technique for monitoring, assisting and/or progressing patient with therapeutic exercises and activities.              Learning Progress Summary           Patient Acceptance, E,TB,D, VU,NR by  at 5/15/2020 1319                   Point: Body mechanics (Done)     Description:   Instruct learner(s) on proper positioning and spine alignment for patient and/or caregiver during mobility tasks and/or exercises.              Learning Progress Summary           Patient Acceptance, E,TB,D, VU,NR by  at 5/15/2020 1319                   Point: Precautions (Done)     Description:   Instruct learner(s) on prescribed precautions during mobility and gait tasks              Learning Progress Summary           Patient Acceptance, E,TB,D, VU,NR by  at 5/15/2020 1319                               User Key     Initials Effective Dates Name Provider Type Discipline     04/03/18 -  Ting Avila, PT Physical Therapist PT              PT Recommendation and Plan  Planned Therapy Interventions (PT Eval): balance training, bed mobility training,  gait training, home exercise program, patient/family education, strengthening, transfer training  Outcome Summary/Treatment Plan (PT)  Anticipated Discharge Disposition (PT): skilled nursing facility(pending progress)  Plan of Care Reviewed With: patient  Outcome Summary: Pt is a 80 yo F who was admitted with SOA, fever and sepsis. Pt presents to PT with impaired functional mobility and gait secondary to generalized weakness, impaired balance, and decreased activity tolerance. Pt may benefit from skilled PT to address strength, mobility, and gait.      Time Calculation:   PT Charges     Row Name 05/15/20 1036             Time Calculation    Start Time  1004  -      Stop Time  1028  -      Time Calculation (min)  24 min  -      PT Received On  05/15/20  -      PT - Next Appointment  05/16/20  -      PT Goal Re-Cert Due Date  05/22/20  -         Time Calculation- PT    Total Timed Code Minutes- PT  16 minute(s)  -        User Key  (r) = Recorded By, (t) = Taken By, (c) = Cosigned By    Initials Name Provider Type     Ting Avila, PT Physical Therapist        Therapy Charges for Today     Code Description Service Date Service Provider Modifiers Qty    [id number]  PT EVAL MOD COMPLEXITY 2 5/15/2020 Ting Avila, PT GP 1    [id number]  PT THER PROC EA 15 MIN 5/15/2020 Ting Avila, PT GP 1          PT G-Codes  Outcome Measure Options: AM-PAC 6 Clicks Basic Mobility (PT)  AM-PAC 6 Clicks Score (PT): 15    Ting Avila PT  5/15/2020

## 2020-05-15 NOTE — PLAN OF CARE
Problem: Patient Care Overview  Goal: Plan of Care Review  Outcome: Ongoing (interventions implemented as appropriate)  Flowsheets  Taken 5/15/2020 0353  Progress: improving  Outcome Summary: Pt A &O, Q2 turned. Medicated per orders, IV steroids and abx cont. C/o pain treated with PRN pain meds x1. Purewick in place. No s/s of distress at this time, VSS, will cont to monitor.  Taken 5/15/2020 0025  Plan of Care Reviewed With: patient     Problem: Fall Risk (Adult)  Goal: Identify Related Risk Factors and Signs and Symptoms  Outcome: Ongoing (interventions implemented as appropriate)  Flowsheets (Taken 5/15/2020 0353)  Related Risk Factors (Fall Risk): environment unfamiliar     Problem: Fall Risk (Adult)  Goal: Absence of Fall  Outcome: Ongoing (interventions implemented as appropriate)  Flowsheets (Taken 5/15/2020 0353)  Absence of Fall: making progress toward outcome     Problem: Skin Injury Risk (Adult)  Goal: Identify Related Risk Factors and Signs and Symptoms  Outcome: Ongoing (interventions implemented as appropriate)  Flowsheets (Taken 5/15/2020 0353)  Related Risk Factors (Skin Injury Risk): advanced age; body weight extremes     Problem: Pain, Chronic (Adult)  Goal: Identify Related Risk Factors and Signs and Symptoms  Outcome: Ongoing (interventions implemented as appropriate)  Flowsheets (Taken 5/15/2020 0353)  Related Risk Factors (Chronic Pain): pain control inadequate  Signs and Symptoms (Chronic Pain): verbalization of pain descriptors     Problem: Pain, Chronic (Adult)  Goal: Acceptable Pain/Comfort Level and Functional Ability  Outcome: Ongoing (interventions implemented as appropriate)  Flowsheets (Taken 5/15/2020 0353)  Acceptable Pain/Comfort Level and Functional Ability: making progress toward outcome     Problem: Skin Injury Risk (Adult)  Goal: Skin Health and Integrity  Outcome: Ongoing (interventions implemented as appropriate)  Flowsheets (Taken 5/15/2020 0353)  Skin Health and Integrity:  making progress toward outcome

## 2020-05-15 NOTE — PLAN OF CARE
Problem: Patient Care Overview  Goal: Plan of Care Review  Flowsheets (Taken 5/15/2020 1308)  Plan of Care Reviewed With: patient  Outcome Summary: Pt is a 78 yo F who was admitted with SOA, fever and sepsis. Pt presents to PT with impaired functional mobility and gait secondary to generalized weakness, impaired balance, and decreased activity tolerance. Pt may benefit from skilled PT to address strength, mobility, and gait.   Note:   Patient was wearing a face mask during this therapy encounter. Therapist used appropriate personal protective equipment including mask and gloves.  Mask used was standard procedure mask. Appropriate PPE was worn during the entire therapy session. Hand hygiene was completed before and after therapy session. Patient is not in enhanced droplet precautions.

## 2020-05-15 NOTE — PROGRESS NOTES
Continued Stay Note  [facility]     Patient Name: Rose Cheema  MRN: [id number]  Today's Date: 5/15/2020    Admit Date: 5/14/2020    Discharge Plan     Row Name 05/15/20 1509       Plan    Plan Comments  Spoke with patient at bedside.  She is hoping to return home with [employer] at [place], but if SNF is needed she would like [facility].  Spoke with Kaila/[facility] and she is following.  Transfer packet started and in CCP office.  CCP will follow. Alexandra Lacey RN        Discharge Codes    No documentation.             Alexandra Lacey RN

## 2020-05-15 NOTE — PROGRESS NOTES
INFECTIOUS DISEASES PROGRESS NOTE    CC: Follow-up sepsis?    S:   Blood cultures positive for enterococcus.  She still having abdominal pain or dysuria.  No fevers or chills but has some sweats overnight    O:  Physical Exam:  Temp:  [97.8 °F (36.6 °C)-98 °F (36.7 °C)] 98 °F (36.7 °C)  Heart Rate:  [50-78] 50  Resp:  [17-18] 17  BP: (105-135)/(55-69) 135/69  Physical Exam   Constitutional: She appears well-developed. No distress.   Pulmonary/Chest: Effort normal.   Abdominal: Soft. She exhibits no distension. There is no tenderness.   Neurological: She is alert.   Skin: Skin is warm and dry.   Psychiatric: She has a normal mood and affect.        Diagnostics:    Creatinine 1.1    5/14 blood cultures 1/2 enterococcus  5/14 urine culture pending      Assessment/Plan   1.  Enterococcal septicemia: Patient now bacteremic with enterococcus.  I favor a potential biliary source given her history of choledocholithiasis.  That said her LFTs are normal.  She is no urinary symptoms to suggest UTI.  I think we should proceed with CT of the abdomen pelvis to look for nidus of infection.  I will switch her from the Zosyn to ampicillin 2 g IV every 6 hours.  Can we wean steroids now that we know this isn't primary pulmonary process?    2.  History of choledocholithiasis  3.  Morbid obesity with a BMI of 53, complicating above  Delfino Pittman MD  10:50 AM  05/15/20

## 2020-05-15 NOTE — PROGRESS NOTES
Tan Stewart MD                          [phone]      Patient ID:    Name:  Rose Cheema    MRN:  [id number]    1940   79 y.o.  female            Patient Care Team:  Cheng Guevara MD as PCP - General (Internal Medicine)    CC/ Reason for visit: Respiratory failure, COPD exacerbation, enterococcal bacteremia    Subjective: Pt seen and examined this AM. No acute overnight events noted. Doing better.  Still requiring some supplemental oxygen.  No further wheezing noted.  Not having any new fevers or chills.  Work-up was positive for bacteremia and antibiotics are being adjusted.  States that she is having issues with CPAP and this is being worked up by Dr. Duckworth as an outpatient.  Has not brought her home machine yet.    ROS: Denies any subjective fevers, syncope or presyncopal events, new neurological deficits, nausea or vomiting currently    Objective     Vital Signs past 24hrs    BP range: BP: (107-135)/(58-69) 126/58  Pulse range: Heart Rate:  [50-80] 80  Resp rate range: Resp:  [16-18] 16  Temp range: Temp (24hrs), Av.8 °F (36.6 °C), Min:97.6 °F (36.4 °C), Max:98 °F (36.7 °C)      Ventilator/Non-Invasive Ventilation Settings (From admission, onward)    None          Device (Oxygen Therapy): nasal cannula       123 kg (272 lb 0.8 oz); Body mass index is 53.13 kg/m².      Intake/Output Summary (Last 24 hours) at 5/15/2020 1716  Last data filed at 5/15/2020 1255  Gross per 24 hour   Intake 930 ml   Output 2000 ml   Net -1070 ml       PHYSICAL EXAM   Constitutional: Middle aged morbidly obese white female pt in bed, No acute respiratory distress, + accessory muscle use  Head: - NCAT  Eyes: No pallor.  Anicteric sclerae, EOMI.  ENMT:  Mallampati 4, no oral thrush.  Moist MM.   NECK: Trachea midline, No thyromegaly, no palpable cervical lymphadenopathy  Heart: RRR, no murmur. 1+ pedal edema   Lungs: LOUIS +, decreased breath sounds at the base, no wheezes/ crackles  heard    Abdomen: Soft.  Obese, no tenderness, guarding or rigidity. No palpable masses  Extremities: Extremities warm and well perfused. No cyanosis/ clubbing  Neuro: Conscious, answers appropriately, no gross focal neuro deficits  Psych: Mood and affect appropriate    Scheduled meds:    ampicillin 2 g Intravenous Q6H   aspirin 81 mg Oral Daily   budesonide-formoterol 2 puff Inhalation BID - RT   busPIRone 30 mg Oral BID   enoxaparin 40 mg Subcutaneous Q12H   fentaNYL 1 patch Transdermal Q72H   FLUoxetine 60 mg Oral Daily   levothyroxine 50 mcg Oral Q AM   oxybutynin XL 10 mg Oral Daily   potassium chloride 20 mEq Oral Daily   pregabalin 100 mg Oral Q12H   QUEtiapine 50 mg Oral Nightly   sodium chloride 10 mL Intravenous Q12H   torsemide 50 mg Oral Daily       IV meds:                           Data Review:      Results from last 7 days   Lab Units 05/15/20  0620 05/14/20  0645 05/14/20  0156   SODIUM mmol/L 137 137 138   POTASSIUM mmol/L 4.0 4.6 4.7   CHLORIDE mmol/L 98 105 100   CO2 mmol/L 23.1 21.7* 25.3   BUN mg/dL 22 17 17   CREATININE mg/dL 1.08* 0.92 1.01*   CALCIUM mg/dL 8.3* 8.3* 8.8   BILIRUBIN mg/dL  --  0.5 0.5   ALK PHOS U/L  --  130* 140*   ALT (SGPT) U/L  --  24 15   AST (SGOT) U/L  --  46* 18   GLUCOSE mg/dL 175* 161* 146*   WBC 10*3/mm3  --  16.82* 15.37*   HEMOGLOBIN g/dL  --  12.6 14.0   PLATELETS 10*3/mm3  --  142 162   INR   --   --  1.02   PROBNP pg/mL  --   --  738.8   PROCALCITONIN ng/mL  --  3.17* 0.13       Lab Results   Component Value Date    CALCIUM 8.3 (L) 05/15/2020    PHOS 3.4 02/13/2019       Results from last 7 days   Lab Units 05/14/20  0808 05/14/20  0236 05/14/20  0156   BLOODCX   --  No growth at 24 hours Enterococcus species*   URINECX  Culture in progress  --   --    BCIDPCR   --   --  Enterococcus spp, not VRE. Identification by BCID PCR.*              Results Review:    I have reviewed the relevant laboratory results and independently reviewed the chest imaging from this  hospitalization including the available echocardiogram reports personally and summarized it if/ when appropriate below    Assessment    Acute hypoxic respiratory failure  Acute COPD exacerbation  Enterococcal bacteremia  CKD  GERSON on CPAP  Suspected OHS  Chronic narcotic dependence  Super morbid obesity    PLAN:  All problems are new to me  Patient is oxygen requirement are likely from baseline COPD with abdominal issues. She did have some COPD exacerbation on admission and is doing well and we will wean down steroids and continue with bronchodilators  Persistent fevers likely from bacteremia which is being worked up by infectious diseases.  CT of the chest reviewed and does not show any acute concerns  Continue with home CPAP.  Asked her to contact her son to bring it here in the hospital.  States that she is having some persistent hypersomnia in spite of being on CPAP and this is being adjusted by Dr. Duckworth as an outpatient.  Can review a download once she gets her home machine to the hospital.  Will need to back off of strong pain medication given advanced age and multiple medical issues.  Weight loss is recommended    I have discussed my findings and recommendations with patient, nursing staff and primary care team.     Tan Stewart MD  5/15/2020

## 2020-05-15 NOTE — PROGRESS NOTES
Name: Rose Cheema ADMIT: 2020   : 1940  PCP: Cheng Guevara MD    MRN: [id number] LOS: 1 days   AGE/SEX: 79 y.o. female  ROOM: [facility]   Subjective   Chief Complaint   Patient presents with   • Fever   • Shortness of Breath     On IV abx  Dyspnea fair  Chronic swelling  On diuretics  Denies abd pain but does have history of strones    ROS  +f/c  No n/v  No cp/palp  +soa/cough    Objective   Vital Signs  Temp:  [97.8 °F (36.6 °C)-98 °F (36.7 °C)] 98 °F (36.7 °C)  Heart Rate:  [50-78] 50  Resp:  [17-18] 17  BP: (105-135)/(55-69) 135/69  SpO2:  [92 %-95 %] 93 %  on  Flow (L/min):  [2] 2;   Device (Oxygen Therapy): nasal cannula  Body mass index is 53.13 kg/m².    Elderly  Chronically ill  Physical Exam   Constitutional: She is oriented to person, place, and time.   HENT:   Head: Normocephalic and atraumatic.   Eyes: Conjunctivae are normal. No scleral icterus.   Neck: Neck supple. No tracheal deviation present.   Cardiovascular: Normal rate and regular rhythm.   Pulmonary/Chest: Effort normal and breath sounds normal.   Decreased bs at bases   Abdominal: Soft. There is no tenderness. There is no guarding.   Musculoskeletal: She exhibits edema (1+ with stasis dermatitis).   Neurological: She is alert and oriented to person, place, and time. She exhibits normal muscle tone.   Skin: Skin is warm and dry.   Psychiatric: She has a normal mood and affect. Her behavior is normal.       Results Review:       I reviewed the patient's new clinical results.  Results from last 7 days   Lab Units 20  0645 20  0156   WBC 10*3/mm3 16.82* 15.37*   HEMOGLOBIN g/dL 12.6 14.0   PLATELETS 10*3/mm3 142 162     Results from last 7 days   Lab Units 05/15/20  0620 20  0645 20  0156   SODIUM mmol/L 137 137 138   POTASSIUM mmol/L 4.0 4.6 4.7   CHLORIDE mmol/L 98 105 100   CO2 mmol/L 23.1 21.7* 25.3   BUN mg/dL 22 17 17   CREATININE mg/dL 1.08* 0.92 1.01*   GLUCOSE mg/dL 175* 161* 146*   Estimated  Creatinine Clearance: 52.7 mL/min (A) (by C-G formula based on SCr of 1.08 mg/dL (H)).  Results from last 7 days   Lab Units 05/14/20  0645 05/14/20  0156   ALBUMIN g/dL 3.50 4.50   BILIRUBIN mg/dL 0.5 0.5   ALK PHOS U/L 130* 140*   AST (SGOT) U/L 46* 18   ALT (SGPT) U/L 24 15     Results from last 7 days   Lab Units 05/15/20  0620 05/14/20  0645 05/14/20  0156   CALCIUM mg/dL 8.3* 8.3* 8.8   ALBUMIN g/dL  --  3.50 4.50     Results from last 7 days   Lab Units 05/14/20  0645 05/14/20  0156   PROCALCITONIN ng/mL 3.17* 0.13   LACTATE mmol/L  --  1.6       Coag   Results from last 7 days   Lab Units 05/14/20  0156   INR  1.02   APTT seconds 33.2     HbA1C No results found for: HGBA1C  Infection   Results from last 7 days   Lab Units 05/14/20  0808 05/14/20  0645 05/14/20  0236 05/14/20  0156   BLOODCX   --   --  No growth at 24 hours Enterococcus species*   URINECX  Culture in progress  --   --   --    BCIDPCR   --   --   --  Enterococcus spp, not VRE. Identification by BCID PCR.*   PROCALCITONIN ng/mL  --  3.17*  --  0.13     Radiology(recent) Ct Chest Without Contrast    Result Date: 5/14/2020  Linear scarring/atelectasis at the right lower lobe and right middle lobe and fine linear scar within the right upper lobe. There are no effusions or lymphadenopathy.  This report was finalized on 5/14/2020 1:40 PM by Dr. Bijal Delacruz M.D.      Troponin T   Date Value Ref Range Status   05/14/2020 0.014 0.000 - 0.030 ng/mL Final     No components found for: TSH;2      ampicillin 2 g Intravenous Q6H   aspirin 81 mg Oral Daily   budesonide-formoterol 2 puff Inhalation BID - RT   busPIRone 30 mg Oral BID   enoxaparin 40 mg Subcutaneous Q12H   fentaNYL 1 patch Transdermal Q72H   FLUoxetine 60 mg Oral Daily   levothyroxine 50 mcg Oral Q AM   oxybutynin XL 10 mg Oral Daily   potassium chloride 20 mEq Oral Daily   pregabalin 100 mg Oral Q12H   QUEtiapine 50 mg Oral Nightly   sodium chloride 10 mL Intravenous Q12H   torsemide 50 mg  Oral Daily      Diet Regular; Cardiac      Assessment/Plan      Active Hospital Problems    Diagnosis  POA   • **Bacteremia [R78.81]  Yes   • Fever and chills [R50.9]  Yes   • CKD (chronic kidney disease) stage 3, GFR 30-59 ml/min (CMS/[facility]) [N18.3]  Yes   • Acute on chronic diastolic (congestive) heart failure (CMS/[facility]) [I50.33]  Yes   • Opioid dependence (CMS/[facility]) [F11.20]  Yes   • Hypothyroidism [E03.9]  Yes   • Pulmonary hypertension  [I27.20]  Yes   • Fibromyalgia [M79.7]  Yes   • CAD (coronary artery disease), native coronary artery [I25.10]  Yes   • Benign essential hypertension [I10]  Yes   • Chronic obstructive pulmonary disease with acute exacerbation (CMS/[facility]) [J44.1]  Yes   • Obstructive sleep apnea [G47.33]  Yes   • Obesity [E66.9]  Yes      Resolved Hospital Problems   No resolved problems to display.       · +Blood cultures. On IV ABX, ID following. To get CT A/P  · On diuretics, monitor renal fx and volume status- a little volume up. Gave extra diuretics yesterday  · Pulmonary following, no wheezing will stop the IV steroids if pulmonary agrees  · Above medications  · Monitor labs closely  · PT    Thanks to specialists       DW RN      Ra Daugherty MD  [facility]  05/15/20  12:02 PM

## 2020-05-16 ENCOUNTER — APPOINTMENT (OUTPATIENT)
Dept: CARDIOLOGY | Facility: HOSPITAL | Age: 80
End: 2020-05-16

## 2020-05-16 LAB
ALBUMIN SERPL-MCNC: 3.4 G/DL (ref 3.5–5.2)
ALBUMIN/GLOB SERPL: 1.4 G/DL
ALP SERPL-CCNC: 99 U/L (ref 39–117)
ALT SERPL W P-5'-P-CCNC: 32 U/L (ref 1–33)
ANION GAP SERPL CALCULATED.3IONS-SCNC: 12.6 MMOL/L (ref 5–15)
AORTIC DIMENSIONLESS INDEX: 0.6 (DI)
AST SERPL-CCNC: 22 U/L (ref 1–32)
BH CV ECHO MEAS - ACS: 2.2 CM
BH CV ECHO MEAS - AO MAX PG (FULL): 5.3 MMHG
BH CV ECHO MEAS - AO MAX PG: 10.6 MMHG
BH CV ECHO MEAS - AO MEAN PG (FULL): 2.7 MMHG
BH CV ECHO MEAS - AO MEAN PG: 5.4 MMHG
BH CV ECHO MEAS - AO ROOT AREA (BSA CORRECTED): 1.3
BH CV ECHO MEAS - AO ROOT AREA: 6.4 CM^2
BH CV ECHO MEAS - AO ROOT DIAM: 2.9 CM
BH CV ECHO MEAS - AO V2 MAX: 162.9 CM/SEC
BH CV ECHO MEAS - AO V2 MEAN: 108.3 CM/SEC
BH CV ECHO MEAS - AO V2 VTI: 30.9 CM
BH CV ECHO MEAS - AVA(I,A): 2.3 CM^2
BH CV ECHO MEAS - AVA(I,D): 2.3 CM^2
BH CV ECHO MEAS - AVA(V,A): 2.6 CM^2
BH CV ECHO MEAS - AVA(V,D): 2.6 CM^2
BH CV ECHO MEAS - BSA(HAYCOCK): 2.4 M^2
BH CV ECHO MEAS - BSA: 2.1 M^2
BH CV ECHO MEAS - BZI_BMI: 52.7 KILOGRAMS/M^2
BH CV ECHO MEAS - BZI_METRIC_HEIGHT: 152.4 CM
BH CV ECHO MEAS - BZI_METRIC_WEIGHT: 122.5 KG
BH CV ECHO MEAS - EDV(CUBED): 158.8 ML
BH CV ECHO MEAS - EDV(MOD-SP2): 149 ML
BH CV ECHO MEAS - EDV(MOD-SP4): 162 ML
BH CV ECHO MEAS - EDV(TEICH): 142.2 ML
BH CV ECHO MEAS - EF(CUBED): 63.3 %
BH CV ECHO MEAS - EF(MOD-BP): 53 %
BH CV ECHO MEAS - EF(MOD-SP2): 51.7 %
BH CV ECHO MEAS - EF(MOD-SP4): 53.1 %
BH CV ECHO MEAS - EF(TEICH): 54.3 %
BH CV ECHO MEAS - ESV(CUBED): 58.2 ML
BH CV ECHO MEAS - ESV(MOD-SP2): 72 ML
BH CV ECHO MEAS - ESV(MOD-SP4): 76 ML
BH CV ECHO MEAS - ESV(TEICH): 64.9 ML
BH CV ECHO MEAS - FS: 28.4 %
BH CV ECHO MEAS - IVS/LVPW: 1
BH CV ECHO MEAS - IVSD: 0.82 CM
BH CV ECHO MEAS - LAT PEAK E' VEL: 14 CM/SEC
BH CV ECHO MEAS - LV DIASTOLIC VOL/BSA (35-75): 76.4 ML/M^2
BH CV ECHO MEAS - LV MASS(C)D: 159.1 GRAMS
BH CV ECHO MEAS - LV MASS(C)DI: 75 GRAMS/M^2
BH CV ECHO MEAS - LV MAX PG: 5.3 MMHG
BH CV ECHO MEAS - LV MEAN PG: 2.7 MMHG
BH CV ECHO MEAS - LV SYSTOLIC VOL/BSA (12-30): 35.8 ML/M^2
BH CV ECHO MEAS - LV V1 MAX: 114.9 CM/SEC
BH CV ECHO MEAS - LV V1 MEAN: 75.6 CM/SEC
BH CV ECHO MEAS - LV V1 VTI: 20 CM
BH CV ECHO MEAS - LVIDD: 5.4 CM
BH CV ECHO MEAS - LVIDS: 3.9 CM
BH CV ECHO MEAS - LVLD AP2: 7.9 CM
BH CV ECHO MEAS - LVLD AP4: 8 CM
BH CV ECHO MEAS - LVLS AP2: 6.8 CM
BH CV ECHO MEAS - LVLS AP4: 6.5 CM
BH CV ECHO MEAS - LVOT AREA (M): 3.5 CM^2
BH CV ECHO MEAS - LVOT AREA: 3.6 CM^2
BH CV ECHO MEAS - LVOT DIAM: 2.1 CM
BH CV ECHO MEAS - LVPWD: 0.8 CM
BH CV ECHO MEAS - MED PEAK E' VEL: 13 CM/SEC
BH CV ECHO MEAS - MV A DUR: 0.16 SEC
BH CV ECHO MEAS - MV A MAX VEL: 89.2 CM/SEC
BH CV ECHO MEAS - MV DEC SLOPE: 850.5 CM/SEC^2
BH CV ECHO MEAS - MV DEC TIME: 161 SEC
BH CV ECHO MEAS - MV E MAX VEL: 137 CM/SEC
BH CV ECHO MEAS - MV E/A: 1.5
BH CV ECHO MEAS - MV MAX PG: 12.8 MMHG
BH CV ECHO MEAS - MV MEAN PG: 4.2 MMHG
BH CV ECHO MEAS - MV P1/2T MAX VEL: 188.7 CM/SEC
BH CV ECHO MEAS - MV P1/2T: 65 MSEC
BH CV ECHO MEAS - MV V2 MAX: 179.2 CM/SEC
BH CV ECHO MEAS - MV V2 MEAN: 93.7 CM/SEC
BH CV ECHO MEAS - MV V2 VTI: 41.8 CM
BH CV ECHO MEAS - MVA P1/2T LCG: 1.2 CM^2
BH CV ECHO MEAS - MVA(P1/2T): 3.4 CM^2
BH CV ECHO MEAS - MVA(VTI): 1.7 CM^2
BH CV ECHO MEAS - PA ACC TIME: 0.12 SEC
BH CV ECHO MEAS - PA MAX PG (FULL): 3.4 MMHG
BH CV ECHO MEAS - PA MAX PG: 6.7 MMHG
BH CV ECHO MEAS - PA PR(ACCEL): 24.3 MMHG
BH CV ECHO MEAS - PA V2 MAX: 129.2 CM/SEC
BH CV ECHO MEAS - PVA(V,A): 2.1 CM^2
BH CV ECHO MEAS - PVA(V,D): 2.1 CM^2
BH CV ECHO MEAS - QP/QS: 0.71
BH CV ECHO MEAS - RAP SYSTOLE: 3 MMHG
BH CV ECHO MEAS - RV BASE (<4.1) - OBSOLETE: 3.9 CM
BH CV ECHO MEAS - RV MAX PG: 3.3 MMHG
BH CV ECHO MEAS - RV MEAN PG: 1.3 MMHG
BH CV ECHO MEAS - RV V1 MAX: 90.7 CM/SEC
BH CV ECHO MEAS - RV V1 MEAN: 50.6 CM/SEC
BH CV ECHO MEAS - RV V1 VTI: 17.2 CM
BH CV ECHO MEAS - RVOT AREA: 3 CM^2
BH CV ECHO MEAS - RVOT DIAM: 1.9 CM
BH CV ECHO MEAS - RVSP: 43 MMHG
BH CV ECHO MEAS - SI(AO): 93.5 ML/M^2
BH CV ECHO MEAS - SI(CUBED): 47.4 ML/M^2
BH CV ECHO MEAS - SI(LVOT): 34.1 ML/M^2
BH CV ECHO MEAS - SI(MOD-SP2): 36.3 ML/M^2
BH CV ECHO MEAS - SI(MOD-SP4): 40.6 ML/M^2
BH CV ECHO MEAS - SI(TEICH): 36.4 ML/M^2
BH CV ECHO MEAS - SV(AO): 198.2 ML
BH CV ECHO MEAS - SV(CUBED): 100.5 ML
BH CV ECHO MEAS - SV(LVOT): 72.3 ML
BH CV ECHO MEAS - SV(MOD-SP2): 77 ML
BH CV ECHO MEAS - SV(MOD-SP4): 86 ML
BH CV ECHO MEAS - SV(RVOT): 51.3 ML
BH CV ECHO MEAS - SV(TEICH): 77.3 ML
BH CV ECHO MEAS - TAPSE (>1.6): 2.1 CM2
BH CV ECHO MEAS - TR MAX VEL: 314.9 CM/SEC
BH CV ECHO MEASUREMENTS AVERAGE E/E' RATIO: 10.15
BH CV VAS BP RIGHT ARM: NORMAL MMHG
BH CV XLRA - RV BASE: 3.9 CM
BH CV XLRA - TDI S': 13 CM/SEC
BILIRUB SERPL-MCNC: 0.3 MG/DL (ref 0.2–1.2)
BUN BLD-MCNC: 26 MG/DL (ref 8–23)
BUN/CREAT SERPL: 25.7 (ref 7–25)
CALCIUM SPEC-SCNC: 7.8 MG/DL (ref 8.6–10.5)
CHLORIDE SERPL-SCNC: 102 MMOL/L (ref 98–107)
CO2 SERPL-SCNC: 27.4 MMOL/L (ref 22–29)
CREAT BLD-MCNC: 1.01 MG/DL (ref 0.57–1)
DEPRECATED RDW RBC AUTO: 43.6 FL (ref 37–54)
ERYTHROCYTE [DISTWIDTH] IN BLOOD BY AUTOMATED COUNT: 12.8 % (ref 12.3–15.4)
GFR SERPL CREATININE-BSD FRML MDRD: 53 ML/MIN/1.73
GLOBULIN UR ELPH-MCNC: 2.5 GM/DL
GLUCOSE BLD-MCNC: 110 MG/DL (ref 65–99)
HCT VFR BLD AUTO: 34 % (ref 34–46.6)
HGB BLD-MCNC: 11.4 G/DL (ref 12–15.9)
LEFT ATRIUM VOLUME INDEX: 30 ML/M2
LV EF 2D ECHO EST: 53 %
MAXIMAL PREDICTED HEART RATE: 143 BPM
MCH RBC QN AUTO: 31.7 PG (ref 26.6–33)
MCHC RBC AUTO-ENTMCNC: 33.5 G/DL (ref 31.5–35.7)
MCV RBC AUTO: 94.4 FL (ref 79–97)
PLATELET # BLD AUTO: 137 10*3/MM3 (ref 140–450)
PMV BLD AUTO: 11 FL (ref 6–12)
POTASSIUM BLD-SCNC: 3.8 MMOL/L (ref 3.5–5.2)
PROT SERPL-MCNC: 5.9 G/DL (ref 6–8.5)
RBC # BLD AUTO: 3.6 10*6/MM3 (ref 3.77–5.28)
SODIUM BLD-SCNC: 142 MMOL/L (ref 136–145)
STRESS TARGET HR: 122 BPM
WBC NRBC COR # BLD: 12.21 10*3/MM3 (ref 3.4–10.8)

## 2020-05-16 PROCEDURE — 97110 THERAPEUTIC EXERCISES: CPT

## 2020-05-16 PROCEDURE — 25010000002 PERFLUTREN (DEFINITY) 8.476 MG IN SODIUM CHLORIDE 0.9 % 10 ML INJECTION: Performed by: INTERNAL MEDICINE

## 2020-05-16 PROCEDURE — 93306 TTE W/DOPPLER COMPLETE: CPT

## 2020-05-16 PROCEDURE — 94799 UNLISTED PULMONARY SVC/PX: CPT

## 2020-05-16 PROCEDURE — 92610 EVALUATE SWALLOWING FUNCTION: CPT

## 2020-05-16 PROCEDURE — 99233 SBSQ HOSP IP/OBS HIGH 50: CPT | Performed by: INTERNAL MEDICINE

## 2020-05-16 PROCEDURE — 93306 TTE W/DOPPLER COMPLETE: CPT | Performed by: INTERNAL MEDICINE

## 2020-05-16 PROCEDURE — 85027 COMPLETE CBC AUTOMATED: CPT | Performed by: INTERNAL MEDICINE

## 2020-05-16 PROCEDURE — 25010000002 ENOXAPARIN PER 10 MG: Performed by: INTERNAL MEDICINE

## 2020-05-16 PROCEDURE — 25010000002 AMPICILLIN PER 500 MG: Performed by: INTERNAL MEDICINE

## 2020-05-16 PROCEDURE — 80053 COMPREHEN METABOLIC PANEL: CPT | Performed by: INTERNAL MEDICINE

## 2020-05-16 PROCEDURE — 87040 BLOOD CULTURE FOR BACTERIA: CPT | Performed by: INTERNAL MEDICINE

## 2020-05-16 RX ADMIN — HYDROCODONE BITARTRATE AND ACETAMINOPHEN 1 TABLET: 7.5; 325 TABLET ORAL at 08:36

## 2020-05-16 RX ADMIN — POTASSIUM CHLORIDE 20 MEQ: 10 CAPSULE, COATED, EXTENDED RELEASE ORAL at 08:37

## 2020-05-16 RX ADMIN — SODIUM CHLORIDE, PRESERVATIVE FREE 10 ML: 5 INJECTION INTRAVENOUS at 08:37

## 2020-05-16 RX ADMIN — PERFLUTREN 2 ML: 6.52 INJECTION, SUSPENSION INTRAVENOUS at 13:00

## 2020-05-16 RX ADMIN — BUSPIRONE HYDROCHLORIDE 30 MG: 15 TABLET ORAL at 08:36

## 2020-05-16 RX ADMIN — AMPICILLIN SODIUM 2 G: 2 INJECTION, POWDER, FOR SOLUTION INTRAVENOUS at 00:10

## 2020-05-16 RX ADMIN — AMPICILLIN SODIUM 2 G: 2 INJECTION, POWDER, FOR SOLUTION INTRAVENOUS at 05:02

## 2020-05-16 RX ADMIN — HYDROCODONE BITARTRATE AND ACETAMINOPHEN 1 TABLET: 7.5; 325 TABLET ORAL at 22:39

## 2020-05-16 RX ADMIN — SODIUM CHLORIDE, PRESERVATIVE FREE 10 ML: 5 INJECTION INTRAVENOUS at 22:41

## 2020-05-16 RX ADMIN — AMPICILLIN SODIUM 2 G: 2 INJECTION, POWDER, FOR SOLUTION INTRAVENOUS at 19:16

## 2020-05-16 RX ADMIN — FLUOXETINE HYDROCHLORIDE 60 MG: 20 CAPSULE ORAL at 08:36

## 2020-05-16 RX ADMIN — PREGABALIN 100 MG: 100 CAPSULE ORAL at 08:43

## 2020-05-16 RX ADMIN — OXYBUTYNIN CHLORIDE 10 MG: 10 TABLET, EXTENDED RELEASE ORAL at 08:36

## 2020-05-16 RX ADMIN — AMPICILLIN SODIUM 2 G: 2 INJECTION, POWDER, FOR SOLUTION INTRAVENOUS at 14:42

## 2020-05-16 RX ADMIN — QUETIAPINE FUMARATE 50 MG: 50 TABLET, FILM COATED ORAL at 20:46

## 2020-05-16 RX ADMIN — TORSEMIDE 50 MG: 20 TABLET ORAL at 08:36

## 2020-05-16 RX ADMIN — BUSPIRONE HYDROCHLORIDE 30 MG: 15 TABLET ORAL at 20:46

## 2020-05-16 RX ADMIN — PREGABALIN 100 MG: 100 CAPSULE ORAL at 20:46

## 2020-05-16 RX ADMIN — ENOXAPARIN SODIUM 40 MG: 40 INJECTION SUBCUTANEOUS at 05:02

## 2020-05-16 RX ADMIN — ASPIRIN 81 MG: 81 TABLET, CHEWABLE ORAL at 08:36

## 2020-05-16 RX ADMIN — LEVOTHYROXINE SODIUM 50 MCG: 50 TABLET ORAL at 05:02

## 2020-05-16 RX ADMIN — ENOXAPARIN SODIUM 40 MG: 40 INJECTION SUBCUTANEOUS at 18:33

## 2020-05-16 RX ADMIN — QUETIAPINE FUMARATE 50 MG: 50 TABLET, FILM COATED ORAL at 00:10

## 2020-05-16 RX ADMIN — BUDESONIDE AND FORMOTEROL FUMARATE DIHYDRATE 2 PUFF: 160; 4.5 AEROSOL RESPIRATORY (INHALATION) at 20:14

## 2020-05-16 NOTE — PROGRESS NOTES
"                                              LOS: 2 days   Patient Care Team:  Cheng Guevara MD as PCP - General (Internal Medicine)    Chief Complaint:  F/up COPD, hypoxia, GERSON and medical problems listed below    Subjective   Interval History  I reviewed the admission note, progress notes, PMH, PSH, Family hx, social history, imagings and prior records on this admission, summarized the finding in my note and formulated a transition of care plan.    Denies shortness of breath at rest or cough.  On oxygen 1 L/min only.  She does not use oxygen at home.    REVIEW OF SYSTEMS:   CARDIOVASCULAR: No chest pain, chest pressure or chest discomfort. No palpitations or edema.   RESPIRATORY: No shortness of breath, cough or sputum.   GASTROINTESTINAL: No anorexia, nausea, vomiting or diarrhea.  Has abdominal discomfort.  HEMATOLOGIC: No bleeding or bruising.     Ventilator/Non-Invasive Ventilation Settings (From admission, onward)    None                Physical Exam:     Vital Signs  Temp:  [97.2 °F (36.2 °C)-99.5 °F (37.5 °C)] 97.7 °F (36.5 °C)  Heart Rate:  [58-99] 62  Resp:  [16-18] 18  BP: (101-137)/(49-73) 101/49    Intake/Output Summary (Last 24 hours) at 5/16/2020 1907  Last data filed at 5/16/2020 0907  Gross per 24 hour   Intake --   Output 1310 ml   Net -1310 ml     Flowsheet Rows      First Filed Value   Admission Height  152.4 cm (60\") Documented at 05/14/2020 0141   Admission Weight  130 kg (286 lb 13.1 oz) Documented at 05/14/2020 0429          General Appearance:   Alert, cooperative, in no acute distress   ENMT:  Mallampati score 3, moist mucous membrane.  Dentures noted   Eyes:  Pupils equals and reactive to light. EOMI   Neck:   Delayed trachea midline. No thyromegaly.   Lungs:    Diminished breath sounds overall but equal bilaterally.  Very minimal crackles at the bases.  No wheezing.  Nonlabored breathing    Heart:   Regular rhythm and normal rate, normal S1 and S2, no         murmur   Skin:   No rash "   Abdomen:    Obese. Soft. No tenderness. No HSM.   Neuro:  Conscious, alert, oriented x3. Strength 5/5 in upper an lower  ext   Extremities:  Moves all extremities well, positive edema, no cyanosis, no       redness          Results Review:        Results from last 7 days   Lab Units 05/16/20  0617 05/15/20  0620 05/14/20  0645   SODIUM mmol/L 142 137 137   POTASSIUM mmol/L 3.8 4.0 4.6   CHLORIDE mmol/L 102 98 105   CO2 mmol/L 27.4 23.1 21.7*   BUN mg/dL 26* 22 17   CREATININE mg/dL 1.01* 1.08* 0.92   GLUCOSE mg/dL 110* 175* 161*   CALCIUM mg/dL 7.8* 8.3* 8.3*     Results from last 7 days   Lab Units 05/14/20  0156   TROPONIN T ng/mL 0.014     Results from last 7 days   Lab Units 05/16/20  0617 05/14/20  0645 05/14/20  0156   WBC 10*3/mm3 12.21* 16.82* 15.37*   HEMOGLOBIN g/dL 11.4* 12.6 14.0   HEMATOCRIT % 34.0 38.5 43.4   PLATELETS 10*3/mm3 137* 142 162     Results from last 7 days   Lab Units 05/14/20  0156   INR  1.02   APTT seconds 33.2     Results from last 7 days   Lab Units 05/15/20  0620   PROBNP pg/mL 3,331.0*       I reviewed the patient's new clinical results.  I personally viewed and interpreted the patient's CXR        Medication Review:     ampicillin 2 g Intravenous Q6H   aspirin 81 mg Oral Daily   budesonide-formoterol 2 puff Inhalation BID - RT   busPIRone 30 mg Oral BID   enoxaparin 40 mg Subcutaneous Q12H   fentaNYL 1 patch Transdermal Q72H   FLUoxetine 60 mg Oral Daily   levothyroxine 50 mcg Oral Q AM   oxybutynin XL 10 mg Oral Daily   potassium chloride 20 mEq Oral Daily   pregabalin 100 mg Oral Q12H   QUEtiapine 50 mg Oral Nightly   sodium chloride 10 mL Intravenous Q12H   torsemide 50 mg Oral Daily            Diagnostic imaging:  I personally and independently reviewed the following images:  CT chest 5/14/2020:  Right lower lobe atelectasis      Assessment   1. Acute hypoxic respiratory failure  2. Acute COPD exacerbation  3. Right lower lobe atelectasis  4. GERSON on CPAP  5. Suspected  OHS  6. Chronic narcotic dependence  7. Super morbid obesity    Pertinent medical problems:  · Enterococcal bacteremia  · CKD    All problems new to me    Plan     · Initiate incentive spirometry  · Symbicort for COPD.  Start DuoNeb 4 times a day as needed  · Oxygen by nasal cannula and titrate to wean off.  · Asked to bring the CPAP to use at night if possible  · DVT prophylaxis with Lovenox          Mayra Block MD  05/16/20  19:07          This note was dictated utilizing [employer] dictation

## 2020-05-16 NOTE — THERAPY TREATMENT NOTE
Patient Name: Rose Cheema  : 1940    MRN: [id number]                              Today's Date: 2020       Admit Date: 2020    Visit Dx:     ICD-10-CM ICD-9-CM   1. Fever and chills R50.9 780.60   2. Acute respiratory distress R06.03 518.82   3. Hypoxemia R09.02 799.02   4. COPD with acute exacerbation (CMS/[facility]) J44.1 491.21     Patient Active Problem List   Diagnosis   • Urinary incontinence   • Urinary frequency   • Generalized abdominal pain   • Pulmonary hypertension    • Benign essential hypertension   • Bilateral lower extremity edema (chronic)   • CAD (coronary artery disease), native coronary artery   • Chronic obstructive pulmonary disease with acute exacerbation (CMS/[facility])   • Diastolic dysfunction   • Obesity   • Obstructive sleep apnea   • Secondary pulmonary arterial hypertension (CMS/[facility])   • H/O: hysterectomy   • Fibromyalgia   • Hx SBO   • Hx of cholecystectomy   • Hypoglycemia   • Fall   • Hypothyroidism   • Chronic pain syndrome   • Anemia   • Pneumonia of right lower lobe due to infectious organism (CMS/[facility])   • Acute UTI   • Acute on chronic diastolic (congestive) heart failure (CMS/[facility])   • Opioid dependence (CMS/[facility])   • Venous stasis dermatitis of both lower extremities   • Gram-negative bacteremia   • Hypokalemia   • E. coli bacteremia   • Anemia of chronic disease   • Fever and chills   • CKD (chronic kidney disease) stage 3, GFR 30-59 ml/min (CMS/[facility])   • Bacteremia     Past Medical History:   Diagnosis Date   • Anemia    • Anxiety    • Arthritis    • CHF (congestive heart failure) (CMS/[facility])    • Coronary artery disease    • Depression    • Disease of thyroid gland    • Fibromyalgia    • GERD (gastroesophageal reflux disease)    • Hypertension    • Moderate tricuspid valve stenosis    • Osteoporosis    • Peripheral neuropathy    • Pulmonary hypertension (CMS/[facility])    • SBO (small bowel obstruction) (CMS/[facility])    • Stenosis of mitral valve      Past Surgical History:    Procedure Laterality Date   • BILATERAL OOPHORECTOMY     • CARDIAC CATHETERIZATION     • CHOLECYSTECTOMY     • ERCP N/A 2/15/2019    Procedure: ENDOSCOPIC RETROGRADE CHOLANGIOPANCREATOGRAPHY WITH PARTIAL CHOLANGIOGRAM;  Surgeon: Gerald Herr MD;  Location: [facility] ENDOSCOPY;  Service: Gastroenterology   • EXPLORATORY LAPAROTOMY     • GASTRIC BYPASS     • HERNIA REPAIR      inguinal and ventral   • HYSTERECTOMY     • OVARIAN CYST REMOVAL       General Information     Row Name 05/16/20 1215          PT Evaluation Time/Intention    Document Type  therapy note (daily note)  -PH     Mode of Treatment  physical therapy  -PH     Row Name 05/16/20 1215          Safety Issues, Functional Mobility    Impairments Affecting Function (Mobility)  balance;endurance/activity tolerance;shortness of breath;strength  -PH       User Key  (r) = Recorded By, (t) = Taken By, (c) = Cosigned By    Initials Name Provider Type    PH Sherly Munguia PTA Physical Therapy Assistant        Mobility     Row Name 05/16/20 1215          Bed Mobility Assessment/Treatment    Bed Mobility Assessment/Treatment  supine-sit  -PH     Supine-Sit [place] (Bed Mobility)  verbal cues;contact guard  -PH     Row Name 05/16/20 1215          Sit-Stand Transfer    Sit-Stand [place] (Transfers)  verbal cues;nonverbal cues (demo/gesture);contact guard;minimum assist (75% patient effort)  -PH     Assistive Device (Sit-Stand Transfers)  walker, front-wheeled  -PH     Row Name 05/16/20 1215          Gait/Stairs Assessment/Training    Gait/Stairs Assessment/Training  gait/ambulation independence  -PH     [place] Level (Gait)  verbal cues;nonverbal cues (demo/gesture);contact guard  -PH     Assistive Device (Gait)  walker, front-wheeled  -PH     Distance in Feet (Gait)  20' in room  -PH     Deviations/Abnormal Patterns (Gait)  amanda decreased;gait speed decreased;stride length decreased  -PH     Bilateral Gait Deviations  forward flexed  "posture  -PH     Comment (Gait/Stairs)  Pt reports have a \"twisted spine\" at baseline  -PH       User Key  (r) = Recorded By, (t) = Taken By, (c) = Cosigned By    Initials Name Provider Type    Sherly Casarez PTA Physical Therapy Assistant        Obj/Interventions     Row Name 05/16/20 1217          Therapeutic Exercise    Lower Extremity (Therapeutic Exercise)  marching while seated;LAQ (long arc quad), bilateral  -PH     Lower Extremity Range of Motion (Therapeutic Exercise)  ankle dorsiflexion/plantar flexion, bilateral  -PH     Sets/Reps (Therapeutic Exercise)  1/10  -PH     Row Name 05/16/20 1217          Static Sitting Balance    Level of Stark (Unsupported Sitting, Static Balance)  supervision  -PH     Sitting Position (Unsupported Sitting, Static Balance)  sitting on edge of bed  -PH     Time Able to Maintain Position (Unsupported Sitting, Static Balance)  2 to 3 minutes  -PH       User Key  (r) = Recorded By, (t) = Taken By, (c) = Cosigned By    Initials Name Provider Type     Sherly Munguia PTA Physical Therapy Assistant        Goals/Plan    No documentation.       Clinical Impression     Row Name 05/16/20 1218          Pain Scale: FACES Pre/Post-Treatment    Pain: FACES Scale, Pretreatment  0-->no hurt  -PH     Pain: FACES Scale, Post-Treatment  0-->no hurt  -PH     Pre/Post Treatment Pain Comment  No facial grimaces/pain noted  -PH     Row Name 05/16/20 1218          Plan of Care Review    Plan of Care Reviewed With  patient  -PH     Outcome Summary  Pt agreeable to PT and amb 20' in room on 1L O2 req CGA and use of fww. Pt limited in distance by fatigue. PT will continue to progress as pt tolerates.  -PH     Row Name 05/16/20 1218          Vital Signs    Pre SpO2 (%)  92  -PH     O2 Delivery Pre Treatment  supplemental O2  -PH     Intra SpO2 (%)  92  -PH     O2 Delivery Intra Treatment  supplemental O2  -PH     Post SpO2 (%)  92  -PH     O2 Delivery Post Treatment  " supplemental O2  -PH     Row Name 05/16/20 1218          Positioning and Restraints    Pre-Treatment Position  in bed  -PH     Post Treatment Position  chair  -PH     In Chair  reclined;call light within reach;encouraged to call for assist;exit alarm on  -PH       User Key  (r) = Recorded By, (t) = Taken By, (c) = Cosigned By    Initials Name Provider Type    Sherly Casarez PTA Physical Therapy Assistant        Outcome Measures     Row Name 05/16/20 1220          How much help from another person do you currently need...    Turning from your back to your side while in flat bed without using bedrails?  4  -PH     Moving from lying on back to sitting on the side of a flat bed without bedrails?  3  -PH     Moving to and from a bed to a chair (including a wheelchair)?  3  -PH     Standing up from a chair using your arms (e.g., wheelchair, bedside chair)?  3  -PH     Climbing 3-5 steps with a railing?  1  -PH     To walk in hospital room?  3  -PH     AM-PAC 6 Clicks Score (PT)  17  -PH     Row Name 05/16/20 1220          Functional Assessment    Outcome Measure Options  AM-PAC 6 Clicks Basic Mobility (PT)  -PH       User Key  (r) = Recorded By, (t) = Taken By, (c) = Cosigned By    Initials Name Provider Type     Sherly Munguia PTA Physical Therapy Assistant        Physical Therapy Education                 Title: PT OT SLP Therapies (Done)     Topic: Physical Therapy (Done)     Point: Mobility training (Done)     Description:   Instruct learner(s) on safety and technique for assisting patient out of bed, chair or wheelchair.  Instruct in the proper use of assistive devices, such as walker, crutches, cane or brace.              Patient Friendly Description:   It's important to get you on your feet again, but we need to do so in a way that is safe for you. Falling has serious consequences, and your personal safety is the most important thing of all.        When it's time to get out of bed, one of us  or a family member will sit next to you on the bed to give you support.     If your doctor or nurse tells you to use a walker, crutches, a cane, or a brace, be sure you use it every time you get out of bed, even if you think you don't need it.    Learning Progress Summary           Patient Acceptance, E,D, VU,NR by  at 5/16/2020 1221    Acceptance, E,TB,D, VU,NR by  at 5/15/2020 1319                   Point: Home exercise program (Done)     Description:   Instruct learner(s) on appropriate technique for monitoring, assisting and/or progressing patient with therapeutic exercises and activities.              Learning Progress Summary           Patient Acceptance, E,D, VU,NR by  at 5/16/2020 1221    Acceptance, E,TB,D, VU,NR by  at 5/15/2020 1319                   Point: Body mechanics (Done)     Description:   Instruct learner(s) on proper positioning and spine alignment for patient and/or caregiver during mobility tasks and/or exercises.              Learning Progress Summary           Patient Acceptance, E,D, VU,NR by  at 5/16/2020 1221    Acceptance, E,TB,D, VU,NR by  at 5/15/2020 1319                   Point: Precautions (Done)     Description:   Instruct learner(s) on prescribed precautions during mobility and gait tasks              Learning Progress Summary           Patient Acceptance, E,D, VU,NR by  at 5/16/2020 1221    Acceptance, E,TB,D, VU,NR by  at 5/15/2020 1319                               User Key     Initials Effective Dates Name Provider Type Discipline     04/03/18 -  Ting Avila, PT Physical Therapist PT     08/20/19 -  Sherly Munguia PTA Physical Therapy Assistant PT              PT Recommendation and Plan     Plan of Care Reviewed With: patient  Outcome Summary: Pt agreeable to PT and amb 20' in room on 1L O2 req CGA and use of fww. Pt limited in distance by fatigue. PT will continue to progress as pt tolerates.     Time Calculation:   PT Charges     Row Name  05/16/20 1222             Time Calculation    Start Time  1058  -PH      Stop Time  1121  -PH      Time Calculation (min)  23 min  -PH      PT Received On  05/16/20  -PH      PT - Next Appointment  05/17/20  -PH        User Key  (r) = Recorded By, (t) = Taken By, (c) = Cosigned By    Initials Name Provider Type    PH Sherly Munguia, CHRISTOPHER Physical Therapy Assistant        Therapy Charges for Today     Code Description Service Date Service Provider Modifiers Qty    56182976474 HC PT THER PROC EA 15 MIN 5/16/2020 Sherly Munguia PTA GP 2          PT G-Codes  Outcome Measure Options: AM-PAC 6 Clicks Basic Mobility (PT)  AM-PAC 6 Clicks Score (PT): 17    Sherly Munguia PTA  5/16/2020

## 2020-05-16 NOTE — PLAN OF CARE
Problem: Patient Care Overview  Goal: Plan of Care Review  Outcome: Ongoing (interventions implemented as appropriate)  Flowsheets (Taken 5/16/2020 1218)  Plan of Care Reviewed With: patient  Outcome Summary: Pt agreeable to PT and amb 20' in room on 1L O2 req CGA and use of fww. Pt limited in distance by fatigue. PT will continue to progress as pt tolerates.    Patient was wearing a face mask during this therapy encounter. Therapist used appropriate personal protective equipment including mask and gloves.  Mask used was standard procedure mask. Appropriate PPE was worn during the entire therapy session. Hand hygiene was completed before and after therapy session. Patient is not in enhanced droplet precautions.

## 2020-05-16 NOTE — PROGRESS NOTES
INFECTIOUS DISEASES PROGRESS NOTE    CC: Follow-up Enterococcus faecalis septicemia    S: Fever resolved. She says her fibromyalgia pain is worse today. She is tolerating ampicillin w/o rash.    ROS: no vomiting, no chest pain    O:  Physical Exam:  Temp:  [97.2 °F (36.2 °C)-99.5 °F (37.5 °C)] 97.9 °F (36.6 °C)  Heart Rate:  [58-99] 99  Resp:  [16-18] 17  BP: (126-137)/(58-73) 132/67  Physical Exam   Constitutional: She appears well-developed. No distress.   Pulmonary/Chest: Effort normal.   Abdominal: Soft. She exhibits no distension. There is no tenderness.   Neurological: She is alert.   Skin: Skin is warm and dry.   Psychiatric: She has a normal mood and affect.        Diagnostics:    CBC, BMP, micro reviewed today    Lab Results   Component Value Date    WBC 12.21 (H) 05/16/2020    HGB 11.4 (L) 05/16/2020    HCT 34.0 05/16/2020    MCV 94.4 05/16/2020     (L) 05/16/2020     Lab Results   Component Value Date    GLUCOSE 110 (H) 05/16/2020    CALCIUM 7.8 (L) 05/16/2020     05/16/2020    K 3.8 05/16/2020    CO2 27.4 05/16/2020     05/16/2020    BUN 26 (H) 05/16/2020    CREATININE 1.01 (H) 05/16/2020    EGFRIFNONA 53 (L) 05/16/2020    BCR 25.7 (H) 05/16/2020    ANIONGAP 12.6 05/16/2020     Micro:  5/14 blood cultures: Enterococcus faecalis (ampicillin-sensitive) in 1/2 sets  5/14 urine culture pending  5/14 COVID: negative  5/16 BCx: requested    Radiology (report reviewed)  CT A/P with colitis, possible diverticulitis, and small bowel enteritis of the duodenum; reactive lymphadenopathy    Assessment/Plan   1.  Enterococcal septicemia likely due to GI source based on CT imaging  2. History of choledocholithiasis  3.  Super obesity w/ BMI of 53, complicating above    She is now afebrile with WBC trending down. CT A/P with multiple sites of inflammation but no abscess. I suspect she had translocation of GI shawanda into her blood stream. Continue ampicillin 2 g IV q6h. Duration TBD. Repeat BCx today to  document sterility. Obtain TTE.     Estuardo Shipley MD  0934 AM  05/16/20

## 2020-05-16 NOTE — PLAN OF CARE
Problem: Patient Care Overview  Goal: Plan of Care Review  Flowsheets (Taken 5/16/2020 4432)  Plan of Care Reviewed With: patient  Note:   Clinical swallowing evaluation completed. Recommend to continue regular diet and thin liquids at this time with use of swallowing strategies of small bites/drinks, multiple swallows, and liquid wash. Meds whole in puree as pt request. Recommend VFSS to further evaluate.

## 2020-05-16 NOTE — THERAPY EVALUATION
Acute Care - Speech Language Pathology   Swallow Initial Evaluation [facility]     Patient Name: Rose Cheema  : 1940  MRN: [id number]  Today's Date: 2020               Admit Date: 2020    Visit Dx:     ICD-10-CM ICD-9-CM   1. Fever and chills R50.9 780.60   2. Acute respiratory distress R06.03 518.82   3. Hypoxemia R09.02 799.02   4. COPD with acute exacerbation (CMS/[facility]) J44.1 491.21     Patient Active Problem List   Diagnosis   • Urinary incontinence   • Urinary frequency   • Generalized abdominal pain   • Pulmonary hypertension    • Benign essential hypertension   • Bilateral lower extremity edema (chronic)   • CAD (coronary artery disease), native coronary artery   • Chronic obstructive pulmonary disease with acute exacerbation (CMS/[facility])   • Diastolic dysfunction   • Obesity   • Obstructive sleep apnea   • Secondary pulmonary arterial hypertension (CMS/[facility])   • H/O: hysterectomy   • Fibromyalgia   • Hx SBO   • Hx of cholecystectomy   • Hypoglycemia   • Fall   • Hypothyroidism   • Chronic pain syndrome   • Anemia   • Pneumonia of right lower lobe due to infectious organism (CMS/[facility])   • Acute UTI   • Acute on chronic diastolic (congestive) heart failure (CMS/[facility])   • Opioid dependence (CMS/[facility])   • Venous stasis dermatitis of both lower extremities   • Gram-negative bacteremia   • Hypokalemia   • E. coli bacteremia   • Anemia of chronic disease   • Fever and chills   • CKD (chronic kidney disease) stage 3, GFR 30-59 ml/min (CMS/[facility])   • Bacteremia     Past Medical History:   Diagnosis Date   • Anemia    • Anxiety    • Arthritis    • CHF (congestive heart failure) (CMS/[facility])    • Coronary artery disease    • Depression    • Disease of thyroid gland    • Fibromyalgia    • GERD (gastroesophageal reflux disease)    • Hypertension    • Moderate tricuspid valve stenosis    • Osteoporosis    • Peripheral neuropathy    • Pulmonary hypertension (CMS/[facility])    • SBO (small bowel obstruction) (CMS/[facility])     • Stenosis of mitral valve      Past Surgical History:   Procedure Laterality Date   • BILATERAL OOPHORECTOMY     • CARDIAC CATHETERIZATION     • CHOLECYSTECTOMY     • ERCP N/A 2/15/2019    Procedure: ENDOSCOPIC RETROGRADE CHOLANGIOPANCREATOGRAPHY WITH PARTIAL CHOLANGIOGRAM;  Surgeon: Gerald Herr MD;  Location: [facility] ENDOSCOPY;  Service: Gastroenterology   • EXPLORATORY LAPAROTOMY     • GASTRIC BYPASS     • HERNIA REPAIR      inguinal and ventral   • HYSTERECTOMY     • OVARIAN CYST REMOVAL          SWALLOW EVALUATION (last 72 hours)      SLP Adult Swallow Evaluation     Row Name 05/16/20 1200          Document Type  evaluation  -ML    Subjective Information  no complaints  -ML    Patient Observations  alert;cooperative  -ML    Patient/Family Observations  Pt sitting upright in chair upon entering room eating lunch meal.   -ML    Patient Effort  good  -ML    Symptoms Noted During/After Treatment  none  -ML          Patient Profile Reviewed  yes  -ML    Pertinent History Of Current Problem  Pt admitted with respiratory failure, COPD exacerbation, and enteroccal bactermemia. Pt's CXR was clear upon admission per chart.   -ML    Current Method of Nutrition  regular textures;thin liquids  -ML    Precautions/Limitations, Vision  WFL with corrective lenses  -ML    Precautions/Limitations, Hearing  WFL  -ML    Prior Level of Function-Communication  WFL  -ML    Prior Level of Function-Swallowing  no diet consistency restrictions uses small bites, liquid washes  -ML    Plans/Goals Discussed with  patient  -ML    Barriers to Rehab  none identified  -ML          Additional Documentation  Pain Scale: Numbers Pre/Post-Treatment (Group)  -ML          Pain Scale: Numbers, Pretreatment  0/10 - no pain  -ML    Pain Scale: Numbers, Post-Treatment  0/10 - no pain  -ML          Dentition Assessment  upper dentures/partial in place;lower dentures/partial in place  -ML    Secretion Management  WNL/WFL  -ML    Mucosal Quality   moist, healthy  -ML          Oral Motor General Assessment  WFL  -ML          Respiratory Support Currently in Use  room air  -ML    Eating/Swallowing Skills  self-fed  -ML    Positioning During Eating  upright in chair  -ML    Utensils Used  cup  -ML    Consistencies Trialed  regular textures;soft textures;pureed;thin liquids;chopped  -ML          Oral Prep Phase  -- prolonged  -ML    Oral Transit  WFL  -ML    Oral Residue  WFL  -ML    Pharyngeal Phase  suspected pharyngeal impairment  -ML    Esophageal Phase  -- possible impairment   -ML    Clinical Swallow Evaluation Summary  Clinical swallowing evaluation completed. Pt reports swallowing difficulty increasing throughout the last year and describes deficits as food getting stuck in her throat (indicating area at thyroid), coughing/choking on liquids at times, and needing to turn her head to the right to get food down. Pt says that very small bites and drinks, prolonged mastication, and liquid washes help her eating and drinking. Assessment completed during pt's lunch meal of roast with gravy, bread, applesauce, and thin liquids. Pt exhibited prolonged mastication of solids and piecemeal deglutition, however, was functional. Pt reported globus sensation with bread and meat, however, consecutive swallows and liquid washes were effective in clearing subjective report. No overt s/s of penetration/aspiration noted during any trials assessed this date. Pt very concerned with worsening swallowing function. Recommend VFSS to further evaluate swallowing function and risk of aspiration. Recommend to continue regular diet and thin liquids at this time with use of swallowing strategies of small bites/drinks, multiple swallows, and liquid wash. Meds whole in puree as pt request.   -ML          SLP Swallowing Diagnosis  suspected pharyngeal dysfunction  -ML    Functional Impact  risk of aspiration/pneumonia  -ML    Rehab Potential/Prognosis, Swallowing  good, to achieve stated  therapy goals  -ML    Swallow Criteria for Skilled Therapeutic Interventions Met  demonstrates skilled criteria  -ML          Therapy Frequency (Swallow)  PRN  -ML    Predicted Duration Therapy Intervention (Days)  until discharge  -ML    SLP Diet Recommendation  regular textures;thin liquids  -ML    Recommended Diagnostics  Videostroboscopy  -ML    Recommended Precautions and Strategies  upright posture during/after eating;small bites of food and sips of liquid;no straw;multiple swallows per bite of food liquid wash  -ML    SLP Rec. for Method of Medication Administration  meds whole;with pudding or applesauce  -ML    Monitor for Signs of Aspiration  yes;notify SLP if any concerns  -ML    Anticipated Dischage Disposition  home  -ML          Oral Nutrition/Hydration Goal Selection (SLP)  -- goals to be establishes after VFSS is completed  -ML      User Key  (r) = Recorded By, (t) = Taken By, (c) = Cosigned By    Initials Name Effective Dates    ML Jessie Quiroz, MS CCC-SLP 10/04/18 -           EDUCATION  The patient has been educated in the following areas:   Dysphagia (Swallowing Impairment).    SLP Recommendation and Plan  SLP Swallowing Diagnosis: suspected pharyngeal dysfunction  SLP Diet Recommendation: regular textures, thin liquids  Recommended Precautions and Strategies: upright posture during/after eating, small bites of food and sips of liquid, no straw, multiple swallows per bite of food(liquid wash)  SLP Rec. for Method of Medication Administration: meds whole, with pudding or applesauce     Monitor for Signs of Aspiration: yes, notify SLP if any concerns  Recommended Diagnostics: Videostroboscopy  Swallow Criteria for Skilled Therapeutic Interventions Met: demonstrates skilled criteria  Anticipated Dischage Disposition: home  Rehab Potential/Prognosis, Swallowing: good, to achieve stated therapy goals  Therapy Frequency (Swallow): PRN  Predicted Duration Therapy Intervention (Days): until  discharge       Plan of Care Reviewed With: patient         SLP Outcome Measures (last 72 hours)      SLP Outcome Measures     Row Name 05/16/20 1300             SLP Outcome Measures    Outcome Measure Used?  Adult NOMS  -ML         Adult FCM Scores    FCM Chosen  Swallowing  -ML      Swallowing FCM Score  6  -ML        User Key  (r) = Recorded By, (t) = Taken By, (c) = Cosigned By    Initials Name Effective Dates    Jessie Barr MS CCC-SLP 10/04/18 -            Time Calculation:   Time Calculation- SLP     Row Name 05/16/20 1323             Time Calculation- SLP    SLP Start Time  1200  -ML      SLP Received On  05/16/20  -ML        User Key  (r) = Recorded By, (t) = Taken By, (c) = Cosigned By    Initials Name Provider Type    Jessie Barr MS CCC-SLP Speech and Language Pathologist          Therapy Charges for Today     Code Description Service Date Service Provider Modifiers Qty    [id number] HC ST EVAL ORAL PHARYNG SWALLOW 3 5/16/2020 Jessie Quiroz MS CCC-SLP GN 1               Jessie Quiroz MS CCC-[place]  5/16/2020

## 2020-05-16 NOTE — PLAN OF CARE
Problem: Patient Care Overview  Goal: Plan of Care Review  Outcome: Ongoing (interventions implemented as appropriate)  Flowsheets  Taken 5/16/2020 0321  Progress: no change  Outcome Summary: Pt A &O, Q2 turned, on 1L O2, SB on monitor. Medictaed per orders, IV abx cont. Purewick in place. C/o pain, treated with PRN pain meds.  Taken 5/16/2020 0010  Plan of Care Reviewed With: patient     Problem: Fall Risk (Adult)  Goal: Identify Related Risk Factors and Signs and Symptoms  Outcome: Ongoing (interventions implemented as appropriate)  Flowsheets  Taken 5/15/2020 0353  Related Risk Factors (Fall Risk): environment unfamiliar  Taken 5/16/2020 0321  Signs and Symptoms (Fall Risk): presence of risk factors     Problem: Fall Risk (Adult)  Goal: Absence of Fall  Outcome: Ongoing (interventions implemented as appropriate)  Flowsheets (Taken 5/16/2020 0321)  Absence of Fall: making progress toward outcome     Problem: Skin Injury Risk (Adult)  Goal: Identify Related Risk Factors and Signs and Symptoms  Outcome: Ongoing (interventions implemented as appropriate)  Flowsheets (Taken 5/15/2020 0353)  Related Risk Factors (Skin Injury Risk): advanced age;body weight extremes     Problem: Skin Injury Risk (Adult)  Goal: Skin Health and Integrity  Outcome: Ongoing (interventions implemented as appropriate)  Flowsheets (Taken 5/16/2020 0321)  Skin Health and Integrity: making progress toward outcome     Problem: Pain, Chronic (Adult)  Goal: Identify Related Risk Factors and Signs and Symptoms  Outcome: Ongoing (interventions implemented as appropriate)  Flowsheets (Taken 5/15/2020 0353)  Related Risk Factors (Chronic Pain): pain control inadequate  Signs and Symptoms (Chronic Pain): verbalization of pain descriptors     Problem: Pain, Chronic (Adult)  Goal: Acceptable Pain/Comfort Level and Functional Ability  Outcome: Ongoing (interventions implemented as appropriate)  Flowsheets (Taken 5/16/2020 0321)  Acceptable Pain/Comfort  Level and Functional Ability: making progress toward outcome

## 2020-05-16 NOTE — PROGRESS NOTES
Name: Rose Cheema ADMIT: 2020   : 1940  PCP: Cheng Guevara MD    MRN: [id number] LOS: 2 days   AGE/SEX: 79 y.o. female  ROOM: [facility]   Subjective   Chief Complaint   Patient presents with   • Fever   • Shortness of Breath     On IV abx  Dyspnea fair- feels is at baseline  Chronic swelling  On diuretics      ROS  +f/c  No n/v  No cp/palp  +soa/-cough    Objective   Vital Signs  Temp:  [97.2 °F (36.2 °C)-99.5 °F (37.5 °C)] 97.9 °F (36.6 °C)  Heart Rate:  [58-99] 99  Resp:  [16-18] 17  BP: (126-137)/(58-73) 132/67  SpO2:  [92 %-94 %] 92 %  on  Flow (L/min):  [1-2] 1;   Device (Oxygen Therapy): nasal cannula  Body mass index is 52.83 kg/m².    Elderly  Chronically ill  Physical Exam   Constitutional: She is oriented to person, place, and time.   HENT:   Head: Normocephalic and atraumatic.   Eyes: Conjunctivae are normal. No scleral icterus.   Neck: Neck supple. No tracheal deviation present.   Cardiovascular: Normal rate and regular rhythm.   Pulmonary/Chest: Effort normal and breath sounds normal.   Decreased bs at bases   Abdominal: Soft. There is no tenderness. There is no guarding.   Musculoskeletal: She exhibits edema (1+ with stasis dermatitis).   Neurological: She is alert and oriented to person, place, and time. She exhibits normal muscle tone.   Skin: Skin is warm and dry.   Psychiatric: She has a normal mood and affect. Her behavior is normal.       Results Review:       I reviewed the patient's new clinical results.  Results from last 7 days   Lab Units 20  0617 20  0645 20  0156   WBC 10*3/mm3 12.21* 16.82* 15.37*   HEMOGLOBIN g/dL 11.4* 12.6 14.0   PLATELETS 10*3/mm3 137* 142 162     Results from last 7 days   Lab Units 20  0617 05/15/20  0620 20  0645 20  0156   SODIUM mmol/L 142 137 137 138   POTASSIUM mmol/L 3.8 4.0 4.6 4.7   CHLORIDE mmol/L 102 98 105 100   CO2 mmol/L 27.4 23.1 21.7* 25.3   BUN mg/dL 26* 22 17 17   CREATININE mg/dL 1.01* 1.08*  0.92 1.01*   GLUCOSE mg/dL 110* 175* 161* 146*   Estimated Creatinine Clearance: 56.3 mL/min (A) (by C-G formula based on SCr of 1.01 mg/dL (H)).  Results from last 7 days   Lab Units 05/16/20  0617 05/14/20  0645 05/14/20  0156   ALBUMIN g/dL 3.40* 3.50 4.50   BILIRUBIN mg/dL 0.3 0.5 0.5   ALK PHOS U/L 99 130* 140*   AST (SGOT) U/L 22 46* 18   ALT (SGPT) U/L 32 24 15     Results from last 7 days   Lab Units 05/16/20  0617 05/15/20  0620 05/14/20  0645 05/14/20  0156   CALCIUM mg/dL 7.8* 8.3* 8.3* 8.8   ALBUMIN g/dL 3.40*  --  3.50 4.50     Results from last 7 days   Lab Units 05/14/20  0645 05/14/20  0156   PROCALCITONIN ng/mL 3.17* 0.13   LACTATE mmol/L  --  1.6       Coag   Results from last 7 days   Lab Units 05/14/20  0156   INR  1.02   APTT seconds 33.2     HbA1C No results found for: HGBA1C  Infection   Results from last 7 days   Lab Units 05/14/20  0808 05/14/20  0645 05/14/20  0236 05/14/20  0156   BLOODCX   --   --  No growth at 2 days Enterococcus faecalis*   URINECX  >100,000 CFU/mL Escherichia coli*  --   --   --    BCIDPCR   --   --   --  Enterococcus spp, not VRE. Identification by BCID PCR.*   PROCALCITONIN ng/mL  --  3.17*  --  0.13     Radiology(recent) Ct Abdomen Pelvis With Contrast    Result Date: 5/15/2020  1.  Long segment of bowel wall thickening with surrounding fat stranding involving the sigmoid colon with differential considerations including infectious colitis such as C. difficile versus diverticulitis and correlation patient history is recommended. A stool sample may be helpful. 2.  Findings suggestive of mild enteritis involving the duodenum. 3.  Interval enlargement of multiple abdominopelvic lymph nodes since 02/12/2019. In the absence of known malignancy, findings are favored be reactive; however, they remain nonspecific. Correlation with patient history as well as continued attention on follow-up with CT abdomen and pelvis in 3 months is recommended to ensure stability. 4.  Other  findings as above.  This report was finalized on 5/15/2020 8:58 PM by Dr. Isaias Olivares M.D.      Troponin T   Date Value Ref Range Status   05/14/2020 0.014 0.000 - 0.030 ng/mL Final     No components found for: TSH;2      ampicillin 2 g Intravenous Q6H   aspirin 81 mg Oral Daily   budesonide-formoterol 2 puff Inhalation BID - RT   busPIRone 30 mg Oral BID   enoxaparin 40 mg Subcutaneous Q12H   fentaNYL 1 patch Transdermal Q72H   FLUoxetine 60 mg Oral Daily   levothyroxine 50 mcg Oral Q AM   oxybutynin XL 10 mg Oral Daily   potassium chloride 20 mEq Oral Daily   pregabalin 100 mg Oral Q12H   QUEtiapine 50 mg Oral Nightly   sodium chloride 10 mL Intravenous Q12H   torsemide 50 mg Oral Daily      Diet Regular; Cardiac      Assessment/Plan      Active Hospital Problems    Diagnosis  POA   • **Bacteremia [R78.81]  Yes   • Fever and chills [R50.9]  Yes   • CKD (chronic kidney disease) stage 3, GFR 30-59 ml/min (CMS/HCC) [N18.3]  Yes   • Acute on chronic diastolic (congestive) heart failure (CMS/HCC) [I50.33]  Yes   • Opioid dependence (CMS/HCC) [F11.20]  Yes   • Hypothyroidism [E03.9]  Yes   • Pulmonary hypertension  [I27.20]  Yes   • Fibromyalgia [M79.7]  Yes   • CAD (coronary artery disease), native coronary artery [I25.10]  Yes   • Benign essential hypertension [I10]  Yes   • Chronic obstructive pulmonary disease with acute exacerbation (CMS/HCC) [J44.1]  Yes   • Obstructive sleep apnea [G47.33]  Yes   • Obesity [E66.9]  Yes      Resolved Hospital Problems   No resolved problems to display.     78 yo with history of CKD, D.CHF and pulmonary hypertension, COPD who presents with fevers. Found to have enterococcus bacteremia likely from GI source.     · +Blood cultures. On IV ABX, ID following.   · 2d echo today to look at valves  · On diuretics, monitor renal fx and volume status- a little volume up. Gave extra diuretics 2 days ago. Currently feels dyspnea is at baseline  · Pulmonary following, no current  wheezing  · Above medications  · Monitor labs closely  · PT    Thanks to specialists       DW staff  Dispo- HH vs subacute rehab suspect in about 2 days    Ra Daugherty MD  [facility]  05/16/20  12:02 PM

## 2020-05-17 PROBLEM — A41.9 SEPSIS WITHOUT ACUTE ORGAN DYSFUNCTION (HCC): Status: ACTIVE | Noted: 2020-05-14

## 2020-05-17 PROBLEM — R82.71 ASYMPTOMATIC BACTERIURIA: Status: ACTIVE | Noted: 2020-05-17

## 2020-05-17 PROBLEM — A41.81 ENTEROCOCCAL SEPTICEMIA (HCC): Status: ACTIVE | Noted: 2020-05-17

## 2020-05-17 LAB
ANION GAP SERPL CALCULATED.3IONS-SCNC: 12.4 MMOL/L (ref 5–15)
BACTERIA SPEC AEROBE CULT: ABNORMAL
BUN BLD-MCNC: 26 MG/DL (ref 8–23)
BUN/CREAT SERPL: 23 (ref 7–25)
CALCIUM SPEC-SCNC: 7.4 MG/DL (ref 8.6–10.5)
CHLORIDE SERPL-SCNC: 101 MMOL/L (ref 98–107)
CO2 SERPL-SCNC: 30.6 MMOL/L (ref 22–29)
CREAT BLD-MCNC: 1.13 MG/DL (ref 0.57–1)
DEPRECATED RDW RBC AUTO: 44 FL (ref 37–54)
ERYTHROCYTE [DISTWIDTH] IN BLOOD BY AUTOMATED COUNT: 12.9 % (ref 12.3–15.4)
GFR SERPL CREATININE-BSD FRML MDRD: 47 ML/MIN/1.73
GLUCOSE BLD-MCNC: 86 MG/DL (ref 65–99)
HCT VFR BLD AUTO: 36.8 % (ref 34–46.6)
HGB BLD-MCNC: 11.6 G/DL (ref 12–15.9)
MCH RBC QN AUTO: 30.1 PG (ref 26.6–33)
MCHC RBC AUTO-ENTMCNC: 31.5 G/DL (ref 31.5–35.7)
MCV RBC AUTO: 95.3 FL (ref 79–97)
PLATELET # BLD AUTO: 143 10*3/MM3 (ref 140–450)
PMV BLD AUTO: 11 FL (ref 6–12)
POTASSIUM BLD-SCNC: 3.6 MMOL/L (ref 3.5–5.2)
RBC # BLD AUTO: 3.86 10*6/MM3 (ref 3.77–5.28)
SODIUM BLD-SCNC: 144 MMOL/L (ref 136–145)
WBC NRBC COR # BLD: 5.83 10*3/MM3 (ref 3.4–10.8)

## 2020-05-17 PROCEDURE — 80048 BASIC METABOLIC PNL TOTAL CA: CPT | Performed by: INTERNAL MEDICINE

## 2020-05-17 PROCEDURE — 25010000002 AMPICILLIN PER 500 MG: Performed by: INTERNAL MEDICINE

## 2020-05-17 PROCEDURE — 94799 UNLISTED PULMONARY SVC/PX: CPT

## 2020-05-17 PROCEDURE — 85027 COMPLETE CBC AUTOMATED: CPT | Performed by: INTERNAL MEDICINE

## 2020-05-17 PROCEDURE — 97110 THERAPEUTIC EXERCISES: CPT

## 2020-05-17 PROCEDURE — 99232 SBSQ HOSP IP/OBS MODERATE 35: CPT | Performed by: INTERNAL MEDICINE

## 2020-05-17 PROCEDURE — 25010000002 ENOXAPARIN PER 10 MG: Performed by: INTERNAL MEDICINE

## 2020-05-17 RX ADMIN — HYDROCODONE BITARTRATE AND ACETAMINOPHEN 1 TABLET: 7.5; 325 TABLET ORAL at 10:08

## 2020-05-17 RX ADMIN — AMPICILLIN SODIUM 2 G: 2 INJECTION, POWDER, FOR SOLUTION INTRAVENOUS at 01:15

## 2020-05-17 RX ADMIN — SODIUM CHLORIDE, PRESERVATIVE FREE 10 ML: 5 INJECTION INTRAVENOUS at 10:10

## 2020-05-17 RX ADMIN — BUDESONIDE AND FORMOTEROL FUMARATE DIHYDRATE 2 PUFF: 160; 4.5 AEROSOL RESPIRATORY (INHALATION) at 20:12

## 2020-05-17 RX ADMIN — ENOXAPARIN SODIUM 40 MG: 40 INJECTION SUBCUTANEOUS at 17:19

## 2020-05-17 RX ADMIN — FLUOXETINE HYDROCHLORIDE 60 MG: 20 CAPSULE ORAL at 10:07

## 2020-05-17 RX ADMIN — PREGABALIN 100 MG: 100 CAPSULE ORAL at 10:08

## 2020-05-17 RX ADMIN — BUSPIRONE HYDROCHLORIDE 30 MG: 15 TABLET ORAL at 21:23

## 2020-05-17 RX ADMIN — LEVOTHYROXINE SODIUM 50 MCG: 50 TABLET ORAL at 05:52

## 2020-05-17 RX ADMIN — ENOXAPARIN SODIUM 40 MG: 40 INJECTION SUBCUTANEOUS at 05:52

## 2020-05-17 RX ADMIN — BUDESONIDE AND FORMOTEROL FUMARATE DIHYDRATE 2 PUFF: 160; 4.5 AEROSOL RESPIRATORY (INHALATION) at 08:16

## 2020-05-17 RX ADMIN — AMPICILLIN SODIUM 2 G: 2 INJECTION, POWDER, FOR SOLUTION INTRAVENOUS at 17:18

## 2020-05-17 RX ADMIN — POTASSIUM CHLORIDE 20 MEQ: 10 CAPSULE, COATED, EXTENDED RELEASE ORAL at 10:07

## 2020-05-17 RX ADMIN — BUSPIRONE HYDROCHLORIDE 30 MG: 15 TABLET ORAL at 10:07

## 2020-05-17 RX ADMIN — FENTANYL 1 PATCH: 25 PATCH TRANSDERMAL at 17:20

## 2020-05-17 RX ADMIN — AMPICILLIN SODIUM 2 G: 2 INJECTION, POWDER, FOR SOLUTION INTRAVENOUS at 10:10

## 2020-05-17 RX ADMIN — AMPICILLIN SODIUM 2 G: 2 INJECTION, POWDER, FOR SOLUTION INTRAVENOUS at 21:24

## 2020-05-17 RX ADMIN — TORSEMIDE 50 MG: 20 TABLET ORAL at 10:07

## 2020-05-17 RX ADMIN — SODIUM CHLORIDE, PRESERVATIVE FREE 10 ML: 5 INJECTION INTRAVENOUS at 21:24

## 2020-05-17 RX ADMIN — PREGABALIN 100 MG: 100 CAPSULE ORAL at 21:26

## 2020-05-17 RX ADMIN — OXYBUTYNIN CHLORIDE 10 MG: 10 TABLET, EXTENDED RELEASE ORAL at 10:08

## 2020-05-17 RX ADMIN — ASPIRIN 81 MG: 81 TABLET, CHEWABLE ORAL at 10:08

## 2020-05-17 NOTE — THERAPY TREATMENT NOTE
Patient Name: Rose Cheema  : 1940    MRN: [id number]                              Today's Date: 2020       Admit Date: 2020    Visit Dx:     ICD-10-CM ICD-9-CM   1. Fever and chills R50.9 780.60   2. Acute respiratory distress R06.03 518.82   3. Hypoxemia R09.02 799.02   4. COPD with acute exacerbation (CMS/[facility]) J44.1 491.21     Patient Active Problem List   Diagnosis   • Urinary incontinence   • Urinary frequency   • Generalized abdominal pain   • Pulmonary hypertension    • Benign essential hypertension   • Bilateral lower extremity edema (chronic)   • CAD (coronary artery disease), native coronary artery   • Chronic obstructive pulmonary disease with acute exacerbation (CMS/[facility])   • Diastolic dysfunction   • Class 3 severe obesity with serious comorbidity and body mass index (BMI) of 50.0 to 59.9 in adult (CMS/[facility])   • Obstructive sleep apnea   • Secondary pulmonary arterial hypertension (CMS/[facility])   • H/O: hysterectomy   • Fibromyalgia   • Hx SBO   • Hx of cholecystectomy   • Hypoglycemia   • Fall   • Hypothyroidism   • Chronic pain syndrome   • Anemia   • Pneumonia of right lower lobe due to infectious organism (CMS/[facility])   • Acute UTI   • Acute on chronic diastolic (congestive) heart failure (CMS/[facility])   • Opioid dependence (CMS/[facility])   • Venous stasis dermatitis of both lower extremities   • Gram-negative bacteremia   • Hypokalemia   • E. coli bacteremia   • Anemia of chronic disease   • Sepsis without acute organ dysfunction - present on admit   • CKD (chronic kidney disease) stage 3, GFR 30-59 ml/min (CMS/[facility])   • Bacteremia   • Enterococcal septicemia (CMS/[facility])     Past Medical History:   Diagnosis Date   • Anemia    • Anxiety    • Arthritis    • CHF (congestive heart failure) (CMS/[facility])    • Coronary artery disease    • Depression    • Disease of thyroid gland    • Fibromyalgia    • GERD (gastroesophageal reflux disease)    • Hypertension    • Moderate tricuspid valve stenosis    •  Osteoporosis    • Peripheral neuropathy    • Pulmonary hypertension (CMS/HCC)    • SBO (small bowel obstruction) (CMS/HCC)    • Stenosis of mitral valve      Past Surgical History:   Procedure Laterality Date   • BILATERAL OOPHORECTOMY     • CARDIAC CATHETERIZATION     • CHOLECYSTECTOMY     • ERCP N/A 2/15/2019    Procedure: ENDOSCOPIC RETROGRADE CHOLANGIOPANCREATOGRAPHY WITH PARTIAL CHOLANGIOGRAM;  Surgeon: Gerald Herr MD;  Location: [facility] ENDOSCOPY;  Service: Gastroenterology   • EXPLORATORY LAPAROTOMY     • GASTRIC BYPASS     • HERNIA REPAIR      inguinal and ventral   • HYSTERECTOMY     • OVARIAN CYST REMOVAL       General Information     Row Name 05/17/20 1223          PT Evaluation Time/Intention    Document Type  therapy note (daily note)  -PH     Mode of Treatment  physical therapy  -     Row Name 05/17/20 1223          Safety Issues, Functional Mobility    Impairments Affecting Function (Mobility)  balance;endurance/activity tolerance;strength;shortness of breath  -PH       User Key  (r) = Recorded By, (t) = Taken By, (c) = Cosigned By    Initials Name Provider Type     Sherly Munguia PTA Physical Therapy Assistant        Mobility     Row Name 05/17/20 1223          Bed Mobility Assessment/Treatment    Comment (Bed Mobility)  Pt reported she had just had diarrhea and req a total cleanup and would rather not get out of bed. Pt agreeable to TE in bed today.  -PH     Row Name 05/17/20 1223          Bed-Chair Transfer    Bed-Chair [place] (Transfers)  not tested  -PH     Row Name 05/17/20 1223          Sit-Stand Transfer    Sit-Stand [place] (Transfers)  not tested  -PH     Row Name 05/17/20 1223          Gait/Stairs Assessment/Training    [place] Level (Gait)  not tested  -       User Key  (r) = Recorded By, (t) = Taken By, (c) = Cosigned By    Initials Name Provider Type     Sherly Munguia PTA Physical Therapy Assistant        Obj/Interventions     Row Name  05/17/20 1224          Therapeutic Exercise    Upper Extremity Range of Motion (Therapeutic Exercise)  elbow flexion/extension, bilateral;shoulder flexion/extension, bilateral;shoulder horizontal abduction/adduction, bilateral shoulder rolls  -PH     Lower Extremity (Therapeutic Exercise)  gluteal sets;heel slides, bilateral;quad sets, bilateral;SLR (straight leg raise), bilateral  -PH     Lower Extremity Range of Motion (Therapeutic Exercise)  hip abduction/adduction, bilateral;ankle dorsiflexion/plantar flexion, bilateral  -PH     Exercise Type (Therapeutic Exercise)  AROM (active range of motion)  -PH     Position (Therapeutic Exercise)  supine  -PH     Sets/Reps (Therapeutic Exercise)  1/10  -PH       User Key  (r) = Recorded By, (t) = Taken By, (c) = Cosigned By    Initials Name Provider Type    PH Sherly Munguia PTA Physical Therapy Assistant        Goals/Plan    No documentation.       Clinical Impression     Row Name 05/17/20 1225          Pain Scale: Numbers Pre/Post-Treatment    Pain Scale: Numbers, Pretreatment  0/10 - no pain  -PH     Row Name 05/17/20 1225          Plan of Care Review    Plan of Care Reviewed With  patient  -PH     Outcome Summary  Pt reported just getting cleaned up from having diarrhea and was tired. Pt agreeable to TE in bed w/ encouragement and performed 2/10 B UE and LE exercises. PT will continue to progress as pt tolerates.   -PH     Row Name 05/17/20 1225          Vital Signs    O2 Delivery Pre Treatment  supplemental O2  -PH     O2 Delivery Intra Treatment  supplemental O2  -PH     O2 Delivery Post Treatment  supplemental O2  -PH     Row Name 05/17/20 1225          Positioning and Restraints    Pre-Treatment Position  in bed  -PH     Post Treatment Position  bed  -PH     In Bed  fowlers;call light within reach;encouraged to call for assist;exit alarm on  -PH       User Key  (r) = Recorded By, (t) = Taken By, (c) = Cosigned By    Initials Name Provider Type    PH  Sherly Munguia PTA Physical Therapy Assistant        Outcome Measures     Row Name 05/17/20 1227          How much help from another person do you currently need...    Turning from your back to your side while in flat bed without using bedrails?  4  -PH     Moving from lying on back to sitting on the side of a flat bed without bedrails?  3  -PH     Moving to and from a bed to a chair (including a wheelchair)?  3  -PH     Standing up from a chair using your arms (e.g., wheelchair, bedside chair)?  3  -PH     Climbing 3-5 steps with a railing?  1  -PH     To walk in hospital room?  3  -PH     AM-PAC 6 Clicks Score (PT)  17  -PH     Row Name 05/17/20 1227          Functional Assessment    Outcome Measure Options  AM-PAC 6 Clicks Basic Mobility (PT)  -PH       User Key  (r) = Recorded By, (t) = Taken By, (c) = Cosigned By    Initials Name Provider Type    PH Sherly Munguia PTA Physical Therapy Assistant        Physical Therapy Education                 Title: PT OT SLP Therapies (Done)     Topic: Physical Therapy (Done)     Point: Mobility training (Done)     Description:   Instruct learner(s) on safety and technique for assisting patient out of bed, chair or wheelchair.  Instruct in the proper use of assistive devices, such as walker, crutches, cane or brace.              Patient Friendly Description:   It's important to get you on your feet again, but we need to do so in a way that is safe for you. Falling has serious consequences, and your personal safety is the most important thing of all.        When it's time to get out of bed, one of us or a family member will sit next to you on the bed to give you support.     If your doctor or nurse tells you to use a walker, crutches, a cane, or a brace, be sure you use it every time you get out of bed, even if you think you don't need it.    Learning Progress Summary           Patient Acceptance, E,D, VU,NR by  at 5/16/2020 1221    Acceptance, E,TB,D,  VU,NR by  at 5/15/2020 1319                   Point: Home exercise program (Done)     Description:   Instruct learner(s) on appropriate technique for monitoring, assisting and/or progressing patient with therapeutic exercises and activities.              Learning Progress Summary           Patient Acceptance, E,D, VU,NR by  at 5/17/2020 1228    Acceptance, E,D, VU,NR by  at 5/16/2020 1221    Acceptance, E,TB,D, VU,NR by  at 5/15/2020 1319                   Point: Body mechanics (Done)     Description:   Instruct learner(s) on proper positioning and spine alignment for patient and/or caregiver during mobility tasks and/or exercises.              Learning Progress Summary           Patient Acceptance, E,D, VU,NR by  at 5/16/2020 1221    Acceptance, E,TB,D, VU,NR by  at 5/15/2020 1319                   Point: Precautions (Done)     Description:   Instruct learner(s) on prescribed precautions during mobility and gait tasks              Learning Progress Summary           Patient Acceptance, E,D, VU,NR by  at 5/16/2020 1221    Acceptance, E,TB,D, VU,NR by  at 5/15/2020 1319                               User Key     Initials Effective Dates Name Provider Type Discipline     04/03/18 -  Ting Avila, PT Physical Therapist PT     08/20/19 -  Sherly Munguia PTA Physical Therapy Assistant PT              PT Recommendation and Plan     Outcome Summary/Treatment Plan (PT)  Anticipated Discharge Disposition (PT): skilled nursing facility  Plan of Care Reviewed With: patient  Outcome Summary: Pt reported just getting cleaned up from having diarrhea and was tired. Pt agreeable to TE in bed w/ encouragement and performed 2/10 B UE and LE exercises. PT will continue to progress as pt tolerates.      Time Calculation:   PT Charges     Row Name 05/17/20 1220             Time Calculation    Start Time  1107  -PH      Stop Time  1130  -PH      Time Calculation (min)  23 min  -PH      PT Received On   05/17/20  -      PT - Next Appointment  05/18/20  -        User Key  (r) = Recorded By, (t) = Taken By, (c) = Cosigned By    Initials Name Provider Type    Sherly Casarez PTA Physical Therapy Assistant        Therapy Charges for Today     Code Description Service Date Service Provider Modifiers Qty    17036932159 HC PT THER PROC EA 15 MIN 5/16/2020 Sherly Munugia PTA GP 2    23005720630 HC PT THER PROC EA 15 MIN 5/17/2020 Sherly Munguia PTA GP 2          PT G-Codes  Outcome Measure Options: AM-PAC 6 Clicks Basic Mobility (PT)  AM-PAC 6 Clicks Score (PT): Chun Munguia PTA  5/17/2020

## 2020-05-17 NOTE — PLAN OF CARE
Problem: Patient Care Overview  Goal: Plan of Care Review  Outcome: Ongoing (interventions implemented as appropriate)  Flowsheets (Taken 5/17/2020 1225)  Plan of Care Reviewed With: patient  Outcome Summary: Pt reported just getting cleaned up from having diarrhea and was tired. Pt agreeable to TE in bed w/ encouragement and performed 2/10 B UE and LE exercises. PT will continue to progress as pt tolerates.     Patient was wearing a face mask during this therapy encounter. Therapist used appropriate personal protective equipment including gown, mask and gloves.  Mask used was standard procedure mask. Appropriate PPE was worn during the entire therapy session. Hand hygiene was completed before and after therapy session. Patient is not in enhanced droplet precautions.

## 2020-05-17 NOTE — PROGRESS NOTES
"                                              LOS: 3 days   Patient Care Team:  Cheng Guevara MD as PCP - General (Internal Medicine)    Chief Complaint:  F/up COPD, hypoxia, GERSON and medical problems listed below    Subjective   Interval History  On 2 L oxygen.  Denies shortness of breath at rest.  Has mild cough but no sputum production.    REVIEW OF SYSTEMS:   CARDIOVASCULAR: No chest pain, chest pressure or chest discomfort. No palpitations or edema.   GASTROINTESTINAL: No anorexia, nausea, vomiting or diarrhea.  Has abdominal discomfort.      Ventilator/Non-Invasive Ventilation Settings (From admission, onward)    None                Physical Exam:     Vital Signs  Temp:  [97.5 °F (36.4 °C)-98.1 °F (36.7 °C)] 97.5 °F (36.4 °C)  Heart Rate:  [60-70] 70  Resp:  [18] 18  BP: (101-124)/(29-63) 103/29    Intake/Output Summary (Last 24 hours) at 5/17/2020 1545  Last data filed at 5/17/2020 1350  Gross per 24 hour   Intake 720 ml   Output 1800 ml   Net -1080 ml     Flowsheet Rows      First Filed Value   Admission Height  152.4 cm (60\") Documented at 05/14/2020 0141   Admission Weight  130 kg (286 lb 13.1 oz) Documented at 05/14/2020 0429          General Appearance:   Alert, cooperative, in no acute distress   ENMT:  Mallampati score 3, moist mucous membrane.  Dentures noted   Eyes:  Pupils equals and reactive to light. EOMI   Neck:   Delayed trachea midline. No thyromegaly.   Lungs:    Minimal crackles at the bases with very faint expiratory wheezing bilaterally.    Heart:   Regular rhythm and normal rate, normal S1 and S2, no         murmur   Skin:   No rash   Abdomen:    Obese. Soft. No tenderness. No HSM.   Neuro:  Conscious, alert, oriented x3. Strength 5/5 in upper an lower  ext   Extremities:  Moves all extremities well, positive edema, no cyanosis, no       redness          Results Review:        Results from last 7 days   Lab Units 05/17/20  0529 05/16/20  0617 05/15/20  0620   SODIUM mmol/L 144 142 137   "   POTASSIUM mmol/L 3.6 3.8 4.0   CHLORIDE mmol/L 101 102 98   CO2 mmol/L 30.6* 27.4 23.1   BUN mg/dL 26* 26* 22   CREATININE mg/dL 1.13* 1.01* 1.08*   GLUCOSE mg/dL 86 110* 175*   CALCIUM mg/dL 7.4* 7.8* 8.3*     Results from last 7 days   Lab Units 05/14/20  0156   TROPONIN T ng/mL 0.014     Results from last 7 days   Lab Units 05/17/20  0529 05/16/20  0617 05/14/20  0645   WBC 10*3/mm3 5.83 12.21* 16.82*   HEMOGLOBIN g/dL 11.6* 11.4* 12.6   HEMATOCRIT % 36.8 34.0 38.5   PLATELETS 10*3/mm3 143 137* 142     Results from last 7 days   Lab Units 05/14/20  0156   INR  1.02   APTT seconds 33.2     Results from last 7 days   Lab Units 05/15/20  0620   PROBNP pg/mL 3,331.0*       I reviewed the patient's new clinical results.  I personally viewed and interpreted the patient's CXR        Medication Review:     ampicillin 2 g Intravenous Q6H   aspirin 81 mg Oral Daily   budesonide-formoterol 2 puff Inhalation BID - RT   busPIRone 30 mg Oral BID   enoxaparin 40 mg Subcutaneous Q12H   fentaNYL 1 patch Transdermal Q72H   FLUoxetine 60 mg Oral Daily   levothyroxine 50 mcg Oral Q AM   oxybutynin XL 10 mg Oral Daily   potassium chloride 20 mEq Oral Daily   pregabalin 100 mg Oral Q12H   QUEtiapine 50 mg Oral Nightly   sodium chloride 10 mL Intravenous Q12H   torsemide 50 mg Oral Daily            Diagnostic imaging:  I personally and independently reviewed the following images:  CT chest 5/14/2020:  Right lower lobe atelectasis      Assessment   1. Acute hypoxic respiratory failure  2. Acute COPD exacerbation  3. Right lower lobe atelectasis  4. GERSON on CPAP  5. Suspected OHS  6. Chronic narcotic dependence  7. Super morbid obesity    Pertinent medical problems:  · Enterococcal bacteremia  · CKD      Plan     · Initiate incentive spirometry.  It has not been provided  · Symbicort for COPD and DuoNeb 4 times a day as needed  · Asked to bring the CPAP to use at night if possible  · Titrate to wean off oxygen.  I took her off and will  observe her SPO2.  · DVT prophylaxis with Lovenox    Disposition: Skilled nursing unit      Mayra Block MD  05/17/20  15:45          This note was dictated utilizing Dragon dictation

## 2020-05-17 NOTE — PROGRESS NOTES
Name: Rose Cheema ADMIT: 2020   : 1940  PCP: Cheng Guveara MD    MRN: [id number] LOS: 3 days   AGE/SEX: 79 y.o. female  ROOM: [facility]     Subjective   Subjective   Overall feels pretty well.  Has IBS and thinks loose stool today due to that.  No nausea or vomiting.  No diarrhea yesterday.    Review of Systems   Gastrointestinal: Positive for diarrhea. Negative for abdominal pain.        Objective   Objective   Vital Signs  Temp:  [97.7 °F (36.5 °C)-98.4 °F (36.9 °C)] 98.1 °F (36.7 °C)  Heart Rate:  [58-70] 70  Resp:  [18] 18  BP: (101-124)/(49-64) 124/63  SpO2:  [91 %-94 %] 93 %  on  Flow (L/min):  [1-2] 2;   Device (Oxygen Therapy): nasal cannula;humidified  Body mass index is 52.87 kg/m².  Physical Exam   Constitutional: She is oriented to person, place, and time.   HENT:   Head: Normocephalic and atraumatic.   Eyes: Conjunctivae are normal. No scleral icterus.   Neck: Neck supple. No tracheal deviation present.   Cardiovascular: Normal rate and regular rhythm.   Pulmonary/Chest: Effort normal and breath sounds normal.   Decreased bs at bases   Abdominal: Soft. There is no tenderness. There is no guarding.   Musculoskeletal: She exhibits edema (1+ with stasis dermatitis).   Neurological: She is alert and oriented to person, place, and time. She exhibits normal muscle tone.   Skin: Skin is warm and dry.   Psychiatric: She has a normal mood and affect. Her behavior is normal.       Results Review:       I reviewed the patient's new clinical results.  Results from last 7 days   Lab Units 20  0529 20  0617 20  0645 20  0156   WBC 10*3/mm3 5.83 12.21* 16.82* 15.37*   HEMOGLOBIN g/dL 11.6* 11.4* 12.6 14.0   PLATELETS 10*3/mm3 143 137* 142 162     Results from last 7 days   Lab Units 20  0529 20  0617 05/15/20  0620 20  0645   SODIUM mmol/L 144 142 137 137   POTASSIUM mmol/L 3.6 3.8 4.0 4.6   CHLORIDE mmol/L 101 102 98 105   CO2 mmol/L 30.6* 27.4 23.1 21.7*      BUN mg/dL 26* 26* 22 17   CREATININE mg/dL 1.13* 1.01* 1.08* 0.92   GLUCOSE mg/dL 86 110* 175* 161*   Estimated Creatinine Clearance: 50.4 mL/min (A) (by C-G formula based on SCr of 1.13 mg/dL (H)).  Results from last 7 days   Lab Units 05/16/20  0617 05/14/20  0645 05/14/20  0156   ALBUMIN g/dL 3.40* 3.50 4.50   BILIRUBIN mg/dL 0.3 0.5 0.5   ALK PHOS U/L 99 130* 140*   AST (SGOT) U/L 22 46* 18   ALT (SGPT) U/L 32 24 15     Results from last 7 days   Lab Units 05/17/20  0529 05/16/20  0617 05/15/20  0620 05/14/20  0645 05/14/20  0156   CALCIUM mg/dL 7.4* 7.8* 8.3* 8.3* 8.8   ALBUMIN g/dL  --  3.40*  --  3.50 4.50     Results from last 7 days   Lab Units 05/14/20  0645 05/14/20  0156   PROCALCITONIN ng/mL 3.17* 0.13   LACTATE mmol/L  --  1.6       Adult Transthoracic Echo Complete W/ Cont if Necessary Per Protocol  · Left ventricular systolic function is normal. Calculated EF = 53.0%.   Estimated EF was in agreement with the calculated EF. Estimated EF = 53%.   Normal left ventricular cavity size and wall thickness noted. Wall motion   assessment as below Left ventricular diastolic function was indeterminate.  · The following segments are hypokinetic: apex.  · Severe MAC is present. Mild mitral valve regurgitation is present.  · Mild to moderate tricuspid valve regurgitation is present. Estimated   right ventricular systolic pressure from tricuspid regurgitation is mildly   elevated (35-45 mmHg). Calculated right ventricular systolic pressure from   tricuspid regurgitation is 43 mmHg.  · Right ventricular cavity is borderline dilated.           ampicillin 2 g Intravenous Q6H   aspirin 81 mg Oral Daily   budesonide-formoterol 2 puff Inhalation BID - RT   busPIRone 30 mg Oral BID   enoxaparin 40 mg Subcutaneous Q12H   fentaNYL 1 patch Transdermal Q72H   FLUoxetine 60 mg Oral Daily   levothyroxine 50 mcg Oral Q AM   oxybutynin XL 10 mg Oral Daily   potassium chloride 20 mEq Oral Daily   pregabalin 100 mg Oral Q12H    QUEtiapine 50 mg Oral Nightly   sodium chloride 10 mL Intravenous Q12H   torsemide 50 mg Oral Daily      Diet Regular; Cardiac       Assessment/Plan     Active Hospital Problems    Diagnosis  POA   • **Enterococcal septicemia (CMS/HCC) [A41.81]  Yes   • Asymptomatic bacteriuria [R82.71]  Yes   • Bacteremia [R78.81]  Yes   • Sepsis without acute organ dysfunction - present on admit [A41.9]  Yes   • CKD (chronic kidney disease) stage 3, GFR 30-59 ml/min (CMS/HCC) [N18.3]  Yes   • Acute on chronic diastolic (congestive) heart failure (CMS/HCC) [I50.33]  Yes   • Opioid dependence (CMS/HCC) [F11.20]  Yes   • Hypothyroidism [E03.9]  Yes   • Pulmonary hypertension  [I27.20]  Yes   • CAD (coronary artery disease), native coronary artery [I25.10]  Yes   • Benign essential hypertension [I10]  Yes   • Chronic obstructive pulmonary disease with acute exacerbation (CMS/HCC) [J44.1]  Yes   • Obstructive sleep apnea [G47.33]  Yes   • Class 3 severe obesity with serious comorbidity and body mass index (BMI) of 50.0 to 59.9 in adult (CMS/[facility]) [E66.01, Z68.43]  Not Applicable      Resolved Hospital Problems   No resolved problems to display.       79 y.o. female admitted with Enterococcal septicemia (CMS/HCC).    · Antibiotics per ID.  On ampicillin for enterococcal bacteremia presumably from GI source.  · Note also asymptomatic bacteriuria E. coli with ESBL.  · WBC has normalized.  · Renal function stable.  On torsemide and KCl.  · Pulmonary following.  · Hold on further evaluation for diarrhea, consider sending for C. difficile if worsens or change in condition.  · Lovenox 40 mg SC every 12 for DVT prophylaxis.  · Full code.  · Discussed with patient and nursing staff.  · Anticipate discharge to SNU facility next week.      Reynaldo Shabazz MD  [facility]  05/17/20  10:22

## 2020-05-17 NOTE — PROGRESS NOTES
INFECTIOUS DISEASES PROGRESS NOTE    CC: Follow-up Enterococcus faecalis septicemia    S: No fever. WBC has normalized. She says she is feeling much better overall today. She had a TTE yesterday that did not show vegetation. She had a positive urine culture but denies dysuria and says her urine is clear.     ROS: no vomiting, no chest pain    O:  Physical Exam:  Temp:  [97.7 °F (36.5 °C)-98.4 °F (36.9 °C)] 98.1 °F (36.7 °C)  Heart Rate:  [58-99] 62  Resp:  [18] 18  BP: (101-124)/(49-64) 124/63  Physical Exam   Constitutional: She appears well-developed. No distress.   Pulmonary/Chest: Effort normal.   Abdominal: Soft. She exhibits no distension. There is no tenderness.   Neurological: She is alert.   Skin: Skin is warm and dry.   Psychiatric: She has a normal mood and affect.        Diagnostics:    CBC, BMP, micro reviewed today  Lab Results   Component Value Date    WBC 5.83 05/17/2020    HGB 11.6 (L) 05/17/2020    HCT 36.8 05/17/2020    MCV 95.3 05/17/2020     05/17/2020     Lab Results   Component Value Date    GLUCOSE 86 05/17/2020    CALCIUM 7.4 (L) 05/17/2020     05/17/2020    K 3.6 05/17/2020    CO2 30.6 (H) 05/17/2020     05/17/2020    BUN 26 (H) 05/17/2020    CREATININE 1.13 (H) 05/17/2020    EGFRIFNONA 47 (L) 05/17/2020    BCR 23.0 05/17/2020    ANIONGAP 12.4 05/17/2020     Micro:  5/14 blood cultures: Enterococcus faecalis (ampicillin-sensitive) in 1/2 sets  5/14 urine culture: > 100k E coli  5/14 COVID: negative  5/16 BCx: NGTD    Radiology (report reviewed)  TTE negative for vegetations    Assessment/Plan   1.  Enterococcal septicemia likely due to GI source based on CT imaging  2. History of choledocholithiasis  3.  Super obesity w/ BMI of 53, complicating above  4. Asymptomatic bacteriuria    Continue ampicillin 2 g IV q6. Duration TBD. Repeat blood cultures are NGTD. TTE negative for vegetations. She is afebrile and WBC down to normal. She has a positive urine culture but no  symptoms which leads us to a diagnosis of asymptomatic bacteriuria.     Estuardo Shipley MD  0811 AM  05/17/20

## 2020-05-18 ENCOUNTER — APPOINTMENT (OUTPATIENT)
Dept: GENERAL RADIOLOGY | Facility: HOSPITAL | Age: 80
End: 2020-05-18

## 2020-05-18 LAB
ANION GAP SERPL CALCULATED.3IONS-SCNC: 11.4 MMOL/L (ref 5–15)
BACTERIA SPEC AEROBE CULT: ABNORMAL
BUN BLD-MCNC: 21 MG/DL (ref 8–23)
BUN/CREAT SERPL: 23.1 (ref 7–25)
CALCIUM SPEC-SCNC: 7.6 MG/DL (ref 8.6–10.5)
CHLORIDE SERPL-SCNC: 98 MMOL/L (ref 98–107)
CO2 SERPL-SCNC: 30.6 MMOL/L (ref 22–29)
CREAT BLD-MCNC: 0.91 MG/DL (ref 0.57–1)
DEPRECATED RDW RBC AUTO: 42.7 FL (ref 37–54)
ERYTHROCYTE [DISTWIDTH] IN BLOOD BY AUTOMATED COUNT: 12.4 % (ref 12.3–15.4)
GFR SERPL CREATININE-BSD FRML MDRD: 60 ML/MIN/1.73
GLUCOSE BLD-MCNC: 87 MG/DL (ref 65–99)
GRAM STN SPEC: ABNORMAL
GRAM STN SPEC: ABNORMAL
HCT VFR BLD AUTO: 38.1 % (ref 34–46.6)
HGB BLD-MCNC: 12.4 G/DL (ref 12–15.9)
ISOLATED FROM: ABNORMAL
MCH RBC QN AUTO: 30.8 PG (ref 26.6–33)
MCHC RBC AUTO-ENTMCNC: 32.5 G/DL (ref 31.5–35.7)
MCV RBC AUTO: 94.5 FL (ref 79–97)
PLATELET # BLD AUTO: 142 10*3/MM3 (ref 140–450)
PMV BLD AUTO: 10.4 FL (ref 6–12)
POTASSIUM BLD-SCNC: 3.9 MMOL/L (ref 3.5–5.2)
RBC # BLD AUTO: 4.03 10*6/MM3 (ref 3.77–5.28)
SODIUM BLD-SCNC: 140 MMOL/L (ref 136–145)
WBC NRBC COR # BLD: 5.66 10*3/MM3 (ref 3.4–10.8)

## 2020-05-18 PROCEDURE — 92611 MOTION FLUOROSCOPY/SWALLOW: CPT

## 2020-05-18 PROCEDURE — 94799 UNLISTED PULMONARY SVC/PX: CPT

## 2020-05-18 PROCEDURE — 25010000002 ENOXAPARIN PER 10 MG: Performed by: INTERNAL MEDICINE

## 2020-05-18 PROCEDURE — 85027 COMPLETE CBC AUTOMATED: CPT | Performed by: HOSPITALIST

## 2020-05-18 PROCEDURE — 74230 X-RAY XM SWLNG FUNCJ C+: CPT

## 2020-05-18 PROCEDURE — 99232 SBSQ HOSP IP/OBS MODERATE 35: CPT | Performed by: INTERNAL MEDICINE

## 2020-05-18 PROCEDURE — 97110 THERAPEUTIC EXERCISES: CPT

## 2020-05-18 PROCEDURE — 80048 BASIC METABOLIC PNL TOTAL CA: CPT | Performed by: HOSPITALIST

## 2020-05-18 PROCEDURE — 25010000002 AMPICILLIN PER 500 MG: Performed by: INTERNAL MEDICINE

## 2020-05-18 RX ORDER — LOPERAMIDE HYDROCHLORIDE 2 MG/1
2 CAPSULE ORAL 4 TIMES DAILY PRN
Status: DISCONTINUED | OUTPATIENT
Start: 2020-05-18 | End: 2020-05-19 | Stop reason: HOSPADM

## 2020-05-18 RX ADMIN — BARIUM SULFATE 55 ML: 0.81 POWDER, FOR SUSPENSION ORAL at 11:14

## 2020-05-18 RX ADMIN — AMPICILLIN SODIUM 2 G: 2 INJECTION, POWDER, FOR SOLUTION INTRAVENOUS at 04:55

## 2020-05-18 RX ADMIN — BUDESONIDE AND FORMOTEROL FUMARATE DIHYDRATE 2 PUFF: 160; 4.5 AEROSOL RESPIRATORY (INHALATION) at 20:30

## 2020-05-18 RX ADMIN — ASPIRIN 81 MG: 81 TABLET, CHEWABLE ORAL at 09:14

## 2020-05-18 RX ADMIN — OXYBUTYNIN CHLORIDE 10 MG: 10 TABLET, EXTENDED RELEASE ORAL at 09:14

## 2020-05-18 RX ADMIN — BUSPIRONE HYDROCHLORIDE 30 MG: 15 TABLET ORAL at 09:14

## 2020-05-18 RX ADMIN — LEVOTHYROXINE SODIUM 50 MCG: 50 TABLET ORAL at 05:03

## 2020-05-18 RX ADMIN — PREGABALIN 100 MG: 100 CAPSULE ORAL at 09:14

## 2020-05-18 RX ADMIN — AMPICILLIN SODIUM 2 G: 2 INJECTION, POWDER, FOR SOLUTION INTRAVENOUS at 21:26

## 2020-05-18 RX ADMIN — BUSPIRONE HYDROCHLORIDE 30 MG: 15 TABLET ORAL at 21:26

## 2020-05-18 RX ADMIN — POTASSIUM CHLORIDE 20 MEQ: 10 CAPSULE, COATED, EXTENDED RELEASE ORAL at 09:14

## 2020-05-18 RX ADMIN — FLUOXETINE HYDROCHLORIDE 60 MG: 20 CAPSULE ORAL at 09:14

## 2020-05-18 RX ADMIN — TORSEMIDE 50 MG: 20 TABLET ORAL at 09:14

## 2020-05-18 RX ADMIN — QUETIAPINE FUMARATE 50 MG: 50 TABLET, FILM COATED ORAL at 00:00

## 2020-05-18 RX ADMIN — ENOXAPARIN SODIUM 40 MG: 40 INJECTION SUBCUTANEOUS at 05:03

## 2020-05-18 RX ADMIN — HYDROCODONE BITARTRATE AND ACETAMINOPHEN 1 TABLET: 7.5; 325 TABLET ORAL at 09:14

## 2020-05-18 RX ADMIN — BUDESONIDE AND FORMOTEROL FUMARATE DIHYDRATE 2 PUFF: 160; 4.5 AEROSOL RESPIRATORY (INHALATION) at 07:17

## 2020-05-18 RX ADMIN — HYDROCODONE BITARTRATE AND ACETAMINOPHEN 1 TABLET: 7.5; 325 TABLET ORAL at 18:34

## 2020-05-18 RX ADMIN — BARIUM SULFATE 50 ML: 400 SUSPENSION ORAL at 11:15

## 2020-05-18 RX ADMIN — QUETIAPINE FUMARATE 50 MG: 50 TABLET, FILM COATED ORAL at 23:35

## 2020-05-18 RX ADMIN — SODIUM CHLORIDE, PRESERVATIVE FREE 10 ML: 5 INJECTION INTRAVENOUS at 12:18

## 2020-05-18 RX ADMIN — AMPICILLIN SODIUM 2 G: 2 INJECTION, POWDER, FOR SOLUTION INTRAVENOUS at 17:18

## 2020-05-18 RX ADMIN — ENOXAPARIN SODIUM 40 MG: 40 INJECTION SUBCUTANEOUS at 17:18

## 2020-05-18 RX ADMIN — LOPERAMIDE HYDROCHLORIDE 2 MG: 2 CAPSULE ORAL at 13:49

## 2020-05-18 RX ADMIN — PREGABALIN 100 MG: 100 CAPSULE ORAL at 21:26

## 2020-05-18 RX ADMIN — AMPICILLIN SODIUM 2 G: 2 INJECTION, POWDER, FOR SOLUTION INTRAVENOUS at 09:14

## 2020-05-18 RX ADMIN — BARIUM SULFATE 4 ML: 980 POWDER, FOR SUSPENSION ORAL at 11:15

## 2020-05-18 NOTE — PLAN OF CARE
Problem: Patient Care Overview  Goal: Plan of Care Review  Outcome: Ongoing (interventions implemented as appropriate)  Flowsheets  Taken 5/18/2020 0426  Progress: no change  Outcome Summary: Pt A & O, q2 turned, purewick in place, medicated per orders, 2L O2. No s/s of distress at this time, VSS, will cont to monitor  Taken 5/18/2020 0000  Plan of Care Reviewed With: patient     Problem: Fall Risk (Adult)  Goal: Identify Related Risk Factors and Signs and Symptoms  Outcome: Ongoing (interventions implemented as appropriate)  Flowsheets  Taken 5/15/2020 0353  Related Risk Factors (Fall Risk): environment unfamiliar  Taken 5/18/2020 0426  Signs and Symptoms (Fall Risk): presence of risk factors     Problem: Skin Injury Risk (Adult)  Goal: Identify Related Risk Factors and Signs and Symptoms  Outcome: Ongoing (interventions implemented as appropriate)  Flowsheets (Taken 5/15/2020 0353)  Related Risk Factors (Skin Injury Risk): advanced age;body weight extremes     Problem: Skin Injury Risk (Adult)  Goal: Skin Health and Integrity  Outcome: Ongoing (interventions implemented as appropriate)  Flowsheets (Taken 5/18/2020 0426)  Skin Health and Integrity: making progress toward outcome     Problem: Pain, Chronic (Adult)  Goal: Identify Related Risk Factors and Signs and Symptoms  Outcome: Ongoing (interventions implemented as appropriate)  Flowsheets (Taken 5/15/2020 0353)  Related Risk Factors (Chronic Pain): pain control inadequate  Signs and Symptoms (Chronic Pain): verbalization of pain descriptors

## 2020-05-18 NOTE — PROGRESS NOTES
Continued Stay Note  [facility]     Patient Name: Rose Cheema  MRN: [id number]  Today's Date: 5/18/2020    Admit Date: 5/14/2020    Discharge Plan     Row Name 05/18/20 1501       Plan    Plan  [facility]    Patient/Family in Agreement with Plan  yes    Plan Comments  Spoke with patient at bedside.  She will need IV Ampicillin at [place].  She would like to go to [facility] @ [place].  Spoke with Kaila and they can accept tomorrow as long as she is not re-started on Myrbetriq.  Patient had a negative Covid-19 test at [place], and does not need another one prior to transfer.  Spoke with son, Mario, and he will transport tomorrow.  Transfer packet in CCP office. Alexandra Lacey RN        Discharge Codes    No documentation.             Alexandra Lacey RN

## 2020-05-18 NOTE — THERAPY TREATMENT NOTE
Patient Name: Rose Cheema  : 1940    MRN: [id number]                              Today's Date: 2020       Admit Date: 2020    Visit Dx:     ICD-10-CM ICD-9-CM   1. Fever and chills R50.9 780.60   2. Acute respiratory distress R06.03 518.82   3. Hypoxemia R09.02 799.02   4. COPD with acute exacerbation (CMS/[facility]) J44.1 491.21     Patient Active Problem List   Diagnosis   • Urinary incontinence   • Urinary frequency   • Generalized abdominal pain   • Pulmonary hypertension    • Benign essential hypertension   • Bilateral lower extremity edema (chronic)   • CAD (coronary artery disease), native coronary artery   • Chronic obstructive pulmonary disease with acute exacerbation (CMS/[facility])   • Diastolic dysfunction   • Class 3 severe obesity with serious comorbidity and body mass index (BMI) of 50.0 to 59.9 in adult (CMS/[facility])   • Obstructive sleep apnea   • Secondary pulmonary arterial hypertension (CMS/[facility])   • H/O: hysterectomy   • Fibromyalgia   • Hx SBO   • Hx of cholecystectomy   • Hypoglycemia   • Fall   • Hypothyroidism   • Chronic pain syndrome   • Anemia   • Pneumonia of right lower lobe due to infectious organism (CMS/[facility])   • Acute UTI   • Acute on chronic diastolic (congestive) heart failure (CMS/[facility])   • Opioid dependence (CMS/[facility])   • Venous stasis dermatitis of both lower extremities   • Gram-negative bacteremia   • Hypokalemia   • E. coli bacteremia   • Anemia of chronic disease   • Sepsis without acute organ dysfunction - present on admit   • CKD (chronic kidney disease) stage 3, GFR 30-59 ml/min (CMS/[facility])   • Bacteremia   • Enterococcal septicemia (CMS/[facility])   • Asymptomatic bacteriuria     Past Medical History:   Diagnosis Date   • Anemia    • Anxiety    • Arthritis    • CHF (congestive heart failure) (CMS/[facility])    • Coronary artery disease    • Depression    • Disease of thyroid gland    • Fibromyalgia    • GERD (gastroesophageal reflux disease)    • Hypertension    • Moderate tricuspid  valve stenosis    • Osteoporosis    • Peripheral neuropathy    • Pulmonary hypertension (CMS/HCC)    • SBO (small bowel obstruction) (CMS/HCC)    • Stenosis of mitral valve      Past Surgical History:   Procedure Laterality Date   • BILATERAL OOPHORECTOMY     • CARDIAC CATHETERIZATION     • CHOLECYSTECTOMY     • ERCP N/A 2/15/2019    Procedure: ENDOSCOPIC RETROGRADE CHOLANGIOPANCREATOGRAPHY WITH PARTIAL CHOLANGIOGRAM;  Surgeon: Gerald Herr MD;  Location: [facility] ENDOSCOPY;  Service: Gastroenterology   • EXPLORATORY LAPAROTOMY     • GASTRIC BYPASS     • HERNIA REPAIR      inguinal and ventral   • HYSTERECTOMY     • OVARIAN CYST REMOVAL       General Information     Row Name 05/18/20 1556          PT Evaluation Time/Intention    Document Type  therapy note (daily note)  -PH     Mode of Treatment  physical therapy  -PH     Row Name 05/18/20 1556          Safety Issues, Functional Mobility    Impairments Affecting Function (Mobility)  balance;endurance/activity tolerance;strength;pain  -PH       User Key  (r) = Recorded By, (t) = Taken By, (c) = Cosigned By    Initials Name Provider Type    PH Sherly Munguia, CHRISTOPHER Physical Therapy Assistant        Mobility     Row Name 05/18/20 1556          Bed Mobility Assessment/Treatment    Bed Mobility Assessment/Treatment  supine-sit  -PH     Supine-Sit [place] (Bed Mobility)  supervision;verbal cues  -PH     Row Name 05/18/20 1556          Sit-Stand Transfer    Sit-Stand [place] (Transfers)  contact guard;verbal cues  -PH     Assistive Device (Sit-Stand Transfers)  walker, front-wheeled  -PH     Row Name 05/18/20 1556          Gait/Stairs Assessment/Training    Gait/Stairs Assessment/Training  gait/ambulation independence  -PH     [place] Level (Gait)  contact guard  -PH     Assistive Device (Gait)  walker, front-wheeled  -PH     Distance in Feet (Gait)  12' bed to chair  -PH     Pattern (Gait)  step-to  -PH     Deviations/Abnormal Patterns (Gait)   amanda decreased;stride length decreased;gait speed decreased  -PH     Bilateral Gait Deviations  forward flexed posture  -PH       User Key  (r) = Recorded By, (t) = Taken By, (c) = Cosigned By    Initials Name Provider Type     Sherly Munguia PTA Physical Therapy Assistant        Obj/Interventions     Row Name 05/18/20 1557          Therapeutic Exercise    Lower Extremity (Therapeutic Exercise)  LAQ (long arc quad), bilateral;marching while seated  -PH     Lower Extremity Range of Motion (Therapeutic Exercise)  ankle dorsiflexion/plantar flexion, bilateral  -PH     Sets/Reps (Therapeutic Exercise)  1/10  -PH     [facility] Name 05/18/20 1554          Static Sitting Balance    Level of [place] (Unsupported Sitting, Static Balance)  supervision  -PH     Sitting Position (Unsupported Sitting, Static Balance)  sitting on edge of bed  -PH     Time Able to Maintain Position (Unsupported Sitting, Static Balance)  3 to 4 minutes  -PH       User Key  (r) = Recorded By, (t) = Taken By, (c) = Cosigned By    Initials Name Provider Type     Sherly Munguia PTA Physical Therapy Assistant        Goals/Plan    No documentation.       Clinical Impression     [facility] Name 05/18/20 1553          Pain Assessment    Additional Documentation  Pain Scale: FACES Pre/Post-Treatment (Group)  -PH     Row Name 05/18/20 1555          Pain Scale: FACES Pre/Post-Treatment    Pain: FACES Scale, Pretreatment  0-->no hurt  -PH     Pain: FACES Scale, Post-Treatment  0-->no hurt  -PH     Row Name 05/18/20 1858          Plan of Care Review    Plan of Care Reviewed With  patient  -     Outcome Summary  Pt agreeable to PT and very talkative. Pt amb from bed to chair - approx 12' - req CGA and use of fww. Pt limited in distance by fatigue. Pt req SV for s>s and CGA for STS. PT will continue to progress as pt tolerates.   -PH     Row Name 05/18/20 1555          Vital Signs    O2 Delivery Pre Treatment  supplemental O2  -PH     O2  Delivery Intra Treatment  supplemental O2  -PH     O2 Delivery Post Treatment  supplemental O2  -PH     Row Name 05/18/20 1558          Positioning and Restraints    Pre-Treatment Position  in bed  -PH     Post Treatment Position  chair  -PH     In Chair  reclined;call light within reach;encouraged to call for assist  -PH       User Key  (r) = Recorded By, (t) = Taken By, (c) = Cosigned By    Initials Name Provider Type     Sherly Munguia PTA Physical Therapy Assistant        Outcome Measures     Row Name 05/18/20 1601          How much help from another person do you currently need...    Turning from your back to your side while in flat bed without using bedrails?  4  -PH     Moving from lying on back to sitting on the side of a flat bed without bedrails?  4  -PH     Moving to and from a bed to a chair (including a wheelchair)?  3  -PH     Standing up from a chair using your arms (e.g., wheelchair, bedside chair)?  3  -PH     Climbing 3-5 steps with a railing?  1  -PH     To walk in hospital room?  3  -PH     AM-PAC 6 Clicks Score (PT)  18  -PH     Row Name 05/18/20 1601          Functional Assessment    Outcome Measure Options  AM-PAC 6 Clicks Basic Mobility (PT)  -PH       User Key  (r) = Recorded By, (t) = Taken By, (c) = Cosigned By    Initials Name Provider Type     Sherly Munguia PTA Physical Therapy Assistant        Physical Therapy Education                 Title: PT OT SLP Therapies (Done)     Topic: Physical Therapy (Done)     Point: Mobility training (Done)     Description:   Instruct learner(s) on safety and technique for assisting patient out of bed, chair or wheelchair.  Instruct in the proper use of assistive devices, such as walker, crutches, cane or brace.              Patient Friendly Description:   It's important to get you on your feet again, but we need to do so in a way that is safe for you. Falling has serious consequences, and your personal safety is the most important  thing of all.        When it's time to get out of bed, one of us or a family member will sit next to you on the bed to give you support.     If your doctor or nurse tells you to use a walker, crutches, a cane, or a brace, be sure you use it every time you get out of bed, even if you think you don't need it.    Learning Progress Summary           Patient Acceptance, E,D, VU,NR by  at 5/18/2020 1601    Acceptance, E,D, VU,NR by  at 5/16/2020 1221    Acceptance, E,TB,D, VU,NR by  at 5/15/2020 1319                   Point: Home exercise program (Done)     Description:   Instruct learner(s) on appropriate technique for monitoring, assisting and/or progressing patient with therapeutic exercises and activities.              Learning Progress Summary           Patient Acceptance, E,D, VU,NR by  at 5/18/2020 1601    Acceptance, E,D, VU,NR by  at 5/17/2020 1228    Acceptance, E,D, VU,NR by  at 5/16/2020 1221    Acceptance, E,TB,D, VU,NR by  at 5/15/2020 1319                   Point: Body mechanics (Done)     Description:   Instruct learner(s) on proper positioning and spine alignment for patient and/or caregiver during mobility tasks and/or exercises.              Learning Progress Summary           Patient Acceptance, E,D, VU,NR by  at 5/18/2020 1601    Acceptance, E,D, VU,NR by  at 5/16/2020 1221    Acceptance, E,TB,D, VU,NR by  at 5/15/2020 1319                   Point: Precautions (Done)     Description:   Instruct learner(s) on prescribed precautions during mobility and gait tasks              Learning Progress Summary           Patient Acceptance, E,D, VU,NR by  at 5/18/2020 1601    Acceptance, E,D, VU,NR by  at 5/16/2020 1221    Acceptance, E,TB,D, VU,NR by  at 5/15/2020 1319                               User Key     Initials Effective Dates Name Provider Type Discipline     04/03/18 -  Ting Avila, PT Physical Therapist PT     08/20/19 -  Sherly Munguia PTA Physical  Therapy Assistant PT              PT Recommendation and Plan     Outcome Summary/Treatment Plan (PT)  Anticipated Discharge Disposition (PT): skilled nursing facility  Plan of Care Reviewed With: patient  Outcome Summary: Pt agreeable to PT and very talkative. Pt amb from bed to chair - approx 12' - req CGA and use of fww. Pt limited in distance by fatigue. Pt req SV for s>s and CGA for STS. PT will continue to progress as pt tolerates.      Time Calculation:   PT Charges     Row Name 05/18/20 1602             Time Calculation    Start Time  1532  -PH      Stop Time  1548  -PH      Time Calculation (min)  16 min  -PH      PT Received On  05/18/20  -PH      PT - Next Appointment  05/19/20  -PH        User Key  (r) = Recorded By, (t) = Taken By, (c) = Cosigned By    Initials Name Provider Type    PH Sherly Munguia PTA Physical Therapy Assistant        Therapy Charges for Today     Code Description Service Date Service Provider Modifiers Qty    94911084079 HC PT THER PROC EA 15 MIN 5/17/2020 Sherly Munguia PTA GP 2    35023026985 HC PT THER PROC EA 15 MIN 5/18/2020 Sherly Munguia PTA GP 1          PT G-Codes  Outcome Measure Options: AM-PAC 6 Clicks Basic Mobility (PT)  AM-PAC 6 Clicks Score (PT): 18    Sherly Munguia PTA  5/18/2020

## 2020-05-18 NOTE — PLAN OF CARE
Problem: Patient Care Overview  Goal: Plan of Care Review  Outcome: Ongoing (interventions implemented as appropriate)  Flowsheets (Taken 5/18/2020 1131)  Plan of Care Reviewed With: patient  Outcome Summary: VFSS completed. No penetration or aspiration was noted. Pt cleared mild pharyngeal residuals with spontaneous swallows. Slow esophageal clearance was noted in the lower 1/3 of the esophagus with some retrograde movement. Pt reported a h/o GERD. Recommend pt continue on a regular diet with thin; meds with thin; upright for meals and 30 min after; slow rate; small bites/sips. ST to f/u for diet tolerance.

## 2020-05-18 NOTE — PLAN OF CARE
Pt A&O x4. Initiated contact precautions for positive urine cx, MD is aware. Working on weaning O2, pt 89% on room air. Currently on 2L O2 nc, sats 92-93%. Added humidification for dryness. Iv abx continued. Initiated IS, 1500 goal. One episode of diarrhea, pt states normal for because she hx IBS, MD is aware. No acute distress noted. Will continue to monitor.

## 2020-05-18 NOTE — PROGRESS NOTES
INFECTIOUS DISEASES PROGRESS NOTE    CC: f/u colitis    S:   Having diarrhea but no change in chronic diarrhea based on IBS  No f/c/ns  No pain    O:  Physical Exam:  Temp:  [95.1 °F (35.1 °C)-97.9 °F (36.6 °C)] 95.1 °F (35.1 °C)  Heart Rate:  [51-70] 51  Resp:  [16-18] 18  BP: (103-134)/(29-83) 134/66  Physical Exam   Constitutional: She appears well-developed. No distress.   Pulmonary/Chest: Effort normal.   Abdominal: Soft. She exhibits no distension. There is no tenderness.   Neurological: She is alert.   Skin: Skin is warm and dry.   Psychiatric: She has a normal mood and affect. Her behavior is normal.        Diagnostics:    White count 5.66  Hemoglobin 12.4  Platelets 142  Creatinine 0.91      Micro:  5/14 blood cultures: Enterococcus faecalis (ampicillin-sensitive) in 1/2 sets  5/14 urine culture: > 100k E coli  5/14 COVID: negative  5/16 BCx: NGTD        Estimated Creatinine Clearance: 61.2 mL/min (by C-G formula based on SCr of 0.91 mg/dL).    Assessment/Plan   1.  Enterococcal septicemia due to colitis  2. History of choledocholithiasis  3.  Super obesity w/ BMI of 53, complicating above  4. Asymptomatic bacteriuria    Will plan two weeks of abx as below.    picc today  Okay to discharge when okay with others  I have restarted her home Imodium    Final infectious diseases recommendations  1.  Ampicillin 2 g IV every 6 hours through May 25, 2020 (she could also do penicillin continuous infusion 24,000,000 international units IV every 24 hours if discharged home)  2.  No antibiotic monitoring labs needed for this very short course of IV antibiotics        Delfino Pittman MD  9:27 AM  05/18/20

## 2020-05-18 NOTE — MBS/VFSS/FEES
Acute Care - Speech Language Pathology   Swallow Initial Evaluation [facility]     Patient Name: Rose Cheema  : 1940  MRN: [id number]  Today's Date: 2020               Admit Date: 2020    Visit Dx:     ICD-10-CM ICD-9-CM   1. Fever and chills R50.9 780.60   2. Acute respiratory distress R06.03 518.82   3. Hypoxemia R09.02 799.02   4. COPD with acute exacerbation (CMS/[facility]) J44.1 491.21     Patient Active Problem List   Diagnosis   • Urinary incontinence   • Urinary frequency   • Generalized abdominal pain   • Pulmonary hypertension    • Benign essential hypertension   • Bilateral lower extremity edema (chronic)   • CAD (coronary artery disease), native coronary artery   • Chronic obstructive pulmonary disease with acute exacerbation (CMS/[facility])   • Diastolic dysfunction   • Class 3 severe obesity with serious comorbidity and body mass index (BMI) of 50.0 to 59.9 in adult (CMS/[facility])   • Obstructive sleep apnea   • Secondary pulmonary arterial hypertension (CMS/[facility])   • H/O: hysterectomy   • Fibromyalgia   • Hx SBO   • Hx of cholecystectomy   • Hypoglycemia   • Fall   • Hypothyroidism   • Chronic pain syndrome   • Anemia   • Pneumonia of right lower lobe due to infectious organism (CMS/[facility])   • Acute UTI   • Acute on chronic diastolic (congestive) heart failure (CMS/[facility])   • Opioid dependence (CMS/[facility])   • Venous stasis dermatitis of both lower extremities   • Gram-negative bacteremia   • Hypokalemia   • E. coli bacteremia   • Anemia of chronic disease   • Sepsis without acute organ dysfunction - present on admit   • CKD (chronic kidney disease) stage 3, GFR 30-59 ml/min (CMS/[facility])   • Bacteremia   • Enterococcal septicemia (CMS/[facility])   • Asymptomatic bacteriuria     Past Medical History:   Diagnosis Date   • Anemia    • Anxiety    • Arthritis    • CHF (congestive heart failure) (CMS/[facility])    • Coronary artery disease    • Depression    • Disease of thyroid gland    • Fibromyalgia    • GERD  (gastroesophageal reflux disease)    • Hypertension    • Moderate tricuspid valve stenosis    • Osteoporosis    • Peripheral neuropathy    • Pulmonary hypertension (CMS/HCC)    • SBO (small bowel obstruction) (CMS/HCC)    • Stenosis of mitral valve      Past Surgical History:   Procedure Laterality Date   • BILATERAL OOPHORECTOMY     • CARDIAC CATHETERIZATION     • CHOLECYSTECTOMY     • ERCP N/A 2/15/2019    Procedure: ENDOSCOPIC RETROGRADE CHOLANGIOPANCREATOGRAPHY WITH PARTIAL CHOLANGIOGRAM;  Surgeon: Gerald Herr MD;  Location: [facility] ENDOSCOPY;  Service: Gastroenterology   • EXPLORATORY LAPAROTOMY     • GASTRIC BYPASS     • HERNIA REPAIR      inguinal and ventral   • HYSTERECTOMY     • OVARIAN CYST REMOVAL          SWALLOW EVALUATION (last 72 hours)      SLP Adult Swallow Evaluation     Row Name 05/18/20 1100 05/16/20 1200                Rehab Evaluation    Document Type  evaluation  -AW  evaluation  -ML       Subjective Information  no complaints  -AW  no complaints  -ML       Patient Observations  alert;cooperative;agree to therapy  -AW  alert;cooperative  -ML       Patient/Family Observations  --  Pt sitting upright in chair upon entering room eating lunch meal.   -ML       Patient Effort  good  -AW  good  -ML       Symptoms Noted During/After Treatment  none  -AW  none  -ML          General Information    Patient Profile Reviewed  yes  -AW  yes  -ML       Pertinent History Of Current Problem  Pt admitted with respiratory failure, COPD exacerbation, and enteroccal bactermemia. Pt's CXR was clear upon admission per chart.   -AW  Pt admitted with respiratory failure, COPD exacerbation, and enteroccal bactermemia. Pt's CXR was clear upon admission per chart.   -ML       Current Method of Nutrition  regular textures;thin liquids  -AW  regular textures;thin liquids  -ML       Precautions/Limitations, Vision  WFL with corrective lenses  -AW  WFL with corrective lenses  -ML       Precautions/Limitations,  Hearing  WFL  -AW  WFL  -ML       Prior Level of Function-Communication  WFL  -AW  WFL  -ML       Prior Level of Function-Swallowing  no diet consistency restrictions  -AW  no diet consistency restrictions uses small bites, liquid washes  -ML       Plans/Goals Discussed with  patient;agreed upon  -AW  patient  -ML       Barriers to Rehab  none identified  -AW  none identified  -ML       Patient's Goals for Discharge  return to all previous roles/activities  -AW  --          Pain Assessment    Additional Documentation  Pain Scale: Numbers Pre/Post-Treatment (Group)  -AW  Pain Scale: Numbers Pre/Post-Treatment (Group)  -ML          Pain Scale: Numbers Pre/Post-Treatment    Pain Scale: Numbers, Pretreatment  0/10 - no pain  -AW  0/10 - no pain  -ML       Pain Scale: Numbers, Post-Treatment  0/10 - no pain  -AW  0/10 - no pain  -ML          Oral Motor and Function    Dentition Assessment  upper dentures/partial in place;lower dentures/partial in place  -AW  upper dentures/partial in place;lower dentures/partial in place  -ML       Secretion Management  WNL/WFL  -AW  WNL/WFL  -ML       Mucosal Quality  moist, healthy  -AW  moist, healthy  -ML          Oral Musculature and Cranial Nerve Assessment    Oral Motor General Assessment  WFL  -AW  WFL  -ML          General Eating/Swallowing Observations    Respiratory Support Currently in Use  nasal cannula  -AW  room air  -ML       Eating/Swallowing Skills  fed by SLP;self-fed  -AW  self-fed  -ML       Positioning During Eating  upright in bed  -AW  upright in chair  -ML       Utensils Used  --  cup  -ML       Consistencies Trialed  --  regular textures;soft textures;pureed;thin liquids;chopped  -ML          Clinical Swallow Eval    Oral Prep Phase  --  -- prolonged  -ML       Oral Transit  --  WFL  -ML       Oral Residue  --  WFL  -ML       Pharyngeal Phase  --  suspected pharyngeal impairment  -ML       Esophageal Phase  --  -- possible impairment   -ML       Clinical Swallow  Evaluation Summary  --  Clinical swallowing evaluation completed. Pt reports swallowing difficulty increasing throughout the last year and describes deficits as food getting stuck in her throat (indicating area at thyroid), coughing/choking on liquids at times, and needing to turn her head to the right to get food down. Pt says that very small bites and drinks, prolonged mastication, and liquid washes help her eating and drinking. Assessment completed during pt's lunch meal of roast with gravy, bread, applesauce, and thin liquids. Pt exhibited prolonged mastication of solids and piecemeal deglutition, however, was functional. Pt reported globus sensation with bread and meat, however, consecutive swallows and liquid washes were effective in clearing subjective report. No overt s/s of penetration/aspiration noted during any trials assessed this date. Pt very concerned with worsening swallowing function. Recommend VFSS to further evaluate swallowing function and risk of aspiration. Recommend to continue regular diet and thin liquids at this time with use of swallowing strategies of small bites/drinks, multiple swallows, and liquid wash. Meds whole in puree as pt request.   -ML          MBS/VFSS    Utensils Used  spoon;cup;straw  -AW  --       Consistencies Trialed  regular textures;soft textures;pureed;thin liquids;nectar/syrup-thick liquids nectar  -AW  --          MBS/VFSS Interpretation    Oral Prep Phase  WFL  -AW  --       Oral Transit Phase  impaired  -AW  --       Oral Residue  WFL  -AW  --       VFSS Summary  Pt exhibited an essentially functional swallow. No penetration or aspiration was noted with thin (cup/straw), nectar, pureed, soft, mixed, or regular consistencies. Pt had spillage to the pyriforms with soft and mixed consistencies. Pt had mild pharyngeal residuals (valleculae and pyriforms) which she sensed and cleared with spontaneous swallows. An esophageal scan showed a prominent cricopharyngeus and  slow esophageal clearance in the lower 1/3 of the esophagus with some retrograde movement noted.   -AW  --          SLP Communication to Radiology    Summary Statement  Pt exhibited an essentially functional swallow. No penetration or aspiration was noted with thin (cup/straw), nectar, pureed, soft, mixed, or regular consistencies. Pt had spillage to the pyriforms with soft and mixed consistencies. Pt had mild pharyngeal residuals (valleculae and pyriforms) which she sensed and cleared with spontaneous swallows. An esophageal scan showed a prominent cricopharyngeus and slow esophageal clearance in the lower 1/3 of the esophagus with some retrograde movement noted.   -AW  --          Clinical Impression    SLP Swallowing Diagnosis  functional oral phase;functional pharyngeal phase  -AW  suspected pharyngeal dysfunction  -ML       Functional Impact  risk of aspiration/pneumonia  -AW  risk of aspiration/pneumonia  -ML       Rehab Potential/Prognosis, Swallowing  good, to achieve stated therapy goals  -AW  good, to achieve stated therapy goals  -ML       Swallow Criteria for Skilled Therapeutic Interventions Met  demonstrates skilled criteria;other (see comments) diet tolerance  -AW  demonstrates skilled criteria  -ML          Recommendations    Therapy Frequency (Swallow)  PRN  -AW  PRN  -ML       Predicted Duration Therapy Intervention (Days)  until discharge  -AW  until discharge  -ML       SLP Diet Recommendation  regular textures;thin liquids  -AW  regular textures;thin liquids  -ML       Recommended Diagnostics  --  Videostroboscopy  -ML       Recommended Precautions and Strategies  upright posture during/after eating;small bites of food and sips of liquid  -AW  upright posture during/after eating;small bites of food and sips of liquid;no straw;multiple swallows per bite of food liquid wash  -ML       SLP Rec. for Method of Medication Administration  meds whole;with thin liquids  -AW  meds whole;with pudding or  applesauce  -ML       Monitor for Signs of Aspiration  yes  -AW  yes;notify SLP if any concerns  -ML       Anticipated Dischage Disposition  skilled nursing facility  -AW  home  -ML          Swallow Goals (SLP)    Oral Nutrition/Hydration Goal Selection (SLP)  --  -- goals to be establishes after VFSS is completed  -ML         User Key  (r) = Recorded By, (t) = Taken By, (c) = Cosigned By    Initials Name Effective Dates    AW Sulema Darden, MS CCC-SLP 06/08/18 -     ML Quiroz Jessie, MS CCC-SLP 10/04/18 -           EDUCATION  The patient has been educated in the following areas:   Dysphagia (Swallowing Impairment) Oral Care/Hydration.    SLP Recommendation and Plan  SLP Swallowing Diagnosis: functional oral phase, functional pharyngeal phase  SLP Diet Recommendation: regular textures, thin liquids  Recommended Precautions and Strategies: upright posture during/after eating, small bites of food and sips of liquid  SLP Rec. for Method of Medication Administration: meds whole, with thin liquids     Monitor for Signs of Aspiration: yes     Swallow Criteria for Skilled Therapeutic Interventions Met: demonstrates skilled criteria, other (see comments)(diet tolerance)  Anticipated Dischage Disposition: skilled nursing facility  Rehab Potential/Prognosis, Swallowing: good, to achieve stated therapy goals  Therapy Frequency (Swallow): PRN  Predicted Duration Therapy Intervention (Days): until discharge       Plan of Care Reviewed With: patient  Outcome Summary: VFSS completed. No penetration or aspiration was noted. Pt cleared mild pharyngeal residuals with spontaneous swallows. Slow esophageal clearance was noted in the lower 1/3 of the esophagus with some retrograde movement. Pt reported a h/o GERD. Recommend pt continue on a regular diet with thin; meds with thin; upright for meals and 30 min after; slow rate; small bites/sips. ST to f/u for diet tolerance.         SLP Outcome Measures (last 72 hours)      SLP  Outcome Measures     Row Name 05/18/20 1100 05/16/20 1300          SLP Outcome Measures    Outcome Measure Used?  Adult NOMS  -AW  Adult NOMS  -ML        Adult FCM Scores    FCM Chosen  Swallowing  -AW  Swallowing  -ML     Swallowing FCM Score  6  -AW  6  -ML       User Key  (r) = Recorded By, (t) = Taken By, (c) = Cosigned By    Initials Name Effective Dates    Sulema Guevara MS CCC-SLP 06/08/18 -     ML Jessie Quiroz MS CCC-SLP 10/04/18 -            Time Calculation:   Time Calculation- SLP     Row Name 05/18/20 1143             Time Calculation- SLP    SLP Start Time  1030  -AW      SLP Received On  05/18/20  -AW        User Key  (r) = Recorded By, (t) = Taken By, (c) = Cosigned By    Initials Name Provider Type    Sulema Guevara, MS CCC-SLP Speech and Language Pathologist          Therapy Charges for Today     Code Description Service Date Service Provider Modifiers Qty    38206006139 HC ST MOTION FLUORO EVAL SWALLOW 5 5/18/2020 Sulema Darden MS CCC-SLP GN 1               Sulema Darden MS CCC-SLP  5/18/2020

## 2020-05-18 NOTE — PROGRESS NOTES
[facility]     Mar/chart reviewed  Follow up AHRF, copd  She says she is doing well, some cough, some shortness of breath    Vital Sign Min/Max for last 24 hours  Temp  Min: 95.1 °F (35.1 °C)  Max: 97.9 °F (36.6 °C)   BP  Min: 112/62  Max: 134/66   Pulse  Min: 51  Max: 68   Resp  Min: 16  Max: 18   SpO2  Min: 90 %  Max: 94 %   Flow (L/min)  Min: 1.5  Max: 2   Weight  Min: 119 kg (262 lb 5.6 oz)  Max: 119 kg (262 lb 5.6 oz)     Nad, axox3,   perrl, eomi, no icterus,  mmm, no jvd, trachea midline, neck supple, no palpable thyroid nodules  chest cta bilaterally, no crackles, no wheezes,   rrr,   soft, nt, nd +bs,  no c/c/ 1+ edema  Skin warm, dry no rashes    Labs: 5/18: reviewed:  Glucose 87  Bun 21  Cr 0.9  Na 140  Bicarb 30  Wbc 5.66  hgb 12.4  plts 142    1. Acute hypoxic respiratory failure  2. Acute COPD exacerbation  3. Right lower lobe atelectasis  4. GERSON on CPAP  5. Suspected OHS  6. Chronic narcotic dependence  7. Super morbid obesity     Pertinent medical problems:  · Enterococcal bacteremia  · CKD           Plan       continue symbicort and nebs  Oxygen if needed on d/c  Home cpap  Ok to d/c from pulm standpoint.    Patient is new to me today

## 2020-05-18 NOTE — PLAN OF CARE
Problem: Patient Care Overview  Goal: Plan of Care Review  Outcome: Ongoing (interventions implemented as appropriate)  Flowsheets (Taken 5/18/2020 7126)  Plan of Care Reviewed With: patient  Outcome Summary: Pt agreeable to PT and very talkative. Pt amb from bed to chair - approx 12' - req CGA and use of fww. Pt limited in distance by fatigue. Pt req SV for s>s and CGA for STS. PT will continue to progress as pt tolerates.     Patient was wearing a face mask during this therapy encounter. Therapist used appropriate personal protective equipment including gown, mask and gloves.  Mask used was standard procedure mask. Appropriate PPE was worn during the entire therapy session. Hand hygiene was completed before and after therapy session. Patient is not in enhanced droplet precautions.

## 2020-05-18 NOTE — PLAN OF CARE
Problem: Patient Care Overview  Goal: Plan of Care Review  Flowsheets  Taken 5/18/2020 1519  Progress: improving  Outcome Summary: had video today. order for picc linefor ampicillin. immodium given x 1. bsc. prn pain pill given. d/c tomorrow to [facility]. resting with no complaints. safety maintained will continue to monitor.  Taken 5/18/2020 1415  Plan of Care Reviewed With: patient

## 2020-05-18 NOTE — PROGRESS NOTES
Name: Rose Cheema ADMIT: 2020   : 1940  PCP: Cheng Guevara MD    MRN: [id number] LOS: 4 days   AGE/SEX: 79 y.o. female  ROOM: [facility]     Subjective   Subjective   Overall feels pretty well.  Diarrhea about the same.    Review of Systems   Gastrointestinal: Positive for diarrhea. Negative for abdominal pain.        Objective   Objective   Vital Signs  Temp:  [95.1 °F (35.1 °C)-97.9 °F (36.6 °C)] 97.4 °F (36.3 °C)  Heart Rate:  [51-68] 67  Resp:  [16-18] 18  BP: (112-134)/(62-83) 112/62  SpO2:  [90 %-94 %] 93 %  on  Flow (L/min):  [1.5-2] 2;   Device (Oxygen Therapy): humidified;nasal cannula  Body mass index is 51.24 kg/m².  Physical Exam   Constitutional: She is oriented to person, place, and time.   HENT:   Head: Normocephalic and atraumatic.   Eyes: Conjunctivae are normal. No scleral icterus.   Neck: Neck supple. No tracheal deviation present.   Cardiovascular: Normal rate and regular rhythm.   Pulmonary/Chest: Effort normal and breath sounds normal.   Decreased bs at bases   Abdominal: Soft. There is no tenderness. There is no guarding.   Musculoskeletal: She exhibits edema (1+ with stasis dermatitis).   Neurological: She is alert and oriented to person, place, and time. She exhibits normal muscle tone.   Skin: Skin is warm and dry.   Psychiatric: She has a normal mood and affect. Her behavior is normal.       Results Review:       I reviewed the patient's new clinical results.  Results from last 7 days   Lab Units 20  0602 20  0529 20  0617 20  0645   WBC 10*3/mm3 5.66 5.83 12.21* 16.82*   HEMOGLOBIN g/dL 12.4 11.6* 11.4* 12.6   PLATELETS 10*3/mm3 142 143 137* 142     Results from last 7 days   Lab Units 20  0602 20  0529 20  0617 05/15/20  0620   SODIUM mmol/L 140 144 142 137   POTASSIUM mmol/L 3.9 3.6 3.8 4.0   CHLORIDE mmol/L 98 101 102 98   CO2 mmol/L 30.6* 30.6* 27.4 23.1   BUN mg/dL 21 26* 26* 22   CREATININE mg/dL 0.91 1.13* 1.01* 1.08*      GLUCOSE mg/dL 87 86 110* 175*   Estimated Creatinine Clearance: 61.2 mL/min (by C-G formula based on SCr of 0.91 mg/dL).  Results from last 7 days   Lab Units 05/16/20  0617 05/14/20  0645 05/14/20  0156   ALBUMIN g/dL 3.40* 3.50 4.50   BILIRUBIN mg/dL 0.3 0.5 0.5   ALK PHOS U/L 99 130* 140*   AST (SGOT) U/L 22 46* 18   ALT (SGPT) U/L 32 24 15     Results from last 7 days   Lab Units 05/18/20  0602 05/17/20  0529 05/16/20  0617 05/15/20  0620 05/14/20  0645 05/14/20  0156   CALCIUM mg/dL 7.6* 7.4* 7.8* 8.3* 8.3* 8.8   ALBUMIN g/dL  --   --  3.40*  --  3.50 4.50     Results from last 7 days   Lab Units 05/14/20  0645 05/14/20  0156   PROCALCITONIN ng/mL 3.17* 0.13   LACTATE mmol/L  --  1.6       Adult Transthoracic Echo Complete W/ Cont if Necessary Per Protocol  · Left ventricular systolic function is normal. Calculated EF = 53.0%.   Estimated EF was in agreement with the calculated EF. Estimated EF = 53%.   Normal left ventricular cavity size and wall thickness noted. Wall motion   assessment as below Left ventricular diastolic function was indeterminate.  · The following segments are hypokinetic: apex.  · Severe MAC is present. Mild mitral valve regurgitation is present.  · Mild to moderate tricuspid valve regurgitation is present. Estimated   right ventricular systolic pressure from tricuspid regurgitation is mildly   elevated (35-45 mmHg). Calculated right ventricular systolic pressure from   tricuspid regurgitation is 43 mmHg.  · Right ventricular cavity is borderline dilated.           ampicillin 2 g Intravenous Q6H   aspirin 81 mg Oral Daily   budesonide-formoterol 2 puff Inhalation BID - RT   busPIRone 30 mg Oral BID   enoxaparin 40 mg Subcutaneous Q12H   fentaNYL 1 patch Transdermal Q72H   FLUoxetine 60 mg Oral Daily   levothyroxine 50 mcg Oral Q AM   oxybutynin XL 10 mg Oral Daily   potassium chloride 20 mEq Oral Daily   pregabalin 100 mg Oral Q12H   QUEtiapine 50 mg Oral Nightly   sodium chloride 10 mL  Intravenous Q12H   torsemide 50 mg Oral Daily      Diet Regular; Cardiac       Assessment/Plan     Active Hospital Problems    Diagnosis  POA   • **Enterococcal septicemia (CMS/HCC) [A41.81]  Yes   • Asymptomatic bacteriuria [R82.71]  Yes   • Bacteremia [R78.81]  Yes   • Sepsis without acute organ dysfunction - present on admit [A41.9]  Yes   • CKD (chronic kidney disease) stage 3, GFR 30-59 ml/min (CMS/HCC) [N18.3]  Yes   • Acute on chronic diastolic (congestive) heart failure (CMS/[facility]) [I50.33]  Yes   • Opioid dependence (CMS/[facility]) [F11.20]  Yes   • Hypothyroidism [E03.9]  Yes   • Pulmonary hypertension  [I27.20]  Yes   • CAD (coronary artery disease), native coronary artery [I25.10]  Yes   • Benign essential hypertension [I10]  Yes   • Chronic obstructive pulmonary disease with acute exacerbation (CMS/HCC) [J44.1]  Yes   • Obstructive sleep apnea [G47.33]  Yes   • Class 3 severe obesity with serious comorbidity and body mass index (BMI) of 50.0 to 59.9 in adult (CMS/[facility]) [E66.01, Z68.43]  Not Applicable      Resolved Hospital Problems   No resolved problems to display.       79 y.o. female admitted with Enterococcal septicemia (CMS/[facility]).    · Antibiotics per ID.  On ampicillin for enterococcal bacteremia presumably from GI source.  · PICC line ordered.  · Note also asymptomatic bacteriuria E. coli with ESBL.  · Renal function stable.  On torsemide and KCl.  · Pulmonary following.  · Lovenox 40 mg SC every 12 for DVT prophylaxis.  · Full code.  · Discussed with patient and nursing staff.  · Anticipate discharge to SNU facility tomorrow.      Reynaldo Shabazz MD  [facility]  05/18/20  18:03

## 2020-05-19 ENCOUNTER — APPOINTMENT (OUTPATIENT)
Dept: GENERAL RADIOLOGY | Facility: HOSPITAL | Age: 80
End: 2020-05-19

## 2020-05-19 VITALS
DIASTOLIC BLOOD PRESSURE: 82 MMHG | HEART RATE: 55 BPM | OXYGEN SATURATION: 96 % | WEIGHT: 261.69 LBS | SYSTOLIC BLOOD PRESSURE: 155 MMHG | BODY MASS INDEX: 51.38 KG/M2 | RESPIRATION RATE: 18 BRPM | TEMPERATURE: 97.3 F | HEIGHT: 60 IN

## 2020-05-19 LAB — BACTERIA SPEC AEROBE CULT: NORMAL

## 2020-05-19 PROCEDURE — 25010000002 ENOXAPARIN PER 10 MG: Performed by: INTERNAL MEDICINE

## 2020-05-19 PROCEDURE — 94799 UNLISTED PULMONARY SVC/PX: CPT

## 2020-05-19 PROCEDURE — C1751 CATH, INF, PER/CENT/MIDLINE: HCPCS

## 2020-05-19 PROCEDURE — 25010000003 LIDOCAINE 1 % SOLUTION: Performed by: RADIOLOGY

## 2020-05-19 PROCEDURE — 02HV33Z INSERTION OF INFUSION DEVICE INTO SUPERIOR VENA CAVA, PERCUTANEOUS APPROACH: ICD-10-PCS | Performed by: RADIOLOGY

## 2020-05-19 PROCEDURE — 99232 SBSQ HOSP IP/OBS MODERATE 35: CPT | Performed by: INTERNAL MEDICINE

## 2020-05-19 PROCEDURE — 25010000002 AMPICILLIN PER 500 MG: Performed by: INTERNAL MEDICINE

## 2020-05-19 RX ORDER — LIDOCAINE HYDROCHLORIDE 10 MG/ML
10 INJECTION, SOLUTION INFILTRATION; PERINEURAL ONCE
Status: COMPLETED | OUTPATIENT
Start: 2020-05-19 | End: 2020-05-19

## 2020-05-19 RX ORDER — HYDROCODONE BITARTRATE AND ACETAMINOPHEN 7.5; 325 MG/1; MG/1
1 TABLET ORAL EVERY 6 HOURS PRN
Qty: 12 TABLET | Refills: 0 | Status: SHIPPED | OUTPATIENT
Start: 2020-05-19 | End: 2020-05-19

## 2020-05-19 RX ORDER — LOPERAMIDE HYDROCHLORIDE 2 MG/1
2 CAPSULE ORAL 4 TIMES DAILY PRN
Start: 2020-05-19 | End: 2020-09-18 | Stop reason: HOSPADM

## 2020-05-19 RX ORDER — PREGABALIN 150 MG/1
150 CAPSULE ORAL 2 TIMES DAILY
Qty: 6 CAPSULE | Refills: 0 | Status: SHIPPED | OUTPATIENT
Start: 2020-05-19

## 2020-05-19 RX ORDER — SODIUM CHLORIDE 0.9 % (FLUSH) 0.9 %
20 SYRINGE (ML) INJECTION AS NEEDED
Status: CANCELLED | OUTPATIENT
Start: 2020-05-19

## 2020-05-19 RX ORDER — OXYBUTYNIN CHLORIDE 10 MG/1
10 TABLET, EXTENDED RELEASE ORAL DAILY
Start: 2020-05-20

## 2020-05-19 RX ORDER — SODIUM CHLORIDE 0.9 % (FLUSH) 0.9 %
10 SYRINGE (ML) INJECTION AS NEEDED
Status: CANCELLED | OUTPATIENT
Start: 2020-05-19

## 2020-05-19 RX ORDER — HYDROCODONE BITARTRATE AND ACETAMINOPHEN 7.5; 325 MG/1; MG/1
1 TABLET ORAL EVERY 6 HOURS PRN
Qty: 12 TABLET | Refills: 0
Start: 2020-05-19

## 2020-05-19 RX ORDER — SODIUM CHLORIDE 0.9 % (FLUSH) 0.9 %
10 SYRINGE (ML) INJECTION EVERY 12 HOURS SCHEDULED
Status: CANCELLED | OUTPATIENT
Start: 2020-05-19

## 2020-05-19 RX ORDER — FENTANYL 25 UG/H
1 PATCH TRANSDERMAL
Qty: 2 PATCH | Refills: 0 | Status: SHIPPED | OUTPATIENT
Start: 2020-05-19

## 2020-05-19 RX ADMIN — AMPICILLIN SODIUM 2 G: 2 INJECTION, POWDER, FOR SOLUTION INTRAVENOUS at 15:05

## 2020-05-19 RX ADMIN — AMPICILLIN SODIUM 2 G: 2 INJECTION, POWDER, FOR SOLUTION INTRAVENOUS at 09:39

## 2020-05-19 RX ADMIN — TORSEMIDE 50 MG: 20 TABLET ORAL at 08:58

## 2020-05-19 RX ADMIN — LOPERAMIDE HYDROCHLORIDE 2 MG: 2 CAPSULE ORAL at 09:38

## 2020-05-19 RX ADMIN — FLUOXETINE HYDROCHLORIDE 60 MG: 20 CAPSULE ORAL at 08:58

## 2020-05-19 RX ADMIN — POTASSIUM CHLORIDE 20 MEQ: 10 CAPSULE, COATED, EXTENDED RELEASE ORAL at 08:58

## 2020-05-19 RX ADMIN — ENOXAPARIN SODIUM 40 MG: 40 INJECTION SUBCUTANEOUS at 05:06

## 2020-05-19 RX ADMIN — SODIUM CHLORIDE, PRESERVATIVE FREE 10 ML: 5 INJECTION INTRAVENOUS at 08:59

## 2020-05-19 RX ADMIN — OXYBUTYNIN CHLORIDE 10 MG: 10 TABLET, EXTENDED RELEASE ORAL at 08:58

## 2020-05-19 RX ADMIN — HYDROCODONE BITARTRATE AND ACETAMINOPHEN 1 TABLET: 7.5; 325 TABLET ORAL at 08:58

## 2020-05-19 RX ADMIN — BUSPIRONE HYDROCHLORIDE 30 MG: 15 TABLET ORAL at 08:58

## 2020-05-19 RX ADMIN — LEVOTHYROXINE SODIUM 50 MCG: 50 TABLET ORAL at 05:06

## 2020-05-19 RX ADMIN — ASPIRIN 81 MG: 81 TABLET, CHEWABLE ORAL at 08:58

## 2020-05-19 RX ADMIN — LOPERAMIDE HYDROCHLORIDE 2 MG: 2 CAPSULE ORAL at 15:12

## 2020-05-19 RX ADMIN — LIDOCAINE HYDROCHLORIDE 7 ML: 10 INJECTION, SOLUTION INFILTRATION; PERINEURAL at 13:57

## 2020-05-19 RX ADMIN — PREGABALIN 100 MG: 100 CAPSULE ORAL at 08:58

## 2020-05-19 RX ADMIN — HYDROCODONE BITARTRATE AND ACETAMINOPHEN 1 TABLET: 7.5; 325 TABLET ORAL at 15:12

## 2020-05-19 RX ADMIN — AMPICILLIN SODIUM 2 G: 2 INJECTION, POWDER, FOR SOLUTION INTRAVENOUS at 04:59

## 2020-05-19 RX ADMIN — BUDESONIDE AND FORMOTEROL FUMARATE DIHYDRATE 2 PUFF: 160; 4.5 AEROSOL RESPIRATORY (INHALATION) at 08:49

## 2020-05-19 NOTE — NURSING NOTE
Attempted to place PICC at bedside and was unsuccessful. Pt will need to have PICC placed in fluro. Nurse informed

## 2020-05-19 NOTE — PLAN OF CARE
Problem: Patient Care Overview  Goal: Plan of Care Review  Outcome: Ongoing (interventions implemented as appropriate)  Flowsheets (Taken 5/19/2020 0441)  Progress: improving  Plan of Care Reviewed With: patient  Outcome Summary: Pt A & O, up with x1 assist, purewick in place, on 2L O2. Medicated per orders, IV abx cont. PICC to be placed today with poss d/c. No s/s of distress at this time, VSS, will cont to monitor.     Problem: Fall Risk (Adult)  Goal: Absence of Fall  Outcome: Ongoing (interventions implemented as appropriate)  Flowsheets (Taken 5/19/2020 0441)  Absence of Fall: making progress toward outcome     Problem: Skin Injury Risk (Adult)  Goal: Skin Health and Integrity  Outcome: Ongoing (interventions implemented as appropriate)  Flowsheets (Taken 5/19/2020 0441)  Skin Health and Integrity: making progress toward outcome     Problem: Pain, Chronic (Adult)  Goal: Acceptable Pain/Comfort Level and Functional Ability  Outcome: Ongoing (interventions implemented as appropriate)  Flowsheets (Taken 5/19/2020 0441)  Acceptable Pain/Comfort Level and Functional Ability: making progress toward outcome

## 2020-05-19 NOTE — DISCHARGE SUMMARY
Patient Name: Rose Cheema  : 1940  MRN: [id number]    Date of Admission: 2020  Date of Discharge:  2020  Primary Care Physician: Cheng Guevara MD      Chief Complaint:   Fever and Shortness of Breath      Discharge Diagnoses     Active Hospital Problems    Diagnosis  POA   • **Enterococcal septicemia (CMS/Formerly [facility]) [A41.81]  Yes   • Asymptomatic bacteriuria [R82.71]  Yes   • Bacteremia [R78.81]  Yes   • Sepsis without acute organ dysfunction - present on admit [A41.9]  Yes   • CKD (chronic kidney disease) stage 3, GFR 30-59 ml/min (CMS/Formerly [facility]) [N18.3]  Yes   • Acute on chronic diastolic (congestive) heart failure (CMS/Formerly [facility]) [I50.33]  Yes   • Opioid dependence (CMS/Formerly [facility]) [F11.20]  Yes   • Hypothyroidism [E03.9]  Yes   • Pulmonary hypertension  [I27.20]  Yes   • CAD (coronary artery disease), native coronary artery [I25.10]  Yes   • Benign essential hypertension [I10]  Yes   • Chronic obstructive pulmonary disease with acute exacerbation (CMS/Formerly [facility]) [J44.1]  Yes   • Obstructive sleep apnea [G47.33]  Yes   • Class 3 severe obesity with serious comorbidity and body mass index (BMI) of 50.0 to 59.9 in adult (CMS/Formerly [facility]) [E66.01, Z68.43]  Not Applicable      Resolved Hospital Problems   No resolved problems to display.        Hospital Course     Ms. Cheema is a 79 y.o. female with a history of CKD, diastolic congestive heart failure, COPD, HTN and pulmonary HTN who presented to [facility] initially complaining of fever, chills and shortness of breath.  Please see the admitting history and physical for further details.  She was found to be septic and was admitted to the hospital for further evaluation and treatment.  Pulmonology saw in consultation for COPD exacerbation and infectious disease saw in consultation and tailored antibiotics. Her blood cultures grew positive enterococcus species thought to be from a GI source. Repeat cultures have been NGTD. An echocardiogram was ordered and  demonstrated normal LV systolic function, EF 53%, severe MAC present, mild mitral valve regurgitation and moderate tricuspid valve regurgitation, RVSP 43 mmHg.  Her urinalysis showed 4+ bacteria and 6-12 WBC, she had no urinary symptoms. Her urine culture grew ESBL E. Coli.  She will need to remain on IV ampicillin 2g q6h thru 5/25/2020. A PICC line was placed prior to discharge. COPD was treated with oxygen, bronchodilators, duo-nebs and steroids. She was able to wean to room air by the time of discharge. She has remained afebrile and labs are normalized. She will be discharged to a skilled nursing facility today in stable condition.    Day of Discharge     no new complaints or events overnight.    She denies chest pain, palpitations, SOA, edema, fever, chills, nausea vomiting.    Physical Exam:  Temp:  [97.3 °F (36.3 °C)-97.7 °F (36.5 °C)] 97.3 °F (36.3 °C)  Heart Rate:  [51-67] 55  Resp:  [16-18] 18  BP: (108-155)/(51-82) 155/82  Body mass index is 51.11 kg/m².  Physical Exam   Constitutional: She is oriented to person, place, and time. She appears well-developed and well-nourished.   HENT:   Head: Normocephalic and atraumatic.   Eyes: Conjunctivae and EOM are normal.   Neck: Normal range of motion. Neck supple.   Cardiovascular: Normal rate and regular rhythm.   Pulmonary/Chest: Effort normal. No respiratory distress.   Abdominal: Soft. Bowel sounds are normal.   Musculoskeletal: Normal range of motion. She exhibits no edema.   Neurological: She is alert and oriented to person, place, and time.   Skin: Skin is warm and dry.   Psychiatric: She has a normal mood and affect. Her behavior is normal.   Nursing note and vitals reviewed.      Consultants     Consult Orders (all) (From admission, onward)     Start     Ordered    05/14/20 0400  Inpatient Pulmonology Consult  Once     Specialty:  Pulmonary Disease  Provider:  Joe Calhoun MD    05/14/20 0400    05/14/20 0336  Inpatient Infectious Diseases Consult   Once     Specialty:  Infectious Diseases  Provider:  Estuardo Shipley MD    05/14/20 0338              Procedures     Imaging Results (All)     Procedure Component Value Units Date/Time    FL Video Swallow With Speech Single Contrast [[id number]] Resulted:  05/18/20 1111     Updated:  05/18/20 1115    CT Abdomen Pelvis With Contrast [[id number]] Collected:  05/15/20 1550     Updated:  05/15/20 2101    Narrative:       CT ABDOMEN AND PELVIS WITH IV CONTRAST     HISTORY: Nausea, vomiting, diarrhea.     TECHNIQUE: Radiation dose reduction techniques were utilized, including  automated exposure control and exposure modulation based on body size.   3 mm images were obtained through the abdomen and pelvis after the  administration of IV contrast.      COMPARISON: CT chest 05/14/2020 and 02/12/2019.     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 There are no findings of small bowel obstruction. There is mild  wall thickening involving the descending portion of the duodenum as well  as the adjacent common bile duct. The appendix is unremarkable.     There is mild-to-moderate intra and extrahepatic biliary ductal dilation  status post cholecystectomy, improved since 02/12/2019. The pancreas,  spleen and adrenal glands have an unremarkable postcontrast CT  appearance. Right renal calculus measures 0.9 cm. There is no  hydronephrosis.     There has been interval enlargement of multiple abdominopelvic nodes  with index adenopathy as below:  *  Left periaortic node just above the left renal collecting system  measures 1.6 cm in short axis dimension (previously 1.1 cm).  *  Left external iliac node measuring 1.1 cm in short axis dimension  (previously 0.8 cm).     Colonic diverticulosis is present. There is a long segment of bowel wall  thickening involving the sigmoid colon with increased surrounding fat  stranding in the pelvis when compared to 02/12/2019. Midline incisional  hernia  contains multiple loops of large bowel without findings of  obstruction. There is no free intraperitoneal air or fluid.     There are no suspicious lytic or blastic osseous lesions. Mild  anterolisthesis of L4 on L5 is present.     The bladder is unremarkable for its degree of distention. The uterus is  surgically absent.       Impression:       1.  Long segment of bowel wall thickening with surrounding fat stranding  involving the sigmoid colon with differential considerations including  infectious colitis such as C. difficile versus diverticulitis and  correlation patient history is recommended. A stool sample may be  helpful.  2.  Findings suggestive of mild enteritis involving the duodenum.  3.  Interval enlargement of multiple abdominopelvic lymph nodes since  02/12/2019. In the absence of known malignancy, findings are favored be  reactive; however, they remain nonspecific. Correlation with patient  history as well as continued attention on follow-up with CT abdomen and  pelvis in 3 months is recommended to ensure stability.  4.  Other findings as above.     This report was finalized on 5/15/2020 8:58 PM by Dr. Isaias Olivares M.D.       CT Chest Without Contrast [[id number]] Collected:  05/14/20 1221     Updated:  05/14/20 1343    Narrative:       CT CHEST WITHOUT CONTRAST     HISTORY: 79-year-old female with shortness of breath.     TECHNIQUE: Radiation dose reduction techniques were utilized, including  automated exposure control and exposure modulation based on body size.   3 mm images were obtained through the chest without the administration  of IV contrast. Compared with multiple recent chest radiographs. There  is no previous chest CT for comparison.     FINDINGS: There is linear atelectasis or scar at the right lower lobe,  image 53. There is linear atelectasis and slight volume loss at the  medial segment of the right middle lobe. There is a fine linear scar at  the right upper lobe. There are very  mild dependent atelectatic changes.  There are no pleural or pericardial effusions. There is no  lymphadenopathy within the chest. There is no hiatal hernia. Gastric  bypass changes are noted at the visualized upper abdomen.       Impression:       Linear scarring/atelectasis at the right lower lobe and  right middle lobe and fine linear scar within the right upper lobe.  There are no effusions or lymphadenopathy.     This report was finalized on 5/14/2020 1:40 PM by Dr. Bijal Delacruz M.D.       XR Chest 1 View [[id number]] Collected:  05/14/20 0213     Updated:  05/14/20 0217    Narrative:       PORTABLE CHEST 05/14/2020 AT 1:53 AM     CLINICAL HISTORY: Dyspnea     Compared to the previous chest dated 05/07/2019.     The lungs are somewhat poorly inflated but appear free of infiltrates.  There are no pleural effusions. The heart is mildly enlarged.     IMPRESSIONS: Mild cardiomegaly. No acute process is identified.     This report was finalized on 5/14/2020 2:14 AM by Dr. Jerome Duke M.D.             Pertinent Labs     Results from last 7 days   Lab Units 05/18/20  0602 05/17/20  0529 05/16/20  0617 05/14/20  0645   WBC 10*3/mm3 5.66 5.83 12.21* 16.82*   HEMOGLOBIN g/dL 12.4 11.6* 11.4* 12.6   PLATELETS 10*3/mm3 142 143 137* 142     Results from last 7 days   Lab Units 05/18/20  0602 05/17/20  0529 05/16/20  0617 05/15/20  0620   SODIUM mmol/L 140 144 142 137   POTASSIUM mmol/L 3.9 3.6 3.8 4.0   CHLORIDE mmol/L 98 101 102 98   CO2 mmol/L 30.6* 30.6* 27.4 23.1   BUN mg/dL 21 26* 26* 22   CREATININE mg/dL 0.91 1.13* 1.01* 1.08*   GLUCOSE mg/dL 87 86 110* 175*   Estimated Creatinine Clearance: 61.2 mL/min (by C-G formula based on SCr of 0.91 mg/dL).  Results from last 7 days   Lab Units 05/16/20  0617 05/14/20  0645 05/14/20  0156   ALBUMIN g/dL 3.40* 3.50 4.50   BILIRUBIN mg/dL 0.3 0.5 0.5   ALK PHOS U/L 99 130* 140*   AST (SGOT) U/L 22 46* 18   ALT (SGPT) U/L 32 24 15     Results from last 7 days   Lab Units  05/18/20  0602 05/17/20  0529 05/16/20  0617 05/15/20  0620 05/14/20  0645 05/14/20  0156   CALCIUM mg/dL 7.6* 7.4* 7.8* 8.3* 8.3* 8.8   ALBUMIN g/dL  --   --  3.40*  --  3.50 4.50       Results from last 7 days   Lab Units 05/15/20  0620 05/14/20  0156   TROPONIN T ng/mL  --  0.014   PROBNP pg/mL 3,331.0* 738.8           Invalid input(s): LDLCALC  Results from last 7 days   Lab Units 05/16/20  1022 05/16/20  1014 05/14/20  0808 05/14/20  0236 05/14/20  0156   BLOODCX  No growth at 3 days No growth at 3 days  --  No growth at 5 days Enterococcus faecalis*   URINECX   --   --  >100,000 CFU/mL Escherichia coli ESBL*  --   --    BCIDPCR   --   --   --   --  Enterococcus spp, not VRE. Identification by BCID PCR.*       Test Results Pending at Discharge      Order Current Status    Blood Culture - Blood, Arm, Right Preliminary result    Blood Culture - Blood, Hand, Left Preliminary result          Discharge Details        Discharge Medications      New Medications      Instructions Start Date   ampicillin   2 g, Intravenous, Every 6 Hours      loperamide 2 MG capsule  Commonly known as:  IMODIUM   2 mg, Oral, 4 Times Daily PRN      oxybutynin XL 10 MG 24 hr tablet  Commonly known as:  DITROPAN-XL  Replaces:  Myrbetriq 50 MG tablet sustained-release 24 hour 24 hr tablet   10 mg, Oral, Daily   Start Date:  May 20, 2020        Continue These Medications      Instructions Start Date   albuterol (2.5 MG/3ML) 0.083% nebulizer solution  Commonly known as:  PROVENTIL   2.5 mg, Nebulization, 4 Times Daily PRN      aspirin 81 MG chewable tablet   81 mg, Oral      busPIRone 30 MG tablet  Commonly known as:  BUSPAR   30 mg, Oral, 2 Times Daily      fentaNYL 25 MCG/HR patch  Commonly known as:  DURAGESIC   1 patch, Transdermal, Every 72 Hours      FLUoxetine 60 MG tablet  Commonly known as:  PROzac   60 mg, Oral, Daily      HYDROcodone-acetaminophen 7.5-325 MG per tablet  Commonly known as:  NORCO   1 tablet, Oral, Every 6 Hours  PRN      levothyroxine 50 MCG tablet  Commonly known as:  SYNTHROID, LEVOTHROID   50 mcg, Oral, Daily      pantoprazole 40 MG EC tablet  Commonly known as:  PROTONIX   40 mg, Oral, Daily      potassium chloride 10 MEQ CR capsule  Commonly known as:  MICRO-K   20 mEq, Oral, Daily      pregabalin 150 MG capsule  Commonly known as:  LYRICA   150 mg, Oral, 2 Times Daily      QUEtiapine 50 MG tablet  Commonly known as:  SEROquel   50 mg, Oral, Nightly      torsemide 100 MG tablet  Commonly known as:  DEMADEX   50 mg, Oral, Daily, Takes 50 mg daily and every other day takes a second 50 mg dose      umeclidinium-vilanterol 62.5-25 MCG/INH aerosol powder  inhaler  Commonly known as:  Anoro Ellipta   1 puff, Inhalation, Daily - RT         Stop These Medications    Myrbetriq 50 MG tablet sustained-release 24 hour 24 hr tablet  Generic drug:  Mirabegron ER  Replaced by:  oxybutynin XL 10 MG 24 hr tablet            Allergies   Allergen Reactions   • Aspartame Other (See Comments)     CAUSES MIGRAINES   • Cephalexin Rash     Tolerated piperacillin-tazobactam (Zosyn) and ampicillin-sulbactam (Unasyn) during Feb 2019 admission.         Discharge Disposition:  Skilled Nursing Facility (DC - External)    Discharge Diet:  Diet Order   Procedures   • Diet Regular; Cardiac       Discharge Activity:   Activity Instructions     Activity as Tolerated            CODE STATUS:    Code Status and Medical Interventions:   Ordered at: 05/14/20 0338     Code Status:    CPR     Medical Interventions (Level of Support Prior to Arrest):    Full       No future appointments.  Additional Instructions for the Follow-ups that You Need to Schedule     Discharge Follow-up with PCP   As directed       Currently Documented PCP:    Cheng Guevara MD    PCP Phone Number:    [phone]     Follow Up Details:  1-2 weeks            Contact information for follow-up providers     Cheng Guevara MD .    Specialty:  Internal Medicine  Why:  1-2  weeks  Contact information:  [address] [place] [address] [address]  [facility] [address]  [phone]                   Contact information for after-discharge care     Destination     [facility] .    Service:  Skilled Nursing  Contact information:  [address]  [facility] [address] [phone]                             Additional Instructions for the Follow-ups that You Need to Schedule     Discharge Follow-up with PCP   As directed       Currently Documented PCP:    Cheng Guevara MD    PCP Phone Number:    [phone]     Follow Up Details:  1-2 weeks           Time Spent on Discharge:  Greater than 30 minutes      NELL Villalobos  [facility]  05/19/20  12:06 PM      Brief Attending Discharge Attestation      I have seen and examined the patient, performing an independent face-to-face diagnostic evaluation with plan of care reviewed and developed with the advanced practice registered nurse (APRN).      Please review the above discharge plan of care which I have reviewed and developed with the APRN.        Brief Summary Statement/Hospital Course and Discharge Plan:   Ms. Cheema is a 79 y.o. female who presented to [facility] initially complaining of fever and chills.  She was found to have sepsis in the ED and was admitted to the hospital for further evaluation and treatment.  She was seen by infectious disease and blood cultures eventually were positive for enterococcus.  [place] to be GI in origin.  ID has recommended 2 weeks of IV ampicillin to be completed on 3/25/2020.  She got a PICC line today and is going to rehab.  See above for further details on hospital stay and orders.     Vital Signs:   Temp:  [97.3 °F (36.3 °C)-97.7 °F (36.5 °C)] 97.3 °F (36.3 °C)  Heart Rate:  [51-56] 55  Resp:  [16-18] 18  BP: (108-155)/(51-82) 155/82  Constitutional: alert, cooperative and no distress  Neck: no JVD  Respiratory: clear to auscultation,  unlabored  Cardiovascular: regular rate and rhythm, S1, S2 normal, no murmur  Abdominal: soft, non-tender, non-distended; BS normal; no masses or organomegaly  Musculoskeletal: trace edema BLE  Neurological: alert and oriented x 3, strength and sensation grossly normal        Total time spent coordinating/providing care on day of discharge: 35 minutes        Electronically signed by Reynaldo Shabazz MD, 05/19/20, 2:43 PM.

## 2020-05-19 NOTE — PROGRESS NOTES
[facility]      Mar/chart reviewed  Follow up AHRF, copd  She says she is doing well, some cough, some shortness of breath    Vital Sign Min/Max for last 24 hours  Temp  Min: 97.3 °F (36.3 °C)  Max: 97.7 °F (36.5 °C)   BP  Min: 108/51  Max: 155/82   Pulse  Min: 51  Max: 67   Resp  Min: 16  Max: 18   SpO2  Min: 90 %  Max: 96 %   Flow (L/min)  Min: 1.5  Max: 2   Weight  Min: 119 kg (261 lb 11 oz)  Max: 119 kg (261 lb 11 oz)     Nad, axox3,   perrl, eomi, no icterus,  mmm, no jvd, trachea midline, neck supple, no palpable thyroid nodules  chest cta bilaterally, no crackles, no wheezes,   rrr,   soft, nt, nd +bs,  no c/c/ 1+ edema  Skin warm, dry no rashes     Labs: 5/18: reviewed: nothing new for 5/19  Glucose 87  Bun 21  Cr 0.9  Na 140  Bicarb 30  Wbc 5.66  hgb 12.4  plts 142     1. Acute hypoxic respiratory failure  2. Acute COPD exacerbation  3. Right lower lobe atelectasis  4. GERSON on CPAP  5. Suspected OHS  6. Chronic narcotic dependence  7. Super morbid obesity     Pertinent medical problems:  · Enterococcal bacteremia  · CKD           Plan       continue symbicort and nebs  Oxygen if needed on d/c  Home cpap  Ok to d/c from pulm standpoint.

## 2020-05-19 NOTE — PROGRESS NOTES
INFECTIOUS DISEASES PROGRESS NOTE    CC: f/u colitis    S:   She has not had a PICC line in place yet.  She still having some diarrhea but no fevers or chills or night sweats    O:  Physical Exam:  Temp:  [97.3 °F (36.3 °C)-97.7 °F (36.5 °C)] 97.3 °F (36.3 °C)  Heart Rate:  [51-67] 55  Resp:  [16-18] 18  BP: (108-155)/(51-82) 155/82  Physical Exam   Constitutional: She appears well-developed. No distress.   Pulmonary/Chest: Effort normal.   Neurological: She is alert.   Psychiatric: She has a normal mood and affect. Her behavior is normal.            Micro:  5/14 blood cultures: Enterococcus faecalis (ampicillin-sensitive) in 1/2 sets  5/14 urine culture: > 100k E coli  5/14 COVID: negative  5/16 BCx: NGTD        Estimated Creatinine Clearance: 61.2 mL/min (by C-G formula based on SCr of 0.91 mg/dL).    Assessment/Plan   1.  Enterococcal septicemia due to colitis  2. History of choledocholithiasis  3.  Super obesity w/ BMI of 53, complicating above  4. Asymptomatic bacteriuria    Seems to be doing reasonably well.  Continue ampicillin as below to complete 2 weeks of therapy.  Hopeful for a PICC line and then discharged later today    Final infectious diseases recommendations  1.  Ampicillin 2 g IV every 6 hours through May 25, 2020 (she could also do penicillin continuous infusion 24,000,000 international units IV every 24 hours if discharged home)  2.  No antibiotic monitoring labs needed for this very short course of IV antibiotics        Delfino Pittman MD  9:27 AM  05/19/20

## 2020-05-19 NOTE — PROGRESS NOTES
Continued Stay Note  [facility]     Patient Name: Rose Cheema  MRN: [id number]  Today's Date: 5/19/2020    Admit Date: 5/14/2020    Discharge Plan     Row Name 05/19/20 1321       Plan    Plan  Skilled bed at [facility]    Plan Comments  Discharge orders noted in Epic. CCP called patient's son Mario Cheema [phone] who confirmed he could provide transportation to [facility] for the patient today. RN will call patient's son when patient is ready. CCP spoke to [facility] who confirmed bed is available today and the facility will be able to provide IV Ampicillin as long as patient has picc line. CCP faxed discharge summary to [facility]. RN was given discharge packet. CCP will follow for any additional needs. Janae WILDER        Discharge Codes    No documentation.       Expected Discharge Date and Time     Expected Discharge Date Expected Discharge Time    May 19, 2020             LEONEL Villafana

## 2020-05-20 NOTE — PROGRESS NOTES
Case Management Discharge Note      Final Note: Skilled bed at [facility]    Provided Post Acute Provider Quality & Resource List?: Yes  Post Acute Provider Quality and Resource List: Home Health, Inpatient Rehab  Delivered To: Patient  Method of Delivery: In person    Destination - Selection Complete      Service Provider Request Status Selected Services Address Phone Number Fax Number    [facility] Selected Skilled Nursing [address] [facility] [id number] [phone]      Durable Medical Equipment      No service has been selected for the patient.      Dialysis/Infusion      No service has been selected for the patient.      Home Medical Care      No service has been selected for the patient.      Therapy      No service has been selected for the patient.      Community Resources      No service has been selected for the patient.        Transportation Services  Private: Car    Final Discharge Disposition Code: 03 - skilled nursing facility (SNF)

## 2020-05-21 LAB
BACTERIA SPEC AEROBE CULT: NORMAL
BACTERIA SPEC AEROBE CULT: NORMAL

## 2020-06-12 RX ORDER — MIRABEGRON 50 MG/1
TABLET, FILM COATED, EXTENDED RELEASE ORAL
Qty: 30 TABLET | Refills: 0 | OUTPATIENT
Start: 2020-06-12

## 2020-08-05 RX ORDER — MIRABEGRON 50 MG/1
TABLET, FILM COATED, EXTENDED RELEASE ORAL
Qty: 30 TABLET | Refills: 0 | Status: SHIPPED | OUTPATIENT
Start: 2020-08-05 | End: 2020-09-08

## 2020-09-08 RX ORDER — MIRABEGRON 50 MG/1
TABLET, FILM COATED, EXTENDED RELEASE ORAL
Qty: 30 TABLET | Refills: 0 | Status: SHIPPED | OUTPATIENT
Start: 2020-09-08 | End: 2020-09-18 | Stop reason: HOSPADM

## 2020-09-14 ENCOUNTER — APPOINTMENT (OUTPATIENT)
Dept: CT IMAGING | Facility: HOSPITAL | Age: 80
End: 2020-09-14

## 2020-09-14 ENCOUNTER — HOSPITAL ENCOUNTER (INPATIENT)
Facility: HOSPITAL | Age: 80
LOS: 3 days | Discharge: HOME HEALTH | End: 2020-09-18
Attending: EMERGENCY MEDICINE | Admitting: INTERNAL MEDICINE

## 2020-09-14 DIAGNOSIS — K56.609 SMALL BOWEL OBSTRUCTION (HCC): Primary | ICD-10-CM

## 2020-09-14 LAB
ALBUMIN SERPL-MCNC: 4.2 G/DL (ref 3.5–5.2)
ALBUMIN/GLOB SERPL: 1.5 G/DL
ALP SERPL-CCNC: 140 U/L (ref 39–117)
ALT SERPL W P-5'-P-CCNC: 15 U/L (ref 1–33)
ANION GAP SERPL CALCULATED.3IONS-SCNC: 8.8 MMOL/L (ref 5–15)
AST SERPL-CCNC: 18 U/L (ref 1–32)
BACTERIA UR QL AUTO: ABNORMAL /HPF
BASOPHILS # BLD AUTO: 0.01 10*3/MM3 (ref 0–0.2)
BASOPHILS NFR BLD AUTO: 0.1 % (ref 0–1.5)
BILIRUB SERPL-MCNC: 0.5 MG/DL (ref 0–1.2)
BILIRUB UR QL STRIP: NEGATIVE
BUN SERPL-MCNC: 15 MG/DL (ref 8–23)
BUN/CREAT SERPL: 14.7 (ref 7–25)
CALCIUM SPEC-SCNC: 8.8 MG/DL (ref 8.6–10.5)
CHLORIDE SERPL-SCNC: 106 MMOL/L (ref 98–107)
CLARITY UR: ABNORMAL
CO2 SERPL-SCNC: 26.2 MMOL/L (ref 22–29)
COLOR UR: YELLOW
CREAT SERPL-MCNC: 1.02 MG/DL (ref 0.57–1)
DEPRECATED RDW RBC AUTO: 45.5 FL (ref 37–54)
EOSINOPHIL # BLD AUTO: 0.1 10*3/MM3 (ref 0–0.4)
EOSINOPHIL NFR BLD AUTO: 1.2 % (ref 0.3–6.2)
ERYTHROCYTE [DISTWIDTH] IN BLOOD BY AUTOMATED COUNT: 12.5 % (ref 12.3–15.4)
GFR SERPL CREATININE-BSD FRML MDRD: 53 ML/MIN/1.73
GLOBULIN UR ELPH-MCNC: 2.8 GM/DL
GLUCOSE SERPL-MCNC: 135 MG/DL (ref 65–99)
GLUCOSE UR STRIP-MCNC: NEGATIVE MG/DL
HCT VFR BLD AUTO: 43.9 % (ref 34–46.6)
HGB BLD-MCNC: 13.8 G/DL (ref 12–15.9)
HGB UR QL STRIP.AUTO: ABNORMAL
HYALINE CASTS UR QL AUTO: ABNORMAL /LPF
IMM GRANULOCYTES # BLD AUTO: 0.03 10*3/MM3 (ref 0–0.05)
IMM GRANULOCYTES NFR BLD AUTO: 0.4 % (ref 0–0.5)
KETONES UR QL STRIP: NEGATIVE
LEUKOCYTE ESTERASE UR QL STRIP.AUTO: ABNORMAL
LIPASE SERPL-CCNC: 19 U/L (ref 13–60)
LYMPHOCYTES # BLD AUTO: 0.8 10*3/MM3 (ref 0.7–3.1)
LYMPHOCYTES NFR BLD AUTO: 9.9 % (ref 19.6–45.3)
MCH RBC QN AUTO: 30.9 PG (ref 26.6–33)
MCHC RBC AUTO-ENTMCNC: 31.4 G/DL (ref 31.5–35.7)
MCV RBC AUTO: 98.4 FL (ref 79–97)
MONOCYTES # BLD AUTO: 0.56 10*3/MM3 (ref 0.1–0.9)
MONOCYTES NFR BLD AUTO: 6.9 % (ref 5–12)
NEUTROPHILS NFR BLD AUTO: 6.62 10*3/MM3 (ref 1.7–7)
NEUTROPHILS NFR BLD AUTO: 81.5 % (ref 42.7–76)
NITRITE UR QL STRIP: POSITIVE
NRBC BLD AUTO-RTO: 0 /100 WBC (ref 0–0.2)
PH UR STRIP.AUTO: <=5 [PH] (ref 5–8)
PLATELET # BLD AUTO: 147 10*3/MM3 (ref 140–450)
PMV BLD AUTO: 10.3 FL (ref 6–12)
POTASSIUM SERPL-SCNC: 4.5 MMOL/L (ref 3.5–5.2)
PROT SERPL-MCNC: 7 G/DL (ref 6–8.5)
PROT UR QL STRIP: NEGATIVE
RBC # BLD AUTO: 4.46 10*6/MM3 (ref 3.77–5.28)
RBC # UR: ABNORMAL /HPF
REF LAB TEST METHOD: ABNORMAL
SODIUM SERPL-SCNC: 141 MMOL/L (ref 136–145)
SP GR UR STRIP: 1.02 (ref 1–1.03)
SQUAMOUS #/AREA URNS HPF: ABNORMAL /HPF
TROPONIN T SERPL-MCNC: <0.01 NG/ML (ref 0–0.03)
UROBILINOGEN UR QL STRIP: ABNORMAL
WBC # BLD AUTO: 8.12 10*3/MM3 (ref 3.4–10.8)
WBC UR QL AUTO: ABNORMAL /HPF

## 2020-09-14 PROCEDURE — 99285 EMERGENCY DEPT VISIT HI MDM: CPT

## 2020-09-14 PROCEDURE — 93010 ELECTROCARDIOGRAM REPORT: CPT | Performed by: INTERNAL MEDICINE

## 2020-09-14 PROCEDURE — 25010000002 MORPHINE PER 10 MG: Performed by: EMERGENCY MEDICINE

## 2020-09-14 PROCEDURE — 84484 ASSAY OF TROPONIN QUANT: CPT | Performed by: PHYSICIAN ASSISTANT

## 2020-09-14 PROCEDURE — 80053 COMPREHEN METABOLIC PANEL: CPT | Performed by: PHYSICIAN ASSISTANT

## 2020-09-14 PROCEDURE — 74177 CT ABD & PELVIS W/CONTRAST: CPT

## 2020-09-14 PROCEDURE — 83690 ASSAY OF LIPASE: CPT | Performed by: PHYSICIAN ASSISTANT

## 2020-09-14 PROCEDURE — 25010000002 ONDANSETRON PER 1 MG: Performed by: EMERGENCY MEDICINE

## 2020-09-14 PROCEDURE — 87077 CULTURE AEROBIC IDENTIFY: CPT | Performed by: INTERNAL MEDICINE

## 2020-09-14 PROCEDURE — 25010000002 IOPAMIDOL 61 % SOLUTION: Performed by: EMERGENCY MEDICINE

## 2020-09-14 PROCEDURE — 87186 SC STD MICRODIL/AGAR DIL: CPT | Performed by: INTERNAL MEDICINE

## 2020-09-14 PROCEDURE — 87086 URINE CULTURE/COLONY COUNT: CPT | Performed by: INTERNAL MEDICINE

## 2020-09-14 PROCEDURE — 93005 ELECTROCARDIOGRAM TRACING: CPT | Performed by: PHYSICIAN ASSISTANT

## 2020-09-14 PROCEDURE — 81001 URINALYSIS AUTO W/SCOPE: CPT | Performed by: PHYSICIAN ASSISTANT

## 2020-09-14 PROCEDURE — 85025 COMPLETE CBC W/AUTO DIFF WBC: CPT | Performed by: PHYSICIAN ASSISTANT

## 2020-09-14 RX ORDER — MORPHINE SULFATE 2 MG/ML
2 INJECTION, SOLUTION INTRAMUSCULAR; INTRAVENOUS ONCE
Status: COMPLETED | OUTPATIENT
Start: 2020-09-14 | End: 2020-09-14

## 2020-09-14 RX ORDER — NITROFURANTOIN 25; 75 MG/1; MG/1
100 CAPSULE ORAL ONCE
Status: COMPLETED | OUTPATIENT
Start: 2020-09-14 | End: 2020-09-15

## 2020-09-14 RX ORDER — SODIUM CHLORIDE 0.9 % (FLUSH) 0.9 %
10 SYRINGE (ML) INJECTION AS NEEDED
Status: DISCONTINUED | OUTPATIENT
Start: 2020-09-14 | End: 2020-09-18 | Stop reason: HOSPADM

## 2020-09-14 RX ORDER — ONDANSETRON 2 MG/ML
4 INJECTION INTRAMUSCULAR; INTRAVENOUS ONCE
Status: COMPLETED | OUTPATIENT
Start: 2020-09-14 | End: 2020-09-14

## 2020-09-14 RX ADMIN — MORPHINE SULFATE 2 MG: 2 INJECTION, SOLUTION INTRAMUSCULAR; INTRAVENOUS at 22:28

## 2020-09-14 RX ADMIN — SODIUM CHLORIDE 500 ML: 9 INJECTION, SOLUTION INTRAVENOUS at 20:27

## 2020-09-14 RX ADMIN — IOPAMIDOL 100 ML: 612 INJECTION, SOLUTION INTRAVENOUS at 23:17

## 2020-09-14 RX ADMIN — ONDANSETRON 4 MG: 2 INJECTION INTRAMUSCULAR; INTRAVENOUS at 20:29

## 2020-09-14 RX ADMIN — MORPHINE SULFATE 2 MG: 2 INJECTION, SOLUTION INTRAMUSCULAR; INTRAVENOUS at 20:44

## 2020-09-14 NOTE — ED TRIAGE NOTES
Pt to ED from home per [facility] with reports of vomiting and upper quadrant abd pain x24 hours.  Pt has had this same episode four times in past month.      Pt has not eaten since yesterday because she vomits every time she eats.      All triage performed with this RN wearing appropriate PPE.  Pt placed in mask upon arrival to ED.

## 2020-09-15 PROBLEM — K56.609 SBO (SMALL BOWEL OBSTRUCTION): Status: ACTIVE | Noted: 2020-09-15

## 2020-09-15 PROBLEM — R11.2 NAUSEA & VOMITING: Status: ACTIVE | Noted: 2020-09-15

## 2020-09-15 PROBLEM — K56.609 SMALL BOWEL OBSTRUCTION (HCC): Status: ACTIVE | Noted: 2020-09-15

## 2020-09-15 LAB
ANION GAP SERPL CALCULATED.3IONS-SCNC: 6.1 MMOL/L (ref 5–15)
B PARAPERT DNA SPEC QL NAA+PROBE: NOT DETECTED
B PERT DNA SPEC QL NAA+PROBE: NOT DETECTED
BASOPHILS # BLD AUTO: 0.01 10*3/MM3 (ref 0–0.2)
BASOPHILS NFR BLD AUTO: 0.2 % (ref 0–1.5)
BUN SERPL-MCNC: 16 MG/DL (ref 8–23)
BUN/CREAT SERPL: 16.7 (ref 7–25)
C PNEUM DNA NPH QL NAA+NON-PROBE: NOT DETECTED
CALCIUM SPEC-SCNC: 7.9 MG/DL (ref 8.6–10.5)
CHLORIDE SERPL-SCNC: 108 MMOL/L (ref 98–107)
CO2 SERPL-SCNC: 26.9 MMOL/L (ref 22–29)
CREAT SERPL-MCNC: 0.96 MG/DL (ref 0.57–1)
DEPRECATED RDW RBC AUTO: 44.4 FL (ref 37–54)
EOSINOPHIL # BLD AUTO: 0.09 10*3/MM3 (ref 0–0.4)
EOSINOPHIL NFR BLD AUTO: 1.7 % (ref 0.3–6.2)
ERYTHROCYTE [DISTWIDTH] IN BLOOD BY AUTOMATED COUNT: 12.5 % (ref 12.3–15.4)
FLUAV H1 2009 PAND RNA NPH QL NAA+PROBE: NOT DETECTED
FLUAV H1 HA GENE NPH QL NAA+PROBE: NOT DETECTED
FLUAV H3 RNA NPH QL NAA+PROBE: NOT DETECTED
FLUAV SUBTYP SPEC NAA+PROBE: NOT DETECTED
FLUBV RNA ISLT QL NAA+PROBE: NOT DETECTED
GFR SERPL CREATININE-BSD FRML MDRD: 56 ML/MIN/1.73
GLUCOSE SERPL-MCNC: 98 MG/DL (ref 65–99)
HADV DNA SPEC NAA+PROBE: NOT DETECTED
HCOV 229E RNA SPEC QL NAA+PROBE: NOT DETECTED
HCOV HKU1 RNA SPEC QL NAA+PROBE: NOT DETECTED
HCOV NL63 RNA SPEC QL NAA+PROBE: NOT DETECTED
HCOV OC43 RNA SPEC QL NAA+PROBE: NOT DETECTED
HCT VFR BLD AUTO: 36.4 % (ref 34–46.6)
HGB BLD-MCNC: 11.9 G/DL (ref 12–15.9)
HMPV RNA NPH QL NAA+NON-PROBE: NOT DETECTED
HPIV1 RNA SPEC QL NAA+PROBE: NOT DETECTED
HPIV2 RNA SPEC QL NAA+PROBE: NOT DETECTED
HPIV3 RNA NPH QL NAA+PROBE: NOT DETECTED
HPIV4 P GENE NPH QL NAA+PROBE: NOT DETECTED
IMM GRANULOCYTES # BLD AUTO: 0.02 10*3/MM3 (ref 0–0.05)
IMM GRANULOCYTES NFR BLD AUTO: 0.4 % (ref 0–0.5)
LYMPHOCYTES # BLD AUTO: 0.87 10*3/MM3 (ref 0.7–3.1)
LYMPHOCYTES NFR BLD AUTO: 16.2 % (ref 19.6–45.3)
M PNEUMO IGG SER IA-ACNC: NOT DETECTED
MCH RBC QN AUTO: 31.3 PG (ref 26.6–33)
MCHC RBC AUTO-ENTMCNC: 32.7 G/DL (ref 31.5–35.7)
MCV RBC AUTO: 95.8 FL (ref 79–97)
MONOCYTES # BLD AUTO: 0.58 10*3/MM3 (ref 0.1–0.9)
MONOCYTES NFR BLD AUTO: 10.8 % (ref 5–12)
NEUTROPHILS NFR BLD AUTO: 3.79 10*3/MM3 (ref 1.7–7)
NEUTROPHILS NFR BLD AUTO: 70.7 % (ref 42.7–76)
NRBC BLD AUTO-RTO: 0 /100 WBC (ref 0–0.2)
PLATELET # BLD AUTO: 125 10*3/MM3 (ref 140–450)
PMV BLD AUTO: 10.4 FL (ref 6–12)
POTASSIUM SERPL-SCNC: 4.4 MMOL/L (ref 3.5–5.2)
RBC # BLD AUTO: 3.8 10*6/MM3 (ref 3.77–5.28)
RHINOVIRUS RNA SPEC NAA+PROBE: NOT DETECTED
RSV RNA NPH QL NAA+NON-PROBE: NOT DETECTED
SARS-COV-2 RNA NPH QL NAA+NON-PROBE: NOT DETECTED
SODIUM SERPL-SCNC: 141 MMOL/L (ref 136–145)
WBC # BLD AUTO: 5.36 10*3/MM3 (ref 3.4–10.8)

## 2020-09-15 PROCEDURE — 25010000002 PIPERACILLIN SOD-TAZOBACTAM PER 1 G: Performed by: INTERNAL MEDICINE

## 2020-09-15 PROCEDURE — 94799 UNLISTED PULMONARY SVC/PX: CPT

## 2020-09-15 PROCEDURE — 94640 AIRWAY INHALATION TREATMENT: CPT

## 2020-09-15 PROCEDURE — 0202U NFCT DS 22 TRGT SARS-COV-2: CPT | Performed by: PHYSICIAN ASSISTANT

## 2020-09-15 PROCEDURE — 99221 1ST HOSP IP/OBS SF/LOW 40: CPT | Performed by: SURGERY

## 2020-09-15 PROCEDURE — 25010000002 MORPHINE PER 10 MG: Performed by: INTERNAL MEDICINE

## 2020-09-15 PROCEDURE — 85025 COMPLETE CBC W/AUTO DIFF WBC: CPT | Performed by: NURSE PRACTITIONER

## 2020-09-15 PROCEDURE — 25010000002 ENOXAPARIN PER 10 MG: Performed by: INTERNAL MEDICINE

## 2020-09-15 PROCEDURE — 80048 BASIC METABOLIC PNL TOTAL CA: CPT | Performed by: NURSE PRACTITIONER

## 2020-09-15 RX ORDER — FENTANYL 25 UG/H
1 PATCH TRANSDERMAL
Status: DISCONTINUED | OUTPATIENT
Start: 2020-09-15 | End: 2020-09-18 | Stop reason: HOSPADM

## 2020-09-15 RX ORDER — FLUOXETINE HYDROCHLORIDE 20 MG/1
60 CAPSULE ORAL DAILY
Status: DISCONTINUED | OUTPATIENT
Start: 2020-09-15 | End: 2020-09-18 | Stop reason: HOSPADM

## 2020-09-15 RX ORDER — BUSPIRONE HYDROCHLORIDE 15 MG/1
30 TABLET ORAL 2 TIMES DAILY
Status: DISCONTINUED | OUTPATIENT
Start: 2020-09-15 | End: 2020-09-18 | Stop reason: HOSPADM

## 2020-09-15 RX ORDER — SODIUM CHLORIDE 9 MG/ML
75 INJECTION, SOLUTION INTRAVENOUS CONTINUOUS
Status: DISCONTINUED | OUTPATIENT
Start: 2020-09-15 | End: 2020-09-16

## 2020-09-15 RX ORDER — L.ACID,PARA/B.BIFIDUM/S.THERM 8B CELL
1 CAPSULE ORAL DAILY
Status: DISCONTINUED | OUTPATIENT
Start: 2020-09-15 | End: 2020-09-18 | Stop reason: HOSPADM

## 2020-09-15 RX ORDER — MORPHINE SULFATE 2 MG/ML
2 INJECTION, SOLUTION INTRAMUSCULAR; INTRAVENOUS EVERY 4 HOURS PRN
Status: DISCONTINUED | OUTPATIENT
Start: 2020-09-15 | End: 2020-09-18 | Stop reason: HOSPADM

## 2020-09-15 RX ORDER — SODIUM CHLORIDE 0.9 % (FLUSH) 0.9 %
10 SYRINGE (ML) INJECTION EVERY 12 HOURS SCHEDULED
Status: DISCONTINUED | OUTPATIENT
Start: 2020-09-15 | End: 2020-09-18 | Stop reason: HOSPADM

## 2020-09-15 RX ORDER — NITROGLYCERIN 0.4 MG/1
0.4 TABLET SUBLINGUAL
Status: DISCONTINUED | OUTPATIENT
Start: 2020-09-15 | End: 2020-09-18 | Stop reason: HOSPADM

## 2020-09-15 RX ORDER — ASPIRIN 81 MG/1
81 TABLET, CHEWABLE ORAL DAILY
Status: DISCONTINUED | OUTPATIENT
Start: 2020-09-15 | End: 2020-09-18 | Stop reason: HOSPADM

## 2020-09-15 RX ORDER — ONDANSETRON 2 MG/ML
4 INJECTION INTRAMUSCULAR; INTRAVENOUS EVERY 6 HOURS PRN
Status: DISCONTINUED | OUTPATIENT
Start: 2020-09-15 | End: 2020-09-18 | Stop reason: HOSPADM

## 2020-09-15 RX ORDER — HYDROCODONE BITARTRATE AND ACETAMINOPHEN 7.5; 325 MG/1; MG/1
1 TABLET ORAL EVERY 6 HOURS PRN
Status: DISCONTINUED | OUTPATIENT
Start: 2020-09-15 | End: 2020-09-18 | Stop reason: HOSPADM

## 2020-09-15 RX ORDER — PREGABALIN 75 MG/1
150 CAPSULE ORAL 2 TIMES DAILY
Status: DISCONTINUED | OUTPATIENT
Start: 2020-09-15 | End: 2020-09-18 | Stop reason: HOSPADM

## 2020-09-15 RX ORDER — ACETAMINOPHEN 650 MG/1
650 SUPPOSITORY RECTAL EVERY 4 HOURS PRN
Status: DISCONTINUED | OUTPATIENT
Start: 2020-09-15 | End: 2020-09-18 | Stop reason: HOSPADM

## 2020-09-15 RX ORDER — IPRATROPIUM BROMIDE AND ALBUTEROL SULFATE 2.5; .5 MG/3ML; MG/3ML
3 SOLUTION RESPIRATORY (INHALATION)
Status: DISCONTINUED | OUTPATIENT
Start: 2020-09-15 | End: 2020-09-15

## 2020-09-15 RX ORDER — MORPHINE SULFATE 2 MG/ML
4 INJECTION, SOLUTION INTRAMUSCULAR; INTRAVENOUS EVERY 4 HOURS PRN
Status: DISCONTINUED | OUTPATIENT
Start: 2020-09-15 | End: 2020-09-18 | Stop reason: HOSPADM

## 2020-09-15 RX ORDER — ACETAMINOPHEN 160 MG/5ML
650 SOLUTION ORAL EVERY 4 HOURS PRN
Status: DISCONTINUED | OUTPATIENT
Start: 2020-09-15 | End: 2020-09-18 | Stop reason: HOSPADM

## 2020-09-15 RX ORDER — ACETAMINOPHEN 325 MG/1
650 TABLET ORAL EVERY 4 HOURS PRN
Status: DISCONTINUED | OUTPATIENT
Start: 2020-09-15 | End: 2020-09-18 | Stop reason: HOSPADM

## 2020-09-15 RX ORDER — PANTOPRAZOLE SODIUM 40 MG/1
40 TABLET, DELAYED RELEASE ORAL DAILY
Status: DISCONTINUED | OUTPATIENT
Start: 2020-09-15 | End: 2020-09-18 | Stop reason: HOSPADM

## 2020-09-15 RX ORDER — SODIUM CHLORIDE 0.9 % (FLUSH) 0.9 %
10 SYRINGE (ML) INJECTION AS NEEDED
Status: DISCONTINUED | OUTPATIENT
Start: 2020-09-15 | End: 2020-09-18 | Stop reason: HOSPADM

## 2020-09-15 RX ORDER — IPRATROPIUM BROMIDE AND ALBUTEROL SULFATE 2.5; .5 MG/3ML; MG/3ML
3 SOLUTION RESPIRATORY (INHALATION)
Status: DISCONTINUED | OUTPATIENT
Start: 2020-09-15 | End: 2020-09-18 | Stop reason: HOSPADM

## 2020-09-15 RX ORDER — LEVOTHYROXINE SODIUM 0.05 MG/1
50 TABLET ORAL DAILY
Status: DISCONTINUED | OUTPATIENT
Start: 2020-09-15 | End: 2020-09-18 | Stop reason: HOSPADM

## 2020-09-15 RX ORDER — QUETIAPINE FUMARATE 50 MG/1
50 TABLET, FILM COATED ORAL NIGHTLY
Status: DISCONTINUED | OUTPATIENT
Start: 2020-09-15 | End: 2020-09-18 | Stop reason: HOSPADM

## 2020-09-15 RX ORDER — CEFTRIAXONE SODIUM 1 G/50ML
1 INJECTION, SOLUTION INTRAVENOUS EVERY 24 HOURS
Status: DISCONTINUED | OUTPATIENT
Start: 2020-09-15 | End: 2020-09-15

## 2020-09-15 RX ORDER — ALBUTEROL SULFATE 2.5 MG/3ML
2.5 SOLUTION RESPIRATORY (INHALATION) 4 TIMES DAILY PRN
Status: DISCONTINUED | OUTPATIENT
Start: 2020-09-15 | End: 2020-09-18 | Stop reason: HOSPADM

## 2020-09-15 RX ADMIN — FLUOXETINE HYDROCHLORIDE 60 MG: 20 CAPSULE ORAL at 14:08

## 2020-09-15 RX ADMIN — BUSPIRONE HYDROCHLORIDE 30 MG: 15 TABLET ORAL at 14:08

## 2020-09-15 RX ADMIN — TAZOBACTAM SODIUM AND PIPERACILLIN SODIUM 3.38 G: 375; 3 INJECTION, SOLUTION INTRAVENOUS at 09:31

## 2020-09-15 RX ADMIN — NITROFURANTOIN MONOHYDRATE/MACROCRYSTALLINE 100 MG: 25; 75 CAPSULE ORAL at 00:58

## 2020-09-15 RX ADMIN — TAZOBACTAM SODIUM AND PIPERACILLIN SODIUM 3.38 G: 375; 3 INJECTION, SOLUTION INTRAVENOUS at 23:52

## 2020-09-15 RX ADMIN — IPRATROPIUM BROMIDE AND ALBUTEROL SULFATE 3 ML: 2.5; .5 SOLUTION RESPIRATORY (INHALATION) at 16:31

## 2020-09-15 RX ADMIN — SODIUM CHLORIDE, PRESERVATIVE FREE 10 ML: 5 INJECTION INTRAVENOUS at 23:45

## 2020-09-15 RX ADMIN — MORPHINE SULFATE 4 MG: 2 INJECTION, SOLUTION INTRAMUSCULAR; INTRAVENOUS at 10:43

## 2020-09-15 RX ADMIN — ENOXAPARIN SODIUM 40 MG: 40 INJECTION SUBCUTANEOUS at 14:09

## 2020-09-15 RX ADMIN — LEVOTHYROXINE SODIUM 50 MCG: 50 TABLET ORAL at 14:08

## 2020-09-15 RX ADMIN — ASPIRIN 81 MG: 81 TABLET, CHEWABLE ORAL at 14:08

## 2020-09-15 RX ADMIN — PREGABALIN 150 MG: 75 CAPSULE ORAL at 23:39

## 2020-09-15 RX ADMIN — FENTANYL 1 PATCH: 25 PATCH TRANSDERMAL at 14:12

## 2020-09-15 RX ADMIN — TAZOBACTAM SODIUM AND PIPERACILLIN SODIUM 3.38 G: 375; 3 INJECTION, SOLUTION INTRAVENOUS at 15:38

## 2020-09-15 RX ADMIN — PANTOPRAZOLE SODIUM 40 MG: 40 TABLET, DELAYED RELEASE ORAL at 14:08

## 2020-09-15 RX ADMIN — QUETIAPINE FUMARATE 50 MG: 50 TABLET, FILM COATED ORAL at 23:39

## 2020-09-15 RX ADMIN — PREGABALIN 150 MG: 75 CAPSULE ORAL at 14:11

## 2020-09-15 RX ADMIN — MORPHINE SULFATE 2 MG: 2 INJECTION, SOLUTION INTRAMUSCULAR; INTRAVENOUS at 15:34

## 2020-09-15 RX ADMIN — BUSPIRONE HYDROCHLORIDE 30 MG: 15 TABLET ORAL at 23:39

## 2020-09-15 RX ADMIN — SODIUM CHLORIDE 75 ML/HR: 9 INJECTION, SOLUTION INTRAVENOUS at 14:00

## 2020-09-15 RX ADMIN — MORPHINE SULFATE 2 MG: 2 INJECTION, SOLUTION INTRAMUSCULAR; INTRAVENOUS at 21:40

## 2020-09-15 RX ADMIN — Medication 1 CAPSULE: at 14:08

## 2020-09-15 RX ADMIN — IPRATROPIUM BROMIDE AND ALBUTEROL SULFATE 3 ML: 2.5; .5 SOLUTION RESPIRATORY (INHALATION) at 20:45

## 2020-09-15 NOTE — H&P
Patient Name:  Rose Cheema  YOB: 1940  MRN:  [id number]  Admit Date:  9/14/2020  Patient Care Team:  Cheng Guevara MD as PCP - General (Internal Medicine)      Subjective   History Present Illness     Chief Complaint   Patient presents with   • Vomiting   • Nausea     History of Present Illness   Ms. Cheema is a 79-year-old female with history of hypertension, COPD, lower extremity edema, diastolic dysfunction, fibromyalgia, hypothyroidism, opiate dependency, COPD, obesity, CKD stage III who presents to the emergency room with nausea, vomiting, abdominal pain.  Patient states she started having some nausea and abdominal pain about 24 hours ago, has gradually worsened.  She describes the pain is epigastric and left upper quadrant stabbing pain that is intermittent during the day.  She is also had about 4 similar episodes over the last month that have resolved on their own, she has not sought treatment scheduled for now.  She does have a history of small bowel obstruction in the past.  She does states she has irritable bowel syndrome with chronic diarrhea and has had no change in her bowel pattern, denies any blood in her stool, she has had some mild dysuria, but no fever or other urinary tract symptoms.  She denies any fever at home, no cough, no shortness of breath, no exposure to COVID-19 that she is aware of.  Today she did have some nausea and had a small amount of vomiting, but today is the first time she has had the vomiting, she has had associated nausea with the other episodes in the past month.  In the emergency room patient troponin was negative, glucose 135, sodium 141, creatinine 1.02, BUN 15, lipase 19, white blood cell count 8.110, hemoglobin 13.8, hematocrit 43.9.  Urinalysis shows positive nitrites, large amount of leukocytes, too numerous to count white blood cells, 4+ bacteria, 0-2 squamous epithelial cells, urine culture is pending.  CT scan of her abdomen shows dilated  small bowel with other segments of decompressed small bowel, findings are consistent with small bowel obstruction.  Status post cholecystectomy and hysterectomy, there is also an 11 mm nonobstructing right renal stone.  EKG showed normal sinus rhythm.  COVID-19 testing is pending for admission, patient is low suspicion.    Review of Systems   Constitutional: Positive for appetite change. Negative for activity change, chills (poor and nausea), fatigue and fever.   HENT: Negative for nosebleeds and trouble swallowing.    Eyes: Negative for photophobia, redness and visual disturbance.   Respiratory: Negative for cough, chest tightness, shortness of breath and wheezing.    Cardiovascular: Negative for chest pain, palpitations and leg swelling.   Gastrointestinal: Positive for abdominal distention, abdominal pain (epigastric pain for about 24 hours, with sharp left side pains.  she has also had 4 other episodes this month), nausea and vomiting (small amount of vomit today).        She has chronic diarrhea and has seen no change in her bowel pattern   Endocrine: Negative.    Genitourinary: Negative.    Musculoskeletal: Negative for gait problem and joint swelling.   Skin: Negative.    Neurological: Positive for weakness (genralized, baseline). Negative for dizziness, seizures, speech difficulty, light-headedness and headaches.   Hematological: Negative.    Psychiatric/Behavioral: Negative for behavioral problems and confusion.        Personal History     Past Medical History:   Diagnosis Date   • Anemia    • Anxiety    • Arthritis    • CHF (congestive heart failure) (CMS/[facility])    • Coronary artery disease    • Depression    • Disease of thyroid gland    • Fibromyalgia    • GERD (gastroesophageal reflux disease)    • Hypertension    • Moderate tricuspid valve stenosis    • Osteoporosis    • Peripheral neuropathy    • Pulmonary hypertension (CMS/HCC)    • SBO (small bowel obstruction) (CMS/[facility])    • Stenosis of mitral valve       Past Surgical History:   Procedure Laterality Date   • BILATERAL OOPHORECTOMY     • CARDIAC CATHETERIZATION     • CHOLECYSTECTOMY     • ERCP N/A 2/15/2019    Procedure: ENDOSCOPIC RETROGRADE CHOLANGIOPANCREATOGRAPHY WITH PARTIAL CHOLANGIOGRAM;  Surgeon: Gerald Herr MD;  Location: [facility] ENDOSCOPY;  Service: Gastroenterology   • EXPLORATORY LAPAROTOMY     • GASTRIC BYPASS     • HERNIA REPAIR      inguinal and ventral   • HYSTERECTOMY     • OVARIAN CYST REMOVAL       History reviewed. No pertinent family history.  Social History     Tobacco Use   • Smoking status: Former Smoker   • Smokeless tobacco: Never Used   Substance Use Topics   • Alcohol use: No   • Drug use: No     No current facility-administered medications on file prior to encounter.      Current Outpatient Medications on File Prior to Encounter   Medication Sig Dispense Refill   • albuterol (PROVENTIL) (2.5 MG/3ML) 0.083% nebulizer solution Take 2.5 mg by nebulization 4 (Four) Times a Day As Needed for Wheezing. 375 vial 0   • aspirin 81 MG chewable tablet Chew 81 mg.     • busPIRone (BUSPAR) 30 MG tablet Take 30 mg by mouth 2 (Two) Times a Day.     • fentaNYL (DURAGESIC) 25 MCG/HR patch Place 1 patch on the skin as directed by provider Every 72 (Seventy-Two) Hours. 2 patch 0   • FLUoxetine (PROzac) 60 MG tablet Take 60 mg by mouth Daily.     • HYDROcodone-acetaminophen (NORCO) 7.5-325 MG per tablet Take 1 tablet by mouth Every 6 (Six) Hours As Needed for Severe Pain . 12 tablet 0   • levothyroxine (SYNTHROID, LEVOTHROID) 50 MCG tablet Take 50 mcg by mouth Daily.     • loperamide (IMODIUM) 2 MG capsule Take 1 capsule by mouth 4 (Four) Times a Day As Needed for Diarrhea.     • MYRBETRIQ 50 MG tablet sustained-release 24 hour 24 hr tablet TAKE ONE TABLET BY MOUTH DAILY 30 tablet 0   • oxybutynin XL (DITROPAN-XL) 10 MG 24 hr tablet Take 1 tablet by mouth Daily.     • pantoprazole (PROTONIX) 40 MG EC tablet Take 40 mg by mouth Daily.     •  potassium chloride (MICRO-K) 10 MEQ CR capsule Take 20 mEq by mouth Daily.     • pregabalin (LYRICA) 150 MG capsule Take 1 capsule by mouth 2 (Two) Times a Day. 6 capsule 0   • QUEtiapine (SEROquel) 50 MG tablet Take 50 mg by mouth Every Night.     • torsemide (DEMADEX) 100 MG tablet Take 50 mg by mouth Daily. Takes 50 mg daily and every other day takes a second 50 mg dose     • umeclidinium-vilanterol (ANORO ELLIPTA) 62.5-25 MCG/INH aerosol powder  inhaler Inhale 1 puff Daily.       Allergies   Allergen Reactions   • Aspartame Other (See Comments)     CAUSES MIGRAINES   • Cephalexin Rash     Tolerated piperacillin-tazobactam (Zosyn) and ampicillin-sulbactam (Unasyn) during Feb 2019 admission.       Objective    Objective     Vital Signs  Temp:  [98.2 °F (36.8 °C)] 98.2 °F (36.8 °C)  Heart Rate:  [70-76] 76  Resp:  [18] 18  BP: (115-136)/(55-92) 115/55  SpO2:  [96 %-99 %] 99 %  on  Flow (L/min):  [2] 2;   Device (Oxygen Therapy): nasal cannula  Body mass index is 51.24 kg/m².    Physical Exam  Vitals signs and nursing note reviewed.   Constitutional:       General: She is not in acute distress.     Appearance: She is obese. She is ill-appearing.   HENT:      Head: Normocephalic.   Neck:      Musculoskeletal: Normal range of motion.      Vascular: No JVD.   Cardiovascular:      Rate and Rhythm: Normal rate and regular rhythm.      Heart sounds: Normal heart sounds.      Comments: Normal sinus rhythm on the monitor with heart rate 72 during my exam, no acute distress.  She does have some bilateral lower extremity edema that is baseline  Pulmonary:      Effort: Pulmonary effort is normal.      Breath sounds: Normal breath sounds.      Comments: Patient is on 2 L of oxygen with sats 97% on room air, no acute distress.  Lung sounds clear.  Abdominal:      General: Bowel sounds are normal. There is no distension.      Palpations: Abdomen is soft.      Tenderness: There is abdominal tenderness in the epigastric area and  left upper quadrant. There is guarding. There is no rebound.   Musculoskeletal: Normal range of motion.   Skin:     General: Skin is warm and dry.      Capillary Refill: Capillary refill takes less than 2 seconds.      Comments: Bilateral lower extremity edema, patient states is baseline as well as some chronic color changes in bilateral lower leg redness, the patient states at baseline is not been any worse, she denies any drainage, no drainage seen at this time.   Neurological:      Mental Status: She is alert and oriented to person, place, and time.      Comments: No focal neuro deficits   Psychiatric:         Attention and Perception: Attention normal.         Mood and Affect: Mood normal.         Behavior: Behavior normal.         Thought Content: Thought content normal.         Cognition and Memory: Cognition normal.         Judgment: Judgment normal.         Results Review:  I reviewed the patient's new clinical results.  I reviewed the patient's new imaging results and agree with the interpretation.  I reviewed the patient's other test results and agree with the interpretation  I personally viewed and interpreted the patient's EKG/Telemetry data  Discussed with ED provider.    Lab Results (last 24 hours)     Procedure Component Value Units Date/Time    Urinalysis With Microscopic If Indicated (No Culture) - Urine, Catheter [[id number]]  (Abnormal) Collected: 09/14/20 2136    Specimen: Urine, Catheter Updated: 09/14/20 2154     Color, UA Yellow     Appearance, UA Cloudy     pH, UA <=5.0     Specific Gravity, UA 1.017     Glucose, UA Negative     Ketones, UA Negative     Bilirubin, UA Negative     Blood, UA Trace     Protein, UA Negative     Leuk Esterase, UA Large (3+)     Nitrite, UA Positive     Urobilinogen, UA 0.2 E.U./dL    Urinalysis, Microscopic Only - Urine, Catheter [[id number]]  (Abnormal) Collected: 09/14/20 2136    Specimen: Urine, Catheter Updated: 09/14/20 2154     RBC, UA 0-2 /HPF      WBC, UA  Too Numerous to Count /HPF      Bacteria, UA 4+ /HPF      Squamous Epithelial Cells, UA 0-2 /HPF      Hyaline Casts, UA 0-2 /LPF      Methodology Automated Microscopy    CBC & Differential [[id number]]  (Abnormal) Collected: 09/14/20 2211    Specimen: Blood Updated: 09/14/20 2227    Narrative:      The following orders were created for panel order CBC & Differential.  Procedure                               Abnormality         Status                     ---------                               -----------         ------                     CBC Auto Differential[[id number]]        Abnormal            Final result                 Please view results for these tests on the individual orders.    Comprehensive Metabolic Panel [[id number]]  (Abnormal) Collected: 09/14/20 2211    Specimen: Blood Updated: 09/14/20 2247     Glucose 135 mg/dL      BUN 15 mg/dL      Creatinine 1.02 mg/dL      Sodium 141 mmol/L      Potassium 4.5 mmol/L      Chloride 106 mmol/L      CO2 26.2 mmol/L      Calcium 8.8 mg/dL      Total Protein 7.0 g/dL      Albumin 4.20 g/dL      ALT (SGPT) 15 U/L      AST (SGOT) 18 U/L      Alkaline Phosphatase 140 U/L      Total Bilirubin 0.5 mg/dL      eGFR Non African Amer 53 mL/min/1.73      Globulin 2.8 gm/dL      A/G Ratio 1.5 g/dL      BUN/Creatinine Ratio 14.7     Anion Gap 8.8 mmol/L     Narrative:      GFR Normal >60  Chronic Kidney Disease <60  Kidney Failure <15      Lipase [[id number]]  (Normal) Collected: 09/14/20 2211    Specimen: Blood Updated: 09/14/20 2247     Lipase 19 U/L     Troponin [[id number]]  (Normal) Collected: 09/14/20 2211    Specimen: Blood Updated: 09/14/20 2247     Troponin T <0.010 ng/mL     Narrative:      Troponin T Reference Range:  <= 0.03 ng/mL-   Negative for AMI  >0.03 ng/mL-     Abnormal for myocardial necrosis.  Clinicians would have to utilize clinical acumen, EKG, Troponin and serial changes to determine if it is an Acute Myocardial Infarction or myocardial injury due to an  underlying chronic condition.       Results may be falsely decreased if patient taking Biotin.      CBC Auto Differential [[id number]]  (Abnormal) Collected: 09/14/20 2211    Specimen: Blood Updated: 09/14/20 2227     WBC 8.12 10*3/mm3      RBC 4.46 10*6/mm3      Hemoglobin 13.8 g/dL      Hematocrit 43.9 %      MCV 98.4 fL      MCH 30.9 pg      MCHC 31.4 g/dL      RDW 12.5 %      RDW-SD 45.5 fl      MPV 10.3 fL      Platelets 147 10*3/mm3      Neutrophil % 81.5 %      Lymphocyte % 9.9 %      Monocyte % 6.9 %      Eosinophil % 1.2 %      Basophil % 0.1 %      Immature Grans % 0.4 %      Neutrophils, Absolute 6.62 10*3/mm3      Lymphocytes, Absolute 0.80 10*3/mm3      Monocytes, Absolute 0.56 10*3/mm3      Eosinophils, Absolute 0.10 10*3/mm3      Basophils, Absolute 0.01 10*3/mm3      Immature Grans, Absolute 0.03 10*3/mm3      nRBC 0.0 /100 WBC     COVID PRE-OP / PRE-PROCEDURE SCREENING ORDER (NO ISOLATION) - Swab, Nasopharynx [[id number]] Collected: 09/15/20 0102    Specimen: Swab from Nasopharynx Updated: 09/15/20 0112    Narrative:      The following orders were created for panel order COVID PRE-OP / PRE-PROCEDURE SCREENING ORDER (NO ISOLATION) - Swab, Nasopharynx.  Procedure                               Abnormality         Status                     ---------                               -----------         ------                     Respiratory Panel PCR w/...[[id number]]                      In process                   Please view results for these tests on the individual orders.    Respiratory Panel PCR w/COVID-19(SARS-CoV-2) NIKKI/JOHN/GRIFFIN/PAD/[facility] In-House, NP Swab in UTM/VTM, 3-4 HR TAT - Swab, Nasopharynx [[id number]] Collected: 09/15/20 0102    Specimen: Swab from Nasopharynx Updated: 09/15/20 0112          Imaging Results (Last 24 Hours)     Procedure Component Value Units Date/Time    CT Abdomen Pelvis With Contrast [[id number]] Collected: 09/15/20 0027     Updated: 09/15/20 0028    Narrative:      CT OF  THE ABDOMEN AND PELVIS WITH CONTRAST 09/14/2020     HISTORY: Abdominal pain. Vomiting.     TECHNIQUE: Axial images were obtained from the lung bases to the  symphysis pubis after intravenous contrast. No oral contrast was given.     FINDINGS: There is some minimal linear probable scarring in the right  lung base also seen on the previous study of 05/15/2020.     Gallbladder has been removed. There is a mild compensatory biliary  dilatation. The liver, spleen and adrenals appear unremarkable. There is  an approximately 11 mm nonobstructing right renal stone. Left kidney  appears unremarkable. Pancreas is mildly atrophic.     There are surgical clips from previous gastric bypass procedure. There  are multiple segments of moderately severely dilated small bowel with  other segments of decompressed small bowel. Findings are consistent with  a small bowel obstruction. The colon appears decompressed. Uterus has  been removed. There is some increased attenuation in the anterior  subcutaneous tissues of the lower abdomen and pelvis, this may be from  previous hernia repair.       Impression:      1. Dilated small bowel with other segments of decompressed small bowel.  Findings are consistent with a small bowel obstruction.  2. Status post cholecystectomy and hysterectomy. Patient also appears to  have had a previous hernia repair.  3. An 11 mm nonobstructing right renal stone.     Radiation dose reduction techniques were utilized, including automated  exposure control and exposure modulation based on body size.                Results for orders placed during the hospital encounter of 05/14/20   Adult Transthoracic Echo Complete W/ Cont if Necessary Per Protocol    Narrative · Left ventricular systolic function is normal. Calculated EF = 53.0%.   Estimated EF was in agreement with the calculated EF. Estimated EF = 53%.   Normal left ventricular cavity size and wall thickness noted. Wall motion   assessment as below Left  ventricular diastolic function was indeterminate.  · The following segments are hypokinetic: apex.  · Severe MAC is present. Mild mitral valve regurgitation is present.  · Mild to moderate tricuspid valve regurgitation is present. Estimated   right ventricular systolic pressure from tricuspid regurgitation is mildly   elevated (35-45 mmHg). Calculated right ventricular systolic pressure from   tricuspid regurgitation is 43 mmHg.  · Right ventricular cavity is borderline dilated.          ECG 12 Lead   Preliminary Result   HEART RATE= 76  bpm   RR Interval= 824  ms   AL Interval= 178  ms   P Horizontal Axis= -5  deg   P Front Axis= 67  deg   QRSD Interval= 115  ms   QT Interval= 473  ms   QRS Axis= -29  deg   T Wave Axis= 87  deg   - ABNORMAL ECG -   Sinus rhythm   Atrial premature complex   Nonspecific intraventricular conduction delay   Low voltage, precordial leads   Prolonged QT interval   Electronically Signed By:    Date and Time of Study: 2020-09-14 20:29:53           Assessment/Plan     Active Hospital Problems    Diagnosis POA   • **SBO (small bowel obstruction) (CMS/[facility]) [K56.609] Yes   • Nausea & vomiting [R11.2] Unknown   • CKD (chronic kidney disease) stage 3, GFR 30-59 ml/min (CMS/[facility]) [N18.3] Yes   • Opioid dependence (CMS/[facility]) [F11.20] Yes   • Hypothyroidism [E03.9] Yes   • Chronic pain syndrome [G89.4] Yes   • Acute UTI ([facility]) [N39.0] Unknown   • Fibromyalgia [M79.7] Yes   • Hx SBO [Z87.19] Not Applicable   • Bilateral lower extremity edema (chronic) [R60.0] Yes   • CAD (coronary artery disease), native coronary artery [I25.10] Yes   • Diastolic dysfunction [I51.89] Yes   • Benign essential hypertension [I10] Yes   • Obstructive sleep apnea [G47.33] Yes   • Chronic obstructive pulmonary disease with acute exacerbation (CMS/[facility]) [J44.1] Yes   • Class 3 severe obesity with serious comorbidity and body mass index (BMI) of 50.0 to 59.9 in adult (CMS/[facility]) [E66.01, Z68.43] Not Applicable     Ms. Cheema  is a 79-year-old female with history of hypertension, COPD, lower extremity edema, diastolic dysfunction, fibromyalgia, hypothyroidism, opiate dependency, COPD, obesity, CKD stage III who presents to the emergency room with nausea, vomiting, abdominal pain.     Small bowel obstruction/nausea vomiting  -N.p.o., may have some medications with sips of water  -Consult general surgery  -IV fluids at 75 cc an hour, monitor fluid volume closely given patient's history of diastolic dysfunction and lower extremity edema.  -Nausea control overnight    Urinary tract infection  -Urine cultures pending  -Patient given 1 dose of Macrobid in the emergency room, will start Rocephin IV while waiting culture results, given her n.p.o. status.    COPD/obstructive sleep apnea  -Oxygen to keep sats above 92%  -Telemetry unit for continuous pulse ox    Hypothyroidism/fibromyalgia/CAD/hypertension  -Chronic conditions stable at this time  -We will start some p.o. medications when med rec complete, will hold some medications given patient's n.p.o. status  -Telemetry unit for monitoring        · I discussed the patient's findings and my recommendations with patient and ED provider.    VTE Prophylaxis - SCDs.  Code Status - Full code.       NELL Patel  [facility]  09/15/20  01:31 EDT

## 2020-09-15 NOTE — ED PROVIDER NOTES
I have supervised the care provided by the midlevel provider.    We have discussed this patient's history, physical exam, and treatment plan.   I have reviewed the note and have personally examined the patient and agree with the plan of care.  See attached attending note.  My personal findings are below:    Patient complains of nausea, vomiting, diarrhea, and epigastric pain that began 24 hours ago.  Pain is sharp.  She denies fever, chills, chest pain, shortness of breath, or dysuria.  Patient has had several similar episodes in the past month.    On exam: Mucous membranes.  Heart is regular.  Lungs are clear.  Abdomen is obese.  There is mild epigastric tenderness without rebound or guarding.    Plan is to obtain labs, CT abdomen/pelvis and administer IV fluids and IV medications.    EKG          EKG time: 2029  Rhythm/Rate: Sinus rhythm with PAC, rate 76  P waves and [place]: Normal  QRS, axis: IVCD, LAD  ST and T waves: Nonspecific ST changes laterally  Prolonged QTI    Interpreted Contemporaneously by me, independently viewed  EKG is changed compared to prior EKG done 5/14/2020.  EDELMIRA campbell was present at that time    12:15 AM: Results of the CT abdomen/pelvis discussed with Dr. powell (radiologist).  Images independently viewed by me.  There is a small bowel obstruction.  Patient will be admitted.     Senthil Young MD  09/15/20 0018

## 2020-09-15 NOTE — PROGRESS NOTES
Pharmacy consult to dose Zosyn    Day: 1  Consult For: Dr ANA Villanueva  Treating: UTI  Duration: 5 days    Rose Cheema is a 79 y.o. female 119 kg (262 lb 5.6 oz).     Anti-Infectives (From admission, onward)      Ordered     Dose/Rate Route Frequency Start Stop    09/15/20 0811  piperacillin-tazobactam (ZOSYN) 3.375 g in iso-osmotic dextrose 50 ml (premix)     Ordering Provider: Mark Anthony Villanueva MD    3.375 g  over 4 Hours Intravenous Every 8 Hours 09/15/20 1400 09/20/20 1359    09/15/20 0811  piperacillin-tazobactam (ZOSYN) 3.375 g in iso-osmotic dextrose 50 ml (premix)     Ordering Provider: Mark Anthony Villanueva MD    3.375 g  over 30 Minutes Intravenous Once 09/15/20 0813      09/15/20 0749  Pharmacy to Dose Zosyn     Ordering Provider: Mark Anthony Villanueva MD     Does not apply Continuous PRN 09/15/20 0747 09/20/20 0746    09/14/20 2221  nitrofurantoin (macrocrystal-monohydrate) (MACROBID) capsule 100 mg     Ordering Provider: Robert Fox III, PA    100 mg Oral Once 09/14/20 2220 09/15/20 0058             Estimated Creatinine Clearance: 58 mL/min (by C-G formula based on SCr of 0.96 mg/dL).  Creatinine   Date Value Ref Range Status   09/15/2020 0.96 0.57 - 1.00 mg/dL Final   09/14/2020 1.02 (H) 0.57 - 1.00 mg/dL Final     WBC   Date Value Ref Range Status   09/15/2020 5.36 3.40 - 10.80 10*3/mm3 Final   09/14/2020 8.12 3.40 - 10.80 10*3/mm3 Final     Temp Readings from Last 2 Encounters:   09/14/20 98.2 °F (36.8 °C) (Tympanic)   05/19/20 97.3 °F (36.3 °C) (Oral)       Baseline cultures:  9/15 UC pending  9/15 RVP neg, COVID, not detected      PLAN:  Treating UTI, UC pending, CrCl= 58ml/min    Will being Zosyn 3.375gm IV q 8hrs EI    Will DC Pharmacy Consult to Dose Zosyn, and will continue to follow renally for adjustments in dosage regimen.    Heydi Low, PharmD, BCACP  09/15/20 08:15 EDT

## 2020-09-15 NOTE — CONSULTS
Inpatient General Surgery Consult  Consult performed by: Fernando Zapata Jr., MD  Consult ordered by: Sarah Tadeo APRN          Patient Care Team:  Cheng Guevara MD as PCP - General (Internal Medicine)    Chief complaint: Abdominal pain with nausea and vomiting    Subjective     History of Present Illness     The patient is a very pleasant 79-year-old female with multiple medical problems who is a relatively poor historian.  She has had a previous gastric bypass, hysterectomy, ventral hernia repair followed by removal of infected mesh who has had problems with recurrent small bowel obstruction.  She was seen in February of last year with a small bowel obstruction that resolved with conservative management.    She has had intermittent abdominal pain with nausea and minimal vomiting over the past several weeks.  She also had chronic diarrhea over that same period of time.  Over the past 2 days or so she has had worse abdominal pain with increased nausea and vomiting and decreased diarrhea.  She has been constipated.  A CT scan of the abdomen and pelvis was performed in the emergency room that shows dilated small bowel with decompressed distal small bowel suggesting a small bowel obstruction.    She has not had an NG tube in place and is not having any nausea or vomiting at this time.  She has not passed flatus or had a bowel movement.    Review of Systems   Constitutional: Negative for fatigue and fever.   Respiratory: Negative for chest tightness and shortness of breath.    Cardiovascular: Negative for chest pain and palpitations.   Gastrointestinal: Positive for abdominal pain, nausea and vomiting. Negative for blood in stool, constipation and diarrhea.        Past Medical History:   Diagnosis Date   • Anemia    • Anxiety    • Arthritis    • CHF (congestive heart failure) (CMS/[facility])    • Coronary artery disease    • Depression    • Disease of thyroid gland    • Fibromyalgia    • GERD (gastroesophageal  reflux disease)    • Hypertension    • Moderate tricuspid valve stenosis    • Osteoporosis    • Peripheral neuropathy    • Pulmonary hypertension (CMS/HCC)    • SBO (small bowel obstruction) (CMS/HCC)    • Stenosis of mitral valve    ,   Past Surgical History:   Procedure Laterality Date   • BILATERAL OOPHORECTOMY     • CARDIAC CATHETERIZATION     • CHOLECYSTECTOMY     • ERCP N/A 2/15/2019    Procedure: ENDOSCOPIC RETROGRADE CHOLANGIOPANCREATOGRAPHY WITH PARTIAL CHOLANGIOGRAM;  Surgeon: Gerald Herr MD;  Location: [facility] ENDOSCOPY;  Service: Gastroenterology   • EXPLORATORY LAPAROTOMY     • GASTRIC BYPASS     • HERNIA REPAIR      inguinal and ventral   • HYSTERECTOMY     • OVARIAN CYST REMOVAL     , History reviewed. No pertinent family history.,   Social History     Tobacco Use   • Smoking status: Former Smoker   • Smokeless tobacco: Never Used   Substance Use Topics   • Alcohol use: No   • Drug use: No     E-cigarette/Vaping     E-cigarette/Vaping Substances     E-cigarette/Vaping Devices        and Allergies:  Aspartame and Cephalexin    Objective      Vital Signs  Temp:  [98.2 °F (36.8 °C)-99.7 °F (37.6 °C)] 99.7 °F (37.6 °C)  Heart Rate:  [62-83] 83  Resp:  [18-20] 20  BP: (104-141)/(48-92) 112/60    Physical Exam  Constitutional:       Appearance: She is not toxic-appearing.   Eyes:      General: No scleral icterus.  Pulmonary:      Effort: Pulmonary effort is normal. No respiratory distress.   Abdominal:      General: Bowel sounds are decreased. There is distension.      Palpations: Abdomen is soft.      Tenderness: There is no abdominal tenderness.   Skin:     General: Skin is warm and dry.   Neurological:      Mental Status: She is alert and oriented to person, place, and time.   Psychiatric:         Mood and Affect: Mood normal.         Results Review:    I reviewed the patient's new clinical results.        Assessment/Plan       SBO (small bowel obstruction) (CMS/HCC)    Benign essential  hypertension    Bilateral lower extremity edema (chronic)    CAD (coronary artery disease), native coronary artery    Chronic obstructive pulmonary disease with acute exacerbation (CMS/[facility])    Diastolic dysfunction    Class 3 severe obesity with serious comorbidity and body mass index (BMI) of 50.0 to 59.9 in adult (CMS/[facility])    Obstructive sleep apnea    Fibromyalgia    Hx SBO    Hypothyroidism    Chronic pain syndrome    Acute UTI ([facility])    Opioid dependence (CMS/[facility])    CKD (chronic kidney disease) stage 3, GFR 30-59 ml/min (CMS/[facility])    Nausea & vomiting      Assessment & Plan     1.  Small bowel obstruction: The patient has suggestion of a small bowel obstruction based on the CT scan of the abdomen and pelvis.  She is not having any nausea or vomiting at this time and should not need an NG tube placed yet.  We will follow her conservatively and make decisions based upon her clinical course.    I discussed the patients findings and my recommendations with patient    Fernando Zapata Jr., MD  09/15/20  17:35 EDT

## 2020-09-15 NOTE — PROGRESS NOTES
Clinical Pharmacy Services: Medication History    Rose Cheema is a 79 y.o. female presenting to [facility] for Small bowel obstruction (CMS/HCC) [K56.069]    She  has a past medical history of Anemia, Anxiety, Arthritis, CHF (congestive heart failure) (CMS/HCC), Coronary artery disease, Depression, Disease of thyroid gland, Fibromyalgia, GERD (gastroesophageal reflux disease), Hypertension, Moderate tricuspid valve stenosis, Osteoporosis, Peripheral neuropathy, Pulmonary hypertension (CMS/HCC), SBO (small bowel obstruction) (CMS/[facility]), and Stenosis of mitral valve.    Allergies as of 09/14/2020 - Reviewed 09/14/2020   Allergen Reaction Noted    Aspartame Other (See Comments) 02/14/2014    Cephalexin Rash 02/13/2014       Medication information was obtained from: patient, pharmacy and [facility]  Pharmacy and Phone Number: [phone]    Prior to Admission Medications       Prescriptions Last Dose Informant Patient Reported? Taking?    albuterol (PROVENTIL) (2.5 MG/3ML) 0.083% nebulizer solution 9/14/2020 Self No Yes    Take 2.5 mg by nebulization 4 (Four) Times a Day As Needed for Wheezing.    aspirin 81 MG chewable tablet 9/14/2020 Other Yes Yes    Chew 81 mg.    busPIRone (BUSPAR) 30 MG tablet 9/14/2020 Other Yes Yes    Take 30 mg by mouth 2 (Two) Times a Day.    calcium carbonate-cholecalciferol 500-400 MG-UNIT tablet tablet  Self Yes Yes    Take 1 tablet by mouth Daily.    FLUoxetine (PROzac) 60 MG tablet 9/14/2020 Other Yes Yes    Take 60 mg by mouth Daily.    HYDROcodone-acetaminophen (NORCO) 7.5-325 MG per tablet 9/14/2020 [facility] No Yes    Take 1 tablet by mouth Every 6 (Six) Hours As Needed for Severe Pain .    levothyroxine (SYNTHROID, LEVOTHROID) 50 MCG tablet 9/14/2020 Other Yes Yes    Take 50 mcg by mouth Daily.    loperamide (IMODIUM) 2 MG capsule Past Week Self No Yes    Take 1 capsule by mouth 4 (Four) Times a Day As Needed for Diarrhea.    Multiple Vitamins-Minerals (EYE VITAMINS PO)   Self Yes Yes    Take 1 each by mouth.    MYRBETRIQ 50 MG tablet sustained-release 24 hour 24 hr tablet 9/14/2020 Pharmacy No Yes    TAKE ONE TABLET BY MOUTH DAILY    pantoprazole (PROTONIX) 40 MG EC tablet 9/14/2020 Other Yes Yes    Take 40 mg by mouth Daily.    potassium chloride (MICRO-K) 10 MEQ CR capsule 9/14/2020 Self Yes Yes    Take 30 mEq by mouth Daily.    Probiotic Product (ALIGN PO)  Self Yes Yes    Take 2 each by mouth Daily.    torsemide (DEMADEX) 100 MG tablet Past Week Other Yes Yes    Take 50 mg by mouth Daily. Takes 50 mg daily and every other day takes a second 50 mg dose    umeclidinium-vilanterol (ANORO ELLIPTA) 62.5-25 MCG/INH aerosol powder  inhaler 9/14/2020 Self No Yes    Inhale 1 puff Daily.    fentaNYL (DURAGESIC) 25 MCG/HR patch 9/14/2020 [facility] No No    Place 1 patch on the skin as directed by provider Every 72 (Seventy-Two) Hours.    oxybutynin XL (DITROPAN-XL) 10 MG 24 hr tablet  Self No No    Take 1 tablet by mouth Daily.    pregabalin (LYRICA) 150 MG capsule  [facility] No No    Take 1 capsule by mouth 2 (Two) Times a Day.    QUEtiapine (SEROquel) 50 MG tablet 9/13/2020 Other Yes No    Take 50 mg by mouth Every Night.              Medication notes: Added Align, multivitamins and calclium+ D per patient. She no longer takes Oxybutynin, confirmed with pharmacy. Narcotics are confirmed via [facility] report (Request # : [id number]).    This medication list is complete to the best of my knowledge as of 9/15/2020    Please call if questions.    Miguel Gautam, [facility] #[id number]  9/15/2020 10:50 EDT

## 2020-09-15 NOTE — ED PROVIDER NOTES
EMERGENCY DEPARTMENT ENCOUNTER    Room Number:  03/03  Date of encounter:  9/15/2020  PCP: Cheng Guevara MD  Historian: Patient      HPI:  Chief Complaint: Abdominal pain nausea and vomiting  A complete HPI/ROS/PMH/PSH/SH/FH are unobtainable due to: Nothing    Context: Rose Cheema is a 79 y.o. female who presents to the ED c/o abdominal pain, nausea, vomiting that began approximately 24 hours ago.  Patient states this is however the fourth time she has had an episode like this in the past month.  She describes the pain as a sharp 8 out of 10 pain located in the epigastric region of the abdomen and nonradiating.  She denies abnormal bowel movements but states there has been some mild nonbloody vomiting as well as nausea loss of appetite.  She denies associated chest pain, shortness of breath, fever, chills at this time.  There are no other issues to report.    A brief review the patient's medical record shows that she has a history of small bowel obstruction, essential hypertension, pulmonary hypertension, coronary artery disease, COPD, and is morbidly obese.  Patient was last admitted to this hospital May 14, 2024 COPD with acute exacerbation, hypoxemia, acute respiratory distress, fever chills.      PAST MEDICAL HISTORY  Active Ambulatory Problems     Diagnosis Date Noted   • Urinary incontinence 08/24/2017   • Urinary frequency 08/30/2017   • Generalized abdominal pain 02/12/2019   • Pulmonary hypertension  02/12/2019   • Benign essential hypertension 09/22/2014   • Bilateral lower extremity edema (chronic) 05/17/2018   • CAD (coronary artery disease), native coronary artery 11/02/2015   • Chronic obstructive pulmonary disease with acute exacerbation (CMS/[facility]) 09/22/2014   • Diastolic dysfunction 12/22/2014   • Class 3 severe obesity with serious comorbidity and body mass index (BMI) of 50.0 to 59.9 in adult (CMS/[facility]) 09/22/2014   • Obstructive sleep apnea 09/22/2014   • Secondary pulmonary arterial  hypertension (CMS/[facility]) 09/22/2014   • H/O: hysterectomy 09/22/2014   • Fibromyalgia 02/12/2019   • Hx SBO 02/12/2019   • Hx of cholecystectomy 02/13/2019   • Hypoglycemia 02/13/2019   • Fall 02/13/2019   • Hypothyroidism 02/13/2019   • Chronic pain syndrome 02/13/2019   • Acute UTI (Proteus) 02/13/2019   • Anemia 02/13/2019   • Pneumonia of right lower lobe due to infectious organism 04/10/2019   • Acute UTI 05/07/2019   • Acute on chronic diastolic (congestive) heart failure (CMS/HCC) 05/08/2019   • Opioid dependence (CMS/HCC) 05/08/2019   • Venous stasis dermatitis of both lower extremities 05/08/2019   • Gram-negative bacteremia 05/08/2019   • Hypokalemia 05/09/2019   • E. coli bacteremia 05/09/2019   • Anemia of chronic disease 05/10/2019   • Sepsis without acute organ dysfunction - present on admit 05/14/2020   • CKD (chronic kidney disease) stage 3, GFR 30-59 ml/min (CMS/HCC) 05/14/2020   • Bacteremia 05/15/2020   • Enterococcal septicemia (CMS/HCC) 05/17/2020   • Asymptomatic bacteriuria 05/17/2020   • Small bowel obstruction (CMS/HCC) 09/15/2020     Resolved Ambulatory Problems     Diagnosis Date Noted   • No Resolved Ambulatory Problems     Past Medical History:   Diagnosis Date   • Anxiety    • Arthritis    • CHF (congestive heart failure) (CMS/[facility])    • Coronary artery disease    • Depression    • Disease of thyroid gland    • GERD (gastroesophageal reflux disease)    • Hypertension    • Moderate tricuspid valve stenosis    • Osteoporosis    • Peripheral neuropathy    • SBO (small bowel obstruction) (CMS/HCC)    • Stenosis of mitral valve          PAST SURGICAL HISTORY  Past Surgical History:   Procedure Laterality Date   • BILATERAL OOPHORECTOMY     • CARDIAC CATHETERIZATION     • CHOLECYSTECTOMY     • ERCP N/A 2/15/2019    Procedure: ENDOSCOPIC RETROGRADE CHOLANGIOPANCREATOGRAPHY WITH PARTIAL CHOLANGIOGRAM;  Surgeon: Gerald Herr MD;  Location: [facility] ENDOSCOPY;  Service: Gastroenterology   •  EXPLORATORY LAPAROTOMY     • GASTRIC BYPASS     • HERNIA REPAIR      inguinal and ventral   • HYSTERECTOMY     • OVARIAN CYST REMOVAL           FAMILY HISTORY  History reviewed. No pertinent family history.      SOCIAL HISTORY  Social History     Socioeconomic History   • Marital status:      Spouse name: Not on file   • Number of children: Not on file   • Years of education: Not on file   • Highest education level: Not on file   Tobacco Use   • Smoking status: Former Smoker   • Smokeless tobacco: Never Used   Substance and Sexual Activity   • Alcohol use: No   • Drug use: No   • Sexual activity: Never         ALLERGIES  Aspartame and Cephalexin        REVIEW OF SYSTEMS  Review of Systems   Constitutional: Negative for chills and fever.   HENT: Negative.    Eyes: Negative.    Respiratory: Negative for cough and shortness of breath.    Cardiovascular: Negative for chest pain and palpitations.   Gastrointestinal: Positive for abdominal pain, nausea and vomiting. Negative for blood in stool and diarrhea.   Genitourinary: Negative for dysuria, flank pain and hematuria.   Musculoskeletal: Negative.    Skin: Negative.    Neurological: Negative for dizziness, light-headedness and headaches.   Psychiatric/Behavioral: Negative.         All systems reviewed and negative except for those discussed in HPI.       PHYSICAL EXAM    I have reviewed the triage vital signs and nursing notes.    ED Triage Vitals [09/14/20 1953]   Temp Heart Rate Resp BP SpO2   98.2 °F (36.8 °C) 70 18 136/92 96 %      Temp src Heart Rate Source Patient Position BP Location FiO2 (%)   Tympanic Monitor Sitting Right arm --       Physical Exam  GENERAL: Chronically ill-appearing, obese, appears to be uncomfortable  HENT: nares patent, NCAT, mucous membranes questionably dry  EYES: no scleral icterus, conjunctiva not pale  CV: regular rhythm, regular rate, heart sounds normal  RESPIRATORY: normal effort, lungs cta b  ABDOMEN: TTP diffusely across  lower abdomen, no guarding, no rebound, no obvious intra-abdominal mass.  MUSCULOSKELETAL: no deformity  NEURO: alert, moves all extremities, follows commands  SKIN: warm, dry        LAB RESULTS  Recent Results (from the past 24 hour(s))   Urinalysis With Microscopic If Indicated (No Culture) - Urine, Catheter    Collection Time: 09/14/20  9:36 PM    Specimen: Urine, Catheter   Result Value Ref Range    Color, UA Yellow Yellow, Straw    Appearance, UA Cloudy (A) Clear    pH, UA <=5.0 5.0 - 8.0    Specific Gravity, UA 1.017 1.005 - 1.030    Glucose, UA Negative Negative    Ketones, UA Negative Negative    Bilirubin, UA Negative Negative    Blood, UA Trace (A) Negative    Protein, UA Negative Negative    Leuk Esterase, UA Large (3+) (A) Negative    Nitrite, UA Positive (A) Negative    Urobilinogen, UA 0.2 E.U./dL 0.2 - 1.0 E.U./dL   Urinalysis, Microscopic Only - Urine, Catheter    Collection Time: 09/14/20  9:36 PM    Specimen: Urine, Catheter   Result Value Ref Range    RBC, UA 0-2 None Seen, 0-2 /HPF    WBC, UA Too Numerous to Count (A) None Seen, 0-2 /HPF    Bacteria, UA 4+ (A) None Seen /HPF    Squamous Epithelial Cells, UA 0-2 None Seen, 0-2 /HPF    Hyaline Casts, UA 0-2 None Seen /LPF    Methodology Automated Microscopy    Comprehensive Metabolic Panel    Collection Time: 09/14/20 10:11 PM    Specimen: Blood   Result Value Ref Range    Glucose 135 (H) 65 - 99 mg/dL    BUN 15 8 - 23 mg/dL    Creatinine 1.02 (H) 0.57 - 1.00 mg/dL    Sodium 141 136 - 145 mmol/L    Potassium 4.5 3.5 - 5.2 mmol/L    Chloride 106 98 - 107 mmol/L    CO2 26.2 22.0 - 29.0 mmol/L    Calcium 8.8 8.6 - 10.5 mg/dL    Total Protein 7.0 6.0 - 8.5 g/dL    Albumin 4.20 3.50 - 5.20 g/dL    ALT (SGPT) 15 1 - 33 U/L    AST (SGOT) 18 1 - 32 U/L    Alkaline Phosphatase 140 (H) 39 - 117 U/L    Total Bilirubin 0.5 0.0 - 1.2 mg/dL    eGFR Non African Amer 53 (L) >60 mL/min/1.73    Globulin 2.8 gm/dL    A/G Ratio 1.5 g/dL    BUN/Creatinine Ratio 14.7  7.0 - 25.0    Anion Gap 8.8 5.0 - 15.0 mmol/L   Lipase    Collection Time: 09/14/20 10:11 PM    Specimen: Blood   Result Value Ref Range    Lipase 19 13 - 60 U/L   Troponin    Collection Time: 09/14/20 10:11 PM    Specimen: Blood   Result Value Ref Range    Troponin T <0.010 0.000 - 0.030 ng/mL   CBC Auto Differential    Collection Time: 09/14/20 10:11 PM    Specimen: Blood   Result Value Ref Range    WBC 8.12 3.40 - 10.80 10*3/mm3    RBC 4.46 3.77 - 5.28 10*6/mm3    Hemoglobin 13.8 12.0 - 15.9 g/dL    Hematocrit 43.9 34.0 - 46.6 %    MCV 98.4 (H) 79.0 - 97.0 fL    MCH 30.9 26.6 - 33.0 pg    MCHC 31.4 (L) 31.5 - 35.7 g/dL    RDW 12.5 12.3 - 15.4 %    RDW-SD 45.5 37.0 - 54.0 fl    MPV 10.3 6.0 - 12.0 fL    Platelets 147 140 - 450 10*3/mm3    Neutrophil % 81.5 (H) 42.7 - 76.0 %    Lymphocyte % 9.9 (L) 19.6 - 45.3 %    Monocyte % 6.9 5.0 - 12.0 %    Eosinophil % 1.2 0.3 - 6.2 %    Basophil % 0.1 0.0 - 1.5 %    Immature Grans % 0.4 0.0 - 0.5 %    Neutrophils, Absolute 6.62 1.70 - 7.00 10*3/mm3    Lymphocytes, Absolute 0.80 0.70 - 3.10 10*3/mm3    Monocytes, Absolute 0.56 0.10 - 0.90 10*3/mm3    Eosinophils, Absolute 0.10 0.00 - 0.40 10*3/mm3    Basophils, Absolute 0.01 0.00 - 0.20 10*3/mm3    Immature Grans, Absolute 0.03 0.00 - 0.05 10*3/mm3    nRBC 0.0 0.0 - 0.2 /100 WBC   Respiratory Panel PCR w/COVID-19(SARS-CoV-2) NIKKI/JOHN/GRIFFIN/PAD/COR/MAD In-House, NP Swab in [facility], 3-4 HR TAT - Swab, Nasopharynx    Collection Time: 09/15/20  1:02 AM    Specimen: Nasopharynx; Swab   Result Value Ref Range    ADENOVIRUS, PCR Not Detected Not Detected    Coronavirus 229E Not Detected Not Detected    Coronavirus HKU1 Not Detected Not Detected    Coronavirus NL63 Not Detected Not Detected    Coronavirus OC43 Not Detected Not Detected    COVID19 Not Detected Not Detected - Ref. Range    Human Metapneumovirus Not Detected Not Detected    Human Rhinovirus/Enterovirus Not Detected Not Detected    Influenza A PCR Not Detected Not Detected     Influenza A H1 Not Detected Not Detected    Influenza A H1 2009 PCR Not Detected Not Detected    Influenza A H3 Not Detected Not Detected    Influenza B PCR Not Detected Not Detected    Parainfluenza Virus 1 Not Detected Not Detected    Parainfluenza Virus 2 Not Detected Not Detected    Parainfluenza Virus 3 Not Detected Not Detected    Parainfluenza Virus 4 Not Detected Not Detected    RSV, PCR Not Detected Not Detected    Bordetella pertussis pcr Not Detected Not Detected    Bordetella parapertussis PCR Not Detected Not Detected    Chlamydophila pneumoniae PCR Not Detected Not Detected    Mycoplasma pneumo by PCR Not Detected Not Detected   Basic Metabolic Panel    Collection Time: 09/15/20  6:03 AM    Specimen: Blood   Result Value Ref Range    Glucose 98 65 - 99 mg/dL    BUN 16 8 - 23 mg/dL    Creatinine 0.96 0.57 - 1.00 mg/dL    Sodium 141 136 - 145 mmol/L    Potassium 4.4 3.5 - 5.2 mmol/L    Chloride 108 (H) 98 - 107 mmol/L    CO2 26.9 22.0 - 29.0 mmol/L    Calcium 7.9 (L) 8.6 - 10.5 mg/dL    eGFR Non African Amer 56 (L) >60 mL/min/1.73    BUN/Creatinine Ratio 16.7 7.0 - 25.0    Anion Gap 6.1 5.0 - 15.0 mmol/L   CBC Auto Differential    Collection Time: 09/15/20  6:03 AM    Specimen: Blood   Result Value Ref Range    WBC 5.36 3.40 - 10.80 10*3/mm3    RBC 3.80 3.77 - 5.28 10*6/mm3    Hemoglobin 11.9 (L) 12.0 - 15.9 g/dL    Hematocrit 36.4 34.0 - 46.6 %    MCV 95.8 79.0 - 97.0 fL    MCH 31.3 26.6 - 33.0 pg    MCHC 32.7 31.5 - 35.7 g/dL    RDW 12.5 12.3 - 15.4 %    RDW-SD 44.4 37.0 - 54.0 fl    MPV 10.4 6.0 - 12.0 fL    Platelets 125 (L) 140 - 450 10*3/mm3    Neutrophil % 70.7 42.7 - 76.0 %    Lymphocyte % 16.2 (L) 19.6 - 45.3 %    Monocyte % 10.8 5.0 - 12.0 %    Eosinophil % 1.7 0.3 - 6.2 %    Basophil % 0.2 0.0 - 1.5 %    Immature Grans % 0.4 0.0 - 0.5 %    Neutrophils, Absolute 3.79 1.70 - 7.00 10*3/mm3    Lymphocytes, Absolute 0.87 0.70 - 3.10 10*3/mm3    Monocytes, Absolute 0.58 0.10 - 0.90 10*3/mm3     Eosinophils, Absolute 0.09 0.00 - 0.40 10*3/mm3    Basophils, Absolute 0.01 0.00 - 0.20 10*3/mm3    Immature Grans, Absolute 0.02 0.00 - 0.05 10*3/mm3    nRBC 0.0 0.0 - 0.2 /100 WBC       Ordered the above labs and independently reviewed the results.        RADIOLOGY  Ct Abdomen Pelvis With Contrast    Result Date: 9/15/2020  CT OF THE ABDOMEN AND PELVIS WITH CONTRAST 09/14/2020  HISTORY: Abdominal pain. Vomiting.  TECHNIQUE: Axial images were obtained from the lung bases to the symphysis pubis after intravenous contrast. No oral contrast was given.  FINDINGS: There is some minimal linear probable scarring in the right lung base also seen on the previous study of 05/15/2020.  Gallbladder has been removed. There is a mild compensatory biliary dilatation. The liver, spleen and adrenals appear unremarkable. There is an approximately 11 mm nonobstructing right renal stone. Left kidney appears unremarkable. Pancreas is mildly atrophic.  There are surgical clips from previous gastric bypass procedure. There are multiple segments of moderately severely dilated small bowel with other segments of decompressed small bowel. Findings are consistent with a small bowel obstruction. The colon appears decompressed. Uterus has been removed. There is some increased attenuation in the anterior subcutaneous tissues of the lower abdomen and pelvis, this may be from previous hernia repair.      1. Dilated small bowel with other segments of decompressed small bowel. Findings are consistent with a small bowel obstruction. 2. Status post cholecystectomy and hysterectomy. Patient also appears to have had a previous hernia repair. 3. An 11 mm nonobstructing right renal stone.  Radiation dose reduction techniques were utilized, including automated exposure control and exposure modulation based on body size.  This report was finalized on 9/15/2020 4:10 AM by Dr. Robert Bridges M.D.        I ordered the above noted radiological studies.  Reviewed by me and discussed with radiologist.  See dictation for official radiology interpretation.      PROCEDURES    Procedures      MEDICATIONS GIVEN IN ER    Medications   sodium chloride 0.9 % flush 10 mL (has no administration in time range)   sodium chloride 0.9 % flush 10 mL (has no administration in time range)   sodium chloride 0.9 % flush 10 mL (has no administration in time range)   nitroglycerin (NITROSTAT) SL tablet 0.4 mg (has no administration in time range)   sodium chloride 0.9 % infusion (has no administration in time range)   acetaminophen (TYLENOL) tablet 650 mg (has no administration in time range)     Or   acetaminophen (TYLENOL) 160 MG/5ML solution 650 mg (has no administration in time range)     Or   acetaminophen (TYLENOL) suppository 650 mg (has no administration in time range)   ondansetron (ZOFRAN) injection 4 mg (has no administration in time range)   cefTRIAXone (ROCEPHIN) IVPB 1 g (has no administration in time range)   sodium chloride 0.9 % bolus 500 mL (0 mL Intravenous Stopped 9/14/20 2346)   ondansetron (ZOFRAN) injection 4 mg (4 mg Intravenous Given 9/14/20 2029)   morphine injection 2 mg (2 mg Intravenous Given 9/14/20 2044)   morphine injection 2 mg (2 mg Intravenous Given 9/14/20 2228)   nitrofurantoin (macrocrystal-monohydrate) (MACROBID) capsule 100 mg (100 mg Oral Given 9/15/20 0058)   iopamidol (ISOVUE-300) 61 % injection 100 mL (100 mL Intravenous Given by Other 9/14/20 2317)         PROGRESS, DATA ANALYSIS, CONSULTS, AND MEDICAL DECISION MAKING    All labs have been independently reviewed by me.  All radiology studies have been reviewed by me and discussed with radiologist dictating the report.   EKG's independently viewed and interpreted by me.  Discussion below represents my analysis of pertinent findings related to patient's condition, differential diagnosis, treatment plan and final disposition.    DDX includes but is not limited to: Acute small bowel obstruction,  ileus, acute pancreatitis infectious/inflammatory colitis, diverticulitis, gastroenteritis, epiploic appendagitis, appendicitis.  CT abdomen pelvis with IV contrast confirms acute small bowel obstruction.  Lab work unremarkable outside questionable UTI.  Patient is not actively vomiting at this time and clinically and NG tube is not indicated.  Will make n.p.o. treat with IV fluids and call [facility] to discuss admission at this time.    ED Course as of Sep 15 0716   Tue Sep 15, 2020   0054 I viewed the patient's CT abdomen pelvis with IV contrast.  Findings appear consistent with a small bowel obstruction.  Will confirm this with the radiologist's official report.    [RC]   0710 WBC, UA(!): Too Numerous to Count [RC]   0710 Bacteria, UA(!): 4+ [RC]   0711 WBC: 5.36 [RC]   0711 RBC: 3.80 [RC]   0711 Hemoglobin(!): 11.9 [RC]   0711 Hematocrit: 36.4 [RC]   0711 Glucose(!): 135 [RC]   0711 BUN: 15 [RC]   0711 Creatinine(!): 1.02 [RC]   0711 Sodium: 141 [RC]   0711 Potassium: 4.5 [RC]   0711 CO2: 26.2 [RC]   0711 Anion Gap: 8.8 [RC]   0711 Lipase: 19 [RC]   0711 Troponin T: <0.010 [RC]      ED Course User Index  [RC] Robert Fox III, PA       Patient was placed in face mask in first look. Patient was wearing facemask when I entered the room and throughout our encounter. I wore full protective equipment throughout this patient encounter including a face mask, and gloves. Hand hygiene was performed before donning protective equipment and after removal when leaving the room.    AS OF 07:16 EDT VITALS:    BP - 113/78  HR - 71  TEMP - 98.2 °F (36.8 °C) (Tympanic)  O2 SATS - 94%        DIAGNOSIS  Final diagnoses:   Small bowel obstruction (CMS/HCC)         DISPOSITION  ADMISSION    Discussed treatment plan and reason for admission with pt/family and admitting physician.  Pt/family voiced understanding of the plan for admission for further testing/treatment as needed.                Robert Fox III,  PA  09/15/20 0716

## 2020-09-16 ENCOUNTER — APPOINTMENT (OUTPATIENT)
Dept: GENERAL RADIOLOGY | Facility: HOSPITAL | Age: 80
End: 2020-09-16

## 2020-09-16 PROBLEM — K56.609 SMALL BOWEL OBSTRUCTION: Status: RESOLVED | Noted: 2020-09-15 | Resolved: 2020-09-16

## 2020-09-16 PROBLEM — K56.609 SBO (SMALL BOWEL OBSTRUCTION) (HCC): Status: RESOLVED | Noted: 2020-09-15 | Resolved: 2020-09-16

## 2020-09-16 PROBLEM — R11.2 NAUSEA & VOMITING: Status: RESOLVED | Noted: 2020-09-15 | Resolved: 2020-09-16

## 2020-09-16 LAB
ANION GAP SERPL CALCULATED.3IONS-SCNC: 13 MMOL/L (ref 5–15)
BUN SERPL-MCNC: 12 MG/DL (ref 8–23)
BUN/CREAT SERPL: 12.8 (ref 7–25)
CALCIUM SPEC-SCNC: 7.9 MG/DL (ref 8.6–10.5)
CHLORIDE SERPL-SCNC: 106 MMOL/L (ref 98–107)
CO2 SERPL-SCNC: 17 MMOL/L (ref 22–29)
CREAT SERPL-MCNC: 0.94 MG/DL (ref 0.57–1)
DEPRECATED RDW RBC AUTO: 42.4 FL (ref 37–54)
ERYTHROCYTE [DISTWIDTH] IN BLOOD BY AUTOMATED COUNT: 12 % (ref 12.3–15.4)
GFR SERPL CREATININE-BSD FRML MDRD: 58 ML/MIN/1.73
GLUCOSE SERPL-MCNC: 67 MG/DL (ref 65–99)
HCT VFR BLD AUTO: 34.5 % (ref 34–46.6)
HGB BLD-MCNC: 11.5 G/DL (ref 12–15.9)
MCH RBC QN AUTO: 32.1 PG (ref 26.6–33)
MCHC RBC AUTO-ENTMCNC: 33.3 G/DL (ref 31.5–35.7)
MCV RBC AUTO: 96.4 FL (ref 79–97)
PLATELET # BLD AUTO: 117 10*3/MM3 (ref 140–450)
PMV BLD AUTO: 10.6 FL (ref 6–12)
POTASSIUM SERPL-SCNC: 4.6 MMOL/L (ref 3.5–5.2)
RBC # BLD AUTO: 3.58 10*6/MM3 (ref 3.77–5.28)
SODIUM SERPL-SCNC: 136 MMOL/L (ref 136–145)
TSH SERPL DL<=0.05 MIU/L-ACNC: 1.75 UIU/ML (ref 0.27–4.2)
WBC # BLD AUTO: 4.65 10*3/MM3 (ref 3.4–10.8)

## 2020-09-16 PROCEDURE — 25010000002 PIPERACILLIN SOD-TAZOBACTAM PER 1 G: Performed by: INTERNAL MEDICINE

## 2020-09-16 PROCEDURE — 36415 COLL VENOUS BLD VENIPUNCTURE: CPT | Performed by: INTERNAL MEDICINE

## 2020-09-16 PROCEDURE — 85027 COMPLETE CBC AUTOMATED: CPT | Performed by: INTERNAL MEDICINE

## 2020-09-16 PROCEDURE — 25010000002 MORPHINE PER 10 MG: Performed by: INTERNAL MEDICINE

## 2020-09-16 PROCEDURE — 80048 BASIC METABOLIC PNL TOTAL CA: CPT | Performed by: INTERNAL MEDICINE

## 2020-09-16 PROCEDURE — 74019 RADEX ABDOMEN 2 VIEWS: CPT

## 2020-09-16 PROCEDURE — 84443 ASSAY THYROID STIM HORMONE: CPT | Performed by: INTERNAL MEDICINE

## 2020-09-16 PROCEDURE — 97162 PT EVAL MOD COMPLEX 30 MIN: CPT

## 2020-09-16 PROCEDURE — 94799 UNLISTED PULMONARY SVC/PX: CPT

## 2020-09-16 PROCEDURE — 25010000002 ENOXAPARIN PER 10 MG: Performed by: INTERNAL MEDICINE

## 2020-09-16 PROCEDURE — 99231 SBSQ HOSP IP/OBS SF/LOW 25: CPT | Performed by: SURGERY

## 2020-09-16 PROCEDURE — 97110 THERAPEUTIC EXERCISES: CPT

## 2020-09-16 RX ORDER — TORSEMIDE 100 MG/1
100 TABLET ORAL
Status: DISCONTINUED | OUTPATIENT
Start: 2020-09-16 | End: 2020-09-17

## 2020-09-16 RX ORDER — DIAPER,BRIEF,INFANT-TODD,DISP
EACH MISCELLANEOUS EVERY 12 HOURS SCHEDULED
Status: DISCONTINUED | OUTPATIENT
Start: 2020-09-16 | End: 2020-09-18 | Stop reason: HOSPADM

## 2020-09-16 RX ADMIN — FLUOXETINE HYDROCHLORIDE 60 MG: 20 CAPSULE ORAL at 08:35

## 2020-09-16 RX ADMIN — LEVOTHYROXINE SODIUM 50 MCG: 50 TABLET ORAL at 08:35

## 2020-09-16 RX ADMIN — PREGABALIN 150 MG: 75 CAPSULE ORAL at 23:19

## 2020-09-16 RX ADMIN — TAZOBACTAM SODIUM AND PIPERACILLIN SODIUM 3.38 G: 375; 3 INJECTION, SOLUTION INTRAVENOUS at 16:58

## 2020-09-16 RX ADMIN — BUSPIRONE HYDROCHLORIDE 30 MG: 15 TABLET ORAL at 08:34

## 2020-09-16 RX ADMIN — IPRATROPIUM BROMIDE AND ALBUTEROL SULFATE 3 ML: 2.5; .5 SOLUTION RESPIRATORY (INHALATION) at 10:44

## 2020-09-16 RX ADMIN — MORPHINE SULFATE 4 MG: 2 INJECTION, SOLUTION INTRAMUSCULAR; INTRAVENOUS at 08:35

## 2020-09-16 RX ADMIN — ENOXAPARIN SODIUM 40 MG: 40 INJECTION SUBCUTANEOUS at 16:58

## 2020-09-16 RX ADMIN — MORPHINE SULFATE 4 MG: 2 INJECTION, SOLUTION INTRAMUSCULAR; INTRAVENOUS at 12:48

## 2020-09-16 RX ADMIN — HYDROCODONE BITARTRATE AND ACETAMINOPHEN 1 TABLET: 7.5; 325 TABLET ORAL at 16:59

## 2020-09-16 RX ADMIN — IPRATROPIUM BROMIDE AND ALBUTEROL SULFATE 3 ML: 2.5; .5 SOLUTION RESPIRATORY (INHALATION) at 19:38

## 2020-09-16 RX ADMIN — TAZOBACTAM SODIUM AND PIPERACILLIN SODIUM 3.38 G: 375; 3 INJECTION, SOLUTION INTRAVENOUS at 07:41

## 2020-09-16 RX ADMIN — SODIUM CHLORIDE, PRESERVATIVE FREE 10 ML: 5 INJECTION INTRAVENOUS at 23:19

## 2020-09-16 RX ADMIN — PANTOPRAZOLE SODIUM 40 MG: 40 TABLET, DELAYED RELEASE ORAL at 08:36

## 2020-09-16 RX ADMIN — SODIUM CHLORIDE, PRESERVATIVE FREE 10 ML: 5 INJECTION INTRAVENOUS at 08:36

## 2020-09-16 RX ADMIN — TAZOBACTAM SODIUM AND PIPERACILLIN SODIUM 3.38 G: 375; 3 INJECTION, SOLUTION INTRAVENOUS at 23:19

## 2020-09-16 RX ADMIN — Medication 1 CAPSULE: at 08:35

## 2020-09-16 RX ADMIN — IPRATROPIUM BROMIDE AND ALBUTEROL SULFATE 3 ML: 2.5; .5 SOLUTION RESPIRATORY (INHALATION) at 14:40

## 2020-09-16 RX ADMIN — QUETIAPINE FUMARATE 50 MG: 50 TABLET, FILM COATED ORAL at 23:18

## 2020-09-16 RX ADMIN — BUSPIRONE HYDROCHLORIDE 30 MG: 15 TABLET ORAL at 23:19

## 2020-09-16 RX ADMIN — PREGABALIN 150 MG: 75 CAPSULE ORAL at 08:36

## 2020-09-16 RX ADMIN — ASPIRIN 81 MG: 81 TABLET, CHEWABLE ORAL at 08:35

## 2020-09-16 RX ADMIN — IPRATROPIUM BROMIDE AND ALBUTEROL SULFATE 3 ML: 2.5; .5 SOLUTION RESPIRATORY (INHALATION) at 06:38

## 2020-09-16 RX ADMIN — HYDROCORTISONE: 1 CREAM TOPICAL at 23:20

## 2020-09-16 RX ADMIN — TORSEMIDE 100 MG: 100 TABLET ORAL at 16:58

## 2020-09-16 NOTE — PLAN OF CARE
Goal Outcome Evaluation:  Plan of Care Reviewed With: patient   Medicated once for abdominal pain with good relief. Denies nausea. Rested well. No acute distress noted.

## 2020-09-16 NOTE — SIGNIFICANT NOTE
09/16/20 1519   OTHER   Discipline occupational therapist   Rehab Time/Intention   Session Not Performed patient/family declined evaluation  (Pt declined OT eval/tx today due to pain. RN notified. Will attempt to f/u tomorrow.)   Recommendation   OT - Next Appointment 09/17/20

## 2020-09-16 NOTE — PROGRESS NOTES
Discharge Planning Assessment  [facility]     Patient Name: Rose Cheema  MRN: [id number]  Today's Date: 9/16/2020    Admit Date: 9/14/2020    Discharge Needs Assessment     Row Name 09/16/20 0967       Living Environment    Lives With  alone    Current Living Arrangements  home/apartment/condo    Primary Care Provided by  self    Provides Primary Care For  no one    Family Caregiver if Needed  child(arlene), adult    Family Caregiver Names  Son  (Sharan Nagel [phone])    Quality of Family Relationships  involved;supportive    Able to Return to Prior Arrangements  yes    Living Arrangement Comments  PT lives alone in a single story house.       Resource/Environmental Concerns    Resource/Environmental Concerns  none       Transition Planning    Patient/Family Anticipates Transition to  inpatient rehabilitation facility    Patient/Family Anticipated Services at Transition  none    Transportation Anticipated  family or friend will provide       Discharge Needs Assessment    Current Outpatient/Agency/Support Group  skilled nursing facility    Equipment Currently Used at Home  bath bench;cpap;grab bar;hospital bed;rollator    Concerns to be Addressed  denies needs/concerns at this time    Anticipated Changes Related to Illness  none    Equipment Needed After Discharge  bath bench;cpap;grab bar, tub/shower;hospital bed;rollator        Discharge Plan     Row Name 09/16/20 0915       Plan    Plan  Plan home with  or [facility] for skilled care.  KYLEIGH Grace RN    Patient/Family in Agreement with Plan  yes    Plan Comments  FACE SHEET VERIFIED/ IM LETTER SIGNED.  Spoke to pt at bedside. Pt's PCP is Dr. Cheng Guevara.   Pt's next of kin is her son  ( Mario Cheema [phone]).   Pt lives alone in a single story house.  Pt is independent with ADL's.  Pt has a bath bench, C PAP, grab bar, hospital bed and rollator for home use.  Pt gets prescriptions at [facility] in [place].  Pt denies any issues affording  medications.  Pt is not current with HH.   Pt has been in [facility] in [facility] and [facility] for skilled care.  Pt requesting a referral to [facility] for skilled care if needed.  Kaila  ( [phone]) called to follow for [facility].   Plan home with  or [facility] for skilled care.  KYLEIGH Grace RN        Continued Care and Services - Admitted Since 9/14/2020     Destination     Service Provider Request Status Selected Services Address Phone Fax    [facility]  Pending - Request Sent N/A [id number] [facility] [id number] [phone]                Demographic Summary     Row Name 09/16/20 0934       General Information    Admission Type  inpatient    Arrived From  emergency department    Required Notices Provided  Important Message from Medicare    Referral Source  admission list    Reason for Consult  discharge planning    Preferred Language  English     Used During This Interaction  no        Functional Status     Row Name 09/16/20 0934       Functional Status    Usual Activity Tolerance  good    Current Activity Tolerance  moderate       Functional Status, IADL    Medications  independent    Meal Preparation  independent    Housekeeping  independent    Laundry  independent    Shopping  independent       Mental Status    General Appearance WDL  WDL       Mental Status Summary    Recent Changes in Mental Status/Cognitive Functioning  no changes        Psychosocial    No documentation.       Abuse/Neglect    No documentation.       Legal    No documentation.       Substance Abuse    No documentation.       Patient Forms    No documentation.           Patti Grace, RN

## 2020-09-16 NOTE — PLAN OF CARE
Problem: Adult Inpatient Plan of Care  Goal: Plan of Care Review  Flowsheets  Taken 9/16/2020 5407  Plan of Care Reviewed With: patient  Outcome Summary: Pt. is a 79 year old Female admitted to the hospital with a SBO and c/o nausea. Pt. reports that prior to admission she was independent with functional mobility and use of Rwx for ambulation. Pt. currently presents with decreased strength, decreased balance, and decreased tolerance to functional activity. Pt. will benefit from skilled inpt. P.T. to address her functional deficits and to assist pt. in regaining her maximum level of independence with functional mobility.  Taken 9/16/2020 0679  Plan of Care Reviewed With: patient   Patient was wearing a face mask during this therapy encounter. Therapist used appropriate personal protective equipment including eye protection, mask, and gloves.  Mask used was standard procedure mask. Appropriate PPE was worn during the entire therapy session. Hand hygiene was completed before and after therapy session. Patient is not in enhanced droplet precautions.

## 2020-09-16 NOTE — PLAN OF CARE
Goal Outcome Evaluation:  Plan of Care Reviewed With: patient     Patient medicated for pain with moderate relief. VSS continue to monitor

## 2020-09-16 NOTE — DISCHARGE PLACEMENT REQUEST
"Rose Caldera (79 y.o. Female)     Date of Birth Social Security Number Address Home Phone MRN    1940  [id number] [address] [phone] [id number]    Holiness Marital Status          [employer]        Admission Date Admission Type Admitting Provider Attending Provider Department, Room/Bed    9/14/20 Emergency Maciej Burton MD Hayden, Juliana, MD [address], [address]/1    Discharge Date Discharge Disposition Discharge Destination                       Attending Provider: Maria Antonia Ellington MD    Allergies: Aspartame, Cephalexin    Isolation: None   Infection: None   Code Status: CPR    Ht: 160 cm (63\")   Wt: 128 kg (281 lb 8.4 oz)    Admission Cmt: None   Principal Problem: SBO (small bowel obstruction) (CMS/[facility]) [K56.609]                 Active Insurance as of 9/14/2020     Primary Coverage     Payor Plan Insurance Group Employer/Plan Group    MEDICARE MEDICARE A & B      Payor Plan Address Payor Plan Phone Number Payor Plan Fax Number Effective Dates    [address] [phone]  5/1/2008 - None Entered    [facility] [id number]       Subscriber Name Subscriber Birth Date Member ID       ROSE CALDERA 1940 [id number]           Secondary Coverage     Payor Plan Insurance Group Employer/Plan Group    [address] [place] [id number]     Payor Plan Address Payor Plan Phone Number Payor Plan Fax Number Effective Dates    [address]   9/1/2004 - None Entered    [facility] [id number]       Subscriber Name Subscriber Birth Date Member ID       ROSE CALDERA 1940 [id number]                 Emergency Contacts      (Rel.) Home Phone Work Phone Mobile Phone    Mario Caldera (Son) [phone] -- --              "

## 2020-09-16 NOTE — PROGRESS NOTES
Chief Complaint:    Small bowel obstruction    Subjective:    The patient is generally feeling better with no nausea or vomiting.  She has not passed flatus or had a bowel movement.    Objective:    Temp:  [97.9 °F (36.6 °C)-98.5 °F (36.9 °C)] 98.1 °F (36.7 °C)  Heart Rate:  [61-83] 73  Resp:  [14-22] 14  BP: (102-125)/(45-63) 125/58    Physical Exam  Constitutional:       General: She is not in acute distress.  Abdominal:      Palpations: Abdomen is soft.      Tenderness: There is no abdominal tenderness.   Psychiatric:         Behavior: Behavior is cooperative.         Results:    WBC is 4.65.    Assessment/Plan:    The patient has a small obstruction based on her original CT scan of the abdomen and pelvis.  She has not had any nausea or vomiting and is clinically stable but has not had any bowel function.  When evaluation was discussed with her, including a small bowel follow-through, she refused any studies that may lead to surgery because she does not want to have a surgical procedure.  We will not perform a small bowel follow-through based on her wishes but, instead, we will plan to recheck abdominal x-rays.    Fernando Zapata Jr., M.D.

## 2020-09-16 NOTE — PROGRESS NOTES
Continued Stay Note  [facility]     Patient Name: Rose Cheema  MRN: [id number]  Today's Date: 9/16/2020    Admit Date: 9/14/2020    Discharge Plan     Row Name 09/16/20 0940       Plan    Plan  Plan home with  or [facility] for skilled care.  KYLEIGH Grace RN    Patient/Family in Agreement with Plan  yes    Plan Comments  FACE SHEET VERIFIED/ IM LETTER SIGNED.  Spoke to pt at bedside. Pt's PCP is Dr. Cheng Guevara.   Pt's next of kin is her son  ( Mario Cheema [phone]).   Pt lives alone in a single story house.  Pt is independent with ADL's.  Pt has a bath bench, C PAP, grab bar, hospital bed and rollator for home use.  Pt gets prescriptions at [facility] in [place].  Pt denies any issues affording medications.  Pt is not current with HH.   Pt has been in [facility] in [facility] and [facility] for skilled care.  Pt requesting a referral to [facility] for skilled care if needed.  Kaila  ( [phone]) called to follow for [facility].   Plan home with  or [facility] for skilled care.  KYLEIGH Grace RN        Discharge Codes    No documentation.             Patti Grace, RN

## 2020-09-16 NOTE — PROGRESS NOTES
Name: Rose Cheema ADMIT: 2020   : 1940  PCP: Cheng Guevara MD    MRN: [id number] LOS: 1 days   AGE/SEX: 79 y.o. female  ROOM: [facility]     Subjective   Subjective   Saw the patient this morning.  No bowel movement but had had flatus.  No more nausea or vomiting.  No abdominal pain.  Belly feels back to normal    Review of Systems     Objective   Objective   Vital Signs  Temp:  [97.9 °F (36.6 °C)-98.5 °F (36.9 °C)] 98.1 °F (36.7 °C)  Heart Rate:  [61-81] 73  Resp:  [14-22] 14  BP: (102-125)/(45-63) 125/58  SpO2:  [91 %-99 %] 99 %  on  Flow (L/min):  [1-2] 2;   Device (Oxygen Therapy): nasal cannula  Body mass index is 49.87 kg/m².  Physical Exam  Vitals signs and nursing note reviewed.   Constitutional:       General: She is not in acute distress.     Appearance: She is obese. She is not ill-appearing, toxic-appearing or diaphoretic.      Comments: Morbidly obese.  Looks a little older than age   HENT:      Head: Normocephalic.      Nose: Nose normal.      Mouth/Throat:      Pharynx: No oropharyngeal exudate.   Neck:      Musculoskeletal: Neck supple.   Cardiovascular:      Rate and Rhythm: Normal rate and regular rhythm.      Heart sounds: Murmur present.      Comments: Grade 3/6 systolic murmur right and left sternal borders  Pulmonary:      Effort: No respiratory distress.      Breath sounds: No stridor. No wheezing, rhonchi or rales.      Comments: Diminished but clear  Abdominal:      General: Bowel sounds are normal. There is no distension.      Palpations: Abdomen is soft.      Tenderness: There is no abdominal tenderness.      Comments: Obese.  No hepatosplenomegaly   Musculoskeletal:      Right lower leg: Edema present.      Left lower leg: Edema present.      Comments: 2+ edema at the ankles   Skin:     General: Skin is warm.      Findings: Rash present.      Comments: Increase erythema along both shins   Neurological:      Mental Status: She is alert.         Results Review:       I  reviewed the patient's new clinical results.  Results from last 7 days   Lab Units 09/16/20  1126 09/15/20  0603 09/14/20  2211   WBC 10*3/mm3 4.65 5.36 8.12   HEMOGLOBIN g/dL 11.5* 11.9* 13.8   PLATELETS 10*3/mm3 117* 125* 147     Results from last 7 days   Lab Units 09/16/20  0939 09/15/20  0603 09/14/20  2211   SODIUM mmol/L 136 141 141   POTASSIUM mmol/L 4.6 4.4 4.5   CHLORIDE mmol/L 106 108* 106   CO2 mmol/L 17.0* 26.9 26.2   BUN mg/dL 12 16 15   CREATININE mg/dL 0.94 0.96 1.02*   GLUCOSE mg/dL 67 98 135*   Estimated Creatinine Clearance: 65.4 mL/min (by C-G formula based on SCr of 0.94 mg/dL).  Results from last 7 days   Lab Units 09/14/20  2211   ALBUMIN g/dL 4.20   BILIRUBIN mg/dL 0.5   ALK PHOS U/L 140*   AST (SGOT) U/L 18   ALT (SGPT) U/L 15     Results from last 7 days   Lab Units 09/16/20  0939 09/15/20  0603 09/14/20  2211   CALCIUM mg/dL 7.9* 7.9* 8.8   ALBUMIN g/dL  --   --  4.20       No results found for: HGBA1C, POCGLU    aspirin, 81 mg, Oral, Daily  busPIRone, 30 mg, Oral, BID  enoxaparin, 40 mg, Subcutaneous, Q24H  fentaNYL, 1 patch, Transdermal, Q72H  FLUoxetine, 60 mg, Oral, Daily  hydrocortisone, , Topical, Q12H  [START ON 9/17/2020] influenza vaccine, 0.5 mL, Intramuscular, Once  ipratropium-albuterol, 3 mL, Nebulization, 4x Daily - RT  lactobacillus acidophilus, 1 capsule, Oral, Daily  levothyroxine, 50 mcg, Oral, Daily  pantoprazole, 40 mg, Oral, Daily  piperacillin-tazobactam, 3.375 g, Intravenous, Q8H  pregabalin, 150 mg, Oral, BID  QUEtiapine, 50 mg, Oral, Nightly  sodium chloride, 10 mL, Intravenous, Q12H  torsemide, 100 mg, Oral, BID       Diet Full Liquid       Assessment/Plan     Active Hospital Problems    Diagnosis  POA   • CKD (chronic kidney disease) stage 3, GFR 30-59 ml/min (CMS/HCC) [N18.3]  Yes   • Opioid dependence (CMS/HCC) [F11.20]  Yes   • Hypothyroidism [E03.9]  Yes   • Chronic pain syndrome [G89.4]  Yes   • Fibromyalgia [M79.7]  Yes   • Hx SBO [Z87.19]  Not Applicable      • Bilateral lower extremity edema (chronic) [R60.0]  Yes   • CAD (coronary artery disease), native coronary artery [I25.10]  Yes   • Diastolic dysfunction [I51.89]  Yes   • Benign essential hypertension [I10]  Yes   • Obstructive sleep apnea [G47.33]  Yes   • Chronic obstructive pulmonary disease with acute exacerbation (CMS/[facility]) [J44.1]  Yes   • Class 3 severe obesity with serious comorbidity and body mass index (BMI) of 50.0 to 59.9 in adult (CMS/[facility]) [E66.01, Z68.43]  Not Applicable      Resolved Hospital Problems    Diagnosis Date Resolved POA   • **SBO (small bowel obstruction) (CMS/[facility]) [K56.609] 09/16/2020 Yes   • Nausea & vomiting [R11.2] 09/16/2020 Unknown   • Small bowel obstruction (CMS/[facility]) [K56.609] 09/16/2020 Yes       · Partial small bowel obstruction.  Some improvement.  Input from Dr. Zapata noted.  Patient not interested in additional studies.  Recheck KUB.  I did start her on liquids.  · Acute on chronic diastolic congestive heart failure.  Demadex restarted at home dose.  IV fluids discontinued  · COPD, pulmonary hypertension and sleep apnea.  Continue mini nebs  · Asymptomatic bacteriuria.  I discontinued Zosyn  · Weakness.  Continue physical therapy.  · Thrombocytopenia, reactive.  Recheck geneva Ellington MD  [facility]  09/16/20  17:38 EDT

## 2020-09-16 NOTE — THERAPY EVALUATION
Patient Name: Rose Cheema  : 1940    MRN: [id number]                              Today's Date: 2020       Admit Date: 2020    Visit Dx:     ICD-10-CM ICD-9-CM   1. Small bowel obstruction (CMS/HCC)  K56.609 560.9     Patient Active Problem List   Diagnosis   • Urinary incontinence   • Urinary frequency   • Generalized abdominal pain   • Pulmonary hypertension    • Benign essential hypertension   • Bilateral lower extremity edema (chronic)   • CAD (coronary artery disease), native coronary artery   • Chronic obstructive pulmonary disease with acute exacerbation (CMS/HCC)   • Diastolic dysfunction   • Class 3 severe obesity with serious comorbidity and body mass index (BMI) of 50.0 to 59.9 in adult (CMS/[facility])   • Obstructive sleep apnea   • Secondary pulmonary arterial hypertension (CMS/HCC)   • H/O: hysterectomy   • Fibromyalgia   • Hx SBO   • Hx of cholecystectomy   • Hypoglycemia   • Fall   • Hypothyroidism   • Chronic pain syndrome   • Acute UTI (Proteus)   • Anemia   • Pneumonia of right lower lobe due to infectious organism   • Acute UTI   • Acute on chronic diastolic (congestive) heart failure (CMS/HCC)   • Opioid dependence (CMS/[facility])   • Venous stasis dermatitis of both lower extremities   • Gram-negative bacteremia   • Hypokalemia   • E. coli bacteremia   • Anemia of chronic disease   • Sepsis without acute organ dysfunction - present on admit   • CKD (chronic kidney disease) stage 3, GFR 30-59 ml/min (CMS/[facility])   • Bacteremia   • Enterococcal septicemia (CMS/[facility])   • Asymptomatic bacteriuria   • SBO (small bowel obstruction) (CMS/[facility])   • Nausea & vomiting   • Small bowel obstruction (CMS/HCC)     Past Medical History:   Diagnosis Date   • Anemia    • Anxiety    • Arthritis    • CHF (congestive heart failure) (CMS/[facility])    • Coronary artery disease    • Depression    • Disease of thyroid gland    • Fibromyalgia    • GERD (gastroesophageal reflux disease)    • Hypertension    • Moderate  tricuspid valve stenosis    • Osteoporosis    • Peripheral neuropathy    • Pulmonary hypertension (CMS/HCC)    • SBO (small bowel obstruction) (CMS/HCC)    • Stenosis of mitral valve      Past Surgical History:   Procedure Laterality Date   • BILATERAL OOPHORECTOMY     • CARDIAC CATHETERIZATION     • CHOLECYSTECTOMY     • ERCP N/A 2/15/2019    Procedure: ENDOSCOPIC RETROGRADE CHOLANGIOPANCREATOGRAPHY WITH PARTIAL CHOLANGIOGRAM;  Surgeon: Gerald Herr MD;  Location: [facility] ENDOSCOPY;  Service: Gastroenterology   • EXPLORATORY LAPAROTOMY     • GASTRIC BYPASS     • HERNIA REPAIR      inguinal and ventral   • HYSTERECTOMY     • OVARIAN CYST REMOVAL       General Information     Row Name 09/16/20 1440          Physical Therapy Time and Intention    Document Type  evaluation Pt. admitted to hospital with nausea and SBO.  -MS     Mode of Treatment  physical therapy;individual therapy  -MS     Row Name 09/16/20 1440          General Information    Patient Profile Reviewed  yes  -MS     Prior Level of Function  independent: With use of Rwx.  -MS     Existing Precautions/Restrictions  (S) fall;oxygen therapy device and L/min Exit alarm  -MS     Barriers to Rehab  none identified  -MS     Row Name 09/16/20 1440          Cognition    Orientation Status (Cognition)  oriented x 3  -MS     Row Name 09/16/20 1440          Safety Issues, Functional Mobility    Comment, Safety Issues/Impairments (Mobility)  Gait belt used for safety.  -MS       User Key  (r) = Recorded By, (t) = Taken By, (c) = Cosigned By    Initials Name Provider Type    MS Mason Ward, PT Physical Therapist        Mobility     Row Name 09/16/20 1443          Bed Mobility    Bed Mobility  supine-sit;sit-supine  -MS     Supine-Sit [place] (Bed Mobility)  minimum assist (75% patient effort)  -MS     Sit-Supine [place] (Bed Mobility)  minimum assist (75% patient effort)  -MS     Assistive Device (Bed Mobility)  draw sheet  -MS     Row Name  09/16/20 1443          Sit-Stand Transfer    Sit-Stand [place] (Transfers)  minimum assist (75% patient effort)  -MS     Assistive Device (Sit-Stand Transfers)  walker, front-wheeled  -MS     Row Name 09/16/20 1443          Gait/Stairs (Locomotion)    [place] Level (Gait)  contact guard  -MS     Assistive Device (Gait)  walker, front-wheeled  -MS     Distance in Feet (Gait)  25 feet  -MS     Deviations/Abnormal Patterns (Gait)  amanda decreased  -MS     Bilateral Gait Deviations  forward flexed posture  -MS     Comment (Gait/Stairs)  Verbal/tactile cues for posture correction. Limited by overall fatigue and weakness.  -MS       User Key  (r) = Recorded By, (t) = Taken By, (c) = Cosigned By    Initials Name Provider Type    Mason Monroy, PT Physical Therapist        Obj/Interventions     Row Name 09/16/20 1445          Range of Motion Comprehensive    Comment, General Range of Motion  B Shld (Imp. 25%); BLE (WFL's)  -MS     Row Name 09/16/20 1445          Strength Comprehensive (MMT)    Comment, General Manual Muscle Testing (MMT) Assessment  B Shld (3-/5); BLE (3/5)  -MS     Row Name 09/16/20 1445          Motor Skills    Therapeutic Exercise  -- BLE ther. ex. program x 10 reps completed (Ankle Pumps, Hip Flexion, LAQ's)  -MS       User Key  (r) = Recorded By, (t) = Taken By, (c) = Cosigned By    Initials Name Provider Type    Mason Monroy, PT Physical Therapist        Goals/Plan     Row Name 09/16/20 1448          Bed Mobility Goal 1 (PT)    Activity/Assistive Device (Bed Mobility Goal 1, PT)  bed mobility activities, all  -MS     [place] Level/Cues Needed (Bed Mobility Goal 1, PT)  independent  -MS     Time Frame (Bed Mobility Goal 1, PT)  long term goal (LTG);5 days  -MS     Row Name 09/16/20 1448          Transfer Goal 1 (PT)    Activity/Assistive Device (Transfer Goal 1, PT)  transfers, all;walker, rolling  -MS     [place] Level/Cues Needed (Transfer Goal 1, PT)  independent   -MS     Time Frame (Transfer Goal 1, PT)  long term goal (LTG);5 days  -MS     Row Name 09/16/20 1448          Gait Training Goal 1 (PT)    Activity/Assistive Device (Gait Training Goal 1, PT)  gait (walking locomotion);walker, rolling  -MS     [place] Level (Gait Training Goal 1, PT)  independent  -MS     Distance (Gait Training Goal 1, PT)  70 feet  -MS     Time Frame (Gait Training Goal 1, PT)  long term goal (LTG);5 days  -MS       User Key  (r) = Recorded By, (t) = Taken By, (c) = Cosigned By    Initials Name Provider Type    Mason Monroy, PT Physical Therapist        Clinical Impression     Row Name 09/16/20 1446          Pain    Additional Documentation  Pain Scale: Numbers Pre/Post-Treatment (Group)  -MS     Row Name 09/16/20 1446          Pain Scale: Numbers Pre/Post-Treatment    Pretreatment Pain Rating  0/10 - no pain  -MS     Posttreatment Pain Rating  0/10 - no pain  -MS     Row Name 09/16/20 1446          Plan of Care Review    Plan of Care Reviewed With  patient  -MS     Row Name 09/16/20 1446          Therapy Assessment/Plan (PT)    Rehab Potential (PT)  good, to achieve stated therapy goals  -MS     Criteria for Skilled Interventions Met (PT)  skilled treatment is necessary  -MS     Row Name 09/16/20 1446          Positioning and Restraints    Pre-Treatment Position  in bed  -MS     Post Treatment Position  bed  -MS     In Bed  notified nsg;supine;call light within reach;encouraged to call for assist;exit alarm on All lines intact.  -MS       User Key  (r) = Recorded By, (t) = Taken By, (c) = Cosigned By    Initials Name Provider Type    Mason Monroy, PT Physical Therapist        Outcome Measures     Row Name 09/16/20 1446          How much help from another person do you currently need...    Turning from your back to your side while in flat bed without using bedrails?  3  -MS     Moving from lying on back to sitting on the side of a flat bed without bedrails?  3  -MS      Moving to and from a bed to a chair (including a wheelchair)?  3  -MS     Standing up from a chair using your arms (e.g., wheelchair, bedside chair)?  3  -MS     Climbing 3-5 steps with a railing?  3  -MS     To walk in hospital room?  3  -MS     AM-PAC 6 Clicks Score (PT)  18  -MS     Row Name 09/16/20 1449          Functional Assessment    Outcome Measure Options  AM-PAC 6 Clicks Basic Mobility (PT)  -MS       User Key  (r) = Recorded By, (t) = Taken By, (c) = Cosigned By    Initials Name Provider Type    MS Mason Ward, PT Physical Therapist        Physical Therapy Education                 Title: PT OT SLP Therapies (Done)     Topic: Physical Therapy (Done)     Point: Mobility training (Done)     Learning Progress Summary           Patient Acceptance, E,D, VU,NR by MS at 9/16/2020 1449                   Point: Home exercise program (Done)     Learning Progress Summary           Patient Acceptance, E,D, VU,NR by MS at 9/16/2020 1449                   Point: Body mechanics (Done)     Learning Progress Summary           Patient Acceptance, E,D, VU,NR by MS at 9/16/2020 1449                   Point: Precautions (Done)     Learning Progress Summary           Patient Acceptance, E,D, VU,NR by MS at 9/16/2020 1449                               User Key     Initials Effective Dates Name Provider Type Discipline    MS 04/03/18 -  Mason Ward, PT Physical Therapist PT              PT Recommendation and Plan  Planned Therapy Interventions (PT): balance training, bed mobility training, gait training, home exercise program, postural re-education, patient/family education, strengthening, transfer training  Plan of Care Reviewed With: patient  Outcome Summary: Pt. is a 79 year old Female admitted to the hospital with a SBO and c/o nausea. Pt. reports that prior to admission she was independent with functional mobility and use of Rwx for ambulation. Pt. currently presents with decreased strength, decreased balance,  and decreased tolerance to functional activity. Pt. will benefit from skilled inpt. P.T. to address her functional deficits and to assist pt. in regaining her maximum level of independence with functional mobility.     Time Calculation:   PT Charges     Row Name 09/16/20 1452             Time Calculation    Start Time  1415  -MS      Stop Time  1435  -MS      Time Calculation (min)  20 min  -MS      PT Received On  09/16/20  -MS      PT - Next Appointment  09/17/20  -MS      PT Goal Re-Cert Due Date  09/21/20  -MS         Time Calculation- PT    Total Timed Code Minutes- PT  18 minute(s)  -MS        User Key  (r) = Recorded By, (t) = Taken By, (c) = Cosigned By    Initials Name Provider Type    Mason Monroy, PT Physical Therapist        Therapy Charges for Today     Code Description Service Date Service Provider Modifiers Qty    85789270815 HC PT EVAL MOD COMPLEXITY 1 9/16/2020 Mason Ward, PT GP 1    49405716212 HC PT THER PROC EA 15 MIN 9/16/2020 Mason Ward, PT GP 1          PT G-Codes  Outcome Measure Options: AM-PAC 6 Clicks Basic Mobility (PT)  AM-PAC 6 Clicks Score (PT): 18    Mason Ward PT  9/16/2020

## 2020-09-17 ENCOUNTER — APPOINTMENT (OUTPATIENT)
Dept: GENERAL RADIOLOGY | Facility: HOSPITAL | Age: 80
End: 2020-09-17

## 2020-09-17 LAB
ANION GAP SERPL CALCULATED.3IONS-SCNC: 10.6 MMOL/L (ref 5–15)
BACTERIA SPEC AEROBE CULT: ABNORMAL
BUN SERPL-MCNC: 13 MG/DL (ref 8–23)
BUN/CREAT SERPL: 13.3 (ref 7–25)
CALCIUM SPEC-SCNC: 8.3 MG/DL (ref 8.6–10.5)
CHLORIDE SERPL-SCNC: 101 MMOL/L (ref 98–107)
CO2 SERPL-SCNC: 28.4 MMOL/L (ref 22–29)
CREAT SERPL-MCNC: 0.98 MG/DL (ref 0.57–1)
DEPRECATED RDW RBC AUTO: 45.7 FL (ref 37–54)
ERYTHROCYTE [DISTWIDTH] IN BLOOD BY AUTOMATED COUNT: 12.5 % (ref 12.3–15.4)
GFR SERPL CREATININE-BSD FRML MDRD: 55 ML/MIN/1.73
GLUCOSE SERPL-MCNC: 96 MG/DL (ref 65–99)
HCT VFR BLD AUTO: 38.5 % (ref 34–46.6)
HGB BLD-MCNC: 12.3 G/DL (ref 12–15.9)
MCH RBC QN AUTO: 31.5 PG (ref 26.6–33)
MCHC RBC AUTO-ENTMCNC: 31.9 G/DL (ref 31.5–35.7)
MCV RBC AUTO: 98.7 FL (ref 79–97)
PLATELET # BLD AUTO: 127 10*3/MM3 (ref 140–450)
PMV BLD AUTO: 11.5 FL (ref 6–12)
POTASSIUM SERPL-SCNC: 4.6 MMOL/L (ref 3.5–5.2)
RBC # BLD AUTO: 3.9 10*6/MM3 (ref 3.77–5.28)
SODIUM SERPL-SCNC: 140 MMOL/L (ref 136–145)
WBC # BLD AUTO: 5.11 10*3/MM3 (ref 3.4–10.8)

## 2020-09-17 PROCEDURE — G0008 ADMIN INFLUENZA VIRUS VAC: HCPCS | Performed by: INTERNAL MEDICINE

## 2020-09-17 PROCEDURE — 97166 OT EVAL MOD COMPLEX 45 MIN: CPT | Performed by: OCCUPATIONAL THERAPIST

## 2020-09-17 PROCEDURE — 97110 THERAPEUTIC EXERCISES: CPT

## 2020-09-17 PROCEDURE — 97535 SELF CARE MNGMENT TRAINING: CPT | Performed by: OCCUPATIONAL THERAPIST

## 2020-09-17 PROCEDURE — 80048 BASIC METABOLIC PNL TOTAL CA: CPT | Performed by: INTERNAL MEDICINE

## 2020-09-17 PROCEDURE — 74018 RADEX ABDOMEN 1 VIEW: CPT

## 2020-09-17 PROCEDURE — 85027 COMPLETE CBC AUTOMATED: CPT | Performed by: INTERNAL MEDICINE

## 2020-09-17 PROCEDURE — 94799 UNLISTED PULMONARY SVC/PX: CPT

## 2020-09-17 PROCEDURE — 25010000002 PIPERACILLIN SOD-TAZOBACTAM PER 1 G: Performed by: INTERNAL MEDICINE

## 2020-09-17 PROCEDURE — 25010000002 INFLUENZA VAC SPLIT QUAD 0.5 ML SUSPENSION PREFILLED SYRINGE: Performed by: INTERNAL MEDICINE

## 2020-09-17 PROCEDURE — 90686 IIV4 VACC NO PRSV 0.5 ML IM: CPT | Performed by: INTERNAL MEDICINE

## 2020-09-17 PROCEDURE — 99231 SBSQ HOSP IP/OBS SF/LOW 25: CPT | Performed by: SURGERY

## 2020-09-17 PROCEDURE — 97110 THERAPEUTIC EXERCISES: CPT | Performed by: OCCUPATIONAL THERAPIST

## 2020-09-17 PROCEDURE — 25010000002 ENOXAPARIN PER 10 MG: Performed by: INTERNAL MEDICINE

## 2020-09-17 RX ORDER — POLYETHYLENE GLYCOL 3350 17 G/17G
17 POWDER, FOR SOLUTION ORAL DAILY
Status: DISCONTINUED | OUTPATIENT
Start: 2020-09-17 | End: 2020-09-18 | Stop reason: HOSPADM

## 2020-09-17 RX ORDER — TORSEMIDE 100 MG/1
100 TABLET ORAL DAILY
Status: DISCONTINUED | OUTPATIENT
Start: 2020-09-18 | End: 2020-09-18 | Stop reason: HOSPADM

## 2020-09-17 RX ADMIN — PREGABALIN 150 MG: 75 CAPSULE ORAL at 08:54

## 2020-09-17 RX ADMIN — TAZOBACTAM SODIUM AND PIPERACILLIN SODIUM 3.38 G: 375; 3 INJECTION, SOLUTION INTRAVENOUS at 06:05

## 2020-09-17 RX ADMIN — TORSEMIDE 100 MG: 100 TABLET ORAL at 08:54

## 2020-09-17 RX ADMIN — LEVOTHYROXINE SODIUM 50 MCG: 50 TABLET ORAL at 06:05

## 2020-09-17 RX ADMIN — HYDROCORTISONE 1 APPLICATION: 1 CREAM TOPICAL at 22:20

## 2020-09-17 RX ADMIN — TAZOBACTAM SODIUM AND PIPERACILLIN SODIUM 3.38 G: 375; 3 INJECTION, SOLUTION INTRAVENOUS at 15:42

## 2020-09-17 RX ADMIN — SODIUM CHLORIDE, PRESERVATIVE FREE 10 ML: 5 INJECTION INTRAVENOUS at 08:55

## 2020-09-17 RX ADMIN — TAZOBACTAM SODIUM AND PIPERACILLIN SODIUM 3.38 G: 375; 3 INJECTION, SOLUTION INTRAVENOUS at 22:19

## 2020-09-17 RX ADMIN — QUETIAPINE FUMARATE 50 MG: 50 TABLET, FILM COATED ORAL at 22:20

## 2020-09-17 RX ADMIN — IPRATROPIUM BROMIDE AND ALBUTEROL SULFATE 3 ML: 2.5; .5 SOLUTION RESPIRATORY (INHALATION) at 11:16

## 2020-09-17 RX ADMIN — PANTOPRAZOLE SODIUM 40 MG: 40 TABLET, DELAYED RELEASE ORAL at 08:54

## 2020-09-17 RX ADMIN — HYDROCORTISONE: 1 CREAM TOPICAL at 09:01

## 2020-09-17 RX ADMIN — ENOXAPARIN SODIUM 40 MG: 40 INJECTION SUBCUTANEOUS at 15:42

## 2020-09-17 RX ADMIN — INFLUENZA VIRUS VACCINE 0.5 ML: 15; 15; 15; 15 SUSPENSION INTRAMUSCULAR at 12:43

## 2020-09-17 RX ADMIN — SODIUM CHLORIDE, PRESERVATIVE FREE 10 ML: 5 INJECTION INTRAVENOUS at 22:24

## 2020-09-17 RX ADMIN — Medication 1 CAPSULE: at 08:54

## 2020-09-17 RX ADMIN — IPRATROPIUM BROMIDE AND ALBUTEROL SULFATE 3 ML: 2.5; .5 SOLUTION RESPIRATORY (INHALATION) at 21:46

## 2020-09-17 RX ADMIN — BUSPIRONE HYDROCHLORIDE 30 MG: 15 TABLET ORAL at 22:19

## 2020-09-17 RX ADMIN — BUSPIRONE HYDROCHLORIDE 30 MG: 15 TABLET ORAL at 08:54

## 2020-09-17 RX ADMIN — IPRATROPIUM BROMIDE AND ALBUTEROL SULFATE 3 ML: 2.5; .5 SOLUTION RESPIRATORY (INHALATION) at 07:03

## 2020-09-17 RX ADMIN — PREGABALIN 150 MG: 75 CAPSULE ORAL at 22:20

## 2020-09-17 RX ADMIN — IPRATROPIUM BROMIDE AND ALBUTEROL SULFATE 3 ML: 2.5; .5 SOLUTION RESPIRATORY (INHALATION) at 14:49

## 2020-09-17 RX ADMIN — HYDROCODONE BITARTRATE AND ACETAMINOPHEN 1 TABLET: 7.5; 325 TABLET ORAL at 20:23

## 2020-09-17 RX ADMIN — ASPIRIN 81 MG: 81 TABLET, CHEWABLE ORAL at 09:02

## 2020-09-17 RX ADMIN — FLUOXETINE HYDROCHLORIDE 60 MG: 20 CAPSULE ORAL at 08:54

## 2020-09-17 RX ADMIN — HYDROCODONE BITARTRATE AND ACETAMINOPHEN 1 TABLET: 7.5; 325 TABLET ORAL at 07:32

## 2020-09-17 NOTE — PLAN OF CARE
Goal Outcome Evaluation:  Plan of Care Reviewed With: patient     Outcome Summary: Pt. is a 79 year old Female admitted to the hospital with a SBO and c/o nausea. Pt. reports that prior to admission she was independent with functional mobility and use of Rwx for ambulation. Pt. currently presents with decreased strength, decreased balance, and decreased tolerance to functional activity. Pt. will benefit from skilled inpt. P.T. to address her functional deficits and to assist pt. in regaining her maximum level of independence with functional mobility.

## 2020-09-17 NOTE — THERAPY EVALUATION
Patient Name: Rose Cheema  : 1940    MRN: [id number]                              Today's Date: 2020       Admit Date: 2020    Visit Dx:     ICD-10-CM ICD-9-CM   1. Small bowel obstruction (CMS/HCC)  K56.609 560.9     Patient Active Problem List   Diagnosis   • Urinary incontinence   • Urinary frequency   • Generalized abdominal pain   • Pulmonary hypertension    • Benign essential hypertension   • Bilateral lower extremity edema (chronic)   • CAD (coronary artery disease), native coronary artery   • Chronic obstructive pulmonary disease with acute exacerbation (CMS/HCC)   • Diastolic dysfunction   • Class 3 severe obesity with serious comorbidity and body mass index (BMI) of 50.0 to 59.9 in adult (CMS/[facility])   • Obstructive sleep apnea   • Secondary pulmonary arterial hypertension (CMS/[facility])   • H/O: hysterectomy   • Fibromyalgia   • Hx SBO   • Hx of cholecystectomy   • Hypoglycemia   • Fall   • Hypothyroidism   • Chronic pain syndrome   • Anemia   • Pneumonia of right lower lobe due to infectious organism   • Acute UTI   • Acute on chronic diastolic (congestive) heart failure (CMS/HCC)   • Opioid dependence (CMS/[facility])   • Venous stasis dermatitis of both lower extremities   • Gram-negative bacteremia   • Hypokalemia   • E. coli bacteremia   • Anemia of chronic disease   • Sepsis without acute organ dysfunction - present on admit   • CKD (chronic kidney disease) stage 3, GFR 30-59 ml/min (CMS/[facility])   • Bacteremia   • Enterococcal septicemia (CMS/[facility])   • Asymptomatic bacteriuria     Past Medical History:   Diagnosis Date   • Anemia    • Anxiety    • Arthritis    • CHF (congestive heart failure) (CMS/[facility])    • Coronary artery disease    • Depression    • Disease of thyroid gland    • Fibromyalgia    • GERD (gastroesophageal reflux disease)    • Hypertension    • Moderate tricuspid valve stenosis    • Osteoporosis    • Peripheral neuropathy    • Pulmonary hypertension (CMS/HCC)    • SBO (small bowel  obstruction) (CMS/HCC)    • Stenosis of mitral valve      Past Surgical History:   Procedure Laterality Date   • BILATERAL OOPHORECTOMY     • CARDIAC CATHETERIZATION     • CHOLECYSTECTOMY     • ERCP N/A 2/15/2019    Procedure: ENDOSCOPIC RETROGRADE CHOLANGIOPANCREATOGRAPHY WITH PARTIAL CHOLANGIOGRAM;  Surgeon: Gerald Herr MD;  Location: [facility] ENDOSCOPY;  Service: Gastroenterology   • EXPLORATORY LAPAROTOMY     • GASTRIC BYPASS     • HERNIA REPAIR      inguinal and ventral   • HYSTERECTOMY     • OVARIAN CYST REMOVAL       General Information     Row Name 09/17/20 1239          OT Time and Intention    Document Type  evaluation;therapy note (daily note)  -     Mode of Treatment  individual therapy;occupational therapy  -     Row Name 09/17/20 1239          General Information    Patient Profile Reviewed  yes  -     Prior Level of Function  independent:;transfer;ADL's  -     Existing Precautions/Restrictions  fall  -     Barriers to Rehab  none identified  -     Row Name 09/17/20 1239          Living Environment    Lives With  alone  -     Row Name 09/17/20 1239          Cognition    Orientation Status (Cognition)  oriented x 4  -     Row Name 09/17/20 1239          Safety Issues, Functional Mobility    Impairments Affecting Function (Mobility)  balance  -       User Key  (r) = Recorded By, (t) = Taken By, (c) = Cosigned By    Initials Name Provider Type     Kelly Virgen, OTR Occupational Therapist        Mobility/ADL's     Row Name 09/17/20 1240          Bed Mobility    Bed Mobility  supine-sit;sit-supine  -     Supine-Sit [place] (Bed Mobility)  standby assist;set up  -     Sit-Supine [place] (Bed Mobility)  set up;minimum assist (75% patient effort)  -     Row Name 09/17/20 1240          Transfers    Transfers  sit-stand transfer;toilet transfer  -     Sit-Stand [place] (Transfers)  contact guard;set up  -     [place] Level (Toilet Transfer)   contact guard;minimum assist (75% patient effort)  -     Assistive Device (Toilet Transfer)  commode, bedside without drop arms  -     Row Name 09/17/20 1240          Toilet Transfer    Type (Toilet Transfer)  sit-stand;stand-sit  -[facility] Name 09/17/20 1240          Activities of Daily Living    BADL Assessment/Intervention  grooming;toileting;lower body dressing  -[facility] Name 09/17/20 1240          Grooming Assessment/Training    [place] Level (Grooming)  grooming skills;standby assist  -     Position (Grooming)  sitting up in bed  -[facility] Name 09/17/20 1240          Toileting Assessment/Training    [place] Level (Toileting)  toileting skills;change pad/brief;dependent (less than 25% patient effort)  -     Assistive Devices (Toileting)  commode, bedside without drop arms  -     Position (Toileting)  supported sitting;supported standing  -KP     Row Name 09/17/20 1240          Lower Body Dressing Assessment/Training    [place] Level (Lower Body Dressing)  lower body dressing skills;doff;don;socks;dependent (less than 25% patient effort)  -     Position (Lower Body Dressing)  sitting up in bed  -       User Key  (r) = Recorded By, (t) = Taken By, (c) = Cosigned By    Initials Name Provider Type    Kelly Pereira, OTR Occupational Therapist        Obj/Interventions     [facility] Name 09/17/20 1242          Sensory Assessment (Somatosensory)    Sensory Assessment (Somatosensory)  sensation intact  -KP     Row Name 09/17/20 1242          Vision Assessment/Intervention    Visual Impairment/Limitations  WFL  -[facility] Name 09/17/20 1242          Range of Motion Comprehensive    General Range of Motion  bilateral upper extremity ROM WNL  -[facility] Name 09/17/20 1242          Strength Comprehensive (MMT)    General Manual Muscle Testing (MMT) Assessment  upper extremity strength deficits identified  -     Comment, General Manual Muscle Testing (MMT) Assessment  B UE 4/5   -     Row Name 09/17/20 1242          Shoulder (Therapeutic Exercise)    Shoulder (Therapeutic Exercise)  AROM (active range of motion);strengthening exercise  -[facility] Name 09/17/20 1242          Elbow/Forearm (Therapeutic Exercise)    Elbow/Forearm (Therapeutic Exercise)  AROM (active range of motion)  -     Elbow/Forearm AROM (Therapeutic Exercise)  bilateral;supination;pronation;extension;flexion;supine;10 repetitions;2 sets  -     Row Name 09/17/20 1242          Wrist (Therapeutic Exercise)    Wrist (Therapeutic Exercise)  AROM (active range of motion)  -     Wrist AROM (Therapeutic Exercise)  flexion;extension;bilateral;10 repetitions;2 sets  -[facility] Name 09/17/20 1242          Hand (Therapeutic Exercise)    Hand (Therapeutic Exercise)  AROM (active range of motion)  -     Hand AROM/AAROM (Therapeutic Exercise)  AROM (active range of motion);bilateral;2 sets;finger flexion;finger extension  -[facility] Name 09/17/20 1242          Balance    Balance Assessment  sitting static balance;standing static balance  -     Static Sitting Balance  WFL  -     Static Standing Balance  mild impairment  -     Balance Interventions  sitting;standing;sit to stand;supported  -[facility] Name 09/17/20 1242          Therapeutic Exercise    Therapeutic Exercise  shoulder;elbow/forearm;wrist;hand  -       User Key  (r) = Recorded By, (t) = Taken By, (c) = Cosigned By    Initials Name Provider Type     Kelly Virgen, OTR Occupational Therapist        Goals/Plan     [facility] Name 09/17/20 1246          Transfer Goal 1 (OT)    Activity/Assistive Device (Transfer Goal 1, OT)  sit-to-stand/stand-to-sit;toilet;commode, bedside without drop arms  -     [place] Level/Cues Needed (Transfer Goal 1, OT)  standby assist  -     Time Frame (Transfer Goal 1, OT)  long term goal (LTG);by discharge  -     Progress/Outcome (Transfer Goal 1, OT)  goal ongoing  -[facility] Name 09/17/20 1246          Bathing  Goal 1 (OT)    Activity/Device (Bathing Goal 1, OT)  bathing skills, all  -KP     [place] Level/Cues Needed (Bathing Goal 1, OT)  minimum assist (75% or more patient effort);moderate assist (50-74% patient effort)  -     Time Frame (Bathing Goal 1, OT)  by discharge;long term goal (LTG)  -KP     Progress/Outcomes (Bathing Goal 1, OT)  goal ongoing  -     Row Name 09/17/20 1246          Toileting Goal 1 (OT)    Activity/Device (Toileting Goal 1, OT)  toileting skills, all  -KP     [place] Level/Cues Needed (Toileting Goal 1, OT)  set-up required;moderate assist (50-74% patient effort)  -     Time Frame (Toileting Goal 1, OT)  long term goal (LTG);by discharge  -     Progress/Outcome (Toileting Goal 1, OT)  goal ongoing  -     Row Name 09/17/20 1246          Strength Goal 1 (OT)    Strength Goal 1 (OT)  to incr B UE to 4+/5  -KP     Time Frame (Strength Goal 1, OT)  long term goal (LTG);by discharge  -     Progress/Outcome (Strength Goal 1, OT)  goal ongoing  -       User Key  (r) = Recorded By, (t) = Taken By, (c) = Cosigned By    Initials Name Provider Type     Kelly Virgen, OTR Occupational Therapist        Clinical Impression     Row Name 09/17/20 1243          Pain Scale: Numbers Pre/Post-Treatment    Pretreatment Pain Rating  4/10  -KP     Posttreatment Pain Rating  2/10  -KP     Row Name 09/17/20 1243          Plan of Care Review    Plan of Care Reviewed With  patient  -     Progress  improving  -     Outcome Summary  Pt evaluated for OT. pt was SBA w sup to sit and min A sit to supine. pt sat EOB SBA. tsf to BSC w. CGA, toileting skills she was dep. SBA w grooming skills. Dep w. socks. Pt completed B UE ex 10x2. Pt cont to benefit from OT to incr ADL ,strength, balance, tsf. recommned rehab at [facility]     Row Name 09/17/20 1243          Therapy Assessment/Plan (OT)    Rehab Potential (OT)  good, to achieve stated therapy goals  -     Criteria for Skilled Therapeutic  Interventions Met (OT)  yes;skilled treatment is necessary  -     Therapy Frequency (OT)  5 times/wk  -     Row Name 09/17/20 1243          Positioning and Restraints    Pre-Treatment Position  in bed  -     Post Treatment Position  bed  -KP     In Bed  supine;call light within reach;encouraged to call for assist;with other staff  -       User Key  (r) = Recorded By, (t) = Taken By, (c) = Cosigned By    Initials Name Provider Type    Kelly Pereira OTR Occupational Therapist        Outcome Measures     Row Name 09/17/20 1247          How much help from another is currently needed...    Putting on and taking off regular lower body clothing?  1  -KP     Bathing (including washing, rinsing, and drying)  2  -KP     Toileting (which includes using toilet bed pan or urinal)  1  -KP     Putting on and taking off regular upper body clothing  3  -KP     Taking care of personal grooming (such as brushing teeth)  3  -KP     Eating meals  3  -     AM-PAC 6 Clicks Score (OT)  13  -     Row Name 09/17/20 1247          Functional Assessment    Outcome Measure Options  AM-PAC 6 Clicks Daily Activity (OT)  -       User Key  (r) = Recorded By, (t) = Taken By, (c) = Cosigned By    Initials Name Provider Type    Kelly Pereira OTR Occupational Therapist        Occupational Therapy Education                 Title: PT OT SLP Therapies (Done)     Topic: Occupational Therapy (Done)     Point: ADL training (Done)     Description:   Instruct learner(s) on proper safety adaptation and remediation techniques during self care or transfers.   Instruct in proper use of assistive devices.              Learning Progress Summary           Patient Acceptance, E,TB, VU,DU by  at 9/17/2020 1247    Comment: ed pt on role of OT. benefit of therapy, POC w. OT. ed on safety w. ADLs , tsf, balance, and strength                   Point: Precautions (Done)     Description:   Instruct learner(s) on prescribed precautions  during self-care and functional transfers.              Learning Progress Summary           Patient Acceptance, E,TB, VU,DU by  at 9/17/2020 1247    Comment: ed pt on role of OT. benefit of therapy, POC w. OT. ed on safety w. ADLs , tsf, balance, and strength                   Point: Body mechanics (Done)     Description:   Instruct learner(s) on proper positioning and spine alignment during self-care, functional mobility activities and/or exercises.              Learning Progress Summary           Patient Acceptance, E,TB, VU,DU by  at 9/17/2020 1247    Comment: ed pt on role of OT. benefit of therapy, POC w. OT. ed on safety w. ADLs , tsf, balance, and strength                               User Key     Initials Effective Dates Name Provider Type Discipline     06/08/18 -  Kelly Virgen OTR Occupational Therapist OT              OT Recommendation and Plan  Therapy Frequency (OT): 5 times/wk  Plan of Care Review  Plan of Care Reviewed With: patient  Progress: improving  Outcome Summary: Pt evaluated for OT. pt was SBA w sup to sit and min A sit to supine. pt sat EOB SBA. tsf to BS w. CGA, toileting skills she was dep. SBA w grooming skills. Dep w. socks. Pt completed B UE ex 10x2. Pt cont to benefit from OT to incr ADL ,strength, balance, tsf. recommned rehab at [facility]  Plan of Care Reviewed With: patient  Outcome Summary: Pt evaluated for OT. pt was SBA w sup to sit and min A sit to supine. pt sat EOB SBA. tsf to BSC w. CGA, toileting skills she was dep. SBA w grooming skills. Dep w. socks. Pt completed B UE ex 10x2. Pt cont to benefit from OT to incr ADL ,strength, balance, tsf. recommned rehab at [facility]     Time Calculation:   Time Calculation- OT     Row Name 09/17/20 1249             Time Calculation- OT    OT Start Time  0954  -      OT Stop Time  1044  -      OT Time Calculation (min)  50 min  -      Total Timed Code Minutes- OT  43 minute(s)  -      OT Received On  09/17/20  -      OT -  Next Appointment  09/18/20  -      OT Goal Re-Cert Due Date  09/24/20  -        User Key  (r) = Recorded By, (t) = Taken By, (c) = Cosigned By    Initials Name Provider Type    Kelly Pereira OTR Occupational Therapist        Therapy Charges for Today     Code Description Service Date Service Provider Modifiers Qty    88286656722 HC OT SELF CARE/MGMT/TRAIN EA 15 MIN 9/17/2020 Kelly Virgen OTR GO 1    74858263934 HC OT THER PROC EA 15 MIN 9/17/2020 Kelly Virgen OTR GO 2    81275472741 HC OT EVAL MOD COMPLEXITY 2 9/17/2020 Kelly Virgen OTR GO 1               LAUREN Pike  9/17/2020

## 2020-09-17 NOTE — PLAN OF CARE
Problem: Adult Inpatient Plan of Care  Goal: Plan of Care Review  Outcome: Ongoing, Progressing  Flowsheets  Taken 9/17/2020 1853 by Sarah Major RN  Outcome Summary:   denies any pain today   no nausea   tolerating diet   passing flatus   cont to monitor  Taken 9/17/2020 1435 by Esthre Haley PTA  Plan of Care Reviewed With: patient  Goal: Patient-Specific Goal (Individualized)  Outcome: Ongoing, Progressing  Goal: Absence of Hospital-Acquired Illness or Injury  Outcome: Ongoing, Progressing  Intervention: Identify and Manage Fall Risk  Recent Flowsheet Documentation  Taken 9/17/2020 1800 by Sarah Major RN  Safety Promotion/Fall Prevention: fall prevention program maintained  Taken 9/17/2020 1610 by Sarah Major RN  Safety Promotion/Fall Prevention: fall prevention program maintained  Taken 9/17/2020 1409 by Sarah Major RN  Safety Promotion/Fall Prevention: fall prevention program maintained  Taken 9/17/2020 1205 by Sarah Major RN  Safety Promotion/Fall Prevention:   fall prevention program maintained   nonskid shoes/slippers when out of bed   safety round/check completed  Taken 9/17/2020 1000 by Sarah Major RN  Safety Promotion/Fall Prevention:   fall prevention program maintained   nonskid shoes/slippers when out of bed   safety round/check completed  Taken 9/17/2020 0800 by Sarah Major RN  Safety Promotion/Fall Prevention: fall prevention program maintained  Intervention: Prevent and Manage VTE (venous thromboembolism) Risk  Recent Flowsheet Documentation  Taken 9/17/2020 0907 by Sarah Major RN  VTE Prevention/Management:   bilateral   dorsiflexion/plantar flexion performed  Goal: Optimal Comfort and Wellbeing  Outcome: Ongoing, Progressing  Intervention: Provide Person-Centered Care  Recent Flowsheet Documentation  Taken 9/17/2020 1409 by Sarah Major RN  Trust Relationship/Rapport: care explained  Taken 9/17/2020 0907 by  Major, Sarah L, RN  Trust Relationship/Rapport:   care explained   questions encouraged   reassurance provided   thoughts/feelings acknowledged  Goal: Readiness for Transition of Care  Outcome: Ongoing, Progressing     Problem: Skin Injury Risk Increased  Goal: Skin Health and Integrity  Outcome: Ongoing, Progressing  Intervention: Optimize Skin Protection  Recent Flowsheet Documentation  Taken 9/17/2020 1409 by Sarah Major RN  Pressure Reduction Devices: alternating pressure pump (ADD)  Taken 9/17/2020 0907 by Sarah Major RN  Pressure Reduction Techniques:   frequent weight shift encouraged   pressure points protected   weight shift assistance provided  Pressure Reduction Devices: alternating pressure pump (ADD)     Problem: Fall Injury Risk  Goal: Absence of Fall and Fall-Related Injury  Outcome: Ongoing, Progressing  Intervention: Identify and Manage Contributors to Fall Injury Risk  Recent Flowsheet Documentation  Taken 9/17/2020 1610 by Sarah Major RN  Medication Review/Management: medications reviewed  Taken 9/17/2020 1409 by Sarah Major RN  Medication Review/Management: medications reviewed  Taken 9/17/2020 1000 by Sarah Major RN  Medication Review/Management: medications reviewed  Intervention: Promote Injury-Free Environment  Recent Flowsheet Documentation  Taken 9/17/2020 1800 by Sarah Major RN  Safety Promotion/Fall Prevention: fall prevention program maintained  Taken 9/17/2020 1610 by Sarah Major RN  Safety Promotion/Fall Prevention: fall prevention program maintained  Taken 9/17/2020 1409 by Sarah Major RN  Safety Promotion/Fall Prevention: fall prevention program maintained  Taken 9/17/2020 1205 by Sarah Major RN  Safety Promotion/Fall Prevention:   fall prevention program maintained   nonskid shoes/slippers when out of bed   safety round/check completed  Taken 9/17/2020 1000 by Sarah Major  RN  Safety Promotion/Fall Prevention:   fall prevention program maintained   nonskid shoes/slippers when out of bed   safety round/check completed  Taken 9/17/2020 0800 by Sarah Major RN  Safety Promotion/Fall Prevention: fall prevention program maintained   Goal Outcome Evaluation:  Plan of Care Reviewed With: patient  Progress: improving  Outcome Summary: denies any pain today; no nausea; tolerating diet; passing flatus; cont to monitor

## 2020-09-17 NOTE — PROGRESS NOTES
Chief Complaint:    Small bowel obstruction    Subjective:    The patient is feeling better today with no abdominal pain.  She was started on a diet and she is tolerating it well.  She has passed flatus but no bowel movement yet.    Objective:    Temp:  [97.6 °F (36.4 °C)-98.1 °F (36.7 °C)] 98.1 °F (36.7 °C)  Heart Rate:  [57-97] 62  Resp:  [16-18] 18  BP: (117-143)/(59-73) 117/59    Physical Exam  Constitutional:       Appearance: She is not ill-appearing or toxic-appearing.   Abdominal:      Palpations: Abdomen is soft.      Tenderness: There is no abdominal tenderness.   Neurological:      Mental Status: She is alert.   Psychiatric:         Behavior: Behavior is cooperative.         Results:    Abdominal x-rays show mild dilatation of some small bowel loops but there is gas and air in the colon.    Assessment/Plan:    The patient has a partial SBO that is clinically resolving.  I agree with the diet.  I will start MiraLAX.  If she has a bowel movement, she will be ready for discharge from a surgical standpoint.    Fernando Zapata Jr., M.D.

## 2020-09-17 NOTE — PLAN OF CARE
Problem: Adult Inpatient Plan of Care  Goal: Plan of Care Review  Recent Flowsheet Documentation  Taken 9/17/2020 1435 by Esther Haley PTA  Progress: improving  Plan of Care Reviewed With: patient  Outcome Summary: Pt tolerated treatment with no complaints. Pt declined OOB activity due to frequent urination however, did get up with OT earlier this morning. Pt agreed to bed exercises. Pt performed bilateral LE exercises without difficulty and discomfort. Will continue to progress pt as tolerated.   ..Patient was wearing a face mask during this therapy encounter. Therapist used appropriate personal protective equipment including eye protection, mask, and gloves.  Mask used was standard procedure mask. Appropriate PPE was worn during the entire therapy session. Hand hygiene was completed before and after therapy session. Patient is not in enhanced droplet precautions.

## 2020-09-17 NOTE — THERAPY TREATMENT NOTE
Patient Name: Rose Cheema  : 1940    MRN: [id number]                              Today's Date: 2020       Admit Date: 2020    Visit Dx:     ICD-10-CM ICD-9-CM   1. Small bowel obstruction (CMS/HCC)  K56.609 560.9     Patient Active Problem List   Diagnosis   • Urinary incontinence   • Urinary frequency   • Generalized abdominal pain   • Pulmonary hypertension    • Benign essential hypertension   • Bilateral lower extremity edema (chronic)   • CAD (coronary artery disease), native coronary artery   • Chronic obstructive pulmonary disease with acute exacerbation (CMS/HCC)   • Diastolic dysfunction   • Class 3 severe obesity with serious comorbidity and body mass index (BMI) of 50.0 to 59.9 in adult (CMS/[facility])   • Obstructive sleep apnea   • Secondary pulmonary arterial hypertension (CMS/[facility])   • H/O: hysterectomy   • Fibromyalgia   • Hx SBO   • Hx of cholecystectomy   • Hypoglycemia   • Fall   • Hypothyroidism   • Chronic pain syndrome   • Anemia   • Pneumonia of right lower lobe due to infectious organism   • Acute UTI   • Acute on chronic diastolic (congestive) heart failure (CMS/HCC)   • Opioid dependence (CMS/[facility])   • Venous stasis dermatitis of both lower extremities   • Gram-negative bacteremia   • Hypokalemia   • E. coli bacteremia   • Anemia of chronic disease   • Sepsis without acute organ dysfunction - present on admit   • CKD (chronic kidney disease) stage 3, GFR 30-59 ml/min (CMS/[facility])   • Bacteremia   • Enterococcal septicemia (CMS/[facility])   • Asymptomatic bacteriuria     Past Medical History:   Diagnosis Date   • Anemia    • Anxiety    • Arthritis    • CHF (congestive heart failure) (CMS/[facility])    • Coronary artery disease    • Depression    • Disease of thyroid gland    • Fibromyalgia    • GERD (gastroesophageal reflux disease)    • Hypertension    • Moderate tricuspid valve stenosis    • Osteoporosis    • Peripheral neuropathy    • Pulmonary hypertension (CMS/HCC)    • SBO (small bowel  obstruction) (CMS/HCC)    • Stenosis of mitral valve      Past Surgical History:   Procedure Laterality Date   • BILATERAL OOPHORECTOMY     • CARDIAC CATHETERIZATION     • CHOLECYSTECTOMY     • ERCP N/A 2/15/2019    Procedure: ENDOSCOPIC RETROGRADE CHOLANGIOPANCREATOGRAPHY WITH PARTIAL CHOLANGIOGRAM;  Surgeon: Gerald Herr MD;  Location: [facility] ENDOSCOPY;  Service: Gastroenterology   • EXPLORATORY LAPAROTOMY     • GASTRIC BYPASS     • HERNIA REPAIR      inguinal and ventral   • HYSTERECTOMY     • OVARIAN CYST REMOVAL       General Information     Row Name 09/17/20 1431          Physical Therapy Time and Intention    Document Type  therapy note (daily note)  -     Mode of Treatment  physical therapy;individual therapy  -[facility] Name 09/17/20 1431          General Information    Existing Precautions/Restrictions  fall  -[facility] Name 09/17/20 1431          Cognition    Orientation Status (Cognition)  oriented x 4  -       User Key  (r) = Recorded By, (t) = Taken By, (c) = Cosigned By    Initials Name Provider Type     Esther Haley PTA Physical Therapy Assistant        Mobility     [facility] Name 09/17/20 1432          Bed Mobility    Comment (Bed Mobility)  pt declined mobility OOB activity this afternoon due to lasixs and urinating fequently.  -       User Key  (r) = Recorded By, (t) = Taken By, (c) = Cosigned By    Initials Name Provider Type     Esther Haley PTA Physical Therapy Assistant        Obj/Interventions     [facility] Name 09/17/20 1433          Motor Skills    Therapeutic Exercise  hip;knee;ankle  -[facility] Name 09/17/20 1433          Hip (Therapeutic Exercise)    Hip (Therapeutic Exercise)  AROM (active range of motion) glut sets  -     Hip AROM (Therapeutic Exercise)  aBduction;aDduction X15  -[facility] Name 09/17/20 1433          Knee (Therapeutic Exercise)    Knee (Therapeutic Exercise)  AROM (active range of motion)  [facility]     Knee AROM (Therapeutic Exercise)  quadriceps  stretch;knee to chest stretch X15 each  -     Row Name 09/17/20 1433          Ankle (Therapeutic Exercise)    Ankle (Therapeutic Exercise)  AROM (active range of motion)  -     Ankle AROM (Therapeutic Exercise)  dorsiflexion;plantarflexion X15  -       User Key  (r) = Recorded By, (t) = Taken By, (c) = Cosigned By    Initials Name Provider Type     Esther Haley PTA Physical Therapy Assistant        Goals/Plan    No documentation.       Clinical Impression     Row Name 09/17/20 1435          Plan of Care Review    Plan of Care Reviewed With  patient  -     Progress  improving  -     Outcome Summary  Pt tolerated treatment with no complaints. Pt declined OOB activity due to frequent urination however, did get up with OT earlier this morning. Pt agreed to bed exercises. Pt performed bilateral LE exercises without difficulty and discomfort. Will continue to progress pt as tolerated.  -     Row Name 09/17/20 1435          Positioning and Restraints    Pre-Treatment Position  in bed  -     Post Treatment Position  bed  -     In Bed  supine;call light within reach;encouraged to call for assist;exit alarm on  -       User Key  (r) = Recorded By, (t) = Taken By, (c) = Cosigned By    Initials Name Provider Type     Esther Haley PTA Physical Therapy Assistant        Outcome Measures     Row Name 09/17/20 1436          How much help from another person do you currently need...    Turning from your back to your side while in flat bed without using bedrails?  3  -EH     Moving from lying on back to sitting on the side of a flat bed without bedrails?  3  -EH     Moving to and from a bed to a chair (including a wheelchair)?  3  -EH     Standing up from a chair using your arms (e.g., wheelchair, bedside chair)?  3  -EH     Climbing 3-5 steps with a railing?  2  -EH     To walk in hospital room?  3  -EH     AM-PAC 6 Clicks Score (PT)  17  -       User Key  (r) = Recorded By, (t) = Taken By, (c) = Cosigned  By    Initials Name Provider Type     Esther Haley PTA Physical Therapy Assistant        Physical Therapy Education                 Title: PT OT SLP Therapies (Done)     Topic: Physical Therapy (Done)     Point: Mobility training (Done)     Learning Progress Summary           Patient Acceptance, E,D, VU,DU by  at 9/17/2020 1436    Acceptance, E,D, VU,NR by MS at 9/16/2020 1449                   Point: Home exercise program (Done)     Learning Progress Summary           Patient Acceptance, E,D, VU,DU by  at 9/17/2020 1436    Acceptance, E,D, VU,NR by MS at 9/16/2020 1449                   Point: Body mechanics (Done)     Learning Progress Summary           Patient Acceptance, E,D, VU,DU by  at 9/17/2020 1436    Acceptance, E,D, VU,NR by MS at 9/16/2020 1449                   Point: Precautions (Done)     Learning Progress Summary           Patient Acceptance, E,D, VU,DU by  at 9/17/2020 1436    Acceptance, E,D, VU,NR by MS at 9/16/2020 1449                               User Key     Initials Effective Dates Name Provider Type Discipline    MS 04/03/18 -  Mason Ward, PT Physical Therapist PT     08/19/18 -  Esther Haley PTA Physical Therapy Assistant PT              PT Recommendation and Plan     Plan of Care Reviewed With: patient  Progress: improving  Outcome Summary: Pt tolerated treatment with no complaints. Pt declined OOB activity due to frequent urination however, did get up with OT earlier this morning. Pt agreed to bed exercises. Pt performed bilateral LE exercises without difficulty and discomfort. Will continue to progress pt as tolerated.     Time Calculation:   PT Charges     Row Name 09/17/20 1430             Time Calculation    Start Time  1335  -      Stop Time  1354  -      Time Calculation (min)  19 min  -      PT Received On  09/17/20  -      PT - Next Appointment  09/18/20  -         Time Calculation- PT    Total Timed Code Minutes- PT  19 minute(s)  -        User  Key  (r) = Recorded By, (t) = Taken By, (c) = Cosigned By    Initials Name Provider Type     Esther Haley PTA Physical Therapy Assistant        Therapy Charges for Today     Code Description Service Date Service Provider Modifiers Qty    [id number] HC PT THER PROC EA 15 MIN 9/17/2020 Esther Haley PTA GP 1          PT G-Codes  Outcome Measure Options: AM-PAC 6 Clicks Daily Activity (OT)  AM-PAC 6 Clicks Score (PT): 17  AM-PAC 6 Clicks Score (OT): 13    Esther Haley PTA  9/17/2020

## 2020-09-17 NOTE — PLAN OF CARE
Problem: Adult Inpatient Plan of Care  Goal: Plan of Care Review  Flowsheets (Taken 9/17/2020 4802)  Progress: improving  Plan of Care Reviewed With: patient  Outcome Summary: Pt evaluated for OT. pt was SBA w sup to sit and min A sit to supine. pt sat EOB SBA. tsf to BSC w. CGA, toileting skills she was dep. SBA w grooming skills. Dep w. socks. Pt completed B UE ex 10x2. Pt cont to benefit from OT to incr ADL ,strength, balance, tsf. recommned rehab at [facility]   OT wore mask, gloves, hand hygiene, protective eye wear. Pt wore mask most of session

## 2020-09-17 NOTE — PROGRESS NOTES
Name: Rose Cheema ADMIT: 2020   : 1940  PCP: Cheng Guevara MD    MRN: [id number] LOS: 2 days   AGE/SEX: 79 y.o. female  ROOM: [facility]     Subjective   Subjective   Saw the patient this afternoon.  Belly feels fine.  Tolerating liquids.  Has had large amounts of urine output    Review of Systems     Objective   Objective   Vital Signs  Temp:  [97.6 °F (36.4 °C)-98.1 °F (36.7 °C)] 98.1 °F (36.7 °C)  Heart Rate:  [57-97] 62  Resp:  [16-18] 18  BP: (117-143)/(59-73) 117/59  SpO2:  [93 %-96 %] 95 %  on  Flow (L/min):  [2] 2;   Device (Oxygen Therapy): room air  Body mass index is 49.87 kg/m².  Physical Exam  Vitals signs and nursing note reviewed.   Constitutional:       General: She is not in acute distress.     Appearance: She is obese. She is not ill-appearing, toxic-appearing or diaphoretic.      Comments: Morbidly obese.  Looks a little older than age   HENT:      Head: Normocephalic.      Nose: Nose normal.      Mouth/Throat:      Pharynx: No oropharyngeal exudate.   Neck:      Musculoskeletal: Neck supple.   Cardiovascular:      Rate and Rhythm: Normal rate and regular rhythm.      Heart sounds: Murmur present.      Comments: Grade 3/6 systolic murmur right and left sternal borders  Pulmonary:      Effort: No respiratory distress.      Breath sounds: No stridor. No wheezing, rhonchi or rales.      Comments: Diminished but clear  Abdominal:      General: Bowel sounds are normal. There is no distension.      Palpations: Abdomen is soft.      Tenderness: There is no abdominal tenderness.      Comments: Obese.  No hepatosplenomegaly   Musculoskeletal:      Right lower leg: Edema present.      Left lower leg: Edema present.      Comments: 1+ edema at the ankles, improved.  Skin is wrinkling.  Erythema diminished   Skin:     General: Skin is warm.      Findings: Rash present.      Comments: Decreased erythema along both shins   Neurological:      Mental Status: She is alert.         Results Review:         I reviewed the patient's new clinical results.  Results from last 7 days   Lab Units 09/17/20  0619 09/16/20  1126 09/15/20  0603 09/14/20  2211   WBC 10*3/mm3 5.11 4.65 5.36 8.12   HEMOGLOBIN g/dL 12.3 11.5* 11.9* 13.8   PLATELETS 10*3/mm3 127* 117* 125* 147     Results from last 7 days   Lab Units 09/17/20  0619 09/16/20  0939 09/15/20  0603 09/14/20  2211   SODIUM mmol/L 140 136 141 141   POTASSIUM mmol/L 4.6 4.6 4.4 4.5   CHLORIDE mmol/L 101 106 108* 106   CO2 mmol/L 28.4 17.0* 26.9 26.2   BUN mg/dL 13 12 16 15   CREATININE mg/dL 0.98 0.94 0.96 1.02*   GLUCOSE mg/dL 96 67 98 135*   Estimated Creatinine Clearance: 62.7 mL/min (by C-G formula based on SCr of 0.98 mg/dL).  Results from last 7 days   Lab Units 09/14/20  2211   ALBUMIN g/dL 4.20   BILIRUBIN mg/dL 0.5   ALK PHOS U/L 140*   AST (SGOT) U/L 18   ALT (SGPT) U/L 15     Results from last 7 days   Lab Units 09/17/20  0619 09/16/20  0939 09/15/20  0603 09/14/20  2211   CALCIUM mg/dL 8.3* 7.9* 7.9* 8.8   ALBUMIN g/dL  --   --   --  4.20       No results found for: HGBA1C, POCGLU    aspirin, 81 mg, Oral, Daily  busPIRone, 30 mg, Oral, BID  enoxaparin, 40 mg, Subcutaneous, Q24H  fentaNYL, 1 patch, Transdermal, Q72H  FLUoxetine, 60 mg, Oral, Daily  hydrocortisone, , Topical, Q12H  ipratropium-albuterol, 3 mL, Nebulization, 4x Daily - RT  lactobacillus acidophilus, 1 capsule, Oral, Daily  levothyroxine, 50 mcg, Oral, Daily  pantoprazole, 40 mg, Oral, Daily  piperacillin-tazobactam, 3.375 g, Intravenous, Q8H  polyethylene glycol, 17 g, Oral, Daily  pregabalin, 150 mg, Oral, BID  QUEtiapine, 50 mg, Oral, Nightly  sodium chloride, 10 mL, Intravenous, Q12H  [START ON 9/18/2020] torsemide, 100 mg, Oral, Daily       Diet Regular; GI Soft       Assessment/Plan     Active Hospital Problems    Diagnosis  POA   • CKD (chronic kidney disease) stage 3, GFR 30-59 ml/min (CMS/HCC) [N18.3]  Yes   • Opioid dependence (CMS/HCC) [F11.20]  Yes   • Hypothyroidism [E03.9]  Yes    • Chronic pain syndrome [G89.4]  Yes   • Fibromyalgia [M79.7]  Yes   • Hx SBO [Z87.19]  Not Applicable   • Bilateral lower extremity edema (chronic) [R60.0]  Yes   • CAD (coronary artery disease), native coronary artery [I25.10]  Yes   • Diastolic dysfunction [I51.89]  Yes   • Benign essential hypertension [I10]  Yes   • Obstructive sleep apnea [G47.33]  Yes   • Chronic obstructive pulmonary disease with acute exacerbation (CMS/[facility]) [J44.1]  Yes   • Class 3 severe obesity with serious comorbidity and body mass index (BMI) of 50.0 to 59.9 in adult (CMS/[facility]) [E66.01, Z68.43]  Not Applicable      Resolved Hospital Problems    Diagnosis Date Resolved POA   • **SBO (small bowel obstruction) (CMS/[facility]) [K56.609] 09/16/2020 Yes   • Nausea & vomiting [R11.2] 09/16/2020 Unknown   • Small bowel obstruction (CMS/[facility]) [K56.609] 09/16/2020 Yes       · Partial small bowel obstruction, resolved.  Discussed with Dr. Zapata.  Advance diet to solid food.  · Acute on chronic diastolic congestive heart failure.  Better.  Will change Demadex to daily per patient request.  She has had significant urine output yesterday and today  · COPD, pulmonary hypertension and sleep apnea.  Continue mini nebs  · Asymptomatic bacteriuria.  I discontinued Zosyn  · Weakness.  Continue physical therapy.  · Thrombocytopenia, reactive.  Recheck a.mBobo Ellington MD  [facility]  09/17/20  18:34 EDT

## 2020-09-18 VITALS
BODY MASS INDEX: 49.88 KG/M2 | OXYGEN SATURATION: 97 % | SYSTOLIC BLOOD PRESSURE: 115 MMHG | DIASTOLIC BLOOD PRESSURE: 71 MMHG | WEIGHT: 281.53 LBS | TEMPERATURE: 97.7 F | HEART RATE: 94 BPM | HEIGHT: 63 IN | RESPIRATION RATE: 18 BRPM

## 2020-09-18 LAB
ANION GAP SERPL CALCULATED.3IONS-SCNC: 11.3 MMOL/L (ref 5–15)
BUN SERPL-MCNC: 14 MG/DL (ref 8–23)
BUN/CREAT SERPL: 12.5 (ref 7–25)
CALCIUM SPEC-SCNC: 8.2 MG/DL (ref 8.6–10.5)
CHLORIDE SERPL-SCNC: 97 MMOL/L (ref 98–107)
CO2 SERPL-SCNC: 33.7 MMOL/L (ref 22–29)
CREAT SERPL-MCNC: 1.12 MG/DL (ref 0.57–1)
DEPRECATED RDW RBC AUTO: 43.5 FL (ref 37–54)
ERYTHROCYTE [DISTWIDTH] IN BLOOD BY AUTOMATED COUNT: 12.3 % (ref 12.3–15.4)
GFR SERPL CREATININE-BSD FRML MDRD: 47 ML/MIN/1.73
GLUCOSE SERPL-MCNC: 99 MG/DL (ref 65–99)
HCT VFR BLD AUTO: 39.7 % (ref 34–46.6)
HGB BLD-MCNC: 12.8 G/DL (ref 12–15.9)
MCH RBC QN AUTO: 30.9 PG (ref 26.6–33)
MCHC RBC AUTO-ENTMCNC: 32.2 G/DL (ref 31.5–35.7)
MCV RBC AUTO: 95.9 FL (ref 79–97)
PLATELET # BLD AUTO: 141 10*3/MM3 (ref 140–450)
PMV BLD AUTO: 10.5 FL (ref 6–12)
POTASSIUM SERPL-SCNC: 3.4 MMOL/L (ref 3.5–5.2)
RBC # BLD AUTO: 4.14 10*6/MM3 (ref 3.77–5.28)
SODIUM SERPL-SCNC: 142 MMOL/L (ref 136–145)
WBC # BLD AUTO: 4.44 10*3/MM3 (ref 3.4–10.8)

## 2020-09-18 PROCEDURE — 97116 GAIT TRAINING THERAPY: CPT

## 2020-09-18 PROCEDURE — 25010000002 PIPERACILLIN SOD-TAZOBACTAM PER 1 G: Performed by: INTERNAL MEDICINE

## 2020-09-18 PROCEDURE — 94799 UNLISTED PULMONARY SVC/PX: CPT

## 2020-09-18 PROCEDURE — 99231 SBSQ HOSP IP/OBS SF/LOW 25: CPT | Performed by: SURGERY

## 2020-09-18 PROCEDURE — 85027 COMPLETE CBC AUTOMATED: CPT | Performed by: INTERNAL MEDICINE

## 2020-09-18 PROCEDURE — 97110 THERAPEUTIC EXERCISES: CPT

## 2020-09-18 PROCEDURE — 80048 BASIC METABOLIC PNL TOTAL CA: CPT | Performed by: INTERNAL MEDICINE

## 2020-09-18 RX ORDER — PETROLATUM 420 MG/G
OINTMENT TOPICAL EVERY 12 HOURS SCHEDULED
Status: DISCONTINUED | OUTPATIENT
Start: 2020-09-18 | End: 2020-09-18 | Stop reason: HOSPADM

## 2020-09-18 RX ADMIN — ASPIRIN 81 MG: 81 TABLET, CHEWABLE ORAL at 09:31

## 2020-09-18 RX ADMIN — TAZOBACTAM SODIUM AND PIPERACILLIN SODIUM 3.38 G: 375; 3 INJECTION, SOLUTION INTRAVENOUS at 05:58

## 2020-09-18 RX ADMIN — TORSEMIDE 100 MG: 100 TABLET ORAL at 09:30

## 2020-09-18 RX ADMIN — FENTANYL 1 PATCH: 25 PATCH TRANSDERMAL at 15:00

## 2020-09-18 RX ADMIN — FLUOXETINE HYDROCHLORIDE 60 MG: 20 CAPSULE ORAL at 09:31

## 2020-09-18 RX ADMIN — IPRATROPIUM BROMIDE AND ALBUTEROL SULFATE 3 ML: 2.5; .5 SOLUTION RESPIRATORY (INHALATION) at 07:19

## 2020-09-18 RX ADMIN — POLYETHYLENE GLYCOL 3350 17 G: 17 POWDER, FOR SOLUTION ORAL at 09:31

## 2020-09-18 RX ADMIN — BUSPIRONE HYDROCHLORIDE 30 MG: 15 TABLET ORAL at 09:31

## 2020-09-18 RX ADMIN — IPRATROPIUM BROMIDE AND ALBUTEROL SULFATE 3 ML: 2.5; .5 SOLUTION RESPIRATORY (INHALATION) at 11:15

## 2020-09-18 RX ADMIN — HYDROCORTISONE: 1 CREAM TOPICAL at 09:30

## 2020-09-18 RX ADMIN — PREGABALIN 150 MG: 75 CAPSULE ORAL at 09:31

## 2020-09-18 RX ADMIN — PANTOPRAZOLE SODIUM 40 MG: 40 TABLET, DELAYED RELEASE ORAL at 09:30

## 2020-09-18 RX ADMIN — LEVOTHYROXINE SODIUM 50 MCG: 50 TABLET ORAL at 05:58

## 2020-09-18 RX ADMIN — Medication 1 CAPSULE: at 09:30

## 2020-09-18 RX ADMIN — HYDROCODONE BITARTRATE AND ACETAMINOPHEN 1 TABLET: 7.5; 325 TABLET ORAL at 09:31

## 2020-09-18 NOTE — PROGRESS NOTES
Chief Complaint:    Partial small bowel obstruction    Subjective:    The patient is feeling well with no abdominal complaints.  She is tolerating a diet.  She had a bowel movement yesterday.    Objective:    Temp:  [97.7 °F (36.5 °C)-98.1 °F (36.7 °C)] 97.7 °F (36.5 °C)  Heart Rate:  [60-85] 83  Resp:  [16-18] 18  BP: (117-131)/(59-83) 124/75    Physical Exam  Constitutional:       Appearance: She is not ill-appearing or toxic-appearing.   Abdominal:      Palpations: Abdomen is soft.      Tenderness: There is no abdominal tenderness.   Neurological:      Mental Status: She is alert.   Psychiatric:         Behavior: Behavior is cooperative.         Results:    WBC is 4.44.    Assessment/Plan:    The patient has a partial small obstruction that has resolved.  She has no interest in pursuing any work-up that could lead to eventual surgery.  She has voiced interest in seeing gastroenterology as an outpatient.  I will sign off but remain available if needed.    Fernando Zapata Jr., M.D.

## 2020-09-18 NOTE — PLAN OF CARE
Problem: Adult Inpatient Plan of Care  Goal: Plan of Care Review  Outcome: Ongoing, Progressing  Flowsheets (Taken 9/18/2020 1041)  Progress: improving  Plan of Care Reviewed With: patient  Outcome Summary: Pt tolerated treatment with c/o lower extremity pain. Pt is progressing with mobility requiring CGA with bed mobility and sit<>stand transfers. Pt ambulated 25ft with rwx, CGA. Pt demos forward flexed posture with decreased stride length and gait speed. Cues for pt to correct posture and take bigger steps. Pt also performed bilateral LE exercises without difficulty. Will continue to progress pt as tolerated.   ..Patient was wearing a face mask during this therapy encounter. Therapist used appropriate personal protective equipment including eye protection, mask, and gloves.  Mask used was standard procedure mask. Appropriate PPE was worn during the entire therapy session. Hand hygiene was completed before and after therapy session. Patient is not in enhanced droplet precautions.

## 2020-09-18 NOTE — PLAN OF CARE
Goal Outcome Evaluation:  Plan of Care Reviewed With: patient  Progress: improving  Outcome Summary: Pt tolerated treatment with c/o lower extremity pain. Pt is progressing with mobility requiring CGA with bed mobility and sit<>stand transfers. Pt ambulated 25ft with rwx, CGA. Pt demos forward flexed posture with decreased stride length and gait speed. Cues for pt to correct posture and take bigger steps. Pt also performed bilateral LE exercises without difficulty. Will continue to progress pt as tolerated.

## 2020-09-18 NOTE — THERAPY TREATMENT NOTE
Patient Name: Rose Cheema  : 1940    MRN: [id number]                              Today's Date: 2020       Admit Date: 2020    Visit Dx:     ICD-10-CM ICD-9-CM   1. Small bowel obstruction (CMS/HCC)  K56.609 560.9     Patient Active Problem List   Diagnosis   • Urinary incontinence   • Urinary frequency   • Generalized abdominal pain   • Pulmonary hypertension    • Benign essential hypertension   • Bilateral lower extremity edema (chronic)   • CAD (coronary artery disease), native coronary artery   • Chronic obstructive pulmonary disease with acute exacerbation (CMS/HCC)   • Diastolic dysfunction   • Class 3 severe obesity with serious comorbidity and body mass index (BMI) of 50.0 to 59.9 in adult (CMS/Formerly [facility])   • Obstructive sleep apnea   • Secondary pulmonary arterial hypertension (CMS/[facility])   • H/O: hysterectomy   • Fibromyalgia   • Hx SBO   • Hx of cholecystectomy   • Hypoglycemia   • Fall   • Hypothyroidism   • Chronic pain syndrome   • Anemia   • Pneumonia of right lower lobe due to infectious organism   • Acute UTI   • Acute on chronic diastolic (congestive) heart failure (CMS/HCC)   • Opioid dependence (CMS/[facility])   • Venous stasis dermatitis of both lower extremities   • Gram-negative bacteremia   • Hypokalemia   • E. coli bacteremia   • Anemia of chronic disease   • Sepsis without acute organ dysfunction - present on admit   • CKD (chronic kidney disease) stage 3, GFR 30-59 ml/min (CMS/[facility])   • Bacteremia   • Enterococcal septicemia (CMS/[facility])   • Asymptomatic bacteriuria     Past Medical History:   Diagnosis Date   • Anemia    • Anxiety    • Arthritis    • CHF (congestive heart failure) (CMS/[facility])    • Coronary artery disease    • Depression    • Disease of thyroid gland    • Fibromyalgia    • GERD (gastroesophageal reflux disease)    • Hypertension    • Moderate tricuspid valve stenosis    • Osteoporosis    • Peripheral neuropathy    • Pulmonary hypertension (CMS/HCC)    • SBO (small bowel  obstruction) (CMS/HCC)    • Stenosis of mitral valve      Past Surgical History:   Procedure Laterality Date   • BILATERAL OOPHORECTOMY     • CARDIAC CATHETERIZATION     • CHOLECYSTECTOMY     • ERCP N/A 2/15/2019    Procedure: ENDOSCOPIC RETROGRADE CHOLANGIOPANCREATOGRAPHY WITH PARTIAL CHOLANGIOGRAM;  Surgeon: Gerald Herr MD;  Location: [facility];  Service: Gastroenterology   • EXPLORATORY LAPAROTOMY     • GASTRIC BYPASS     • HERNIA REPAIR      inguinal and ventral   • HYSTERECTOMY     • OVARIAN CYST REMOVAL       General Information     [facility] Name 09/18/20 1034          Physical Therapy Time and Intention    Document Type  therapy note (daily note)  -     Mode of Treatment  physical therapy;individual therapy  -Critical access hospital Name 09/18/20 1034          General Information    Existing Precautions/Restrictions  fall  -Critical access hospital Name 09/18/20 1034          Cognition    Orientation Status (Cognition)  oriented x 4  -Critical access hospital Name 09/18/20 1034          Safety Issues, Functional Mobility    Impairments Affecting Function (Mobility)  balance;endurance/activity tolerance;strength;pain  -       User Key  (r) = Recorded By, (t) = Taken By, (c) = Cosigned By    Initials Name Provider Type     Esther Haley PTA Physical Therapy Assistant        Mobility     [facility] Name 09/18/20 1035          Bed Mobility    Supine-Sit [place] (Bed Mobility)  contact guard;verbal cues  -     Sit-Supine [place] (Bed Mobility)  contact guard;verbal cues  -     Assistive Device (Bed Mobility)  bed rails;head of bed elevated  -     Comment (Bed Mobility)  pt needed some assistance with bilateral LEs.  -Critical access hospital Name 09/18/20 1035          Sit-Stand Transfer    Sit-Stand [place] (Transfers)  contact guard;verbal cues  -     Assistive Device (Sit-Stand Transfers)  walker, front-wheeled  -Critical access hospital Name 09/18/20 1035          Gait/Stairs (Locomotion)    [place] Level (Gait)  contact guard  -      Assistive Device (Gait)  walker, front-wheeled  -     Distance in Feet (Gait)  25  -     Deviations/Abnormal Patterns (Gait)  amanda decreased;stride length decreased;gait speed decreased  -     Bilateral Gait Deviations  forward flexed posture;heel strike decreased  -       User Key  (r) = Recorded By, (t) = Taken By, (c) = Cosigned By    Initials Name Provider Type     Esther Haley PTA Physical Therapy Assistant        Obj/Interventions     [facility] Name 09/18/20 1040          Hip (Therapeutic Exercise)    Hip (Therapeutic Exercise)  AROM (active range of motion)  -     Hip AROM (Therapeutic Exercise)  flexion;bilateral;10 repetitions  -EH     Row Name 09/18/20 1040          Knee (Therapeutic Exercise)    Knee (Therapeutic Exercise)  AROM (active range of motion)  -     Knee AROM (Therapeutic Exercise)  bilateral;heel slides;LAQ (long arc quad);10 repetitions  -EH     Row Name 09/18/20 1040          Ankle (Therapeutic Exercise)    Ankle (Therapeutic Exercise)  AROM (active range of motion)  -     Ankle AROM (Therapeutic Exercise)  dorsiflexion;plantarflexion;bilateral;10 repetitions  -EH     Row Name 09/18/20 1040          Balance    Balance Assessment  sitting static balance  -     Static Standing Balance  mild impairment  -       User Key  (r) = Recorded By, (t) = Taken By, (c) = Cosigned By    Initials Name Provider Type     Esther Haley PTA Physical Therapy Assistant        Goals/Plan    No documentation.       Clinical Impression     Row Name 09/18/20 1041          Pain Scale: Numbers Pre/Post-Treatment    Pain Location - Side  Bilateral  -     Pain Location - Orientation  lower  -     Pain Location  extremity  -     Pain Intervention(s)  Repositioned;Ambulation/increased activity  -[facility] Name 09/18/20 1041          Plan of Care Review    Plan of Care Reviewed With  patient  -     Progress  improving  -     Outcome Summary  Pt tolerated treatment with c/o lower extremity  pain. Pt is progressing with mobility requiring CGA with bed mobility and sit<>stand transfers. Pt ambulated 25ft with rwx, CGA. Pt demos forward flexed posture with decreased stride length and gait speed. Cues for pt to correct posture and take bigger steps. Pt also performed bilateral LE exercises without difficulty. Will continue to progress pt as tolerated.  -     Row Name 09/18/20 1041          Positioning and Restraints    Pre-Treatment Position  in bed  -EH     Post Treatment Position  bed  -EH     In Bed  supine;call light within reach;encouraged to call for assist;exit alarm on;with nsg  -       User Key  (r) = Recorded By, (t) = Taken By, (c) = Cosigned By    Initials Name Provider Type     Esther Haley PTA Physical Therapy Assistant        Outcome Measures     Row Name 09/18/20 1045          How much help from another person do you currently need...    Turning from your back to your side while in flat bed without using bedrails?  3  -EH     Moving from lying on back to sitting on the side of a flat bed without bedrails?  3  -EH     Moving to and from a bed to a chair (including a wheelchair)?  3  -EH     Standing up from a chair using your arms (e.g., wheelchair, bedside chair)?  3  -EH     Climbing 3-5 steps with a railing?  2  -EH     To walk in hospital room?  3  -EH     AM-PAC 6 Clicks Score (PT)  17  -       User Key  (r) = Recorded By, (t) = Taken By, (c) = Cosigned By    Initials Name Provider Type     Esther Haley PTA Physical Therapy Assistant        Physical Therapy Education                 Title: PT OT SLP Therapies (Done)     Topic: Physical Therapy (Done)     Point: Mobility training (Done)     Learning Progress Summary           Patient Acceptance, E,D, VU,DU,NR by  at 9/18/2020 1045    Acceptance, E,D, VU,DU by  at 9/17/2020 1436    Acceptance, E,D, VU,NR by MS at 9/16/2020 1449                   Point: Home exercise program (Done)     Learning Progress Summary            Patient Acceptance, E,D, VU,DU,NR by  at 9/18/2020 1045    Acceptance, E,D, VU,DU by  at 9/17/2020 1436    Acceptance, E,D, VU,NR by MS at 9/16/2020 1449                   Point: Body mechanics (Done)     Learning Progress Summary           Patient Acceptance, E,D, VU,DU,NR by  at 9/18/2020 1045    Acceptance, E,D, VU,DU by  at 9/17/2020 1436    Acceptance, E,D, VU,NR by MS at 9/16/2020 1449                   Point: Precautions (Done)     Learning Progress Summary           Patient Acceptance, E,D, VU,DU,NR by  at 9/18/2020 1045    Acceptance, E,D, VU,DU by  at 9/17/2020 1436    Acceptance, E,D, VU,NR by MS at 9/16/2020 1449                               User Key     Initials Effective Dates Name Provider Type [facility] 04/03/18 -  Mason Ward, PT Physical Therapist PT     08/19/18 -  Esther Haley PTA Physical Therapy Assistant PT              PT Recommendation and Plan     Plan of Care Reviewed With: patient  Progress: improving  Outcome Summary: Pt tolerated treatment with c/o lower extremity pain. Pt is progressing with mobility requiring CGA with bed mobility and sit<>stand transfers. Pt ambulated 25ft with rwx, CGA. Pt demos forward flexed posture with decreased stride length and gait speed. Cues for pt to correct posture and take bigger steps. Pt also performed bilateral LE exercises without difficulty. Will continue to progress pt as tolerated.     Time Calculation:   PT Charges     Row Name 09/18/20 1034             Time Calculation    Start Time  0909  -      Stop Time  0932  -      Time Calculation (min)  23 min  -      PT Received On  09/18/20  -      PT - Next Appointment  09/19/20  -         Time Calculation- PT    Total Timed Code Minutes- PT  23 minute(s)  -        User Key  (r) = Recorded By, (t) = Taken By, (c) = Cosigned By    Initials Name Provider Type     Esther Haley PTA Physical Therapy Assistant        Therapy Charges for Today     Code  Description Service Date Service Provider Modifiers Qty    17892120224 HC PT THER PROC EA 15 MIN 9/17/2020 Esther Haley, CHRISTOPHER GP 1    12694893240 HC PT THER PROC EA 15 MIN 9/18/2020 Esther Haley, CHRISTOPHER GP 1    49393608971 HC GAIT TRAINING EA 15 MIN 9/18/2020 Esther Haley PTA GP 1          PT G-Codes  Outcome Measure Options: AM-PAC 6 Clicks Daily Activity (OT)  AM-PAC 6 Clicks Score (PT): 17  AM-PAC 6 Clicks Score (OT): 13    Esther Haley PTA  9/18/2020

## 2020-09-18 NOTE — PROGRESS NOTES
Continued Stay Note  [facility]     Patient Name: Rose Cheema  MRN: [id number]  Today's Date: 9/18/2020    Admit Date: 9/14/2020    Discharge Plan     Row Name 09/18/20 1518       Plan    Plan  Plan home with [facility].   KYLEIGH Grace RN    Patient/Family in Agreement with Plan  yes    Plan Comments  Per Joellen  ( [phone]) [facility] can accept pt.  Pt denies any other needs.  Kaila  ( [phone]) called and notified pt was discharging home today.  Plan home with [facility].   KYLEIGH Grace RN        Discharge Codes    No documentation.       Expected Discharge Date and Time     Expected Discharge Date Expected Discharge Time    Sep 18, 2020             Patti Grace, RN

## 2020-09-18 NOTE — DISCHARGE SUMMARY
[facility]    ASSOCIATES  [phone]    DISCHARGE SUMMARY  [facility]    Patient Identification:  Name: Rose Cheema  Age: 79 y.o.  Sex: female  :  1940  MRN: [id number]  Primary Care Physician: Cheng Guevara MD    Admit date: 2020  Discharge date and time:      Discharge Diagnoses:  Diastolic dysfunction    Benign essential hypertension    Bilateral lower extremity edema (chronic)    CAD (coronary artery disease), native coronary artery    Chronic obstructive pulmonary disease with acute exacerbation (CMS/Formerly [facility])    Class 3 severe obesity with serious comorbidity and body mass index (BMI) of 50.0 to 59.9 in adult (CMS/Formerly [facility])    Obstructive sleep apnea    Fibromyalgia    Hx SBO    Hypothyroidism    Chronic pain syndrome    Opioid dependence (CMS/Formerly [facility])    CKD (chronic kidney disease) stage 3, GFR 30-59 ml/min (CMS/Formerly [facility])       History of present illness from H&P:    Ms. Cheema is a 79-year-old female with history of hypertension, COPD, lower extremity edema, diastolic dysfunction, fibromyalgia, hypothyroidism, opiate dependency, COPD, obesity, CKD stage III who presents to the emergency room with nausea, vomiting, abdominal pain.  Patient states she started having some nausea and abdominal pain about 24 hours ago, has gradually worsened.  She describes the pain is epigastric and left upper quadrant stabbing pain that is intermittent during the day.  She is also had about 4 similar episodes over the last month that have resolved on their own, she has not sought treatment scheduled for now.  She does have a history of small bowel obstruction in the past.  She does states she has irritable bowel syndrome with chronic diarrhea and has had no change in her bowel pattern, denies any blood in her stool, she has had some mild dysuria, but no fever or other urinary tract symptoms.  She denies any fever at home, no cough, no shortness of breath, no exposure to COVID-19 that she  is aware of.  Today she did have some nausea and had a small amount of vomiting, but today is the first time she has had the vomiting, she has had associated nausea with the other episodes in the past month.  In the emergency room patient troponin was negative, glucose 135, sodium 141, creatinine 1.02, BUN 15, lipase 19, white blood cell count 8.110, hemoglobin 13.8, hematocrit 43.9.  Urinalysis shows positive nitrites, large amount of leukocytes, too numerous to count white blood cells, 4+ bacteria, 0-2 squamous epithelial cells, urine culture is pending.  CT scan of her abdomen shows dilated small bowel with other segments of decompressed small bowel, findings are consistent with small bowel obstruction.  Status post cholecystectomy and hysterectomy, there is also an 11 mm nonobstructing right renal stone.  EKG showed normal sinus rhythm.  COVID-19 testing is pending for admission, patient is low suspicion.    Hospital Course:   · Partial small bowel obstruction, resolved.  O for discharge from Dr. Zapata's standpoint.  Tolerating po  · Acute on chronic diastolic congestive heart failure.  Better.  Demedex everyother day as sh was previousy summer.  · COPD, pulmonary hypertension and sleep apnea (CPAP at home).  Continue mini nebs  · Asymptomatic bacteriuria.  Zosyn x 4days stopped  · Weakness.  Continue physical therapy.  · Thrombocytopenia, reactive.  Recheck a.m.  · Low potassium, restart on discharge      The patient was seen and examined on the day of discharge. Abdomen soft and non tender.    Consults:   Consults     Date and Time Order Name Status Description    9/15/2020 0102 Inpatient General Surgery Consult Completed     9/15/2020 0018 [facility] (on-call MD unless specified) Details Completed           Results from last 7 days   Lab Units 09/18/20  0734   WBC 10*3/mm3 4.44   HEMOGLOBIN g/dL 12.8   HEMATOCRIT % 39.7   PLATELETS 10*3/mm3 141       Results from last 7 days   Lab Units 09/18/20  0734   SODIUM  mmol/L 142   POTASSIUM mmol/L 3.4*   CHLORIDE mmol/L 97*   CO2 mmol/L 33.7*   BUN mg/dL 14   CREATININE mg/dL 1.12*   GLUCOSE mg/dL 99   CALCIUM mg/dL 8.2*       Significant Diagnostic Studies:   WBC   Date Value Ref Range Status   09/18/2020 4.44 3.40 - 10.80 10*3/mm3 Final     Hemoglobin   Date Value Ref Range Status   09/18/2020 12.8 12.0 - 15.9 g/dL Final     Hematocrit   Date Value Ref Range Status   09/18/2020 39.7 34.0 - 46.6 % Final     Platelets   Date Value Ref Range Status   09/18/2020 141 140 - 450 10*3/mm3 Final     Sodium   Date Value Ref Range Status   09/18/2020 142 136 - 145 mmol/L Final     Potassium   Date Value Ref Range Status   09/18/2020 3.4 (L) 3.5 - 5.2 mmol/L Final     Chloride   Date Value Ref Range Status   09/18/2020 97 (L) 98 - 107 mmol/L Final     CO2   Date Value Ref Range Status   09/18/2020 33.7 (H) 22.0 - 29.0 mmol/L Final     BUN   Date Value Ref Range Status   09/18/2020 14 8 - 23 mg/dL Final     Creatinine   Date Value Ref Range Status   09/18/2020 1.12 (H) 0.57 - 1.00 mg/dL Final     Glucose   Date Value Ref Range Status   09/18/2020 99 65 - 99 mg/dL Final     Calcium   Date Value Ref Range Status   09/18/2020 8.2 (L) 8.6 - 10.5 mg/dL Final     No results found for: AST, ALT, ALKPHOS  No results found for: APTT, INR  No results found for: COLORU, CLARITYU, SPECGRAV, PHUR, PROTEINUR, GLUCOSEU, KETONESU, BLOODU, NITRITE, LEUKOCYTESUR, BILIRUBINUR, UROBILINOGEN, RBCUA, WBCUA, BACTERIA, UACOMMENT  No results found for: TROPONINT, TROPONINI, BNP  No components found for: HGBA1C;2  No components found for: TSH;2    Imaging Results (All)     Procedure Component Value Units Date/Time    XR Abdomen KUB [[id number]] Collected: 09/17/20 1449     Updated: 09/17/20 1455    Narrative:      XR ABDOMEN KUB-     Clinical: Small bowel obstruction follow-up     COMPARISON 9/16/2020     FINDINGS: Some of the small bowel loops are mild to moderately  distended. One is located within the right  abdomen and it measures  approximately 5.8 cm in diameter. There are other small bowel loops  which are normal in caliber. Caliber of the colon is normal. There is  right renal calculus again demonstrated, measuring 8 mm. The abdominal  soft tissue planes are preserved.     CONCLUSION: Some of the small bowel loops are mildly moderately  distended. Other small bowel loops are normal in caliber. The colon is  normal in caliber. Right renal calculus again noted.     This report was finalized on 9/17/2020 2:52 PM by Dr. Dennis Burns M.D.       XR Abdomen Flat & Upright [[id number]] Collected: 09/16/20 1634     Updated: 09/16/20 1756    Narrative:      FLAT AND UPRIGHT ABDOMEN     HISTORY: Small bowel obstruction. Follow-up exam.     COMPARISON: CT abdomen and pelvis 09/14/2020.     FINDINGS: Within the central, left abdomen there is a dilated small  bowel loop that correlates with the dilated small bowel demonstrated on  CT abdomen and pelvis 2 days previous and is consistent with a small  bowel obstruction. There is air and stool throughout the distribution of  the colon without colonic dilatation. There is no evidence for free  intraperitoneal gas. There is chronic elevation of the right  hemidiaphragm. Surgical clips are present in the region of the  gastroesophageal junction and there are cholecystectomy clips.       Impression:      Dilated air-filled small bowel loop within the left mid  abdomen with similar degree of dilatation as demonstrated on CT 2 days  prior consistent with small bowel obstruction. There is air and stool  throughout the distribution of the colon and there is no evidence for  free intraperitoneal gas.     This report was finalized on 9/16/2020 5:53 PM by Dr. Solomon Gay M.D.       CT Abdomen Pelvis With Contrast [[id number]] Collected: 09/15/20 0027     Updated: 09/15/20 0413    Narrative:      CT OF THE ABDOMEN AND PELVIS WITH CONTRAST 09/14/2020     HISTORY: Abdominal pain.  Vomiting.     TECHNIQUE: Axial images were obtained from the lung bases to the  symphysis pubis after intravenous contrast. No oral contrast was given.     FINDINGS: There is some minimal linear probable scarring in the right  lung base also seen on the previous study of 05/15/2020.     Gallbladder has been removed. There is a mild compensatory biliary  dilatation. The liver, spleen and adrenals appear unremarkable. There is  an approximately 11 mm nonobstructing right renal stone. Left kidney  appears unremarkable. Pancreas is mildly atrophic.     There are surgical clips from previous gastric bypass procedure. There  are multiple segments of moderately severely dilated small bowel with  other segments of decompressed small bowel. Findings are consistent with  a small bowel obstruction. The colon appears decompressed. Uterus has  been removed. There is some increased attenuation in the anterior  subcutaneous tissues of the lower abdomen and pelvis, this may be from  previous hernia repair.       Impression:      1. Dilated small bowel with other segments of decompressed small bowel.  Findings are consistent with a small bowel obstruction.  2. Status post cholecystectomy and hysterectomy. Patient also appears to  have had a previous hernia repair.  3. An 11 mm nonobstructing right renal stone.     Radiation dose reduction techniques were utilized, including automated  exposure control and exposure modulation based on body size.     This report was finalized on 9/15/2020 4:10 AM by Dr. Robert Bridges M.D.         No results found for: SITE, ALLENTEST, PHART, KMV1QXN, PO2ART, TIG9CNX, BASEEXCESS, K1IKBDUT, HGBBG, HCTABG, OXYHEMOGLOBI, METHHGBN, CARBOXYHGB, CO2CT, BAROMETRIC, MODALITY, FIO2       Discharge Medications      Continue These Medications      Instructions Start Date   albuterol (2.5 MG/3ML) 0.083% nebulizer solution  Commonly known as: PROVENTIL   2.5 mg, Nebulization, 4 Times Daily PRN      aspirin 81  MG chewable tablet   81 mg, Oral      busPIRone 30 MG tablet  Commonly known as: BUSPAR   30 mg, Oral, 2 Times Daily      calcium carbonate-cholecalciferol 500-400 MG-UNIT tablet tablet   1 tablet, Oral, Daily      EYE VITAMINS PO   1 each, Oral      fentaNYL 25 MCG/HR patch  Commonly known as: DURAGESIC   1 patch, Transdermal, Every 72 Hours      FLUoxetine 60 MG tablet  Commonly known as: PROzac   60 mg, Oral, Daily      HYDROcodone-acetaminophen 7.5-325 MG per tablet  Commonly known as: NORCO   1 tablet, Oral, Every 6 Hours PRN      levothyroxine 50 MCG tablet  Commonly known as: SYNTHROID, LEVOTHROID   50 mcg, Oral, Daily      oxybutynin XL 10 MG 24 hr tablet  Commonly known as: DITROPAN-XL   10 mg, Oral, Daily      pantoprazole 40 MG EC tablet  Commonly known as: PROTONIX   40 mg, Oral, Daily      potassium chloride 10 MEQ CR capsule  Commonly known as: MICRO-K   30 mEq, Oral, Daily      pregabalin 150 MG capsule  Commonly known as: LYRICA   150 mg, Oral, 2 Times Daily      QUEtiapine 50 MG tablet  Commonly known as: SEROquel   50 mg, Oral, Nightly      torsemide 100 MG tablet  Commonly known as: DEMADEX   50 mg, Oral, Daily, Takes 50 mg daily and every other day takes a second 50 mg dose      umeclidinium-vilanterol 62.5-25 MCG/INH aerosol powder  inhaler  Commonly known as: Anoro Ellipta   1 puff, Inhalation, Daily - RT         Stop These Medications    loperamide 2 MG capsule  Commonly known as: IMODIUM     Myrbetriq 50 MG tablet sustained-release 24 hour 24 hr tablet  Generic drug: Mirabegron ER        ASK your doctor about these medications      Instructions Start Date   ALIGN PO   2 each, Oral, Daily               Patient Instructions:       No future appointments.     Follow-up Information     Cheng Guevara MD .    Specialty: Internal Medicine  Contact information:  [address] [place] [address] [address]  [facility] [id number] [phone]                   Discharge Order (From admission, onward)     Start      Ordered    09/18/20 1436  Discharge patient  Once     Expected Discharge Date: 09/18/20    Discharge Disposition: Home or Self Care    Physician of Record for Attribution - Please select from Treatment Team: RICHARD ZHOU [[id number]]    Review needed by CMO to determine Physician of Record: No       Question Answer Comment   Physician of Record for Attribution - Please select from Treatment Team RICHARD ZHOU    Review needed by CMO to determine Physician of Record No        09/18/20 1438                Diet Order   Procedures   • Diet Regular; GI Soft           Discharge instructions:  Follow up with your primary care provider in 1-2 weeks with a cbc and cmp         Total time spent discharging patient including evaluation, post hospitalization follow up,  medication and post hospitalization instructions and education, total time exceeds 30 minutes.    Signed:  Richard Zhou MD  9/18/2020  14:55 EDT

## 2020-09-18 NOTE — PROGRESS NOTES
Continued Stay Note  [facility]     Patient Name: Rose Cheema  MRN: [id number]  Today's Date: 9/18/2020    Admit Date: 9/14/2020    Discharge Plan     Row Name 09/18/20 1215       Plan    Plan  Plan home with [facility] or [facility] for skilled care. KYLEIGH Grace RN    Provided Post Acute Provider List?  Refused    Provided Post Acute Provider Quality & Resource List?  Refused    Patient/Family in Agreement with Plan  yes    Plan Comments  Spoke to pt at bedside.   Pt states she does not feel she needs skilled care at this time.  Pt states she would like to go home with .  Pt has used [facility] and would like to use them again.  Joellen ( [phone]) called to follow at [facility] Office.   Plan home with [facility] or [facility] for skilled care.  KYLEIGH Grace RN        Discharge Codes    No documentation.             Patti Grace, RN

## 2020-09-19 ENCOUNTER — READMISSION MANAGEMENT (OUTPATIENT)
Dept: CALL CENTER | Facility: HOSPITAL | Age: 80
End: 2020-09-19

## 2020-09-19 NOTE — OUTREACH NOTE
Prep Survey      Responses   [facility] patient discharged from?  [place]   Is LACE score < 7 ?  No   Eligibility  Readm Mgmt   Discharge diagnosis  Partial small bowel obstruction,    Acute on chronic diastolic congestive heart failure   COVID-19 Test Status  Negative   Does the patient have one of the following disease processes/diagnoses(primary or secondary)?  CHF   Does the patient have Home health ordered?  Yes   What is the Home health agency?   [facility]    Is there a DME ordered?  No   Prep survey completed?  Yes          Devora Heard RN

## 2020-09-25 ENCOUNTER — READMISSION MANAGEMENT (OUTPATIENT)
Dept: CALL CENTER | Facility: HOSPITAL | Age: 80
End: 2020-09-25

## 2020-09-25 NOTE — OUTREACH NOTE
CHF Week 1 Survey      Responses   [facility] [place] patient discharged from?  [place]   Does the patient have one of the following disease processes/diagnoses(primary or secondary)?  CHF   CHF Week 1 attempt successful?  Yes   Call start time  1652   Call end time  1656   Discharge diagnosis  Partial small bowel obstruction,    Acute on chronic diastolic congestive heart failure   Meds reviewed with patient/caregiver?  Yes   Is the patient having any side effects they believe may be caused by any medication additions or changes?  No   Does the patient have all medications ordered at discharge?  Yes   Is the patient taking all medications as directed (includes completed medication regime)?  Yes   Does the patient have a primary care provider?   Yes   Does the patient have an appointment with their PCP within 7 days of discharge?  Yes   Has the patient kept scheduled appointments due by today?  Yes   What is the Home health agency?   [facility]     Has home health visited the patient within 72 hours of discharge?  Yes   Pulse Ox monitoring  None   Psychosocial issues?  No   Did the patient receive a copy of their discharge instructions?  Yes   Nursing interventions  Reviewed instructions with patient   What is the patient's perception of their health status since discharge?  Improving   Nursing interventions  Nurse provided patient education   Is the patient weighing daily?  Yes   Does the patient have scales?  Yes   Daily weight interventions  Education provided on importance of daily weight   Is the patient able to teach back Heart Failure diet management?  Yes   Is the patient able to teach back Heart Failure Zones?  Yes   Is the patient able to teach back signs and symptoms of worsening condition? (i.e. weight gain, shortness of air, etc.)  Yes   Is the patient/caregiver able to teach back the hierarchy of who to call/visit for symptoms/problems? PCP, Specialist, Home health nurse, Urgent Care, ED, 911  Yes    Additional teach back comments  She is a retired nurse, she is doing well, knows to weigh daily, avoids sodium.    CHF Week 1 call completed?  Yes   Wrap up additional comments  Improving.          Karely Riley RN

## 2020-10-01 ENCOUNTER — READMISSION MANAGEMENT (OUTPATIENT)
Dept: CALL CENTER | Facility: HOSPITAL | Age: 80
End: 2020-10-01

## 2020-10-01 NOTE — OUTREACH NOTE
CHF Week 2 Survey      Responses   [facility] patient discharged from?  [place]   Does the patient have one of the following disease processes/diagnoses(primary or secondary)?  CHF   Week 2 attempt successful?  Yes   Call start time  1634   Call end time  1636   Discharge diagnosis  Partial small bowel obstruction,    Acute on chronic diastolic congestive heart failure   Is the patient taking all medications as directed (includes completed medication regime)?  Yes   Has the patient kept scheduled appointments due by today?  Yes   What is the Home health agency?   [facility]    Psychosocial issues?  No   What is the patient's perception of their health status since discharge?  Improving   Nursing interventions  Nurse provided patient education   Is the patient weighing daily?  Yes   Does the patient have scales?  Yes   Is the patient able to teach back signs and symptoms of worsening condition? (i.e. weight gain, shortness of air, etc.)  Yes   Is the patient/caregiver able to teach back the hierarchy of who to call/visit for symptoms/problems? PCP, Specialist, Home health nurse, Urgent Care, ED, 911  Yes   CHF Week 2 call completed?  Yes   Wrap up additional comments  Patient says she is doing some better but is now having diarrhea from stopping her anti-diarrheal, she is going to speak with her PCP about other options.          Lay Lea RN

## 2020-10-06 RX ORDER — MIRABEGRON 50 MG/1
TABLET, FILM COATED, EXTENDED RELEASE ORAL
Qty: 30 TABLET | Refills: 0 | OUTPATIENT
Start: 2020-10-06

## 2020-10-09 ENCOUNTER — READMISSION MANAGEMENT (OUTPATIENT)
Dept: CALL CENTER | Facility: HOSPITAL | Age: 80
End: 2020-10-09

## 2020-10-09 RX ORDER — MIRABEGRON 50 MG/1
TABLET, FILM COATED, EXTENDED RELEASE ORAL
Qty: 30 TABLET | Refills: 0 | OUTPATIENT
Start: 2020-10-09

## 2020-10-09 NOTE — OUTREACH NOTE
CHF Week 3 Survey      Responses   [facility] patient discharged from?  [place]   Does the patient have one of the following disease processes/diagnoses(primary or secondary)?  CHF   Week 3 attempt successful?  Yes   Call start time  1648   Call end time  1649   Discharge diagnosis  Partial small bowel obstruction,    Acute on chronic diastolic congestive heart failure   Meds reviewed with patient/caregiver?  Yes   Is the patient having any side effects they believe may be caused by any medication additions or changes?  No   Does the patient have all medications ordered at discharge?  Yes   Is the patient taking all medications as directed (includes completed medication regime)?  Yes   Does the patient have a primary care provider?   Yes   Does the patient have an appointment with their PCP within 7 days of discharge?  N/A   Has the patient kept scheduled appointments due by today?  N/A   What is the Home health agency?   [facility]     Has home health visited the patient within 72 hours of discharge?  Yes   Pulse Ox monitoring  None   Psychosocial issues?  No   Did the patient receive a copy of their discharge instructions?  Yes   Nursing interventions  Reviewed instructions with patient   What is the patient's perception of their health status since discharge?  Improving   Nursing interventions  Nurse provided patient education   Is the patient weighing daily?  No [most every day]   Does the patient have scales?  Yes   Daily weight interventions  Education provided on importance of daily weight   Is the patient able to teach back Heart Failure diet management?  Yes   CHF Week 3 call completed?  Yes          Edelmira Jaeger RN

## 2020-11-10 RX ORDER — MIRABEGRON 50 MG/1
TABLET, FILM COATED, EXTENDED RELEASE ORAL
Qty: 30 TABLET | Refills: 0 | OUTPATIENT
Start: 2020-11-10

## 2021-03-03 DIAGNOSIS — Z23 IMMUNIZATION DUE: ICD-10-CM

## 2023-05-26 ENCOUNTER — HOSPITAL ENCOUNTER (INPATIENT)
Facility: HOSPITAL | Age: 83
LOS: 6 days | Discharge: NURSING, REHAB OR LONG-TERM CARE | DRG: 689 | End: 2023-06-02
Attending: EMERGENCY MEDICINE | Admitting: INTERNAL MEDICINE
Payer: MEDICARE

## 2023-05-26 DIAGNOSIS — F11.20 UNCOMPLICATED OPIOID DEPENDENCE: Chronic | ICD-10-CM

## 2023-05-26 DIAGNOSIS — N39.0 URINARY TRACT INFECTION WITHOUT HEMATURIA, SITE UNSPECIFIED: ICD-10-CM

## 2023-05-26 DIAGNOSIS — J44.1 COPD EXACERBATION: Primary | ICD-10-CM

## 2023-05-26 DIAGNOSIS — M79.7 FIBROMYALGIA: Chronic | ICD-10-CM

## 2023-05-26 DIAGNOSIS — F41.1 GAD (GENERALIZED ANXIETY DISORDER): ICD-10-CM

## 2023-05-26 PROCEDURE — 99285 EMERGENCY DEPT VISIT HI MDM: CPT

## 2023-05-26 PROCEDURE — P9612 CATHETERIZE FOR URINE SPEC: HCPCS

## 2023-05-27 ENCOUNTER — APPOINTMENT (OUTPATIENT)
Dept: GENERAL RADIOLOGY | Facility: HOSPITAL | Age: 83
DRG: 689 | End: 2023-05-27
Payer: MEDICARE

## 2023-05-27 ENCOUNTER — APPOINTMENT (OUTPATIENT)
Dept: CT IMAGING | Facility: HOSPITAL | Age: 83
DRG: 689 | End: 2023-05-27
Payer: MEDICARE

## 2023-05-27 PROBLEM — J44.1 COPD EXACERBATION: Status: ACTIVE | Noted: 2023-05-27

## 2023-05-27 LAB
ALBUMIN SERPL-MCNC: 2.8 G/DL (ref 3.5–5.2)
ALBUMIN/GLOB SERPL: 1.1 G/DL
ALP SERPL-CCNC: 96 U/L (ref 39–117)
ALT SERPL W P-5'-P-CCNC: 11 U/L (ref 1–33)
ANION GAP SERPL CALCULATED.3IONS-SCNC: 7.8 MMOL/L (ref 5–15)
ANION GAP SERPL CALCULATED.3IONS-SCNC: 9.3 MMOL/L (ref 5–15)
APTT PPP: 31.6 SECONDS (ref 22.7–35.4)
AST SERPL-CCNC: 12 U/L (ref 1–32)
B PARAPERT DNA SPEC QL NAA+PROBE: NOT DETECTED
B PERT DNA SPEC QL NAA+PROBE: NOT DETECTED
BACTERIA UR QL AUTO: ABNORMAL /HPF
BASOPHILS # BLD AUTO: 0.01 10*3/MM3 (ref 0–0.2)
BASOPHILS # BLD AUTO: 0.03 10*3/MM3 (ref 0–0.2)
BASOPHILS NFR BLD AUTO: 0.1 % (ref 0–1.5)
BASOPHILS NFR BLD AUTO: 0.4 % (ref 0–1.5)
BILIRUB SERPL-MCNC: 0.4 MG/DL (ref 0–1.2)
BILIRUB UR QL STRIP: NEGATIVE
BUN SERPL-MCNC: 9 MG/DL (ref 8–23)
BUN SERPL-MCNC: 9 MG/DL (ref 8–23)
BUN/CREAT SERPL: 11.4 (ref 7–25)
BUN/CREAT SERPL: 13.4 (ref 7–25)
C PNEUM DNA NPH QL NAA+NON-PROBE: NOT DETECTED
CALCIUM SPEC-SCNC: 8.7 MG/DL (ref 8.6–10.5)
CALCIUM SPEC-SCNC: 8.9 MG/DL (ref 8.6–10.5)
CHLORIDE SERPL-SCNC: 106 MMOL/L (ref 98–107)
CHLORIDE SERPL-SCNC: 107 MMOL/L (ref 98–107)
CLARITY UR: CLEAR
CO2 SERPL-SCNC: 23.2 MMOL/L (ref 22–29)
CO2 SERPL-SCNC: 24.7 MMOL/L (ref 22–29)
COLOR UR: YELLOW
CREAT SERPL-MCNC: 0.67 MG/DL (ref 0.57–1)
CREAT SERPL-MCNC: 0.79 MG/DL (ref 0.57–1)
D-LACTATE SERPL-SCNC: 1.1 MMOL/L (ref 0.5–2)
DEPRECATED RDW RBC AUTO: 46.2 FL (ref 37–54)
DEPRECATED RDW RBC AUTO: 47.6 FL (ref 37–54)
EGFRCR SERPLBLD CKD-EPI 2021: 75.7 ML/MIN/1.73
EGFRCR SERPLBLD CKD-EPI 2021: 88.5 ML/MIN/1.73
EOSINOPHIL # BLD AUTO: 0.01 10*3/MM3 (ref 0–0.4)
EOSINOPHIL # BLD AUTO: 0.01 10*3/MM3 (ref 0–0.4)
EOSINOPHIL NFR BLD AUTO: 0.1 % (ref 0.3–6.2)
EOSINOPHIL NFR BLD AUTO: 0.1 % (ref 0.3–6.2)
ERYTHROCYTE [DISTWIDTH] IN BLOOD BY AUTOMATED COUNT: 13.1 % (ref 12.3–15.4)
ERYTHROCYTE [DISTWIDTH] IN BLOOD BY AUTOMATED COUNT: 13.2 % (ref 12.3–15.4)
FLUAV SUBTYP SPEC NAA+PROBE: NOT DETECTED
FLUBV RNA ISLT QL NAA+PROBE: NOT DETECTED
GEN 5 2HR TROPONIN T REFLEX: 26 NG/L
GLOBULIN UR ELPH-MCNC: 2.6 GM/DL
GLUCOSE SERPL-MCNC: 110 MG/DL (ref 65–99)
GLUCOSE SERPL-MCNC: 121 MG/DL (ref 65–99)
GLUCOSE UR STRIP-MCNC: NEGATIVE MG/DL
HADV DNA SPEC NAA+PROBE: NOT DETECTED
HCOV 229E RNA SPEC QL NAA+PROBE: NOT DETECTED
HCOV HKU1 RNA SPEC QL NAA+PROBE: NOT DETECTED
HCOV NL63 RNA SPEC QL NAA+PROBE: NOT DETECTED
HCOV OC43 RNA SPEC QL NAA+PROBE: NOT DETECTED
HCT VFR BLD AUTO: 32.3 % (ref 34–46.6)
HCT VFR BLD AUTO: 35 % (ref 34–46.6)
HGB BLD-MCNC: 10.4 G/DL (ref 12–15.9)
HGB BLD-MCNC: 11.1 G/DL (ref 12–15.9)
HGB UR QL STRIP.AUTO: NEGATIVE
HMPV RNA NPH QL NAA+NON-PROBE: NOT DETECTED
HPIV1 RNA ISLT QL NAA+PROBE: NOT DETECTED
HPIV2 RNA SPEC QL NAA+PROBE: NOT DETECTED
HPIV3 RNA NPH QL NAA+PROBE: NOT DETECTED
HPIV4 P GENE NPH QL NAA+PROBE: NOT DETECTED
HYALINE CASTS UR QL AUTO: ABNORMAL /LPF
IMM GRANULOCYTES # BLD AUTO: 0.03 10*3/MM3 (ref 0–0.05)
IMM GRANULOCYTES NFR BLD AUTO: 0.4 % (ref 0–0.5)
INR PPP: 0.96 (ref 0.9–1.1)
INR PPP: 1.06 (ref 0.9–1.1)
KETONES UR QL STRIP: ABNORMAL
LEUKOCYTE ESTERASE UR QL STRIP.AUTO: ABNORMAL
LIPASE SERPL-CCNC: 11 U/L (ref 13–60)
LYMPHOCYTES # BLD AUTO: 0.9 10*3/MM3 (ref 0.7–3.1)
LYMPHOCYTES # BLD AUTO: 0.94 10*3/MM3 (ref 0.7–3.1)
LYMPHOCYTES NFR BLD AUTO: 12.2 % (ref 19.6–45.3)
LYMPHOCYTES NFR BLD AUTO: 12.2 % (ref 19.6–45.3)
M PNEUMO IGG SER IA-ACNC: NOT DETECTED
MAGNESIUM SERPL-MCNC: 2.2 MG/DL (ref 1.6–2.4)
MAGNESIUM SERPL-MCNC: 2.2 MG/DL (ref 1.6–2.4)
MCH RBC QN AUTO: 31.1 PG (ref 26.6–33)
MCH RBC QN AUTO: 31.7 PG (ref 26.6–33)
MCHC RBC AUTO-ENTMCNC: 31.7 G/DL (ref 31.5–35.7)
MCHC RBC AUTO-ENTMCNC: 32.2 G/DL (ref 31.5–35.7)
MCV RBC AUTO: 98 FL (ref 79–97)
MCV RBC AUTO: 98.5 FL (ref 79–97)
MONOCYTES # BLD AUTO: 0.39 10*3/MM3 (ref 0.1–0.9)
MONOCYTES # BLD AUTO: 0.52 10*3/MM3 (ref 0.1–0.9)
MONOCYTES NFR BLD AUTO: 5.3 % (ref 5–12)
MONOCYTES NFR BLD AUTO: 6.7 % (ref 5–12)
NEUTROPHILS NFR BLD AUTO: 6.02 10*3/MM3 (ref 1.7–7)
NEUTROPHILS NFR BLD AUTO: 6.2 10*3/MM3 (ref 1.7–7)
NEUTROPHILS NFR BLD AUTO: 80.5 % (ref 42.7–76)
NEUTROPHILS NFR BLD AUTO: 81.7 % (ref 42.7–76)
NITRITE UR QL STRIP: POSITIVE
NRBC BLD AUTO-RTO: 0.1 /100 WBC (ref 0–0.2)
NT-PROBNP SERPL-MCNC: 2838 PG/ML (ref 0–1800)
PH UR STRIP.AUTO: 5.5 [PH] (ref 5–8)
PLAT MORPH BLD: NORMAL
PLATELET # BLD AUTO: 120 10*3/MM3 (ref 140–450)
PLATELET # BLD AUTO: 96 10*3/MM3 (ref 140–450)
PMV BLD AUTO: 11.9 FL (ref 6–12)
PMV BLD AUTO: 12.1 FL (ref 6–12)
POTASSIUM SERPL-SCNC: 4.6 MMOL/L (ref 3.5–5.2)
POTASSIUM SERPL-SCNC: 4.6 MMOL/L (ref 3.5–5.2)
PROCALCITONIN SERPL-MCNC: 0.03 NG/ML (ref 0–0.25)
PROT SERPL-MCNC: 5.4 G/DL (ref 6–8.5)
PROT UR QL STRIP: NEGATIVE
PROTHROMBIN TIME: 12.9 SECONDS (ref 11.7–14.2)
PROTHROMBIN TIME: 13.9 SECONDS (ref 11.7–14.2)
QT INTERVAL: 465 MS
QT INTERVAL: 502 MS
RBC # BLD AUTO: 3.28 10*6/MM3 (ref 3.77–5.28)
RBC # BLD AUTO: 3.57 10*6/MM3 (ref 3.77–5.28)
RBC # UR STRIP: ABNORMAL /HPF
RBC MORPH BLD: NORMAL
REF LAB TEST METHOD: ABNORMAL
RHINOVIRUS RNA SPEC NAA+PROBE: NOT DETECTED
RSV RNA NPH QL NAA+NON-PROBE: NOT DETECTED
SARS-COV-2 RNA NPH QL NAA+NON-PROBE: NOT DETECTED
SODIUM SERPL-SCNC: 137 MMOL/L (ref 136–145)
SODIUM SERPL-SCNC: 141 MMOL/L (ref 136–145)
SP GR UR STRIP: >=1.03 (ref 1–1.03)
SQUAMOUS #/AREA URNS HPF: ABNORMAL /HPF
TROPONIN T DELTA: 2 NG/L
TROPONIN T SERPL HS-MCNC: 24 NG/L
UROBILINOGEN UR QL STRIP: ABNORMAL
WBC # UR STRIP: ABNORMAL /HPF
WBC MORPH BLD: NORMAL
WBC NRBC COR # BLD: 7.37 10*3/MM3 (ref 3.4–10.8)
WBC NRBC COR # BLD: 7.71 10*3/MM3 (ref 3.4–10.8)

## 2023-05-27 PROCEDURE — 0202U NFCT DS 22 TRGT SARS-COV-2: CPT | Performed by: EMERGENCY MEDICINE

## 2023-05-27 PROCEDURE — 85610 PROTHROMBIN TIME: CPT | Performed by: NURSE PRACTITIONER

## 2023-05-27 PROCEDURE — 85025 COMPLETE CBC W/AUTO DIFF WBC: CPT | Performed by: EMERGENCY MEDICINE

## 2023-05-27 PROCEDURE — 85610 PROTHROMBIN TIME: CPT | Performed by: EMERGENCY MEDICINE

## 2023-05-27 PROCEDURE — 83735 ASSAY OF MAGNESIUM: CPT | Performed by: EMERGENCY MEDICINE

## 2023-05-27 PROCEDURE — 83735 ASSAY OF MAGNESIUM: CPT | Performed by: NURSE PRACTITIONER

## 2023-05-27 PROCEDURE — 36415 COLL VENOUS BLD VENIPUNCTURE: CPT | Performed by: NURSE PRACTITIONER

## 2023-05-27 PROCEDURE — 25510000001 IOPAMIDOL 61 % SOLUTION: Performed by: EMERGENCY MEDICINE

## 2023-05-27 PROCEDURE — 85025 COMPLETE CBC W/AUTO DIFF WBC: CPT | Performed by: NURSE PRACTITIONER

## 2023-05-27 PROCEDURE — 25010000002 MORPHINE PER 10 MG: Performed by: EMERGENCY MEDICINE

## 2023-05-27 PROCEDURE — 25010000002 LEVOFLOXACIN PER 250 MG: Performed by: EMERGENCY MEDICINE

## 2023-05-27 PROCEDURE — 74177 CT ABD & PELVIS W/CONTRAST: CPT

## 2023-05-27 PROCEDURE — 80053 COMPREHEN METABOLIC PANEL: CPT | Performed by: EMERGENCY MEDICINE

## 2023-05-27 PROCEDURE — 93010 ELECTROCARDIOGRAM REPORT: CPT | Performed by: INTERNAL MEDICINE

## 2023-05-27 PROCEDURE — 25010000002 CEFTRIAXONE PER 250 MG: Performed by: NURSE PRACTITIONER

## 2023-05-27 PROCEDURE — 71045 X-RAY EXAM CHEST 1 VIEW: CPT

## 2023-05-27 PROCEDURE — 87150 DNA/RNA AMPLIFIED PROBE: CPT | Performed by: EMERGENCY MEDICINE

## 2023-05-27 PROCEDURE — 87040 BLOOD CULTURE FOR BACTERIA: CPT | Performed by: EMERGENCY MEDICINE

## 2023-05-27 PROCEDURE — 94640 AIRWAY INHALATION TREATMENT: CPT

## 2023-05-27 PROCEDURE — 25010000002 ENOXAPARIN PER 10 MG: Performed by: NURSE PRACTITIONER

## 2023-05-27 PROCEDURE — 85730 THROMBOPLASTIN TIME PARTIAL: CPT | Performed by: EMERGENCY MEDICINE

## 2023-05-27 PROCEDURE — 87186 SC STD MICRODIL/AGAR DIL: CPT | Performed by: EMERGENCY MEDICINE

## 2023-05-27 PROCEDURE — 63710000001 PREDNISONE PER 1 MG: Performed by: NURSE PRACTITIONER

## 2023-05-27 PROCEDURE — 87077 CULTURE AEROBIC IDENTIFY: CPT | Performed by: EMERGENCY MEDICINE

## 2023-05-27 PROCEDURE — 94799 UNLISTED PULMONARY SVC/PX: CPT

## 2023-05-27 PROCEDURE — 25010000002 ONDANSETRON PER 1 MG: Performed by: NURSE PRACTITIONER

## 2023-05-27 PROCEDURE — 94761 N-INVAS EAR/PLS OXIMETRY MLT: CPT

## 2023-05-27 PROCEDURE — 83690 ASSAY OF LIPASE: CPT | Performed by: EMERGENCY MEDICINE

## 2023-05-27 PROCEDURE — 81001 URINALYSIS AUTO W/SCOPE: CPT | Performed by: EMERGENCY MEDICINE

## 2023-05-27 PROCEDURE — 99204 OFFICE O/P NEW MOD 45 MIN: CPT | Performed by: STUDENT IN AN ORGANIZED HEALTH CARE EDUCATION/TRAINING PROGRAM

## 2023-05-27 PROCEDURE — 85007 BL SMEAR W/DIFF WBC COUNT: CPT | Performed by: EMERGENCY MEDICINE

## 2023-05-27 PROCEDURE — 84484 ASSAY OF TROPONIN QUANT: CPT | Performed by: EMERGENCY MEDICINE

## 2023-05-27 PROCEDURE — 25010000002 ONDANSETRON PER 1 MG: Performed by: EMERGENCY MEDICINE

## 2023-05-27 PROCEDURE — 83880 ASSAY OF NATRIURETIC PEPTIDE: CPT | Performed by: EMERGENCY MEDICINE

## 2023-05-27 PROCEDURE — 84145 PROCALCITONIN (PCT): CPT | Performed by: EMERGENCY MEDICINE

## 2023-05-27 PROCEDURE — 93005 ELECTROCARDIOGRAM TRACING: CPT | Performed by: EMERGENCY MEDICINE

## 2023-05-27 PROCEDURE — 94664 DEMO&/EVAL PT USE INHALER: CPT

## 2023-05-27 PROCEDURE — 93010 ELECTROCARDIOGRAM REPORT: CPT | Performed by: STUDENT IN AN ORGANIZED HEALTH CARE EDUCATION/TRAINING PROGRAM

## 2023-05-27 PROCEDURE — 83605 ASSAY OF LACTIC ACID: CPT | Performed by: EMERGENCY MEDICINE

## 2023-05-27 PROCEDURE — 87086 URINE CULTURE/COLONY COUNT: CPT | Performed by: EMERGENCY MEDICINE

## 2023-05-27 PROCEDURE — 90791 PSYCH DIAGNOSTIC EVALUATION: CPT

## 2023-05-27 PROCEDURE — 93005 ELECTROCARDIOGRAM TRACING: CPT | Performed by: INTERNAL MEDICINE

## 2023-05-27 RX ORDER — LEVOTHYROXINE SODIUM 0.05 MG/1
50 TABLET ORAL DAILY
Status: DISCONTINUED | OUTPATIENT
Start: 2023-05-27 | End: 2023-05-31

## 2023-05-27 RX ORDER — ACETAMINOPHEN 650 MG/1
650 SUPPOSITORY RECTAL EVERY 4 HOURS PRN
Status: DISCONTINUED | OUTPATIENT
Start: 2023-05-27 | End: 2023-06-02 | Stop reason: HOSPADM

## 2023-05-27 RX ORDER — IPRATROPIUM BROMIDE AND ALBUTEROL SULFATE 2.5; .5 MG/3ML; MG/3ML
3 SOLUTION RESPIRATORY (INHALATION)
Status: DISCONTINUED | OUTPATIENT
Start: 2023-05-27 | End: 2023-06-02 | Stop reason: HOSPADM

## 2023-05-27 RX ORDER — IPRATROPIUM BROMIDE AND ALBUTEROL SULFATE 2.5; .5 MG/3ML; MG/3ML
3 SOLUTION RESPIRATORY (INHALATION)
Status: DISCONTINUED | OUTPATIENT
Start: 2023-05-27 | End: 2023-05-27

## 2023-05-27 RX ORDER — MORPHINE SULFATE 2 MG/ML
2 INJECTION, SOLUTION INTRAMUSCULAR; INTRAVENOUS ONCE
Status: COMPLETED | OUTPATIENT
Start: 2023-05-27 | End: 2023-05-27

## 2023-05-27 RX ORDER — SODIUM CHLORIDE 0.9 % (FLUSH) 0.9 %
10 SYRINGE (ML) INJECTION AS NEEDED
Status: DISCONTINUED | OUTPATIENT
Start: 2023-05-27 | End: 2023-06-02 | Stop reason: HOSPADM

## 2023-05-27 RX ORDER — ONDANSETRON 2 MG/ML
4 INJECTION INTRAMUSCULAR; INTRAVENOUS ONCE
Status: COMPLETED | OUTPATIENT
Start: 2023-05-27 | End: 2023-05-27

## 2023-05-27 RX ORDER — FLUOXETINE HYDROCHLORIDE 20 MG/5ML
60 LIQUID ORAL DAILY
Status: DISCONTINUED | OUTPATIENT
Start: 2023-05-27 | End: 2023-05-27 | Stop reason: ALTCHOICE

## 2023-05-27 RX ORDER — HYDROCODONE BITARTRATE AND ACETAMINOPHEN 7.5; 325 MG/1; MG/1
1 TABLET ORAL ONCE
Status: COMPLETED | OUTPATIENT
Start: 2023-05-27 | End: 2023-05-27

## 2023-05-27 RX ORDER — SODIUM CHLORIDE 9 MG/ML
40 INJECTION, SOLUTION INTRAVENOUS AS NEEDED
Status: DISCONTINUED | OUTPATIENT
Start: 2023-05-27 | End: 2023-06-02 | Stop reason: HOSPADM

## 2023-05-27 RX ORDER — AMOXICILLIN 250 MG
2 CAPSULE ORAL 2 TIMES DAILY
Status: DISCONTINUED | OUTPATIENT
Start: 2023-05-27 | End: 2023-06-02 | Stop reason: HOSPADM

## 2023-05-27 RX ORDER — POLYETHYLENE GLYCOL 3350 17 G/17G
17 POWDER, FOR SOLUTION ORAL DAILY PRN
Status: DISCONTINUED | OUTPATIENT
Start: 2023-05-27 | End: 2023-06-02 | Stop reason: HOSPADM

## 2023-05-27 RX ORDER — HYDROCODONE BITARTRATE AND ACETAMINOPHEN 7.5; 325 MG/1; MG/1
1 TABLET ORAL EVERY 6 HOURS PRN
Status: DISCONTINUED | OUTPATIENT
Start: 2023-05-27 | End: 2023-06-02 | Stop reason: HOSPADM

## 2023-05-27 RX ORDER — SODIUM CHLORIDE 0.9 % (FLUSH) 0.9 %
10 SYRINGE (ML) INJECTION EVERY 12 HOURS SCHEDULED
Status: DISCONTINUED | OUTPATIENT
Start: 2023-05-27 | End: 2023-06-02 | Stop reason: HOSPADM

## 2023-05-27 RX ORDER — IPRATROPIUM BROMIDE AND ALBUTEROL SULFATE 2.5; .5 MG/3ML; MG/3ML
3 SOLUTION RESPIRATORY (INHALATION) ONCE
Status: COMPLETED | OUTPATIENT
Start: 2023-05-27 | End: 2023-05-27

## 2023-05-27 RX ORDER — POTASSIUM CHLORIDE 750 MG/1
10 TABLET, FILM COATED, EXTENDED RELEASE ORAL 2 TIMES DAILY
Status: DISCONTINUED | OUTPATIENT
Start: 2023-05-27 | End: 2023-06-02 | Stop reason: HOSPADM

## 2023-05-27 RX ORDER — TORSEMIDE 100 MG/1
100 TABLET ORAL SEE ADMIN INSTRUCTIONS
Status: DISCONTINUED | OUTPATIENT
Start: 2023-05-27 | End: 2023-06-02 | Stop reason: HOSPADM

## 2023-05-27 RX ORDER — BISACODYL 5 MG/1
5 TABLET, DELAYED RELEASE ORAL DAILY PRN
Status: DISCONTINUED | OUTPATIENT
Start: 2023-05-27 | End: 2023-06-02 | Stop reason: HOSPADM

## 2023-05-27 RX ORDER — FLUOXETINE HYDROCHLORIDE 20 MG/1
60 CAPSULE ORAL DAILY
Status: DISCONTINUED | OUTPATIENT
Start: 2023-05-27 | End: 2023-06-02 | Stop reason: HOSPADM

## 2023-05-27 RX ORDER — BUSPIRONE HYDROCHLORIDE 15 MG/1
30 TABLET ORAL 2 TIMES DAILY
Status: DISCONTINUED | OUTPATIENT
Start: 2023-05-27 | End: 2023-06-02 | Stop reason: HOSPADM

## 2023-05-27 RX ORDER — QUETIAPINE FUMARATE 100 MG/1
100 TABLET, FILM COATED ORAL NIGHTLY
Status: DISCONTINUED | OUTPATIENT
Start: 2023-05-27 | End: 2023-06-02 | Stop reason: HOSPADM

## 2023-05-27 RX ORDER — AZITHROMYCIN 250 MG/1
250 TABLET, FILM COATED ORAL
Status: COMPLETED | OUTPATIENT
Start: 2023-05-28 | End: 2023-06-01

## 2023-05-27 RX ORDER — BISACODYL 10 MG
10 SUPPOSITORY, RECTAL RECTAL DAILY PRN
Status: DISCONTINUED | OUTPATIENT
Start: 2023-05-27 | End: 2023-06-02 | Stop reason: HOSPADM

## 2023-05-27 RX ORDER — ENOXAPARIN SODIUM 100 MG/ML
40 INJECTION SUBCUTANEOUS EVERY 12 HOURS
Status: DISCONTINUED | OUTPATIENT
Start: 2023-05-27 | End: 2023-05-27

## 2023-05-27 RX ORDER — ASPIRIN 81 MG/1
81 TABLET, CHEWABLE ORAL DAILY
Status: DISCONTINUED | OUTPATIENT
Start: 2023-05-27 | End: 2023-06-02 | Stop reason: HOSPADM

## 2023-05-27 RX ORDER — AZITHROMYCIN 250 MG/1
500 TABLET, FILM COATED ORAL ONCE
Status: COMPLETED | OUTPATIENT
Start: 2023-05-27 | End: 2023-05-27

## 2023-05-27 RX ORDER — CALCIUM ACETATE MONOHYDRATE AND ALUMINUM SULFATE TETRADECAHYDRATE 952; 1347 MG/2299MG; MG/2299MG
1 POWDER, FOR SOLUTION TOPICAL EVERY 8 HOURS SCHEDULED
Status: DISCONTINUED | OUTPATIENT
Start: 2023-05-27 | End: 2023-05-30

## 2023-05-27 RX ORDER — L.ACID,PARA/B.BIFIDUM/S.THERM 8B CELL
1 CAPSULE ORAL DAILY
Status: DISCONTINUED | OUTPATIENT
Start: 2023-05-27 | End: 2023-06-02 | Stop reason: HOSPADM

## 2023-05-27 RX ORDER — ALBUTEROL SULFATE 90 UG/1
2 AEROSOL, METERED RESPIRATORY (INHALATION) EVERY 4 HOURS PRN
Status: ON HOLD | COMMUNITY

## 2023-05-27 RX ORDER — ALBUTEROL SULFATE 2.5 MG/3ML
2.5 SOLUTION RESPIRATORY (INHALATION) EVERY 6 HOURS PRN
Status: DISCONTINUED | OUTPATIENT
Start: 2023-05-27 | End: 2023-06-02 | Stop reason: HOSPADM

## 2023-05-27 RX ORDER — OCTISALATE, AVOBENZONE, HOMOSALATE, AND OCTOCRYLENE 29.4; 29.4; 49; 25.48 MG/ML; MG/ML; MG/ML; MG/ML
1 LOTION TOPICAL DAILY
Status: ON HOLD | COMMUNITY

## 2023-05-27 RX ORDER — NITROGLYCERIN 0.4 MG/1
0.4 TABLET SUBLINGUAL
Status: DISCONTINUED | OUTPATIENT
Start: 2023-05-27 | End: 2023-06-02 | Stop reason: HOSPADM

## 2023-05-27 RX ORDER — ACETAMINOPHEN 160 MG/5ML
650 SOLUTION ORAL EVERY 4 HOURS PRN
Status: DISCONTINUED | OUTPATIENT
Start: 2023-05-27 | End: 2023-06-02 | Stop reason: HOSPADM

## 2023-05-27 RX ORDER — PANTOPRAZOLE SODIUM 40 MG/1
40 TABLET, DELAYED RELEASE ORAL DAILY
Status: DISCONTINUED | OUTPATIENT
Start: 2023-05-27 | End: 2023-06-02 | Stop reason: HOSPADM

## 2023-05-27 RX ORDER — PREDNISONE 20 MG/1
20 TABLET ORAL
Status: COMPLETED | OUTPATIENT
Start: 2023-05-27 | End: 2023-05-31

## 2023-05-27 RX ORDER — LEVOFLOXACIN 5 MG/ML
750 INJECTION, SOLUTION INTRAVENOUS ONCE
Status: COMPLETED | OUTPATIENT
Start: 2023-05-27 | End: 2023-05-27

## 2023-05-27 RX ORDER — IPRATROPIUM BROMIDE AND ALBUTEROL SULFATE 2.5; .5 MG/3ML; MG/3ML
SOLUTION RESPIRATORY (INHALATION)
Status: ACTIVE
Start: 2023-05-27 | End: 2023-05-27

## 2023-05-27 RX ORDER — ACETAMINOPHEN 325 MG/1
650 TABLET ORAL EVERY 4 HOURS PRN
Status: DISCONTINUED | OUTPATIENT
Start: 2023-05-27 | End: 2023-06-02 | Stop reason: HOSPADM

## 2023-05-27 RX ORDER — ONDANSETRON 2 MG/ML
4 INJECTION INTRAMUSCULAR; INTRAVENOUS EVERY 6 HOURS PRN
Status: DISCONTINUED | OUTPATIENT
Start: 2023-05-27 | End: 2023-06-02 | Stop reason: HOSPADM

## 2023-05-27 RX ORDER — VIT E ACET/GLY/DIMETH/WATER
LOTION (ML) TOPICAL AS NEEDED
Status: DISCONTINUED | OUTPATIENT
Start: 2023-05-27 | End: 2023-06-02 | Stop reason: HOSPADM

## 2023-05-27 RX ADMIN — BUSPIRONE HYDROCHLORIDE 30 MG: 15 TABLET ORAL at 12:46

## 2023-05-27 RX ADMIN — Medication 10 ML: at 08:51

## 2023-05-27 RX ADMIN — Medication 10 ML: at 20:54

## 2023-05-27 RX ADMIN — IPRATROPIUM BROMIDE AND ALBUTEROL SULFATE 3 ML: 2.5; .5 SOLUTION RESPIRATORY (INHALATION) at 01:14

## 2023-05-27 RX ADMIN — AZITHROMYCIN 500 MG: 250 TABLET, FILM COATED ORAL at 12:47

## 2023-05-27 RX ADMIN — Medication 1 CAPSULE: at 12:48

## 2023-05-27 RX ADMIN — HYDROCODONE BITARTRATE AND ACETAMINOPHEN 1 TABLET: 7.5; 325 TABLET ORAL at 20:30

## 2023-05-27 RX ADMIN — CEFTRIAXONE 2 G: 2 INJECTION, POWDER, FOR SOLUTION INTRAMUSCULAR; INTRAVENOUS at 12:26

## 2023-05-27 RX ADMIN — BUSPIRONE HYDROCHLORIDE 30 MG: 15 TABLET ORAL at 20:30

## 2023-05-27 RX ADMIN — ONDANSETRON 4 MG: 2 INJECTION INTRAMUSCULAR; INTRAVENOUS at 02:01

## 2023-05-27 RX ADMIN — QUETIAPINE FUMARATE 100 MG: 100 TABLET ORAL at 20:30

## 2023-05-27 RX ADMIN — PANTOPRAZOLE SODIUM 40 MG: 40 TABLET, DELAYED RELEASE ORAL at 12:48

## 2023-05-27 RX ADMIN — ONDANSETRON 4 MG: 2 INJECTION INTRAMUSCULAR; INTRAVENOUS at 08:51

## 2023-05-27 RX ADMIN — LEVOTHYROXINE SODIUM 50 MCG: 0.05 TABLET ORAL at 12:48

## 2023-05-27 RX ADMIN — FLUOXETINE HYDROCHLORIDE 60 MG: 20 CAPSULE ORAL at 12:48

## 2023-05-27 RX ADMIN — DOCUSATE SODIUM 50MG AND SENNOSIDES 8.6MG 2 TABLET: 8.6; 5 TABLET, FILM COATED ORAL at 08:51

## 2023-05-27 RX ADMIN — POTASSIUM CHLORIDE 10 MEQ: 750 TABLET, EXTENDED RELEASE ORAL at 20:30

## 2023-05-27 RX ADMIN — ASPIRIN 81 MG: 81 TABLET, CHEWABLE ORAL at 12:47

## 2023-05-27 RX ADMIN — IPRATROPIUM BROMIDE AND ALBUTEROL SULFATE 3 ML: 2.5; .5 SOLUTION RESPIRATORY (INHALATION) at 23:52

## 2023-05-27 RX ADMIN — APIXABAN 5 MG: 5 TABLET, FILM COATED ORAL at 20:30

## 2023-05-27 RX ADMIN — Medication 1 PACKET: at 16:02

## 2023-05-27 RX ADMIN — PREDNISONE 20 MG: 20 TABLET ORAL at 12:48

## 2023-05-27 RX ADMIN — MIRABEGRON 50 MG: 25 TABLET, FILM COATED, EXTENDED RELEASE ORAL at 12:46

## 2023-05-27 RX ADMIN — LEVOFLOXACIN 750 MG: 750 INJECTION, SOLUTION INTRAVENOUS at 05:00

## 2023-05-27 RX ADMIN — ENOXAPARIN SODIUM 40 MG: 100 INJECTION SUBCUTANEOUS at 12:26

## 2023-05-27 RX ADMIN — MORPHINE SULFATE 2 MG: 2 INJECTION, SOLUTION INTRAMUSCULAR; INTRAVENOUS at 02:01

## 2023-05-27 RX ADMIN — POTASSIUM CHLORIDE 10 MEQ: 750 TABLET, EXTENDED RELEASE ORAL at 12:48

## 2023-05-27 RX ADMIN — IPRATROPIUM BROMIDE AND ALBUTEROL SULFATE 3 ML: 2.5; .5 SOLUTION RESPIRATORY (INHALATION) at 15:46

## 2023-05-27 RX ADMIN — IOPAMIDOL 85 ML: 612 INJECTION, SOLUTION INTRAVENOUS at 02:29

## 2023-05-27 NOTE — H&P
"    Patient Name:  Rose Cheema  YOB: 1940  MRN:  [id number]  Admit Date:  5/26/2023  Patient Care Team:  Cheng Guevara MD as PCP - General (Internal Medicine)      Subjective   History Present Illness     Chief Complaint   Patient presents with   • Shortness of Breath   • Nausea       Ms. Cheema is a 82 y.o. female with a history of hypertension, CAD, diastolic dysfunction, COPD, GERSON, CKD stage III, hypothyroidism, fibromyalgia that presents to [facility] complaining of abdominal pain and dyspnea beginning yesterday.  She is confused and verbalizes to me that she is a \" poor historian right now\".  She has chronic shortness of breath all the time, its been worse for 2 days.  She has a chronic cough, worse for 2 days.  Unsure of characteristics of cough.  No fever or chills.  Had intermittent mild abdominal pain, none currently.  At home wears 2 L of oxygen at nighttime, none during the day.  Has chronic redness to her lower legs.  Resides in assisted living facility.    Additional HPI and review of systems very limited, patient stating, \" I just do not know\".       Review of Systems   Unable to perform ROS: Other    Confusion    Personal History     Past Medical History:   Diagnosis Date   • Anemia    • Anxiety    • Arthritis    • CHF (congestive heart failure)    • Coronary artery disease    • Depression    • Disease of thyroid gland    • Fibromyalgia    • GERD (gastroesophageal reflux disease)    • Hypertension    • Moderate tricuspid valve stenosis    • Osteoporosis    • Peripheral neuropathy    • Pulmonary hypertension    • SBO (small bowel obstruction)    • Stenosis of mitral valve      Past Surgical History:   Procedure Laterality Date   • BILATERAL OOPHORECTOMY     • CARDIAC CATHETERIZATION     • CHOLECYSTECTOMY     • ERCP N/A 2/15/2019    Procedure: ENDOSCOPIC RETROGRADE CHOLANGIOPANCREATOGRAPHY WITH PARTIAL CHOLANGIOGRAM;  Surgeon: Gerald Herr MD;  Location: [facility]" ENDOSCOPY;  Service: Gastroenterology   • EXPLORATORY LAPAROTOMY     • GASTRIC BYPASS     • HERNIA REPAIR      inguinal and ventral   • HYSTERECTOMY     • OVARIAN CYST REMOVAL       No family history on file.  Social History     Tobacco Use   • Smoking status: Former   • Smokeless tobacco: Never   Substance Use Topics   • Alcohol use: No   • Drug use: No     No current facility-administered medications on file prior to encounter.     Current Outpatient Medications on File Prior to Encounter   Medication Sig Dispense Refill   • albuterol sulfate  (90 Base) MCG/ACT inhaler Inhale 2 puffs Every 4 (Four) Hours As Needed for Wheezing.     • aspirin 81 MG chewable tablet Chew 1 tablet.     • busPIRone (BUSPAR) 30 MG tablet Take 1 tablet by mouth 2 (Two) Times a Day.     • fentaNYL (DURAGESIC) 25 MCG/HR patch Place 1 patch on the skin as directed by provider Every 72 (Seventy-Two) Hours. 2 patch 0   • FLUoxetine (PROzac) 60 MG tablet Take 1 tablet by mouth Daily.     • levothyroxine (SYNTHROID, LEVOTHROID) 50 MCG tablet Take 1 tablet by mouth Daily.     • Mirabegron ER (Myrbetriq) 50 MG tablet sustained-release 24 hour 24 hr tablet Take 50 mg by mouth Daily.     • potassium chloride (MICRO-K) 10 MEQ CR capsule Take 1 capsule by mouth 2 (Two) Times a Day.     • Probiotic Product (Align) 4 MG capsule Take 1 capsule by mouth Daily.     • QUEtiapine (SEROquel) 50 MG tablet Take 2 tablets by mouth Every Night.     • torsemide (DEMADEX) 100 MG tablet Take 1 tablet by mouth See Admin Instructions. Take one tablet by mouth twice daily every other day     • umeclidinium-vilanterol (ANORO ELLIPTA) 62.5-25 MCG/INH aerosol powder  inhaler Inhale 1 puff Daily.     • HYDROcodone-acetaminophen (NORCO) 7.5-325 MG per tablet Take 1 tablet by mouth Every 6 (Six) Hours As Needed for Severe Pain . (Patient taking differently: Take 1 tablet by mouth 5 (Five) Times a Day As Needed for Severe Pain.) 12 tablet 0   • pantoprazole (PROTONIX)  40 MG EC tablet Take 40 mg by mouth Daily.     • [DISCONTINUED] albuterol (PROVENTIL) (2.5 MG/3ML) 0.083% nebulizer solution Take 2.5 mg by nebulization 4 (Four) Times a Day As Needed for Wheezing. 375 vial 0   • [DISCONTINUED] calcium carbonate-cholecalciferol 500-400 MG-UNIT tablet tablet Take 1 tablet by mouth Daily.     • [DISCONTINUED] Multiple Vitamins-Minerals (EYE VITAMINS PO) Take 1 each by mouth.     • [DISCONTINUED] oxybutynin XL (DITROPAN-XL) 10 MG 24 hr tablet Take 1 tablet by mouth Daily.     • [DISCONTINUED] pregabalin (LYRICA) 150 MG capsule Take 1 capsule by mouth 2 (Two) Times a Day. 6 capsule 0   • [DISCONTINUED] Probiotic Product (ALIGN PO) Take 2 each by mouth Daily.       Allergies   Allergen Reactions   • Sulfa Antibiotics Other (See Comments)     Per facility paperwork   • Aspartame Other (See Comments)     CAUSES MIGRAINES   • Cephalexin Rash     Tolerated piperacillin-tazobactam (Zosyn) and ampicillin-sulbactam (Unasyn) during Feb 2019 admission.       Objective    Objective     Vital Signs  Temp:  [97.2 °F (36.2 °C)-98 °F (36.7 °C)] 98 °F (36.7 °C)  Heart Rate:  [62-74] 62  Resp:  [18] 18  BP: (123-149)/(61-87) 138/78  SpO2:  [92 %-100 %] 97 %  on  Flow (L/min):  [2-3] 3;   Device (Oxygen Therapy): nasal cannula  Body mass index is 49.78 kg/m².    Physical Exam  Constitutional:       General: She is not in acute distress.     Appearance: She is obese. She is ill-appearing. She is not toxic-appearing.   HENT:      Head: Normocephalic and atraumatic.      Nose: Nose normal.      Mouth/Throat:      Mouth: Mucous membranes are moist.   Eyes:      Extraocular Movements: Extraocular movements intact.      Conjunctiva/sclera: Conjunctivae normal.      Pupils: Pupils are equal, round, and reactive to light.   Cardiovascular:      Rate and Rhythm: Normal rate and regular rhythm.      Pulses: Normal pulses.      Heart sounds: Normal heart sounds.   Pulmonary:      Effort: Pulmonary effort is normal.  No respiratory distress.      Comments: Diminished throughout, wheezes to right lower lobe.  No cough during exam.  O2 sats 95% on 3 L.  Abdominal:      General: Bowel sounds are normal. There is no distension.      Palpations: Abdomen is soft.      Tenderness: There is no abdominal tenderness.   Musculoskeletal:         General: No swelling. Normal range of motion.      Cervical back: Normal range of motion and neck supple.      Comments: Generalized symmetrical weakness of upper and lower extremities   Skin:     General: Skin is warm and dry.      Findings: Erythema (Anterior calves, slightly warm to touch, chronic changes and thickening) present.   Neurological:      General: No focal deficit present.      Mental Status: She is alert. She is disoriented.      Comments: Oriented to person and place, disoriented to year.   Psychiatric:      Comments: Flat, odd affect, wide-eyed         Results Review:  I reviewed the patient's new clinical results.      Lab Results (last 24 hours)     Procedure Component Value Units Date/Time    COMPREHENSIVE METABOLIC PANEL [[id number]]  (Abnormal) Collected: 05/26/23 1401     Updated: 05/26/23 2353    BNP (IN-HOUSE) [[id number]]  (Abnormal) Collected: 05/26/23 1401    Specimen: Blood Updated: 05/26/23 2353      pg/mL     CBC AND DIFFERENTIAL [[id number]]  (Abnormal) Collected: 05/26/23 1401     Updated: 05/26/23 2353    PROCALCITONIN [[id number]] Collected: 05/26/23 1401    Specimen: Blood Updated: 05/26/23 2353     Procalcitonin <0.05 ng/mL      Comment: Interpretation of PCT results for risk assessment for progression to severe sepsis and septic shock.    The Access PCT assay is only intended to aid in the risk assessment of critically ill patients on their  first day of ICU admissions for progression to sever sepsis and septic shock.  The interpretation of  PCT results are as follow:    PCT Concentration (ng/mL or ug/L)              Interpretation  <0.50                                                              Low risk of severe sepsis and/or septic shock  >2.00                                                             High risk of severe sepsis and/or septic shock    Concentrations under 0.50 ng/mL do not exclude local infections or systemic infections in their initial  stages (e.g. under six hours from onset of illness).  PCT concentrations between 0.50 and 2.00 ng/mL  should be interpreted with consideration of the patients history. In this range,  It is recommended to retest PCT within 6 to 24 hours.       PT AND PTT [[id number]] Collected: 05/26/23 1401    Specimen: Blood Updated: 05/26/23 2353     Protime 11.3 Second      INR 1.0     PTT 32.5 Second     AUTODIFF [[id number]]  (Abnormal) Collected: 05/26/23 1401    Specimen: Blood Updated: 05/26/23 2353     Neutrophil % 80.7 %      Lymphocyte % 13.1 %      Monocyte % 5.0 %      Eosinophil % 0.5 %      Basophil % 0.7 %      Neutrophils Absolute 5.2 x10(3)/ul      Lymphocytes Absolute 0.8 x10(3)/ul      Monocytes Absolute 0.3 x10(3)/ul      Eosinophils Absolute 0.0 x10(3)/ul      Basophils Absolute 0.0 x10(3)/ul     Narrative:       Ordered by Discern Expert.    TROPONIN I, HIGH SENSITIVE [[id number]]  (Abnormal) Collected: 05/26/23 1401     Updated: 05/26/23 2353    CONV D-DIMER [[id number]]  (Abnormal) Collected: 05/26/23 1520     Updated: 05/26/23 2353    CBC & Differential [[id number]]  (Abnormal) Collected: 05/27/23 0125    Specimen: Blood from Arm, Right Updated: 05/27/23 0218    Narrative:      The following orders were created for panel order CBC & Differential.  Procedure                               Abnormality         Status                     ---------                               -----------         ------                     CBC Auto Differential[[id number]]        Abnormal            Final result               Scan Slide[[id number]]                   Normal              Final result                 Please view  results for these tests on the individual orders.    Comprehensive Metabolic Panel [[id number]]  (Abnormal) Collected: 05/27/23 0125    Specimen: Blood from Arm, Right Updated: 05/27/23 0202     Glucose 121 mg/dL      BUN 9 mg/dL      Creatinine 0.67 mg/dL      Sodium 137 mmol/L      Potassium 4.6 mmol/L      Comment: Slight hemolysis detected by analyzer. Results may be affected.        Chloride 106 mmol/L      CO2 23.2 mmol/L      Calcium 8.9 mg/dL      Total Protein 5.4 g/dL      Albumin 2.8 g/dL      ALT (SGPT) 11 U/L      AST (SGOT) 12 U/L      Alkaline Phosphatase 96 U/L      Total Bilirubin 0.4 mg/dL      Globulin 2.6 gm/dL      A/G Ratio 1.1 g/dL      BUN/Creatinine Ratio 13.4     Anion Gap 7.8 mmol/L      eGFR 88.5 mL/min/1.73     Narrative:      GFR Normal >60  Chronic Kidney Disease <60  Kidney Failure <15    The GFR formula is only valid for adults with stable renal function between ages 18 and 70.    Lipase [[id number]]  (Abnormal) Collected: 05/27/23 0125    Specimen: Blood from Arm, Right Updated: 05/27/23 0202     Lipase 11 U/L     BNP [[id number]]  (Abnormal) Collected: 05/27/23 0125    Specimen: Blood from Arm, Right Updated: 05/27/23 0207     proBNP 2,838.0 pg/mL     Narrative:      Among patients with dyspnea, NT-proBNP is highly sensitive for the detection of acute congestive heart failure. In addition NT-proBNP of <300 pg/ml effectively rules out acute congestive heart failure with 99% negative predictive value.    Results may be falsely decreased if patient taking Biotin.      High Sensitivity Troponin T [[id number]]  (Abnormal) Collected: 05/27/23 0125    Specimen: Blood from Arm, Right Updated: 05/27/23 0207     HS Troponin T 24 ng/L     Narrative:      High Sensitive Troponin T Reference Range:  <10.0 ng/L- Negative Female for AMI  <15.0 ng/L- Negative Male for AMI  >=10 - Abnormal Female indicating possible myocardial injury.  >=15 - Abnormal Male indicating possible myocardial injury.  "  Clinicians would have to utilize clinical acumen, EKG, Troponin, and serial changes to determine if it is an Acute Myocardial Infarction or myocardial injury due to an underlying chronic condition.         Blood Culture - Blood, Arm, Right [[id number]] Collected: 05/27/23 0125    Specimen: Blood from Arm, Right Updated: 05/27/23 0129    Procalcitonin [[id number]]  (Normal) Collected: 05/27/23 0125    Specimen: Blood from Arm, Right Updated: 05/27/23 0207     Procalcitonin 0.03 ng/mL     Narrative:      As a Marker for Sepsis (Non-Neonates):    1. <0.5 ng/mL represents a low risk of severe sepsis and/or septic shock.  2. >2 ng/mL represents a high risk of severe sepsis and/or septic shock.    As a Marker for Lower Respiratory Tract Infections that require antibiotic therapy:    PCT on Admission    Antibiotic Therapy       6-12 Hrs later    >0.5                Strongly Recommended  >0.25 - <0.5        Recommended   0.1 - 0.25          Discouraged              Remeasure/reassess PCT  <0.1                Strongly Discouraged     Remeasure/reassess PCT    As 28 day mortality risk marker: \"Change in Procalcitonin Result\" (>80% or <=80%) if Day 0 (or Day 1) and Day 4 values are available. Refer to http://www.[facility]-pct-calculator.com    Change in PCT <=80%  A decrease of PCT levels below or equal to 80% defines a positive change in PCT test result representing a higher risk for 28-day all-cause mortality of patients diagnosed with severe sepsis for septic shock.    Change in PCT >80%  A decrease of PCT levels of more than 80% defines a negative change in PCT result representing a lower risk for 28-day all-cause mortality of patients diagnosed with severe sepsis or septic shock.       Magnesium [[id number]]  (Normal) Collected: 05/27/23 0125    Specimen: Blood from Arm, Right Updated: 05/27/23 0202     Magnesium 2.2 mg/dL     CBC Auto Differential [[id number]]  (Abnormal) Collected: 05/27/23 0125    Specimen: Blood from Arm, " Right Updated: 05/27/23 0217     WBC 7.37 10*3/mm3      RBC 3.28 10*6/mm3      Hemoglobin 10.4 g/dL      Hematocrit 32.3 %      MCV 98.5 fL      MCH 31.7 pg      MCHC 32.2 g/dL      RDW 13.2 %      RDW-SD 47.6 fl      MPV 12.1 fL      Platelets 96 10*3/mm3      Neutrophil % 81.7 %      Lymphocyte % 12.2 %      Monocyte % 5.3 %      Eosinophil % 0.1 %      Basophil % 0.4 %      Neutrophils, Absolute 6.02 10*3/mm3      Lymphocytes, Absolute 0.90 10*3/mm3      Monocytes, Absolute 0.39 10*3/mm3      Eosinophils, Absolute 0.01 10*3/mm3      Basophils, Absolute 0.03 10*3/mm3     Scan Slide [[id number]]  (Normal) Collected: 05/27/23 0125    Specimen: Blood from Arm, Right Updated: 05/27/23 0218     RBC Morphology Normal     WBC Morphology Normal     Platelet Morphology Normal    Protime-INR [[id number]]  (Normal) Collected: 05/27/23 0156    Specimen: Blood from Arm, Left Updated: 05/27/23 0229     Protime 12.9 Seconds      INR 0.96    aPTT [[id number]]  (Normal) Collected: 05/27/23 0156    Specimen: Blood from Arm, Left Updated: 05/27/23 0229     PTT 31.6 seconds     Blood Culture - Blood, Arm, Left [[id number]] Collected: 05/27/23 0156    Specimen: Blood from Arm, Left Updated: 05/27/23 0206    Lactic Acid, Plasma [[id number]]  (Normal) Collected: 05/27/23 0156    Specimen: Blood from Arm, Left Updated: 05/27/23 0230     Lactate 1.1 mmol/L     Respiratory Panel PCR w/COVID-19(SARS-CoV-2) NIKKI/JOHN/GRIFFIN/PAD/COR/MAD/FRANCIS In-House, NP Swab in [facility], 3-4 HR TAT - Swab, Nasopharynx [[id number]]  (Normal) Collected: 05/27/23 0157    Specimen: Swab from Nasopharynx Updated: 05/27/23 0303     ADENOVIRUS, PCR Not Detected     Coronavirus 229E Not Detected     Coronavirus HKU1 Not Detected     Coronavirus NL63 Not Detected     Coronavirus OC43 Not Detected     COVID19 Not Detected     Human Metapneumovirus Not Detected     Human Rhinovirus/Enterovirus Not Detected     Influenza A PCR Not Detected     Influenza B PCR Not Detected      Parainfluenza Virus 1 Not Detected     Parainfluenza Virus 2 Not Detected     Parainfluenza Virus 3 Not Detected     Parainfluenza Virus 4 Not Detected     RSV, PCR Not Detected     Bordetella pertussis pcr Not Detected     Bordetella parapertussis PCR Not Detected     Chlamydophila pneumoniae PCR Not Detected     Mycoplasma pneumo by PCR Not Detected    Narrative:      In the setting of a positive respiratory panel with a viral infection PLUS a negative procalcitonin without other underlying concern for bacterial infection, consider observing off antibiotics or discontinuation of antibiotics and continue supportive care. If the respiratory panel is positive for atypical bacterial infection (Bordetella pertussis, Chlamydophila pneumoniae, or Mycoplasma pneumoniae), consider antibiotic de-escalation to target atypical bacterial infection.    Urinalysis With Microscopic If Indicated (No Culture) - Straight Cath [[id number]]  (Abnormal) Collected: 05/27/23 0217    Specimen: Urine from Straight Cath Updated: 05/27/23 0228     Color, UA Yellow     Appearance, UA Clear     pH, UA 5.5     Specific Gravity, UA >=1.030     Glucose, UA Negative     Ketones, UA 40 mg/dL (2+)     Bilirubin, UA Negative     Blood, UA Negative     Protein, UA Negative     Leuk Esterase, UA Trace     Nitrite, UA Positive     Urobilinogen, UA 0.2 E.U./dL    Urinalysis, Microscopic Only - Straight Cath [[id number]]  (Abnormal) Collected: 05/27/23 0217    Specimen: Urine from Straight Cath Updated: 05/27/23 0228     RBC, UA 0-2 /HPF      WBC, UA 6-12 /HPF      Bacteria, UA 4+ /HPF      Squamous Epithelial Cells, UA 0-2 /HPF      Hyaline Casts, UA 0-2 /LPF      Methodology Automated Microscopy    Urine Culture - Urine, Urine, Clean Catch [[id number]] Collected: 05/27/23 0430    Specimen: Urine, Clean Catch Updated: 05/27/23 0430    High Sensitivity Troponin T 2Hr [[id number]]  (Abnormal) Collected: 05/27/23 0521    Specimen: Blood Updated: 05/27/23 0552      HS Troponin T 26 ng/L      Troponin T Delta 2 ng/L     Narrative:      High Sensitive Troponin T Reference Range:  <10.0 ng/L- Negative Female for AMI  <15.0 ng/L- Negative Male for AMI  >=10 - Abnormal Female indicating possible myocardial injury.  >=15 - Abnormal Male indicating possible myocardial injury.   Clinicians would have to utilize clinical acumen, EKG, Troponin, and serial changes to determine if it is an Acute Myocardial Infarction or myocardial injury due to an underlying chronic condition.         Basic Metabolic Panel [[id number]]  (Abnormal) Collected: 05/27/23 0521    Specimen: Blood Updated: 05/27/23 0552     Glucose 110 mg/dL      BUN 9 mg/dL      Creatinine 0.79 mg/dL      Sodium 141 mmol/L      Potassium 4.6 mmol/L      Chloride 107 mmol/L      CO2 24.7 mmol/L      Calcium 8.7 mg/dL      BUN/Creatinine Ratio 11.4     Anion Gap 9.3 mmol/L      eGFR 75.7 mL/min/1.73     Narrative:      GFR Normal >60  Chronic Kidney Disease <60  Kidney Failure <15    The GFR formula is only valid for adults with stable renal function between ages 18 and 70.    Magnesium [[id number]]  (Normal) Collected: 05/27/23 0521    Specimen: Blood Updated: 05/27/23 0552     Magnesium 2.2 mg/dL     CBC Auto Differential [[id number]]  (Abnormal) Collected: 05/27/23 0729    Specimen: Blood Updated: 05/27/23 0818     WBC 7.71 10*3/mm3      RBC 3.57 10*6/mm3      Hemoglobin 11.1 g/dL      Hematocrit 35.0 %      MCV 98.0 fL      MCH 31.1 pg      MCHC 31.7 g/dL      RDW 13.1 %      RDW-SD 46.2 fl      MPV 11.9 fL      Platelets 120 10*3/mm3      Neutrophil % 80.5 %      Lymphocyte % 12.2 %      Monocyte % 6.7 %      Eosinophil % 0.1 %      Basophil % 0.1 %      Immature Grans % 0.4 %      Neutrophils, Absolute 6.20 10*3/mm3      Lymphocytes, Absolute 0.94 10*3/mm3      Monocytes, Absolute 0.52 10*3/mm3      Eosinophils, Absolute 0.01 10*3/mm3      Basophils, Absolute 0.01 10*3/mm3      Immature Grans, Absolute 0.03 10*3/mm3       nRBC 0.1 /100 WBC     Protime-INR [[id number]]  (Normal) Collected: 05/27/23 0829    Specimen: Blood from Hand, Left Updated: 05/27/23 0906     Protime 13.9 Seconds      INR 1.06          Imaging Results (Last 24 Hours)     Procedure Component Value Units Date/Time    CT Abdomen Pelvis With Contrast [[id number]] Collected: 05/27/23 0243     Updated: 05/27/23 0255    Narrative:      CT OF THE ABDOMEN AND PELVIS WITH CONTRAST     HISTORY: Shortness of breath. Nausea and abdominal pain     COMPARISON: None available.     TECHNIQUE: Axial CT imaging was obtained through the abdomen and pelvis.  IV contrast was administered.     FINDINGS:  Images through the lung bases demonstrate bibasilar scarring. There is  likely also some mild atelectasis and trace bilateral effusions. There  are dystrophic calcifications of the mitral valve annulus. There are  postsurgical changes of gastric bypass. There is no evidence of  obstruction. Gallbladder is absent. There is dilatation of the common  bile duct, measuring up to 1.3 cm. This was also present on prior exam.  It may be postcholecystectomy in nature. There is pancreatic atrophy.  Spleen appears normal. Adrenal glands are unremarkable. The kidneys  enhance symmetrically. Nonobstructing stone is again noted within the  superior pole of the right kidney, measuring 1.0 cm. Excreted contrast  material is noted within the urinary bladder. Patient does appear to  have a small bladder diverticulum. Uterus is absent. There is colonic  diverticulosis. There is no bowel obstruction. The appendix is normal.  There is soft tissue stranding involving the right lower anterior  abdominal wall, as well as some skin thickening. Correlation with any  evidence of cellulitis is recommended. No acute osseous abnormalities  are seen. There is a ventral hernia, which contains a small knuckle of  colon, without evidence of obstruction.       Impression:      Soft tissue stranding which is seen involving  the lower anterior  abdominal wall on the right. There is associated skin thickening.  Correlation with any evidence of cellulitis is recommended. No discrete  drainable fluid collection is seen.     Radiation dose reduction techniques were utilized, including automated  exposure control and exposure modulation based on body size.     This report was finalized on 5/27/2023 2:52 AM by Dr. Miya Galarza M.D.       XR Chest 1 View [[id number]] Collected: 05/27/23 0121     Updated: 05/27/23 0125    Narrative:      SINGLE VIEW OF THE CHEST     HISTORY: Shortness of air     COMPARISON: 05/14/2020     FINDINGS:  There is cardiomegaly. No pneumothorax is seen. There is chronic  scarring identified within both lungs. There is blunting of left  costophrenic angle, and small effusion is not excluded. No definite  acute infiltrates are seen.       Impression:      Blunting of the left costophrenic angle. Small effusion is not excluded.     This report was finalized on 5/27/2023 1:22 AM by Dr. Miya Galarza M.D.             Results for orders placed during the hospital encounter of 05/14/20    Adult Transthoracic Echo Complete W/ Cont if Necessary Per Protocol    Interpretation Summary  · Left ventricular systolic function is normal. Calculated EF = 53.0%. Estimated EF was in agreement with the calculated EF. Estimated EF = 53%. Normal left ventricular cavity size and wall thickness noted. Wall motion assessment as below Left ventricular diastolic function was indeterminate.  · The following segments are hypokinetic: apex.  · Severe MAC is present. Mild mitral valve regurgitation is present.  · Mild to moderate tricuspid valve regurgitation is present. Estimated right ventricular systolic pressure from tricuspid regurgitation is mildly elevated (35-45 mmHg). Calculated right ventricular systolic pressure from tricuspid regurgitation is 43 mmHg.  · Right ventricular cavity is borderline dilated.      ECG 12 Lead Dyspnea    Final Result   HEART RATE= 70  bpm   RR Interval= 857  ms   IL Interval= 162  ms   P Horizontal Axis=   deg   P Front Axis= 51  deg   QRSD Interval= 118  ms   QT Interval= 502  ms   QRS Axis= -23  deg   T Wave Axis= 69  deg   - ABNORMAL ECG -   Sinus rhythm   Nonspecific intraventricular conduction delay   Low voltage, precordial leads   No change from previous tracing   Electronically Signed By: Senthil Abraham (BHE) ([facility]) 27-May-2023 08:21:30   Date and Time of Study: 2023-05-27 00:35:57           Assessment/Plan     Active Hospital Problems    Diagnosis  POA   • **COPD exacerbation [J44.1]  Yes   • CKD (chronic kidney disease) stage 3, GFR 30-59 ml/min [N18.30]  Yes   • Hypothyroidism [E03.9]  Yes   • Chronic pain syndrome [G89.4]  Yes   • CAD (coronary artery disease), native coronary artery [I25.10]  Yes   • Diastolic dysfunction [I51.89]  Yes   • Benign essential hypertension [I10]  Yes   • Obstructive sleep apnea [G47.33]  Yes   • Chronic obstructive pulmonary disease with acute exacerbation [J44.1]  Yes      Resolved Hospital Problems   No resolved problems to display.       UTI  Chemistries fairly unremarkable  Normal lactate and procalcitonin  White count 10.4 with left shift  UA positive for nitrates, ketones, leukocytes, WBCs and bacteria  Treat with Rocephin for UTI.  Had some nonspecific intermittent abdominal pain, unsure of other symptoms that she is confused.    COPD exacerbation/abnormal chest x-ray  Chest x-ray showing blunting of left costophrenic angle, nonexclusive of small effusion.  Repeat chest x-ray in a.m.  proBNP 2,838.    Intermittent abdominal pain  CT abdomen and pelvis showing soft tissue stranding in abdominal wall and skin thickening. Defer to Dr. Ferrari.    BLE erythema  domeboro soaks TID. cetaphil moisturizer.     VTE Prophylaxis - lovenox 30 mg  Code Status - DNR.       NELL Mar  [facility]  05/27/23  11:08 EDT

## 2023-05-27 NOTE — ED TRIAGE NOTES
Pt presents via EMS from [facility] for nausea, weakness, and difficulty breathing. Pt has a history of COPD and is on 2 L of oxygen at home at night.

## 2023-05-27 NOTE — ED PROVIDER NOTES
EMERGENCY DEPARTMENT ENCOUNTER    Room Number:  11/11  Date of encounter:  5/27/2023  PCP: Cheng Guevara MD  Patient Care Team:  Cheng Guevara MD as PCP - General (Internal Medicine)   Independent Historians: Patient    HPI:  Chief Complaint: Abdominal pain, dyspnea    A complete HPI/ROS/PMH/PSH/SH/FH are unobtainable due to: None    Chronic or social conditions impacting patient care (Social Determinants of Health): None  (Financial Resource Strain / Food Insecurity / Transportation Needs / Physical Activity / Stress / Social Connections / Intimate Partner Violence / Housing Stability)    Context: Rose Cheema is a 82 y.o. female who presents to the ED c/o wake up this morning with abdominal pain and dyspnea.  States the issues that persisted throughout the day.  No vomiting but has had some gagging.  No fever or chills.  Denies chest pain.  I have reviewed patient's discharge summary from 9/18/2020.  Patient with somewhat strange affect is unclear if this is baseline.    Review of prior external notes (non-ED) -and- Review of prior external test results outside of this encounter: I reviewed discharge summary from 9/18/2020    Prescription drug monitoring program review:         PAST MEDICAL HISTORY  Active Ambulatory Problems     Diagnosis Date Noted   • Urinary incontinence 08/24/2017   • Urinary frequency 08/30/2017   • Generalized abdominal pain 02/12/2019   • Pulmonary hypertension  02/12/2019   • Benign essential hypertension 09/22/2014   • Bilateral lower extremity edema (chronic) 05/17/2018   • CAD (coronary artery disease), native coronary artery 11/02/2015   • Chronic obstructive pulmonary disease with acute exacerbation 09/22/2014   • Diastolic dysfunction 12/22/2014   • Class 3 severe obesity with serious comorbidity and body mass index (BMI) of 50.0 to 59.9 in adult 09/22/2014   • Obstructive sleep apnea 09/22/2014   • Secondary pulmonary arterial hypertension 09/22/2014   • H/O: hysterectomy  09/22/2014   • Fibromyalgia 02/12/2019   • Hx SBO 02/12/2019   • Hx of cholecystectomy 02/13/2019   • Hypoglycemia 02/13/2019   • Fall 02/13/2019   • Hypothyroidism 02/13/2019   • Chronic pain syndrome 02/13/2019   • Anemia 02/13/2019   • Pneumonia of right lower lobe due to infectious organism 04/10/2019   • Acute UTI 05/07/2019   • Acute on chronic diastolic (congestive) heart failure 05/08/2019   • Opioid dependence 05/08/2019   • Venous stasis dermatitis of both lower extremities 05/08/2019   • Gram-negative bacteremia 05/08/2019   • Hypokalemia 05/09/2019   • E. coli bacteremia 05/09/2019   • Anemia of chronic disease 05/10/2019   • Sepsis without acute organ dysfunction - present on admit 05/14/2020   • CKD (chronic kidney disease) stage 3, GFR 30-59 ml/min 05/14/2020   • Bacteremia 05/15/2020   • Enterococcal septicemia 05/17/2020   • Asymptomatic bacteriuria 05/17/2020     Resolved Ambulatory Problems     Diagnosis Date Noted   • SBO (small bowel obstruction) 09/15/2020   • Nausea & vomiting 09/15/2020   • Small bowel obstruction 09/15/2020     Past Medical History:   Diagnosis Date   • Anxiety    • Arthritis    • CHF (congestive heart failure)    • Coronary artery disease    • Depression    • Disease of thyroid gland    • GERD (gastroesophageal reflux disease)    • Hypertension    • Moderate tricuspid valve stenosis    • Osteoporosis    • Peripheral neuropathy    • Stenosis of mitral valve          PAST SURGICAL HISTORY  Past Surgical History:   Procedure Laterality Date   • BILATERAL OOPHORECTOMY     • CARDIAC CATHETERIZATION     • CHOLECYSTECTOMY     • ERCP N/A 2/15/2019    Procedure: ENDOSCOPIC RETROGRADE CHOLANGIOPANCREATOGRAPHY WITH PARTIAL CHOLANGIOGRAM;  Surgeon: Gerald Herr MD;  Location: [facility] ENDOSCOPY;  Service: Gastroenterology   • EXPLORATORY LAPAROTOMY     • GASTRIC BYPASS     • HERNIA REPAIR      inguinal and ventral   • HYSTERECTOMY     • OVARIAN CYST REMOVAL           FAMILY  HISTORY  No family history on file.      SOCIAL HISTORY  Social History     Socioeconomic History   • Marital status:    Tobacco Use   • Smoking status: Former   • Smokeless tobacco: Never   Substance and Sexual Activity   • Alcohol use: No   • Drug use: No   • Sexual activity: Never         ALLERGIES  Aspartame and Cephalexin        REVIEW OF SYSTEMS  Review of Systems  Included in HPI  All systems reviewed and negative except for those discussed in HPI.      PHYSICAL EXAM    I have reviewed the triage vital signs and nursing notes.    ED Triage Vitals [05/26/23 2356]   Temp Heart Rate Resp BP SpO2   97.2 °F (36.2 °C) 70 18 149/70 96 %      Temp src Heart Rate Source Patient Position BP Location FiO2 (%)   Oral -- -- -- --       Physical Exam  GENERAL: alert, no acute distress  SKIN: Warm, dry, multiple bruises to bilateral forearms  HENT: Normocephalic, atraumatic  EYES: no scleral icterus  CV: regular rhythm, regular rate  RESPIRATORY: normal effort, wheezing bilaterally  ABDOMEN: soft, generalized tenderness there is guarding but no rebound, nondistended  MUSCULOSKELETAL: no deformity  NEURO: alert, moves all extremities, follows commands                                                               LAB RESULTS  Recent Results (from the past 24 hour(s))   BNP (IN-HOUSE)    Collection Time: 05/26/23  2:01 PM    Specimen: Blood   Result Value Ref Range     (H) 0 - 99 pg/mL   PROCALCITONIN    Collection Time: 05/26/23  2:01 PM    Specimen: Blood   Result Value Ref Range    Procalcitonin <0.05 ng/mL   PT AND PTT    Collection Time: 05/26/23  2:01 PM    Specimen: Blood   Result Value Ref Range    Protime 11.3 9.4 - 12.5 Second    INR 1.0     PTT 32.5 25.1 - 36.5 Second   AUTODIFF    Collection Time: 05/26/23  2:01 PM    Specimen: Blood   Result Value Ref Range    Neutrophil % 80.7 (H) 34.0 - 69.5 %    Lymphocyte % 13.1 (L) 20.0 - 53.0 %    Monocyte % 5.0 5.0 - 12.5 %    Eosinophil % 0.5 (L) 0.7 - 6.0 %     Basophil % 0.7 0.0 - 3.0 %    Neutrophils Absolute 5.2 1.7 - 6.0 x10(3)/ul    Lymphocytes Absolute 0.8 0.8 - 3.2 x10(3)/ul    Monocytes Absolute 0.3 0.2 - 1.4 x10(3)/ul    Eosinophils Absolute 0.0 0.0 - 0.6 x10(3)/ul    Basophils Absolute 0.0 0.0 - 0.3 x10(3)/ul   ECG 12 Lead Dyspnea    Collection Time: 05/27/23 12:35 AM   Result Value Ref Range    QT Interval 502 ms   Comprehensive Metabolic Panel    Collection Time: 05/27/23  1:25 AM    Specimen: Arm, Right; Blood   Result Value Ref Range    Glucose 121 (H) 65 - 99 mg/dL    BUN 9 8 - 23 mg/dL    Creatinine 0.67 0.57 - 1.00 mg/dL    Sodium 137 136 - 145 mmol/L    Potassium 4.6 3.5 - 5.2 mmol/L    Chloride 106 98 - 107 mmol/L    CO2 23.2 22.0 - 29.0 mmol/L    Calcium 8.9 8.6 - 10.5 mg/dL    Total Protein 5.4 (L) 6.0 - 8.5 g/dL    Albumin 2.8 (L) 3.5 - 5.2 g/dL    ALT (SGPT) 11 1 - 33 U/L    AST (SGOT) 12 1 - 32 U/L    Alkaline Phosphatase 96 39 - 117 U/L    Total Bilirubin 0.4 0.0 - 1.2 mg/dL    Globulin 2.6 gm/dL    A/G Ratio 1.1 g/dL    BUN/Creatinine Ratio 13.4 7.0 - 25.0    Anion Gap 7.8 5.0 - 15.0 mmol/L    eGFR 88.5 >60.0 mL/min/1.73   Lipase    Collection Time: 05/27/23  1:25 AM    Specimen: Arm, Right; Blood   Result Value Ref Range    Lipase 11 (L) 13 - 60 U/L   BNP    Collection Time: 05/27/23  1:25 AM    Specimen: Arm, Right; Blood   Result Value Ref Range    proBNP 2,838.0 (H) 0.0 - 1,800.0 pg/mL   High Sensitivity Troponin T    Collection Time: 05/27/23  1:25 AM    Specimen: Arm, Right; Blood   Result Value Ref Range    HS Troponin T 24 (H) <10 ng/L   Procalcitonin    Collection Time: 05/27/23  1:25 AM    Specimen: Arm, Right; Blood   Result Value Ref Range    Procalcitonin 0.03 0.00 - 0.25 ng/mL   Magnesium    Collection Time: 05/27/23  1:25 AM    Specimen: Arm, Right; Blood   Result Value Ref Range    Magnesium 2.2 1.6 - 2.4 mg/dL   CBC Auto Differential    Collection Time: 05/27/23  1:25 AM    Specimen: Arm, Right; Blood   Result Value Ref Range     WBC 7.37 3.40 - 10.80 10*3/mm3    RBC 3.28 (L) 3.77 - 5.28 10*6/mm3    Hemoglobin 10.4 (L) 12.0 - 15.9 g/dL    Hematocrit 32.3 (L) 34.0 - 46.6 %    MCV 98.5 (H) 79.0 - 97.0 fL    MCH 31.7 26.6 - 33.0 pg    MCHC 32.2 31.5 - 35.7 g/dL    RDW 13.2 12.3 - 15.4 %    RDW-SD 47.6 37.0 - 54.0 fl    MPV 12.1 (H) 6.0 - 12.0 fL    Platelets 96 (L) 140 - 450 10*3/mm3    Neutrophil % 81.7 (H) 42.7 - 76.0 %    Lymphocyte % 12.2 (L) 19.6 - 45.3 %    Monocyte % 5.3 5.0 - 12.0 %    Eosinophil % 0.1 (L) 0.3 - 6.2 %    Basophil % 0.4 0.0 - 1.5 %    Neutrophils, Absolute 6.02 1.70 - 7.00 10*3/mm3    Lymphocytes, Absolute 0.90 0.70 - 3.10 10*3/mm3    Monocytes, Absolute 0.39 0.10 - 0.90 10*3/mm3    Eosinophils, Absolute 0.01 0.00 - 0.40 10*3/mm3    Basophils, Absolute 0.03 0.00 - 0.20 10*3/mm3   Scan Slide    Collection Time: 05/27/23  1:25 AM    Specimen: Arm, Right; Blood   Result Value Ref Range    RBC Morphology Normal Normal    WBC Morphology Normal Normal    Platelet Morphology Normal Normal   Protime-INR    Collection Time: 05/27/23  1:56 AM    Specimen: Arm, Left; Blood   Result Value Ref Range    Protime 12.9 11.7 - 14.2 Seconds    INR 0.96 0.90 - 1.10   aPTT    Collection Time: 05/27/23  1:56 AM    Specimen: Arm, Left; Blood   Result Value Ref Range    PTT 31.6 22.7 - 35.4 seconds   Lactic Acid, Plasma    Collection Time: 05/27/23  1:56 AM    Specimen: Arm, Left; Blood   Result Value Ref Range    Lactate 1.1 0.5 - 2.0 mmol/L   Respiratory Panel PCR w/COVID-19(SARS-CoV-2) NIKKI/JOHN/GRIFFIN/PAD/COR/MAD/FRANCIS In-House, NP Swab in UTM/VTM, 3-4 HR TAT - Swab, Nasopharynx    Collection Time: 05/27/23  1:57 AM    Specimen: Nasopharynx; Swab   Result Value Ref Range    ADENOVIRUS, PCR Not Detected Not Detected    Coronavirus 229E Not Detected Not Detected    Coronavirus HKU1 Not Detected Not Detected    Coronavirus NL63 Not Detected Not Detected    Coronavirus OC43 Not Detected Not Detected    COVID19 Not Detected Not Detected - Ref. Range     Human Metapneumovirus Not Detected Not Detected    Human Rhinovirus/Enterovirus Not Detected Not Detected    Influenza A PCR Not Detected Not Detected    Influenza B PCR Not Detected Not Detected    Parainfluenza Virus 1 Not Detected Not Detected    Parainfluenza Virus 2 Not Detected Not Detected    Parainfluenza Virus 3 Not Detected Not Detected    Parainfluenza Virus 4 Not Detected Not Detected    RSV, PCR Not Detected Not Detected    Bordetella pertussis pcr Not Detected Not Detected    Bordetella parapertussis PCR Not Detected Not Detected    Chlamydophila pneumoniae PCR Not Detected Not Detected    Mycoplasma pneumo by PCR Not Detected Not Detected   Urinalysis With Microscopic If Indicated (No Culture) - Straight Cath    Collection Time: 05/27/23  2:17 AM    Specimen: Straight Cath; Urine   Result Value Ref Range    Color, UA Yellow Yellow, Straw    Appearance, UA Clear Clear    pH, UA 5.5 5.0 - 8.0    Specific Gravity, UA >=1.030 1.005 - 1.030    Glucose, UA Negative Negative    Ketones, UA 40 mg/dL (2+) (A) Negative    Bilirubin, UA Negative Negative    Blood, UA Negative Negative    Protein, UA Negative Negative    Leuk Esterase, UA Trace (A) Negative    Nitrite, UA Positive (A) Negative    Urobilinogen, UA 0.2 E.U./dL 0.2 - 1.0 E.U./dL   Urinalysis, Microscopic Only - Straight Cath    Collection Time: 05/27/23  2:17 AM    Specimen: Straight Cath; Urine   Result Value Ref Range    RBC, UA 0-2 None Seen, 0-2 /HPF    WBC, UA 6-12 (A) None Seen, 0-2 /HPF    Bacteria, UA 4+ (A) None Seen /HPF    Squamous Epithelial Cells, UA 0-2 None Seen, 0-2 /HPF    Hyaline Casts, UA 0-2 None Seen /LPF    Methodology Automated Microscopy        Ordered the above labs and independently reviewed the results.        RADIOLOGY  CT Abdomen Pelvis With Contrast    Result Date: 5/27/2023  CT OF THE ABDOMEN AND PELVIS WITH CONTRAST  HISTORY: Shortness of breath. Nausea and abdominal pain  COMPARISON: None available.  TECHNIQUE:  Axial CT imaging was obtained through the abdomen and pelvis. IV contrast was administered.  FINDINGS: Images through the lung bases demonstrate bibasilar scarring. There is likely also some mild atelectasis and trace bilateral effusions. There are dystrophic calcifications of the mitral valve annulus. There are postsurgical changes of gastric bypass. There is no evidence of obstruction. Gallbladder is absent. There is dilatation of the common bile duct, measuring up to 1.3 cm. This was also present on prior exam. It may be postcholecystectomy in nature. There is pancreatic atrophy. Spleen appears normal. Adrenal glands are unremarkable. The kidneys enhance symmetrically. Nonobstructing stone is again noted within the superior pole of the right kidney, measuring 1.0 cm. Excreted contrast material is noted within the urinary bladder. Patient does appear to have a small bladder diverticulum. Uterus is absent. There is colonic diverticulosis. There is no bowel obstruction. The appendix is normal. There is soft tissue stranding involving the right lower anterior abdominal wall, as well as some skin thickening. Correlation with any evidence of cellulitis is recommended. No acute osseous abnormalities are seen. There is a ventral hernia, which contains a small knuckle of colon, without evidence of obstruction.      Soft tissue stranding which is seen involving the lower anterior abdominal wall on the right. There is associated skin thickening. Correlation with any evidence of cellulitis is recommended. No discrete drainable fluid collection is seen.  Radiation dose reduction techniques were utilized, including automated exposure control and exposure modulation based on body size.  This report was finalized on 5/27/2023 2:52 AM by Dr. Miya Galarza M.D.      XR Chest 1 View    Result Date: 5/27/2023  SINGLE VIEW OF THE CHEST  HISTORY: Shortness of air  COMPARISON: 05/14/2020  FINDINGS: There is cardiomegaly. No  pneumothorax is seen. There is chronic scarring identified within both lungs. There is blunting of left costophrenic angle, and small effusion is not excluded. No definite acute infiltrates are seen.      Blunting of the left costophrenic angle. Small effusion is not excluded.  This report was finalized on 5/27/2023 1:22 AM by Dr. Miya Galarza M.D.      CT chest PE Protocol w    Result Date: 5/26/2023  EXAMINATION: CT Chest PE Protocol W ACCESSION NUMBER: [id number] DATE: 5/26/2023 16:14 PROVIDED INDICATION: Dyspnea, elevated d-dimer, concern for pulmonary embolism. COMPARISON: CT chest with PE protocol dated 06/11/2013. TECHNIQUE: Axial computed tomographic images of the chest were acquired following intravenous administration of 60 mL Isovue-370 in pulmonary arterial phase using bolus tracking technique. Images were reconstructed in the axial plane and reformatted as coronal MIP and sagittal MPR. FINDINGS: Pulmonary arteries: Poor contrast bolus timing limits evaluation. Within limitations, no large central or segmental pulmonary embolus is identified. Evaluation of the more distal branches limited. Pulmonary arteries maintain normal luminal caliber. Other Findings: Tubes/Lines: None. Lungs: No pulmonary nodule, mass, or consolidation. Dependent atelectatic changes are present bilaterally. Large Airways: Collapse of the posterior wall the trachea is consistent with imaging during expiration. No luminal filling defects within the visualized airways. Pleura: No pleural effusion or pneumothorax. Lymph nodes: The axillary, supraclavicular, mediastinal, and hilar lymph nodes are normal. Mediastinum: No mediastinal mass. Heart and Pericardium: Heart is moderately enlarged. No pericardial fluid collection. Aorta: The thoracic aorta is normal in size. Minimal aortic calcifications are identified. Chest Wall and Lower Neck: No pathologic findings. Included Upper Abdomen: On blood or surgically absent. Along the  stomach suggests prior bariatric procedure. Additional surgical clips near the gastroesophageal junction suggest prior fundoplication. Otherwise within normal limits for technique. Bones: No acute or aggressive osseous abnormality is seen. Degenerative changes result in exaggerated kyphotic curvature of the thoracic spine. IMPRESSION: 1.  Limited examination with no large central or segmental pulmonary emboli. 2.  Minimal peripheral atelectatic changes. Otherwise no acute pulmonary process. 3.  Cardiomegaly. 4.  Multilevel degenerative changes of the thoracic spine resulting in exaggeration of normal kyphosis. Dictated by: Delfino Young M.D. Signed by Delfino Young M.D. on 5/26/2023 16:37 ##### Final ##### Dictated by:    DELFINO YOUNG MD-RAD Dictated DT/TM: 05/26/2023 4:37 pm Interpreted and electronically signed by:  DELFINO YOUNG MD-RAD Signed DT/TM:  05/26/2023 4:37 pm    XR chest 1 view    Result Date: 5/26/2023  INDICATION: Shortness of air COMPARISON:   03/15/2023 FINDINGS: Single portable AP view of the chest.  The heart and mediastinal contours are normal. The lungs are clear. The right hemidiaphragm is elevated. No pneumothorax or pleural effusion. IMPRESSION: No acute findings. Dictated by: Shakir Calles MD Signed by Shakir Calles MD on 5/26/2023 14:18 ##### Final ##### Dictated by:    SHAKIR CALLES MD-RAD Dictated DT/TM: 05/26/2023 2:18 pm Interpreted and electronically signed by:  SHAKIR CALLES MD-RAD Signed DT/TM:  05/26/2023 2:18 pm      I ordered the above noted radiological studies. Reviewed by me and discussed with radiologist.  See dictation for official radiology interpretation.      PROCEDURES    Procedures      MEDICATIONS GIVEN IN ER    Medications   sodium chloride 0.9 % flush 10 mL (has no administration in time range)   sodium chloride 0.9 % flush 10 mL (has no administration in time range)   sodium chloride 0.9 % flush 10 mL (has no administration in time range)   sodium  chloride 0.9 % infusion 40 mL (has no administration in time range)   sennosides-docusate (PERICOLACE) 8.6-50 MG per tablet 2 tablet (has no administration in time range)     And   polyethylene glycol (MIRALAX) packet 17 g (has no administration in time range)     And   bisacodyl (DULCOLAX) EC tablet 5 mg (has no administration in time range)     And   bisacodyl (DULCOLAX) suppository 10 mg (has no administration in time range)   ipratropium-albuterol (DUO-NEB) nebulizer solution 3 mL (has no administration in time range)   nitroglycerin (NITROSTAT) SL tablet 0.4 mg (has no administration in time range)   acetaminophen (TYLENOL) tablet 650 mg (has no administration in time range)     Or   acetaminophen (TYLENOL) 160 MG/5ML solution 650 mg (has no administration in time range)     Or   acetaminophen (TYLENOL) suppository 650 mg (has no administration in time range)   ondansetron (ZOFRAN) injection 4 mg (has no administration in time range)   morphine injection 2 mg (2 mg Intravenous Given 5/27/23 0201)   ondansetron (ZOFRAN) injection 4 mg (4 mg Intravenous Given 5/27/23 0201)   ipratropium-albuterol (DUO-NEB) nebulizer solution 3 mL ( Nebulization Canceled Entry 5/27/23 0138)   iopamidol (ISOVUE-300) 61 % injection 100 mL (85 mL Intravenous Given by Other 5/27/23 0229)   levoFLOXacin (LEVAQUIN) 750 mg/150 mL D5W (premix) (LEVAQUIN) 750 mg (750 mg Intravenous New Bag 5/27/23 0500)         ORDERS PLACED DURING THIS VISIT:  Orders Placed This Encounter   Procedures   • Blood Culture - Blood,   • Blood Culture - Blood,   • Respiratory Panel PCR w/COVID-19(SARS-CoV-2) NIKKI/JOHN/GRIFFIN/PAD/COR/MAD/FRANCIS In-House, NP Swab in UTM/VTM, 3-4 HR TAT - Swab, Nasopharynx   • Urine Culture - Urine, Urine, Clean Catch   • XR Chest 1 View   • CT Abdomen Pelvis With Contrast   • Comprehensive Metabolic Panel   • Protime-INR   • aPTT   • Lipase   • Urinalysis With Microscopic If Indicated (No Culture) - Urine, Clean Catch   • BNP   • High  Sensitivity Troponin T   • Procalcitonin   • Lactic Acid, Plasma   • Magnesium   • CBC Auto Differential   • Scan Slide   • High Sensitivity Troponin T 2Hr   • Urinalysis, Microscopic Only - Urine, Clean Catch   • Basic Metabolic Panel   • CBC Auto Differential   • Protime-INR   • Magnesium   • Diet: Cardiac Diets; Healthy Heart (2-3 Na+); Texture: Regular Texture (IDDSI 7); Fluid Consistency: Thin (IDDSI 0)   • Monitor Blood Pressure   • Vital Signs   • Intake & Output   • Weigh Patient   • Oral Care   • Place Sequential Compression Device   • Maintain Sequential Compression Device   • Telemetry - Maintain IV Access   • Telemetry - Place Orders & Notify Provider of Results When Patient Experiences Acute Chest Pain, Dysrhythmia or Respiratory Distress   • May Be Off Telemetry for Tests   • Pulse Oximetry, Continuous   • Up With Assistance   • Code Status and Medical Interventions:   • LHA (on-call MD unless specified) Details   • ECG 12 Lead Dyspnea   • Insert Peripheral IV   • Insert Peripheral IV   • Initiate Observation Status   • CBC & Differential         PROGRESS, DATA ANALYSIS, CONSULTS, AND MEDICAL DECISION MAKING    All labs have been independently interpreted by me.  All radiology studies have been reviewed by me and discussed with radiologist dictating the report.   EKG's independently viewed and interpreted by me.  Discussion below represents my analysis of pertinent findings related to patient's condition, differential diagnosis, treatment plan and final disposition.    My differential diagnosis for abdominal pain includes but is not limited to:  Gastritis, gastroenteritis, peptic ulcer disease, GERD, esophageal perforation, acute appendicitis, mesenteric adenitis, Meckel’s diverticulum, epiploic appendagitis, diverticulitis, colon cancer, ulcerative colitis, Crohn’s disease, intussusception, small bowel obstruction, adhesions, ischemic bowel, perforated viscus, ileus, obstipation, biliary colic,  cholecystitis, cholelithiasis, Edward-Arpan Ramesh, hepatitis, pancreatitis, common bile duct obstruction, cholangitis, bile leak, splenic trauma, splenic rupture, splenic infarction, splenic abscess, abdominal abscess, ascites, spontaneous bacterial peritonitis, hernia, UTI, cystitis, prostatitis, ureterolithiasis, urinary obstruction, ovarian cyst, torsion, pregnancy, ectopic pregnancy, PID, pelvic abscess, mittelschmerz, endometriosis, AAA, myocardial infarction, pneumonia, cancer, porphyria, DKA, medications, sickle cell, viral syndrome, zoster      ED Course as of 05/27/23 0545   Sat May 27, 2023   0038 EKG          EKG time: 0035  Rhythm/Rate: Sinus rhythm rate 70  P waves and NV: Normal  QRS, axis: Nonspecific interventricular conduction delay  ST and T waves: Normal    Interpreted Contemporaneously by me, independently viewed [TJ]   0211 Creatinine: 0.67 [TJ]   0545 WBC, UA(!): 6-12 [TJ]   0545 Bacteria, UA(!): 4+ [TJ]   0545 Ketones, UA(!): 40 mg/dL (2+) [TJ]   0545 proBNP(!): 2,838.0 [TJ]   0545 Lipase(!): 11 [TJ]   0545 I have independently reviewed patient's chest x-ray; my interpretation is negative [TJ]      ED Course User Index  [TJ] Terence Marcelino MD       I interpreted the cardiac monitor rhythm and my independent interpretation is: normal sinus rhythm.     PPE: The patient wore a mask and I wore an N95 mask throughout the entire patient encounter.       AS OF 05:45 EDT VITALS:    BP - 126/74  HR - 65  TEMP - 97.7 °F (36.5 °C) (Oral)  O2 SATS - 92%        DIAGNOSIS  Final diagnoses:   COPD exacerbation   Urinary tract infection without hematuria, site unspecified         DISPOSITION  ED Disposition     ED Disposition   Decision to Admit    Condition   --    Comment   Level of Care: Telemetry [5]   Diagnosis: COPD exacerbation [660951]   Admitting Physician: LAURIE CALLE [[id number]]                  Note Disclaimer: At [facility], we believe that sharing information builds trust and better  relationships. You are receiving this note because you recently visited [facility]. It is possible you will see health information before a provider has talked with you about it. This kind of information can be easy to misunderstand. To help you fully understand what it means for your health, we urge you to discuss this note with your provider.       Terence Marcelino MD  05/27/23 0573

## 2023-05-27 NOTE — PLAN OF CARE
Goal Outcome Evaluation:  Plan of Care Reviewed With: patient        Progress: no change  Outcome Evaluation: Patient alert to self and place; VSS on 2L nasal cannula with controlled rate afib/aflutter noted. Cardiology consulted-previous EKG in ED was SR and patient and her son report no history of afib/aflutter. However, there is an EKG in the system from 2020 with a controlled rate afib noted. Patient has been anxious, paranoid this shift. RN has tried to provide comfort and reassurance. IV antibiotics continue. Accumax applied to bed. Will continue to monitor.

## 2023-05-27 NOTE — NURSING NOTE
Patient anxious, has repeatedly stated that she feels like she is dying. Patient c/o SOA, chest pain when taking a deep breath, abdominal pain. Respirations are even and unlabored. Abdomen soft and non tender. VSS. Patient' son at bedside and reports she has never been this confused/paranoid even with previous UTIs. Dr. Ferrari at bedside and made aware of the above.

## 2023-05-27 NOTE — NURSING NOTE
Nursing report ED to floor  Rose Cheema  82 y.o.  female    HPI :   Chief Complaint   Patient presents with   • Shortness of Breath   • Nausea       Admitting doctor:   Mason Logan MD    Admitting diagnosis:   The primary encounter diagnosis was COPD exacerbation. A diagnosis of Urinary tract infection without hematuria, site unspecified was also pertinent to this visit.    Code status:   Current Code Status     Date Active Code Status Order ID Comments User Context       5/27/2023 0448 CPR (Attempt to Resuscitate) [id number]  Sarah Tadeo, APRN ED      Question Answer    Code Status (Patient has no pulse and is not breathing) CPR (Attempt to Resuscitate)    Medical Interventions (Patient has pulse or is breathing) Full Support                Allergies:   Aspartame and Cephalexin    Isolation:   No active isolations    Intake and Output  No intake or output data in the 24 hours ending 05/27/23 0528    Weight:       05/26/23  2356   Weight: 127 kg (281 lb)       Most recent vitals:   Vitals:    05/27/23 0311 05/27/23 0317 05/27/23 0317 05/27/23 0411   BP: 123/77   126/74   Pulse: 71 74  65   Resp:       Temp:   97.7 °F (36.5 °C)    TempSrc:   Oral    SpO2: 98% 92%  92%   Weight:       Height:           Active LDAs/IV Access:   Lines, Drains & Airways     Active LDAs     Name Placement date Placement time Site Days    Peripheral IV 05/27/23 0155 Left Antecubital 05/27/23 0155  Antecubital  less than 1    External Urinary Catheter 05/27/23  0219  --  less than 1                Labs (abnormal labs have a star):   Labs Reviewed   COMPREHENSIVE METABOLIC PANEL - Abnormal; Notable for the following components:       Result Value    Glucose 121 (*)     Total Protein 5.4 (*)     Albumin 2.8 (*)     All other components within normal limits    Narrative:     GFR Normal >60  Chronic Kidney Disease <60  Kidney Failure <15    The GFR formula is only valid for adults with stable renal function between ages 18 and  70.   LIPASE - Abnormal; Notable for the following components:    Lipase 11 (*)     All other components within normal limits   URINALYSIS W/ MICROSCOPIC IF INDICATED (NO CULTURE) - Abnormal; Notable for the following components:    Ketones, UA 40 mg/dL (2+) (*)     Leuk Esterase, UA Trace (*)     Nitrite, UA Positive (*)     All other components within normal limits   BNP (IN-HOUSE) - Abnormal; Notable for the following components:    proBNP 2,838.0 (*)     All other components within normal limits    Narrative:     Among patients with dyspnea, NT-proBNP is highly sensitive for the detection of acute congestive heart failure. In addition NT-proBNP of <300 pg/ml effectively rules out acute congestive heart failure with 99% negative predictive value.    Results may be falsely decreased if patient taking Biotin.     TROPONIN - Abnormal; Notable for the following components:    HS Troponin T 24 (*)     All other components within normal limits    Narrative:     High Sensitive Troponin T Reference Range:  <10.0 ng/L- Negative Female for AMI  <15.0 ng/L- Negative Male for AMI  >=10 - Abnormal Female indicating possible myocardial injury.  >=15 - Abnormal Male indicating possible myocardial injury.   Clinicians would have to utilize clinical acumen, EKG, Troponin, and serial changes to determine if it is an Acute Myocardial Infarction or myocardial injury due to an underlying chronic condition.        CBC WITH AUTO DIFFERENTIAL - Abnormal; Notable for the following components:    RBC 3.28 (*)     Hemoglobin 10.4 (*)     Hematocrit 32.3 (*)     MCV 98.5 (*)     MPV 12.1 (*)     Platelets 96 (*)     Neutrophil % 81.7 (*)     Lymphocyte % 12.2 (*)     Eosinophil % 0.1 (*)     All other components within normal limits   URINALYSIS, MICROSCOPIC ONLY - Abnormal; Notable for the following components:    WBC, UA 6-12 (*)     Bacteria, UA 4+ (*)     All other components within normal limits   RESPIRATORY PANEL PCR W/ COVID-19  "(SARS-COV-2) NIKKI/JOHN/GRIFFIN/PAD/COR/MAD/FRANCIS IN-HOUSE, NP SWAB IN UTM/VTP, 3-4 HR TAT - Normal    Narrative:     In the setting of a positive respiratory panel with a viral infection PLUS a negative procalcitonin without other underlying concern for bacterial infection, consider observing off antibiotics or discontinuation of antibiotics and continue supportive care. If the respiratory panel is positive for atypical bacterial infection (Bordetella pertussis, Chlamydophila pneumoniae, or Mycoplasma pneumoniae), consider antibiotic de-escalation to target atypical bacterial infection.   PROTIME-INR - Normal   APTT - Normal   PROCALCITONIN - Normal    Narrative:     As a Marker for Sepsis (Non-Neonates):    1. <0.5 ng/mL represents a low risk of severe sepsis and/or septic shock.  2. >2 ng/mL represents a high risk of severe sepsis and/or septic shock.    As a Marker for Lower Respiratory Tract Infections that require antibiotic therapy:    PCT on Admission    Antibiotic Therapy       6-12 Hrs later    >0.5                Strongly Recommended  >0.25 - <0.5        Recommended   0.1 - 0.25          Discouraged              Remeasure/reassess PCT  <0.1                Strongly Discouraged     Remeasure/reassess PCT    As 28 day mortality risk marker: \"Change in Procalcitonin Result\" (>80% or <=80%) if Day 0 (or Day 1) and Day 4 values are available. Refer to http://www.[facility]-pct-calculator.com    Change in PCT <=80%  A decrease of PCT levels below or equal to 80% defines a positive change in PCT test result representing a higher risk for 28-day all-cause mortality of patients diagnosed with severe sepsis for septic shock.    Change in PCT >80%  A decrease of PCT levels of more than 80% defines a negative change in PCT result representing a lower risk for 28-day all-cause mortality of patients diagnosed with severe sepsis or septic shock.      LACTIC ACID, PLASMA - Normal   MAGNESIUM - Normal   SCAN SLIDE - Normal   BLOOD " CULTURE   BLOOD CULTURE   URINE CULTURE   HIGH SENSITIVITIY TROPONIN T 2HR   BASIC METABOLIC PANEL   CBC WITH AUTO DIFFERENTIAL   PROTIME-INR   MAGNESIUM   CBC AND DIFFERENTIAL    Narrative:     The following orders were created for panel order CBC & Differential.  Procedure                               Abnormality         Status                     ---------                               -----------         ------                     CBC Auto Differential[[id number]]        Abnormal            Final result               Scan Slide[[id number]]                   Normal              Final result                 Please view results for these tests on the individual orders.       EKG:   ECG 12 Lead Dyspnea   Preliminary Result   HEART RATE= 70  bpm   RR Interval= 857  ms   IA Interval= 162  ms   P Horizontal Axis=   deg   P Front Axis= 51  deg   QRSD Interval= 118  ms   QT Interval= 502  ms   QRS Axis= -23  deg   T Wave Axis= 69  deg   - ABNORMAL ECG -   Sinus rhythm   Nonspecific intraventricular conduction delay   Low voltage, precordial leads   Electronically Signed By:    Date and Time of Study: 2023-05-27 00:35:57          Meds given in ED:   Medications   sodium chloride 0.9 % flush 10 mL (has no administration in time range)   sodium chloride 0.9 % flush 10 mL (has no administration in time range)   sodium chloride 0.9 % flush 10 mL (has no administration in time range)   sodium chloride 0.9 % infusion 40 mL (has no administration in time range)   sennosides-docusate (PERICOLACE) 8.6-50 MG per tablet 2 tablet (has no administration in time range)     And   polyethylene glycol (MIRALAX) packet 17 g (has no administration in time range)     And   bisacodyl (DULCOLAX) EC tablet 5 mg (has no administration in time range)     And   bisacodyl (DULCOLAX) suppository 10 mg (has no administration in time range)   ipratropium-albuterol (DUO-NEB) nebulizer solution 3 mL (has no administration in time range)   nitroglycerin  (NITROSTAT) SL tablet 0.4 mg (has no administration in time range)   acetaminophen (TYLENOL) tablet 650 mg (has no administration in time range)     Or   acetaminophen (TYLENOL) 160 MG/5ML solution 650 mg (has no administration in time range)     Or   acetaminophen (TYLENOL) suppository 650 mg (has no administration in time range)   ondansetron (ZOFRAN) injection 4 mg (has no administration in time range)   morphine injection 2 mg (2 mg Intravenous Given 5/27/23 0201)   ondansetron (ZOFRAN) injection 4 mg (4 mg Intravenous Given 5/27/23 0201)   ipratropium-albuterol (DUO-NEB) nebulizer solution 3 mL ( Nebulization Canceled Entry 5/27/23 0138)   iopamidol (ISOVUE-300) 61 % injection 100 mL (85 mL Intravenous Given by Other 5/27/23 0229)   levoFLOXacin (LEVAQUIN) 750 mg/150 mL D5W (premix) (LEVAQUIN) 750 mg (750 mg Intravenous New Bag 5/27/23 0500)       Imaging results:  CT Abdomen Pelvis With Contrast    Result Date: 5/27/2023  Soft tissue stranding which is seen involving the lower anterior abdominal wall on the right. There is associated skin thickening. Correlation with any evidence of cellulitis is recommended. No discrete drainable fluid collection is seen.  Radiation dose reduction techniques were utilized, including automated exposure control and exposure modulation based on body size.  This report was finalized on 5/27/2023 2:52 AM by Dr. Miya Galarza M.D.      XR Chest 1 View    Result Date: 5/27/2023  Blunting of the left costophrenic angle. Small effusion is not excluded.  This report was finalized on 5/27/2023 1:22 AM by Dr. Miya Galarza M.D.        Ambulatory status:   Full assist.    Social issues:   Social History     Socioeconomic History   • Marital status:    Tobacco Use   • Smoking status: Former   • Smokeless tobacco: Never   Substance and Sexual Activity   • Alcohol use: No   • Drug use: No   • Sexual activity: Never       NIH Stroke Scale:         Liliana Tsang RN  05/27/23 05:28  EDT

## 2023-05-27 NOTE — CONSULTS
"  Access center consult.    Met with patient in room #560. Introduced self and role. Patient agreed to be evaluated. Visitor(son) at bedside.     Patient is a 82 y.o. W/F. She is alert and oriented x2. She and son report pt. with recent confusion. Pt. currently being treated for UTI. She lives in an assisted living facility. Lutheran: [employer]. Children: 1 son. Occupation: Retired nurse. Hobbies: denies. Education: college. Legal: denies. : Mario Cheema (Son) [phone]. : denies. Support system: son. History of violence/trauma/abuse: denies. Patient feels safe in the AL.    Access consulted for anxiety. Patient presented to the ED 5/27/23 via EMS from the nursing facility with c/o SOB, and abdominal pain. Patient admitted with COPD exacerbation. PMH anxiety, depression, opioid dependency, CHF, CAD, thyroid disease, acute UTI, fall, chronic pain syndrome, sepsis, GERSON, fibromyalgia, pneumonia, CKD, HTN,arthritis,peripheral neuropathy, osteoporosis. Patient denies any past inpatient psychiatric care. She reports seeing a Psychiatrist \"a long time ago\" for depression. Current medications: buspar, prozac, seroquel. PTA fentanyl, hydrocodone. Throughout encounter pt. drowsy, c/o generalized pain, constipation. Notified primary RN. Primary RN reports pt. anxious, and repeatedly c/o feeling like she is dying.     Patient denies SI/HI/A/V/H. Denies wish to be dead. Sleep: no recent changes. Appetite: decreased. Depression: \"it's controlled\" Anxiety: \"I don't know\" and unable to rate. Current stressors: moving into assisted living, medical. Patient denies any problem with alcohol. Regarding illicit drug use pt. admits to \"an opioid dependency\" and noted per PMH. She admits to long term use of fentanyl and hydrocodone \"for years.\"     Patient continues with drowsiness throughout encounter. Report given to primary RN. Access will follow. Dr. Junior consulted for medication management.   "

## 2023-05-27 NOTE — CONSULTS
Date of Hospital Visit: 23  Encounter Provider: Camacho Mims MD  Place of Service: [facility] CARDIOLOGY  Patient Name: Rose Cheema  :1940  Referral Provider: No ref. provider found    Chief complaint  Shortness of breath    History of Present Illness  82-year-old woman with hypertension, HFpEF, COPD, CKD stage III, GERSON on CPAP who presented with shortness of breath and abdominal pain.  She also complained of cough though notes that this is a chronic issue.    On arrival to the ED, she was afebrile, pulse 70s, respiratory rate 18, blood pressure 149/70, O2 saturations 96% on 2 L nasal cannula.  Blood work with normal BMP, hemoglobin of 11, indeterminate troponins x2 (24-26.) Initial EKG with sinus rhythm and nonspecific IVCD.  Repeat EKG with AF.    Patient notes her symptoms have improved after having a bowel movement.    Past Medical History:   Diagnosis Date   • Anemia    • Anxiety    • Arthritis    • CHF (congestive heart failure)    • Coronary artery disease    • Depression    • Disease of thyroid gland    • Fibromyalgia    • GERD (gastroesophageal reflux disease)    • Hypertension    • Moderate tricuspid valve stenosis    • Osteoporosis    • Peripheral neuropathy    • Pulmonary hypertension    • SBO (small bowel obstruction)    • Stenosis of mitral valve        Past Surgical History:   Procedure Laterality Date   • BILATERAL OOPHORECTOMY     • CARDIAC CATHETERIZATION     • CHOLECYSTECTOMY     • ERCP N/A 2/15/2019    Procedure: ENDOSCOPIC RETROGRADE CHOLANGIOPANCREATOGRAPHY WITH PARTIAL CHOLANGIOGRAM;  Surgeon: Gerald Herr MD;  Location: [facility] ENDOSCOPY;  Service: Gastroenterology   • EXPLORATORY LAPAROTOMY     • GASTRIC BYPASS     • HERNIA REPAIR      inguinal and ventral   • HYSTERECTOMY     • OVARIAN CYST REMOVAL         Medications Prior to Admission   Medication Sig Dispense Refill Last Dose   • albuterol sulfate  (90 Base) MCG/ACT inhaler Inhale 2  puffs Every 4 (Four) Hours As Needed for Wheezing.   Past Month   • aspirin 81 MG chewable tablet Chew 1 tablet.   5/26/2023   • busPIRone (BUSPAR) 30 MG tablet Take 1 tablet by mouth 2 (Two) Times a Day.   5/26/2023   • fentaNYL (DURAGESIC) 25 MCG/HR patch Place 1 patch on the skin as directed by provider Every 72 (Seventy-Two) Hours. 2 patch 0 Past Week   • FLUoxetine (PROzac) 60 MG tablet Take 1 tablet by mouth Daily.   5/26/2023   • levothyroxine (SYNTHROID, LEVOTHROID) 50 MCG tablet Take 1 tablet by mouth Daily.   5/26/2023   • Mirabegron ER (Myrbetriq) 50 MG tablet sustained-release 24 hour 24 hr tablet Take 50 mg by mouth Daily.   5/26/2023   • potassium chloride (MICRO-K) 10 MEQ CR capsule Take 1 capsule by mouth 2 (Two) Times a Day.   5/26/2023   • Probiotic Product (Align) 4 MG capsule Take 1 capsule by mouth Daily.   5/26/2023   • QUEtiapine (SEROquel) 50 MG tablet Take 2 tablets by mouth Every Night.   Past Week   • torsemide (DEMADEX) 100 MG tablet Take 1 tablet by mouth See Admin Instructions. Take one tablet by mouth twice daily every other day   5/26/2023   • umeclidinium-vilanterol (ANORO ELLIPTA) 62.5-25 MCG/INH aerosol powder  inhaler Inhale 1 puff Daily.   5/26/2023   • HYDROcodone-acetaminophen (NORCO) 7.5-325 MG per tablet Take 1 tablet by mouth Every 6 (Six) Hours As Needed for Severe Pain . (Patient taking differently: Take 1 tablet by mouth 5 (Five) Times a Day As Needed for Severe Pain.) 12 tablet 0    • pantoprazole (PROTONIX) 40 MG EC tablet Take 40 mg by mouth Daily.          Current Meds  Scheduled Meds:aluminum sulfate-calcium acetate, 1 packet, Topical, Q8H  aspirin, 81 mg, Oral, Daily  [START ON 5/28/2023] azithromycin, 250 mg, Oral, Q24H  busPIRone, 30 mg, Oral, BID  cefTRIAXone, 2 g, Intravenous, Q24H  enoxaparin, 40 mg, Subcutaneous, Q12H  FLUoxetine, 60 mg, Oral, Daily  ipratropium-albuterol, 3 mL, Nebulization, Q8H - RT  lactobacillus acidophilus, 1 capsule, Oral,  "Daily  levothyroxine, 50 mcg, Oral, Daily  Mirabegron ER, 50 mg, Oral, Daily  pantoprazole, 40 mg, Oral, Daily  potassium chloride, 10 mEq, Oral, BID  predniSONE, 20 mg, Oral, Daily With Breakfast  QUEtiapine, 100 mg, Oral, Nightly  senna-docusate sodium, 2 tablet, Oral, BID  sodium chloride, 10 mL, Intravenous, Q12H  torsemide, 100 mg, Oral, See Admin Instructions      Continuous Infusions:   PRN Meds:.•  acetaminophen **OR** acetaminophen **OR** acetaminophen  •  albuterol  •  senna-docusate sodium **AND** polyethylene glycol **AND** bisacodyl **AND** bisacodyl  •  cetaphil  •  nitroglycerin  •  ondansetron  •  [COMPLETED] Insert Peripheral IV **AND** sodium chloride  •  sodium chloride  •  sodium chloride    Allergies as of 05/26/2023 - Reviewed 05/26/2023   Allergen Reaction Noted   • Aspartame Other (See Comments) 02/14/2014   • Cephalexin Rash 02/13/2014       Social History     Socioeconomic History   • Marital status:    Tobacco Use   • Smoking status: Former   • Smokeless tobacco: Never   Substance and Sexual Activity   • Alcohol use: No   • Drug use: No   • Sexual activity: Never       No family history on file.    REVIEW OF SYSTEMS:   12 point ROS was performed and is negative except as outlined in HPI          Objective:   Temp:  [97.2 °F (36.2 °C)-98.2 °F (36.8 °C)] 98.2 °F (36.8 °C)  Heart Rate:  [62-74] 68  Resp:  [18] 18  BP: (110-149)/(61-87) 110/72  Body mass index is 49.78 kg/m².  Flowsheet Rows    Flowsheet Row First Filed Value   Admission Height 160 cm (63\") Documented at 05/26/2023 2356   Admission Weight 127 kg (281 lb) Documented at 05/26/2023 2356        Vitals:    05/27/23 1100   BP: 110/72   Pulse: 68   Resp: 18   Temp: 98.2 °F (36.8 °C)   SpO2: 100%       GEN: no distress, alert and oriented  HEENT: NACT, EOMI, moist mucous membranes  Lungs: CTAB, no wheezes, rales or rhonchi  CV: normal rate, regular rhythm, normal S1, S2, no murmurs, +2 radial pulses b/l, no carotid " bruit  Abdomen: soft, nontender, nondistended, NABS  Extremities: no edema  Skin: no rash, warm, dry  Heme/Lymph: no bruising  Psych: organized thought, normal behavior and affect      Lab Review:   Results from last 7 days   Lab Units 05/27/23  0521 05/27/23  0125   SODIUM mmol/L 141 137   POTASSIUM mmol/L 4.6 4.6   CHLORIDE mmol/L 107 106   CO2 mmol/L 24.7 23.2   BUN mg/dL 9 9   CREATININE mg/dL 0.79 0.67   CALCIUM mg/dL 8.7 8.9   BILIRUBIN mg/dL  --  0.4   ALK PHOS U/L  --  96   ALT (SGPT) U/L  --  11   AST (SGOT) U/L  --  12   GLUCOSE mg/dL 110* 121*     Results from last 7 days   Lab Units 05/27/23  0521 05/27/23  0125   HSTROP T ng/L 26* 24*     Results from last 7 days   Lab Units 05/27/23  0729 05/27/23  0125   WBC 10*3/mm3 7.71 7.37   HEMOGLOBIN g/dL 11.1* 10.4*   HEMATOCRIT % 35.0 32.3*   PLATELETS 10*3/mm3 120* 96*     Results from last 7 days   Lab Units 05/27/23  0829 05/27/23  0156 05/26/23  1401   INR  1.06 0.96 1.0   APTT seconds  --  31.6 32.5     Results from last 7 days   Lab Units 05/27/23  0521   MAGNESIUM mg/dL 2.2         I personally viewed and interpreted the patient's EKG/Telemetry data)      COPD exacerbation    Benign essential hypertension    CAD (coronary artery disease), native coronary artery    Chronic obstructive pulmonary disease with acute exacerbation    Diastolic dysfunction    Obstructive sleep apnea    Hypothyroidism    Chronic pain syndrome    CKD (chronic kidney disease) stage 3, GFR 30-59 ml/min    Assessment and Plan:    82-year-old woman with hypertension, HFpEF, COPD, CKD stage III, GERSON on CPAP who presented with shortness of breath and abdominal pain, found to have abnormal UA, being treated for UTI. Also with soft tissue stranding on CT abdomen. EKG with AF.    CHADSVASC 4. Currently rate controlled.    - echocardiogram  - Start Eliquis 5mg BID  - Continue home aspirin 81 mg daily and Lasix    Camacho Mims MD  05/27/23  14:57 EDT.

## 2023-05-28 ENCOUNTER — APPOINTMENT (OUTPATIENT)
Dept: GENERAL RADIOLOGY | Facility: HOSPITAL | Age: 83
DRG: 689 | End: 2023-05-28
Payer: MEDICARE

## 2023-05-28 ENCOUNTER — APPOINTMENT (OUTPATIENT)
Dept: CARDIOLOGY | Facility: HOSPITAL | Age: 83
DRG: 689 | End: 2023-05-28
Payer: MEDICARE

## 2023-05-28 LAB
ANION GAP SERPL CALCULATED.3IONS-SCNC: 9.8 MMOL/L (ref 5–15)
AORTIC DIMENSIONLESS INDEX: 0.7 (DI)
BACTERIA BLD CULT: NORMAL
BH CV ECHO MEAS - AO MAX PG: 6.6 MMHG
BH CV ECHO MEAS - AO MEAN PG: 3.4 MMHG
BH CV ECHO MEAS - AO V2 MAX: 128 CM/SEC
BH CV ECHO MEAS - AO V2 VTI: 27.7 CM
BH CV ECHO MEAS - EDV(CUBED): 152.7 ML
BH CV ECHO MEAS - EDV(MOD-SP2): 126 ML
BH CV ECHO MEAS - EDV(MOD-SP4): 102 ML
BH CV ECHO MEAS - EF(MOD-BP): 58.1 %
BH CV ECHO MEAS - EF(MOD-SP2): 57.9 %
BH CV ECHO MEAS - EF(MOD-SP4): 59.8 %
BH CV ECHO MEAS - ESV(CUBED): 57.3 ML
BH CV ECHO MEAS - ESV(MOD-SP2): 53 ML
BH CV ECHO MEAS - ESV(MOD-SP4): 41 ML
BH CV ECHO MEAS - FS: 27.9 %
BH CV ECHO MEAS - IVS/LVPW: 0.88 CM
BH CV ECHO MEAS - IVSD: 1.06 CM
BH CV ECHO MEAS - LAT PEAK E' VEL: 9.9 CM/SEC
BH CV ECHO MEAS - LV DIASTOLIC VOL/BSA (35-75): 45.6 CM2
BH CV ECHO MEAS - LV MASS(C)D: 241.3 GRAMS
BH CV ECHO MEAS - LV MAX PG: 3.3 MMHG
BH CV ECHO MEAS - LV MEAN PG: 1.43 MMHG
BH CV ECHO MEAS - LV SYSTOLIC VOL/BSA (12-30): 18.3 CM2
BH CV ECHO MEAS - LV V1 MAX: 91 CM/SEC
BH CV ECHO MEAS - LV V1 VTI: 18.3 CM
BH CV ECHO MEAS - LVIDD: 5.3 CM
BH CV ECHO MEAS - LVIDS: 3.9 CM
BH CV ECHO MEAS - LVPWD: 1.21 CM
BH CV ECHO MEAS - MED PEAK E' VEL: 8.4 CM/SEC
BH CV ECHO MEAS - MR MAX PG: 62.5 MMHG
BH CV ECHO MEAS - MR MAX VEL: 395.3 CM/SEC
BH CV ECHO MEAS - MV DEC SLOPE: 640.4 CM/SEC2
BH CV ECHO MEAS - MV DEC TIME: 226 MSEC
BH CV ECHO MEAS - MV E MAX VEL: 145 CM/SEC
BH CV ECHO MEAS - MV MAX PG: 13.3 MMHG
BH CV ECHO MEAS - MV MEAN PG: 4 MMHG
BH CV ECHO MEAS - MV P1/2T: 82.4 MSEC
BH CV ECHO MEAS - MV V2 VTI: 42.7 CM
BH CV ECHO MEAS - MVA(P1/2T): 2.7 CM2
BH CV ECHO MEAS - PA ACC TIME: 0.09 SEC
BH CV ECHO MEAS - PA PR(ACCEL): 38.6 MMHG
BH CV ECHO MEAS - PA V2 MAX: 107.2 CM/SEC
BH CV ECHO MEAS - RAP SYSTOLE: 8 MMHG
BH CV ECHO MEAS - RV MAX PG: 2.02 MMHG
BH CV ECHO MEAS - RV V1 MAX: 71.1 CM/SEC
BH CV ECHO MEAS - RV V1 VTI: 14.9 CM
BH CV ECHO MEAS - RVSP: 39.1 MMHG
BH CV ECHO MEAS - SI(MOD-SP2): 32.7 ML/M2
BH CV ECHO MEAS - SI(MOD-SP4): 27.3 ML/M2
BH CV ECHO MEAS - SV(MOD-SP2): 73 ML
BH CV ECHO MEAS - SV(MOD-SP4): 61 ML
BH CV ECHO MEAS - TAPSE (>1.6): 1.95 CM
BH CV ECHO MEAS - TR MAX PG: 31.1 MMHG
BH CV ECHO MEAS - TR MAX VEL: 279 CM/SEC
BH CV ECHO MEASUREMENTS AVERAGE E/E' RATIO: 15.85
BH CV XLRA - TDI S': 12.1 CM/SEC
BOTTLE TYPE: NORMAL
BUN SERPL-MCNC: 7 MG/DL (ref 8–23)
BUN/CREAT SERPL: 8.3 (ref 7–25)
CALCIUM SPEC-SCNC: 8.9 MG/DL (ref 8.6–10.5)
CHLORIDE SERPL-SCNC: 106 MMOL/L (ref 98–107)
CO2 SERPL-SCNC: 24.2 MMOL/L (ref 22–29)
CREAT SERPL-MCNC: 0.84 MG/DL (ref 0.57–1)
DEPRECATED RDW RBC AUTO: 45.3 FL (ref 37–54)
EGFRCR SERPLBLD CKD-EPI 2021: 70.4 ML/MIN/1.73
ERYTHROCYTE [DISTWIDTH] IN BLOOD BY AUTOMATED COUNT: 12.8 % (ref 12.3–15.4)
GLUCOSE SERPL-MCNC: 95 MG/DL (ref 65–99)
HCT VFR BLD AUTO: 31.1 % (ref 34–46.6)
HGB BLD-MCNC: 10.3 G/DL (ref 12–15.9)
LEFT ATRIUM VOLUME INDEX: 33.1 ML/M2
MAXIMAL PREDICTED HEART RATE: 140 BPM
MCH RBC QN AUTO: 31.8 PG (ref 26.6–33)
MCHC RBC AUTO-ENTMCNC: 33.1 G/DL (ref 31.5–35.7)
MCV RBC AUTO: 96 FL (ref 79–97)
PLATELET # BLD AUTO: 120 10*3/MM3 (ref 140–450)
PMV BLD AUTO: 11.3 FL (ref 6–12)
POTASSIUM SERPL-SCNC: 3.9 MMOL/L (ref 3.5–5.2)
RBC # BLD AUTO: 3.24 10*6/MM3 (ref 3.77–5.28)
SODIUM SERPL-SCNC: 140 MMOL/L (ref 136–145)
STRESS TARGET HR: 119 BPM
WBC NRBC COR # BLD: 7.2 10*3/MM3 (ref 3.4–10.8)

## 2023-05-28 PROCEDURE — 85027 COMPLETE CBC AUTOMATED: CPT | Performed by: NURSE PRACTITIONER

## 2023-05-28 PROCEDURE — 94799 UNLISTED PULMONARY SVC/PX: CPT

## 2023-05-28 PROCEDURE — 25510000001 PERFLUTREN (DEFINITY) 8.476 MG IN SODIUM CHLORIDE (PF) 0.9 % 10 ML INJECTION: Performed by: STUDENT IN AN ORGANIZED HEALTH CARE EDUCATION/TRAINING PROGRAM

## 2023-05-28 PROCEDURE — 63710000001 PREDNISONE PER 1 MG: Performed by: NURSE PRACTITIONER

## 2023-05-28 PROCEDURE — 94664 DEMO&/EVAL PT USE INHALER: CPT

## 2023-05-28 PROCEDURE — 94761 N-INVAS EAR/PLS OXIMETRY MLT: CPT

## 2023-05-28 PROCEDURE — 99232 SBSQ HOSP IP/OBS MODERATE 35: CPT | Performed by: STUDENT IN AN ORGANIZED HEALTH CARE EDUCATION/TRAINING PROGRAM

## 2023-05-28 PROCEDURE — 93306 TTE W/DOPPLER COMPLETE: CPT

## 2023-05-28 PROCEDURE — 80048 BASIC METABOLIC PNL TOTAL CA: CPT | Performed by: NURSE PRACTITIONER

## 2023-05-28 PROCEDURE — 93306 TTE W/DOPPLER COMPLETE: CPT | Performed by: INTERNAL MEDICINE

## 2023-05-28 PROCEDURE — 71046 X-RAY EXAM CHEST 2 VIEWS: CPT

## 2023-05-28 PROCEDURE — 25010000002 CEFTRIAXONE PER 250 MG: Performed by: NURSE PRACTITIONER

## 2023-05-28 RX ORDER — LORAZEPAM 0.5 MG/1
0.5 TABLET ORAL EVERY 6 HOURS PRN
Status: DISCONTINUED | OUTPATIENT
Start: 2023-05-28 | End: 2023-06-02 | Stop reason: HOSPADM

## 2023-05-28 RX ADMIN — IPRATROPIUM BROMIDE AND ALBUTEROL SULFATE 3 ML: 2.5; .5 SOLUTION RESPIRATORY (INHALATION) at 15:59

## 2023-05-28 RX ADMIN — BUSPIRONE HYDROCHLORIDE 30 MG: 15 TABLET ORAL at 21:13

## 2023-05-28 RX ADMIN — QUETIAPINE FUMARATE 100 MG: 100 TABLET ORAL at 21:13

## 2023-05-28 RX ADMIN — ASPIRIN 81 MG: 81 TABLET, CHEWABLE ORAL at 08:03

## 2023-05-28 RX ADMIN — Medication 1 PACKET: at 09:17

## 2023-05-28 RX ADMIN — HYDROCODONE BITARTRATE AND ACETAMINOPHEN 1 TABLET: 7.5; 325 TABLET ORAL at 14:18

## 2023-05-28 RX ADMIN — PREDNISONE 20 MG: 20 TABLET ORAL at 08:03

## 2023-05-28 RX ADMIN — CEFTRIAXONE 2 G: 2 INJECTION, POWDER, FOR SOLUTION INTRAMUSCULAR; INTRAVENOUS at 11:14

## 2023-05-28 RX ADMIN — POTASSIUM CHLORIDE 10 MEQ: 750 TABLET, EXTENDED RELEASE ORAL at 08:03

## 2023-05-28 RX ADMIN — DOCUSATE SODIUM 50MG AND SENNOSIDES 8.6MG 2 TABLET: 8.6; 5 TABLET, FILM COATED ORAL at 08:02

## 2023-05-28 RX ADMIN — DOCUSATE SODIUM 50MG AND SENNOSIDES 8.6MG 2 TABLET: 8.6; 5 TABLET, FILM COATED ORAL at 21:13

## 2023-05-28 RX ADMIN — APIXABAN 5 MG: 5 TABLET, FILM COATED ORAL at 21:13

## 2023-05-28 RX ADMIN — HYDROCODONE BITARTRATE AND ACETAMINOPHEN 1 TABLET: 7.5; 325 TABLET ORAL at 08:03

## 2023-05-28 RX ADMIN — BUSPIRONE HYDROCHLORIDE 30 MG: 15 TABLET ORAL at 08:03

## 2023-05-28 RX ADMIN — AZITHROMYCIN DIHYDRATE 250 MG: 250 TABLET, FILM COATED ORAL at 08:03

## 2023-05-28 RX ADMIN — HYDROCODONE BITARTRATE AND ACETAMINOPHEN 1 TABLET: 7.5; 325 TABLET ORAL at 21:13

## 2023-05-28 RX ADMIN — LEVOTHYROXINE SODIUM 50 MCG: 0.05 TABLET ORAL at 08:03

## 2023-05-28 RX ADMIN — Medication 1 PACKET: at 14:23

## 2023-05-28 RX ADMIN — FLUOXETINE HYDROCHLORIDE 60 MG: 20 CAPSULE ORAL at 08:02

## 2023-05-28 RX ADMIN — Medication 1 PACKET: at 06:04

## 2023-05-28 RX ADMIN — POTASSIUM CHLORIDE 10 MEQ: 750 TABLET, EXTENDED RELEASE ORAL at 21:13

## 2023-05-28 RX ADMIN — PERFLUTREN 2 ML: 6.52 INJECTION, SUSPENSION INTRAVENOUS at 15:34

## 2023-05-28 RX ADMIN — Medication 1 CAPSULE: at 08:02

## 2023-05-28 RX ADMIN — LORAZEPAM 0.5 MG: 0.5 TABLET ORAL at 17:11

## 2023-05-28 RX ADMIN — APIXABAN 5 MG: 5 TABLET, FILM COATED ORAL at 08:03

## 2023-05-28 RX ADMIN — Medication 10 ML: at 08:16

## 2023-05-28 RX ADMIN — IPRATROPIUM BROMIDE AND ALBUTEROL SULFATE 3 ML: 2.5; .5 SOLUTION RESPIRATORY (INHALATION) at 08:22

## 2023-05-28 RX ADMIN — MIRABEGRON 50 MG: 25 TABLET, FILM COATED, EXTENDED RELEASE ORAL at 08:03

## 2023-05-28 RX ADMIN — PANTOPRAZOLE SODIUM 40 MG: 40 TABLET, DELAYED RELEASE ORAL at 08:03

## 2023-05-28 NOTE — PLAN OF CARE
"Goal Outcome Evaluation:      81 yo female admitted 5/26 with COPD exacerbation. IV rocephin for UTI and positive blood cultures. Pt complains of generalized pain to back and abdomen, PRN Norco given as ordered. Pt states she is SOA, but exhibits no labored breathing and seems to rest comfortably. Anxious, and states she feels like she is \"not doing well.\" CXR and echo done today. VS stable, 2L O2 NC for comfort, disoriented to situation.                  "

## 2023-05-28 NOTE — NURSING NOTE
"Access Center follow-up d/t anxiety.     Patient RIB with  at bedside. The patient stated \"not well\" when asked how she was feeling. The patient stated she felt like she was having difficulty breathing. O2 sats @ 100% on 2L of O2.  The patient stated she felt like she was dying. Patient reported her treatment team has assured her she is not dying. The patient denied depression but rated anxiety 10/10. The patient's  reported broken sleep stating she sleep around 3 hours at a time. Nursing working on figuring out last known Fentanyl patch placement. Psychiatrist to see today. Access will follow.   "

## 2023-05-28 NOTE — NURSING NOTE
Attempted to contact [facility] for last known fentanyl patch placement. After Numerous calls with no answer, will pass along to next shift to call during daytime hours.

## 2023-05-28 NOTE — PROGRESS NOTES
"    Patient Name: Rose Cheema  :1940  82 y.o.      Patient Care Team:  Cheng Guevara MD as PCP - General (Internal Medicine)    Chief Complaint:     Interval History:   No acute events overnight.     Objective   Vital Signs  Temp:  [97.8 °F (36.6 °C)-98.5 °F (36.9 °C)] 98 °F (36.7 °C)  Heart Rate:  [54-78] 61  Resp:  [16-18] 16  BP: (110-179)/(71-96) 133/78    Intake/Output Summary (Last 24 hours) at 2023 0757  Last data filed at 2023 0500  Gross per 24 hour   Intake --   Output 600 ml   Net -600 ml     Flowsheet Rows    Flowsheet Row First Filed Value   Admission Height 160 cm (63\") Documented at 2023   Admission Weight 127 kg (281 lb) Documented at 2023 235          GEN: no distress, alert and oriented  HEENT: NACT, EOMI, moist mucous membranes  Lungs: CTAB, no wheezes, rales or rhonchi  CV: normal rate, regular rhythm, normal S1, S2, no murmurs, +2 radial pulses b/l, no carotid bruit  Abdomen: soft, nontender, nondistended, NABS  Extremities: no edema  Skin: no rash, warm, dry  Heme/Lymph: no bruising  Psych: organized thought, normal behavior and affect    Results Review:    Results from last 7 days   Lab Units 23  0441   SODIUM mmol/L 140   POTASSIUM mmol/L 3.9   CHLORIDE mmol/L 106   CO2 mmol/L 24.2   BUN mg/dL 7*   CREATININE mg/dL 0.84   GLUCOSE mg/dL 95   CALCIUM mg/dL 8.9     Results from last 7 days   Lab Units 23  0521 23  0125   HSTROP T ng/L 26* 24*     Results from last 7 days   Lab Units 23  0441   WBC 10*3/mm3 7.20   HEMOGLOBIN g/dL 10.3*   HEMATOCRIT % 31.1*   PLATELETS 10*3/mm3 120*     Results from last 7 days   Lab Units 23  0829 23  0156 23  1401   INR  1.06 0.96 1.0   APTT seconds  --  31.6 32.5     Results from last 7 days   Lab Units 23  0521   MAGNESIUM mg/dL 2.2         Results from last 7 days   Lab Units 23  1401   BNP pg/mL 376*           Medication Review:   aluminum sulfate-calcium acetate, 1 " packet, Topical, Q8H  apixaban, 5 mg, Oral, Q12H  aspirin, 81 mg, Oral, Daily  azithromycin, 250 mg, Oral, Q24H  busPIRone, 30 mg, Oral, BID  cefTRIAXone, 2 g, Intravenous, Q24H  FLUoxetine, 60 mg, Oral, Daily  ipratropium-albuterol, 3 mL, Nebulization, Q8H - RT  lactobacillus acidophilus, 1 capsule, Oral, Daily  levothyroxine, 50 mcg, Oral, Daily  Mirabegron ER, 50 mg, Oral, Daily  pantoprazole, 40 mg, Oral, Daily  potassium chloride, 10 mEq, Oral, BID  predniSONE, 20 mg, Oral, Daily With Breakfast  QUEtiapine, 100 mg, Oral, Nightly  senna-docusate sodium, 2 tablet, Oral, BID  sodium chloride, 10 mL, Intravenous, Q12H  torsemide, 100 mg, Oral, See Admin Instructions              Assessment & Plan   #A-fib  #Hypertension  #HFpEF  #COPD  #CKD stage III  #GERSON on CPAP      82-year-old woman with hypertension, HFpEF, COPD, CKD stage III, GERSON on CPAP who presented with shortness of breath and abdominal pain, found to have abnormal UA, being treated for UTI. Also with soft tissue stranding on CT abdomen. EKG with AF.     CHADSVASC 4. Currently rate controlled.     - Follow-up echocardiogram  - Continue Eliquis 5mg BID  - Continue home aspirin 81 mg daily and Chadd Mims MD  [facility]  05/28/23  07:57 EDT

## 2023-05-28 NOTE — CONSULTS
"IDENTIFYING INFORMATION: The patient is an 82-year-old white female admitted with an exacerbation of COPD and for treatment of urinary tract infection.  She is seen by psychiatry related to complaints of anxiety    CHIEF COMPLAINT: None given    INFORMANT: Patient and chart    RELIABILITY: Good    HISTORY OF PRESENT ILLNESS: The patient is an 82-year-old white female admitted with exacerbation of COPD and for treatment of urinary tract infection.  She is seen by psychiatry related to complaints of severe anxiety which she rates as a 10 on a scale of 1-10.  She has multiple medical issues including COPD, congestive heart failure, coronary artery disease, thyroid disease, acute UTI, chronic pain, obstructive sleep apnea, fibromyalgia, chronic renal disease, hypertension arthritis, peripheral neuropathy and osteoporosis.  The patient reports that she saw a psychiatrist several years ago and is now on fairly aggressive doses of Prozac, Seroquel and BuSpar, particularly given her age.  She denies prior suicide attempts or gestures and denies current suicidal or homicidal ideation.  She reports that her anxiety is mainly related to her uncertainty related to her current health situation.  She states that she often feels as though she is \"dying\".  The chart indicates that she has a history of chronic pain for which she has been dependent opioids for many years.  The patient is pleasant cooperative when seen today.  She has just undergone a cardiac procedure and is undergoing a cardiac ultrasound during interview today.  Her ability to comply with interview is somewhat impaired by this fact.    PAST PSYCHIATRIC HISTORY: As above.  The patient is currently on BuSpar 30 mg twice daily, Seroquel 100 mg at bedtime and Prozac 60 mg daily.  No history of inpatient psychiatric care and no history of suicide attempts or gestures.    PAST MEDICAL HISTORY: See above    MEDICATIONS:   Current Facility-Administered Medications "   Medication Dose Route Frequency Provider Last Rate Last Admin   • acetaminophen (TYLENOL) tablet 650 mg  650 mg Oral Q4H PRN Sarah Tadeo APRN        Or   • acetaminophen (TYLENOL) 160 MG/5ML solution 650 mg  650 mg Oral Q4H PRN Sarah Tadeo APRN        Or   • acetaminophen (TYLENOL) suppository 650 mg  650 mg Rectal Q4H PRN Sarah Tadeo APRN       • albuterol (PROVENTIL) nebulizer solution 0.083% 2.5 mg/3mL  2.5 mg Nebulization Q6H PRN Janice Gaston APRN       • aluminum sulfate-calcium acetate (DOMEBORO) topical packet 1 packet  1 packet Topical Q8H Janice Gaston APRN   1 packet at 05/28/23 1423   • apixaban (ELIQUIS) tablet 5 mg  5 mg Oral Q12H Camacho Mims MD   5 mg at 05/28/23 0803   • aspirin chewable tablet 81 mg  81 mg Oral Daily Janice Gaston APRN   81 mg at 05/28/23 0803   • azithromycin (ZITHROMAX) tablet 250 mg  250 mg Oral Q24H Janice Gaston APRN   250 mg at 05/28/23 0803   • sennosides-docusate (PERICOLACE) 8.6-50 MG per tablet 2 tablet  2 tablet Oral BID Sarah Tadeo APRN   2 tablet at 05/28/23 0802    And   • polyethylene glycol (MIRALAX) packet 17 g  17 g Oral Daily PRN Sarah Tadeo APRN        And   • bisacodyl (DULCOLAX) EC tablet 5 mg  5 mg Oral Daily PRN Sarah Tadeo APRN        And   • bisacodyl (DULCOLAX) suppository 10 mg  10 mg Rectal Daily PRN Sarah Tadeo APRN       • busPIRone (BUSPAR) tablet 30 mg  30 mg Oral BID Janice Gaston APRN   30 mg at 05/28/23 0803   • cefTRIAXone (ROCEPHIN) 2 g in sodium chloride 0.9 % 100 mL IVPB-VTB  2 g Intravenous Q24H Janice Gaston APRN 200 mL/hr at 05/28/23 1114 2 g at 05/28/23 1114   • cetaphil lotion   Topical PRN Janice Gaston APRN       • FLUoxetine (PROzac) capsule 60 mg  60 mg Oral Daily Tin Ferrari MD   60 mg at 05/28/23 0802   • HYDROcodone-acetaminophen (NORCO) 7.5-325 MG per tablet 1 tablet  1 tablet  Oral Q6H PRN Thalia Eddy APRN   1 tablet at 05/28/23 1418   • ipratropium-albuterol (DUO-NEB) nebulizer solution 3 mL  3 mL Nebulization Q8H - RT Janice Gaston APRN   3 mL at 05/28/23 0822   • lactobacillus acidophilus (RISAQUAD) capsule 1 capsule  1 capsule Oral Daily Janice Gaston APRN   1 capsule at 05/28/23 0802   • levothyroxine (SYNTHROID, LEVOTHROID) tablet 50 mcg  50 mcg Oral Daily Janice Gaston APRN   50 mcg at 05/28/23 0803   • LORazepam (ATIVAN) tablet 0.5 mg  0.5 mg Oral Q6H PRN Narinder Junior III, MD       • Mirabegron ER (MYRBETRIQ) 24 hr tablet 50 mg  50 mg Oral Daily Janice Gaston APRN   50 mg at 05/28/23 0803   • nitroglycerin (NITROSTAT) SL tablet 0.4 mg  0.4 mg Sublingual Q5 Min PRN Sarah Tadeo APRN       • ondansetron (ZOFRAN) injection 4 mg  4 mg Intravenous Q6H PRN Sarah Tadeo APRN   4 mg at 05/27/23 0851   • pantoprazole (PROTONIX) EC tablet 40 mg  40 mg Oral Daily Janice Gaston APRN   40 mg at 05/28/23 0803   • potassium chloride (K-DUR,KLOR-CON) ER tablet 10 mEq  10 mEq Oral BID Janice Gaston APRN   10 mEq at 05/28/23 0803   • predniSONE (DELTASONE) tablet 20 mg  20 mg Oral Daily With Breakfast Janice Gaston APRN   20 mg at 05/28/23 0803   • QUEtiapine (SEROquel) tablet 100 mg  100 mg Oral Nightly Janice aGston APRN   100 mg at 05/27/23 2030   • sodium chloride 0.9 % flush 10 mL  10 mL Intravenous PRN Terence Marcelino MD       • sodium chloride 0.9 % flush 10 mL  10 mL Intravenous Q12H Sarah Tadeo APRN   10 mL at 05/28/23 0816   • sodium chloride 0.9 % flush 10 mL  10 mL Intravenous PRN Sarah Tadeo APRN       • sodium chloride 0.9 % infusion 40 mL  40 mL Intravenous PRN Sarah Tadeo, NELL       • torsemide (DEMADEX) tablet 100 mg  100 mg Oral See Admin Instructions Janice Gaston APRN             ALLERGIES: Sulfa  aspartame cephalexin    FAMILY HISTORY: Noncontributory    SOCIAL HISTORY: The patient lives in a local nursing facility.  Charting indicates a possible history of opioid dependence    MENTAL STATUS EXAM: The patient is an elderly white female appearing her stated age.  She is dressed in hospital garb and as noted previously is undergoing cardiac ultrasound during today's interview.  The patient is awake alert and oriented to person place and can identify the date.  She cannot identify thepresident.  Her mood is calm her affect blunted.  Speech is generally relevant and coherent though there are some word-finding difficulties noted.  There are no gross deficits in memory or cognition noted though the patient is unable to comply with formal testing.  She denies current suicidal or homicidal ideation or psychotic features.  Judgment and insight appear to be reasonably intact    ASSETS/LIABILITIES: Patient for change/health issues    DIAGNOSTIC IMPRESSION: Major depressive disorder recurrent moderate, medical problems as previously described    PLAN: I will not make any changes in the patient's current psychotropic medication regimen apart from adding as needed lorazepam 0.5 mg every 6 hours.  Anxiety.  The patient reports that her current anxiety is related mainly to her current medical condition and uncertainty related thereto and that prior to admission she was not experiencing any issues with anxiety.  She will likely not need the lorazepam once medically stable and discharge.  I will continue to follow with you.

## 2023-05-28 NOTE — PROGRESS NOTES
Name: Rose Cheema ADMIT: 2023   : 1940  PCP: Cheng Guevara MD    MRN: [id number] LOS: 1 days   AGE/SEX: 82 y.o. female  ROOM: [facility]     Subjective   Subjective   Patient is seen at bedside, no new complaints.       Objective   Objective   Vital Signs  Temp:  [97.8 °F (36.6 °C)-98.6 °F (37 °C)] 98.5 °F (36.9 °C)  Heart Rate:  [54-78] 66  Resp:  [16-18] 16  BP: (124-181)/(69-96) 149/69  SpO2:  [95 %-100 %] 100 %  on  Flow (L/min):  [2] 2;   Device (Oxygen Therapy): nasal cannula  Body mass index is 49.78 kg/m².  Physical Exam   General, awake and alert.  Appears sick on chronic basis  Head and ENT, normocephalic and atraumatic.  Lungs, symmetric expansion, equal air entry bilaterally.  Heart, regular rate and rhythm.  Abdomen, soft and nontender.  Extremities, no clubbing or cyanosis.  Bilateral lower extremity skin changes, consistent with chronic venous stasis.  Neuro, no focal deficits.  Skin: Warm and no rash.  Psych, appears anxious.  Musculoskeletal, joint examination is grossly normal.    Copied text material from yesterday's note has been reviewed for appropriate changes and remains accurate as of 23.    Results Review     I reviewed the patient's new clinical results.  Results from last 7 days   Lab Units 23  0441 23  0729 23  0125   WBC 10*3/mm3 7.20 7.71 7.37   HEMOGLOBIN g/dL 10.3* 11.1* 10.4*   PLATELETS 10*3/mm3 120* 120* 96*     Results from last 7 days   Lab Units 23  0441 23  0521 23  0125   SODIUM mmol/L 140 141 137   POTASSIUM mmol/L 3.9 4.6 4.6   CHLORIDE mmol/L 106 107 106   CO2 mmol/L 24.2 24.7 23.2   BUN mg/dL 7* 9 9   CREATININE mg/dL 0.84 0.79 0.67   GLUCOSE mg/dL 95 110* 121*   EGFR mL/min/1.73 70.4 75.7 88.5     Results from last 7 days   Lab Units 23  0125   ALBUMIN g/dL 2.8*   BILIRUBIN mg/dL 0.4   ALK PHOS U/L 96   AST (SGOT) U/L 12   ALT (SGPT) U/L 11     Results from last 7 days   Lab Units 23  0448  05/27/23  0521 05/27/23  0125   CALCIUM mg/dL 8.9 8.7 8.9   ALBUMIN g/dL  --   --  2.8*   MAGNESIUM mg/dL  --  2.2 2.2     Results from last 7 days   Lab Units 05/27/23  0156 05/27/23  0125 05/26/23  1401   PROCALCITONIN ng/mL  --  0.03 <0.05   LACTATE mmol/L 1.1  --   --      No results found for: HGBA1C, POCGLU    CT Abdomen Pelvis With Contrast    Result Date: 5/27/2023  Soft tissue stranding which is seen involving the lower anterior abdominal wall on the right. There is associated skin thickening. Correlation with any evidence of cellulitis is recommended. No discrete drainable fluid collection is seen.  Radiation dose reduction techniques were utilized, including automated exposure control and exposure modulation based on body size.  This report was finalized on 5/27/2023 2:52 AM by Dr. Miya Galarza M.D.      XR Chest 1 View    Result Date: 5/27/2023  Blunting of the left costophrenic angle. Small effusion is not excluded.  This report was finalized on 5/27/2023 1:22 AM by Dr. Miya Galarza M.D.      XR Chest PA & Lateral    Result Date: 5/28/2023  Small pleural effusions. Otherwise negative chest x-ray.  This report was finalized on 5/28/2023 12:25 PM by Dr. Terry Brothers M.D.      I have personally reviewed all medications:  Scheduled Medications  aluminum sulfate-calcium acetate, 1 packet, Topical, Q8H  apixaban, 5 mg, Oral, Q12H  aspirin, 81 mg, Oral, Daily  azithromycin, 250 mg, Oral, Q24H  busPIRone, 30 mg, Oral, BID  cefTRIAXone, 2 g, Intravenous, Q24H  FLUoxetine, 60 mg, Oral, Daily  ipratropium-albuterol, 3 mL, Nebulization, Q8H - RT  lactobacillus acidophilus, 1 capsule, Oral, Daily  levothyroxine, 50 mcg, Oral, Daily  Mirabegron ER, 50 mg, Oral, Daily  pantoprazole, 40 mg, Oral, Daily  potassium chloride, 10 mEq, Oral, BID  predniSONE, 20 mg, Oral, Daily With Breakfast  QUEtiapine, 100 mg, Oral, Nightly  senna-docusate sodium, 2 tablet, Oral, BID  sodium chloride, 10 mL, Intravenous,  Q12H  torsemide, 100 mg, Oral, See Admin Instructions    Infusions   Diet  Diet: Cardiac Diets; Healthy Heart (2-3 Na+); Texture: Regular Texture (IDDSI 7); Fluid Consistency: Thin (IDDSI 0)    I have personally reviewed:  [x]  Laboratory   [x]  Microbiology   [x]  Radiology   [x]  EKG/Telemetry  [x]  Cardiology/Vascular   []  Pathology    []  Records       Assessment/Plan     Active Hospital Problems    Diagnosis  POA   • **COPD exacerbation [J44.1]  Yes   • CKD (chronic kidney disease) stage 3, GFR 30-59 ml/min [N18.30]  Yes   • Hypothyroidism [E03.9]  Yes   • Chronic pain syndrome [G89.4]  Yes   • CAD (coronary artery disease), native coronary artery [I25.10]  Yes   • Diastolic dysfunction [I51.89]  Yes   • Benign essential hypertension [I10]  Yes   • Obstructive sleep apnea [G47.33]  Yes   • Chronic obstructive pulmonary disease with acute exacerbation [J44.1]  Yes      Resolved Hospital Problems   No resolved problems to display.       82 y.o. female admitted with COPD exacerbation.    UTI  Chemistries fairly unremarkable  Normal lactate and procalcitonin  White count 10.4 with left shift  UA positive for nitrates, ketones, leukocytes, WBCs and bacteria  Treat with Rocephin for UTI.  Had some nonspecific intermittent abdominal pain, unsure of other symptoms that she is confused.     COPD exacerbation/abnormal chest x-ray  Chest x-ray showing blunting of left costophrenic angle, nonexclusive of small effusion.  Repeat chest x-ray in a.m.  proBNP 2,838.     BLE erythema  domeboro soaks TID. cetaphil moisturizer.      VTE Prophylaxis - lovenox 30 mg  Code Status - DNR.           Tin Ferrari MD  [facility]  05/28/23  18:57 EDT

## 2023-05-29 LAB
ALBUMIN SERPL-MCNC: 2.8 G/DL (ref 3.5–5.2)
ALBUMIN/GLOB SERPL: 1.3 G/DL
ALP SERPL-CCNC: 86 U/L (ref 39–117)
ALT SERPL W P-5'-P-CCNC: 11 U/L (ref 1–33)
ANION GAP SERPL CALCULATED.3IONS-SCNC: 9.2 MMOL/L (ref 5–15)
AST SERPL-CCNC: 16 U/L (ref 1–32)
BACTERIA SPEC AEROBE CULT: ABNORMAL
BASOPHILS # BLD AUTO: 0.02 10*3/MM3 (ref 0–0.2)
BASOPHILS NFR BLD AUTO: 0.3 % (ref 0–1.5)
BILIRUB SERPL-MCNC: 0.3 MG/DL (ref 0–1.2)
BUN SERPL-MCNC: 8 MG/DL (ref 8–23)
BUN/CREAT SERPL: 10.4 (ref 7–25)
CALCIUM SPEC-SCNC: 8.5 MG/DL (ref 8.6–10.5)
CHLORIDE SERPL-SCNC: 107 MMOL/L (ref 98–107)
CO2 SERPL-SCNC: 21.8 MMOL/L (ref 22–29)
CREAT SERPL-MCNC: 0.77 MG/DL (ref 0.57–1)
DEPRECATED RDW RBC AUTO: 47.1 FL (ref 37–54)
EGFRCR SERPLBLD CKD-EPI 2021: 78.1 ML/MIN/1.73
EOSINOPHIL # BLD AUTO: 0.02 10*3/MM3 (ref 0–0.4)
EOSINOPHIL NFR BLD AUTO: 0.3 % (ref 0.3–6.2)
ERYTHROCYTE [DISTWIDTH] IN BLOOD BY AUTOMATED COUNT: 13.2 % (ref 12.3–15.4)
GLOBULIN UR ELPH-MCNC: 2.2 GM/DL
GLUCOSE SERPL-MCNC: 83 MG/DL (ref 65–99)
HCT VFR BLD AUTO: 31.4 % (ref 34–46.6)
HGB BLD-MCNC: 10.2 G/DL (ref 12–15.9)
LYMPHOCYTES # BLD AUTO: 1.15 10*3/MM3 (ref 0.7–3.1)
LYMPHOCYTES NFR BLD AUTO: 19.5 % (ref 19.6–45.3)
MCH RBC QN AUTO: 32.4 PG (ref 26.6–33)
MCHC RBC AUTO-ENTMCNC: 32.5 G/DL (ref 31.5–35.7)
MCV RBC AUTO: 99.7 FL (ref 79–97)
MONOCYTES # BLD AUTO: 0.49 10*3/MM3 (ref 0.1–0.9)
MONOCYTES NFR BLD AUTO: 8.3 % (ref 5–12)
NEUTROPHILS NFR BLD AUTO: 4.2 10*3/MM3 (ref 1.7–7)
NEUTROPHILS NFR BLD AUTO: 71.3 % (ref 42.7–76)
PLATELET # BLD AUTO: 107 10*3/MM3 (ref 140–450)
PMV BLD AUTO: 11.7 FL (ref 6–12)
POTASSIUM SERPL-SCNC: 4.3 MMOL/L (ref 3.5–5.2)
PROT SERPL-MCNC: 5 G/DL (ref 6–8.5)
RBC # BLD AUTO: 3.15 10*6/MM3 (ref 3.77–5.28)
SODIUM SERPL-SCNC: 138 MMOL/L (ref 136–145)
WBC NRBC COR # BLD: 5.9 10*3/MM3 (ref 3.4–10.8)

## 2023-05-29 PROCEDURE — 99232 SBSQ HOSP IP/OBS MODERATE 35: CPT | Performed by: STUDENT IN AN ORGANIZED HEALTH CARE EDUCATION/TRAINING PROGRAM

## 2023-05-29 PROCEDURE — 94761 N-INVAS EAR/PLS OXIMETRY MLT: CPT

## 2023-05-29 PROCEDURE — 94799 UNLISTED PULMONARY SVC/PX: CPT

## 2023-05-29 PROCEDURE — 25010000002 ERTAPENEM PER 500 MG: Performed by: INTERNAL MEDICINE

## 2023-05-29 PROCEDURE — 80053 COMPREHEN METABOLIC PANEL: CPT | Performed by: INTERNAL MEDICINE

## 2023-05-29 PROCEDURE — 94664 DEMO&/EVAL PT USE INHALER: CPT

## 2023-05-29 PROCEDURE — 63710000001 PREDNISONE PER 1 MG: Performed by: NURSE PRACTITIONER

## 2023-05-29 PROCEDURE — 94660 CPAP INITIATION&MGMT: CPT

## 2023-05-29 PROCEDURE — 85025 COMPLETE CBC W/AUTO DIFF WBC: CPT | Performed by: INTERNAL MEDICINE

## 2023-05-29 RX ADMIN — DOCUSATE SODIUM 50MG AND SENNOSIDES 8.6MG 2 TABLET: 8.6; 5 TABLET, FILM COATED ORAL at 20:50

## 2023-05-29 RX ADMIN — QUETIAPINE FUMARATE 100 MG: 100 TABLET ORAL at 20:50

## 2023-05-29 RX ADMIN — APIXABAN 5 MG: 5 TABLET, FILM COATED ORAL at 20:50

## 2023-05-29 RX ADMIN — BUSPIRONE HYDROCHLORIDE 30 MG: 15 TABLET ORAL at 09:46

## 2023-05-29 RX ADMIN — IPRATROPIUM BROMIDE AND ALBUTEROL SULFATE 3 ML: 2.5; .5 SOLUTION RESPIRATORY (INHALATION) at 15:01

## 2023-05-29 RX ADMIN — Medication 1 CAPSULE: at 09:46

## 2023-05-29 RX ADMIN — APIXABAN 5 MG: 5 TABLET, FILM COATED ORAL at 09:46

## 2023-05-29 RX ADMIN — LORAZEPAM 0.5 MG: 0.5 TABLET ORAL at 20:50

## 2023-05-29 RX ADMIN — FLUOXETINE HYDROCHLORIDE 60 MG: 20 CAPSULE ORAL at 09:46

## 2023-05-29 RX ADMIN — PREDNISONE 20 MG: 20 TABLET ORAL at 09:46

## 2023-05-29 RX ADMIN — Medication 10 ML: at 09:47

## 2023-05-29 RX ADMIN — POTASSIUM CHLORIDE 10 MEQ: 750 TABLET, EXTENDED RELEASE ORAL at 20:50

## 2023-05-29 RX ADMIN — ASPIRIN 81 MG: 81 TABLET, CHEWABLE ORAL at 09:46

## 2023-05-29 RX ADMIN — PANTOPRAZOLE SODIUM 40 MG: 40 TABLET, DELAYED RELEASE ORAL at 09:46

## 2023-05-29 RX ADMIN — LORAZEPAM 0.5 MG: 0.5 TABLET ORAL at 00:59

## 2023-05-29 RX ADMIN — DOCUSATE SODIUM 50MG AND SENNOSIDES 8.6MG 2 TABLET: 8.6; 5 TABLET, FILM COATED ORAL at 09:46

## 2023-05-29 RX ADMIN — HYDROCODONE BITARTRATE AND ACETAMINOPHEN 1 TABLET: 7.5; 325 TABLET ORAL at 23:05

## 2023-05-29 RX ADMIN — MIRABEGRON 50 MG: 25 TABLET, FILM COATED, EXTENDED RELEASE ORAL at 09:46

## 2023-05-29 RX ADMIN — Medication 1 PACKET: at 21:04

## 2023-05-29 RX ADMIN — IPRATROPIUM BROMIDE AND ALBUTEROL SULFATE 3 ML: 2.5; .5 SOLUTION RESPIRATORY (INHALATION) at 23:25

## 2023-05-29 RX ADMIN — BUSPIRONE HYDROCHLORIDE 30 MG: 15 TABLET ORAL at 20:50

## 2023-05-29 RX ADMIN — HYDROCODONE BITARTRATE AND ACETAMINOPHEN 1 TABLET: 7.5; 325 TABLET ORAL at 04:56

## 2023-05-29 RX ADMIN — AZITHROMYCIN DIHYDRATE 250 MG: 250 TABLET, FILM COATED ORAL at 09:46

## 2023-05-29 RX ADMIN — POTASSIUM CHLORIDE 10 MEQ: 750 TABLET, EXTENDED RELEASE ORAL at 09:46

## 2023-05-29 RX ADMIN — Medication 1 PACKET: at 15:03

## 2023-05-29 RX ADMIN — ERTAPENEM SODIUM 1 G: 1 INJECTION, POWDER, LYOPHILIZED, FOR SOLUTION INTRAMUSCULAR; INTRAVENOUS at 09:46

## 2023-05-29 RX ADMIN — LEVOTHYROXINE SODIUM 50 MCG: 0.05 TABLET ORAL at 09:46

## 2023-05-29 RX ADMIN — HYDROCODONE BITARTRATE AND ACETAMINOPHEN 1 TABLET: 7.5; 325 TABLET ORAL at 15:03

## 2023-05-29 RX ADMIN — Medication 1 PACKET: at 05:03

## 2023-05-29 RX ADMIN — IPRATROPIUM BROMIDE AND ALBUTEROL SULFATE 3 ML: 2.5; .5 SOLUTION RESPIRATORY (INHALATION) at 07:56

## 2023-05-29 NOTE — NURSING NOTE
Access center follow up.    Patient resting with eyes closed, awakens easily to verbal stimuli. She is alert and oriented x4. Son at bedside. Patient/son report mood improved. She reports able to rest. Pt. seen by Dr. Junior yesterday who initiated ativan and pt. tolerating without difficulty. Access following.

## 2023-05-29 NOTE — PROGRESS NOTES
"  Infectious Diseases Progress Note    Osvaldo Ponce MD     [facility]  [place]: 2 days  Patient Identification:  Name: Rose Cheema  Age: 82 y.o.  Sex: female  :  1940  MRN: [id number]         Primary Care Physician: Cheng Guevara MD        Subjective: More awake and interactive and states that she is feeling better.  Family members at the bedside agree.  Interval History: See consultation note.    Objective:    Scheduled Meds:aluminum sulfate-calcium acetate, 1 packet, Topical, Q8H  apixaban, 5 mg, Oral, Q12H  aspirin, 81 mg, Oral, Daily  azithromycin, 250 mg, Oral, Q24H  busPIRone, 30 mg, Oral, BID  ertapenem, 1 g, Intravenous, Q24H  FLUoxetine, 60 mg, Oral, Daily  ipratropium-albuterol, 3 mL, Nebulization, Q8H - RT  lactobacillus acidophilus, 1 capsule, Oral, Daily  levothyroxine, 50 mcg, Oral, Daily  Mirabegron ER, 50 mg, Oral, Daily  pantoprazole, 40 mg, Oral, Daily  potassium chloride, 10 mEq, Oral, BID  predniSONE, 20 mg, Oral, Daily With Breakfast  QUEtiapine, 100 mg, Oral, Nightly  senna-docusate sodium, 2 tablet, Oral, BID  sodium chloride, 10 mL, Intravenous, Q12H  torsemide, 100 mg, Oral, See Admin Instructions      Continuous Infusions:     Vital signs in last 24 hours:  Temp:  [97.9 °F (36.6 °C)-98.6 °F (37 °C)] 98.3 °F (36.8 °C)  Heart Rate:  [57-84] 82  Resp:  [18] 18  BP: (131-148)/(70-86) 148/86    Intake/Output:    Intake/Output Summary (Last 24 hours) at 2023  Last data filed at 2023 1459  Gross per 24 hour   Intake 240 ml   Output 1450 ml   Net -1210 ml       Exam:  /86 (BP Location: Left arm, Patient Position: Lying)   Pulse 82   Temp 98.3 °F (36.8 °C) (Oral)   Resp 18   Ht 160 cm (63\")   Wt 127 kg (281 lb)   SpO2 98%   BMI 49.78 kg/m²   Patient is examined using the personal protective equipment as per guidelines from infection control for this particular patient as enacted.  Hand washing was performed before and after patient " interaction.  General Appearance:  Lethargic but interactive.  Alert, cooperative, no distress, AAOx3                          Head:    Normocephalic, without obvious abnormality, atraumatic                           Eyes:    PERRL, conjunctivae/corneas clear, EOM's intact, both eyes                         Throat:   Lips, tongue, gums normal; oral mucosa pink and moist                           Neck:   Supple, symmetrical, trachea midline, no JVD                         Lungs:    Clear to auscultation bilaterally, respirations unlabored                 Chest Wall:    No tenderness or deformity                          Heart:    s1s2 regular                  Abdomen:     Soft and non tender                 Extremities:   Extremities normal, atraumatic, no cyanosis or edema                        Pulses:   Pulses palpable in all extremities                            Skin:   Skin is warm and dry,  no rashes or palpable lesions                  Neurologic:   Grossly non focal         Data Review:    I reviewed the patient's new clinical results.  Results from last 7 days   Lab Units 05/29/23 0417 05/28/23 0441 05/27/23  0729 05/27/23  0125   WBC 10*3/mm3 5.90 7.20 7.71 7.37   HEMOGLOBIN g/dL 10.2* 10.3* 11.1* 10.4*   PLATELETS 10*3/mm3 107* 120* 120* 96*     Results from last 7 days   Lab Units 05/29/23  0417 05/28/23  0441 05/27/23  0521 05/27/23  0125   SODIUM mmol/L 138 140 141 137   POTASSIUM mmol/L 4.3 3.9 4.6 4.6   CHLORIDE mmol/L 107 106 107 106   CO2 mmol/L 21.8* 24.2 24.7 23.2   BUN mg/dL 8 7* 9 9   CREATININE mg/dL 0.77 0.84 0.79 0.67   CALCIUM mg/dL 8.5* 8.9 8.7 8.9   GLUCOSE mg/dL 83 95 110* 121*     Microbiology Results (last 10 days)     Procedure Component Value - Date/Time    Urine Culture - Urine, Urine, Clean Catch [[id number]]  (Abnormal)  (Susceptibility) Collected: 05/27/23 0430    Lab Status: Final result Specimen: Urine, Clean Catch Updated: 05/29/23 0024     Urine Culture >100,000 CFU/mL  Escherichia coli ESBL     Comment:   Consider infectious disease consult.  Susceptibility results may not correlate to clinical outcomes.       Narrative:      Colonization of the urinary tract without infection is common. Treatment is discouraged unless the patient is symptomatic, pregnant, or undergoing an invasive urologic procedure.  Recent outcomes data supports the use of pip/tazo in the treatment of susceptible ESBL infections for uncomplicated UTI. Consider use of pip/tazo as a carbapenem-sparing regimen in applicable patients.    Susceptibility      Escherichia coli ESBL      SARAH      Ertapenem Susceptible      Gentamicin Resistant     Levofloxacin Resistant     Meropenem Susceptible      Nitrofurantoin Susceptible      Piperacillin + Tazobactam Susceptible      Tobramycin Susceptible      Trimethoprim + Sulfamethoxazole Resistant                          Respiratory Panel PCR w/COVID-19(SARS-CoV-2) NIKKI/JOHN/GRIFFIN/PAD/COR/MAD/FRANCIS In-House, NP Swab in UTM/VTM, 3-4 HR TAT - Swab, Nasopharynx [[id number]]  (Normal) Collected: 05/27/23 0157    Lab Status: Final result Specimen: Swab from Nasopharynx Updated: 05/27/23 0303     ADENOVIRUS, PCR Not Detected     Coronavirus 229E Not Detected     Coronavirus HKU1 Not Detected     Coronavirus NL63 Not Detected     Coronavirus OC43 Not Detected     COVID19 Not Detected     Human Metapneumovirus Not Detected     Human Rhinovirus/Enterovirus Not Detected     Influenza A PCR Not Detected     Influenza B PCR Not Detected     Parainfluenza Virus 1 Not Detected     Parainfluenza Virus 2 Not Detected     Parainfluenza Virus 3 Not Detected     Parainfluenza Virus 4 Not Detected     RSV, PCR Not Detected     Bordetella pertussis pcr Not Detected     Bordetella parapertussis PCR Not Detected     Chlamydophila pneumoniae PCR Not Detected     Mycoplasma pneumo by PCR Not Detected    Narrative:      In the setting of a positive respiratory panel with a viral infection PLUS a negative  procalcitonin without other underlying concern for bacterial infection, consider observing off antibiotics or discontinuation of antibiotics and continue supportive care. If the respiratory panel is positive for atypical bacterial infection (Bordetella pertussis, Chlamydophila pneumoniae, or Mycoplasma pneumoniae), consider antibiotic de-escalation to target atypical bacterial infection.    Blood Culture - Blood, Arm, Left [[id number]]  (Abnormal) Collected: 05/27/23 0156    Lab Status: Preliminary result Specimen: Blood from Arm, Left Updated: 05/29/23 1317     Blood Culture Gram Positive Cocci     Isolated from Aerobic Bottle     Gram Stain Aerobic Bottle Gram positive cocci    Blood Culture ID, PCR - Blood, Arm, Left [[id number]] Collected: 05/27/23 0156    Lab Status: Final result Specimen: Blood from Arm, Left Updated: 05/28/23 1147     BCID, PCR Negative by BCID PCR. Culture to Follow.     BOTTLE TYPE Aerobic Bottle    Blood Culture - Blood, Arm, Right [[id number]]  (Normal) Collected: 05/27/23 0125    Lab Status: Preliminary result Specimen: Blood from Arm, Right Updated: 05/29/23 0131     Blood Culture No growth at 2 days            Assessment:    COPD exacerbation    Benign essential hypertension    CAD (coronary artery disease), native coronary artery    Chronic obstructive pulmonary disease with acute exacerbation    Diastolic dysfunction    Obstructive sleep apnea    Hypothyroidism    Chronic pain syndrome    CKD (chronic kidney disease) stage 3, GFR 30-59 ml/min  1-gram-positive cocci in 1 out of 2 blood cultures in the setting of abnormal urinalysis with shortness of breath and hypoxia significance [id number] on the final identity of the pathogen and if it turns out to be coag-negative staph of nonbloody and cystectomies likely contaminant during the process of collection.  2-urinary tract infection-with ESBL +ve E.coli  3-atrial fibrillation with rapid ventricular response  4-history of heart  failure with preserved ejection fraction  5-chronic kidney disease  6-obesity and obstructive sleep apnea  7-COPD  8-abdominal wall stranding seen on the CT scan of the abdomen with possible cellulitis versus body wall edema.     Recommendations/Discussions:  DC rocephin - start ertapenem  See my discussion and recommendations on 5/28/2023  Osvaldo Ponce MD  5/29/2023  19:32 EDT    Parts of this note may be an electronic transcription/translation of spoken language to printed text using the Dragon dictation system.

## 2023-05-29 NOTE — PROGRESS NOTES
"Nutrition Services    Patient Name:  Rose Cheema  YOB: 1940  MRN: [id number]  Admit Date:  5/26/2023    Assessment Date:  05/29/23    Comment:  Nutrition assessment initiated due to skin issues  TY and ZENOBIA).  She is morbidly obese with a BMI of 49.8,  Po inake not well documented and currently sound asleep with lunch untouched. Labs, meds reviewed. Will f/u with pt when she is more awake to discuss her nutrition needs.      CLINICAL NUTRITION ASSESSMENT      Reason for Assessment Pressure Injury and/or Non-Healing Wound     Diagnosis/Problem   COPD exac, CKD, chronic pain, CHF, GERSON   Medical/Surgical History Past Medical History:   Diagnosis Date   • Anemia    • Anxiety    • Arthritis    • CHF (congestive heart failure)    • Coronary artery disease    • Depression    • Disease of thyroid gland    • Fibromyalgia    • GERD (gastroesophageal reflux disease)    • Hypertension    • Moderate tricuspid valve stenosis    • Osteoporosis    • Peripheral neuropathy    • Pulmonary hypertension    • SBO (small bowel obstruction)    • Stenosis of mitral valve        Past Surgical History:   Procedure Laterality Date   • BILATERAL OOPHORECTOMY     • CARDIAC CATHETERIZATION     • CHOLECYSTECTOMY     • ERCP N/A 2/15/2019    Procedure: ENDOSCOPIC RETROGRADE CHOLANGIOPANCREATOGRAPHY WITH PARTIAL CHOLANGIOGRAM;  Surgeon: Gerald Herr MD;  Location: [facility] ENDOSCOPY;  Service: Gastroenterology   • EXPLORATORY LAPAROTOMY     • GASTRIC BYPASS     • HERNIA REPAIR      inguinal and ventral   • HYSTERECTOMY     • OVARIAN CYST REMOVAL          Encounter Information        Nutrition History:     Food Preferences:    Supplements:    Factors Affecting Intake: pain issues     Anthropometrics        Current Height  Current Weight  BMI kg/m2 Height: 160 cm (63\")  Weight: 127 kg (281 lb) (05/28/23 1436)  Body mass index is 49.78 kg/m².   Adjusted BMI (if applicable)    BMI Category Obese, Class III (40 or higher)     "   Admission Weight 127kg       Ideal Body Weight (IBW) 52.4kg   Adjusted IBW (if applicable)        Usual Body Weight (UBW) ~270lbs   Weight Change/Trend Gain       Weight History Wt Readings from Last 30 Encounters:   05/28/23 1436 127 kg (281 lb)   05/26/23 2356 127 kg (281 lb)   09/15/20 1810 128 kg (281 lb 8.4 oz)   09/15/20 0119 119 kg (262 lb 5.6 oz)   05/19/20 0626 119 kg (261 lb 11 oz)   05/18/20 0350 119 kg (262 lb 5.6 oz)   05/17/20 0523 123 kg (270 lb 11.6 oz)   05/16/20 1454 122 kg (270 lb)   05/16/20 0448 123 kg (270 lb 8.1 oz)   05/15/20 0533 123 kg (272 lb 0.8 oz)   05/14/20 0429 130 kg (286 lb 13.1 oz)   05/10/19 0715 122 kg (268 lb 6.4 oz)   05/09/19 0538 118 kg (260 lb)   05/07/19 2326 116 kg (256 lb 6.3 oz)   05/07/19 1700 120 kg (264 lb)   04/15/19 2334 115 kg (254 lb 8 oz)   04/11/19 0500 125 kg (276 lb 6.4 oz)   04/10/19 1554 123 kg (271 lb 2.7 oz)   04/10/19 1209 128 kg (282 lb 6.4 oz)   02/26/19 1013 121 kg (266 lb 6.4 oz)   02/18/19 0500 117 kg (257 lb 6.4 oz)   02/17/19 0651 116 kg (256 lb 9.6 oz)   02/16/19 0559 116 kg (256 lb 1.6 oz)   02/15/19 1042 122 kg (270 lb)   02/15/19 0548 120 kg (263 lb 12.8 oz)   02/14/19 0632 120 kg (264 lb)   02/13/19 0500 124 kg (272 lb 6.4 oz)   02/12/19 2314 123 kg (272 lb 3.2 oz)   02/12/19 1400 127 kg (280 lb)   09/21/17 1409 132 kg (291 lb)   08/24/17 1359 132 kg (291 lb)             Estimated/Assessed Needs       Energy Requirements    Weight for Calculation 127kg   Method for Estimation  11 kcal/kg   EST Needs (kcal/day) 1397       Protein Requirements    Weight for Calculation 52.4kg   EST Protein Needs (g/kg) 1.8 gm/kg   EST Daily Needs (g/day) 94       Fluid Requirements     Method for Estimation Defer to physician    Estimated Needs (mL/day)      Tests/Procedures        Tests/Procedures X-Ray     Labs       Pertinent Labs    Results from last 7 days   Lab Units 05/29/23  0417 05/28/23  0441 05/27/23  0521 05/27/23  0125   SODIUM mmol/L 138 140 141  137   POTASSIUM mmol/L 4.3 3.9 4.6 4.6   CHLORIDE mmol/L 107 106 107 106   CO2 mmol/L 21.8* 24.2 24.7 23.2   BUN mg/dL 8 7* 9 9   CREATININE mg/dL 0.77 0.84 0.79 0.67   CALCIUM mg/dL 8.5* 8.9 8.7 8.9   BILIRUBIN mg/dL 0.3  --   --  0.4   ALK PHOS U/L 86  --   --  96   ALT (SGPT) U/L 11  --   --  11   AST (SGOT) U/L 16  --   --  12   GLUCOSE mg/dL 83 95 110* 121*     Results from last 7 days   Lab Units 05/29/23  0417 05/27/23  0729 05/27/23  0521 05/27/23  0125   MAGNESIUM mg/dL  --   --  2.2 2.2   HEMOGLOBIN g/dL 10.2*   < >  --  10.4*   HEMATOCRIT % 31.4*   < >  --  32.3*   WBC 10*3/mm3 5.90   < >  --  7.37   ALBUMIN g/dL 2.8*  --   --  2.8*    < > = values in this interval not displayed.     Results from last 7 days   Lab Units 05/29/23  0417 05/28/23  0441 05/27/23  0829 05/27/23  0729 05/27/23  0156 05/27/23  0125 05/26/23  1401   INR   --   --  1.06  --  0.96  --  1.0   APTT seconds  --   --   --   --  31.6  --  32.5   PLATELETS 10*3/mm3 107* 120*  --  120*  --  96*  --      COVID19   Date Value Ref Range Status   05/27/2023 Not Detected Not Detected - Ref. Range Final     No results found for: HGBA1C       Medications           Scheduled Medications aluminum sulfate-calcium acetate, 1 packet, Topical, Q8H  apixaban, 5 mg, Oral, Q12H  aspirin, 81 mg, Oral, Daily  azithromycin, 250 mg, Oral, Q24H  busPIRone, 30 mg, Oral, BID  ertapenem, 1 g, Intravenous, Q24H  FLUoxetine, 60 mg, Oral, Daily  ipratropium-albuterol, 3 mL, Nebulization, Q8H - RT  lactobacillus acidophilus, 1 capsule, Oral, Daily  levothyroxine, 50 mcg, Oral, Daily  Mirabegron ER, 50 mg, Oral, Daily  pantoprazole, 40 mg, Oral, Daily  potassium chloride, 10 mEq, Oral, BID  predniSONE, 20 mg, Oral, Daily With Breakfast  QUEtiapine, 100 mg, Oral, Nightly  senna-docusate sodium, 2 tablet, Oral, BID  sodium chloride, 10 mL, Intravenous, Q12H  torsemide, 100 mg, Oral, See Admin Instructions       Infusions     PRN Medications •  acetaminophen **OR**  acetaminophen **OR** acetaminophen  •  albuterol  •  senna-docusate sodium **AND** polyethylene glycol **AND** bisacodyl **AND** bisacodyl  •  cetaphil  •  HYDROcodone-acetaminophen  •  LORazepam  •  nitroglycerin  •  ondansetron  •  [COMPLETED] Insert Peripheral IV **AND** sodium chloride  •  sodium chloride  •  sodium chloride     Physical Findings          Physical Appearance alert, disoriented, obese, on oxygen therapy   Oral/Mouth Cavity WNL   Edema  2+ (mild)   Gastrointestinal last bowel movement:  5/27   Skin  MASD, pressure injury   Tubes/Drains/Lines none   NFPE No clinical signs of muscle wasting or fat loss   --  Current Nutrition Orders & Evaluation of Intake       Oral Nutrition     Food Allergies Other: aspartame   Current PO Diet Diet: Cardiac Diets; Healthy Heart (2-3 Na+); Texture: Regular Texture (IDDSI 7); Fluid Consistency: Thin (IDDSI 0)   Supplement n/a   PO Evaluation     % PO Intake Not well documented    # of Days Evaluated    --  PES STATEMENT / NUTRITION DIAGNOSIS      Nutrition Dx Problem  Problem: Increased Nutrient Needs  Etiology: Medical Diagnosis PI/MASD  Signs/Symptoms: Report/Observation    Comment:    --  NUTRITION INTERVENTION / PLAN OF CARE      Intervention Goal(s) Maintain nutrition status, Reduce/improve symptoms, Disease management/therapy, Tolerate PO , Appropriate weight loss and PO intake goal %: 75%         RD Intervention/Action Follow Tx Progress and Care plan reviewed         Prescription/Orders:       PO Diet       Supplements       Snacks       Enteral Nutrition       Parenteral Nutrition    New Prescription Ordered? No changes at this time   --      Monitor/Evaluation Per protocol, PO intake, Weight, Skin status, Symptoms   Discharge Plan/Needs Pending clinical course   Education Will instruct as appropriate   --    RD to follow per protocol.      Electronically signed by:  Ivana Montelongo RD  05/29/23 13:45 EDT    Electronically signed by:  Ivana Montelongo  RD  05/29/23 13:45 EDT

## 2023-05-29 NOTE — PLAN OF CARE
Goal Outcome Evaluation:  Plan of Care Reviewed With: patient        Progress: no change  Outcome Evaluation: VSS. Alert and oriented x3-4. Pt c/o lower back pain and BLE pain. PRN PO pain meds per order. PRN ativan for anxiety. q2h turn. Urine culture + for ecoli. Contact precautions initiated. 2L O2 nc. Will continue to provide supportive care.          awaiting for pt to wake, then PO challenge

## 2023-05-29 NOTE — PROGRESS NOTES
Name: Rose Cheema ADMIT: 2023   : 1940  PCP: Cheng Guevara MD    MRN: [id number] LOS: 2 days   AGE/SEX: 82 y.o. female  ROOM: [facility]     Subjective   Subjective     Patient is seen at bedside, no new complaints.     Objective   Objective   Vital Signs  Temp:  [97.9 °F (36.6 °C)-98.6 °F (37 °C)] 98.3 °F (36.8 °C)  Heart Rate:  [57-84] 82  Resp:  [18] 18  BP: (131-148)/(70-86) 148/86  SpO2:  [90 %-100 %] 98 %  on  Flow (L/min):  [2-3] 3;   Device (Oxygen Therapy): nasal cannula  Body mass index is 49.78 kg/m².  Physical Exam    General, awake and alert.  Appears sick on chronic basis  Head and ENT, normocephalic and atraumatic.  Lungs, symmetric expansion, equal air entry bilaterally.  Heart, regular rate and rhythm.  Abdomen, soft and nontender.  Extremities, no clubbing or cyanosis.  Bilateral lower extremity skin changes, consistent with chronic venous stasis.  Neuro, no focal deficits.  Skin: Warm and no rash.  Psych, appears anxious.  Musculoskeletal, joint examination is grossly normal.    Copied text material from yesterday's note has been reviewed for appropriate changes and remains accurate as of 23.      Results Review     I reviewed the patient's new clinical results.  Results from last 7 days   Lab Units 23  0729 23  0125   WBC 10*3/mm3 5.90 7.20 7.71 7.37   HEMOGLOBIN g/dL 10.2* 10.3* 11.1* 10.4*   PLATELETS 10*3/mm3 107* 120* 120* 96*     Results from last 7 days   Lab Units 23  0521 23  0125   SODIUM mmol/L 138 140 141 137   POTASSIUM mmol/L 4.3 3.9 4.6 4.6   CHLORIDE mmol/L 107 106 107 106   CO2 mmol/L 21.8* 24.2 24.7 23.2   BUN mg/dL 8 7* 9 9   CREATININE mg/dL 0.77 0.84 0.79 0.67   GLUCOSE mg/dL 83 95 110* 121*   EGFR mL/min/1.73 78.1 70.4 75.7 88.5     Results from last 7 days   Lab Units 23  0417 23  0125   ALBUMIN g/dL 2.8* 2.8*   BILIRUBIN mg/dL 0.3 0.4   ALK PHOS U/L 86 96    AST (SGOT) U/L 16 12   ALT (SGPT) U/L 11 11     Results from last 7 days   Lab Units 05/29/23  0417 05/28/23  0441 05/27/23  0521 05/27/23  0125   CALCIUM mg/dL 8.5* 8.9 8.7 8.9   ALBUMIN g/dL 2.8*  --   --  2.8*   MAGNESIUM mg/dL  --   --  2.2 2.2     Results from last 7 days   Lab Units 05/27/23  0156 05/27/23  0125 05/26/23  1401   PROCALCITONIN ng/mL  --  0.03 <0.05   LACTATE mmol/L 1.1  --   --      No results found for: HGBA1C, POCGLU    XR Chest PA & Lateral    Result Date: 5/28/2023  Small pleural effusions. Otherwise negative chest x-ray.  This report was finalized on 5/28/2023 12:25 PM by Dr. Terry Brothers M.D.      I have personally reviewed all medications:  Scheduled Medications  aluminum sulfate-calcium acetate, 1 packet, Topical, Q8H  apixaban, 5 mg, Oral, Q12H  aspirin, 81 mg, Oral, Daily  azithromycin, 250 mg, Oral, Q24H  busPIRone, 30 mg, Oral, BID  ertapenem, 1 g, Intravenous, Q24H  FLUoxetine, 60 mg, Oral, Daily  ipratropium-albuterol, 3 mL, Nebulization, Q8H - RT  lactobacillus acidophilus, 1 capsule, Oral, Daily  levothyroxine, 50 mcg, Oral, Daily  Mirabegron ER, 50 mg, Oral, Daily  pantoprazole, 40 mg, Oral, Daily  potassium chloride, 10 mEq, Oral, BID  predniSONE, 20 mg, Oral, Daily With Breakfast  QUEtiapine, 100 mg, Oral, Nightly  senna-docusate sodium, 2 tablet, Oral, BID  sodium chloride, 10 mL, Intravenous, Q12H  torsemide, 100 mg, Oral, See Admin Instructions    Infusions   Diet  Diet: Cardiac Diets; Healthy Heart (2-3 Na+); Texture: Regular Texture (IDDSI 7); Fluid Consistency: Thin (IDDSI 0)    I have personally reviewed:  [x]  Laboratory   [x]  Microbiology   [x]  Radiology   [x]  EKG/Telemetry  [x]  Cardiology/Vascular   []  Pathology    []  Records       Assessment/Plan     Active Hospital Problems    Diagnosis  POA   • **COPD exacerbation [J44.1]  Yes   • CKD (chronic kidney disease) stage 3, GFR 30-59 ml/min [N18.30]  Yes   • Hypothyroidism [E03.9]  Yes   • Chronic pain  syndrome [G89.4]  Yes   • CAD (coronary artery disease), native coronary artery [I25.10]  Yes   • Diastolic dysfunction [I51.89]  Yes   • Benign essential hypertension [I10]  Yes   • Obstructive sleep apnea [G47.33]  Yes   • Chronic obstructive pulmonary disease with acute exacerbation [J44.1]  Yes      Resolved Hospital Problems   No resolved problems to display.       82 y.o. female admitted with COPD exacerbation.    Assessment and plan:  1.  Urinary tract infection, bacteremia, patient is currently on Rocephin which will be continued.  ID on board.    2.  COPD exacerbation, continue breathing treatments.    3.  Chronic diastolic CHF, patient already on torsemide which will be continued.    4.  Hypothyroidism, continue Synthroid.    5.  Depression/anxiety, resume home medications, consult psych.    6.  CODE STATUS is full code.  Further plans based on hospital course.    Tin Ferrari MD  [facility]  05/29/23  18:36 EDT

## 2023-05-29 NOTE — PROGRESS NOTES
"    Patient Name: Rose Cheema  :1940  82 y.o.      Patient Care Team:  Cheng Guevara MD as PCP - General (Internal Medicine)    Chief Complaint:     Interval History:   No acute events overnight.     Objective   Vital Signs  Temp:  [97.9 °F (36.6 °C)-98.6 °F (37 °C)] 97.9 °F (36.6 °C)  Heart Rate:  [55-84] 66  Resp:  [16-18] 18  BP: (131-181)/(69-81) 131/70    Intake/Output Summary (Last 24 hours) at 2023 0752  Last data filed at 2023 0437  Gross per 24 hour   Intake --   Output 800 ml   Net -800 ml     Flowsheet Rows    Flowsheet Row First Filed Value   Admission Height 160 cm (63\") Documented at 20236   Admission Weight 127 kg (281 lb) Documented at 2023 2356          GEN: no distress, alert and oriented  HEENT: NACT, EOMI, moist mucous membranes  Lungs: CTAB, no wheezes, rales or rhonchi  CV: normal rate, regular rhythm, normal S1, S2, no murmurs, +2 radial pulses b/l, no carotid bruit  Abdomen: soft, nontender, nondistended, NABS  Extremities: no edema  Skin: no rash, warm, dry  Heme/Lymph: no bruising  Psych: organized thought, normal behavior and affect    Results Review:    Results from last 7 days   Lab Units 23  0417   SODIUM mmol/L 138   POTASSIUM mmol/L 4.3   CHLORIDE mmol/L 107   CO2 mmol/L 21.8*   BUN mg/dL 8   CREATININE mg/dL 0.77   GLUCOSE mg/dL 83   CALCIUM mg/dL 8.5*     Results from last 7 days   Lab Units 23  0521 23  0125   HSTROP T ng/L 26* 24*     Results from last 7 days   Lab Units 23  0417   WBC 10*3/mm3 5.90   HEMOGLOBIN g/dL 10.2*   HEMATOCRIT % 31.4*   PLATELETS 10*3/mm3 107*     Results from last 7 days   Lab Units 23  0829 23  0156 23  1401   INR  1.06 0.96 1.0   APTT seconds  --  31.6 32.5     Results from last 7 days   Lab Units 23  0521   MAGNESIUM mg/dL 2.2         Results from last 7 days   Lab Units 23  1401   BNP pg/mL 376*           Medication Review:   aluminum sulfate-calcium acetate, " 1 packet, Topical, Q8H  apixaban, 5 mg, Oral, Q12H  aspirin, 81 mg, Oral, Daily  azithromycin, 250 mg, Oral, Q24H  busPIRone, 30 mg, Oral, BID  ertapenem, 1 g, Intravenous, Q24H  FLUoxetine, 60 mg, Oral, Daily  ipratropium-albuterol, 3 mL, Nebulization, Q8H - RT  lactobacillus acidophilus, 1 capsule, Oral, Daily  levothyroxine, 50 mcg, Oral, Daily  Mirabegron ER, 50 mg, Oral, Daily  pantoprazole, 40 mg, Oral, Daily  potassium chloride, 10 mEq, Oral, BID  predniSONE, 20 mg, Oral, Daily With Breakfast  QUEtiapine, 100 mg, Oral, Nightly  senna-docusate sodium, 2 tablet, Oral, BID  sodium chloride, 10 mL, Intravenous, Q12H  torsemide, 100 mg, Oral, See Admin Instructions              Assessment & Plan   #A-fib  #Hypertension  #HFpEF  #COPD  #CKD stage III  #GERSON on CPAP      82-year-old woman with hypertension, HFpEF, COPD, CKD stage III, GERSON on CPAP who presented with shortness of breath and abdominal pain, found to have abnormal UA, being treated for UTI. Also with soft tissue stranding on CT abdomen. EKG with AF.     CHADSVASC 4. Currently rate controlled.     Echocardiogram with normal EF, mildly increased left atrial volume.    - Continue Eliquis 5mg BID  - Continue home aspirin 81 mg daily and Lasix    Cardiology will sign off at this time, please call back with any questions or concerns.    Camacho Mims MD  [facility]  05/29/23  07:52 EDT

## 2023-05-29 NOTE — CONSULTS
CONSULT NOTE    Infectious Diseases - Osvaldo Bojorquez MD  [facility]       Patient Identification:  Name: Rose Cheema  Age: 82 y.o.  Sex: female  :  1940  MRN: [id number]             Date of Consultation: 2023      Primary Care Physician: Cheng Guevara MD                               Requesting Physician: Dr. Ferrari  Reason for Consultation: Bacteremia    Impression: Patient is a 82-year-old female with complicated past medical history is markable for coronary artery disease, hypertension, COPD, obesity and obstructive sleep apnea as well as chronic kidney disease with fibromyalgia and hypothyroidism presented to the hospital on 2023 with abdominal pain and shortness of breath.  Patient was noted to be confused.  Apparently her symptoms have been going on for couple days in the setting of chronic cough.  Patient also has chronic hypoxia and uses nocturnal oxygen for supplementation.  Evaluation revealed abnormal urinalysis consistent with UTI.  Patient was started on Rocephin.  Blood cultures drawn at the time of admission are now positive for gram-positive cocci and urine culture is showing evidence of greater than 100,000 colonies of gram-negative rods.  According to the family member at the bedside patient is feeling somewhat better but he still does not engage.  Chest x-ray showed clear lungs and CT scan of the abdomen and pelvis did not show any pulmonary embolism.  Because of positive blood culture infectious disease service is consulted.  Patient has been on Rocephin since 2023.  1-gram-positive cocci in 1 out of 2 blood cultures in the setting of abnormal urinalysis with shortness of breath and hypoxia significance [id number] on the final identity of the pathogen and if it turns out to be coag-negative staph of nonbloody and cystectomies likely contaminant during the process of collection.  2-urinary tract infection-continue with IV Rocephin and follow-up on  the sensitivity of the gram-negative rods and adjust antibiotic therapy accordingly  3-atrial fibrillation with rapid ventricular response  4-history of heart failure with preserved ejection fraction  5-chronic kidney disease  6-obesity and obstructive sleep apnea  7-COPD  8-abdominal wall stranding seen on the CT scan of the abdomen with possible cellulitis versus body wall edema.    Recommendations/Discussions:  At this juncture given her hemodynamics and overall progression continue with IV Rocephin and follow-up on the sensitivity of the gram-negative rods in the urine.  Periodically assess for progression of her clinical course and assess her abdominal wall for any evolving cellulitis.  Follow-up on the final identity of the gram-positive cocci in 1 out of 2 blood cultures and elected not to be coag-negative staph of non-ludginensis and probably no further work-up is needed.  Adjust antibiotic therapy based on the sensitivity of the pathogen.  Management of other issues per primary team.  Duration of antibiotic treatment will depend upon clinical course, sensitivity and identity of the pathogen and whether or not concern for ascending  tract involvement.  Thank you  for letting me be the part of your patient care please see above impression and recommendations      History of Present Illness:    Patient is a 82-year-old female with complicated past medical history is markable for coronary artery disease, hypertension, COPD, obesity and obstructive sleep apnea as well as chronic kidney disease with fibromyalgia and hypothyroidism presented to the hospital on 5/26/2023 with abdominal pain and shortness of breath.  Patient was noted to be confused.  Apparently her symptoms have been going on for couple days in the setting of chronic cough.  Patient also has chronic hypoxia and uses nocturnal oxygen for supplementation.  Evaluation revealed abnormal urinalysis consistent with UTI.  Patient was started on  Rocephin.  Blood cultures drawn at the time of admission are now positive for gram-positive cocci and urine culture is showing evidence of greater than 100,000 colonies of gram-negative rods.  According to the family member at the bedside patient is feeling somewhat better but he still does not engage.  Chest x-ray showed clear lungs and CT scan of the abdomen and pelvis did not show any pulmonary embolism.  Because of positive blood culture infectious disease service is consulted.  Patient has been on Rocephin since 5/26/2023.      Past Medical History:  Past Medical History:   Diagnosis Date   • Anemia    • Anxiety    • Arthritis    • CHF (congestive heart failure)    • Coronary artery disease    • Depression    • Disease of thyroid gland    • Fibromyalgia    • GERD (gastroesophageal reflux disease)    • Hypertension    • Moderate tricuspid valve stenosis    • Osteoporosis    • Peripheral neuropathy    • Pulmonary hypertension    • SBO (small bowel obstruction)    • Stenosis of mitral valve      Past Surgical History:  Past Surgical History:   Procedure Laterality Date   • BILATERAL OOPHORECTOMY     • CARDIAC CATHETERIZATION     • CHOLECYSTECTOMY     • ERCP N/A 2/15/2019    Procedure: ENDOSCOPIC RETROGRADE CHOLANGIOPANCREATOGRAPHY WITH PARTIAL CHOLANGIOGRAM;  Surgeon: Gerald Herr MD;  Location: [facility] ENDOSCOPY;  Service: Gastroenterology   • EXPLORATORY LAPAROTOMY     • GASTRIC BYPASS     • HERNIA REPAIR      inguinal and ventral   • HYSTERECTOMY     • OVARIAN CYST REMOVAL        Home Meds:  Medications Prior to Admission   Medication Sig Dispense Refill Last Dose   • albuterol sulfate  (90 Base) MCG/ACT inhaler Inhale 2 puffs Every 4 (Four) Hours As Needed for Wheezing.   Past Month   • aspirin 81 MG chewable tablet Chew 1 tablet.   5/26/2023   • busPIRone (BUSPAR) 30 MG tablet Take 1 tablet by mouth 2 (Two) Times a Day.   5/26/2023   • fentaNYL (DURAGESIC) 25 MCG/HR patch Place 1 patch on the  skin as directed by provider Every 72 (Seventy-Two) Hours. 2 patch 0 Past Week   • FLUoxetine (PROzac) 60 MG tablet Take 1 tablet by mouth Daily.   5/26/2023   • HYDROcodone-acetaminophen (NORCO) 7.5-325 MG per tablet Take 1 tablet by mouth Every 6 (Six) Hours As Needed for Severe Pain . (Patient taking differently: Take 1 tablet by mouth 5 (Five) Times a Day As Needed for Severe Pain.) 12 tablet 0 5/26/2023   • levothyroxine (SYNTHROID, LEVOTHROID) 50 MCG tablet Take 1 tablet by mouth Daily.   5/26/2023   • Mirabegron ER (Myrbetriq) 50 MG tablet sustained-release 24 hour 24 hr tablet Take 50 mg by mouth Daily.   5/26/2023   • potassium chloride (MICRO-K) 10 MEQ CR capsule Take 1 capsule by mouth 2 (Two) Times a Day.   5/26/2023   • Probiotic Product (Align) 4 MG capsule Take 1 capsule by mouth Daily.   5/26/2023   • QUEtiapine (SEROquel) 50 MG tablet Take 2 tablets by mouth Every Night.   Past Week   • torsemide (DEMADEX) 100 MG tablet Take 1 tablet by mouth See Admin Instructions. Take one tablet by mouth twice daily every other day   5/26/2023   • umeclidinium-vilanterol (ANORO ELLIPTA) 62.5-25 MCG/INH aerosol powder  inhaler Inhale 1 puff Daily.   5/26/2023   • pantoprazole (PROTONIX) 40 MG EC tablet Take 40 mg by mouth Daily.        Current Meds:     Current Facility-Administered Medications:   •  acetaminophen (TYLENOL) tablet 650 mg, 650 mg, Oral, Q4H PRN **OR** acetaminophen (TYLENOL) 160 MG/5ML solution 650 mg, 650 mg, Oral, Q4H PRN **OR** acetaminophen (TYLENOL) suppository 650 mg, 650 mg, Rectal, Q4H PRN, Sarah Tadeo APRN  •  albuterol (PROVENTIL) nebulizer solution 0.083% 2.5 mg/3mL, 2.5 mg, Nebulization, Q6H PRN, Janice Gaston APRN  •  aluminum sulfate-calcium acetate (DOMEBORO) topical packet 1 packet, 1 packet, Topical, Q8H, Janice Gaston APRN, 1 packet at 05/28/23 1423  •  apixaban (ELIQUIS) tablet 5 mg, 5 mg, Oral, Q12H, Camacho Mims MD, 5 mg at 05/28/23 0803  •   aspirin chewable tablet 81 mg, 81 mg, Oral, Daily, Janice Gaston APRN, 81 mg at 05/28/23 0803  •  azithromycin (ZITHROMAX) tablet 250 mg, 250 mg, Oral, Q24H, Janice Gaston APRN, 250 mg at 05/28/23 0803  •  sennosides-docusate (PERICOLACE) 8.6-50 MG per tablet 2 tablet, 2 tablet, Oral, BID, 2 tablet at 05/28/23 0802 **AND** polyethylene glycol (MIRALAX) packet 17 g, 17 g, Oral, Daily PRN **AND** bisacodyl (DULCOLAX) EC tablet 5 mg, 5 mg, Oral, Daily PRN **AND** bisacodyl (DULCOLAX) suppository 10 mg, 10 mg, Rectal, Daily PRN, Sarah Tadeo APRN  •  busPIRone (BUSPAR) tablet 30 mg, 30 mg, Oral, BID, Janice Gaston APRN, 30 mg at 05/28/23 0803  •  cefTRIAXone (ROCEPHIN) 2 g in sodium chloride 0.9 % 100 mL IVPB-VTB, 2 g, Intravenous, Q24H, Janice Gaston APRN, Last Rate: 200 mL/hr at 05/28/23 1114, 2 g at 05/28/23 1114  •  cetaphil lotion, , Topical, PRN, Janice Gaston APRN  •  FLUoxetine (PROzac) capsule 60 mg, 60 mg, Oral, Daily, Tin Ferrari MD, 60 mg at 05/28/23 0802  •  HYDROcodone-acetaminophen (NORCO) 7.5-325 MG per tablet 1 tablet, 1 tablet, Oral, Q6H PRN, Thalia Eddy APRN, 1 tablet at 05/28/23 1418  •  ipratropium-albuterol (DUO-NEB) nebulizer solution 3 mL, 3 mL, Nebulization, Q8H - RT, Janice Gaston APRN, 3 mL at 05/28/23 1559  •  lactobacillus acidophilus (RISAQUAD) capsule 1 capsule, 1 capsule, Oral, Daily, Janice Gaston APRN, 1 capsule at 05/28/23 0802  •  levothyroxine (SYNTHROID, LEVOTHROID) tablet 50 mcg, 50 mcg, Oral, Daily, Janice Gaston APRN, 50 mcg at 05/28/23 0803  •  LORazepam (ATIVAN) tablet 0.5 mg, 0.5 mg, Oral, Q6H PRN, Narinder Junior III, MD, 0.5 mg at 05/28/23 1711  •  Mirabegron ER (MYRBETRIQ) 24 hr tablet 50 mg, 50 mg, Oral, Daily, Janice Gaston APRN, 50 mg at 05/28/23 0803  •  nitroglycerin (NITROSTAT) SL tablet 0.4 mg, 0.4 mg, Sublingual, Q5 Min  "PRN, Sarah Tadeo APRN  •  ondansetron (ZOFRAN) injection 4 mg, 4 mg, Intravenous, Q6H PRN, Sarah Tadeo APRN, 4 mg at 05/27/23 0851  •  pantoprazole (PROTONIX) EC tablet 40 mg, 40 mg, Oral, Daily, Janice Gaston APRN, 40 mg at 05/28/23 0803  •  potassium chloride (K-DUR,KLOR-CON) ER tablet 10 mEq, 10 mEq, Oral, BID, Janice Gaston APRN, 10 mEq at 05/28/23 0803  •  predniSONE (DELTASONE) tablet 20 mg, 20 mg, Oral, Daily With Breakfast, Janice Gaston APRN, 20 mg at 05/28/23 0803  •  QUEtiapine (SEROquel) tablet 100 mg, 100 mg, Oral, Nightly, Janice Gaston APRN, 100 mg at 05/27/23 2030  •  [COMPLETED] Insert Peripheral IV, , , Once **AND** sodium chloride 0.9 % flush 10 mL, 10 mL, Intravenous, PRN, Terence Marcelino MD  •  sodium chloride 0.9 % flush 10 mL, 10 mL, Intravenous, Q12H, Sarah Tadeo APRN, 10 mL at 05/28/23 0816  •  sodium chloride 0.9 % flush 10 mL, 10 mL, Intravenous, PRN, Sarah Tadeo APRN  •  sodium chloride 0.9 % infusion 40 mL, 40 mL, Intravenous, PRN, Sarah Tadeo APRN  •  torsemide (DEMADEX) tablet 100 mg, 100 mg, Oral, See Admin Instructions, Janice Gaston APRN  Allergies:  Allergies   Allergen Reactions   • Sulfa Antibiotics Other (See Comments)     Per facility paperwork   • Aspartame Other (See Comments)     CAUSES MIGRAINES   • Cephalexin Rash     Tolerated piperacillin-tazobactam (Zosyn) and ampicillin-sulbactam (Unasyn) during Feb 2019 admission.     Social History:   Social History     Tobacco Use   • Smoking status: Former   • Smokeless tobacco: Never   Substance Use Topics   • Alcohol use: No      Family History:  History reviewed. No pertinent family history.       Review of Systems  See history of present illness and past medical history.        Vitals:   /71 (BP Location: Left arm, Patient Position: Lying)   Pulse 84   Temp 98.6 °F (37 °C) (Oral)   Resp 18   Ht 160 cm (63\")  "  Wt 127 kg (281 lb)   SpO2 90%   BMI 49.78 kg/m²   I/O:     Intake/Output Summary (Last 24 hours) at 5/28/2023 2026  Last data filed at 5/28/2023 0500  Gross per 24 hour   Intake --   Output 300 ml   Net -300 ml     Exam:  Patient is examined using the personal protective equipment as per guidelines from infection control for this particular patient as enacted.  Hand washing was performed before and after patient interaction.  General Appearance:   Somnolent and lethargic chronically ill-appearing female who is able to engage in conversation and according to family member at the bedside this somewhat improved.   Head:    Normocephalic, without obvious abnormality, atraumatic   Eyes:    PERRL, conjunctivae/corneas clear, EOM's intact, both eyes   Ears:    Normal external ear canals, both ears   Nose:   Nares normal, septum midline, mucosa normal, no drainage    or sinus tenderness   Throat:   Lips, tongue, gums normal; oral mucosa pink and moist   Neck:   Supple, symmetrical, trachea midline, no adenopathy;     thyroid:  no enlargement/tenderness/nodules; no carotid    bruit or JVD   Back:     Symmetric, no curvature, ROM normal, no CVA tenderness   Lungs:     Clear to auscultation bilaterally, respirations unlabored   Chest Wall:    No tenderness or deformity    Heart:    Regular rate and rhythm, S1 and S2 normal, no murmur, rub   or gallop   Abdomen:    Body wall edema, no guarding rigidity or rebound.  Obese and nontender.   Extremities:  Chronic venous stasis changes noted   Pulses:   Pulses palpable in all extremities; symmetric all extremities   Skin:                          Neurologic:  Lethargic and somnolent but arousable.       Data Review:    I reviewed the patient's new clinical results.  Results from last 7 days   Lab Units 05/28/23  0441 05/27/23  0729 05/27/23  0125   WBC 10*3/mm3 7.20 7.71 7.37   HEMOGLOBIN g/dL 10.3* 11.1* 10.4*   PLATELETS 10*3/mm3 120* 120* 96*     Results from last 7 days    Lab Units 05/28/23  0441 05/27/23  0521 05/27/23  0125   SODIUM mmol/L 140 141 137   POTASSIUM mmol/L 3.9 4.6 4.6   CHLORIDE mmol/L 106 107 106   CO2 mmol/L 24.2 24.7 23.2   BUN mg/dL 7* 9 9   CREATININE mg/dL 0.84 0.79 0.67   CALCIUM mg/dL 8.9 8.7 8.9   GLUCOSE mg/dL 95 110* 121*     Microbiology Results (last 10 days)     Procedure Component Value - Date/Time    Urine Culture - Urine, Urine, Clean Catch [[id number]]  (Abnormal) Collected: 05/27/23 0430    Lab Status: Preliminary result Specimen: Urine, Clean Catch Updated: 05/28/23 1016     Urine Culture >100,000 CFU/mL Gram Negative Bacilli    Narrative:      Colonization of the urinary tract without infection is common. Treatment is discouraged unless the patient is symptomatic, pregnant, or undergoing an invasive urologic procedure.    Respiratory Panel PCR w/COVID-19(SARS-CoV-2) NIKKI/JOHN/GRIFFIN/PAD/COR/MAD/FRANCIS In-House, NP Swab in UTM/VTM, 3-4 HR TAT - Swab, Nasopharynx [[id number]]  (Normal) Collected: 05/27/23 0157    Lab Status: Final result Specimen: Swab from Nasopharynx Updated: 05/27/23 0303     ADENOVIRUS, PCR Not Detected     Coronavirus 229E Not Detected     Coronavirus HKU1 Not Detected     Coronavirus NL63 Not Detected     Coronavirus OC43 Not Detected     COVID19 Not Detected     Human Metapneumovirus Not Detected     Human Rhinovirus/Enterovirus Not Detected     Influenza A PCR Not Detected     Influenza B PCR Not Detected     Parainfluenza Virus 1 Not Detected     Parainfluenza Virus 2 Not Detected     Parainfluenza Virus 3 Not Detected     Parainfluenza Virus 4 Not Detected     RSV, PCR Not Detected     Bordetella pertussis pcr Not Detected     Bordetella parapertussis PCR Not Detected     Chlamydophila pneumoniae PCR Not Detected     Mycoplasma pneumo by PCR Not Detected    Narrative:      In the setting of a positive respiratory panel with a viral infection PLUS a negative procalcitonin without other underlying concern for bacterial infection,  consider observing off antibiotics or discontinuation of antibiotics and continue supportive care. If the respiratory panel is positive for atypical bacterial infection (Bordetella pertussis, Chlamydophila pneumoniae, or Mycoplasma pneumoniae), consider antibiotic de-escalation to target atypical bacterial infection.    Blood Culture - Blood, Arm, Left [[id number]]  (Abnormal) Collected: 05/27/23 0156    Lab Status: Preliminary result Specimen: Blood from Arm, Left Updated: 05/28/23 1031     Blood Culture Abnormal Stain     Gram Stain Aerobic Bottle Gram positive cocci    Blood Culture ID, PCR - Blood, Arm, Left [[id number]] Collected: 05/27/23 0156    Lab Status: Final result Specimen: Blood from Arm, Left Updated: 05/28/23 1147     BCID, PCR Negative by BCID PCR. Culture to Follow.     BOTTLE TYPE Aerobic Bottle    Blood Culture - Blood, Arm, Right [[id number]]  (Normal) Collected: 05/27/23 0125    Lab Status: Preliminary result Specimen: Blood from Arm, Right Updated: 05/28/23 0131     Blood Culture No growth at 24 hours            Assessment:  Active Hospital Problems    Diagnosis  POA   • **COPD exacerbation [J44.1]  Yes   • CKD (chronic kidney disease) stage 3, GFR 30-59 ml/min [N18.30]  Yes   • Hypothyroidism [E03.9]  Yes   • Chronic pain syndrome [G89.4]  Yes   • CAD (coronary artery disease), native coronary artery [I25.10]  Yes   • Diastolic dysfunction [I51.89]  Yes   • Benign essential hypertension [I10]  Yes   • Obstructive sleep apnea [G47.33]  Yes   • Chronic obstructive pulmonary disease with acute exacerbation [J44.1]  Yes      Resolved Hospital Problems   No resolved problems to display.         Plan:  See above  Osvaldo Ponce MD   5/28/2023  20:26 EDT    Parts of this note may be an electronic transcription/translation of spoken language to printed text using the Dragon dictation system.

## 2023-05-29 NOTE — PLAN OF CARE
Goal Outcome Evaluation:               Pt very tired today. Hard time keeping her awake. Pt not eating much but will take her meds. Q2 turn safety maintained. Family interested in rehab/SNF placement.

## 2023-05-30 LAB
ALBUMIN SERPL-MCNC: 3.1 G/DL (ref 3.5–5.2)
ALBUMIN/GLOB SERPL: 1.5 G/DL
ALP SERPL-CCNC: 85 U/L (ref 39–117)
ALT SERPL W P-5'-P-CCNC: 7 U/L (ref 1–33)
ANION GAP SERPL CALCULATED.3IONS-SCNC: 8 MMOL/L (ref 5–15)
AST SERPL-CCNC: 11 U/L (ref 1–32)
BACTERIA SPEC AEROBE CULT: ABNORMAL
BASOPHILS # BLD AUTO: 0.01 10*3/MM3 (ref 0–0.2)
BASOPHILS NFR BLD AUTO: 0.2 % (ref 0–1.5)
BILIRUB SERPL-MCNC: 0.2 MG/DL (ref 0–1.2)
BUN SERPL-MCNC: 8 MG/DL (ref 8–23)
BUN/CREAT SERPL: 10.5 (ref 7–25)
CALCIUM SPEC-SCNC: 8.3 MG/DL (ref 8.6–10.5)
CHLORIDE SERPL-SCNC: 106 MMOL/L (ref 98–107)
CO2 SERPL-SCNC: 28 MMOL/L (ref 22–29)
CREAT SERPL-MCNC: 0.76 MG/DL (ref 0.57–1)
DEPRECATED RDW RBC AUTO: 47.2 FL (ref 37–54)
EGFRCR SERPLBLD CKD-EPI 2021: 79.3 ML/MIN/1.73
EOSINOPHIL # BLD AUTO: 0.01 10*3/MM3 (ref 0–0.4)
EOSINOPHIL NFR BLD AUTO: 0.2 % (ref 0.3–6.2)
ERYTHROCYTE [DISTWIDTH] IN BLOOD BY AUTOMATED COUNT: 13.2 % (ref 12.3–15.4)
GLOBULIN UR ELPH-MCNC: 2.1 GM/DL
GLUCOSE SERPL-MCNC: 84 MG/DL (ref 65–99)
GRAM STN SPEC: ABNORMAL
HCT VFR BLD AUTO: 33.3 % (ref 34–46.6)
HGB BLD-MCNC: 10.6 G/DL (ref 12–15.9)
ISOLATED FROM: ABNORMAL
LYMPHOCYTES # BLD AUTO: 1.14 10*3/MM3 (ref 0.7–3.1)
LYMPHOCYTES NFR BLD AUTO: 17.3 % (ref 19.6–45.3)
MCH RBC QN AUTO: 31.2 PG (ref 26.6–33)
MCHC RBC AUTO-ENTMCNC: 31.8 G/DL (ref 31.5–35.7)
MCV RBC AUTO: 97.9 FL (ref 79–97)
MONOCYTES # BLD AUTO: 0.43 10*3/MM3 (ref 0.1–0.9)
MONOCYTES NFR BLD AUTO: 6.5 % (ref 5–12)
NEUTROPHILS NFR BLD AUTO: 4.97 10*3/MM3 (ref 1.7–7)
NEUTROPHILS NFR BLD AUTO: 75.5 % (ref 42.7–76)
PLATELET # BLD AUTO: 132 10*3/MM3 (ref 140–450)
PMV BLD AUTO: 11.2 FL (ref 6–12)
POTASSIUM SERPL-SCNC: 4 MMOL/L (ref 3.5–5.2)
PROT SERPL-MCNC: 5.2 G/DL (ref 6–8.5)
RBC # BLD AUTO: 3.4 10*6/MM3 (ref 3.77–5.28)
SODIUM SERPL-SCNC: 142 MMOL/L (ref 136–145)
WBC NRBC COR # BLD: 6.58 10*3/MM3 (ref 3.4–10.8)

## 2023-05-30 PROCEDURE — 80053 COMPREHEN METABOLIC PANEL: CPT | Performed by: INTERNAL MEDICINE

## 2023-05-30 PROCEDURE — 85025 COMPLETE CBC W/AUTO DIFF WBC: CPT | Performed by: INTERNAL MEDICINE

## 2023-05-30 PROCEDURE — 97166 OT EVAL MOD COMPLEX 45 MIN: CPT | Performed by: OCCUPATIONAL THERAPIST

## 2023-05-30 PROCEDURE — 25010000002 ERTAPENEM PER 500 MG: Performed by: INTERNAL MEDICINE

## 2023-05-30 PROCEDURE — 25010000002 VANCOMYCIN 10 G RECONSTITUTED SOLUTION: Performed by: INTERNAL MEDICINE

## 2023-05-30 PROCEDURE — 97535 SELF CARE MNGMENT TRAINING: CPT | Performed by: OCCUPATIONAL THERAPIST

## 2023-05-30 PROCEDURE — 87040 BLOOD CULTURE FOR BACTERIA: CPT | Performed by: INTERNAL MEDICINE

## 2023-05-30 PROCEDURE — 94761 N-INVAS EAR/PLS OXIMETRY MLT: CPT

## 2023-05-30 PROCEDURE — 94799 UNLISTED PULMONARY SVC/PX: CPT

## 2023-05-30 PROCEDURE — 63710000001 PREDNISONE PER 1 MG: Performed by: NURSE PRACTITIONER

## 2023-05-30 PROCEDURE — 94760 N-INVAS EAR/PLS OXIMETRY 1: CPT

## 2023-05-30 RX ADMIN — POTASSIUM CHLORIDE 10 MEQ: 750 TABLET, EXTENDED RELEASE ORAL at 20:29

## 2023-05-30 RX ADMIN — FLUOXETINE HYDROCHLORIDE 60 MG: 20 CAPSULE ORAL at 08:57

## 2023-05-30 RX ADMIN — BUSPIRONE HYDROCHLORIDE 30 MG: 15 TABLET ORAL at 20:29

## 2023-05-30 RX ADMIN — HYDROCODONE BITARTRATE AND ACETAMINOPHEN 1 TABLET: 7.5; 325 TABLET ORAL at 06:02

## 2023-05-30 RX ADMIN — LORAZEPAM 0.5 MG: 0.5 TABLET ORAL at 20:38

## 2023-05-30 RX ADMIN — AZITHROMYCIN DIHYDRATE 250 MG: 250 TABLET, FILM COATED ORAL at 08:57

## 2023-05-30 RX ADMIN — BUSPIRONE HYDROCHLORIDE 30 MG: 15 TABLET ORAL at 08:57

## 2023-05-30 RX ADMIN — HYDROCODONE BITARTRATE AND ACETAMINOPHEN 1 TABLET: 7.5; 325 TABLET ORAL at 12:23

## 2023-05-30 RX ADMIN — HYDROCODONE BITARTRATE AND ACETAMINOPHEN 1 TABLET: 7.5; 325 TABLET ORAL at 23:45

## 2023-05-30 RX ADMIN — Medication 10 ML: at 20:29

## 2023-05-30 RX ADMIN — MIRABEGRON 50 MG: 25 TABLET, FILM COATED, EXTENDED RELEASE ORAL at 08:57

## 2023-05-30 RX ADMIN — VANCOMYCIN HYDROCHLORIDE 1500 MG: 10 INJECTION, POWDER, LYOPHILIZED, FOR SOLUTION INTRAVENOUS at 16:08

## 2023-05-30 RX ADMIN — ASPIRIN 81 MG: 81 TABLET, CHEWABLE ORAL at 08:57

## 2023-05-30 RX ADMIN — HYDROCODONE BITARTRATE AND ACETAMINOPHEN 1 TABLET: 7.5; 325 TABLET ORAL at 18:10

## 2023-05-30 RX ADMIN — LEVOTHYROXINE SODIUM 50 MCG: 0.05 TABLET ORAL at 08:57

## 2023-05-30 RX ADMIN — IPRATROPIUM BROMIDE AND ALBUTEROL SULFATE 3 ML: 2.5; .5 SOLUTION RESPIRATORY (INHALATION) at 15:33

## 2023-05-30 RX ADMIN — POTASSIUM CHLORIDE 10 MEQ: 750 TABLET, EXTENDED RELEASE ORAL at 08:57

## 2023-05-30 RX ADMIN — Medication 1 CAPSULE: at 08:57

## 2023-05-30 RX ADMIN — APIXABAN 5 MG: 5 TABLET, FILM COATED ORAL at 20:29

## 2023-05-30 RX ADMIN — PANTOPRAZOLE SODIUM 40 MG: 40 TABLET, DELAYED RELEASE ORAL at 08:57

## 2023-05-30 RX ADMIN — APIXABAN 5 MG: 5 TABLET, FILM COATED ORAL at 08:57

## 2023-05-30 RX ADMIN — IPRATROPIUM BROMIDE AND ALBUTEROL SULFATE 3 ML: 2.5; .5 SOLUTION RESPIRATORY (INHALATION) at 08:42

## 2023-05-30 RX ADMIN — QUETIAPINE FUMARATE 100 MG: 100 TABLET ORAL at 20:29

## 2023-05-30 RX ADMIN — ERTAPENEM SODIUM 1 G: 1 INJECTION, POWDER, LYOPHILIZED, FOR SOLUTION INTRAMUSCULAR; INTRAVENOUS at 08:57

## 2023-05-30 RX ADMIN — Medication 10 ML: at 08:59

## 2023-05-30 RX ADMIN — IPRATROPIUM BROMIDE AND ALBUTEROL SULFATE 3 ML: 2.5; .5 SOLUTION RESPIRATORY (INHALATION) at 22:57

## 2023-05-30 RX ADMIN — Medication 1 PACKET: at 06:02

## 2023-05-30 RX ADMIN — PREDNISONE 20 MG: 20 TABLET ORAL at 08:57

## 2023-05-30 RX ADMIN — DOCUSATE SODIUM 50MG AND SENNOSIDES 8.6MG 2 TABLET: 8.6; 5 TABLET, FILM COATED ORAL at 20:29

## 2023-05-30 NOTE — PLAN OF CARE
Problem: Adult Inpatient Plan of Care  Goal: Plan of Care Review  Flowsheets (Taken 5/30/2023 9498)  Progress: no change  Plan of Care Reviewed With: patient  Outcome Evaluation: vss. aaox4 this shift. plan to move patient to low air loss bed this shift. prn medication provided for c/o pain   Goal Outcome Evaluation:  Plan of Care Reviewed With: patient        Progress: no change  Outcome Evaluation: vss. aaox4 this shift. plan to move patient to low air loss bed this shift. prn medication provided for c/o pain

## 2023-05-30 NOTE — PROGRESS NOTES
"[facility] f/u d/t anxiety. Chart reviewed, spoke w/ RN and met w/ pt. RN reports pt is \"sleepy, but okay mood\". Psychiatry following, ativan initiated on 5/28. Pt RIB upon entry, A&Ox3 (POTUS unknown). Pt rates anxiety as a \"6/10\" and depression as a \"5/10\" (10 being the worst for both). Pt denies SI/HI, hallucinations or wish to be dead. Pt reports reads \"[employer] novels\" to assist w/ decreasing anxiety and depressive symptoms which prove to be extremely helpful. Reports no further mental health concerns at this time.   ACCESS following.   "

## 2023-05-30 NOTE — CASE MANAGEMENT/SOCIAL WORK
Discharge Planning Assessment  [facility]     Patient Name: Rose Cheema  MRN: [id number]  Today's Date: 5/30/2023    Admit Date: 5/26/2023    Plan: Rehab   Discharge Needs Assessment     Row Name 05/30/23 1348       Living Environment    People in Home alone    Current Living Arrangements assisted living facility    Potentially Unsafe Housing Conditions none    Primary Care Provided by self    Provides Primary Care For no one    Family Caregiver if Needed child(arlene), adult    Quality of Family Relationships supportive       Resource/Environmental Concerns    Resource/Environmental Concerns none       Transition Planning    Patient/Family Anticipates Transition to inpatient rehabilitation facility    Patient/Family Anticipated Services at Transition rehabilitation services    Transportation Anticipated health plan transportation       Discharge Needs Assessment    Equipment Currently Used at Home oxygen    Equipment Needed After Discharge none    Outpatient/Agency/Support Group Needs skilled nursing facility               Discharge Plan     Row Name 05/30/23 1349       Plan    Plan Rehab    Patient/Family in Agreement with Plan yes    Plan Comments Met with pt at bedside. Introduced self, explained CCP role, facesheet verified.  Pt lives in [facility] alone and is independent with ADLs.  Has used [facility] in past and has been to rehab at [facility] and [facility].  Pt uses rollator and night timeO2 from [place].  Would like rehab again and would like to go to [facility] again.  Referral placed in Epic.  Await determination.  MARIA E Piedra RN              Continued Care and Services - Admitted Since 5/26/2023     Destination     Service Provider Request Status Selected Services Address Phone Fax Patient Preferred    [facility] [place] Pending - Request Sent N/A [address] [facility] [id number] [phone] --              Expected Discharge Date and Time      Expected Discharge Date Expected Discharge Time    Jun 1, 2023          Demographic Summary     Row Name 05/30/23 1346       General Information    Admission Type inpatient    Arrived From home    Referral Source admission list    Reason for Consult discharge planning    Preferred Language English       Contact Information    Permission Granted to Share Info With family/designee  Mario Cheema (son) [phone]               Functional Status    No documentation.                Psychosocial    No documentation.                Abuse/Neglect    No documentation.                Legal    No documentation.                Substance Abuse    No documentation.                Patient Forms    No documentation.                   Kaitlyn Piedra RN

## 2023-05-30 NOTE — PROGRESS NOTES
The patient looks significantly improved today reporting reduction in anxiety with addition of as needed lorazepam.  She states that she is sleeping well and that her appetite is good.  She offers no other complaints when seen today.

## 2023-05-30 NOTE — PLAN OF CARE
Goal Outcome Evaluation:  Plan of Care Reviewed With: patient        Progress: no change  Outcome Evaluation: pt evaluated for OT. pt admitted from home where she lives alone, pt admitted w COPD exacerbation. pt reports she was independent PTA. pt reports she has a shower chair at home in her shower and grab bars and walk in shower. pt completed grooming skills w SBA. set up w food items and demo good ROM in UE and decr strength. pt not up for getting up/EOB or standing this am. pt cont to benefit from OT to incr ADL ,strength, balance, tsf. rec rehab at [place].

## 2023-05-30 NOTE — PLAN OF CARE
Goal Outcome Evaluation:  Plan of Care Reviewed With: patient        Progress: no change  Outcome Evaluation: VSS. Alert and oriented x3. Disoriented to situation. PRN norco for chronic back pain. PRN ativan for anxiety x1. q2h turn. [place] soaks x2 this shift. Will continue to provide supportive care.

## 2023-05-30 NOTE — THERAPY EVALUATION
Patient Name: Rose Cheema  : 1940    MRN: [id number]                              Today's Date: 2023       Admit Date: 2023    Visit Dx:     ICD-10-CM ICD-9-CM   1. COPD exacerbation  J44.1 491.21   2. Urinary tract infection without hematuria, site unspecified  N39.0 599.0     Patient Active Problem List   Diagnosis   • Urinary incontinence   • Urinary frequency   • Generalized abdominal pain   • Pulmonary hypertension    • Benign essential hypertension   • Bilateral lower extremity edema (chronic)   • CAD (coronary artery disease), native coronary artery   • Chronic obstructive pulmonary disease with acute exacerbation   • Diastolic dysfunction   • Class 3 severe obesity with serious comorbidity and body mass index (BMI) of 50.0 to 59.9 in adult   • Obstructive sleep apnea   • Secondary pulmonary arterial hypertension   • H/O: hysterectomy   • Fibromyalgia   • Hx SBO   • Hx of cholecystectomy   • Hypoglycemia   • Fall   • Hypothyroidism   • Chronic pain syndrome   • Anemia   • Pneumonia of right lower lobe due to infectious organism   • Acute UTI   • Acute on chronic diastolic (congestive) heart failure   • Opioid dependence   • Venous stasis dermatitis of both lower extremities   • Gram-negative bacteremia   • Hypokalemia   • E. coli bacteremia   • Anemia of chronic disease   • Sepsis without acute organ dysfunction - present on admit   • CKD (chronic kidney disease) stage 3, GFR 30-59 ml/min   • Bacteremia   • Enterococcal septicemia   • Asymptomatic bacteriuria   • COPD exacerbation     Past Medical History:   Diagnosis Date   • Anemia    • Anxiety    • Arthritis    • CHF (congestive heart failure)    • Coronary artery disease    • Depression    • Disease of thyroid gland    • Fibromyalgia    • GERD (gastroesophageal reflux disease)    • Hypertension    • Moderate tricuspid valve stenosis    • Osteoporosis    • Peripheral neuropathy    • Pulmonary hypertension    • SBO (small bowel  obstruction)    • Stenosis of mitral valve      Past Surgical History:   Procedure Laterality Date   • BILATERAL OOPHORECTOMY     • CARDIAC CATHETERIZATION     • CHOLECYSTECTOMY     • ERCP N/A 2/15/2019    Procedure: ENDOSCOPIC RETROGRADE CHOLANGIOPANCREATOGRAPHY WITH PARTIAL CHOLANGIOGRAM;  Surgeon: Gerald Herr MD;  Location: [facility] ENDOSCOPY;  Service: Gastroenterology   • EXPLORATORY LAPAROTOMY     • GASTRIC BYPASS     • HERNIA REPAIR      inguinal and ventral   • HYSTERECTOMY     • OVARIAN CYST REMOVAL        General Information     Row Name 05/30/23 1151          OT Time and Intention    Document Type evaluation  -     Mode of Treatment individual therapy;occupational therapy  -     Row Name 05/30/23 1151          General Information    Patient Profile Reviewed yes  -     Prior Level of Function independent:;all household mobility;gait;transfer;ADL's;bed mobility  -     Existing Precautions/Restrictions fall;oxygen therapy device and L/min  O2 at night per pt report  -     Barriers to Rehab none identified  -[facility] Name 05/30/23 1151          Living Environment    People in Home alone  -[facility] Name 05/30/23 1151          Cognition    Orientation Status (Cognition) oriented x 3  -[facility] Name 05/30/23 1151          Safety Issues, Functional Mobility    Impairments Affecting Function (Mobility) strength;endurance/activity tolerance;balance  -           User Key  (r) = Recorded By, (t) = Taken By, (c) = Cosigned By    Initials Name Provider Type     Kelly Virgen, OTR Occupational Therapist                 Mobility/ADL's     Row Name 05/30/23 1153          Bed Mobility    Comment, (Bed Mobility) pt refuses to get to EOB, too cold.  -     Row Name 05/30/23 1153          Transfers    Comment, (Transfers) NT pt doesn't want to get up yet.  -     Row Name 05/30/23 1153          Functional Mobility    Functional Mobility- Comment NT pt did not want to get up and walk right  now.  -     Row Name 05/30/23 1153          Activities of Daily Living    BADL Assessment/Intervention grooming;feeding  -KP     Row Name 05/30/23 1153          Grooming Assessment/Training    [place] Level (Grooming) grooming skills;wash face, hands;set up;standby assist  -     Position (Grooming) sitting up in bed  -     Row Name 05/30/23 1153          Self-Feeding Assessment/Training    [place] Level (Feeding) feeding skills;finger foods;set up;supervision;prepare tray/open items;maximum assist (25% patient effort)  -     Position (Self-Feeding) sitting up in bed  -           User Key  (r) = Recorded By, (t) = Taken By, (c) = Cosigned By    Initials Name Provider Type    Kelly Pereira OTR Occupational Therapist               Obj/Interventions     Row Name 05/30/23 1155          Sensory Assessment (Somatosensory)    Sensory Assessment (Somatosensory) UE sensation intact  -     Row Name 05/30/23 1155          Vision Assessment/Intervention    Visual Impairment/Limitations WFL  -KP     Row Name 05/30/23 1155          Range of Motion Comprehensive    General Range of Motion bilateral upper extremity ROM WNL  -KP     Comment, General Range of Motion B UE 8/8  -KP     Row Name 05/30/23 1155          Strength Comprehensive (MMT)    General Manual Muscle Testing (MMT) Assessment upper extremity strength deficits identified  -KP     Comment, General Manual Muscle Testing (MMT) Assessment B UE 4-/5  -KP     Row Name 05/30/23 1155          Motor Skills    Motor Skills coordination;functional endurance  -KP     Coordination WFL  -KP     Functional Endurance fair +  -KP     Row Name 05/30/23 1155          Balance    Comment, Balance NT pt did not want to get to EOB  -KP           User Key  (r) = Recorded By, (t) = Taken By, (c) = Cosigned By    Initials Name Provider Type    Kelly Pereira OTR Occupational Therapist               Goals/Plan     Row Name 05/30/23 1208           Transfer Goal 1 (OT)    Strategies/Barriers (Transfers Goal 1, OT) TBA and set goal  -     Row Name 05/30/23 1201          Bathing Goal 1 (OT)    Activity/Device (Bathing Goal 1, OT) bathing skills, all;grab bar, tub/shower  -     [place] Level/Cues Needed (Bathing Goal 1, OT) minimum assist (75% or more patient effort)  -KP     Time Frame (Bathing Goal 1, OT) 2 weeks;short term goal (STG)  -KP     Row Name 05/30/23 1201          Grooming Goal 1 (OT)    Activity/Device (Grooming Goal 1, OT) grooming skills, all  -KP     [place] (Grooming Goal 1, OT) modified independence  -KP     Time Frame (Grooming Goal 1, OT) short term goal (STG);2 weeks  -KP     Progress/Outcome (Grooming Goal 1, OT) goal ongoing  -     Row Name 05/30/23 1201          Strength Goal 1 (OT)    Strength Goal 1 (OT) incr UE to 4/5 to incr ADL and tsf  -KP     Time Frame (Strength Goal 1, OT) short term goal (STG);2 weeks  -KP     Progress/Outcome (Strength Goal 1, OT) goal ongoing  -[facility] Name 05/30/23 1201          Therapy Assessment/Plan (OT)    Planned Therapy Interventions (OT) activity tolerance training;occupation/activity based interventions;functional balance retraining;BADL retraining;strengthening exercise;ROM/therapeutic exercise;transfer/mobility retraining;patient/caregiver education/training  -           User Key  (r) = Recorded By, (t) = Taken By, (c) = Cosigned By    Initials Name Provider Type    Kelly Pereira, OTR Occupational Therapist               Clinical Impression     Row Name 05/30/23 1150          Pain Assessment    Pretreatment Pain Rating 0/10 - no pain  -     Posttreatment Pain Rating 0/10 - no pain  -     Row Name 05/30/23 1158          Plan of Care Review    Plan of Care Reviewed With patient  -     Progress no change  -     Outcome Evaluation pt evaluated for OT. pt admitted from home where she lives alone, pt admitted w COPD exacerbation. pt reports she was independent PTA.  pt reports she has a shower chair at home in her shower and grab bars and walk in shower. pt completed grooming skills w SBA. set up w food items and demo good ROM in UE and decr strength. pt not up for getting up/EOB or standing this am. pt cont to benefit from OT to incr ADL ,strength, balance, tsf. rec rehab at [place].  -     Row Name 05/30/23 1154          Therapy Assessment/Plan (OT)    Rehab Potential (OT) good, to achieve stated therapy goals  -     Criteria for Skilled Therapeutic Interventions Met (OT) yes;skilled treatment is necessary;meets criteria  -     Therapy Frequency (OT) 5 times/wk  -     Row Name 05/30/23 1157          Therapy Plan Review/Discharge Plan (OT)    Anticipated Discharge Disposition (OT) skilled nursing facility;sub acute care setting  -     Row Name 05/30/23 1158          Positioning and Restraints    Pre-Treatment Position in bed  -     Post Treatment Position bed  -KP     In Bed notified nsg;fowlers;call light within reach;encouraged to call for assist;exit alarm on  -           User Key  (r) = Recorded By, (t) = Taken By, (c) = Cosigned By    Initials Name Provider Type    Kelly Pereira OTR Occupational Therapist               Outcome Measures     Row Name 05/30/23 1202          How much help from another is currently needed...    Putting on and taking off regular lower body clothing? 2  -KP     Bathing (including washing, rinsing, and drying) 2  -KP     Toileting (which includes using toilet bed pan or urinal) 2  -KP     Putting on and taking off regular upper body clothing 2  -KP     Taking care of personal grooming (such as brushing teeth) 3  -KP     Eating meals 3  -KP     AM-PAC 6 Clicks Score (OT) 14  -     Row Name 05/30/23 1202          Functional Assessment    Outcome Measure Options AM-PAC 6 Clicks Daily Activity (OT)  -           User Key  (r) = Recorded By, (t) = Taken By, (c) = Cosigned By    Initials Name Provider Type    Kelly Pereira  LAUREN Pantoja Occupational Therapist                Occupational Therapy Education     Title: PT OT SLP Therapies (Done)     Topic: Occupational Therapy (Done)     Point: ADL training (Done)     Description:   Instruct learner(s) on proper safety adaptation and remediation techniques during self care or transfers.   Instruct in proper use of assistive devices.              Learning Progress Summary           Patient Acceptance, E,TB,D, VU,DU by  at 5/30/2023 1203    Comment: ed pt on role of OT. benefit of therapy, POC w. OT. ed on safety w ADL and tsf.                   Point: Home exercise program (Done)     Description:   Instruct learner(s) on appropriate technique for monitoring, assisting and/or progressing therapeutic exercises/activities.              Learning Progress Summary           Patient Acceptance, E,TB,D, VU,DU by  at 5/30/2023 1203    Comment: ed pt on role of OT. benefit of therapy, POC w. OT. ed on safety w ADL and tsf.                   Point: Precautions (Done)     Description:   Instruct learner(s) on prescribed precautions during self-care and functional transfers.              Learning Progress Summary           Patient Acceptance, E,TB,D, VU,DU by  at 5/30/2023 1203    Comment: ed pt on role of OT. benefit of therapy, POC w. OT. ed on safety w ADL and tsf.                   Point: Body mechanics (Done)     Description:   Instruct learner(s) on proper positioning and spine alignment during self-care, functional mobility activities and/or exercises.              Learning Progress Summary           Patient Acceptance, E,TB,D, VU,DU by  at 5/30/2023 1203    Comment: ed pt on role of OT. benefit of therapy, POC w. OT. ed on safety w ADL and tsf.                               User Key     Initials Effective Dates Name Provider Type Discipline     06/16/21 -  Kelly Virgen OTR Occupational Therapist OT              OT Recommendation and Plan  Planned Therapy Interventions (OT):  activity tolerance training, occupation/activity based interventions, functional balance retraining, BADL retraining, strengthening exercise, ROM/therapeutic exercise, transfer/mobility retraining, patient/caregiver education/training  Therapy Frequency (OT): 5 times/wk  Plan of Care Review  Plan of Care Reviewed With: patient  Progress: no change  Outcome Evaluation: pt evaluated for OT. pt admitted from home where she lives alone, pt admitted w COPD exacerbation. pt reports she was independent PTA. pt reports she has a shower chair at home in her shower and grab bars and walk in shower. pt completed grooming skills w SBA. set up w food items and demo good ROM in UE and decr strength. pt not up for getting up/EOB or standing this am. pt cont to benefit from OT to incr ADL ,strength, balance, tsf. rec rehab at [place].     Time Calculation:    Time Calculation- OT     Row Name 05/30/23 1204             Time Calculation- OT    OT Start Time 0820  -      OT Stop Time 0843  -      OT Time Calculation (min) 23 min  -      Total Timed Code Minutes- OT 12 minute(s)  -      OT Received On 05/30/23  -      OT - Next Appointment 05/31/23  -      OT Goal Re-Cert Due Date 06/12/23  -         Timed Charges    39165 - OT Self Care/Mgmt Minutes 12  -KP         Untimed Charges    OT Eval/Re-eval Minutes 11  -KP         Total Minutes    Timed Charges Total Minutes 12  -KP      Untimed Charges Total Minutes 11  -KP       Total Minutes 23  -KP            User Key  (r) = Recorded By, (t) = Taken By, (c) = Cosigned By    Initials Name Provider Type     Kelly Virgen OTR Occupational Therapist              Therapy Charges for Today     Code Description Service Date Service Provider Modifiers Qty    37832267286 HC OT SELF CARE/MGMT/TRAIN EA 15 MIN 5/30/2023 Kelly Virgen OTR GO 1    48732557208 HC OT EVAL MOD COMPLEXITY 2 5/30/2023 Kelly Virgen OTR GO 1               Kelly Virgen  OTR  5/30/2023

## 2023-05-30 NOTE — PROGRESS NOTES
Name: Rose Cheema ADMIT: 2023   : 1940  PCP: Cheng Guevara MD    MRN: [id number] LOS: 3 days   AGE/SEX: 82 y.o. female  ROOM: [facility]     Subjective   Subjective   Patient is seen at bedside, no new complaints.       Objective   Objective   Vital Signs  Temp:  [97.9 °F (36.6 °C)-98.3 °F (36.8 °C)] 98 °F (36.7 °C)  Heart Rate:  [55-82] 59  Resp:  [16-18] 16  BP: (118-150)/(67-90) 150/75  SpO2:  [93 %-100 %] 100 %  on  Flow (L/min):  [1-3] 1;   Device (Oxygen Therapy): room air  Body mass index is 49.78 kg/m².  Physical Exam     General, awake and alert.  Appears sick on chronic basis  Head and ENT, normocephalic and atraumatic.  Lungs, symmetric expansion, equal air entry bilaterally.  Heart, regular rate and rhythm.  Abdomen, soft and nontender.  Extremities, no clubbing or cyanosis.  Bilateral lower extremity skin changes, consistent with chronic venous stasis.  Neuro, no focal deficits.  Skin: Warm and no rash.  Psych, appears anxious.  Musculoskeletal, joint examination is grossly normal.       Copied text material from yesterday's note has been reviewed for appropriate changes and remains accurate as of 23.      Results Review     I reviewed the patient's new clinical results.  Results from last 7 days   Lab Units 23  0729   WBC 10*3/mm3 6.58 5.90 7.20 7.71   HEMOGLOBIN g/dL 10.6* 10.2* 10.3* 11.1*   PLATELETS 10*3/mm3 132* 107* 120* 120*     Results from last 7 days   Lab Units 23  0521   SODIUM mmol/L 142 138 140 141   POTASSIUM mmol/L 4.0 4.3 3.9 4.6   CHLORIDE mmol/L 106 107 106 107   CO2 mmol/L 28.0 21.8* 24.2 24.7   BUN mg/dL 8 8 7* 9   CREATININE mg/dL 0.76 0.77 0.84 0.79   GLUCOSE mg/dL 84 83 95 110*   EGFR mL/min/1.73 79.3 78.1 70.4 75.7     Results from last 7 days   Lab Units 23  0341 23  0417 23  0125   ALBUMIN g/dL 3.1* 2.8* 2.8*   BILIRUBIN mg/dL 0.2 0.3  0.4   ALK PHOS U/L 85 86 96   AST (SGOT) U/L 11 16 12   ALT (SGPT) U/L 7 11 11     Results from last 7 days   Lab Units 05/30/23  0341 05/29/23  0417 05/28/23  0441 05/27/23  0521 05/27/23  0125   CALCIUM mg/dL 8.3* 8.5* 8.9 8.7 8.9   ALBUMIN g/dL 3.1* 2.8*  --   --  2.8*   MAGNESIUM mg/dL  --   --   --  2.2 2.2     Results from last 7 days   Lab Units 05/27/23  0156 05/27/23  0125 05/26/23  1401   PROCALCITONIN ng/mL  --  0.03 <0.05   LACTATE mmol/L 1.1  --   --      No results found for: HGBA1C, POCGLU    No radiology results for the last day  I have personally reviewed all medications:  Scheduled Medications  apixaban, 5 mg, Oral, Q12H  aspirin, 81 mg, Oral, Daily  azithromycin, 250 mg, Oral, Q24H  busPIRone, 30 mg, Oral, BID  ertapenem, 1 g, Intravenous, Q24H  FLUoxetine, 60 mg, Oral, Daily  ipratropium-albuterol, 3 mL, Nebulization, Q8H - RT  lactobacillus acidophilus, 1 capsule, Oral, Daily  levothyroxine, 50 mcg, Oral, Daily  Mirabegron ER, 50 mg, Oral, Daily  pantoprazole, 40 mg, Oral, Daily  potassium chloride, 10 mEq, Oral, BID  predniSONE, 20 mg, Oral, Daily With Breakfast  QUEtiapine, 100 mg, Oral, Nightly  senna-docusate sodium, 2 tablet, Oral, BID  sodium chloride, 10 mL, Intravenous, Q12H  torsemide, 100 mg, Oral, See Admin Instructions  vancomycin, 1,500 mg, Intravenous, Q24H    Infusions  Pharmacy to dose vancomycin,     Diet  Diet: Cardiac Diets; Healthy Heart (2-3 Na+); Texture: Regular Texture (IDDSI 7); Fluid Consistency: Thin (IDDSI 0)    I have personally reviewed:  [x]  Laboratory   [x]  Microbiology   [x]  Radiology   [x]  EKG/Telemetry  [x]  Cardiology/Vascular   []  Pathology    []  Records       Assessment/Plan     Active Hospital Problems    Diagnosis  POA   • **COPD exacerbation [J44.1]  Yes   • CKD (chronic kidney disease) stage 3, GFR 30-59 ml/min [N18.30]  Yes   • Hypothyroidism [E03.9]  Yes   • Chronic pain syndrome [G89.4]  Yes   • CAD (coronary artery disease), native coronary  artery [I25.10]  Yes   • Diastolic dysfunction [I51.89]  Yes   • Benign essential hypertension [I10]  Yes   • Obstructive sleep apnea [G47.33]  Yes   • Chronic obstructive pulmonary disease with acute exacerbation [J44.1]  Yes      Resolved Hospital Problems   No resolved problems to display.       82 y.o. female admitted with COPD exacerbation.    Assessment and plan:  1.  Urinary tract infection, bacteremia, patient is currently on Invanz which will be continued.  ID on board.  Urine culture positive for ESBL positive E. coli.     2.  COPD exacerbation, continue breathing treatments.     3.  Chronic diastolic CHF, patient already on torsemide which will be continued.     4.  Hypothyroidism, continue Synthroid.     5.  Depression/anxiety, resume home medications, consult psych.     6.  CODE STATUS is full code.  Further plans based on hospital course.    Tin Ferrari MD  [facility]  05/30/23  15:25 EDT

## 2023-05-30 NOTE — NURSING NOTE
Wound/ostomy - consult received regarding pressure injury requesting advice on treatment. Images noted in epic and skin to gluteals and thighs with redness and appearing to annelise in the photos. Patient is morbidly obese with multiple deep folds throughout body, lying in bed, adult diaper and purewick in place, BLE with hyperpigmented skin consistent with chronic venous stasis, skin intact and fairly smooth, noted [place] soaks have been ordered. Assisted patient to turn onto side, patient able to help minimally, skin intact to gluteals, some redness noted related to edges of adult diaper but skin intact, residue from z guard noted. No specific interventions as there are no apparent wounds to be managed. Skin in general should be kept clean and dry and barrier cream in folds to prevent friction, may need pillow cases in folds if MASD becomes an issue. Recommend turning and offloading with a turning wedge as patient's weight flattens pillows and offloading ineffective. No open skin lesions to BLE, patient states [place] soaks are being implemented there. Will d/c domeboro as any redness or excoriation seems to have resolved.

## 2023-05-30 NOTE — DISCHARGE PLACEMENT REQUEST
"Rose Caldera (82 y.o. Female)     Date of Birth   1940    Social Security Number       Address   [address] [place] [address]    Home Phone   [phone]    MRN   [id number]       Mandaen   [employer]    Marital Status                               Admission Date   5/26/23    Admission Type   Emergency    Admitting Provider   Mason Logan MD    Attending Provider   Tin Ferrari MD    Department, Room/Bed   [address], [address]/1       Discharge Date       Discharge Disposition       Discharge Destination                               Attending Provider: Tin Ferrari MD    Allergies: Sulfa Antibiotics, Aspartame, Cephalexin    Isolation: Contact   Infection: ESBL E coli (05/29/23)   Code Status: No CPR    Ht: 160 cm (63\")   Wt: 127 kg (281 lb)    Admission Cmt: None   Principal Problem: COPD exacerbation [J44.1]                 Active Insurance as of 5/26/2023     Primary Coverage     Payor Plan Insurance Group Employer/Plan Group    MEDICARE MEDICARE A & B      Payor Plan Address Payor Plan Phone Number Payor Plan Fax Number Effective Dates    [address] [phone]  5/1/2008 - None Entered    [facility] [id number]       Subscriber Name Subscriber Birth Date Member ID       ROSE CALDERA 1940 [id number]           Secondary Coverage     Payor Plan Insurance Group Employer/Plan Group    [place] [id number]     Payor Plan Address Payor Plan Phone Number Payor Plan Fax Number Effective Dates    [address]   9/1/2004 - None Entered    [facility] [id number]       Subscriber Name Subscriber Birth Date Member ID       ROSE CALDERA 1940 [id number]                 Emergency Contacts      (Rel.) Home Phone Work Phone Mobile Phone    Mario Caldera (Son) [phone] -- [phone]    Anette(inLaw)Olga (Daughter) [phone] -- [phone]          "

## 2023-05-30 NOTE — PROGRESS NOTES
"  Infectious Diseases Progress Note    Osvaldo Ponce MD     [facility]  Los: 3 days  Patient Identification:  Name: Rose Cheema  Age: 82 y.o.  Sex: female  :  1940  MRN: [id number]         Primary Care Physician: Cheng Guevara MD        Subjective: Interactive denies any new complaints.  Interval History: See consultation note.    Objective:    Scheduled Meds:aluminum sulfate-calcium acetate, 1 packet, Topical, Q8H  apixaban, 5 mg, Oral, Q12H  aspirin, 81 mg, Oral, Daily  azithromycin, 250 mg, Oral, Q24H  busPIRone, 30 mg, Oral, BID  ertapenem, 1 g, Intravenous, Q24H  FLUoxetine, 60 mg, Oral, Daily  ipratropium-albuterol, 3 mL, Nebulization, Q8H - RT  lactobacillus acidophilus, 1 capsule, Oral, Daily  levothyroxine, 50 mcg, Oral, Daily  Mirabegron ER, 50 mg, Oral, Daily  pantoprazole, 40 mg, Oral, Daily  potassium chloride, 10 mEq, Oral, BID  predniSONE, 20 mg, Oral, Daily With Breakfast  QUEtiapine, 100 mg, Oral, Nightly  senna-docusate sodium, 2 tablet, Oral, BID  sodium chloride, 10 mL, Intravenous, Q12H  torsemide, 100 mg, Oral, See Admin Instructions      Continuous Infusions:     Vital signs in last 24 hours:  Temp:  [97.9 °F (36.6 °C)-98.3 °F (36.8 °C)] 98.2 °F (36.8 °C)  Heart Rate:  [55-82] 55  Resp:  [16-18] 16  BP: (118-148)/(67-90) 118/67    Intake/Output:    Intake/Output Summary (Last 24 hours) at 2023 0835  Last data filed at 2023 0502  Gross per 24 hour   Intake 480 ml   Output 1250 ml   Net -770 ml       Exam:  /67 (BP Location: Left arm, Patient Position: Lying)   Pulse 55   Temp 98.2 °F (36.8 °C) (Oral)   Resp 16   Ht 160 cm (63\")   Wt 127 kg (281 lb)   SpO2 98%   BMI 49.78 kg/m²   Patient is examined using the personal protective equipment as per guidelines from infection control for this particular patient as enacted.  Hand washing was performed before and after patient interaction.  General Appearance:  Lethargic but interactive.  Alert, " cooperative, no distress, AAOx3                          Head:    Normocephalic, without obvious abnormality, atraumatic                           Eyes:    PERRL, conjunctivae/corneas clear, EOM's intact, both eyes                         Throat:   Lips, tongue, gums normal; oral mucosa pink and moist                           Neck:   Supple, symmetrical, trachea midline, no JVD                         Lungs:    Clear to auscultation bilaterally, respirations unlabored                 Chest Wall:    No tenderness or deformity                          Heart:    s1s2 regular                  Abdomen:     Soft and non tender                 Extremities:   Extremities normal, atraumatic, no cyanosis or edema                        Pulses:   Pulses palpable in all extremities                            Skin:   Skin is warm and dry,  no rashes or palpable lesions                  Neurologic:   Grossly non focal         Data Review:    I reviewed the patient's new clinical results.  Results from last 7 days   Lab Units 05/30/23 0341 05/29/23 0417 05/28/23 0441 05/27/23  0729 05/27/23  0125   WBC 10*3/mm3 6.58 5.90 7.20 7.71 7.37   HEMOGLOBIN g/dL 10.6* 10.2* 10.3* 11.1* 10.4*   PLATELETS 10*3/mm3 132* 107* 120* 120* 96*     Results from last 7 days   Lab Units 05/30/23  0341 05/29/23 0417 05/28/23  0441 05/27/23  0521 05/27/23  0125   SODIUM mmol/L 142 138 140 141 137   POTASSIUM mmol/L 4.0 4.3 3.9 4.6 4.6   CHLORIDE mmol/L 106 107 106 107 106   CO2 mmol/L 28.0 21.8* 24.2 24.7 23.2   BUN mg/dL 8 8 7* 9 9   CREATININE mg/dL 0.76 0.77 0.84 0.79 0.67   CALCIUM mg/dL 8.3* 8.5* 8.9 8.7 8.9   GLUCOSE mg/dL 84 83 95 110* 121*     Microbiology Results (last 10 days)     Procedure Component Value - Date/Time    Urine Culture - Urine, Urine, Clean Catch [[id number]]  (Abnormal)  (Susceptibility) Collected: 05/27/23 0430    Lab Status: Final result Specimen: Urine, Clean Catch Updated: 05/29/23 0024     Urine Culture >100,000  CFU/mL Escherichia coli ESBL     Comment:   Consider infectious disease consult.  Susceptibility results may not correlate to clinical outcomes.       Narrative:      Colonization of the urinary tract without infection is common. Treatment is discouraged unless the patient is symptomatic, pregnant, or undergoing an invasive urologic procedure.  Recent outcomes data supports the use of pip/tazo in the treatment of susceptible ESBL infections for uncomplicated UTI. Consider use of pip/tazo as a carbapenem-sparing regimen in applicable patients.    Susceptibility      Escherichia coli ESBL      SARAH      Ertapenem Susceptible      Gentamicin Resistant     Levofloxacin Resistant     Meropenem Susceptible      Nitrofurantoin Susceptible      Piperacillin + Tazobactam Susceptible      Tobramycin Susceptible      Trimethoprim + Sulfamethoxazole Resistant                          Respiratory Panel PCR w/COVID-19(SARS-CoV-2) NIKKI/JOHN/GRIFFIN/PAD/COR/MAD/FRANCIS In-House, NP Swab in UTM/VTM, 3-4 HR TAT - Swab, Nasopharynx [[id number]]  (Normal) Collected: 05/27/23 0157    Lab Status: Final result Specimen: Swab from Nasopharynx Updated: 05/27/23 0303     ADENOVIRUS, PCR Not Detected     Coronavirus 229E Not Detected     Coronavirus HKU1 Not Detected     Coronavirus NL63 Not Detected     Coronavirus OC43 Not Detected     COVID19 Not Detected     Human Metapneumovirus Not Detected     Human Rhinovirus/Enterovirus Not Detected     Influenza A PCR Not Detected     Influenza B PCR Not Detected     Parainfluenza Virus 1 Not Detected     Parainfluenza Virus 2 Not Detected     Parainfluenza Virus 3 Not Detected     Parainfluenza Virus 4 Not Detected     RSV, PCR Not Detected     Bordetella pertussis pcr Not Detected     Bordetella parapertussis PCR Not Detected     Chlamydophila pneumoniae PCR Not Detected     Mycoplasma pneumo by PCR Not Detected    Narrative:      In the setting of a positive respiratory panel with a viral infection PLUS a  negative procalcitonin without other underlying concern for bacterial infection, consider observing off antibiotics or discontinuation of antibiotics and continue supportive care. If the respiratory panel is positive for atypical bacterial infection (Bordetella pertussis, Chlamydophila pneumoniae, or Mycoplasma pneumoniae), consider antibiotic de-escalation to target atypical bacterial infection.    Blood Culture - Blood, Arm, Left [[id number]]  (Abnormal) Collected: 05/27/23 0156    Lab Status: Final result Specimen: Blood from Arm, Left Updated: 05/30/23 0806     Blood Culture Aerococcus viridans     Comment:   Aerococcus viridans is usually susceptible to vancomycin. Resistance has been reported to the fluoroquinolones and beta-lactams. Please call lab to request further workup.        Isolated from Aerobic Bottle     Gram Stain Aerobic Bottle Gram positive cocci    Narrative:      Probable contaminant requires clinical correlation, susceptibility not performed unless requested by physician.      Blood Culture ID, PCR - Blood, Arm, Left [[id number]] Collected: 05/27/23 0156    Lab Status: Final result Specimen: Blood from Arm, Left Updated: 05/28/23 1147     BCID, PCR Negative by BCID PCR. Culture to Follow.     BOTTLE TYPE Aerobic Bottle    Blood Culture - Blood, Arm, Right [[id number]]  (Normal) Collected: 05/27/23 0125    Lab Status: Preliminary result Specimen: Blood from Arm, Right Updated: 05/30/23 0130     Blood Culture No growth at 3 days            Assessment:    COPD exacerbation    Benign essential hypertension    CAD (coronary artery disease), native coronary artery    Chronic obstructive pulmonary disease with acute exacerbation    Diastolic dysfunction    Obstructive sleep apnea    Hypothyroidism    Chronic pain syndrome    CKD (chronic kidney disease) stage 3, GFR 30-59 ml/min  1-gram-positive cocci in 1 out of 2 blood cultures in the setting of abnormal urinalysis with shortness of breath and hypoxia  significance unclear-now that pathogen is identified as Aerococcus viridans possibility of endovascular infection cannot be ruled out.  See discussion below.  2-urinary tract infection-with ESBL +ve E.coli  3-atrial fibrillation with rapid ventricular response  4-history of heart failure with preserved ejection fraction  5-chronic kidney disease  6-obesity and obstructive sleep apnea  7-COPD  8-abdominal wall stranding seen on the CT scan of the abdomen with possible cellulitis versus body wall edema.     Recommendations/Discussions:  · Continue ertapenem for 5 to 7 days.  · Aerococcus and blood culture in this situation is hard to ignore and would recommend adding vancomycin to her regimen and repeating blood culture.  I reviewed her echocardiogram performed couple days ago and it shows calcification of mitral valve but no evidence of specific vegetation.  · If repeat blood cultures are negative then would recommend couple of weeks of IV vancomycin for unexplained Aerococcus viridans bacteremia in this situation.  Osvaldo Ponce MD  5/30/2023  08:35 EDT    Parts of this note may be an electronic transcription/translation of spoken language to printed text using the Dragon dictation system.

## 2023-05-31 LAB
ALBUMIN SERPL-MCNC: 3.1 G/DL (ref 3.5–5.2)
ALBUMIN/GLOB SERPL: 1.9 G/DL
ALP SERPL-CCNC: 78 U/L (ref 39–117)
ALT SERPL W P-5'-P-CCNC: 7 U/L (ref 1–33)
ANION GAP SERPL CALCULATED.3IONS-SCNC: 8 MMOL/L (ref 5–15)
AST SERPL-CCNC: 10 U/L (ref 1–32)
BASOPHILS # BLD AUTO: 0.02 10*3/MM3 (ref 0–0.2)
BASOPHILS NFR BLD AUTO: 0.3 % (ref 0–1.5)
BILIRUB SERPL-MCNC: 0.2 MG/DL (ref 0–1.2)
BUN SERPL-MCNC: 9 MG/DL (ref 8–23)
BUN/CREAT SERPL: 13.2 (ref 7–25)
CALCIUM SPEC-SCNC: 7.9 MG/DL (ref 8.6–10.5)
CHLORIDE SERPL-SCNC: 107 MMOL/L (ref 98–107)
CO2 SERPL-SCNC: 27 MMOL/L (ref 22–29)
CREAT SERPL-MCNC: 0.68 MG/DL (ref 0.57–1)
DEPRECATED RDW RBC AUTO: 47.4 FL (ref 37–54)
EGFRCR SERPLBLD CKD-EPI 2021: 88.2 ML/MIN/1.73
EOSINOPHIL # BLD AUTO: 0.05 10*3/MM3 (ref 0–0.4)
EOSINOPHIL NFR BLD AUTO: 0.7 % (ref 0.3–6.2)
ERYTHROCYTE [DISTWIDTH] IN BLOOD BY AUTOMATED COUNT: 13.3 % (ref 12.3–15.4)
GLOBULIN UR ELPH-MCNC: 1.6 GM/DL
GLUCOSE SERPL-MCNC: 87 MG/DL (ref 65–99)
HCT VFR BLD AUTO: 32.3 % (ref 34–46.6)
HGB BLD-MCNC: 10.3 G/DL (ref 12–15.9)
LYMPHOCYTES # BLD AUTO: 1.25 10*3/MM3 (ref 0.7–3.1)
LYMPHOCYTES NFR BLD AUTO: 18.6 % (ref 19.6–45.3)
MCH RBC QN AUTO: 31.3 PG (ref 26.6–33)
MCHC RBC AUTO-ENTMCNC: 31.9 G/DL (ref 31.5–35.7)
MCV RBC AUTO: 98.2 FL (ref 79–97)
MONOCYTES # BLD AUTO: 0.48 10*3/MM3 (ref 0.1–0.9)
MONOCYTES NFR BLD AUTO: 7.1 % (ref 5–12)
NEUTROPHILS NFR BLD AUTO: 4.9 10*3/MM3 (ref 1.7–7)
NEUTROPHILS NFR BLD AUTO: 72.9 % (ref 42.7–76)
PLATELET # BLD AUTO: 125 10*3/MM3 (ref 140–450)
PMV BLD AUTO: 11.5 FL (ref 6–12)
POTASSIUM SERPL-SCNC: 3.7 MMOL/L (ref 3.5–5.2)
PROT SERPL-MCNC: 4.7 G/DL (ref 6–8.5)
RBC # BLD AUTO: 3.29 10*6/MM3 (ref 3.77–5.28)
SODIUM SERPL-SCNC: 142 MMOL/L (ref 136–145)
WBC NRBC COR # BLD: 6.73 10*3/MM3 (ref 3.4–10.8)

## 2023-05-31 PROCEDURE — 94761 N-INVAS EAR/PLS OXIMETRY MLT: CPT

## 2023-05-31 PROCEDURE — 94664 DEMO&/EVAL PT USE INHALER: CPT

## 2023-05-31 PROCEDURE — 85025 COMPLETE CBC W/AUTO DIFF WBC: CPT | Performed by: INTERNAL MEDICINE

## 2023-05-31 PROCEDURE — 80053 COMPREHEN METABOLIC PANEL: CPT | Performed by: INTERNAL MEDICINE

## 2023-05-31 PROCEDURE — 97110 THERAPEUTIC EXERCISES: CPT

## 2023-05-31 PROCEDURE — 94799 UNLISTED PULMONARY SVC/PX: CPT

## 2023-05-31 PROCEDURE — 97162 PT EVAL MOD COMPLEX 30 MIN: CPT

## 2023-05-31 PROCEDURE — 63710000001 PREDNISONE PER 1 MG: Performed by: NURSE PRACTITIONER

## 2023-05-31 PROCEDURE — 25010000002 ERTAPENEM PER 500 MG: Performed by: INTERNAL MEDICINE

## 2023-05-31 PROCEDURE — 25010000002 VANCOMYCIN 10 G RECONSTITUTED SOLUTION: Performed by: INTERNAL MEDICINE

## 2023-05-31 PROCEDURE — 94760 N-INVAS EAR/PLS OXIMETRY 1: CPT

## 2023-05-31 RX ORDER — LEVOTHYROXINE SODIUM 0.05 MG/1
50 TABLET ORAL DAILY
Status: CANCELLED | OUTPATIENT
Start: 2023-06-01

## 2023-05-31 RX ORDER — LEVOTHYROXINE SODIUM 0.05 MG/1
50 TABLET ORAL
Status: DISCONTINUED | OUTPATIENT
Start: 2023-06-01 | End: 2023-06-02 | Stop reason: HOSPADM

## 2023-05-31 RX ADMIN — LORAZEPAM 0.5 MG: 0.5 TABLET ORAL at 06:33

## 2023-05-31 RX ADMIN — HYDROCODONE BITARTRATE AND ACETAMINOPHEN 1 TABLET: 7.5; 325 TABLET ORAL at 06:33

## 2023-05-31 RX ADMIN — PREDNISONE 20 MG: 20 TABLET ORAL at 09:03

## 2023-05-31 RX ADMIN — ERTAPENEM SODIUM 1 G: 1 INJECTION, POWDER, LYOPHILIZED, FOR SOLUTION INTRAMUSCULAR; INTRAVENOUS at 09:00

## 2023-05-31 RX ADMIN — FLUOXETINE HYDROCHLORIDE 60 MG: 20 CAPSULE ORAL at 09:02

## 2023-05-31 RX ADMIN — QUETIAPINE FUMARATE 100 MG: 100 TABLET ORAL at 21:50

## 2023-05-31 RX ADMIN — IPRATROPIUM BROMIDE AND ALBUTEROL SULFATE 3 ML: 2.5; .5 SOLUTION RESPIRATORY (INHALATION) at 23:55

## 2023-05-31 RX ADMIN — APIXABAN 5 MG: 5 TABLET, FILM COATED ORAL at 21:46

## 2023-05-31 RX ADMIN — Medication 1 CAPSULE: at 09:02

## 2023-05-31 RX ADMIN — PANTOPRAZOLE SODIUM 40 MG: 40 TABLET, DELAYED RELEASE ORAL at 09:03

## 2023-05-31 RX ADMIN — ASPIRIN 81 MG: 81 TABLET, CHEWABLE ORAL at 09:02

## 2023-05-31 RX ADMIN — POTASSIUM CHLORIDE 10 MEQ: 750 TABLET, EXTENDED RELEASE ORAL at 09:02

## 2023-05-31 RX ADMIN — BUSPIRONE HYDROCHLORIDE 30 MG: 15 TABLET ORAL at 21:50

## 2023-05-31 RX ADMIN — Medication 10 ML: at 09:04

## 2023-05-31 RX ADMIN — LEVOTHYROXINE SODIUM 50 MCG: 0.05 TABLET ORAL at 09:03

## 2023-05-31 RX ADMIN — BUSPIRONE HYDROCHLORIDE 30 MG: 15 TABLET ORAL at 09:02

## 2023-05-31 RX ADMIN — AZITHROMYCIN DIHYDRATE 250 MG: 250 TABLET, FILM COATED ORAL at 09:02

## 2023-05-31 RX ADMIN — HYDROCODONE BITARTRATE AND ACETAMINOPHEN 1 TABLET: 7.5; 325 TABLET ORAL at 21:46

## 2023-05-31 RX ADMIN — POTASSIUM CHLORIDE 10 MEQ: 750 TABLET, EXTENDED RELEASE ORAL at 21:45

## 2023-05-31 RX ADMIN — APIXABAN 5 MG: 5 TABLET, FILM COATED ORAL at 09:03

## 2023-05-31 RX ADMIN — HYDROCODONE BITARTRATE AND ACETAMINOPHEN 1 TABLET: 7.5; 325 TABLET ORAL at 16:07

## 2023-05-31 RX ADMIN — IPRATROPIUM BROMIDE AND ALBUTEROL SULFATE 3 ML: 2.5; .5 SOLUTION RESPIRATORY (INHALATION) at 15:40

## 2023-05-31 RX ADMIN — VANCOMYCIN HYDROCHLORIDE 1500 MG: 10 INJECTION, POWDER, LYOPHILIZED, FOR SOLUTION INTRAVENOUS at 13:22

## 2023-05-31 RX ADMIN — Medication 10 ML: at 21:46

## 2023-05-31 RX ADMIN — MIRABEGRON 50 MG: 25 TABLET, FILM COATED, EXTENDED RELEASE ORAL at 09:02

## 2023-05-31 RX ADMIN — IPRATROPIUM BROMIDE AND ALBUTEROL SULFATE 3 ML: 2.5; .5 SOLUTION RESPIRATORY (INHALATION) at 07:19

## 2023-05-31 NOTE — CASE MANAGEMENT/SOCIAL WORK
Continued Stay Note  [facility]     Patient Name: Rose Cheema  MRN: [id number]  Today's Date: 5/31/2023    Admit Date: 5/26/2023    Plan: Rehab   Discharge Plan     Row Name 05/31/23 1148       Plan    Plan Rehab    Patient/Family in Agreement with Plan yes    Plan Comments Spoke with pts son/Mario over phone and discussed plan for rehab.  Mario in agreement and would want [facility] as first choices.  If no availability at [facility] would want [facility] in [place].  Referrals in [facility].  MARIA E Piedra RN               Discharge Codes    No documentation.               Expected Discharge Date and Time     Expected Discharge Date Expected Discharge Time    Jun 1, 2023             Kaitlyn Piedra, RN

## 2023-05-31 NOTE — DISCHARGE PLACEMENT REQUEST
"Rose Caldera (82 y.o. Female)     Date of Birth   1940    Social Security Number       Address   [address] [facility] [facility] [address] [place] [address]    Home Phone   [phone]    MRN   [id number]       [employer]ism   [employer]    Marital Status                               Admission Date   5/26/23    Admission Type   Emergency    Admitting Provider   Mason Logan MD    Attending Provider   Tin Ferrari MD    Department, Room/Bed   [address], [address]/1       Discharge Date       Discharge Disposition       Discharge Destination                               Attending Provider: Tin Ferrari MD    Allergies: Sulfa Antibiotics, Aspartame, Cephalexin    Isolation: Contact   Infection: ESBL E coli (05/29/23)   Code Status: No CPR    Ht: 160 cm (63\")   Wt: 127 kg (281 lb)    Admission Cmt: None   Principal Problem: COPD exacerbation [J44.1]                 Active Insurance as of 5/26/2023     Primary Coverage     Payor Plan Insurance Group Employer/Plan Group    MEDICARE MEDICARE A & B      Payor Plan Address Payor Plan Phone Number Payor Plan Fax Number Effective Dates    [address] [phone]  5/1/2008 - None Entered    [facility] [id number]       Subscriber Name Subscriber Birth Date Member ID       ROSE CALDERA 1940 [id number]           Secondary Coverage     Payor Plan Insurance Group Employer/Plan Group    [place] [id number]     Payor Plan Address Payor Plan Phone Number Payor Plan Fax Number Effective Dates    [address]   9/1/2004 - None Entered    [facility] [address]       Subscriber Name Subscriber Birth Date Member ID       ROSE CALDERA 1940 [id number]                 Emergency Contacts      (Rel.) Home Phone Work Phone Mobile Phone    Mario Caldera (Son) [phone] -- [phone]    Anette(inLaw)Olga (Daughter) [phone] -- [phone]          "

## 2023-05-31 NOTE — THERAPY EVALUATION
Patient Name: Rose Cheema  : 1940    MRN: [id number]                              Today's Date: 2023       Admit Date: 2023    Visit Dx:     ICD-10-CM ICD-9-CM   1. COPD exacerbation  J44.1 491.21   2. Urinary tract infection without hematuria, site unspecified  N39.0 599.0     Patient Active Problem List   Diagnosis   • Urinary incontinence   • Urinary frequency   • Generalized abdominal pain   • Pulmonary hypertension    • Benign essential hypertension   • Bilateral lower extremity edema (chronic)   • CAD (coronary artery disease), native coronary artery   • Chronic obstructive pulmonary disease with acute exacerbation   • Diastolic dysfunction   • Class 3 severe obesity with serious comorbidity and body mass index (BMI) of 50.0 to 59.9 in adult   • Obstructive sleep apnea   • Secondary pulmonary arterial hypertension   • H/O: hysterectomy   • Fibromyalgia   • Hx SBO   • Hx of cholecystectomy   • Hypoglycemia   • Fall   • Hypothyroidism   • Chronic pain syndrome   • Anemia   • Pneumonia of right lower lobe due to infectious organism   • Acute UTI   • Acute on chronic diastolic (congestive) heart failure   • Opioid dependence   • Venous stasis dermatitis of both lower extremities   • Gram-negative bacteremia   • Hypokalemia   • E. coli bacteremia   • Anemia of chronic disease   • Sepsis without acute organ dysfunction - present on admit   • CKD (chronic kidney disease) stage 3, GFR 30-59 ml/min   • Bacteremia   • Enterococcal septicemia   • Asymptomatic bacteriuria   • COPD exacerbation     Past Medical History:   Diagnosis Date   • Anemia    • Anxiety    • Arthritis    • CHF (congestive heart failure)    • Coronary artery disease    • Depression    • Disease of thyroid gland    • Fibromyalgia    • GERD (gastroesophageal reflux disease)    • Hypertension    • Moderate tricuspid valve stenosis    • Osteoporosis    • Peripheral neuropathy    • Pulmonary hypertension    • SBO (small bowel  obstruction)    • Stenosis of mitral valve      Past Surgical History:   Procedure Laterality Date   • BILATERAL OOPHORECTOMY     • CARDIAC CATHETERIZATION     • CHOLECYSTECTOMY     • ERCP N/A 2/15/2019    Procedure: ENDOSCOPIC RETROGRADE CHOLANGIOPANCREATOGRAPHY WITH PARTIAL CHOLANGIOGRAM;  Surgeon: Gerald Herr MD;  Location: [facility] ENDOSCOPY;  Service: Gastroenterology   • EXPLORATORY LAPAROTOMY     • GASTRIC BYPASS     • HERNIA REPAIR      inguinal and ventral   • HYSTERECTOMY     • OVARIAN CYST REMOVAL        General Information     Row Name 05/31/23 1415          Physical Therapy Time and Intention    Document Type evaluation  -PC     Mode of Treatment physical therapy  -PC     Row Name 05/31/23 1415          General Information    Patient Profile Reviewed yes  -PC     Existing Precautions/Restrictions fall  -PC     Barriers to Rehab medically complex  -PC     Row Name 05/31/23 1415          Living Environment    People in Home alone  -PC     Row Name 05/31/23 1415          Home Main Entrance    Number of Stairs, Main Entrance none  -PC     Row Name 05/31/23 1415          Stairs Within Home, Primary    Stairs, Within Home, Primary independent living in an assisted living facility, meals brought to her room  -PC     Number of Stairs, Within Home, Primary none  -PC     Row Name 05/31/23 1415          Cognition    Orientation Status (Cognition) oriented x 3  -PC     Row Name 05/31/23 1415          Safety Issues, Functional Mobility    Impairments Affecting Function (Mobility) strength;endurance/activity tolerance;balance  -PC           User Key  (r) = Recorded By, (t) = Taken By, (c) = Cosigned By    Initials Name Provider Type    PC Yadi Woodward, PT Physical Therapist               Mobility     Row Name 05/31/23 1416          Bed Mobility    Bed Mobility supine-sit;sit-supine  -PC     Supine-Sit [place] (Bed Mobility) moderate assist (50% patient effort);2 person assist  -PC     Sit-Supine  [place] (Bed Mobility) maximum assist (25% patient effort);2 person assist  -PC     Row Name 05/31/23 1416          Sit-Stand Transfer    Sit-Stand [place] (Transfers) maximum assist (25% patient effort);2 person assist  -PC     Assistive Device (Sit-Stand Transfers) walker, front-wheeled  -PC     Comment, (Sit-Stand Transfer) sit to stand x 2 for approx 10 sec, working on upright posture and balance  -PC           User Key  (r) = Recorded By, (t) = Taken By, (c) = Cosigned By    Initials Name Provider Type    PC Yadi Woodward, PT Physical Therapist               Obj/Interventions     Row Name 05/31/23 1417          Range of Motion Comprehensive    General Range of Motion bilateral lower extremity ROM WFL  -PC     Row Name 05/31/23 1417          Strength Comprehensive (MMT)    Comment, General Manual Muscle Testing (MMT) Assessment B LE 3+/5  -PC     Row Name 05/31/23 1417          Motor Skills    Motor Skills functional endurance  -PC     Functional Endurance poor  -PC     Row Name 05/31/23 1417          Balance    Balance Assessment sitting static balance;sitting dynamic balance;standing static balance  -PC     Static Sitting Balance supervision  -PC     Dynamic Sitting Balance supervision  -PC     Position, Sitting Balance sitting edge of bed  -PC     Static Standing Balance moderate assist;maximum assist;2-person assist  -PC     Position/Device Used, Standing Balance walker, rolling  -PC           User Key  (r) = Recorded By, (t) = Taken By, (c) = Cosigned By    Initials Name Provider Type    PC Yadi Woodward PT Physical Therapist               Goals/Plan     Row Name 05/31/23 1426          Bed Mobility Goal 1 (PT)    Activity/Assistive Device (Bed Mobility Goal 1, PT) sit to supine/supine to sit  -     [place] Level/Cues Needed (Bed Mobility Goal 1, PT) minimum assist (75% or more patient effort)  -     Time Frame (Bed Mobility Goal 1, PT) 1 week  -     Row Name 05/31/23 1423           Transfer Goal 1 (PT)    Activity/Assistive Device (Transfer Goal 1, PT) sit-to-stand/stand-to-sit;bed-to-chair/chair-to-bed  -PC     [place] Level/Cues Needed (Transfer Goal 1, PT) moderate assist (50-74% patient effort)  -PC     Time Frame (Transfer Goal 1, PT) 1 week  -PC     Row Name 05/31/23 1426          Gait Training Goal 1 (PT)    Activity/Assistive Device (Gait Training Goal 1, PT) assistive device use;gait (walking locomotion)  -PC     [place] Level (Gait Training Goal 1, PT) moderate assist (50-74% patient effort)  -PC     Distance (Gait Training Goal 1, PT) 15 ft  -PC     Time Frame (Gait Training Goal 1, PT) 1 week  -PC     Row Name 05/31/23 1426          Therapy Assessment/Plan (PT)    Planned Therapy Interventions (PT) bed mobility training;gait training;transfer training;strengthening;balance training  -PC           User Key  (r) = Recorded By, (t) = Taken By, (c) = Cosigned By    Initials Name Provider Type    PC Yadi Woodward, PT Physical Therapist               Clinical Impression     Row Name 05/31/23 1418          Pain    Pre/Posttreatment Pain Comment pt c/o general soreness/aches and pains all over  -PC     Pain Intervention(s) Repositioned  -PC     Row Name 05/31/23 1414          Plan of Care Review    Plan of Care Reviewed With patient  -PC     Outcome Evaluation Pt adm with UTI, COPD exacerbation, confusion from an independent living apartment in an assisted living facility where she is independent with mobility with a rolling walker, has assist for bathing once a week. Pt presents with weakness and impaired functional mobility, pt is obese which complicates her ability to mobilize. Pt is very weak and required mod A x 2 to come to sit edge of bed, and max a x 2 to stand at bedside, pt made two attempts to stand, she stood for approx 10 sec each time. Pt would benefit from SNF at discharge, will follow while in acute care and progress with activity as tolerated  -PC     Row Name  05/31/23 1419          Therapy Assessment/Plan (PT)    Rehab Potential (PT) fair, will monitor progress closely  -PC     Criteria for Skilled Interventions Met (PT) yes;meets criteria  -PC     Therapy Frequency (PT) 5 times/wk  -PC     Row Name 05/31/23 1419          Positioning and Restraints    Pre-Treatment Position in bed  -PC     Post Treatment Position bed  -PC     In Bed supine;call light within reach;encouraged to call for assist;with nsg  nursing assistant cleaning pt up, changing brief, NA agreed to set bed alarm  -PC           User Key  (r) = Recorded By, (t) = Taken By, (c) = Cosigned By    Initials Name Provider Type    PC Yadi Woodward, PT Physical Therapist               Outcome Measures     Row Name 05/31/23 1427          How much help from another person do you currently need...    Turning from your back to your side while in flat bed without using bedrails? 2  -PC     Moving from lying on back to sitting on the side of a flat bed without bedrails? 2  -PC     Moving to and from a bed to a chair (including a wheelchair)? 1  -PC     Standing up from a chair using your arms (e.g., wheelchair, bedside chair)? 2  -PC     Climbing 3-5 steps with a railing? 1  -PC     To walk in hospital room? 1  -PC     AM-PAC 6 Clicks Score (PT) 9  -PC     Highest level of mobility 3 --> Sat at edge of bed  -PC     Row Name 05/31/23 1427          Functional Assessment    Outcome Measure Options AM-PAC 6 Clicks Basic Mobility (PT)  -PC           User Key  (r) = Recorded By, (t) = Taken By, (c) = Cosigned By    Initials Name Provider Type    PC Yadi Woodward PT Physical Therapist                             Physical Therapy Education     Title: PT OT SLP Therapies (In Progress)     Topic: Physical Therapy (In Progress)     Point: Mobility training (In Progress)     Learning Progress Summary           Patient Acceptance, E,D, NR by PC at 5/31/2023 1427                   Point: Home exercise program (In Progress)      Learning Progress Summary           Patient Acceptance, E,D, NR by PC at 5/31/2023 1427                   Point: Body mechanics (In Progress)     Learning Progress Summary           Patient Acceptance, E,D, NR by PC at 5/31/2023 1427                   Point: Precautions (In Progress)     Learning Progress Summary           Patient Acceptance, E,D, NR by PC at 5/31/2023 1427                               User Key     Initials Effective Dates Name Provider Type [facility] 06/16/21 -  Yadi Woodward PT Physical Therapist PT              PT Recommendation and Plan  Planned Therapy Interventions (PT): bed mobility training, gait training, transfer training, strengthening, balance training  Plan of Care Reviewed With: patient  Outcome Evaluation: Pt adm with UTI, COPD exacerbation, confusion from an independent living apartment in an assisted living facility where she is independent with mobility with a rolling walker, has assist for bathing once a week. Pt presents with weakness and impaired functional mobility, pt is obese which complicates her ability to mobilize. Pt is very weak and required mod A x 2 to come to sit edge of bed, and max a x 2 to stand at bedside, pt made two attempts to stand, she stood for approx 10 sec each time. Pt would benefit from SNF at discharge, will follow while in acute care and progress with activity as tolerated     Time Calculation:    PT Charges     Row Name 05/31/23 1428             Time Calculation    Start Time 1332  -PC      Stop Time 1400  -PC      Time Calculation (min) 28 min  -PC      PT Received On 05/31/23  -PC      PT - Next Appointment 06/01/23  -      PT Goal Re-Cert Due Date 06/07/23  -            User Key  (r) = Recorded By, (t) = Taken By, (c) = Cosigned By    Initials Name Provider Type    PC Yadi Woodward PT Physical Therapist              Therapy Charges for Today     Code Description Service Date Service Provider Modifiers Qty    86623414850  PT EVAL  MOD COMPLEXITY 3 5/31/2023 Yadi Woodward, PT GP 1    37573753346 HC PT THER PROC EA 15 MIN 5/31/2023 Yadi Woodward, PT GP 1    88720185510 HC PT THER SUPP EA 15 MIN 5/31/2023 Yadi Woodward, PT GP 1          PT G-Codes  Outcome Measure Options: AM-PAC 6 Clicks Basic Mobility (PT)  AM-PAC 6 Clicks Score (PT): 9  AM-PAC 6 Clicks Score (OT): 14  PT Discharge Summary  Anticipated Discharge Disposition (PT): skilled nursing facility    Yadi Woodward, PT  5/31/2023

## 2023-05-31 NOTE — PLAN OF CARE
Goal Outcome Evaluation:  Plan of Care Reviewed With: patient           Outcome Evaluation: Pt adm with UTI, COPD exacerbation, confusion from an independent living apartment in an assisted living facility where she is independent with mobility with a rolling walker, has assist for bathing once a week. Pt presents with weakness and impaired functional mobility, pt is obese which complicates her ability to mobilize. Pt is very weak and required mod A x 2 to come to sit edge of bed, and max a x 2 to stand at bedside, pt made two attempts to stand, she stood for approx 10 sec each time. Pt would benefit from SNF at discharge, will follow while in acute care and progress with activity as tolerated

## 2023-05-31 NOTE — PROGRESS NOTES
Name: Rose Cheema ADMIT: 2023   : 1940  PCP: Cheng Guevara MD    MRN: [id number] LOS: 4 days   AGE/SEX: 82 y.o. female  ROOM: [facility]     Subjective   Subjective   Patient is seen at bedside, no new complaints.       Objective   Objective   Vital Signs  Temp:  [97.6 °F (36.4 °C)-98.3 °F (36.8 °C)] 98 °F (36.7 °C)  Heart Rate:  [57-90] 75  Resp:  [16-18] 18  BP: (124-150)/(64-78) 124/64  SpO2:  [92 %-100 %] 98 %  on  Flow (L/min):  [1] 1;   Device (Oxygen Therapy): room air  Body mass index is 49.78 kg/m².  Physical Exam     General, awake and alert.  Appears sick on chronic basis  Head and ENT, normocephalic and atraumatic.  Lungs, symmetric expansion, equal air entry bilaterally.  Heart, regular rate and rhythm.  Abdomen, soft and nontender.  Extremities, no clubbing or cyanosis.  Bilateral lower extremity skin changes, consistent with chronic venous stasis.  Neuro, no focal deficits.  Skin: Warm and no rash.  Psych, appears anxious.  Musculoskeletal, joint examination is grossly normal.        Copied text material from yesterday's note has been reviewed for appropriate changes and remains accurate as of 23.         Results Review     I reviewed the patient's new clinical results.  Results from last 7 days   Lab Units 23  0441   WBC 10*3/mm3 6.73 6.58 5.90 7.20   HEMOGLOBIN g/dL 10.3* 10.6* 10.2* 10.3*   PLATELETS 10*3/mm3 125* 132* 107* 120*     Results from last 7 days   Lab Units 23  0441   SODIUM mmol/L 142 142 138 140   POTASSIUM mmol/L 3.7 4.0 4.3 3.9   CHLORIDE mmol/L 107 106 107 106   CO2 mmol/L 27.0 28.0 21.8* 24.2   BUN mg/dL 9 8 8 7*   CREATININE mg/dL 0.68 0.76 0.77 0.84   GLUCOSE mg/dL 87 84 83 95   EGFR mL/min/1.73 88.2 79.3 78.1 70.4     Results from last 7 days   Lab Units 23  0530 23  0341 23  0417 23  0125   ALBUMIN g/dL 3.1* 3.1* 2.8* 2.8*    BILIRUBIN mg/dL 0.2 0.2 0.3 0.4   ALK PHOS U/L 78 85 86 96   AST (SGOT) U/L 10 11 16 12   ALT (SGPT) U/L 7 7 11 11     Results from last 7 days   Lab Units 05/31/23  0530 05/30/23  0341 05/29/23  0417 05/28/23  0441 05/27/23  0521 05/27/23  0125   CALCIUM mg/dL 7.9* 8.3* 8.5* 8.9 8.7 8.9   ALBUMIN g/dL 3.1* 3.1* 2.8*  --   --  2.8*   MAGNESIUM mg/dL  --   --   --   --  2.2 2.2     Results from last 7 days   Lab Units 05/27/23  0156 05/27/23  0125 05/26/23  1401   PROCALCITONIN ng/mL  --  0.03 <0.05   LACTATE mmol/L 1.1  --   --      No results found for: HGBA1C, POCGLU    No radiology results for the last day  I have personally reviewed all medications:  Scheduled Medications  apixaban, 5 mg, Oral, Q12H  aspirin, 81 mg, Oral, Daily  azithromycin, 250 mg, Oral, Q24H  busPIRone, 30 mg, Oral, BID  ertapenem, 1 g, Intravenous, Q24H  FLUoxetine, 60 mg, Oral, Daily  ipratropium-albuterol, 3 mL, Nebulization, Q8H - RT  lactobacillus acidophilus, 1 capsule, Oral, Daily  levothyroxine, 50 mcg, Oral, Daily  Mirabegron ER, 50 mg, Oral, Daily  pantoprazole, 40 mg, Oral, Daily  potassium chloride, 10 mEq, Oral, BID  QUEtiapine, 100 mg, Oral, Nightly  senna-docusate sodium, 2 tablet, Oral, BID  sodium chloride, 10 mL, Intravenous, Q12H  torsemide, 100 mg, Oral, See Admin Instructions  vancomycin, 1,500 mg, Intravenous, Q24H    Infusions  Pharmacy to dose vancomycin,     Diet  Diet: Cardiac Diets; Healthy Heart (2-3 Na+); Texture: Regular Texture (IDDSI 7); Fluid Consistency: Thin (IDDSI 0)    I have personally reviewed:  [x]  Laboratory   [x]  Microbiology   [x]  Radiology   [x]  EKG/Telemetry  [x]  Cardiology/Vascular   []  Pathology    []  Records       Assessment/Plan     Active Hospital Problems    Diagnosis  POA   • **COPD exacerbation [J44.1]  Yes   • CKD (chronic kidney disease) stage 3, GFR 30-59 ml/min [N18.30]  Yes   • Hypothyroidism [E03.9]  Yes   • Chronic pain syndrome [G89.4]  Yes   • CAD (coronary artery  disease), native coronary artery [I25.10]  Yes   • Diastolic dysfunction [I51.89]  Yes   • Benign essential hypertension [I10]  Yes   • Obstructive sleep apnea [G47.33]  Yes   • Chronic obstructive pulmonary disease with acute exacerbation [J44.1]  Yes      Resolved Hospital Problems   No resolved problems to display.       82 y.o. female admitted with COPD exacerbation.      Assessment and plan:  1.  Urinary tract infection, bacteremia, patient is currently on Invanz which will be continued.  ID on board.  Urine culture positive for ESBL positive E. coli.  Follow infectious disease recommendations going forward.     2.  COPD exacerbation, continue breathing treatments.     3.  Chronic diastolic CHF, patient already on torsemide which will be continued.     4.  Hypothyroidism, continue Synthroid.     5.  Depression/anxiety, resume home medications, psychiatry on board, follow recommendations.    6.  Morbid obesity, complicates all aspects of care, patient would benefit from weight loss.     7.  CODE STATUS is full code.  Further plans based on hospital course.      Tin Ferrari MD  [facility]  05/31/23  10:34 EDT

## 2023-05-31 NOTE — PLAN OF CARE
Goal Outcome Evaluation:  VSS. Pain controlled with PO meds. Turn N1eksfc. Continue IV antibiotics. Ativan PO given x1 for anxiety.  Son at bedside and assist with care. Will continue to monitor.

## 2023-05-31 NOTE — PROGRESS NOTES
The patient is provided and reports reduced anxiety.  At one point during interview she smiles broadly.  She looks greatly improved.

## 2023-05-31 NOTE — PLAN OF CARE
Goal Outcome Evaluation:  Plan of Care Reviewed With: patient        Progress: improving   Vitals stable. Alert and oriented x 3. Forgetful at times. Turned and repositioned every 2 hours. Patient obese, takes 2 staff to turn in bed. PT / OT working with patient to improve mobility. Lives in assisted living facility but will need rehab after discharge from hospital. Tolerating regular diet fair. External catheter patent to wall suction. Briefs on. Bed alarm on. Falls protocol in place. On room air. A fib cardiac rhythm noted. IV vancomycin, IV Invanz given as scheduled. Possible discharge in a few days. Will continue to monitor.

## 2023-05-31 NOTE — NURSING NOTE
Access follow up regarding anxiety. Chart reviewed. Pt progressing towards goal, reports improved sleep. Ativan overnight for anxiety. Will follow.

## 2023-06-01 PROBLEM — I48.0 PAF (PAROXYSMAL ATRIAL FIBRILLATION): Status: ACTIVE | Noted: 2023-06-01

## 2023-06-01 PROBLEM — R78.81 BACTEREMIA: Status: ACTIVE | Noted: 2023-06-01

## 2023-06-01 PROBLEM — J44.1 COPD EXACERBATION: Status: RESOLVED | Noted: 2023-05-27 | Resolved: 2023-06-01

## 2023-06-01 LAB
ALBUMIN SERPL-MCNC: 2.8 G/DL (ref 3.5–5.2)
ALBUMIN/GLOB SERPL: 1.5 G/DL
ALP SERPL-CCNC: 76 U/L (ref 39–117)
ALT SERPL W P-5'-P-CCNC: 6 U/L (ref 1–33)
ANION GAP SERPL CALCULATED.3IONS-SCNC: 9.8 MMOL/L (ref 5–15)
AST SERPL-CCNC: 10 U/L (ref 1–32)
BACTERIA SPEC AEROBE CULT: NORMAL
BASOPHILS # BLD AUTO: 0.04 10*3/MM3 (ref 0–0.2)
BASOPHILS NFR BLD AUTO: 0.6 % (ref 0–1.5)
BILIRUB SERPL-MCNC: 0.3 MG/DL (ref 0–1.2)
BUN SERPL-MCNC: 10 MG/DL (ref 8–23)
BUN/CREAT SERPL: 15.6 (ref 7–25)
CALCIUM SPEC-SCNC: 8.1 MG/DL (ref 8.6–10.5)
CHLORIDE SERPL-SCNC: 106 MMOL/L (ref 98–107)
CO2 SERPL-SCNC: 26.2 MMOL/L (ref 22–29)
CREAT SERPL-MCNC: 0.64 MG/DL (ref 0.57–1)
DEPRECATED RDW RBC AUTO: 46.1 FL (ref 37–54)
EGFRCR SERPLBLD CKD-EPI 2021: 89.5 ML/MIN/1.73
EOSINOPHIL # BLD AUTO: 0.09 10*3/MM3 (ref 0–0.4)
EOSINOPHIL NFR BLD AUTO: 1.3 % (ref 0.3–6.2)
ERYTHROCYTE [DISTWIDTH] IN BLOOD BY AUTOMATED COUNT: 12.9 % (ref 12.3–15.4)
GLOBULIN UR ELPH-MCNC: 1.9 GM/DL
GLUCOSE SERPL-MCNC: 88 MG/DL (ref 65–99)
HCT VFR BLD AUTO: 32.2 % (ref 34–46.6)
HGB BLD-MCNC: 10.6 G/DL (ref 12–15.9)
IMM GRANULOCYTES # BLD AUTO: 0.03 10*3/MM3 (ref 0–0.05)
IMM GRANULOCYTES NFR BLD AUTO: 0.4 % (ref 0–0.5)
LYMPHOCYTES # BLD AUTO: 1.49 10*3/MM3 (ref 0.7–3.1)
LYMPHOCYTES NFR BLD AUTO: 20.8 % (ref 19.6–45.3)
MCH RBC QN AUTO: 31.6 PG (ref 26.6–33)
MCHC RBC AUTO-ENTMCNC: 32.9 G/DL (ref 31.5–35.7)
MCV RBC AUTO: 96.1 FL (ref 79–97)
MONOCYTES # BLD AUTO: 0.53 10*3/MM3 (ref 0.1–0.9)
MONOCYTES NFR BLD AUTO: 7.4 % (ref 5–12)
NEUTROPHILS NFR BLD AUTO: 4.97 10*3/MM3 (ref 1.7–7)
NEUTROPHILS NFR BLD AUTO: 69.5 % (ref 42.7–76)
NRBC BLD AUTO-RTO: 0 /100 WBC (ref 0–0.2)
PLATELET # BLD AUTO: 124 10*3/MM3 (ref 140–450)
PMV BLD AUTO: 11.4 FL (ref 6–12)
POTASSIUM SERPL-SCNC: 3.4 MMOL/L (ref 3.5–5.2)
PROT SERPL-MCNC: 4.7 G/DL (ref 6–8.5)
RBC # BLD AUTO: 3.35 10*6/MM3 (ref 3.77–5.28)
SODIUM SERPL-SCNC: 142 MMOL/L (ref 136–145)
VANCOMYCIN TROUGH SERPL-MCNC: 12.2 MCG/ML (ref 5–20)
WBC NRBC COR # BLD: 7.15 10*3/MM3 (ref 3.4–10.8)

## 2023-06-01 PROCEDURE — 25010000002 VANCOMYCIN 10 G RECONSTITUTED SOLUTION: Performed by: INTERNAL MEDICINE

## 2023-06-01 PROCEDURE — 80202 ASSAY OF VANCOMYCIN: CPT | Performed by: INTERNAL MEDICINE

## 2023-06-01 PROCEDURE — 94799 UNLISTED PULMONARY SVC/PX: CPT

## 2023-06-01 PROCEDURE — 94664 DEMO&/EVAL PT USE INHALER: CPT

## 2023-06-01 PROCEDURE — 80053 COMPREHEN METABOLIC PANEL: CPT | Performed by: INTERNAL MEDICINE

## 2023-06-01 PROCEDURE — 25010000002 ERTAPENEM PER 500 MG: Performed by: INTERNAL MEDICINE

## 2023-06-01 PROCEDURE — 85025 COMPLETE CBC W/AUTO DIFF WBC: CPT | Performed by: INTERNAL MEDICINE

## 2023-06-01 PROCEDURE — 94761 N-INVAS EAR/PLS OXIMETRY MLT: CPT

## 2023-06-01 RX ADMIN — POTASSIUM CHLORIDE 10 MEQ: 750 TABLET, EXTENDED RELEASE ORAL at 20:25

## 2023-06-01 RX ADMIN — DOCUSATE SODIUM 50MG AND SENNOSIDES 8.6MG 2 TABLET: 8.6; 5 TABLET, FILM COATED ORAL at 09:30

## 2023-06-01 RX ADMIN — Medication 1 CAPSULE: at 09:30

## 2023-06-01 RX ADMIN — IPRATROPIUM BROMIDE AND ALBUTEROL SULFATE 3 ML: 2.5; .5 SOLUTION RESPIRATORY (INHALATION) at 07:40

## 2023-06-01 RX ADMIN — HYDROCODONE BITARTRATE AND ACETAMINOPHEN 1 TABLET: 7.5; 325 TABLET ORAL at 06:19

## 2023-06-01 RX ADMIN — PANTOPRAZOLE SODIUM 40 MG: 40 TABLET, DELAYED RELEASE ORAL at 09:30

## 2023-06-01 RX ADMIN — Medication 10 ML: at 20:29

## 2023-06-01 RX ADMIN — ERTAPENEM SODIUM 1 G: 1 INJECTION, POWDER, LYOPHILIZED, FOR SOLUTION INTRAMUSCULAR; INTRAVENOUS at 09:30

## 2023-06-01 RX ADMIN — IPRATROPIUM BROMIDE AND ALBUTEROL SULFATE 3 ML: 2.5; .5 SOLUTION RESPIRATORY (INHALATION) at 23:22

## 2023-06-01 RX ADMIN — APIXABAN 5 MG: 5 TABLET, FILM COATED ORAL at 09:30

## 2023-06-01 RX ADMIN — ASPIRIN 81 MG: 81 TABLET, CHEWABLE ORAL at 09:30

## 2023-06-01 RX ADMIN — AZITHROMYCIN DIHYDRATE 250 MG: 250 TABLET, FILM COATED ORAL at 09:30

## 2023-06-01 RX ADMIN — APIXABAN 5 MG: 5 TABLET, FILM COATED ORAL at 20:25

## 2023-06-01 RX ADMIN — Medication 10 ML: at 09:31

## 2023-06-01 RX ADMIN — LEVOTHYROXINE SODIUM 50 MCG: 0.05 TABLET ORAL at 06:19

## 2023-06-01 RX ADMIN — BUSPIRONE HYDROCHLORIDE 30 MG: 15 TABLET ORAL at 09:30

## 2023-06-01 RX ADMIN — POTASSIUM CHLORIDE 10 MEQ: 750 TABLET, EXTENDED RELEASE ORAL at 09:30

## 2023-06-01 RX ADMIN — MIRABEGRON 50 MG: 25 TABLET, FILM COATED, EXTENDED RELEASE ORAL at 09:30

## 2023-06-01 RX ADMIN — QUETIAPINE FUMARATE 100 MG: 100 TABLET ORAL at 20:24

## 2023-06-01 RX ADMIN — VANCOMYCIN HYDROCHLORIDE 1500 MG: 10 INJECTION, POWDER, LYOPHILIZED, FOR SOLUTION INTRAVENOUS at 11:00

## 2023-06-01 RX ADMIN — FLUOXETINE HYDROCHLORIDE 60 MG: 20 CAPSULE ORAL at 09:30

## 2023-06-01 RX ADMIN — HYDROCODONE BITARTRATE AND ACETAMINOPHEN 1 TABLET: 7.5; 325 TABLET ORAL at 12:41

## 2023-06-01 RX ADMIN — BUSPIRONE HYDROCHLORIDE 30 MG: 15 TABLET ORAL at 20:24

## 2023-06-01 RX ADMIN — HYDROCODONE BITARTRATE AND ACETAMINOPHEN 1 TABLET: 7.5; 325 TABLET ORAL at 18:57

## 2023-06-01 RX ADMIN — IPRATROPIUM BROMIDE AND ALBUTEROL SULFATE 3 ML: 2.5; .5 SOLUTION RESPIRATORY (INHALATION) at 15:16

## 2023-06-01 NOTE — PROGRESS NOTES
Name: Rose Cheema ADMIT: 2023   : 1940  PCP: Cheng Guevara MD    MRN: [id number] LOS: 5 days   AGE/SEX: 82 y.o. female  ROOM: [facility]     Subjective   Subjective   Says she feels a lot better today compared to yesterday.  Having some loose stools.    Objective   Objective   Vital Signs  Temp:  [97.6 °F (36.4 °C)-98 °F (36.7 °C)] 97.8 °F (36.6 °C)  Heart Rate:  [63-73] 66  Resp:  [16-18] 16  BP: (140-150)/(74-96) 141/89  SpO2:  [91 %-94 %] 91 %  on   ;   Device (Oxygen Therapy): room air  Body mass index is 49.78 kg/m².  Physical Exam  Constitutional:       General: She is not in acute distress.     Appearance: She is ill-appearing.   Cardiovascular:      Rate and Rhythm: Normal rate.      Pulses: Normal pulses.      Heart sounds: No murmur heard.  Pulmonary:      Effort: Pulmonary effort is normal. No respiratory distress.      Breath sounds: Normal breath sounds.   Abdominal:      General: Abdomen is flat. There is no distension.   Musculoskeletal:      Right lower leg: Edema present.      Left lower leg: Edema present.      Comments: Bilateral venous stasis   Neurological:      Mental Status: She is alert.         Results Review     I reviewed the patient's new clinical results.  Results from last 7 days   Lab Units 23  06237   WBC 10*3/mm3 7.15 6.73 6.58 5.90   HEMOGLOBIN g/dL 10.6* 10.3* 10.6* 10.2*   PLATELETS 10*3/mm3 124* 125* 132* 107*     Results from last 7 days   Lab Units 23  0607 23  0530 23  03423  0417   SODIUM mmol/L 142 142 142 138   POTASSIUM mmol/L 3.4* 3.7 4.0 4.3   CHLORIDE mmol/L 106 107 106 107   CO2 mmol/L 26.2 27.0 28.0 21.8*   BUN mg/dL 10 9 8 8   CREATININE mg/dL 0.64 0.68 0.76 0.77   GLUCOSE mg/dL 88 87 84 83   EGFR mL/min/1.73 89.5 88.2 79.3 78.1     Results from last 7 days   Lab Units 23  0607 23  0530 23  0341 23  0417   ALBUMIN g/dL 2.8* 3.1* 3.1* 2.8*   BILIRUBIN  mg/dL 0.3 0.2 0.2 0.3   ALK PHOS U/L 76 78 85 86   AST (SGOT) U/L 10 10 11 16   ALT (SGPT) U/L 6 7 7 11     Results from last 7 days   Lab Units 06/01/23  0607 05/31/23  0530 05/30/23  0341 05/29/23  0417 05/28/23  0441 05/27/23  0521 05/27/23  0125   CALCIUM mg/dL 8.1* 7.9* 8.3* 8.5*   < > 8.7 8.9   ALBUMIN g/dL 2.8* 3.1* 3.1* 2.8*  --   --  2.8*   MAGNESIUM mg/dL  --   --   --   --   --  2.2 2.2    < > = values in this interval not displayed.     Results from last 7 days   Lab Units 05/27/23  0156 05/27/23  0125 05/26/23  1401   PROCALCITONIN ng/mL  --  0.03 <0.05   LACTATE mmol/L 1.1  --   --      No results found for: HGBA1C, POCGLU    No radiology results for the last day  Scheduled Medications  apixaban, 5 mg, Oral, Q12H  aspirin, 81 mg, Oral, Daily  busPIRone, 30 mg, Oral, BID  ertapenem, 1 g, Intravenous, Q24H  FLUoxetine, 60 mg, Oral, Daily  ipratropium-albuterol, 3 mL, Nebulization, Q8H - RT  lactobacillus acidophilus, 1 capsule, Oral, Daily  levothyroxine, 50 mcg, Oral, QAM AC  Mirabegron ER, 50 mg, Oral, Daily  pantoprazole, 40 mg, Oral, Daily  potassium chloride, 10 mEq, Oral, BID  QUEtiapine, 100 mg, Oral, Nightly  senna-docusate sodium, 2 tablet, Oral, BID  sodium chloride, 10 mL, Intravenous, Q12H  torsemide, 100 mg, Oral, See Admin Instructions  vancomycin, 1,500 mg, Intravenous, Q24H    Infusions  Pharmacy to dose vancomycin,     Diet  Diet: Cardiac Diets; Healthy Heart (2-3 Na+); Texture: Regular Texture (IDDSI 7); Fluid Consistency: Thin (IDDSI 0)       Assessment/Plan     Active Hospital Problems    Diagnosis  POA   • **Bacteremia [R78.81]  Unknown   • PAF (paroxysmal atrial fibrillation) [I48.0]  Unknown   • CKD (chronic kidney disease) stage 3, GFR 30-59 ml/min [N18.30]  Yes   • Acute UTI (urinary tract infection) [N39.0]  Unknown   • Hypothyroidism [E03.9]  Yes   • Chronic pain syndrome [G89.4]  Yes   • CAD (coronary artery disease), native coronary artery [I25.10]  Yes   • Diastolic  dysfunction [I51.89]  Yes   • Benign essential hypertension [I10]  Yes   • Obstructive sleep apnea [G47.33]  Yes   • Chronic obstructive pulmonary disease with acute exacerbation [J44.1]  Yes      Resolved Hospital Problems    Diagnosis Date Resolved POA   • COPD exacerbation [J44.1] 06/01/2023 Yes       82 y.o. female admitted with Bacteremia.      06/01/23  Continue ABX.    ESBL E coli UTI  Aerococcus viridans bacteremia  -blood cx level (5/27/23): Aerococcus viridans  -urine cx (5/27/23): >100k ESBL E coli  -repeat NGTD  -TTE negative for vegetation  -ID following  -Continue Invanz 5-7d, vancomycin planned for 2wks (monitor trough)    A-fib  CAD  -RC: none  -AC: apixaban  -Continue ASA    Hypothyroid  -Synthroid 50 mcg    HFpEF  -Torsemide 100 mg    Depression  Anxiety  -BuSpar 30 mg twice daily, Prozac 60 mg, Seroquel 100 mg nightly    Hypokalemia, replete      · Eliquis (home med) for DVT prophylaxis.  · Discussed with patient.  · Anticipate discharge to SNF once arrangements have been made      Shakir Bruner MD  [facility]  06/01/23  11:26 EDT

## 2023-06-01 NOTE — CASE MANAGEMENT/SOCIAL WORK
Continued Stay Note  [facility]     Patient Name: Rose Cheema  MRN: [id number]  Today's Date: 6/1/2023    Admit Date: 5/26/2023    Plan: [facility]   Discharge Plan     Row Name 06/01/23 1159       Plan    Plan [facility]    Patient/Family in Agreement with Plan yes    Plan Comments Received call from [facility] who states pt accepted and they will have bed available tomorrow.  Pharmacy updated and packet in CCP office. MARIA E Piedra RN               Discharge Codes    No documentation.               Expected Discharge Date and Time     Expected Discharge Date Expected Discharge Time    Jun 1, 2023             Kaitlyn Piedra, RN

## 2023-06-01 NOTE — PROGRESS NOTES
"  Infectious Diseases Progress Note    Osvaldo Ponce MD     [facility]  Los: 5 days  Patient Identification:  Name: Rose Cheema  Age: 82 y.o.  Sex: female  :  1940  MRN: [id number]         Primary Care Physician: Cheng Guevara MD        Subjective: Feeling much better denies any fever and chills.  More awake and interactive..  Interval History: See consultation note.    Objective:    Scheduled Meds:apixaban, 5 mg, Oral, Q12H  aspirin, 81 mg, Oral, Daily  azithromycin, 250 mg, Oral, Q24H  busPIRone, 30 mg, Oral, BID  ertapenem, 1 g, Intravenous, Q24H  FLUoxetine, 60 mg, Oral, Daily  ipratropium-albuterol, 3 mL, Nebulization, Q8H - RT  lactobacillus acidophilus, 1 capsule, Oral, Daily  levothyroxine, 50 mcg, Oral, QAM AC  Mirabegron ER, 50 mg, Oral, Daily  pantoprazole, 40 mg, Oral, Daily  potassium chloride, 10 mEq, Oral, BID  QUEtiapine, 100 mg, Oral, Nightly  senna-docusate sodium, 2 tablet, Oral, BID  sodium chloride, 10 mL, Intravenous, Q12H  torsemide, 100 mg, Oral, See Admin Instructions  vancomycin, 1,500 mg, Intravenous, Q24H      Continuous Infusions:Pharmacy to dose vancomycin,         Vital signs in last 24 hours:  Temp:  [97.6 °F (36.4 °C)-98 °F (36.7 °C)] 97.8 °F (36.6 °C)  Heart Rate:  [63-75] 66  Resp:  [16-18] 16  BP: (124-150)/(64-96) 141/89    Intake/Output:    Intake/Output Summary (Last 24 hours) at 2023 0850  Last data filed at 2023 0800  Gross per 24 hour   Intake 1920 ml   Output 500 ml   Net 1420 ml       Exam:  /89 (BP Location: Left arm, Patient Position: Lying)   Pulse 66   Temp 97.8 °F (36.6 °C) (Oral)   Resp 16   Ht 160 cm (63\")   Wt 127 kg (281 lb)   SpO2 91%   BMI 49.78 kg/m²   Patient is examined using the personal protective equipment as per guidelines from infection control for this particular patient as enacted.  Hand washing was performed before and after patient interaction.  General Appearance:  Much more awake and interactive.       "                    Head:    Normocephalic, without obvious abnormality, atraumatic                           Eyes:    PERRL, conjunctivae/corneas clear, EOM's intact, both eyes                         Throat:   Lips, tongue, gums normal; oral mucosa pink and moist                           Neck:   Supple, symmetrical, trachea midline, no JVD                         Lungs:    Clear to auscultation bilaterally, respirations unlabored                 Chest Wall:    No tenderness or deformity                          Heart:    s1s2 regular                  Abdomen:     Soft and non tender, no abdominal wall cellulitis noted.                 Extremities:   Extremities normal, atraumatic, no cyanosis or edema                        Pulses:   Pulses palpable in all extremities                            Skin:   Skin is warm and dry,  no rashes or palpable lesions                  Neurologic:   Grossly non focal         Data Review:    I reviewed the patient's new clinical results.  Results from last 7 days   Lab Units 06/01/23  0607 05/31/23 0530 05/30/23 0341 05/29/23 0417 05/28/23  0441 05/27/23  0729 05/27/23  0125   WBC 10*3/mm3 7.15 6.73 6.58 5.90 7.20 7.71 7.37   HEMOGLOBIN g/dL 10.6* 10.3* 10.6* 10.2* 10.3* 11.1* 10.4*   PLATELETS 10*3/mm3 124* 125* 132* 107* 120* 120* 96*     Results from last 7 days   Lab Units 06/01/23  0607 05/31/23  0530 05/30/23 0341 05/29/23 0417 05/28/23  0441 05/27/23  0521 05/27/23  0125   SODIUM mmol/L 142 142 142 138 140 141 137   POTASSIUM mmol/L 3.4* 3.7 4.0 4.3 3.9 4.6 4.6   CHLORIDE mmol/L 106 107 106 107 106 107 106   CO2 mmol/L 26.2 27.0 28.0 21.8* 24.2 24.7 23.2   BUN mg/dL 10 9 8 8 7* 9 9   CREATININE mg/dL 0.64 0.68 0.76 0.77 0.84 0.79 0.67   CALCIUM mg/dL 8.1* 7.9* 8.3* 8.5* 8.9 8.7 8.9   GLUCOSE mg/dL 88 87 84 83 95 110* 121*     Microbiology Results (last 10 days)     Procedure Component Value - Date/Time    Blood Culture - Blood, Arm, Right [[id number]]  (Normal)  Collected: 05/30/23 1216    Lab Status: Preliminary result Specimen: Blood from Arm, Right Updated: 05/31/23 1301     Blood Culture No growth at 24 hours    Blood Culture - Blood, Arm, Left [[id number]]  (Normal) Collected: 05/30/23 1203    Lab Status: Preliminary result Specimen: Blood from Arm, Left Updated: 05/31/23 1301     Blood Culture No growth at 24 hours    Urine Culture - Urine, Urine, Clean Catch [[id number]]  (Abnormal)  (Susceptibility) Collected: 05/27/23 0430    Lab Status: Final result Specimen: Urine, Clean Catch Updated: 05/29/23 0024     Urine Culture >100,000 CFU/mL Escherichia coli ESBL     Comment:   Consider infectious disease consult.  Susceptibility results may not correlate to clinical outcomes.       Narrative:      Colonization of the urinary tract without infection is common. Treatment is discouraged unless the patient is symptomatic, pregnant, or undergoing an invasive urologic procedure.  Recent outcomes data supports the use of pip/tazo in the treatment of susceptible ESBL infections for uncomplicated UTI. Consider use of pip/tazo as a carbapenem-sparing regimen in applicable patients.    Susceptibility      Escherichia coli ESBL      SARAH      Ertapenem Susceptible      Gentamicin Resistant     Levofloxacin Resistant     Meropenem Susceptible      Nitrofurantoin Susceptible      Piperacillin + Tazobactam Susceptible      Tobramycin Susceptible      Trimethoprim + Sulfamethoxazole Resistant                          Respiratory Panel PCR w/COVID-19(SARS-CoV-2) NIKKI/JOHN/GRIFFIN/PAD/COR/MAD/FRANCIS In-House, NP Swab in UTM/VTM, 3-4 HR TAT - Swab, Nasopharynx [[id number]]  (Normal) Collected: 05/27/23 0157    Lab Status: Final result Specimen: Swab from Nasopharynx Updated: 05/27/23 0303     ADENOVIRUS, PCR Not Detected     Coronavirus 229E Not Detected     Coronavirus HKU1 Not Detected     Coronavirus NL63 Not Detected     Coronavirus OC43 Not Detected     COVID19 Not Detected     Human  Metapneumovirus Not Detected     Human Rhinovirus/Enterovirus Not Detected     Influenza A PCR Not Detected     Influenza B PCR Not Detected     Parainfluenza Virus 1 Not Detected     Parainfluenza Virus 2 Not Detected     Parainfluenza Virus 3 Not Detected     Parainfluenza Virus 4 Not Detected     RSV, PCR Not Detected     Bordetella pertussis pcr Not Detected     Bordetella parapertussis PCR Not Detected     Chlamydophila pneumoniae PCR Not Detected     Mycoplasma pneumo by PCR Not Detected    Narrative:      In the setting of a positive respiratory panel with a viral infection PLUS a negative procalcitonin without other underlying concern for bacterial infection, consider observing off antibiotics or discontinuation of antibiotics and continue supportive care. If the respiratory panel is positive for atypical bacterial infection (Bordetella pertussis, Chlamydophila pneumoniae, or Mycoplasma pneumoniae), consider antibiotic de-escalation to target atypical bacterial infection.    Blood Culture - Blood, Arm, Left [[id number]]  (Abnormal) Collected: 05/27/23 0156    Lab Status: Final result Specimen: Blood from Arm, Left Updated: 05/30/23 0806     Blood Culture Aerococcus viridans     Comment:   Aerococcus viridans is usually susceptible to vancomycin. Resistance has been reported to the fluoroquinolones and beta-lactams. Please call lab to request further workup.        Isolated from Aerobic Bottle     Gram Stain Aerobic Bottle Gram positive cocci    Narrative:      Probable contaminant requires clinical correlation, susceptibility not performed unless requested by physician.      Blood Culture ID, PCR - Blood, Arm, Left [[id number]] Collected: 05/27/23 0156    Lab Status: Final result Specimen: Blood from Arm, Left Updated: 05/28/23 1147     BCID, PCR Negative by BCID PCR. Culture to Follow.     BOTTLE TYPE Aerobic Bottle    Blood Culture - Blood, Arm, Right [[id number]]  (Normal) Collected: 05/27/23 0125    Lab  Status: Final result Specimen: Blood from Arm, Right Updated: 06/01/23 0130     Blood Culture No growth at 5 days            Assessment:    COPD exacerbation    Benign essential hypertension    CAD (coronary artery disease), native coronary artery    Chronic obstructive pulmonary disease with acute exacerbation    Diastolic dysfunction    Obstructive sleep apnea    Hypothyroidism    Chronic pain syndrome    CKD (chronic kidney disease) stage 3, GFR 30-59 ml/min  1-gram-positive cocci in 1 out of 2 blood cultures in the setting of abnormal urinalysis with shortness of breath and hypoxia significance unclear-now that pathogen is identified as Aerococcus viridans possibility of endovascular infection cannot be ruled out.  See discussion below.  2-urinary tract infection-with ESBL +ve E.coli  3-atrial fibrillation with rapid ventricular response  4-history of heart failure with preserved ejection fraction  5-chronic kidney disease  6-obesity and obstructive sleep apnea  7-COPD  8-abdominal wall stranding seen on the CT scan of the abdomen with possible cellulitis versus body wall edema.     Recommendations/Discussions:  · Continue ertapenem for 5 to 7 days.  · Aerococcus and blood culture in this situation is hard to ignore and would recommend adding vancomycin to her regimen and repeating blood culture.  I reviewed her echocardiogram performed couple days ago and it shows calcification of mitral valve but no evidence of specific vegetation.  · If repeat blood cultures are negative then would recommend couple of weeks of IV vancomycin for unexplained Aerococcus viridans bacteremia in this situation.  Osvaldo Ponce MD  6/1/2023  08:50 EDT    Parts of this note may be an electronic transcription/translation of spoken language to printed text using the Dragon dictation system.

## 2023-06-01 NOTE — PROGRESS NOTES
"Nutrition Services    Patient Name:  Rose Cheema  YOB: 1940  MRN: [id number]  Admit Date:  5/26/2023  FOLLOW UP - CLINICAL NUTRITION    Assessment Date:  06/01/23     Since last review, pt continues on IV Abx for bacteremia. Bilateral gluteal PI noted. 1-2+ edema. Plans to d/c to rehab when appropriate.   Visited pt in room who reported appetite to be improving. Confirmed nausea today, abdominal discomfort yesterday. Per EMR, PO 50% of recent meals; pt reports this quantity to be her normal intakes at home. When asked about ONS (for adequate protein/wound healing), pt reported a milk sensitivity and was very hesitant to add ONS to her day. Will continue to follow skin/intake trends and only add if needed in the future. Pt also with crackers bedside; RD encouraged snacks between meals to fully build back appetite, emphasized protein. UBW reported at 275#; wt stable. Pt requesting to d/c bowel regimen as she is having more frequent than normal BMs, loose; RD relayed this information to MD entering room at end of interview, who acknowledged.     Recommendations:  1. Continue current diet, with encouragement for adequate PO intakes.   2. Will continue to monitor skin/intakes and add ONS as needed.     RD will continue to follow-up, per protocol.      Encounter Information         Reason for Encounter Follow-up    Current Issues Bacteremia     Current Nutrition Orders & Evaluation of Intake       Oral Nutrition     Current PO Diet Diet: Cardiac Diets; Healthy Heart (2-3 Na+); Texture: Regular Texture (IDDSI 7); Fluid Consistency: Thin (IDDSI 0)   Supplement n/a   PO Evaluation     % PO Intake 50%    # of Days Evaluated 3    Factors Affecting Intake  decreased appetite, nausea, abdominal discomfort   --  Anthropometrics          Height    Weight Height: 160 cm (63\")  Weight: 127 kg (281 lb) (05/28/23 1436)    BMI kg/m2 Body mass index is 49.78 kg/m².  Obese, Class III (40 or higher)    Weight trend Stable "     Labs        Pertinent Labs Reviewed, listed below     Results from last 7 days   Lab Units 06/01/23  0607 05/31/23  0530 05/30/23  0341   SODIUM mmol/L 142 142 142   POTASSIUM mmol/L 3.4* 3.7 4.0   CHLORIDE mmol/L 106 107 106   CO2 mmol/L 26.2 27.0 28.0   BUN mg/dL 10 9 8   CREATININE mg/dL 0.64 0.68 0.76   CALCIUM mg/dL 8.1* 7.9* 8.3*   BILIRUBIN mg/dL 0.3 0.2 0.2   ALK PHOS U/L 76 78 85   ALT (SGPT) U/L 6 7 7   AST (SGOT) U/L 10 10 11   GLUCOSE mg/dL 88 87 84     Results from last 7 days   Lab Units 06/01/23  0607 05/27/23  0729 05/27/23  0521 05/27/23  0125   MAGNESIUM mg/dL  --   --  2.2 2.2   HEMOGLOBIN g/dL 10.6*   < >  --  10.4*   HEMATOCRIT % 32.2*   < >  --  32.3*   WBC 10*3/mm3 7.15   < >  --  7.37   ALBUMIN g/dL 2.8*   < >  --  2.8*    < > = values in this interval not displayed.     Results from last 7 days   Lab Units 06/01/23  0607 05/31/23  0530 05/30/23  0341 05/29/23  0417 05/28/23  0441 05/27/23  0829 05/27/23  0729 05/27/23  0156 05/27/23  0125 05/26/23  1401   INR   --   --   --   --   --  1.06  --  0.96  --  1.0   APTT seconds  --   --   --   --   --   --   --  31.6  --  32.5   PLATELETS 10*3/mm3 124* 125* 132* 107* 120*  --    < >  --    < >  --     < > = values in this interval not displayed.     COVID19   Date Value Ref Range Status   05/27/2023 Not Detected Not Detected - Ref. Range Final     No results found for: HGBA1C       Medications            Scheduled Medications apixaban, 5 mg, Oral, Q12H  aspirin, 81 mg, Oral, Daily  busPIRone, 30 mg, Oral, BID  ertapenem, 1 g, Intravenous, Q24H  FLUoxetine, 60 mg, Oral, Daily  ipratropium-albuterol, 3 mL, Nebulization, Q8H - RT  lactobacillus acidophilus, 1 capsule, Oral, Daily  levothyroxine, 50 mcg, Oral, QAM AC  Mirabegron ER, 50 mg, Oral, Daily  pantoprazole, 40 mg, Oral, Daily  potassium chloride, 10 mEq, Oral, BID  QUEtiapine, 100 mg, Oral, Nightly  senna-docusate sodium, 2 tablet, Oral, BID  sodium chloride, 10 mL, Intravenous,  Q12H  torsemide, 100 mg, Oral, See Admin Instructions  vancomycin, 1,500 mg, Intravenous, Q24H        Infusions Pharmacy to dose vancomycin,         PRN Medications •  acetaminophen **OR** acetaminophen **OR** acetaminophen  •  albuterol  •  senna-docusate sodium **AND** polyethylene glycol **AND** bisacodyl **AND** bisacodyl  •  cetaphil  •  HYDROcodone-acetaminophen  •  LORazepam  •  nitroglycerin  •  ondansetron  •  Pharmacy to dose vancomycin  •  [COMPLETED] Insert Peripheral IV **AND** sodium chloride  •  sodium chloride  •  sodium chloride     Physical Findings          Physical Appearance alert, oriented   Oral/Mouth Cavity tooth or teeth missing   Edema  1+ (trace), 2+ (mild)   Gastrointestinal non-distended , normoactive, last bowel movement:5/27, abdominal discomfort noted.    Skin  MASD, pressure injury bilateral gluteal PI   Tubes/Drains/Lines none   NFPE Not indicated at this time   --  NUTRITION INTERVENTION / PLAN OF CARE  Intervention Goal         Intervention Goal(s) Maintain nutrition status, Reduce/improve symptoms, Tolerate PO , Increase intake and Appropriate weight loss     Nutrition Intervention         RD Action Advise available snack, Supplement offered/refused, Encourage intake, Follow Tx Progress and Care plan reviewed     Nutrition Prescription         Diet Prescription     Supplement Prescription n/a   EN/PN Prescription    New Prescription Ordered? No changes at this time   --  Monitor/Evaluation        Monitor Per protocol, PO intake, Pertinent labs, Weight, Skin status, GI status, Symptoms   Discharge Needs Pending clinical course   Education Will instruct as appropriate   --    RD to follow up per protocol.    Electronically signed by:  Jacque Alston RD  06/01/23 13:33 EDT

## 2023-06-01 NOTE — SIGNIFICANT NOTE
06/01/23 1526   OTHER   Discipline occupational therapist   Rehab Time/Intention   Session Not Performed patient/family declined, not feeling well  (pt declined OT twice this afternoon.  pt reports stomach hurts and back pain.  RN aware of refusals.)   Recommendation   OT - Next Appointment 06/05/23

## 2023-06-01 NOTE — NURSING NOTE
AC follow up regarding anxiety: chart reviewed. Progressing towards goal. More alert this shift. Plan for D/C to rehab when appropriate. Will follow.

## 2023-06-01 NOTE — PROGRESS NOTES
[facility] Clinical Pharmacy Services: Vancomycin Level Monitoring Note    Rose Cheema is a 82 y.o. female who is on day 3 of pharmacy to dose vancomycin for Aerococcus Bacteremia.    Estimated Creatinine Clearance: 91 mL/min (by C-G formula based on SCr of 0.64 mg/dL).    Current Vanc Dose: 1500 mg IV every  24  hours  Results from last 7 days   Lab Units 06/01/23  0924   VANCOMYCIN TR mcg/mL 12.20   Predicted AUC at current dose:459 mg/L.hr  Next Level Date and Time: Vanc Trough on 6/5 @ 0930    No changes at this time. Pharmacy is continuing to monitor and will adjust as needed.    Gregorio Jett, [facility]  Clinical Pharmacist

## 2023-06-01 NOTE — PLAN OF CARE
Goal Outcome Evaluation:  Plan of Care Reviewed With: patient        Progress: improving   Vital signs stable. Alert and oriented x 4. On room air. A fib cardiac rhythm. Prn oral hydrocodone given for complaint of lower extremity discomfort. 2+ pedal edema present in bilateral lower extremities. Turned and repositioned every 2 hours. Morbidly obese, requires 2 staff to turn. Refused to get out of bed with PT dept.today. Patient will be discharged tomorrow to Skilled care facility. Tolerating heart healthy diet fair. External catheter patent to wall suction. IV antibiotics given as scheduled. Will continue to monitor.

## 2023-06-01 NOTE — PLAN OF CARE
Goal Outcome Evaluation:  Plan of Care Reviewed With: patient        Progress: no change  Outcome Evaluation: VSS. Alert and oriented x4. Pain managed with prn pain meds. External catheter in use. On room air. IV abx continued. Will continue to provide supportive care.

## 2023-06-01 NOTE — PROGRESS NOTES
The patient is resting comfortably and does not awaken for interview.  Charting indicates that her anxiety symptoms are well controlled, and that the patient is been accepted at [facility] as of tomorrow.  I would recommend continuation of the patient's currently prescribed psychotropic medications at the time of discharge.  I will sign off.

## 2023-06-02 VITALS
WEIGHT: 281 LBS | TEMPERATURE: 97.8 F | SYSTOLIC BLOOD PRESSURE: 152 MMHG | RESPIRATION RATE: 18 BRPM | HEART RATE: 71 BPM | OXYGEN SATURATION: 100 % | DIASTOLIC BLOOD PRESSURE: 89 MMHG | BODY MASS INDEX: 49.79 KG/M2 | HEIGHT: 63 IN

## 2023-06-02 PROCEDURE — C1751 CATH, INF, PER/CENT/MIDLINE: HCPCS

## 2023-06-02 PROCEDURE — 94761 N-INVAS EAR/PLS OXIMETRY MLT: CPT

## 2023-06-02 PROCEDURE — 25010000002 ERTAPENEM PER 500 MG: Performed by: INTERNAL MEDICINE

## 2023-06-02 PROCEDURE — 94799 UNLISTED PULMONARY SVC/PX: CPT

## 2023-06-02 PROCEDURE — 94664 DEMO&/EVAL PT USE INHALER: CPT

## 2023-06-02 PROCEDURE — 25010000002 VANCOMYCIN 10 G RECONSTITUTED SOLUTION: Performed by: INTERNAL MEDICINE

## 2023-06-02 PROCEDURE — 02HV33Z INSERTION OF INFUSION DEVICE INTO SUPERIOR VENA CAVA, PERCUTANEOUS APPROACH: ICD-10-PCS | Performed by: STUDENT IN AN ORGANIZED HEALTH CARE EDUCATION/TRAINING PROGRAM

## 2023-06-02 RX ORDER — SODIUM CHLORIDE 0.9 % (FLUSH) 0.9 %
10 SYRINGE (ML) INJECTION EVERY 12 HOURS SCHEDULED
Status: DISCONTINUED | OUTPATIENT
Start: 2023-06-02 | End: 2023-06-02 | Stop reason: HOSPADM

## 2023-06-02 RX ORDER — SODIUM CHLORIDE 0.9 % (FLUSH) 0.9 %
10 SYRINGE (ML) INJECTION AS NEEDED
Status: DISCONTINUED | OUTPATIENT
Start: 2023-06-02 | End: 2023-06-02 | Stop reason: HOSPADM

## 2023-06-02 RX ORDER — HYDROCODONE BITARTRATE AND ACETAMINOPHEN 7.5; 325 MG/1; MG/1
1 TABLET ORAL EVERY 6 HOURS PRN
Qty: 18 TABLET | Refills: 0 | Status: ON HOLD | OUTPATIENT
Start: 2023-06-02

## 2023-06-02 RX ORDER — AMOXICILLIN 250 MG
2 CAPSULE ORAL 2 TIMES DAILY
Qty: 120 TABLET | Refills: 0 | Status: ON HOLD | OUTPATIENT
Start: 2023-06-02

## 2023-06-02 RX ORDER — SODIUM CHLORIDE 0.9 % (FLUSH) 0.9 %
20 SYRINGE (ML) INJECTION AS NEEDED
Status: DISCONTINUED | OUTPATIENT
Start: 2023-06-02 | End: 2023-06-02 | Stop reason: HOSPADM

## 2023-06-02 RX ORDER — LORAZEPAM 0.5 MG/1
0.5 TABLET ORAL 2 TIMES DAILY PRN
Qty: 6 TABLET | Refills: 0 | Status: ON HOLD | OUTPATIENT
Start: 2023-06-02

## 2023-06-02 RX ORDER — VIT E ACET/GLY/DIMETH/WATER
LOTION (ML) TOPICAL AS NEEDED
Qty: 473 ML | Refills: 1 | Status: SHIPPED | OUTPATIENT
Start: 2023-06-02 | End: 2023-06-09

## 2023-06-02 RX ORDER — SODIUM CHLORIDE 9 MG/ML
40 INJECTION, SOLUTION INTRAVENOUS AS NEEDED
Status: DISCONTINUED | OUTPATIENT
Start: 2023-06-02 | End: 2023-06-02 | Stop reason: HOSPADM

## 2023-06-02 RX ORDER — FENTANYL 25 UG/H
1 PATCH TRANSDERMAL
Qty: 2 PATCH | Refills: 0 | Status: ON HOLD | OUTPATIENT
Start: 2023-06-02

## 2023-06-02 RX ORDER — HYDROCODONE BITARTRATE AND ACETAMINOPHEN 7.5; 325 MG/1; MG/1
1 TABLET ORAL EVERY 6 HOURS PRN
Qty: 12 TABLET | Refills: 0 | Status: SHIPPED | OUTPATIENT
Start: 2023-06-02 | End: 2023-06-02 | Stop reason: HOSPADM

## 2023-06-02 RX ADMIN — LEVOTHYROXINE SODIUM 50 MCG: 0.05 TABLET ORAL at 06:39

## 2023-06-02 RX ADMIN — HYDROCODONE BITARTRATE AND ACETAMINOPHEN 1 TABLET: 7.5; 325 TABLET ORAL at 18:15

## 2023-06-02 RX ADMIN — HYDROCODONE BITARTRATE AND ACETAMINOPHEN 1 TABLET: 7.5; 325 TABLET ORAL at 00:47

## 2023-06-02 RX ADMIN — Medication 10 ML: at 12:20

## 2023-06-02 RX ADMIN — PANTOPRAZOLE SODIUM 40 MG: 40 TABLET, DELAYED RELEASE ORAL at 08:32

## 2023-06-02 RX ADMIN — POTASSIUM CHLORIDE 10 MEQ: 750 TABLET, EXTENDED RELEASE ORAL at 08:32

## 2023-06-02 RX ADMIN — Medication 10 ML: at 08:34

## 2023-06-02 RX ADMIN — ERTAPENEM SODIUM 1 G: 1 INJECTION, POWDER, LYOPHILIZED, FOR SOLUTION INTRAMUSCULAR; INTRAVENOUS at 08:32

## 2023-06-02 RX ADMIN — IPRATROPIUM BROMIDE AND ALBUTEROL SULFATE 3 ML: 2.5; .5 SOLUTION RESPIRATORY (INHALATION) at 08:54

## 2023-06-02 RX ADMIN — BUSPIRONE HYDROCHLORIDE 30 MG: 15 TABLET ORAL at 08:32

## 2023-06-02 RX ADMIN — IPRATROPIUM BROMIDE AND ALBUTEROL SULFATE 3 ML: 2.5; .5 SOLUTION RESPIRATORY (INHALATION) at 15:48

## 2023-06-02 RX ADMIN — VANCOMYCIN HYDROCHLORIDE 1500 MG: 10 INJECTION, POWDER, LYOPHILIZED, FOR SOLUTION INTRAVENOUS at 12:16

## 2023-06-02 RX ADMIN — Medication 1 CAPSULE: at 08:32

## 2023-06-02 RX ADMIN — MIRABEGRON 50 MG: 25 TABLET, FILM COATED, EXTENDED RELEASE ORAL at 08:32

## 2023-06-02 RX ADMIN — APIXABAN 5 MG: 5 TABLET, FILM COATED ORAL at 08:38

## 2023-06-02 RX ADMIN — HYDROCODONE BITARTRATE AND ACETAMINOPHEN 1 TABLET: 7.5; 325 TABLET ORAL at 12:16

## 2023-06-02 RX ADMIN — FLUOXETINE HYDROCHLORIDE 60 MG: 20 CAPSULE ORAL at 08:32

## 2023-06-02 RX ADMIN — HYDROCODONE BITARTRATE AND ACETAMINOPHEN 1 TABLET: 7.5; 325 TABLET ORAL at 06:39

## 2023-06-02 RX ADMIN — ASPIRIN 81 MG: 81 TABLET, CHEWABLE ORAL at 08:32

## 2023-06-02 NOTE — PROGRESS NOTES
[facility] follow up d/t anxiety; this writer reviewed chart and spoke with RN Robert. Per RN, patient did okay overnight, slept on and off; she requests pain meds when available.  Discharge to SNF placement once arrangements made, possibly today; psychiatrist signed off.  No further needs/concerns noted at this time per RN and/or medical team; Access Center to continue following.

## 2023-06-02 NOTE — CASE MANAGEMENT/SOCIAL WORK
"Physicians Statement of Medical Necessity for  Ambulance Transportation    GENERAL INFORMATION     Name: Rose Cheema  YOB: 1940  Medicare #: [id number]  Transport Date: 6/2/223 (Valid for round trips this date, or for scheduled repetitive trips for 60 days from the date signed below.)  Origin: [facility]  Destination: [facility]  Is the Patient's stay covered under Medicare Part A (PPS/DRG?)Yes  Closest appropriate facility? Yes  If this a hosp-hosp transfer? No  Is this a hospice patient? No    MEDICAL NECESSITY QUESTIONAIRE    Ambulance Transportation is medically necessary only if other means of transportation are contraindicated or would be potentially harmful to the patient.  To meet this requirement, the patient must be either \"bed confined\" or suffer from a condition such that transport by means other than an ambulance is contraindicated by the patient's condition.  The following questions must be answered by the healthcare professional signing below for this form to be valid:     1) Describe the MEDICAL CONDITION (physical and/or mental) of this patient AT THE TIME OF AMBULANCE TRANSPORT that requires the patient to be transported in an ambulance, and why transport by other means is contraindicated by the patient's condition:   Past Medical History:   Diagnosis Date   • Anemia    • Anxiety    • Arthritis    • CHF (congestive heart failure)    • Coronary artery disease    • Depression    • Disease of thyroid gland    • Fibromyalgia    • GERD (gastroesophageal reflux disease)    • Hypertension    • Moderate tricuspid valve stenosis    • Osteoporosis    • Peripheral neuropathy    • Pulmonary hypertension    • SBO (small bowel obstruction)    • Stenosis of mitral valve       Past Surgical History:   Procedure Laterality Date   • BILATERAL OOPHORECTOMY     • CARDIAC CATHETERIZATION     • CHOLECYSTECTOMY     • ERCP N/A 2/15/2019    Procedure: ENDOSCOPIC RETROGRADE " "CHOLANGIOPANCREATOGRAPHY WITH PARTIAL CHOLANGIOGRAM;  Surgeon: Gerald Herr MD;  Location: [facility] ENDOSCOPY;  Service: Gastroenterology   • EXPLORATORY LAPAROTOMY     • GASTRIC BYPASS     • HERNIA REPAIR      inguinal and ventral   • HYSTERECTOMY     • OVARIAN CYST REMOVAL        2) Is this patient \"bed confined\" as defined below?Yes   To be \"bed confined\" the patient must satisfy all three of the following criteria:  (1) unable to get up from bed without assistance; AND (2) unable to ambulate;  AND (3) unable to sit in a chair or wheelchair.  3) Can this patient safely be transported by car or wheelchair van (I.e., may safely sit during transport, without an attendant or monitoring?)No   4. In addition to completing questions 1-3 above, please check any of the following conditions that apply*:          *Note: supporting documentation for any boxes checked must be maintained in the patient's medical records Unable to tolerate seated position for time needed to transport      SIGNATURE OF PHYSICIAN OR OTHER AUTHORIZED HEALTHCARE PROFESSIONAL    I certify that the above information is true and correct based on my evaluation of this patient, and represent that the patient requires transport by ambulance and that other forms of transport are contraindicated.  I understand that this information will be used by the Centers for Medicare and Medicaid Services (CMS) to support the determiniation of medical necessity for ambulance services, and I represent that I have personal knowledge of the patient's condition at the time of transport.       If this box is checked, I also certify that the patient is physically or mentally incapable of signing the ambulance service's claim form and that the institution with which I am affiliated has furnished care, services or assistance to the patient.  My signature below is made on behalf of the patient pursuant to 42 .36(b)(4). In accordance with 42 .37, the " specific reason(s) that the patient is physically or mentally incapable of signing the claim for is as follows:     Signature of Physician or Healthcare Professional  Date/Time:        (For Scheduled repetitive transport, this form is not valid for transports performed more than 60 days after this date).                                                                                                                                            --------------------------------------------------------------------------------------------  Printed Name and Credentials of Physician or Authorized Healthcare Professional     *Form must be signed by patient's attending physician for scheduled, repetitive transports,.  For non-repetitive ambulance transports, if unable to obtain the signature of the attending physician, any of the following may sign (please select below):     Physician  Clinical Nurse Specialist  Registered Nurse     Physician Assistant  Discharge Planner  Licensed Practical Nurse     Nurse Practitioner

## 2023-06-02 NOTE — DISCHARGE SUMMARY
"    Patient Name: Rose Cheema  : 1940  MRN: [id number]    Date of Admission: 2023  Date of Discharge:  2023  Primary Care Physician: Cheng Guevara MD      Chief Complaint:   Shortness of Breath and Nausea      Discharge Diagnoses     Active Hospital Problems    Diagnosis  POA   • **Bacteremia [R78.81]  Unknown   • PAF (paroxysmal atrial fibrillation) [I48.0]  Unknown   • CKD (chronic kidney disease) stage 3, GFR 30-59 ml/min [N18.30]  Yes   • Acute UTI (urinary tract infection) [N39.0]  Unknown   • Hypothyroidism [E03.9]  Yes   • Chronic pain syndrome [G89.4]  Yes   • CAD (coronary artery disease), native coronary artery [I25.10]  Yes   • Diastolic dysfunction [I51.89]  Yes   • Benign essential hypertension [I10]  Yes   • Obstructive sleep apnea [G47.33]  Yes      Resolved Hospital Problems    Diagnosis Date Resolved POA   • COPD exacerbation [J44.1] 2023 Yes   • Chronic obstructive pulmonary disease with acute exacerbation [J44.1] 2023 Yes      Brief Admitting HPI     Ms. Cheema is a 82 y.o. female with a history of hypertension, CAD, diastolic dysfunction, COPD, GERSON, CKD stage III, hypothyroidism, fibromyalgia that presents to [facility] complaining of abdominal pain and dyspnea beginning yesterday.  She is confused and verbalizes to me that she is a \" poor historian right now\".  She has chronic shortness of breath all the time, its been worse for 2 days.  She has a chronic cough, worse f or 2 days.  Unsure of characteristics of cough.  No fever or chills.  Had intermittent mild abdominal pain, none currently.  At home wears 2 L of oxygen at nighttime, none during the day.  Has chronic redness to her lower legs.  Resides in assisted living facility.     Hospital Course     Pt admitted for altered mental status.  Noted to have UTI on admission which ended up growing ESBL E. coli, for which she completed course of Invanz.  She was seen in consultation with infectious " disease, psychiatry, cardiology.  She was noted be in A-fib, and TTE was obtained which showed normal EF, mildly increased LA volume.  She was started on apixaban.  Cardiology did not recommend any additional rate control medicines as she was well controlled this time.  Blood cultures returned positive for Aerococcus viridans, with repeat cultures negative.  ID recommends 14 days of vancomycin (stop date 6/13/2023).  In addition she had vast improvement in her mental status and was back to her baseline.  She was evaluated by physical therapy recommend discharge to SNF.  I discussed the case with the patient and her son, Mario, and they are in agreement with treatment plan with no  additional questions at this time.    Discharge Plan     ESBL E coli UTI  Aerococcus viridans bacteremia  -blood cx level (5/27/23): Aerococcus viridans  -urine cx (5/27/23): >100k ESBL E coli  -repeat NGTD  -TTE negative for vegetation  -Completed course of Invanz; vancomycin planned for 2wks (through 6/13/2023)     A-fib  CAD  -RC: none  -AC: apixaban  -Continue ASA per Cardiology     Hypothyroid  -Synthroid 50 mcg     HFpEF  -Torsemide 100 mg     Depression  Anxiety  -BuSpar 30 mg twice daily, Prozac 60 mg, Seroquel 100 mg nightly    Day of Discharge     Subjective:  Eager for dc to rehab.    Physical Exam:  Temp:  [97.6 °F (36.4 °C)-98 °F (36.7 °C)] 97.8 °F (36.6 °C)  Heart Rate:  [62-96] 71  Resp:  [16-18] 18  BP: (148-163)/(76-99) 152/89  Body mass index is 49.78 kg/m².  Physical Exam  Constitutional:       General: She is not in acute distress.     Appearance: She is ill-appearing.   Cardiovascular:      Rate and Rhythm: Normal rate.      Pulses: Normal pulses.      Heart sounds: No murmur heard.  Pulmonary:      Effort: Pulmonary effort is normal. No respiratory distress.      Breath sounds: Normal breath sounds.   Abdominal:      General: Abdomen is flat. There is no distension.   Musculoskeletal:      Right lower leg: Edema  present.      Left lower leg: Edema present.      Comments: Bilateral venous stasis   Neurological:      Mental Status: She is alert.     Consultants     Consult Orders (all) (From admission, onward)     Start     Ordered    06/02/23 0834  IV TEAM - Consult for PICC Line (IV Team to Determine Number of Lumens)  Once        Provider:  (Not yet assigned)    06/02/23 0833    05/28/23 1857  Inpatient Infectious Diseases Consult  Once        Specialty:  Infectious Diseases  Provider:  Osvaldo Ponce MD    05/28/23 1857    05/27/23 1453  Inpatient Cardiology Consult  Once        Specialty:  Cardiology  Provider:  Senthil Abraham MD    05/27/23 1452    05/27/23 1333  Inpatient Access Center Consult  Once        Provider:  (Not yet assigned)    05/27/23 1332    05/27/23 1315  Inpatient Cardiology Consult  Once,   Status:  Canceled        Specialty:  Cardiology  Provider:  (Not yet assigned)    05/27/23 1314    05/27/23 1243  Inpatient Psychiatrist Consult  Once        Specialty:  Psychiatry  Provider:  Narinder Junior III, MD    05/27/23 1242    05/27/23 0359  [facility] (on-call MD unless specified) Details  Once        Specialty:  Hospitalist  Provider:  (Not yet assigned)    05/27/23 0358              Procedures     * Surgery not found *      Imaging Results (All)     Procedure Component Value Units Date/Time    XR Chest PA & Lateral [[id number]] Collected: 05/28/23 1224     Updated: 05/28/23 1228    Narrative:      PA AND LATERAL CHEST X-RAY     HISTORY: Shortness of breath.     Chest x-ray consisting of three views is provided. Correlation:  05/27/2023.     FINDINGS: The cardiomediastinal silhouette is normal. The lungs are  clear. The bones appear normal. There is no pneumothorax. Small pleural  effusions blunt the costophrenic angles bilaterally.       Impression:      Small pleural effusions. Otherwise negative chest x-ray.     This report was finalized on 5/28/2023 12:25 PM by Dr. Terry Brothers M.D.       CT  Abdomen Pelvis With Contrast [[id number]] Collected: 05/27/23 0243     Updated: 05/27/23 0255    Narrative:      CT OF THE ABDOMEN AND PELVIS WITH CONTRAST     HISTORY: Shortness of breath. Nausea and abdominal pain     COMPARISON: None available.     TECHNIQUE: Axial CT imaging was obtained through the abdomen and pelvis.  IV contrast was administered.     FINDINGS:  Images through the lung bases demonstrate bibasilar scarring. There is  likely also some mild atelectasis and trace bilateral effusions. There  are dystrophic calcifications of the mitral valve annulus. There are  postsurgical changes of gastric bypass. There is no evidence of  obstruction. Gallbladder is absent. There is dilatation of the common  bile duct, measuring up to 1.3 cm. This was also present on prior exam.  It may be postcholecystectomy in nature. There is pancreatic atrophy.  Spleen appears normal. Adrenal glands are unremarkable. The kidneys  enhance symmetrically. Nonobstructing stone is again noted within the  superior pole of the right kidney, measuring 1.0 cm. Excreted contrast  material is noted within the urinary bladder. Patient does appear to  have a small bladder diverticulum. Uterus is absent. There is colonic  diverticulosis. There is no bowel obstruction. The appendix is normal.  There is soft tissue stranding involving the right lower anterior  abdominal wall, as well as some skin thickening. Correlation with any  evidence of cellulitis is recommended. No acute osseous abnormalities  are seen. There is a ventral hernia, which contains a small knuckle of  colon, without evidence of obstruction.       Impression:      Soft tissue stranding which is seen involving the lower anterior  abdominal wall on the right. There is associated skin thickening.  Correlation with any evidence of cellulitis is recommended. No discrete  drainable fluid collection is seen.     Radiation dose reduction techniques were utilized, including  automated  exposure control and exposure modulation based on body size.     This report was finalized on 5/27/2023 2:52 AM by Dr. Miya Galarza M.D.       XR Chest 1 View [[id number]] Collected: 05/27/23 0121     Updated: 05/27/23 0125    Narrative:      SINGLE VIEW OF THE CHEST     HISTORY: Shortness of air     COMPARISON: 05/14/2020     FINDINGS:  There is cardiomegaly. No pneumothorax is seen. There is chronic  scarring identified within both lungs. There is blunting of left  costophrenic angle, and small effusion is not excluded. No definite  acute infiltrates are seen.       Impression:      Blunting of the left costophrenic angle. Small effusion is not excluded.     This report was finalized on 5/27/2023 1:22 AM by Dr. Miya Galarza M.D.             Results for orders placed during the hospital encounter of 05/26/23    Adult Transthoracic Echo Complete W/ Cont if Necessary Per Protocol    Interpretation Summary  •  Left ventricular systolic function is normal. Calculated left ventricular EF = 58.1% The left ventricular cavity is moderately dilated. Left ventricular wall thickness is consistent with mild concentric hypertrophy. All left ventricular wall segments contract normally. Left ventricular diastolic function was indeterminate.  •  Left atrial volume is mildly increased.  •  No aortic valve regurgitation or stenosis is present. The aortic valve is abnormal in structure. There is mild thickening of the aortic valve.  •  Severe mitral annular calcification is present. Trace mitral valve regurgitation is present. Mild mitral valve stenosis is present. The mean gradient is 4 mmHg at a heart rate of 56 bpm  •  The tricuspid valve is grossly normal in structure. Mild tricuspid valve regurgitation is present. Estimated right ventricular systolic pressure from tricuspid regurgitation is mildly elevated (35-45 mmHg). Calculated right ventricular systolic pressure from tricuspid regurgitation is 39.1  mmHg.    Pertinent Labs     Results from last 7 days   Lab Units 06/01/23  0607 05/31/23  0530 05/30/23 0341 05/29/23  0417   WBC 10*3/mm3 7.15 6.73 6.58 5.90   HEMOGLOBIN g/dL 10.6* 10.3* 10.6* 10.2*   PLATELETS 10*3/mm3 124* 125* 132* 107*     Results from last 7 days   Lab Units 06/01/23  0607 05/31/23 0530 05/30/23 0341 05/29/23 0417   SODIUM mmol/L 142 142 142 138   POTASSIUM mmol/L 3.4* 3.7 4.0 4.3   CHLORIDE mmol/L 106 107 106 107   CO2 mmol/L 26.2 27.0 28.0 21.8*   BUN mg/dL 10 9 8 8   CREATININE mg/dL 0.64 0.68 0.76 0.77   GLUCOSE mg/dL 88 87 84 83   EGFR mL/min/1.73 89.5 88.2 79.3 78.1     Results from last 7 days   Lab Units 06/01/23  0607 05/31/23 0530 05/30/23 0341 05/29/23  0417   ALBUMIN g/dL 2.8* 3.1* 3.1* 2.8*   BILIRUBIN mg/dL 0.3 0.2 0.2 0.3   ALK PHOS U/L 76 78 85 86   AST (SGOT) U/L 10 10 11 16   ALT (SGPT) U/L 6 7 7 11     Results from last 7 days   Lab Units 06/01/23  0607 05/31/23 0530 05/30/23 0341 05/29/23 0417 05/28/23  0441 05/27/23 0521 05/27/23  0125   CALCIUM mg/dL 8.1* 7.9* 8.3* 8.5*   < > 8.7 8.9   ALBUMIN g/dL 2.8* 3.1* 3.1* 2.8*  --   --  2.8*   MAGNESIUM mg/dL  --   --   --   --   --  2.2 2.2    < > = values in this interval not displayed.     Results from last 7 days   Lab Units 05/27/23  0125   LIPASE U/L 11*     Results from last 7 days   Lab Units 05/27/23  0521 05/27/23  0125   HSTROP T ng/L 26* 24*   PROBNP pg/mL  --  2,838.0*           Invalid input(s): LDLCALC  Results from last 7 days   Lab Units 05/30/23  1216 05/30/23  1203 05/27/23  0430 05/27/23  0156 05/27/23  0125   BLOODCX  No growth at 3 days No growth at 3 days  --  Aerococcus viridans* No growth at 5 days   URINECX   --   --  >100,000 CFU/mL Escherichia coli ESBL*  --   --    BCIDPCR   --   --   --  Negative by BCID PCR. Culture to Follow.  --      Results from last 7 days   Lab Units 05/27/23  0157   COVID19  Not Detected       Test Results Pending at Discharge     Pending Labs     Order Current  Status    Blood Culture - Blood, Arm, Left Preliminary result    Blood Culture - Blood, Arm, Right Preliminary result          Discharge Details        Discharge Medications      New Medications      Instructions Start Date   apixaban 5 MG tablet tablet  Commonly known as: ELIQUIS   5 mg, Oral, Every 12 Hours Scheduled      cetaphil lotion   Topical, As Needed      LORazepam 0.5 MG tablet  Commonly known as: ATIVAN   0.5 mg, Oral, 2 Times Daily PRN      sennosides-docusate 8.6-50 MG per tablet  Commonly known as: PERICOLACE   2 tablets, Oral, 2 Times Daily      vancomycin   1,500 mg, Intravenous, Every 24 Hours   Start Date: Allegra 3, 2023        Changes to Medications      Instructions Start Date   HYDROcodone-acetaminophen 7.5-325 MG per tablet  Commonly known as: NORCO  What changed: when to take this   1 tablet, Oral, Every 6 Hours PRN         Continue These Medications      Instructions Start Date   albuterol sulfate  (90 Base) MCG/ACT inhaler  Commonly known as: PROVENTIL HFA;VENTOLIN HFA;PROAIR HFA   2 puffs, Inhalation, Every 4 Hours PRN      Align 4 MG capsule   1 capsule, Oral, Daily      aspirin 81 MG chewable tablet   81 mg, Oral      busPIRone 30 MG tablet  Commonly known as: BUSPAR   30 mg, Oral, 2 Times Daily      fentaNYL 25 MCG/HR patch  Commonly known as: DURAGESIC   1 patch, Transdermal, Every 72 Hours      FLUoxetine 60 MG tablet  Commonly known as: PROzac   60 mg, Oral, Daily      levothyroxine 50 MCG tablet  Commonly known as: SYNTHROID, LEVOTHROID   50 mcg, Oral, Daily      Myrbetriq 50 MG tablet sustained-release 24 hour 24 hr tablet  Generic drug: Mirabegron ER   50 mg, Oral, Daily      pantoprazole 40 MG EC tablet  Commonly known as: PROTONIX   40 mg, Oral, Daily      potassium chloride 10 MEQ CR capsule  Commonly known as: MICRO-K   10 mEq, Oral, 2 Times Daily      QUEtiapine 50 MG tablet  Commonly known as: SEROquel   100 mg, Oral, Nightly      torsemide 100 MG tablet  Commonly known  as: DEMADEX   100 mg, Oral, See Admin Instructions, Take one tablet by mouth twice daily every other day      umeclidinium-vilanterol 62.5-25 MCG/INH aerosol powder  inhaler  Commonly known as: Anoro Ellipta   1 puff, Inhalation, Daily - RT             Allergies   Allergen Reactions   • Sulfa Antibiotics Other (See Comments)     Per facility paperwork   • Aspartame Other (See Comments)     CAUSES MIGRAINES   • Cephalexin Rash     Tolerated piperacillin-tazobactam (Zosyn) and ampicillin-sulbactam (Unasyn) during Feb 2019 admission.       Discharge Disposition:  Skilled Nursing Facility (DC - External)      Discharge Diet:  Diet Order   Procedures   • Diet: Cardiac Diets; Healthy Heart (2-3 Na+); Texture: Regular Texture (IDDSI 7); Fluid Consistency: Thin (IDDSI 0)       Discharge Activity:   Activity Instructions     Activity as Tolerated            CODE STATUS:    Code Status and Medical Interventions:   Ordered at: 05/27/23 1949     Medical Intervention Limits:    NO intubation (DNI)     Code Status (Patient has no pulse and is not breathing):    No CPR (Do Not Attempt to Resuscitate)     Medical Interventions (Patient has pulse or is breathing):    Limited Support       Future Appointments   Date Time Provider Department Center   6/2/2023  7:00 PM EMS MED [facility]      Contact information for follow-up providers     Cheng Guevara MD .    Specialty: Internal Medicine  Contact information:  [address] [place] [address] DR LOPEZ [address] [place] [address] [phone]                   Contact information for after-discharge care     Destination     [facility] .    Service: Skilled Nursing  Contact information:  [address] [place] [id number] [phone]                             Time Spent on Discharge:  Greater than 30 minutes spent on discharge management including final examination, discussion of hospital stay and patient education, preparation of records, medication  reconciliation, follow up planning      Shakir Bruner MD  [facility]  06/02/23  15:40 EDT

## 2023-06-02 NOTE — PROGRESS NOTES
"Enter Query Response Below      Query Response: - Metabolic encephalopathy             If applicable, please update the problem list.     Patient: Rose Cheema        : 1940  Account: [id number]           Admit Date:         How to Respond to this query:       a. Click New Note     b. Answer query within the yellow box.                c. Update the Problem List, if applicable.      If you have any questions about this query contact me at: [email or web address]        82 year old woman with PMH of CHF, PAF, CKD, COPD, admitted  with UTI and COPD exacerbation, also found to have Aerococcus viridans bacteremia.  H&P notes: \"She is confused ...  patient stating, \" I just do not know\".\"  Nursing note  states: \"Patient son at bedside and reports she has never been this confused/paranoid even with previous UTIs.\"   [facility]ursing assessments in [facility] show patient was initially disoriented x2 and by  note oriented x4.  Treatment included monitoring, reorienting, IV Rocephin, IV Invanz, IV vancomycin, po azithromycin.     Please document if the patient was being treated/monitored for:  - Metabolic encephalopathy  - Confusion only, no encephalopathy  - Other, please specify  - Unable to determine     By submitting this query, we are merely seeking further clarification of documentation to accurately reflect all conditions that you are monitoring, evaluating, treating or that extend the hospitalization or utilize additional resources of care. Please utilize your independent clinical judgment when addressing the question(s) above.     This query and your response, once completed, will be entered into the legal medical record.    Sincerely,   Mini Casanova RN, BSN  [email or web address]  Clinical Documentation Integrity Program  "

## 2023-06-02 NOTE — SIGNIFICANT NOTE
"   06/02/23 1143   PICC Single Lumen 06/02/23 Right Basilic   Placement date: If unknown, DO NOT use \"Add Comment\" note/Placement time: If unknown, DO NOT use \"Add Comment\" note: 06/02/23 1141   Hand Hygiene Completed: Yes  Size (Fr): 4  Description (optional): Lot#[id number]  EXP:04-  Length (cm): 40 cm  O...   Site Assessment Clean;Dry;Intact   #1 Lumen Status Blood return noted;Capped;Flushed;Normal saline locked   Length emma (cm) 40 cm   Extremity Circumference (cm) 36 cm   Dressing Type Securing device;Antimicrobial dressing/disc   Dressing Status Clean;Dry;Intact   Dressing Intervention New dressing   Dressing Change Due 06/09/23   Indication/Daily Review of Necessity long-term IV access >7 days     Sharp Count Correct: 3 needles, 2 guidewires, and 1 scalpel      "

## 2023-06-03 NOTE — CASE MANAGEMENT/SOCIAL WORK
Case Management Discharge Note      Final Note: Rosa Dos Santos for rehab on 6/2/23         Selected Continued Care - Discharged on 6/2/2023 Admission date: 5/26/2023 - Discharge disposition: Skilled Nursing Facility (DC - External)      Destination Coordination complete.      Service Provider Selected Services Address Phone Fax Patient Preferred    [facility] [address] [facility] [phone] --              Durable Medical Equipment    No services have been selected for the patient.                Dialysis/Infusion    No services have been selected for the patient.                Home Medical Care    No services have been selected for the patient.                Therapy    No services have been selected for the patient.                Community Resources    No services have been selected for the patient.                Community & DME    No services have been selected for the patient.                         Final Discharge Disposition Code: 03 - skilled nursing facility (SNF)

## 2023-06-04 LAB
BACTERIA SPEC AEROBE CULT: NORMAL
BACTERIA SPEC AEROBE CULT: NORMAL

## 2023-06-09 ENCOUNTER — HOSPITAL ENCOUNTER (INPATIENT)
Facility: HOSPITAL | Age: 83
LOS: 4 days | Discharge: NURSING, REHAB OR LONG-TERM CARE | End: 2023-06-14
Attending: EMERGENCY MEDICINE | Admitting: INTERNAL MEDICINE
Payer: MEDICARE

## 2023-06-09 ENCOUNTER — APPOINTMENT (OUTPATIENT)
Dept: ULTRASOUND IMAGING | Facility: HOSPITAL | Age: 83
End: 2023-06-09
Payer: MEDICARE

## 2023-06-09 DIAGNOSIS — D72.829 LEUKOCYTOSIS, UNSPECIFIED TYPE: ICD-10-CM
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
## 2023-06-09 DIAGNOSIS — R21 RASH: ICD-10-CM

## 2023-06-09 DIAGNOSIS — F41.1 GAD (GENERALIZED ANXIETY DISORDER): ICD-10-CM

## 2023-06-09 DIAGNOSIS — N17.9 AKI (ACUTE KIDNEY INJURY): ICD-10-CM

## 2023-06-09 LAB
ALBUMIN SERPL-MCNC: 2.7 G/DL (ref 3.5–5.2)
ALBUMIN/GLOB SERPL: 0.9 G/DL
ALP SERPL-CCNC: 86 U/L (ref 39–117)
ALT SERPL W P-5'-P-CCNC: 10 U/L (ref 1–33)
ANION GAP SERPL CALCULATED.3IONS-SCNC: 10.5 MMOL/L (ref 5–15)
APTT PPP: 31.7 SECONDS (ref 22.7–35.4)
AST SERPL-CCNC: 17 U/L (ref 1–32)
BACTERIA UR QL AUTO: ABNORMAL /HPF
BASOPHILS # BLD AUTO: 0.05 10*3/MM3 (ref 0–0.2)
BASOPHILS NFR BLD AUTO: 0.4 % (ref 0–1.5)
BILIRUB SERPL-MCNC: 0.4 MG/DL (ref 0–1.2)
BILIRUB UR QL STRIP: NEGATIVE
BUN SERPL-MCNC: 25 MG/DL (ref 8–23)
BUN/CREAT SERPL: 10.4 (ref 7–25)
CALCIUM SPEC-SCNC: 8.9 MG/DL (ref 8.6–10.5)
CHLORIDE SERPL-SCNC: 100 MMOL/L (ref 98–107)
CLARITY UR: CLEAR
CO2 SERPL-SCNC: 28.5 MMOL/L (ref 22–29)
COLOR UR: YELLOW
CREAT SERPL-MCNC: 2.4 MG/DL (ref 0.57–1)
D-LACTATE SERPL-SCNC: 1.3 MMOL/L (ref 0.5–2)
DEPRECATED RDW RBC AUTO: 45 FL (ref 37–54)
EGFRCR SERPLBLD CKD-EPI 2021: 20 ML/MIN/1.73
EOSINOPHIL # BLD AUTO: 0.33 10*3/MM3 (ref 0–0.4)
EOSINOPHIL NFR BLD AUTO: 2.5 % (ref 0.3–6.2)
ERYTHROCYTE [DISTWIDTH] IN BLOOD BY AUTOMATED COUNT: 12.9 % (ref 12.3–15.4)
GLOBULIN UR ELPH-MCNC: 3.1 GM/DL
GLUCOSE SERPL-MCNC: 103 MG/DL (ref 65–99)
GLUCOSE UR STRIP-MCNC: NEGATIVE MG/DL
HCT VFR BLD AUTO: 39.8 % (ref 34–46.6)
HGB BLD-MCNC: 13.1 G/DL (ref 12–15.9)
HGB UR QL STRIP.AUTO: ABNORMAL
HYALINE CASTS UR QL AUTO: ABNORMAL /LPF
IMM GRANULOCYTES # BLD AUTO: 0.06 10*3/MM3 (ref 0–0.05)
IMM GRANULOCYTES NFR BLD AUTO: 0.5 % (ref 0–0.5)
INR PPP: 1.36 (ref 0.9–1.1)
KETONES UR QL STRIP: NEGATIVE
LEUKOCYTE ESTERASE UR QL STRIP.AUTO: ABNORMAL
LYMPHOCYTES # BLD AUTO: 1.16 10*3/MM3 (ref 0.7–3.1)
LYMPHOCYTES NFR BLD AUTO: 8.9 % (ref 19.6–45.3)
MCH RBC QN AUTO: 31.4 PG (ref 26.6–33)
MCHC RBC AUTO-ENTMCNC: 32.9 G/DL (ref 31.5–35.7)
MCV RBC AUTO: 95.4 FL (ref 79–97)
MONOCYTES # BLD AUTO: 0.72 10*3/MM3 (ref 0.1–0.9)
MONOCYTES NFR BLD AUTO: 5.5 % (ref 5–12)
NEUTROPHILS NFR BLD AUTO: 10.77 10*3/MM3 (ref 1.7–7)
NEUTROPHILS NFR BLD AUTO: 82.2 % (ref 42.7–76)
NITRITE UR QL STRIP: NEGATIVE
NRBC BLD AUTO-RTO: 0 /100 WBC (ref 0–0.2)
PH UR STRIP.AUTO: <=5 [PH] (ref 5–8)
PLATELET # BLD AUTO: 189 10*3/MM3 (ref 140–450)
PMV BLD AUTO: 11.4 FL (ref 6–12)
POTASSIUM SERPL-SCNC: 4.8 MMOL/L (ref 3.5–5.2)
PROCALCITONIN SERPL-MCNC: 0.11 NG/ML (ref 0–0.25)
PROT SERPL-MCNC: 5.8 G/DL (ref 6–8.5)
PROT UR QL STRIP: ABNORMAL
PROTHROMBIN TIME: 17 SECONDS (ref 11.7–14.2)
RBC # BLD AUTO: 4.17 10*6/MM3 (ref 3.77–5.28)
RBC # UR STRIP: ABNORMAL /HPF
REF LAB TEST METHOD: ABNORMAL
SODIUM SERPL-SCNC: 139 MMOL/L (ref 136–145)
SP GR UR STRIP: 1.02 (ref 1–1.03)
SQUAMOUS #/AREA URNS HPF: ABNORMAL /HPF
UROBILINOGEN UR QL STRIP: ABNORMAL
WBC # UR STRIP: ABNORMAL /HPF
WBC NRBC COR # BLD: 13.09 10*3/MM3 (ref 3.4–10.8)

## 2023-06-09 PROCEDURE — 85730 THROMBOPLASTIN TIME PARTIAL: CPT | Performed by: EMERGENCY MEDICINE

## 2023-06-09 PROCEDURE — 76775 US EXAM ABDO BACK WALL LIM: CPT

## 2023-06-09 PROCEDURE — 83605 ASSAY OF LACTIC ACID: CPT | Performed by: EMERGENCY MEDICINE

## 2023-06-09 PROCEDURE — 81001 URINALYSIS AUTO W/SCOPE: CPT | Performed by: EMERGENCY MEDICINE

## 2023-06-09 PROCEDURE — 82436 ASSAY OF URINE CHLORIDE: CPT | Performed by: INTERNAL MEDICINE

## 2023-06-09 PROCEDURE — 82570 ASSAY OF URINE CREATININE: CPT | Performed by: INTERNAL MEDICINE

## 2023-06-09 PROCEDURE — 36415 COLL VENOUS BLD VENIPUNCTURE: CPT

## 2023-06-09 PROCEDURE — 85610 PROTHROMBIN TIME: CPT | Performed by: EMERGENCY MEDICINE

## 2023-06-09 PROCEDURE — 85025 COMPLETE CBC W/AUTO DIFF WBC: CPT | Performed by: EMERGENCY MEDICINE

## 2023-06-09 PROCEDURE — 84300 ASSAY OF URINE SODIUM: CPT | Performed by: INTERNAL MEDICINE

## 2023-06-09 PROCEDURE — G0378 HOSPITAL OBSERVATION PER HR: HCPCS

## 2023-06-09 PROCEDURE — 84156 ASSAY OF PROTEIN URINE: CPT | Performed by: INTERNAL MEDICINE

## 2023-06-09 PROCEDURE — 99285 EMERGENCY DEPT VISIT HI MDM: CPT

## 2023-06-09 PROCEDURE — 87040 BLOOD CULTURE FOR BACTERIA: CPT | Performed by: EMERGENCY MEDICINE

## 2023-06-09 PROCEDURE — 80053 COMPREHEN METABOLIC PANEL: CPT | Performed by: EMERGENCY MEDICINE

## 2023-06-09 PROCEDURE — 84145 PROCALCITONIN (PCT): CPT | Performed by: EMERGENCY MEDICINE

## 2023-06-09 RX ORDER — SODIUM CHLORIDE 9 MG/ML
40 INJECTION, SOLUTION INTRAVENOUS AS NEEDED
Status: DISCONTINUED | OUTPATIENT
Start: 2023-06-09 | End: 2023-06-14 | Stop reason: HOSPADM

## 2023-06-09 RX ORDER — AMOXICILLIN 250 MG
2 CAPSULE ORAL 2 TIMES DAILY
Status: DISCONTINUED | OUTPATIENT
Start: 2023-06-09 | End: 2023-06-14 | Stop reason: HOSPADM

## 2023-06-09 RX ORDER — ASPIRIN 81 MG/1
81 TABLET, CHEWABLE ORAL DAILY
Status: DISCONTINUED | OUTPATIENT
Start: 2023-06-09 | End: 2023-06-14 | Stop reason: HOSPADM

## 2023-06-09 RX ORDER — DIPHENHYDRAMINE HCL 25 MG
25 CAPSULE ORAL EVERY 6 HOURS PRN
COMMUNITY

## 2023-06-09 RX ORDER — ALBUTEROL SULFATE 2.5 MG/3ML
2.5 SOLUTION RESPIRATORY (INHALATION) EVERY 6 HOURS PRN
Status: DISCONTINUED | OUTPATIENT
Start: 2023-06-09 | End: 2023-06-14 | Stop reason: HOSPADM

## 2023-06-09 RX ORDER — SODIUM CHLORIDE 9 MG/ML
75 INJECTION, SOLUTION INTRAVENOUS CONTINUOUS
Status: DISCONTINUED | OUTPATIENT
Start: 2023-06-09 | End: 2023-06-11

## 2023-06-09 RX ORDER — LEVOTHYROXINE SODIUM 0.05 MG/1
50 TABLET ORAL
Status: DISCONTINUED | OUTPATIENT
Start: 2023-06-10 | End: 2023-06-14 | Stop reason: HOSPADM

## 2023-06-09 RX ORDER — ACETAMINOPHEN 160 MG/5ML
650 SOLUTION ORAL EVERY 4 HOURS PRN
Status: DISCONTINUED | OUTPATIENT
Start: 2023-06-09 | End: 2023-06-14 | Stop reason: HOSPADM

## 2023-06-09 RX ORDER — IPRATROPIUM BROMIDE AND ALBUTEROL SULFATE 2.5; .5 MG/3ML; MG/3ML
3 SOLUTION RESPIRATORY (INHALATION)
Status: DISCONTINUED | OUTPATIENT
Start: 2023-06-09 | End: 2023-06-14 | Stop reason: HOSPADM

## 2023-06-09 RX ORDER — BISACODYL 10 MG
10 SUPPOSITORY, RECTAL RECTAL AS NEEDED
COMMUNITY

## 2023-06-09 RX ORDER — L.ACID,PARA/B.BIFIDUM/S.THERM 8B CELL
1 CAPSULE ORAL DAILY
Status: DISCONTINUED | OUTPATIENT
Start: 2023-06-09 | End: 2023-06-14 | Stop reason: HOSPADM

## 2023-06-09 RX ORDER — NITROGLYCERIN 0.4 MG/1
0.4 TABLET SUBLINGUAL
Status: DISCONTINUED | OUTPATIENT
Start: 2023-06-09 | End: 2023-06-14 | Stop reason: HOSPADM

## 2023-06-09 RX ORDER — FLUOXETINE HYDROCHLORIDE 20 MG/1
60 CAPSULE ORAL DAILY
Status: DISCONTINUED | OUTPATIENT
Start: 2023-06-09 | End: 2023-06-14 | Stop reason: HOSPADM

## 2023-06-09 RX ORDER — ENEMA 19; 7 G/133ML; G/133ML
1 ENEMA RECTAL ONCE AS NEEDED
COMMUNITY
End: 2023-06-14 | Stop reason: HOSPADM

## 2023-06-09 RX ORDER — SODIUM CHLORIDE 0.9 % (FLUSH) 0.9 %
10 SYRINGE (ML) INJECTION EVERY 12 HOURS SCHEDULED
Status: DISCONTINUED | OUTPATIENT
Start: 2023-06-09 | End: 2023-06-14 | Stop reason: HOSPADM

## 2023-06-09 RX ORDER — SODIUM CHLORIDE 0.9 % (FLUSH) 0.9 %
10 SYRINGE (ML) INJECTION AS NEEDED
Status: DISCONTINUED | OUTPATIENT
Start: 2023-06-09 | End: 2023-06-14 | Stop reason: HOSPADM

## 2023-06-09 RX ORDER — NYSTATIN 100000 [USP'U]/G
1 POWDER TOPICAL 3 TIMES DAILY PRN
Status: DISCONTINUED | OUTPATIENT
Start: 2023-06-09 | End: 2023-06-14 | Stop reason: HOSPADM

## 2023-06-09 RX ORDER — PANTOPRAZOLE SODIUM 40 MG/1
40 TABLET, DELAYED RELEASE ORAL DAILY
Status: DISCONTINUED | OUTPATIENT
Start: 2023-06-09 | End: 2023-06-14 | Stop reason: HOSPADM

## 2023-06-09 RX ORDER — QUETIAPINE FUMARATE 100 MG/1
100 TABLET, FILM COATED ORAL NIGHTLY
Status: DISCONTINUED | OUTPATIENT
Start: 2023-06-09 | End: 2023-06-14 | Stop reason: HOSPADM

## 2023-06-09 RX ORDER — BISACODYL 10 MG
10 SUPPOSITORY, RECTAL RECTAL AS NEEDED
Status: DISCONTINUED | OUTPATIENT
Start: 2023-06-09 | End: 2023-06-14 | Stop reason: HOSPADM

## 2023-06-09 RX ORDER — ONDANSETRON 2 MG/ML
4 INJECTION INTRAMUSCULAR; INTRAVENOUS EVERY 6 HOURS PRN
Status: DISCONTINUED | OUTPATIENT
Start: 2023-06-09 | End: 2023-06-14 | Stop reason: HOSPADM

## 2023-06-09 RX ORDER — LORAZEPAM 0.5 MG/1
0.5 TABLET ORAL 2 TIMES DAILY PRN
Status: DISCONTINUED | OUTPATIENT
Start: 2023-06-09 | End: 2023-06-14 | Stop reason: HOSPADM

## 2023-06-09 RX ORDER — HYDROCODONE BITARTRATE AND ACETAMINOPHEN 7.5; 325 MG/1; MG/1
1 TABLET ORAL EVERY 6 HOURS PRN
Status: DISCONTINUED | OUTPATIENT
Start: 2023-06-09 | End: 2023-06-14 | Stop reason: HOSPADM

## 2023-06-09 RX ORDER — DIPHENHYDRAMINE HCL 25 MG
25 CAPSULE ORAL EVERY 6 HOURS PRN
Status: DISCONTINUED | OUTPATIENT
Start: 2023-06-09 | End: 2023-06-14 | Stop reason: HOSPADM

## 2023-06-09 RX ORDER — ACETAMINOPHEN 650 MG/1
650 SUPPOSITORY RECTAL EVERY 4 HOURS PRN
Status: DISCONTINUED | OUTPATIENT
Start: 2023-06-09 | End: 2023-06-14 | Stop reason: HOSPADM

## 2023-06-09 RX ORDER — ACETAMINOPHEN 325 MG/1
650 TABLET ORAL EVERY 4 HOURS PRN
Status: DISCONTINUED | OUTPATIENT
Start: 2023-06-09 | End: 2023-06-14 | Stop reason: HOSPADM

## 2023-06-09 RX ORDER — NYSTATIN 100000 [USP'U]/G
1 POWDER TOPICAL 3 TIMES DAILY PRN
COMMUNITY

## 2023-06-09 RX ADMIN — MIRABEGRON 25 MG: 25 TABLET, FILM COATED, EXTENDED RELEASE ORAL at 21:51

## 2023-06-09 RX ADMIN — ASPIRIN 81 MG: 81 TABLET, CHEWABLE ORAL at 21:51

## 2023-06-09 RX ADMIN — HYDROCODONE BITARTRATE AND ACETAMINOPHEN 1 TABLET: 7.5; 325 TABLET ORAL at 19:50

## 2023-06-09 RX ADMIN — DOCUSATE SODIUM 50MG AND SENNOSIDES 8.6MG 2 TABLET: 8.6; 5 TABLET, FILM COATED ORAL at 21:51

## 2023-06-09 RX ADMIN — SODIUM CHLORIDE 75 ML/HR: 9 INJECTION, SOLUTION INTRAVENOUS at 22:11

## 2023-06-09 RX ADMIN — APIXABAN 2.5 MG: 2.5 TABLET, FILM COATED ORAL at 21:53

## 2023-06-09 RX ADMIN — SODIUM CHLORIDE 1000 ML: 9 INJECTION, SOLUTION INTRAVENOUS at 13:57

## 2023-06-09 RX ADMIN — PANTOPRAZOLE SODIUM 40 MG: 40 TABLET, DELAYED RELEASE ORAL at 21:52

## 2023-06-09 RX ADMIN — QUETIAPINE FUMARATE 100 MG: 100 TABLET ORAL at 21:51

## 2023-06-09 RX ADMIN — Medication 10 ML: at 21:53

## 2023-06-09 RX ADMIN — FLUOXETINE HYDROCHLORIDE 60 MG: 20 CAPSULE ORAL at 21:51

## 2023-06-09 RX ADMIN — LORAZEPAM 0.5 MG: 0.5 TABLET ORAL at 21:52

## 2023-06-09 RX ADMIN — Medication 1 CAPSULE: at 21:51

## 2023-06-09 NOTE — ED PROVIDER NOTES
EMERGENCY DEPARTMENT ENCOUNTER  I wore full protective equipment throughout this patient encounter including a N95 mask, eye shield, gown and gloves. Hand hygiene was performed before donning protective equipment and after removal when leaving the room.    Room Number:  36/36  Date of encounter:  6/9/2023  PCP: Provider, No Known  Patient Care Team:  Provider, No Known as PCP - General (Family Medicine)     HPI:  Context: Rose Cheema is a 82 y.o. female who presents to the ED c/o chief complaint of rash.  Patient is a poor historian, history limited.  Patient reports that she was just discharged from this facility, sent back in secondary to rash.  Patient reports that rash was there with a hospitalization, is unable to say if anything has changed.  Patient does report its been itching.  Patient denies any abdominal pain, no nausea vomiting, no urinary symptoms, no chest pain or shortness of breath, no fevers.    MEDICAL HISTORY REVIEW  Reviewed in EPIC    PAST MEDICAL HISTORY  Active Ambulatory Problems     Diagnosis Date Noted    Urinary incontinence 08/24/2017    Urinary frequency 08/30/2017    Generalized abdominal pain 02/12/2019    Pulmonary hypertension  02/12/2019    Benign essential hypertension 09/22/2014    Bilateral lower extremity edema (chronic) 05/17/2018    CAD (coronary artery disease), native coronary artery 11/02/2015    Diastolic dysfunction 12/22/2014    Class 3 severe obesity with serious comorbidity and body mass index (BMI) of 50.0 to 59.9 in adult 09/22/2014    Obstructive sleep apnea 09/22/2014    Secondary pulmonary arterial hypertension 09/22/2014    H/O: hysterectomy 09/22/2014    Fibromyalgia 02/12/2019    Hx SBO 02/12/2019    Hx of cholecystectomy 02/13/2019    Hypoglycemia 02/13/2019    Fall 02/13/2019    Hypothyroidism 02/13/2019    Chronic pain syndrome 02/13/2019    Anemia 02/13/2019    Pneumonia of right lower lobe due to infectious organism 04/10/2019    Acute UTI (urinary tract  infection) 05/07/2019    Acute on chronic diastolic (congestive) heart failure 05/08/2019    Opioid dependence 05/08/2019    Venous stasis dermatitis of both lower extremities 05/08/2019    Gram-negative bacteremia 05/08/2019    Hypokalemia 05/09/2019    E. coli bacteremia 05/09/2019    Anemia of chronic disease 05/10/2019    Sepsis without acute organ dysfunction - present on admit 05/14/2020    CKD (chronic kidney disease) stage 3, GFR 30-59 ml/min 05/14/2020    Bacteremia 05/15/2020    Enterococcal septicemia 05/17/2020    Asymptomatic bacteriuria 05/17/2020    Bacteremia 06/01/2023    PAF (paroxysmal atrial fibrillation) 06/01/2023     Resolved Ambulatory Problems     Diagnosis Date Noted    Chronic obstructive pulmonary disease with acute exacerbation 09/22/2014    SBO (small bowel obstruction) 09/15/2020    Nausea & vomiting 09/15/2020    Small bowel obstruction 09/15/2020    COPD exacerbation 05/27/2023     Past Medical History:   Diagnosis Date    Anxiety     Arthritis     CHF (congestive heart failure)     Coronary artery disease     Depression     Disease of thyroid gland     GERD (gastroesophageal reflux disease)     Hypertension     Moderate tricuspid valve stenosis     Osteoporosis     Peripheral neuropathy     Stenosis of mitral valve        PAST SURGICAL HISTORY  Past Surgical History:   Procedure Laterality Date    BILATERAL OOPHORECTOMY      CARDIAC CATHETERIZATION      CHOLECYSTECTOMY      ERCP N/A 2/15/2019    Procedure: ENDOSCOPIC RETROGRADE CHOLANGIOPANCREATOGRAPHY WITH PARTIAL CHOLANGIOGRAM;  Surgeon: Gerald Herr MD;  Location: [facility] ENDOSCOPY;  Service: Gastroenterology    EXPLORATORY LAPAROTOMY      GASTRIC BYPASS      HERNIA REPAIR      inguinal and ventral    HYSTERECTOMY      OVARIAN CYST REMOVAL         FAMILY HISTORY  History reviewed. No pertinent family history.    SOCIAL HISTORY  Social History     Socioeconomic History    Marital status:    Tobacco Use    Smoking  status: Former    Smokeless tobacco: Never   Vaping Use    Vaping Use: Never used   Substance and Sexual Activity    Alcohol use: No    Drug use: No    Sexual activity: Never       ALLERGIES  Sulfa antibiotics, Aspartame, and Cephalexin    The patient's allergies have been reviewed    REVIEW OF SYSTEMS  All systems reviewed and negative except for those discussed in HPI.     PHYSICAL EXAM  I have reviewed the triage vital signs and nursing notes.  ED Triage Vitals   Temp Heart Rate Resp BP SpO2   06/09/23 0842 06/09/23 0842 06/09/23 0842 06/09/23 0842 06/09/23 0842   98.2 °F (36.8 °C) 106 16 (!) 100/0 95 %      Temp src Heart Rate Source Patient Position BP Location FiO2 (%)   -- 06/09/23 0916 06/09/23 0918 06/09/23 0918 --    Monitor Lying Right arm        General: No acute distress.  HENT: NCAT, PERRL, Nares patent.  Eyes: no scleral icterus.  Neck: trachea midline, no ROM limitations.  CV: regular rhythm, regular rate.  Respiratory: normal effort, CTAB.  Abdomen: soft, nondistended, NTTP, no rebound tenderness, no guarding or rigidity.  Musculoskeletal: no deformity.  Neuro: alert, moves all extremities, follows commands.  Skin: warm, dry.  Erythematous lacy reticular rash bilateral lower extremities extending to mid thigh.  Patient has bruising on lower legs with trace petechia.  No signs of infection.    LAB RESULTS  Recent Results (from the past 24 hour(s))   Comprehensive Metabolic Panel    Collection Time: 06/09/23 10:55 AM    Specimen: Blood   Result Value Ref Range    Glucose 103 (H) 65 - 99 mg/dL    BUN 25 (H) 8 - 23 mg/dL    Creatinine 2.40 (H) 0.57 - 1.00 mg/dL    Sodium 139 136 - 145 mmol/L    Potassium 4.8 3.5 - 5.2 mmol/L    Chloride 100 98 - 107 mmol/L    CO2 28.5 22.0 - 29.0 mmol/L    Calcium 8.9 8.6 - 10.5 mg/dL    Total Protein 5.8 (L) 6.0 - 8.5 g/dL    Albumin 2.7 (L) 3.5 - 5.2 g/dL    ALT (SGPT) 10 1 - 33 U/L    AST (SGOT) 17 1 - 32 U/L    Alkaline Phosphatase 86 39 - 117 U/L    Total Bilirubin  0.4 0.0 - 1.2 mg/dL    Globulin 3.1 gm/dL    A/G Ratio 0.9 g/dL    BUN/Creatinine Ratio 10.4 7.0 - 25.0    Anion Gap 10.5 5.0 - 15.0 mmol/L    eGFR 20.0 (L) >60.0 mL/min/1.73   Lactic Acid, Plasma    Collection Time: 06/09/23 10:55 AM    Specimen: Blood   Result Value Ref Range    Lactate 1.3 0.5 - 2.0 mmol/L   Procalcitonin    Collection Time: 06/09/23 10:55 AM    Specimen: Blood   Result Value Ref Range    Procalcitonin 0.11 0.00 - 0.25 ng/mL   CBC Auto Differential    Collection Time: 06/09/23 10:55 AM    Specimen: Blood   Result Value Ref Range    WBC 13.09 (H) 3.40 - 10.80 10*3/mm3    RBC 4.17 3.77 - 5.28 10*6/mm3    Hemoglobin 13.1 12.0 - 15.9 g/dL    Hematocrit 39.8 34.0 - 46.6 %    MCV 95.4 79.0 - 97.0 fL    MCH 31.4 26.6 - 33.0 pg    MCHC 32.9 31.5 - 35.7 g/dL    RDW 12.9 12.3 - 15.4 %    RDW-SD 45.0 37.0 - 54.0 fl    MPV 11.4 6.0 - 12.0 fL    Platelets 189 140 - 450 10*3/mm3    Neutrophil % 82.2 (H) 42.7 - 76.0 %    Lymphocyte % 8.9 (L) 19.6 - 45.3 %    Monocyte % 5.5 5.0 - 12.0 %    Eosinophil % 2.5 0.3 - 6.2 %    Basophil % 0.4 0.0 - 1.5 %    Immature Grans % 0.5 0.0 - 0.5 %    Neutrophils, Absolute 10.77 (H) 1.70 - 7.00 10*3/mm3    Lymphocytes, Absolute 1.16 0.70 - 3.10 10*3/mm3    Monocytes, Absolute 0.72 0.10 - 0.90 10*3/mm3    Eosinophils, Absolute 0.33 0.00 - 0.40 10*3/mm3    Basophils, Absolute 0.05 0.00 - 0.20 10*3/mm3    Immature Grans, Absolute 0.06 (H) 0.00 - 0.05 10*3/mm3    nRBC 0.0 0.0 - 0.2 /100 WBC   Protime-INR    Collection Time: 06/09/23 10:55 AM    Specimen: Blood   Result Value Ref Range    Protime 17.0 (H) 11.7 - 14.2 Seconds    INR 1.36 (H) 0.90 - 1.10   aPTT    Collection Time: 06/09/23 10:55 AM    Specimen: Blood   Result Value Ref Range    PTT 31.7 22.7 - 35.4 seconds       I ordered the above labs and reviewed the results.    RADIOLOGY  No Radiology Exams Resulted Within Past 24 Hours    I ordered the above noted radiological studies. I reviewed the images and results. I agree  with the radiologist interpretation.    PROCEDURES  Procedures    MEDICATIONS GIVEN IN ER  Medications   sodium chloride 0.9 % bolus 1,000 mL (has no administration in time range)       PROGRESS, DATA ANALYSIS, CONSULTS, AND MEDICAL DECISION MAKING  A complete history and physical exam have been performed.  All available laboratory and imaging results have been reviewed by myself prior to disposition.    MDM    After the initial H&P, I discussed pertinent information from history and physical exam with patient/family.  Discussed differential diagnosis.  Discussed plan for ED evaluation/workup/treatment.  All questions answered.  Patient/family is agreeable with plan.  ED Course as of 06/09/23 1325   Fri Jun 09, 2023   0963 Ankle history reviewed and significant for: Patient was last admitted to the hospital on 26th of last month, discharged on the second of this month.  82-year-old female with history of hypertension, coronary artery disease, CHF, COPD, chronic kidney disease, sleep apnea.  Patient admitted for altered mental status, found to have ESBL E. coli urinary tract infection, completed treatment with Ibrance.  Patient was seen in consultation with infectious disease.  Blood cultures grew Aerococcus viridans with repeat cultures negative.  ID recommended 14 days of vancomycin with stop date of 6/13/2023.  During admission patient was noted to have chronic redness to her lower legs. [JG]   7506 I reviewed paperwork from facility, patient had lab work performed on the third of this month, at that time kidney function was normal with a creatinine of 0.7.  Patient had repeat lab work performed on the eighth, significant TOI with creatinine increasing to 2.5, BUN of 27, potassium normal. [JG]   0660 Patient reassessed.  Discussed ED findings, differential diagnosis, and the need for admission for evaluation/treatment.  They are agreeable to admission and all questions were answered.     [JG]   1321 Phone call  with Dr Ponce [facility].  Discussed the patient, relevant history, exam, diagnostics, ED findings/progress, and concerns. They agree to admit the patient to telemetry observation. Care assumed by the admitting physician at this time.       [JG]      ED Course User Index  [JG] Dash Solorio MD       AS OF 13:25 EDT VITALS:    BP - 104/40  HR - 89  TEMP - 98.2 °F (36.8 °C)  O2 SATS - 95%    DIAGNOSIS  Final diagnoses:   TOI (acute kidney injury)   Leukocytosis, unspecified type   Rash         DISPOSITION  ADMISSION    Discussed treatment plan and reason for admission with pt/family and admitting physician.  Pt/family voiced understanding of the plan for admission for further testing/treatment as needed.        Dash Solorio MD  06/09/23 4209

## 2023-06-09 NOTE — PROGRESS NOTES
Clinical Pharmacy Services: Medication History    Rose Cheema is a 82 y.o. female presenting to [facility] for   Chief Complaint   Patient presents with    Rash       She  has a past medical history of Anemia, Anxiety, Arthritis, CHF (congestive heart failure), Coronary artery disease, Depression, Disease of thyroid gland, Fibromyalgia, GERD (gastroesophageal reflux disease), Hypertension, Moderate tricuspid valve stenosis, Osteoporosis, Peripheral neuropathy, Pulmonary hypertension, SBO (small bowel obstruction), and Stenosis of mitral valve.    Allergies as of 06/09/2023 - Reviewed 06/09/2023   Allergen Reaction Noted    Sulfa antibiotics Other (See Comments) 05/27/2023    Aspartame Other (See Comments) 02/14/2014    Cephalexin Rash 02/13/2014       Medication information was obtained from: long-term Paperwork   Pharmacy and Phone Number:     Prior to Admission Medications       Prescriptions Last Dose Informant Patient Reported? Taking?    albuterol sulfate  (90 Base) MCG/ACT inhaler  Nursing Home Yes Yes    Inhale 2 puffs Every 4 (Four) Hours As Needed for Wheezing.    apixaban (ELIQUIS) 5 MG tablet tablet  Nursing Home No Yes    Take 1 tablet by mouth Every 12 (Twelve) Hours. Indications: Atrial Fibrillation    aspirin 81 MG chewable tablet  Nursing Home Yes Yes    Chew 1 tablet Daily.    busPIRone (BUSPAR) 30 MG tablet  Nursing Home Yes Yes    Take 1 tablet by mouth 2 (Two) Times a Day.    diphenhydrAMINE (BENADRYL) 25 mg capsule  Nursing Home Yes Yes    Take 1 capsule by mouth Every 6 (Six) Hours As Needed for Itching.    fentaNYL (DURAGESIC) 25 MCG/HR patch  Nursing Home No Yes    Place 1 patch on the skin as directed by provider Every 72 (Seventy-Two) Hours.    FLUoxetine (PROzac) 60 MG tablet  Nursing Home Yes Yes    Take 1 tablet by mouth Daily.    HYDROcodone-acetaminophen (NORCO) 7.5-325 MG per tablet  Nursing Home No Yes    Take 1 tablet by mouth Every 6 (Six) Hours As Needed  for Severe Pain.    levothyroxine (SYNTHROID, LEVOTHROID) 50 MCG tablet  Nursing Home Yes Yes    Take 1 tablet by mouth Daily.    LORazepam (ATIVAN) 0.5 MG tablet  Nursing Home No Yes    Take 1 tablet by mouth 2 (Two) Times a Day As Needed for Anxiety.    magnesium hydroxide (MILK OF MAGNESIA) 400 MG/5ML suspension  Nursing Home Yes Yes    Take 30 mL by mouth Daily As Needed for Constipation.    Mirabegron ER (MYRBETRIQ) 50 MG tablet sustained-release 24 hour 24 hr tablet  Nursing Home Yes Yes    Take 50 mg by mouth Daily.    nystatin (MYCOSTATIN) 091744 UNIT/GM powder  Nursing Home Yes Yes    Apply 1 application topically to the appropriate area as directed 3 (Three) Times a Day As Needed.    pantoprazole (PROTONIX) 40 MG EC tablet  Nursing Home Yes Yes    Take 1 tablet by mouth Daily.    potassium chloride (MICRO-K) 10 MEQ CR capsule  Nursing Home Yes Yes    Take 1 capsule by mouth 2 (Two) Times a Day.    Probiotic Product (Align) 4 MG capsule  Nursing Home Yes Yes    Take 1 capsule by mouth Daily.    QUEtiapine (SEROquel) 100 MG tablet  Nursing Home Yes Yes    Take 1 tablet by mouth Every Night.    sennosides-docusate (PERICOLACE) 8.6-50 MG per tablet  Nursing Home No Yes    Take 2 tablets by mouth 2 (Two) Times a Day.    torsemide (DEMADEX) 100 MG tablet  Nursing Home Yes Yes    Take 1 tablet by mouth Every Other Day.    umeclidinium-vilanterol (ANORO ELLIPTA) 62.5-25 MCG/INH aerosol powder  inhaler  Nursing Home No Yes    Inhale 1 puff Daily.    bisacodyl (DULCOLAX) 10 MG suppository  Nursing Home Yes No    Insert 1 suppository into the rectum As Needed for Constipation.    Sodium Phosphates (fleet enema) 7-19 GM/118ML enema  Nursing Home Yes No    Insert 1 enema into the rectum 1 (One) Time As Needed.              Medication notes:     This medication list is complete to the best of my knowledge as of 6/9/2023    Please call if questions.    Kaleb Dodge  Medication History Technician   [phone]    6/9/2023  13:14 EDT

## 2023-06-09 NOTE — ED NOTES
Pt to ED from [facility] for possible Red Man Syndrome. Pt was recently put on antibiotics for UTI and now has rash and redness all over body. Pt labs also show kidney damage per facility. Pt is chronically on 3L O2. Pt was given benadryl last night but not given today because they wanted ED to see the whole situation.

## 2023-06-09 NOTE — ED NOTES
".Nursing report ED to floor  Rose Cheema  82 y.o.  female    HPI :   Chief Complaint   Patient presents with    Rash       Admitting doctor:   Osvaldo Ponce MD    Admitting diagnosis:   The primary encounter diagnosis was TOI (acute kidney injury). Diagnoses of Leukocytosis, unspecified type and Rash were also pertinent to this visit.    Code status:   Current Code Status       Date Active Code Status Order ID Comments User Context       Prior            Allergies:   Sulfa antibiotics, Aspartame, and Cephalexin    Isolation:   No active isolations    Intake and Output  No intake or output data in the 24 hours ending 06/09/23 1346    Weight:       06/09/23  0916   Weight: 127 kg (281 lb)       Most recent vitals:   Vitals:    06/09/23 0916 06/09/23 0918 06/09/23 1040 06/09/23 1345   BP:  115/73 104/40    BP Location:  Right arm     Patient Position:  Lying     Pulse: 77  89 84   Resp:   18 18   Temp:       SpO2:   95% 97%   Weight: 127 kg (281 lb)      Height: 160 cm (63\")          Active LDAs/IV Access:   Lines, Drains & Airways       Active LDAs       Name Placement date Placement time Site Days    PICC Single Lumen 06/02/23 Right Basilic 06/02/23  1141  Basilic  7    External Urinary Catheter 05/27/23  0219  --  13                    Labs (abnormal labs have a star):   Labs Reviewed   COMPREHENSIVE METABOLIC PANEL - Abnormal; Notable for the following components:       Result Value    Glucose 103 (*)     BUN 25 (*)     Creatinine 2.40 (*)     Total Protein 5.8 (*)     Albumin 2.7 (*)     eGFR 20.0 (*)     All other components within normal limits    Narrative:     GFR Normal >60  Chronic Kidney Disease <60  Kidney Failure <15    The GFR formula is only valid for adults with stable renal function between ages 18 and 70.   URINALYSIS W/ MICROSCOPIC IF INDICATED (NO CULTURE) - Abnormal; Notable for the following components:    Blood, UA Trace (*)     Protein, UA Trace (*)     Leuk Esterase, UA Moderate (2+) (*)     " "All other components within normal limits   CBC WITH AUTO DIFFERENTIAL - Abnormal; Notable for the following components:    WBC 13.09 (*)     Neutrophil % 82.2 (*)     Lymphocyte % 8.9 (*)     Neutrophils, Absolute 10.77 (*)     Immature Grans, Absolute 0.06 (*)     All other components within normal limits   PROTIME-INR - Abnormal; Notable for the following components:    Protime 17.0 (*)     INR 1.36 (*)     All other components within normal limits   URINALYSIS, MICROSCOPIC ONLY - Abnormal; Notable for the following components:    WBC, UA 3-5 (*)     All other components within normal limits   LACTIC ACID, PLASMA - Normal   PROCALCITONIN - Normal    Narrative:     As a Marker for Sepsis (Non-Neonates):    1. <0.5 ng/mL represents a low risk of severe sepsis and/or septic shock.  2. >2 ng/mL represents a high risk of severe sepsis and/or septic shock.    As a Marker for Lower Respiratory Tract Infections that require antibiotic therapy:    PCT on Admission    Antibiotic Therapy       6-12 Hrs later    >0.5                Strongly Recommended  >0.25 - <0.5        Recommended   0.1 - 0.25          Discouraged              Remeasure/reassess PCT  <0.1                Strongly Discouraged     Remeasure/reassess PCT    As 28 day mortality risk marker: \"Change in Procalcitonin Result\" (>80% or <=80%) if Day 0 (or Day 1) and Day 4 values are available. Refer to http://www.[employer]-pct-calculator.com    Change in PCT <=80%  A decrease of PCT levels below or equal to 80% defines a positive change in PCT test result representing a higher risk for 28-day all-cause mortality of patients diagnosed with severe sepsis for septic shock.    Change in PCT >80%  A decrease of PCT levels of more than 80% defines a negative change in PCT result representing a lower risk for 28-day all-cause mortality of patients diagnosed with severe sepsis or septic shock.      APTT - Normal   BLOOD CULTURE   BLOOD CULTURE   CBC AND DIFFERENTIAL    " Narrative:     The following orders were created for panel order CBC & Differential.  Procedure                               Abnormality         Status                     ---------                               -----------         ------                     CBC Auto Differential[[id number]]        Abnormal            Final result                 Please view results for these tests on the individual orders.       EKG:   No orders to display       Meds given in ED:   Medications   sodium chloride 0.9 % bolus 1,000 mL (has no administration in time range)       Imaging results:  No radiology results for the last day    Ambulatory status:   - bedrest    Social issues:   Social History     Socioeconomic History    Marital status:    Tobacco Use    Smoking status: Former    Smokeless tobacco: Never   Vaping Use    Vaping Use: Never used   Substance and Sexual Activity    Alcohol use: No    Drug use: No    Sexual activity: Never       NIH Stroke Scale:         Boogie Rivera RN  06/09/23 13:46 EDT

## 2023-06-09 NOTE — H&P
Internal medicine history and physical  INTERNAL MEDICINE   [facility]       Patient Identification:  Name: Rose Cheema  Age: 82 y.o.  Sex: female  :  1940  MRN: [id number]                   Primary Care Physician: Provider, No Known                               Date of admission:2023    Chief Complaint: Sent from the facility for evaluation of rash and worsening kidney function.    History of Present Illness:   Patient is a 82-year-old female who was recently hospitalized from 2023 through 2023 when she initially presented with shortness of breath and nausea.  Patient was found to have ESBL positive E. coli urinary tract infection and also blood culture positive for Aerococcus viridans.  Patient completed the treatment for urinary tract infection with ertapenem while in the hospital and was discharged on IV vancomycin for 2 weeks from 2023.  Patient is a poor historian and is not clear when did the rash started since her discharge to the rehabilitation facility.  Work-up in the emergency room revealed maculopapular rash involving her torso extensor surface of the arm and legs.  Her creatinine was noted to be 2.4 from the baseline of 0.7 at the time of discharge.  Patient currently feels otherwise okay and denies any cough shortness of breath fever or chills.  She has preserved appetite.      Past Medical History:  Past Medical History:   Diagnosis Date   • Anemia    • Anxiety    • Arthritis    • CHF (congestive heart failure)    • Coronary artery disease    • Depression    • Disease of thyroid gland    • Fibromyalgia    • GERD (gastroesophageal reflux disease)    • Hypertension    • Moderate tricuspid valve stenosis    • Osteoporosis    • Peripheral neuropathy    • Pulmonary hypertension    • SBO (small bowel obstruction)    • Stenosis of mitral valve      Past Surgical History:  Past Surgical History:   Procedure Laterality Date   • BILATERAL OOPHORECTOMY     • CARDIAC  CATHETERIZATION     • CHOLECYSTECTOMY     • ERCP N/A 2/15/2019    Procedure: ENDOSCOPIC RETROGRADE CHOLANGIOPANCREATOGRAPHY WITH PARTIAL CHOLANGIOGRAM;  Surgeon: Gerald Herr MD;  Location: [facility] ENDOSCOPY;  Service: Gastroenterology   • EXPLORATORY LAPAROTOMY     • GASTRIC BYPASS     • HERNIA REPAIR      inguinal and ventral   • HYSTERECTOMY     • OVARIAN CYST REMOVAL        Home Meds:  Medications Prior to Admission   Medication Sig Dispense Refill Last Dose   • albuterol sulfate  (90 Base) MCG/ACT inhaler Inhale 2 puffs Every 4 (Four) Hours As Needed for Wheezing.      • apixaban (ELIQUIS) 5 MG tablet tablet Take 1 tablet by mouth Every 12 (Twelve) Hours. Indications: Atrial Fibrillation 60 tablet 0    • aspirin 81 MG chewable tablet Chew 1 tablet Daily.      • busPIRone (BUSPAR) 30 MG tablet Take 1 tablet by mouth 2 (Two) Times a Day.      • diphenhydrAMINE (BENADRYL) 25 mg capsule Take 1 capsule by mouth Every 6 (Six) Hours As Needed for Itching.      • fentaNYL (DURAGESIC) 25 MCG/HR patch Place 1 patch on the skin as directed by provider Every 72 (Seventy-Two) Hours. 2 patch 0    • FLUoxetine (PROzac) 60 MG tablet Take 1 tablet by mouth Daily.      • HYDROcodone-acetaminophen (NORCO) 7.5-325 MG per tablet Take 1 tablet by mouth Every 6 (Six) Hours As Needed for Severe Pain. 18 tablet 0    • levothyroxine (SYNTHROID, LEVOTHROID) 50 MCG tablet Take 1 tablet by mouth Daily.      • LORazepam (ATIVAN) 0.5 MG tablet Take 1 tablet by mouth 2 (Two) Times a Day As Needed for Anxiety. 6 tablet 0    • magnesium hydroxide (MILK OF MAGNESIA) 400 MG/5ML suspension Take 30 mL by mouth Daily As Needed for Constipation.      • Mirabegron ER (MYRBETRIQ) 50 MG tablet sustained-release 24 hour 24 hr tablet Take 50 mg by mouth Daily.      • nystatin (MYCOSTATIN) 352449 UNIT/GM powder Apply 1 application topically to the appropriate area as directed 3 (Three) Times a Day As Needed.      • pantoprazole (PROTONIX) 40  MG EC tablet Take 1 tablet by mouth Daily.      • potassium chloride (MICRO-K) 10 MEQ CR capsule Take 1 capsule by mouth 2 (Two) Times a Day.      • Probiotic Product (Align) 4 MG capsule Take 1 capsule by mouth Daily.      • QUEtiapine (SEROquel) 100 MG tablet Take 1 tablet by mouth Every Night.      • sennosides-docusate (PERICOLACE) 8.6-50 MG per tablet Take 2 tablets by mouth 2 (Two) Times a Day. 120 tablet 0    • torsemide (DEMADEX) 100 MG tablet Take 1 tablet by mouth Every Other Day.      • umeclidinium-vilanterol (ANORO ELLIPTA) 62.5-25 MCG/INH aerosol powder  inhaler Inhale 1 puff Daily.      • bisacodyl (DULCOLAX) 10 MG suppository Insert 1 suppository into the rectum As Needed for Constipation.      • Sodium Phosphates (fleet enema) 7-19 GM/118ML enema Insert 1 enema into the rectum 1 (One) Time As Needed.        Current Meds:     Current Facility-Administered Medications:   •  acetaminophen (TYLENOL) tablet 650 mg, 650 mg, Oral, Q4H PRN **OR** acetaminophen (TYLENOL) 160 MG/5ML solution 650 mg, 650 mg, Oral, Q4H PRN **OR** acetaminophen (TYLENOL) suppository 650 mg, 650 mg, Rectal, Q4H PRN, Osvaldo Ponce MD  •  albuterol sulfate HFA (PROVENTIL HFA;VENTOLIN HFA;PROAIR HFA) inhaler 2 puff, 2 puff, Inhalation, Q6H PRN, Osvaldo Ponce MD  •  apixaban (ELIQUIS) tablet 2.5 mg, 2.5 mg, Oral, Q12H, Osvaldo Ponce MD  •  aspirin chewable tablet 81 mg, 81 mg, Oral, Daily, Osvaldo Ponce MD  •  bisacodyl (DULCOLAX) suppository 10 mg, 10 mg, Rectal, PRN, Osvaldo Ponce MD  •  FLUoxetine (PROzac) capsule 60 mg, 60 mg, Oral, Daily, Osvaldo Ponce MD  •  HYDROcodone-acetaminophen (NORCO) 7.5-325 MG per tablet 1 tablet, 1 tablet, Oral, Q6H PRN, Osvaldo Ponce MD  •  ipratropium-albuterol (DUO-NEB) nebulizer solution 3 mL, 3 mL, Nebulization, Q8H - RT, Osvaldo Ponce MD  •  lactobacillus acidophilus (RISAQUAD) capsule 1 capsule, 1 capsule, Oral, Daily, Osvaldo Ponce MD  •  levothyroxine (SYNTHROID, LEVOTHROID) tablet 50 mcg, 50  "mcg, Oral, Daily, Osvaldo Ponce MD  •  LORazepam (ATIVAN) tablet 0.5 mg, 0.5 mg, Oral, BID PRN, Osvaldo Ponce MD  •  Mirabegron ER (MYRBETRIQ) 24 hr tablet 25 mg, 25 mg, Oral, Daily, Osvaldo Ponce MD  •  nitroglycerin (NITROSTAT) SL tablet 0.4 mg, 0.4 mg, Sublingual, Q5 Min PRN, Osvaldo Ponce MD  •  nystatin (MYCOSTATIN) powder 1 application, 1 application, Topical, TID PRN, Osvaldo Ponce MD  •  ondansetron (ZOFRAN) injection 4 mg, 4 mg, Intravenous, Q6H PRN, Osvaldo Ponce MD  •  pantoprazole (PROTONIX) EC tablet 40 mg, 40 mg, Oral, Daily, Osvaldo Ponce MD  •  QUEtiapine (SEROquel) tablet 100 mg, 100 mg, Oral, Nightly, Osvaldo Ponce MD  •  sennosides-docusate (PERICOLACE) 8.6-50 MG per tablet 2 tablet, 2 tablet, Oral, BID, Osvaldo Ponce MD  •  sodium chloride 0.9 % flush 10 mL, 10 mL, Intravenous, Q12H, Osvaldo Ponce MD  •  sodium chloride 0.9 % flush 10 mL, 10 mL, Intravenous, PRN, Osvaldo Ponce MD  •  sodium chloride 0.9 % infusion 40 mL, 40 mL, Intravenous, PRN, Osvaldo Ponce MD  •  sodium chloride 0.9 % infusion, 75 mL/hr, Intravenous, Continuous, Osvaldo Ponce MD  Allergies:  Allergies   Allergen Reactions   • Sulfa Antibiotics Other (See Comments)     Per facility paperwork   • Aspartame Other (See Comments)     CAUSES MIGRAINES   • Cephalexin Rash     Tolerated piperacillin-tazobactam (Zosyn) and ampicillin-sulbactam (Unasyn) during Feb 2019 admission.     Social History:   Social History     Tobacco Use   • Smoking status: Former   • Smokeless tobacco: Never   Substance Use Topics   • Alcohol use: No      Family History:  History reviewed. No pertinent family history.       Review of Systems  See history of present illness and past medical history.  Patient is pleasantly poor historian.      Vitals:   /52 (BP Location: Left arm, Patient Position: Lying)   Pulse 67   Temp 98.1 °F (36.7 °C) (Oral)   Resp 18   Ht 165.1 cm (65\")   Wt 101 kg (221 lb 12.5 oz)   SpO2 94%   BMI 36.91 kg/m²   I/O: No " intake or output data in the 24 hours ending 06/09/23 1911  Exam:  Patient is examined using the personal protective equipment as per guidelines from infection control for this particular patient as enacted.  Hand washing was performed before and after patient interaction.  General Appearance:   Alert cooperative interactive and appears to have a much improved mental status and disposition compared to when she was in the hospital.   Head:    Normocephalic, without obvious abnormality, atraumatic   Eyes:    PERRL, conjunctiva/corneas clear, EOM's intact, both eyes   Ears:    Normal external ear canals, both ears   Nose:   Nares normal, septum midline, mucosa normal, no drainage    or sinus tenderness   Throat:   Lips, tongue, gums normal; oral mucosa pink and moist   Neck:   Supple, symmetrical, trachea midline, no adenopathy;     thyroid:  no enlargement/tenderness/nodules; no carotid    bruit or JVD   Back:     Symmetric, no curvature, ROM normal, no CVA tenderness   Lungs:     Clear to auscultation bilaterally, respirations unlabored   Chest Wall:    No tenderness or deformity    Heart:   S1-S2 irregular   Abdomen:    Obese soft nontender   Extremities:  Chronic bilateral lower extremity edema with changes.  And right arm PICC line in place.   Pulses:   Pulses palpable in all extremities; symmetric all extremities   Skin:  Generalized maculopapular rash noted   Neurologic:  Alert oriented grossly nonfocal       Data Review:      I reviewed the patient's new clinical results.  Results from last 7 days   Lab Units 06/09/23  1055   WBC 10*3/mm3 13.09*   HEMOGLOBIN g/dL 13.1   PLATELETS 10*3/mm3 189     Results from last 7 days   Lab Units 06/09/23  1055   SODIUM mmol/L 139   POTASSIUM mmol/L 4.8   CHLORIDE mmol/L 100   CO2 mmol/L 28.5   BUN mg/dL 25*   CREATININE mg/dL 2.40*   CALCIUM mg/dL 8.9   GLUCOSE mg/dL 103*     CT Abdomen Pelvis With Contrast    Result Date: 5/27/2023  Soft tissue stranding which is seen  involving the lower anterior abdominal wall on the right. There is associated skin thickening. Correlation with any evidence of cellulitis is recommended. No discrete drainable fluid collection is seen.  Radiation dose reduction techniques were utilized, including automated exposure control and exposure modulation based on body size.  This report was finalized on 5/27/2023 2:52 AM by Dr. Miya Galarza M.D.      XR Chest 1 View    Result Date: 5/27/2023  Blunting of the left costophrenic angle. Small effusion is not excluded.  This report was finalized on 5/27/2023 1:22 AM by Dr. Miya Galarza M.D.      XR Chest PA & Lateral    Result Date: 5/28/2023  Small pleural effusions. Otherwise negative chest x-ray.  This report was finalized on 5/28/2023 12:25 PM by Dr. Terry Brothers M.D.      SCANNED - TELEMETRY     Final Result        Microbiology Results (last 10 days)     ** No results found for the last 240 hours. **          Assessment:  Active Hospital Problems    Diagnosis  POA   • **TOI (acute kidney injury) [N17.9]  Yes   • PAF (paroxysmal atrial fibrillation) [I48.0]  Yes   • Bacteremia - aerococcus [R78.81]  Yes   • CKD (chronic kidney disease) stage 3, GFR 30-59 ml/min [N18.30]  Yes   • Anemia [D64.9]  Yes   • Chronic pain syndrome [G89.4]  Yes   • Hypothyroidism [E03.9]  Yes   • Bilateral lower extremity edema (chronic) [R60.0]  Yes   • Diastolic dysfunction [I51.89]  Yes   • Benign essential hypertension [I10]  Yes   • Obstructive sleep apnea [G47.33]  Yes       Medical decision making/care plan: See admitting orders  · Generalized maculopapular rash and worsening renal function while on IV vancomycin for Aerococcus viridans bacteremia-this likely represent possible rash due to vancomycin and associated renal dysfunction.  Plan is to hold her vancomycin provide her with cautious hydration get nephrology consultation and check urine for eosinophils.  Get ultrasound of the kidneys to rule out  obstruction.  Continue symptomatic management of rash and follow her clinical course.  · Acute kidney injury in the setting of history of chronic kidney disease-multifactorial including possibility of antibiotic toxicity-plan is to hold vancomycin and monitor renal function and cautious hydration.  Hold her diuretics for now.  Monitor for volume overload given her history of diastolic dysfunction.  · Obesity and obstructive sleep apnea-continue her home regimen including CPAP device and continuous pulse ox monitoring  · Hypothyroidism-continue thyroid replacement therapy.  · History of recent ESBL positive E. coli UTI with altered mental status overall improved-monitor.  · Paroxysmal atrial fibrillation-continue her current regimen.  Continue anticoagulation therapy.  · Hypertension-continue present regimen and avoid hypotensive episodes.  · Hypothyroidism-continue thyroid replacement.  · Aerococcus bacteremia-patient was designed to complete her vancomycin on 6/13/2023 but given her presentation and renal function and the fact that her repeat blood cultures are negative benefit of continued vancomycin treatment in this situation is far less than the risk such as renal dysfunction.  We will DC vancomycin.  We will DC PICC line.      Osvaldo Ponce MD   6/9/2023  19:11 EDT    Parts of this note may be an electronic transcription/translation of spoken language to printed text using the Dragon dictation system.

## 2023-06-10 PROBLEM — E44.0 MODERATE MALNUTRITION: Status: ACTIVE | Noted: 2023-06-10

## 2023-06-10 LAB
ALBUMIN SERPL-MCNC: 2.7 G/DL (ref 3.5–5.2)
ALBUMIN/GLOB SERPL: 1.2 G/DL
ALP SERPL-CCNC: 80 U/L (ref 39–117)
ALT SERPL W P-5'-P-CCNC: 8 U/L (ref 1–33)
ANION GAP SERPL CALCULATED.3IONS-SCNC: 11.5 MMOL/L (ref 5–15)
AST SERPL-CCNC: 7 U/L (ref 1–32)
BACTERIA UR QL AUTO: ABNORMAL /HPF
BASOPHILS # BLD AUTO: 0.04 10*3/MM3 (ref 0–0.2)
BASOPHILS NFR BLD AUTO: 0.3 % (ref 0–1.5)
BILIRUB SERPL-MCNC: 0.4 MG/DL (ref 0–1.2)
BILIRUB UR QL STRIP: NEGATIVE
BUN SERPL-MCNC: 27 MG/DL (ref 8–23)
BUN/CREAT SERPL: 11.3 (ref 7–25)
CALCIUM SPEC-SCNC: 8.5 MG/DL (ref 8.6–10.5)
CHLORIDE SERPL-SCNC: 100 MMOL/L (ref 98–107)
CHLORIDE UR-SCNC: 72 MMOL/L
CK SERPL-CCNC: 19 U/L (ref 20–180)
CLARITY UR: CLEAR
CO2 SERPL-SCNC: 27.5 MMOL/L (ref 22–29)
COLOR UR: YELLOW
CREAT SERPL-MCNC: 2.39 MG/DL (ref 0.57–1)
CREAT UR-MCNC: 105.1 MG/DL
D-LACTATE SERPL-SCNC: 1.4 MMOL/L (ref 0.5–2)
DEPRECATED RDW RBC AUTO: 44.4 FL (ref 37–54)
EGFRCR SERPLBLD CKD-EPI 2021: 20.1 ML/MIN/1.73
EOSINOPHIL # BLD AUTO: 0.28 10*3/MM3 (ref 0–0.4)
EOSINOPHIL NFR BLD AUTO: 2.4 % (ref 0.3–6.2)
ERYTHROCYTE [DISTWIDTH] IN BLOOD BY AUTOMATED COUNT: 12.7 % (ref 12.3–15.4)
GLOBULIN UR ELPH-MCNC: 2.2 GM/DL
GLUCOSE SERPL-MCNC: 88 MG/DL (ref 65–99)
GLUCOSE UR STRIP-MCNC: NEGATIVE MG/DL
HCT VFR BLD AUTO: 35.1 % (ref 34–46.6)
HGB BLD-MCNC: 11.4 G/DL (ref 12–15.9)
HGB UR QL STRIP.AUTO: ABNORMAL
HYALINE CASTS UR QL AUTO: ABNORMAL /LPF
IMM GRANULOCYTES # BLD AUTO: 0.05 10*3/MM3 (ref 0–0.05)
IMM GRANULOCYTES NFR BLD AUTO: 0.4 % (ref 0–0.5)
KETONES UR QL STRIP: ABNORMAL
LEUKOCYTE ESTERASE UR QL STRIP.AUTO: ABNORMAL
LYMPHOCYTES # BLD AUTO: 1.32 10*3/MM3 (ref 0.7–3.1)
LYMPHOCYTES NFR BLD AUTO: 11.3 % (ref 19.6–45.3)
MCH RBC QN AUTO: 31.2 PG (ref 26.6–33)
MCHC RBC AUTO-ENTMCNC: 32.5 G/DL (ref 31.5–35.7)
MCV RBC AUTO: 96.2 FL (ref 79–97)
MONOCYTES # BLD AUTO: 0.65 10*3/MM3 (ref 0.1–0.9)
MONOCYTES NFR BLD AUTO: 5.6 % (ref 5–12)
NEUTROPHILS NFR BLD AUTO: 80 % (ref 42.7–76)
NEUTROPHILS NFR BLD AUTO: 9.29 10*3/MM3 (ref 1.7–7)
NITRITE UR QL STRIP: NEGATIVE
NRBC BLD AUTO-RTO: 0 /100 WBC (ref 0–0.2)
PH UR STRIP.AUTO: <=5 [PH] (ref 5–8)
PLATELET # BLD AUTO: 168 10*3/MM3 (ref 140–450)
PMV BLD AUTO: 11.4 FL (ref 6–12)
POTASSIUM SERPL-SCNC: 4 MMOL/L (ref 3.5–5.2)
PROT ?TM UR-MCNC: 14.9 MG/DL
PROT SERPL-MCNC: 4.9 G/DL (ref 6–8.5)
PROT UR QL STRIP: ABNORMAL
RBC # BLD AUTO: 3.65 10*6/MM3 (ref 3.77–5.28)
RBC # UR STRIP: ABNORMAL /HPF
REF LAB TEST METHOD: ABNORMAL
SODIUM SERPL-SCNC: 139 MMOL/L (ref 136–145)
SODIUM UR-SCNC: 68 MMOL/L
SP GR UR STRIP: 1.02 (ref 1–1.03)
SQUAMOUS #/AREA URNS HPF: ABNORMAL /HPF
UROBILINOGEN UR QL STRIP: ABNORMAL
WBC # UR STRIP: ABNORMAL /HPF
WBC NRBC COR # BLD: 11.63 10*3/MM3 (ref 3.4–10.8)
YEAST URNS QL MICRO: ABNORMAL /HPF

## 2023-06-10 PROCEDURE — 94664 DEMO&/EVAL PT USE INHALER: CPT

## 2023-06-10 PROCEDURE — 80053 COMPREHEN METABOLIC PANEL: CPT | Performed by: INTERNAL MEDICINE

## 2023-06-10 PROCEDURE — 94760 N-INVAS EAR/PLS OXIMETRY 1: CPT

## 2023-06-10 PROCEDURE — 94799 UNLISTED PULMONARY SVC/PX: CPT

## 2023-06-10 PROCEDURE — 94640 AIRWAY INHALATION TREATMENT: CPT

## 2023-06-10 PROCEDURE — 81001 URINALYSIS AUTO W/SCOPE: CPT | Performed by: INTERNAL MEDICINE

## 2023-06-10 PROCEDURE — 83605 ASSAY OF LACTIC ACID: CPT | Performed by: INTERNAL MEDICINE

## 2023-06-10 PROCEDURE — 82550 ASSAY OF CK (CPK): CPT | Performed by: INTERNAL MEDICINE

## 2023-06-10 PROCEDURE — 94761 N-INVAS EAR/PLS OXIMETRY MLT: CPT

## 2023-06-10 PROCEDURE — 85025 COMPLETE CBC W/AUTO DIFF WBC: CPT | Performed by: INTERNAL MEDICINE

## 2023-06-10 PROCEDURE — 87205 SMEAR GRAM STAIN: CPT | Performed by: INTERNAL MEDICINE

## 2023-06-10 RX ADMIN — HYDROCODONE BITARTRATE AND ACETAMINOPHEN 1 TABLET: 7.5; 325 TABLET ORAL at 21:45

## 2023-06-10 RX ADMIN — QUETIAPINE FUMARATE 100 MG: 100 TABLET ORAL at 21:45

## 2023-06-10 RX ADMIN — IPRATROPIUM BROMIDE AND ALBUTEROL SULFATE 3 ML: .5; 3 SOLUTION RESPIRATORY (INHALATION) at 09:34

## 2023-06-10 RX ADMIN — APIXABAN 2.5 MG: 2.5 TABLET, FILM COATED ORAL at 21:45

## 2023-06-10 RX ADMIN — LEVOTHYROXINE SODIUM 50 MCG: 0.05 TABLET ORAL at 06:33

## 2023-06-10 RX ADMIN — Medication 10 ML: at 09:03

## 2023-06-10 RX ADMIN — APIXABAN 2.5 MG: 2.5 TABLET, FILM COATED ORAL at 09:02

## 2023-06-10 RX ADMIN — IPRATROPIUM BROMIDE AND ALBUTEROL SULFATE 3 ML: .5; 3 SOLUTION RESPIRATORY (INHALATION) at 15:48

## 2023-06-10 RX ADMIN — LORAZEPAM 0.5 MG: 0.5 TABLET ORAL at 21:45

## 2023-06-10 RX ADMIN — Medication 10 ML: at 21:45

## 2023-06-10 RX ADMIN — PANTOPRAZOLE SODIUM 40 MG: 40 TABLET, DELAYED RELEASE ORAL at 09:03

## 2023-06-10 RX ADMIN — HYDROCODONE BITARTRATE AND ACETAMINOPHEN 1 TABLET: 7.5; 325 TABLET ORAL at 03:38

## 2023-06-10 RX ADMIN — SODIUM CHLORIDE 75 ML/HR: 9 INJECTION, SOLUTION INTRAVENOUS at 11:17

## 2023-06-10 RX ADMIN — ASPIRIN 81 MG: 81 TABLET, CHEWABLE ORAL at 09:02

## 2023-06-10 RX ADMIN — DOCUSATE SODIUM 50MG AND SENNOSIDES 8.6MG 2 TABLET: 8.6; 5 TABLET, FILM COATED ORAL at 21:45

## 2023-06-10 RX ADMIN — IPRATROPIUM BROMIDE AND ALBUTEROL SULFATE 3 ML: .5; 3 SOLUTION RESPIRATORY (INHALATION) at 23:34

## 2023-06-10 RX ADMIN — DOCUSATE SODIUM 50MG AND SENNOSIDES 8.6MG 2 TABLET: 8.6; 5 TABLET, FILM COATED ORAL at 09:03

## 2023-06-10 NOTE — SIGNIFICANT NOTE
06/10/23 1351   OTHER   Discipline occupational therapist   Rehab Time/Intention   Session Not Performed patient/family declined evaluation  (pt not feeling well)   Recommendation   OT - Next Appointment 06/12/23

## 2023-06-10 NOTE — PLAN OF CARE
Goal Outcome Evaluation:  Plan of Care Reviewed With: patient        Progress: no change  Outcome Evaluation: Pt A&OX4 , admit from ED, incontinent with care as needed, purewick applied, Q2 turns, PICC rt upper arm infusing, rash covering random parts of the body red and itchy per pt, accu max pump ordered, ground meat thin liquid diet,2L NC with mild SOA reported, blanchable redness to tesfaye area and coccyx, afib on monitor

## 2023-06-10 NOTE — SIGNIFICANT NOTE
06/10/23 1032   OTHER   Discipline physical therapist   Rehab Time/Intention   Session Not Performed patient/family declined, not feeling well   Recommendation   PT - Next Appointment 06/11/23

## 2023-06-10 NOTE — PROGRESS NOTES
Name: Rose Cheema ADMIT: 2023   : 1940  PCP: Provider, No Known    MRN: [id number] LOS: 0 days   AGE/SEX: 82 y.o. female  ROOM: [facility]     Subjective   Subjective   Patient resting in bed, asleep but easily awakens to voice. Denies complaints, states rash doesn't hurt, denies pruritus.      Objective   Objective   Vital Signs  Temp:  [97.6 °F (36.4 °C)-98.3 °F (36.8 °C)] 98.3 °F (36.8 °C)  Heart Rate:  [] 66  Resp:  [16-18] 16  BP: (102-135)/(48-85) 111/59  SpO2:  [93 %-100 %] 99 %  on  Flow (L/min):  [1-4] 1;   Device (Oxygen Therapy): nasal cannula  Body mass index is 36.91 kg/m².  Physical Exam  Constitutional:       General: She is not in acute distress.     Appearance: She is obese. She is ill-appearing.   Cardiovascular:      Rate and Rhythm: Normal rate and regular rhythm.   Pulmonary:      Effort: Pulmonary effort is normal. No respiratory distress.      Breath sounds: Normal breath sounds. No wheezing.   Abdominal:      General: There is no distension.      Palpations: Abdomen is soft.      Tenderness: There is no abdominal tenderness.   Musculoskeletal:         General: No swelling or tenderness.      Right lower leg: No edema.      Left lower leg: No edema.   Skin:     General: Skin is warm and dry.      Coloration: Skin is pale.      Findings: Rash present.   Neurological:      General: No focal deficit present.      Mental Status: She is alert and oriented to person, place, and time. Mental status is at baseline.       Results Review     I reviewed the patient's new clinical results.  Results from last 7 days   Lab Units 06/10/23  0640 23  1055   WBC 10*3/mm3 11.63* 13.09*   HEMOGLOBIN g/dL 11.4* 13.1   PLATELETS 10*3/mm3 168 189     Results from last 7 days   Lab Units 06/10/23  0640 23  1055   SODIUM mmol/L 139 139   POTASSIUM mmol/L 4.0 4.8   CHLORIDE mmol/L 100 100   CO2 mmol/L 27.5 28.5   BUN mg/dL 27* 25*   CREATININE mg/dL 2.39* 2.40*   GLUCOSE mg/dL 88 103*    Estimated Creatinine Clearance: 22.1 mL/min (A) (by C-G formula based on SCr of 2.39 mg/dL (H)).  Results from last 7 days   Lab Units 06/10/23  0640 06/09/23  1055   ALBUMIN g/dL 2.7* 2.7*   BILIRUBIN mg/dL 0.4 0.4   ALK PHOS U/L 80 86   AST (SGOT) U/L 7 17   ALT (SGPT) U/L 8 10     Results from last 7 days   Lab Units 06/10/23  0640 06/09/23  1055   CALCIUM mg/dL 8.5* 8.9   ALBUMIN g/dL 2.7* 2.7*     Results from last 7 days   Lab Units 06/10/23  0640 06/09/23  1055   PROCALCITONIN ng/mL  --  0.11   LACTATE mmol/L 1.4 1.3     COVID19   Date Value Ref Range Status   05/27/2023 Not Detected Not Detected - Ref. Range Final     SARS-CoV-2, ARLENE   Date Value Ref Range Status   10/21/2020 Not Detected Not Detected Final     Comment:     This nucleic acid amplification test was developed and its performance  characteristics determined by Dr [facility]. Nucleic acid  amplification tests include PCR and TMA. This test has not been FDA  cleared or approved. This test has been authorized by FDA under an  Emergency Use Authorization (EUA). This test is only authorized for  the duration of time the declaration that circumstances exist  justifying the authorization of the emergency use of in vitro  diagnostic tests for detection of SARS-CoV-2 virus and/or diagnosis  of COVID-19 infection under section 564(b)(1) of the Act, 21 U.S.C.  360bbb-3(b) (1), unless the authorization is terminated or revoked  sooner.  When diagnostic testing is negative, the possibility of a false  negative result should be considered in the context of a patient's  recent exposures and the presence of clinical signs and symptoms  consistent with COVID-19. An individual without symptoms of COVID-19  and who is not shedding SARS-CoV-2 virus would expect to have a  negative (not detected) result in this assay.     No results found for: HGBA1C, POCGLU    US Renal Bilateral  US RENAL BILATERAL-clinical: Acute renal insufficiency     FINDINGS: The  right kidney is 11.6 cm in length and left kidney is 10.5  cm in length. The bladder was collapsed, cannot evaluate for either the  right or left ureteral jet.     No hydronephrosis. There is a 13 mm right upper pole renal calculus,  this was seen on previous 05/27/2023 CT. Renal cortical echotexture is  normal to slightly greater than anticipated, possible medical renal  disease.     CONCLUSION: Right upper pole renal calculus, no obstructive uropathy.  Bladder collapsed, cannot evaluate for either ureteral jet.     This report was finalized on 6/9/2023 9:00 PM by Dr. Dennis Burns M.D.       Scheduled Medications  apixaban, 2.5 mg, Oral, Q12H  aspirin, 81 mg, Oral, Daily  FLUoxetine, 60 mg, Oral, Daily  ipratropium-albuterol, 3 mL, Nebulization, Q8H - RT  lactobacillus acidophilus, 1 capsule, Oral, Daily  levothyroxine, 50 mcg, Oral, Q AM  Mirabegron ER, 25 mg, Oral, Daily  pantoprazole, 40 mg, Oral, Daily  QUEtiapine, 100 mg, Oral, Nightly  sennosides-docusate, 2 tablet, Oral, BID  sodium chloride, 10 mL, Intravenous, Q12H    Infusions  sodium chloride, 75 mL/hr, Last Rate: 75 mL/hr (06/10/23 1117)    Diet  Diet: Renal Diets; Low Sodium (2-3g), Low Potassium, Low Phosphorus; Texture: Regular Texture (IDDSI 7); Fluid Consistency: Thin (IDDSI 0)         I have personally reviewed:  [x]  Laboratory   [x]  Microbiology   [x]  Radiology   [x]  EKG/Telemetry   [x]  Cardiology/Vascular   []  Pathology   [x]  Records    Assessment/Plan     Active Hospital Problems    Diagnosis  POA    **TOI (acute kidney injury) [N17.9]  Yes    Moderate Malnutrition (HCC) [E44.0]  Yes    PAF (paroxysmal atrial fibrillation) [I48.0]  Yes    Bacteremia - aerococcus [R78.81]  Yes    CKD (chronic kidney disease) stage 3, GFR 30-59 ml/min [N18.30]  Yes    Anemia [D64.9]  Yes    Chronic pain syndrome [G89.4]  Yes    Hypothyroidism [E03.9]  Yes    Bilateral lower extremity edema (chronic) [R60.0]  Yes    Diastolic dysfunction [I51.89]  Yes     Benign essential hypertension [I10]  Yes    Obstructive sleep apnea [G47.33]  Yes      Resolved Hospital Problems   No resolved problems to display.       82 y.o. female admitted with TOI (acute kidney injury).    TOI on CKD  - renal following; demedex on hold, IVF per renal  - cr stable overnight at 2.4, b/l 0.6  - BRITTANY unremarkable for cause of TOI    Rash  - possible drug rash  - pt denies pain, pruritus, symptoms  - vanco has been Dc'd; monitor for improvement    Paroxysmal atrial fibrillation  - continue low-dose eliquis, no rate control meds    Hypothyroidism  - levothyroxine    Recent ESBL E. Coli UTI  - s/p ertapenem    Hypertension  - hx of, normotensive off of meds    Recent aerococcus bacteremia  - vanco dcd 2/2 to rash and TOI  - agree with admitting provider risk of continuing vanco outweigh benefits, she shiela completed all but 3 days of tx    Obesity  - rec wt loss    GERSON  - ok for home CPAP    Eliquis (home med) for DVT prophylaxis.  DNR.confirmed with patient  Discussed with patient and nursing staff.  Anticipate discharge to SNU facility timing yet to be determined.    Copied text in this note has been reviewed and is accurate as of 06/10/23.     Candida Scott MD  [facility]  06/10/23  18:25 EDT    I wore protective equipment throughout this patient encounter including gloves and protective eyewear.  Hand hygiene was performed before donning protective equipment and after removal when leaving the room.

## 2023-06-10 NOTE — CONSULTS
[facility]                                                                                             Nephrology Initial Consult Note    Patient Identification:  Name: Rose Cheema MRN: [id number]  Age: 82 y.o. : 1940  Sex: female  Date:6/10/2023    Requesting Physician: As per consult order.  Reason for Consultation: Acute kidney injury  Information from:patient/ family/ chart      History of Present Illness: This is a 82 y.o. year old female with no prior history of chronic kidney disease or renal failure.  Creatinine was last normal about 8 days ago, 0.64 on .  Recently hospitalized in late May with shortness of breath and nausea found to have ESBL E. coli urinary tract infection and blood cultures growing Aerococcus.  She completed ertapenem and was discharged on IV vancomycin for 2 weeks.  She was also noted to have a diffuse rash involving her legs, arms and upper trunk.  She states her intake has been a bit low denies any nausea or vomiting, no abdominal pain or diarrhea.  No dysuria.  She denies any fevers or chills, no diarrhea, constipation or edema.      The following medical history and medications personally reviewed by me:    Problem List:     TOI (acute kidney injury)    Benign essential hypertension    Bilateral lower extremity edema (chronic)    Diastolic dysfunction    Obstructive sleep apnea    Hypothyroidism    Chronic pain syndrome    Anemia    CKD (chronic kidney disease) stage 3, GFR 30-59 ml/min    Bacteremia - aerococcus    PAF (paroxysmal atrial fibrillation)      Past Medical History:  Past Medical History:   Diagnosis Date    Anemia     Anxiety     Arthritis     CHF (congestive heart failure)     Coronary artery disease     Depression     Disease of thyroid gland     Fibromyalgia     GERD (gastroesophageal reflux disease)     Hypertension     Moderate tricuspid valve  stenosis     Osteoporosis     Peripheral neuropathy     Pulmonary hypertension     SBO (small bowel obstruction)     Stenosis of mitral valve        Past Surgical History:  Past Surgical History:   Procedure Laterality Date    BILATERAL OOPHORECTOMY      CARDIAC CATHETERIZATION      CHOLECYSTECTOMY      ERCP N/A 2/15/2019    Procedure: ENDOSCOPIC RETROGRADE CHOLANGIOPANCREATOGRAPHY WITH PARTIAL CHOLANGIOGRAM;  Surgeon: Gerald Herr MD;  Location: [facility] ENDOSCOPY;  Service: Gastroenterology    EXPLORATORY LAPAROTOMY      GASTRIC BYPASS      HERNIA REPAIR      inguinal and ventral    HYSTERECTOMY      OVARIAN CYST REMOVAL          Home Meds:   Medications Prior to Admission   Medication Sig Dispense Refill Last Dose    albuterol sulfate  (90 Base) MCG/ACT inhaler Inhale 2 puffs Every 4 (Four) Hours As Needed for Wheezing.       apixaban (ELIQUIS) 5 MG tablet tablet Take 1 tablet by mouth Every 12 (Twelve) Hours. Indications: Atrial Fibrillation 60 tablet 0     aspirin 81 MG chewable tablet Chew 1 tablet Daily.       busPIRone (BUSPAR) 30 MG tablet Take 1 tablet by mouth 2 (Two) Times a Day.       diphenhydrAMINE (BENADRYL) 25 mg capsule Take 1 capsule by mouth Every 6 (Six) Hours As Needed for Itching.       fentaNYL (DURAGESIC) 25 MCG/HR patch Place 1 patch on the skin as directed by provider Every 72 (Seventy-Two) Hours. 2 patch 0     FLUoxetine (PROzac) 60 MG tablet Take 1 tablet by mouth Daily.       HYDROcodone-acetaminophen (NORCO) 7.5-325 MG per tablet Take 1 tablet by mouth Every 6 (Six) Hours As Needed for Severe Pain. 18 tablet 0     levothyroxine (SYNTHROID, LEVOTHROID) 50 MCG tablet Take 1 tablet by mouth Daily.       LORazepam (ATIVAN) 0.5 MG tablet Take 1 tablet by mouth 2 (Two) Times a Day As Needed for Anxiety. 6 tablet 0     magnesium hydroxide (MILK OF MAGNESIA) 400 MG/5ML suspension Take 30 mL by mouth Daily As Needed for Constipation.       Mirabegron ER (MYRBETRIQ) 50 MG tablet  sustained-release 24 hour 24 hr tablet Take 50 mg by mouth Daily.       nystatin (MYCOSTATIN) 479803 UNIT/GM powder Apply 1 application topically to the appropriate area as directed 3 (Three) Times a Day As Needed.       pantoprazole (PROTONIX) 40 MG EC tablet Take 1 tablet by mouth Daily.       potassium chloride (MICRO-K) 10 MEQ CR capsule Take 1 capsule by mouth 2 (Two) Times a Day.       Probiotic Product (Align) 4 MG capsule Take 1 capsule by mouth Daily.       QUEtiapine (SEROquel) 100 MG tablet Take 1 tablet by mouth Every Night.       sennosides-docusate (PERICOLACE) 8.6-50 MG per tablet Take 2 tablets by mouth 2 (Two) Times a Day. 120 tablet 0     torsemide (DEMADEX) 100 MG tablet Take 1 tablet by mouth Every Other Day.       umeclidinium-vilanterol (ANORO ELLIPTA) 62.5-25 MCG/INH aerosol powder  inhaler Inhale 1 puff Daily.       bisacodyl (DULCOLAX) 10 MG suppository Insert 1 suppository into the rectum As Needed for Constipation.       Sodium Phosphates (fleet enema) 7-19 GM/118ML enema Insert 1 enema into the rectum 1 (One) Time As Needed.          Current Meds:   Current Facility-Administered Medications   Medication Dose Route Frequency Provider Last Rate Last Admin    acetaminophen (TYLENOL) tablet 650 mg  650 mg Oral Q4H PRN Osvaldo Ponce MD        Or    acetaminophen (TYLENOL) 160 MG/5ML solution 650 mg  650 mg Oral Q4H PRN Osvaldo Ponce MD        Or    acetaminophen (TYLENOL) suppository 650 mg  650 mg Rectal Q4H PRN Osvaldo Ponce MD        albuterol (PROVENTIL) nebulizer solution 0.083% 2.5 mg/3mL  2.5 mg Nebulization Q6H PRN Osvaldo Ponce MD        apixaban (ELIQUIS) tablet 2.5 mg  2.5 mg Oral Q12H Osvaldo Ponce MD   2.5 mg at 06/10/23 0902    aspirin chewable tablet 81 mg  81 mg Oral Daily Osvaldo Ponce MD   81 mg at 06/10/23 0902    bisacodyl (DULCOLAX) suppository 10 mg  10 mg Rectal PRN Osvaldo Ponce MD        diphenhydrAMINE (BENADRYL) capsule 25 mg  25 mg Oral Q6H PRN Osvaldo Ponce MD         FLUoxetine (PROzac) capsule 60 mg  60 mg Oral Daily Osvaldo Ponce MD   60 mg at 06/09/23 2151    HYDROcodone-acetaminophen (NORCO) 7.5-325 MG per tablet 1 tablet  1 tablet Oral Q6H PRN Osvaldo Ponce MD   1 tablet at 06/10/23 0338    ipratropium-albuterol (DUO-NEB) nebulizer solution 3 mL  3 mL Nebulization Q8H - RT Osvaldo Ponce MD   3 mL at 06/10/23 0934    lactobacillus acidophilus (RISAQUAD) capsule 1 capsule  1 capsule Oral Daily Osvaldo Ponce MD   1 capsule at 06/09/23 2151    levothyroxine (SYNTHROID, LEVOTHROID) tablet 50 mcg  50 mcg Oral Q AM Osvaldo Ponce MD   50 mcg at 06/10/23 0633    LORazepam (ATIVAN) tablet 0.5 mg  0.5 mg Oral BID PRN Osvaldo Ponce MD   0.5 mg at 06/09/23 2152    Mirabegron ER (MYRBETRIQ) 24 hr tablet 25 mg  25 mg Oral Daily Osvaldo Ponce MD   25 mg at 06/09/23 2151    nitroglycerin (NITROSTAT) SL tablet 0.4 mg  0.4 mg Sublingual Q5 Min PRN Osvaldo Ponce MD        nystatin (MYCOSTATIN) powder 1 application  1 application Topical TID PRN Osvaldo Ponce MD        ondansetron (ZOFRAN) injection 4 mg  4 mg Intravenous Q6H PRN Osvaldo Ponce MD        pantoprazole (PROTONIX) EC tablet 40 mg  40 mg Oral Daily Osvaldo Ponce MD   40 mg at 06/10/23 0903    QUEtiapine (SEROquel) tablet 100 mg  100 mg Oral Nightly Osvaldo Ponce MD   100 mg at 06/09/23 2151    sennosides-docusate (PERICOLACE) 8.6-50 MG per tablet 2 tablet  2 tablet Oral BID Osvaldo Ponce MD   2 tablet at 06/10/23 0903    sodium chloride 0.9 % flush 10 mL  10 mL Intravenous Q12H Osvaldo Ponce MD   10 mL at 06/10/23 0903    sodium chloride 0.9 % flush 10 mL  10 mL Intravenous PRN Osvaldo Ponce MD        sodium chloride 0.9 % infusion 40 mL  40 mL Intravenous PRN Osvaldo Ponce MD        sodium chloride 0.9 % infusion  75 mL/hr Intravenous Continuous Osvaldo Ponce MD 75 mL/hr at 06/10/23 0632 75 mL/hr at 06/10/23 0632       Allergies:  Allergies   Allergen Reactions    Sulfa Antibiotics Other (See Comments)     Per facility paperwork  "   Aspartame Other (See Comments)     CAUSES MIGRAINES    Cephalexin Rash     Tolerated piperacillin-tazobactam (Zosyn) and ampicillin-sulbactam (Unasyn) during 2019 admission.       Social History:   Social History     Socioeconomic History    Marital status:    Tobacco Use    Smoking status: Former    Smokeless tobacco: Never   Vaping Use    Vaping Use: Never used   Substance and Sexual Activity    Alcohol use: No    Drug use: No    Sexual activity: Never        Family History:  History reviewed. No pertinent family history.     Review of Systems: as per HPI, in addition:    General:      Denies weakness / fatigue,                       No fevers / chills                       no weight loss  HEENT:       no dysphagia / odynophagia  Neck:           normal range of motion, no swelling  Respiratory: no cough / congestion                      No shortness of air                       No wheezing  CV:              No chest pain                       No palpitations  Abdomen/GI: no nausea / vomiting                      No diarrhea / constipation                      No abdominal pain  :             no dysuria / urinary frequency                       No urgency, normal output  Endocrine:   no polyuria / polydipsia,                      No heat or cold intolerance  Skin:           + Diffuse rash   Vascular:   No edema                     No claudication  Psych:        no depression/ anxiety  Neuro:        no focal weakness, no seizures  Musculoskeletal: no joint pain or deformities      Physical Exam:  Vitals:   Temp (24hrs), Av °F (36.7 °C), Min:97.6 °F (36.4 °C), Max:98.3 °F (36.8 °C)    /71 (BP Location: Left arm, Patient Position: Lying)   Pulse 60   Temp 98.3 °F (36.8 °C) (Oral)   Resp 18   Ht 165.1 cm (65\")   Wt 101 kg (221 lb 12.5 oz)   SpO2 94%   BMI 36.91 kg/m²   Intake/Output:   No intake or output data in the 24 hours ending 06/10/23 0935     Wt Readings from Last 1 Encounters: "   06/10/23 0124 101 kg (221 lb 12.5 oz)   06/09/23 1535 101 kg (221 lb 12.5 oz)   06/09/23 0916 127 kg (281 lb)       Exam:    General Appearance:  Awake, alert, oriented x3, no acute distress  Obese, mildly ill-appearing   Head and Face:  Normocephalic, atraumatic, mucus membranes moist, oropharynx clear   Eyes:  No icterus, pupils equal round and reactive to light, extraocular movements intact    ENMT: Moist mucosa, tongue symmetric    Neck: Supple  no jugular venous distention  no thyromegaly   Pulmonary:  Respiratory effort: Normal  Auscultation of lungs: Clear bilaterally  No wheezes  No rhonchi  Good air movement, good expansion   Chest wall:  No tenderness or deformity   Cardiovascular:  Auscultation of the heart: Normal rhythm, no murmurs  Trace edema of bilateral lower extremities   Abdomen:  Abdomen: soft, non-tender, normal bowel sounds all four quadrants, no masses   Liver and spleen: no hepatosplenomegaly   Musculoskeletal: Digits and nails: normal  Normal range of motion  No joint swelling or gross deformities    Skin: Bilateral erythematous skin discoloration lower legs, cellulitis versus venous stasis.  Diffuse erythematous rash upper arms, truncal region and upper legs   Lymphatic:  no cervical lymphadenopathy    Psychiatric: Judgement and insight: normal  Orientation to person place and time: normal  Mood and affect: normal       DATA:  Radiology and Labs:  The following labs independently reviewed by me, additional AM labs ordered  Old records independently reviewed showing normal baseline creatinine  The following radiologic studies independently viewed by me, findings renal ultrasound showed right upper pole calculus no obstructive uropathy, normal-sized kidneys, no hydronephrosis  Interval notes, chart personally reviewed by me.  I have reviewed and summarized old records as detailed above  Plan of care discussed with patient herself at bedside, discussed plan of care with her RN  New problems  include TOI, possible drug rash      Risk/ complexity of medical care/ medical decision making: High risk, new renal failure, complication of antibiotic therapy  Chronic illness with severe exacerbation or progression: Infection and antibiotic complications      Labs:   Recent Results (from the past 24 hour(s))   Comprehensive Metabolic Panel    Collection Time: 06/09/23 10:55 AM    Specimen: Blood   Result Value Ref Range    Glucose 103 (H) 65 - 99 mg/dL    BUN 25 (H) 8 - 23 mg/dL    Creatinine 2.40 (H) 0.57 - 1.00 mg/dL    Sodium 139 136 - 145 mmol/L    Potassium 4.8 3.5 - 5.2 mmol/L    Chloride 100 98 - 107 mmol/L    CO2 28.5 22.0 - 29.0 mmol/L    Calcium 8.9 8.6 - 10.5 mg/dL    Total Protein 5.8 (L) 6.0 - 8.5 g/dL    Albumin 2.7 (L) 3.5 - 5.2 g/dL    ALT (SGPT) 10 1 - 33 U/L    AST (SGOT) 17 1 - 32 U/L    Alkaline Phosphatase 86 39 - 117 U/L    Total Bilirubin 0.4 0.0 - 1.2 mg/dL    Globulin 3.1 gm/dL    A/G Ratio 0.9 g/dL    BUN/Creatinine Ratio 10.4 7.0 - 25.0    Anion Gap 10.5 5.0 - 15.0 mmol/L    eGFR 20.0 (L) >60.0 mL/min/1.73   Lactic Acid, Plasma    Collection Time: 06/09/23 10:55 AM    Specimen: Blood   Result Value Ref Range    Lactate 1.3 0.5 - 2.0 mmol/L   Procalcitonin    Collection Time: 06/09/23 10:55 AM    Specimen: Blood   Result Value Ref Range    Procalcitonin 0.11 0.00 - 0.25 ng/mL   CBC Auto Differential    Collection Time: 06/09/23 10:55 AM    Specimen: Blood   Result Value Ref Range    WBC 13.09 (H) 3.40 - 10.80 10*3/mm3    RBC 4.17 3.77 - 5.28 10*6/mm3    Hemoglobin 13.1 12.0 - 15.9 g/dL    Hematocrit 39.8 34.0 - 46.6 %    MCV 95.4 79.0 - 97.0 fL    MCH 31.4 26.6 - 33.0 pg    MCHC 32.9 31.5 - 35.7 g/dL    RDW 12.9 12.3 - 15.4 %    RDW-SD 45.0 37.0 - 54.0 fl    MPV 11.4 6.0 - 12.0 fL    Platelets 189 140 - 450 10*3/mm3    Neutrophil % 82.2 (H) 42.7 - 76.0 %    Lymphocyte % 8.9 (L) 19.6 - 45.3 %    Monocyte % 5.5 5.0 - 12.0 %    Eosinophil % 2.5 0.3 - 6.2 %    Basophil % 0.4 0.0 - 1.5 %     Immature Grans % 0.5 0.0 - 0.5 %    Neutrophils, Absolute 10.77 (H) 1.70 - 7.00 10*3/mm3    Lymphocytes, Absolute 1.16 0.70 - 3.10 10*3/mm3    Monocytes, Absolute 0.72 0.10 - 0.90 10*3/mm3    Eosinophils, Absolute 0.33 0.00 - 0.40 10*3/mm3    Basophils, Absolute 0.05 0.00 - 0.20 10*3/mm3    Immature Grans, Absolute 0.06 (H) 0.00 - 0.05 10*3/mm3    nRBC 0.0 0.0 - 0.2 /100 WBC   Protime-INR    Collection Time: 06/09/23 10:55 AM    Specimen: Blood   Result Value Ref Range    Protime 17.0 (H) 11.7 - 14.2 Seconds    INR 1.36 (H) 0.90 - 1.10   aPTT    Collection Time: 06/09/23 10:55 AM    Specimen: Blood   Result Value Ref Range    PTT 31.7 22.7 - 35.4 seconds   Urinalysis With Microscopic If Indicated (No Culture) - Urine, Clean Catch    Collection Time: 06/09/23  1:12 PM    Specimen: Urine, Clean Catch   Result Value Ref Range    Color, UA Yellow Yellow, Straw    Appearance, UA Clear Clear    pH, UA <=5.0 5.0 - 8.0    Specific Gravity, UA 1.017 1.005 - 1.030    Glucose, UA Negative Negative    Ketones, UA Negative Negative    Bilirubin, UA Negative Negative    Blood, UA Trace (A) Negative    Protein, UA Trace (A) Negative    Leuk Esterase, UA Moderate (2+) (A) Negative    Nitrite, UA Negative Negative    Urobilinogen, UA 0.2 E.U./dL 0.2 - 1.0 E.U./dL   Urinalysis, Microscopic Only - Urine, Clean Catch    Collection Time: 06/09/23  1:12 PM    Specimen: Urine, Clean Catch   Result Value Ref Range    RBC, UA 0-2 None Seen, 0-2 /HPF    WBC, UA 3-5 (A) None Seen, 0-2 /HPF    Bacteria, UA None Seen None Seen /HPF    Squamous Epithelial Cells, UA 0-2 None Seen, 0-2 /HPF    Hyaline Casts, UA None Seen None Seen /LPF    Methodology Manual Light Microscopy    Comprehensive Metabolic Panel    Collection Time: 06/10/23  6:40 AM    Specimen: Blood   Result Value Ref Range    Glucose 88 65 - 99 mg/dL    BUN 27 (H) 8 - 23 mg/dL    Creatinine 2.39 (H) 0.57 - 1.00 mg/dL    Sodium 139 136 - 145 mmol/L    Potassium 4.0 3.5 - 5.2 mmol/L     Chloride 100 98 - 107 mmol/L    CO2 27.5 22.0 - 29.0 mmol/L    Calcium 8.5 (L) 8.6 - 10.5 mg/dL    Total Protein 4.9 (L) 6.0 - 8.5 g/dL    Albumin 2.7 (L) 3.5 - 5.2 g/dL    ALT (SGPT) 8 1 - 33 U/L    AST (SGOT) 7 1 - 32 U/L    Alkaline Phosphatase 80 39 - 117 U/L    Total Bilirubin 0.4 0.0 - 1.2 mg/dL    Globulin 2.2 gm/dL    A/G Ratio 1.2 g/dL    BUN/Creatinine Ratio 11.3 7.0 - 25.0    Anion Gap 11.5 5.0 - 15.0 mmol/L    eGFR 20.1 (L) >60.0 mL/min/1.73   CBC Auto Differential    Collection Time: 06/10/23  6:40 AM    Specimen: Blood   Result Value Ref Range    WBC 11.63 (H) 3.40 - 10.80 10*3/mm3    RBC 3.65 (L) 3.77 - 5.28 10*6/mm3    Hemoglobin 11.4 (L) 12.0 - 15.9 g/dL    Hematocrit 35.1 34.0 - 46.6 %    MCV 96.2 79.0 - 97.0 fL    MCH 31.2 26.6 - 33.0 pg    MCHC 32.5 31.5 - 35.7 g/dL    RDW 12.7 12.3 - 15.4 %    RDW-SD 44.4 37.0 - 54.0 fl    MPV 11.4 6.0 - 12.0 fL    Platelets 168 140 - 450 10*3/mm3    Neutrophil % 80.0 (H) 42.7 - 76.0 %    Lymphocyte % 11.3 (L) 19.6 - 45.3 %    Monocyte % 5.6 5.0 - 12.0 %    Eosinophil % 2.4 0.3 - 6.2 %    Basophil % 0.3 0.0 - 1.5 %    Immature Grans % 0.4 0.0 - 0.5 %    Neutrophils, Absolute 9.29 (H) 1.70 - 7.00 10*3/mm3    Lymphocytes, Absolute 1.32 0.70 - 3.10 10*3/mm3    Monocytes, Absolute 0.65 0.10 - 0.90 10*3/mm3    Eosinophils, Absolute 0.28 0.00 - 0.40 10*3/mm3    Basophils, Absolute 0.04 0.00 - 0.20 10*3/mm3    Immature Grans, Absolute 0.05 0.00 - 0.05 10*3/mm3    nRBC 0.0 0.0 - 0.2 /100 WBC   Lactic Acid, Plasma    Collection Time: 06/10/23  6:40 AM    Specimen: Blood   Result Value Ref Range    Lactate 1.4 0.5 - 2.0 mmol/L       Radiology:  Imaging Results (Last 24 Hours)       Procedure Component Value Units Date/Time    US Renal Bilateral [[id number]] Collected: 06/09/23 2058     Updated: 06/09/23 2104    Narrative:      US RENAL BILATERAL-clinical: Acute renal insufficiency     FINDINGS: The right kidney is 11.6 cm in length and left kidney is 10.5  cm in length.  The bladder was collapsed, cannot evaluate for either the  right or left ureteral jet.     No hydronephrosis. There is a 13 mm right upper pole renal calculus,  this was seen on previous 05/27/2023 CT. Renal cortical echotexture is  normal to slightly greater than anticipated, possible medical renal  disease.     CONCLUSION: Right upper pole renal calculus, no obstructive uropathy.  Bladder collapsed, cannot evaluate for either ureteral jet.     This report was finalized on 6/9/2023 9:00 PM by Dr. Dennis Burns M.D.                    ASSESSMENT:     TOI (acute kidney injury), vancomycin toxicity versus interstitial nephritis    Benign essential hypertension    Bilateral lower extremity edema (chronic)    Diastolic dysfunction    Obstructive sleep apnea    Hypothyroidism    Chronic pain syndrome    Anemia  Diffuse rash, possible drug rash    CKD (chronic kidney disease) stage 3, GFR 30-59 ml/min    Bacteremia - aerococcus    PAF (paroxysmal atrial fibrillation)        DISCUSSION/PLAN:   Her renal function is stable today but significant change from her baseline creatinine of 0.6  Likely vancomycin toxicity  Interstitial nephritis seems less likely given a relatively bland UA with minimal proteinuria and only 3-5 RBCs, no RBCs in the urine  Will recheck a urinalysis today plus urine eosinophils  No evidence of peripheral eosinophilia  Could consider steroids if definitive evidence of interstitial nephritis but given her recent bacteremia I am hesitant to give her any type of immunosuppression  IV antibiotics per infectious disease  Agree with discontinuation of vancomycin  Hold Demadex  Continue current IV fluids  Await renal ultrasound results and will recheck urine studies today    Continue to monitor electrolytes and volume closely, avoid IV contrast and nephrotoxic medications     I appreciate the consult request  Please send me a secure chat message if any questions regarding patient care.      Antione Jenkins,  M.D  [facility]  Office phone number: [phone]  Answering service phone number: [phone]      6/10/2023        Dictation via Dragon dictation software

## 2023-06-10 NOTE — CONSULTS
Nutrition Services    Patient Name:  Rose Cheema  YOB: 1940  MRN: [id number]  Admit Date:  6/9/2023  Assessment Date:  06/10/23    Comment: Consult per nurse admission screen and MST score 5    82yoF with PMH of CHF, HTN, gastric bypass, recent d/c from [facility] (5/26-6/2) for UTI and bacteremia presents to ED from rehab facility for eval of rash and worsening kidney function.     Received consult for nutrition assessment per nurse admission screen and MST score 5. Renal diet initiated yesterday per MD. Limited documentation of PO intake. Pt with 50 lb weight loss past month per RN documentation. Pt is poor historian per notes. Chart history reviewed with 43 lb (15%) weight loss past 3 months. Pt reported poor PO intake related to decreased appetite past month per RN documentation. Will add ONS BID with meals for additional nutrition support. Last documented BM on 6/8 per I/Os.     Recommendations:   Continue current diet order and encourage adequate PO intake PRN.   Add Novasource Renal BID with meals for additional nutrition support.     RD will continue to follow course for PO intake and tolerance.     CLINICAL NUTRITION ASSESSMENT      Reason for Assessment MST score 2+, Nurse Admission Screen     Diagnosis/Problem   TOI    Medical/Surgical History Past Medical History:   Diagnosis Date    Anemia     Anxiety     Arthritis     CHF (congestive heart failure)     Coronary artery disease     Depression     Disease of thyroid gland     Fibromyalgia     GERD (gastroesophageal reflux disease)     Hypertension     Moderate tricuspid valve stenosis     Osteoporosis     Peripheral neuropathy     Pulmonary hypertension     SBO (small bowel obstruction)     Stenosis of mitral valve        Past Surgical History:   Procedure Laterality Date    BILATERAL OOPHORECTOMY      CARDIAC CATHETERIZATION      CHOLECYSTECTOMY      ERCP N/A 2/15/2019    Procedure: ENDOSCOPIC RETROGRADE CHOLANGIOPANCREATOGRAPHY WITH PARTIAL  "CHOLANGIOGRAM;  Surgeon: Gerald Herr MD;  Location: [facility] ENDOSCOPY;  Service: Gastroenterology    EXPLORATORY LAPAROTOMY      GASTRIC BYPASS      HERNIA REPAIR      inguinal and ventral    HYSTERECTOMY      OVARIAN CYST REMOVAL          Encounter Information        Nutrition History:     Food Preferences:    Supplements:    Factors Affecting Intake: decreased appetite     Anthropometrics        Current Height  Current Weight  BMI kg/m2 Height: 165.1 cm (65\")  Weight: 101 kg (221 lb 12.5 oz) (06/10/23 0124)  Body mass index is 36.91 kg/m².   Adjusted BMI (if applicable)    BMI Category Obese, Class II (35 - 39.9)       Admission Weight 221 lb (101 kg)       Ideal Body Weight (IBW) 125 lb (57 kg)   Adjusted IBW (if applicable)        Usual Body Weight (UBW) 281 lb (5/26/23); 264 lb (3/2023)   Weight Change/Trend Loss, Amount/Timeframe: 43-60 lb (up to 21.3%) weight loss past 1-3 months per weight history and chart review        Weight History Wt Readings from Last 30 Encounters:   06/10/23 0124 101 kg (221 lb 12.5 oz)   06/09/23 1535 101 kg (221 lb 12.5 oz)   06/09/23 0916 127 kg (281 lb)   05/28/23 1436 127 kg (281 lb)   05/26/23 2356 127 kg (281 lb)   09/15/20 1810 128 kg (281 lb 8.4 oz)   09/15/20 0119 119 kg (262 lb 5.6 oz)   05/19/20 0626 119 kg (261 lb 11 oz)   05/18/20 0350 119 kg (262 lb 5.6 oz)   05/17/20 0523 123 kg (270 lb 11.6 oz)   05/16/20 1454 122 kg (270 lb)   05/16/20 0448 123 kg (270 lb 8.1 oz)   05/15/20 0533 123 kg (272 lb 0.8 oz)   05/14/20 0429 130 kg (286 lb 13.1 oz)   05/10/19 0715 122 kg (268 lb 6.4 oz)   05/09/19 0538 118 kg (260 lb)   05/07/19 2326 116 kg (256 lb 6.3 oz)   05/07/19 1700 120 kg (264 lb)   04/15/19 2334 115 kg (254 lb 8 oz)   04/11/19 0500 125 kg (276 lb 6.4 oz)   04/10/19 1554 123 kg (271 lb 2.7 oz)   04/10/19 1209 128 kg (282 lb 6.4 oz)   02/26/19 1013 121 kg (266 lb 6.4 oz)   02/18/19 0500 117 kg (257 lb 6.4 oz)   02/17/19 0651 116 kg (256 lb 9.6 oz)   02/16/19 " 0559 116 kg (256 lb 1.6 oz)   02/15/19 1042 122 kg (270 lb)   02/15/19 0548 120 kg (263 lb 12.8 oz)   02/14/19 0632 120 kg (264 lb)   02/13/19 0500 124 kg (272 lb 6.4 oz)   02/12/19 2314 123 kg (272 lb 3.2 oz)   02/12/19 1400 127 kg (280 lb)   09/21/17 1409 132 kg (291 lb)   08/24/17 1359 132 kg (291 lb)             Estimated/Assessed Needs        Current Weight  Weight: 101 kg (221 lb 12.5 oz) (06/10/23 0124)       Energy Requirements    Weight for Calculation 101 kg   Method for Estimation  18 kcal/kg, 20 kcal/kg   EST Needs (kcal/day) 4714-8976 kcal       Protein Requirements    Weight for Calculation 57 kg (IBW)   EST Protein Needs (g/kg) 1.5 - 2.0 gm/kg   EST Daily Needs (g/day)  gm        Fluid Requirements     Method for Estimation Defer to physician    EST Needs (mL/day)      Tests/Procedures        Tests/Procedures No new tests/procedures     Labs       Pertinent Labs    Results from last 7 days   Lab Units 06/10/23  0640 06/09/23  1055   SODIUM mmol/L 139 139   POTASSIUM mmol/L 4.0 4.8   CHLORIDE mmol/L 100 100   CO2 mmol/L 27.5 28.5   BUN mg/dL 27* 25*   CREATININE mg/dL 2.39* 2.40*   CALCIUM mg/dL 8.5* 8.9   BILIRUBIN mg/dL 0.4 0.4   ALK PHOS U/L 80 86   ALT (SGPT) U/L 8 10   AST (SGOT) U/L 7 17   GLUCOSE mg/dL 88 103*     Results from last 7 days   Lab Units 06/10/23  0640   HEMOGLOBIN g/dL 11.4*   HEMATOCRIT % 35.1   WBC 10*3/mm3 11.63*   ALBUMIN g/dL 2.7*     Results from last 7 days   Lab Units 06/10/23  0640 06/09/23  1055   INR   --  1.36*   APTT seconds  --  31.7   PLATELETS 10*3/mm3 168 189     COVID19   Date Value Ref Range Status   05/27/2023 Not Detected Not Detected - Ref. Range Final     No results found for: HGBA1C       Medications           Scheduled Medications apixaban, 2.5 mg, Oral, Q12H  aspirin, 81 mg, Oral, Daily  FLUoxetine, 60 mg, Oral, Daily  ipratropium-albuterol, 3 mL, Nebulization, Q8H - RT  lactobacillus acidophilus, 1 capsule, Oral, Daily  levothyroxine, 50 mcg, Oral,  Q AM  Mirabegron ER, 25 mg, Oral, Daily  pantoprazole, 40 mg, Oral, Daily  QUEtiapine, 100 mg, Oral, Nightly  sennosides-docusate, 2 tablet, Oral, BID  sodium chloride, 10 mL, Intravenous, Q12H       Infusions sodium chloride, 75 mL/hr, Last Rate: 75 mL/hr (06/10/23 1117)       PRN Medications   acetaminophen **OR** acetaminophen **OR** acetaminophen    albuterol    bisacodyl    diphenhydrAMINE    HYDROcodone-acetaminophen    LORazepam    nitroglycerin    nystatin    ondansetron    sodium chloride    sodium chloride     Physical Findings          Physical Appearance alert, anxious, obese, oriented, on oxygen therapy   Oral/Mouth Cavity dentures   Edema  lower extremity    Gastrointestinal non-distended , last bowel movement:6/8   Skin  bruising, dermatitis, excoriation, MASD   Tubes/Drains/Lines none   NFPE Not indicated at this time, See Malnutrition Severity Assessment   --  Current Nutrition Orders & Evaluation of Intake       Oral Nutrition     Food Allergies Other:aspartame (causes headaches)   Current PO Diet Diet: Renal Diets; Low Sodium (2-3g), Low Potassium, Low Phosphorus; Texture: Regular Texture (IDDSI 7); Fluid Consistency: Thin (IDDSI 0)   Supplement n/a   PO Evaluation     % PO Intake Limited documentation     # of Days Evaluated    --  PES STATEMENT / NUTRITION DIAGNOSIS      Nutrition Dx Problem  Problem: Malnutrition  Etiology: Medical Diagnosis and Factors Affecting Nutritiondecreased appetite  Signs/Symptoms: Report of Minimal PO Intake, Unintended Weight Change, and Report/Observation    Comment:    --  NUTRITION INTERVENTION / PLAN OF CARE      Intervention Goal(s) Maintain nutrition status, Nutrition support treatment, Improved nutrition related labs, Reduce/improve symptoms, Meet estimated needs, Disease management/therapy, Establish PO intake, Tolerate PO , and No significant weight loss         RD Intervention/Action Encourage intake, Follow Tx Progress, Care plan reviewed, and  Recommend/ordered:          Prescription/Orders:       PO Diet       Supplements Novasource Renal BID with meals       Snacks       Enteral Nutrition       Parenteral Nutrition    New Prescription Ordered? Yes   --      Monitor/Evaluation Per protocol, I&O, PO intake, Supplement intake, Pertinent labs, Weight, Skin status, GI status, Symptoms   Discharge Plan/Needs Pending clinical course   Education Will instruct as appropriate   --    RD to follow per protocol.      Electronically signed by:  Polly Ayala RD  06/10/23 14:10 EDT

## 2023-06-11 LAB
ALBUMIN SERPL-MCNC: 2.5 G/DL (ref 3.5–5.2)
ANION GAP SERPL CALCULATED.3IONS-SCNC: 9.9 MMOL/L (ref 5–15)
BASOPHILS # BLD AUTO: 0.04 10*3/MM3 (ref 0–0.2)
BASOPHILS NFR BLD AUTO: 0.4 % (ref 0–1.5)
BUN SERPL-MCNC: 25 MG/DL (ref 8–23)
BUN/CREAT SERPL: 11.5 (ref 7–25)
CALCIUM SPEC-SCNC: 8.5 MG/DL (ref 8.6–10.5)
CHLORIDE SERPL-SCNC: 104 MMOL/L (ref 98–107)
CO2 SERPL-SCNC: 27.1 MMOL/L (ref 22–29)
CREAT SERPL-MCNC: 2.17 MG/DL (ref 0.57–1)
DEPRECATED RDW RBC AUTO: 46.7 FL (ref 37–54)
EGFRCR SERPLBLD CKD-EPI 2021: 22.5 ML/MIN/1.73
EOSINOPHIL # BLD AUTO: 0.26 10*3/MM3 (ref 0–0.4)
EOSINOPHIL NFR BLD AUTO: 2.4 % (ref 0.3–6.2)
EOSINOPHIL SPEC QL MICRO: 0 % EOS/100 CELLS (ref 0–0)
ERYTHROCYTE [DISTWIDTH] IN BLOOD BY AUTOMATED COUNT: 12.8 % (ref 12.3–15.4)
GLUCOSE SERPL-MCNC: 88 MG/DL (ref 65–99)
HCT VFR BLD AUTO: 31.7 % (ref 34–46.6)
HGB BLD-MCNC: 10.3 G/DL (ref 12–15.9)
IMM GRANULOCYTES # BLD AUTO: 0.04 10*3/MM3 (ref 0–0.05)
IMM GRANULOCYTES NFR BLD AUTO: 0.4 % (ref 0–0.5)
LYMPHOCYTES # BLD AUTO: 1.05 10*3/MM3 (ref 0.7–3.1)
LYMPHOCYTES NFR BLD AUTO: 9.8 % (ref 19.6–45.3)
MAGNESIUM SERPL-MCNC: 1.9 MG/DL (ref 1.6–2.4)
MCH RBC QN AUTO: 31.3 PG (ref 26.6–33)
MCHC RBC AUTO-ENTMCNC: 32.5 G/DL (ref 31.5–35.7)
MCV RBC AUTO: 96.4 FL (ref 79–97)
MONOCYTES # BLD AUTO: 0.62 10*3/MM3 (ref 0.1–0.9)
MONOCYTES NFR BLD AUTO: 5.8 % (ref 5–12)
NEUTROPHILS NFR BLD AUTO: 8.65 10*3/MM3 (ref 1.7–7)
NEUTROPHILS NFR BLD AUTO: 81.2 % (ref 42.7–76)
NRBC BLD AUTO-RTO: 0 /100 WBC (ref 0–0.2)
PHOSPHATE SERPL-MCNC: 3.9 MG/DL (ref 2.5–4.5)
PLATELET # BLD AUTO: 155 10*3/MM3 (ref 140–450)
PMV BLD AUTO: 11.3 FL (ref 6–12)
POTASSIUM SERPL-SCNC: 3.3 MMOL/L (ref 3.5–5.2)
RBC # BLD AUTO: 3.29 10*6/MM3 (ref 3.77–5.28)
SODIUM SERPL-SCNC: 141 MMOL/L (ref 136–145)
URATE SERPL-MCNC: 8.3 MG/DL (ref 2.4–5.7)
WBC NRBC COR # BLD: 10.66 10*3/MM3 (ref 3.4–10.8)

## 2023-06-11 PROCEDURE — 84550 ASSAY OF BLOOD/URIC ACID: CPT | Performed by: INTERNAL MEDICINE

## 2023-06-11 PROCEDURE — 83735 ASSAY OF MAGNESIUM: CPT | Performed by: STUDENT IN AN ORGANIZED HEALTH CARE EDUCATION/TRAINING PROGRAM

## 2023-06-11 PROCEDURE — 85025 COMPLETE CBC W/AUTO DIFF WBC: CPT | Performed by: STUDENT IN AN ORGANIZED HEALTH CARE EDUCATION/TRAINING PROGRAM

## 2023-06-11 PROCEDURE — 94664 DEMO&/EVAL PT USE INHALER: CPT

## 2023-06-11 PROCEDURE — 94799 UNLISTED PULMONARY SVC/PX: CPT

## 2023-06-11 PROCEDURE — 94761 N-INVAS EAR/PLS OXIMETRY MLT: CPT

## 2023-06-11 PROCEDURE — 80069 RENAL FUNCTION PANEL: CPT | Performed by: INTERNAL MEDICINE

## 2023-06-11 PROCEDURE — 97530 THERAPEUTIC ACTIVITIES: CPT

## 2023-06-11 RX ORDER — POTASSIUM CHLORIDE 750 MG/1
10 TABLET, FILM COATED, EXTENDED RELEASE ORAL
Status: DISCONTINUED | OUTPATIENT
Start: 2023-06-11 | End: 2023-06-14

## 2023-06-11 RX ORDER — POTASSIUM CHLORIDE 750 MG/1
10 TABLET, FILM COATED, EXTENDED RELEASE ORAL DAILY
Status: DISCONTINUED | OUTPATIENT
Start: 2023-06-11 | End: 2023-06-11

## 2023-06-11 RX ADMIN — MIRABEGRON 25 MG: 25 TABLET, FILM COATED, EXTENDED RELEASE ORAL at 09:19

## 2023-06-11 RX ADMIN — Medication 1 CAPSULE: at 09:20

## 2023-06-11 RX ADMIN — POTASSIUM CHLORIDE 10 MEQ: 750 TABLET, EXTENDED RELEASE ORAL at 11:43

## 2023-06-11 RX ADMIN — HYDROCODONE BITARTRATE AND ACETAMINOPHEN 1 TABLET: 7.5; 325 TABLET ORAL at 18:44

## 2023-06-11 RX ADMIN — HYDROCODONE BITARTRATE AND ACETAMINOPHEN 1 TABLET: 7.5; 325 TABLET ORAL at 11:38

## 2023-06-11 RX ADMIN — ASPIRIN 81 MG: 81 TABLET, CHEWABLE ORAL at 09:20

## 2023-06-11 RX ADMIN — POTASSIUM CHLORIDE 10 MEQ: 750 TABLET, EXTENDED RELEASE ORAL at 17:20

## 2023-06-11 RX ADMIN — Medication 10 ML: at 21:49

## 2023-06-11 RX ADMIN — PANTOPRAZOLE SODIUM 40 MG: 40 TABLET, DELAYED RELEASE ORAL at 09:20

## 2023-06-11 RX ADMIN — QUETIAPINE FUMARATE 100 MG: 100 TABLET ORAL at 21:49

## 2023-06-11 RX ADMIN — FLUOXETINE HYDROCHLORIDE 60 MG: 20 CAPSULE ORAL at 09:20

## 2023-06-11 RX ADMIN — LEVOTHYROXINE SODIUM 50 MCG: 0.05 TABLET ORAL at 06:47

## 2023-06-11 RX ADMIN — IPRATROPIUM BROMIDE AND ALBUTEROL SULFATE 3 ML: .5; 3 SOLUTION RESPIRATORY (INHALATION) at 07:54

## 2023-06-11 RX ADMIN — APIXABAN 2.5 MG: 2.5 TABLET, FILM COATED ORAL at 11:38

## 2023-06-11 RX ADMIN — DOCUSATE SODIUM 50MG AND SENNOSIDES 8.6MG 2 TABLET: 8.6; 5 TABLET, FILM COATED ORAL at 11:38

## 2023-06-11 RX ADMIN — APIXABAN 2.5 MG: 2.5 TABLET, FILM COATED ORAL at 21:49

## 2023-06-11 NOTE — PLAN OF CARE
Goal Outcome Evaluation:              Outcome Evaluation: Medicated prn for back and leg pain.  Generalized red rash over body with darkened red rash to lower legs.  Poor appetite, but eating a little better today.  Encouraged po.

## 2023-06-11 NOTE — PLAN OF CARE
Goal Outcome Evaluation:  Plan of Care Reviewed With: patient           Outcome Evaluation: Pt is an 83 yo who was seen in ER for rash; workup revealed maculopapular rash involving her arm and legs. She was recently hospitalized from 5/26/2023 through 6/2/2023 for ESBL positive E. coli UTI and also blood culture positive for Aerococcus viridans then discharged to a rehab facility. She resides at an [facility] and uses a RW for mobility at [facility]. She states that she had not been up while at rehab. Today she was able to sit up at EOB with Min/Mod A and perform STS at RW Mod A x 2; unable to stand for more than 30 seconds due to c/o weakness and dizziness. BP had dropped to 89/64 after standing then normalized after lying back down; RN notified. Acute PT indicated to address functional deficits. At this time PT rec's SNF after discharge.

## 2023-06-11 NOTE — THERAPY EVALUATION
Patient Name: Rose Cheema  : 1940    MRN: [id number]                              Today's Date: 2023       Admit Date: 2023    Visit Dx:     ICD-10-CM ICD-9-CM   1. TOI (acute kidney injury)  N17.9 584.9   2. Leukocytosis, unspecified type  D72.829 288.60   3. Rash  R21 782.1     Patient Active Problem List   Diagnosis    Urinary incontinence    Urinary frequency    Generalized abdominal pain    Pulmonary hypertension     Benign essential hypertension    Bilateral lower extremity edema (chronic)    CAD (coronary artery disease), native coronary artery    Diastolic dysfunction    Class 3 severe obesity with serious comorbidity and body mass index (BMI) of 50.0 to 59.9 in adult    Obstructive sleep apnea    Secondary pulmonary arterial hypertension    H/O: hysterectomy    Fibromyalgia    Hx SBO    Hx of cholecystectomy    Hypoglycemia    Fall    Hypothyroidism    Chronic pain syndrome    Anemia    Pneumonia of right lower lobe due to infectious organism    Acute UTI (urinary tract infection)    Acute on chronic diastolic (congestive) heart failure    Opioid dependence    Venous stasis dermatitis of both lower extremities    Gram-negative bacteremia    Hypokalemia    E. coli bacteremia    Anemia of chronic disease    Sepsis without acute organ dysfunction - present on admit    CKD (chronic kidney disease) stage 3, GFR 30-59 ml/min    Bacteremia - aerococcus    Enterococcal septicemia    Asymptomatic bacteriuria    Bacteremia    PAF (paroxysmal atrial fibrillation)    TOI (acute kidney injury)    Moderate Malnutrition (HCC)     Past Medical History:   Diagnosis Date    Anemia     Anxiety     Arthritis     CHF (congestive heart failure)     Coronary artery disease     Depression     Disease of thyroid gland     Fibromyalgia     GERD (gastroesophageal reflux disease)     Hypertension     Moderate tricuspid valve stenosis     Osteoporosis     Peripheral neuropathy     Pulmonary hypertension     SBO (small  bowel obstruction)     Stenosis of mitral valve      Past Surgical History:   Procedure Laterality Date    BILATERAL OOPHORECTOMY      CARDIAC CATHETERIZATION      CHOLECYSTECTOMY      ERCP N/A 2/15/2019    Procedure: ENDOSCOPIC RETROGRADE CHOLANGIOPANCREATOGRAPHY WITH PARTIAL CHOLANGIOGRAM;  Surgeon: Gerald Herr MD;  Location: [facility] ENDOSCOPY;  Service: Gastroenterology    EXPLORATORY LAPAROTOMY      GASTRIC BYPASS      HERNIA REPAIR      inguinal and ventral    HYSTERECTOMY      OVARIAN CYST REMOVAL        General Information       Row Name 06/11/23 1426          Physical Therapy Time and Intention    Document Type evaluation;therapy note (daily note)  -MW     Mode of Treatment physical therapy  -MW       Row Name 06/11/23 1426          General Information    Patient Profile Reviewed yes  -MW     Prior Level of Function independent:;all household mobility;ADL's  RW  -MW     Existing Precautions/Restrictions fall;oxygen therapy device and L/min  -MW     Barriers to Rehab previous functional deficit  -MW       Row Name 06/11/23 1426          Cognition    Orientation Status (Cognition) oriented x 3  -MW       Row Name 06/11/23 1426          Safety Issues, Functional Mobility    Impairments Affecting Function (Mobility) balance;endurance/activity tolerance;strength;pain  -MW     Comment, Safety Issues/Impairments (Mobility) Gait belt and nonslip socks used for safety  -MW               User Key  (r) = Recorded By, (t) = Taken By, (c) = Cosigned By      Initials Name Provider Type    MW Sherrell Prater PT Physical Therapist                   Mobility       Row Name 06/11/23 1431          Bed Mobility    Bed Mobility supine-sit-supine;scooting/bridging  -MW     Scooting/Bridging [place] (Bed Mobility) maximum assist (25% patient effort);2 person assist;moderate assist (50% patient effort);verbal cues  -MW     Supine-Sit-Supine [place] (Bed Mobility) minimum assist (75% patient effort);moderate assist  (50% patient effort);verbal cues  -MW     Assistive Device (Bed Mobility) bed rails;head of bed elevated  -[facility] Name 06/11/23 1430          Sit-Stand Transfer    Sit-Stand [place] (Transfers) moderate assist (50% patient effort);2 person assist;verbal cues  -MW     Assistive Device (Sit-Stand Transfers) walker, front-wheeled  -MW     Comment, (Sit-Stand Transfer) Able to stand for less than 30 sec due to weakness and drop in BP  -MW       Row Name 06/11/23 1430          Gait/Stairs (Locomotion)    [place] Level (Gait) unable to assess  -MW               User Key  (r) = Recorded By, (t) = Taken By, (c) = Cosigned By      Initials Name Provider Type    Sherrell Velásquez, PT Physical Therapist                   Obj/Interventions    No documentation.                  Goals/Plan       Row Name 06/11/23 1445          Bed Mobility Goal 1 (PT)    Activity/Assistive Device (Bed Mobility Goal 1, PT) bed mobility activities, all  -MW     [place] Level/Cues Needed (Bed Mobility Goal 1, PT) contact guard required;minimum assist (75% or more patient effort)  -MW     Time Frame (Bed Mobility Goal 1, PT) 2 weeks  -[facility] Name 06/11/23 1445          Transfer Goal 1 (PT)    Activity/Assistive Device (Transfer Goal 1, PT) transfers, all;walker, rolling  -MW     [place] Level/Cues Needed (Transfer Goal 1, PT) contact guard required;minimum assist (75% or more patient effort)  -MW     Time Frame (Transfer Goal 1, PT) 2 weeks  -MW       Row Name 06/11/23 1445          Gait Training Goal 1 (PT)    Activity/Assistive Device (Gait Training Goal 1, PT) gait (walking locomotion);walker, rolling  -MW     [place] Level (Gait Training Goal 1, PT) minimum assist (75% or more patient effort);contact guard required  -MW     Distance (Gait Training Goal 1, PT) 50  -MW     Time Frame (Gait Training Goal 1, PT) 2 weeks  -MW       Row Name 06/11/23 1445          Therapy Assessment/Plan (PT)    Planned Therapy  Interventions (PT) balance training;bed mobility training;gait training;home exercise program;patient/family education;neuromuscular re-education;postural re-education;strengthening;transfer training  -               User Key  (r) = Recorded By, (t) = Taken By, (c) = Cosigned By      Initials Name Provider Type    Sherrell Velásquez, PT Physical Therapist                   Clinical Impression       Row Name 06/11/23 1432          Pain    Pretreatment Pain Rating 4/10  -MW     Posttreatment Pain Rating 4/10  -MW     Pain Location - Side/Orientation Bilateral  -MW     Pain Location lower  -MW     Pain Location - extremity;back  -MW     Pain Intervention(s) Repositioned;Rest;Ambulation/increased activity  -       Row Name 06/11/23 1432          Plan of Care Review    Plan of Care Reviewed With patient  -MW     Outcome Evaluation Pt is an 81 yo who was seen in ER for rash; workup revealed maculopapular rash involving her arm and legs. She was recently hospitalized from 5/26/2023 through 6/2/2023 for ESBL positive E. coli UTI and also blood culture positive for Aerococcus viridans then discharged to a rehab facility. She resides at an [facility] and uses a RW for mobility at [facility]. She states that she had not been up while at rehab. Today she was able to sit up at EOB with Min/Mod A and perform STS at RW Mod A x 2; unable to stand for more than 30 seconds due to c/o weakness and dizziness. BP had dropped to 89/64 after standing then normalized after lying back down; RN notified. Acute PT indicated to address functional deficits. At this time PT rec's SNF after discharge.  -       Row Name 06/11/23 1432          Therapy Assessment/Plan (PT)    Rehab Potential (PT) good, to achieve stated therapy goals  -     Criteria for Skilled Interventions Met (PT) yes;meets criteria;skilled treatment is necessary  -     Therapy Frequency (PT) 5 times/wk  -       Row Name 06/11/23 1432          Vital Signs    Pre Systolic BP  Rehab 111  -MW     Pre Treatment Diastolic BP 71  -MW     Intra Systolic BP Rehab 89  -MW     Intra Treatment Diastolic BP 64  -MW     Post Systolic BP Rehab 107  -MW     Post Treatment Diastolic BP 67  -MW     O2 Delivery Pre Treatment supplemental O2  -MW     O2 Delivery Intra Treatment supplemental O2  -MW     O2 Delivery Post Treatment supplemental O2  -MW     Pre Patient Position Supine  -MW     Intra Patient Position Standing  -MW     Post Patient Position Supine  -MW       Row Name 06/11/23 1432          Positioning and Restraints    Pre-Treatment Position in bed  -MW     Post Treatment Position bed  -MW     In Bed notified nsg;supine;call light within reach;encouraged to call for assist;exit alarm on;with nsg  with nsg assistant, needs met, lines intact  -MW               User Key  (r) = Recorded By, (t) = Taken By, (c) = Cosigned By      Initials Name Provider Type    Sherrell Velásquez PT Physical Therapist                   Outcome Measures       Row Name 06/11/23 1446          How much help from another person do you currently need...    Turning from your back to your side while in flat bed without using bedrails? 3  -MW     Moving from lying on back to sitting on the side of a flat bed without bedrails? 2  -MW     Moving to and from a bed to a chair (including a wheelchair)? 2  -MW     Standing up from a chair using your arms (e.g., wheelchair, bedside chair)? 2  -MW     Climbing 3-5 steps with a railing? 1  -MW     To walk in hospital room? 2  -MW     AM-PAC 6 Clicks Score (PT) 12  -MW     Highest level of mobility 4 --> Transferred to chair/commode  -MW       Row Name 06/11/23 1446          Functional Assessment    Outcome Measure Options AM-PAC 6 Clicks Basic Mobility (PT)  -MW               User Key  (r) = Recorded By, (t) = Taken By, (c) = Cosigned By      Initials Name Provider Type    Sherrell Velásquez PT Physical Therapist                                 Physical Therapy Education       Title:  PT OT SLP Therapies (In Progress)       Topic: Physical Therapy (In Progress)       Point: Mobility training (Done)       Learning Progress Summary             Patient Acceptance, E, VU,NR by  at 6/11/2023 1447                         Point: Home exercise program (Not Started)       Learner Progress:  Not documented in this visit.              Point: Body mechanics (Done)       Learning Progress Summary             Patient Acceptance, E, VU,NR by  at 6/11/2023 1447                         Point: Precautions (Done)       Learning Progress Summary             Patient Acceptance, E, VU,NR by  at 6/11/2023 1447                                         User Key       Initials Effective Dates Name Provider Type Discipline     06/16/21 -  Sherrell Prater, PT Physical Therapist PT                  PT Recommendation and Plan  Planned Therapy Interventions (PT): balance training, bed mobility training, gait training, home exercise program, patient/family education, neuromuscular re-education, postural re-education, strengthening, transfer training  Plan of Care Reviewed With: patient  Outcome Evaluation: Pt is an 81 yo who was seen in ER for rash; workup revealed maculopapular rash involving her arm and legs. She was recently hospitalized from 5/26/2023 through 6/2/2023 for ESBL positive E. coli UTI and also blood culture positive for Aerococcus viridans then discharged to a rehab facility. She resides at an [facility] and uses a RW for mobility at [facility]. She states that she had not been up while at rehab. Today she was able to sit up at EOB with Min/Mod A and perform STS at RW Mod A x 2; unable to stand for more than 30 seconds due to c/o weakness and dizziness. BP had dropped to 89/64 after standing then normalized after lying back down; RN notified. Acute PT indicated to address functional deficits. At this time PT rec's SNF after discharge.     Time Calculation:    PT Charges       Row Name 06/11/23 1447 06/11/23 3538           Time Calculation    Start Time 1315  -MW --     Stop Time 1340  -MW --     Time Calculation (min) 25 min  -MW --     PT Received On -- 06/11/23  -MW     PT - Next Appointment -- 06/12/23  -MW     PT Goal Re-Cert Due Date 06/25/23  -MW --               User Key  (r) = Recorded By, (t) = Taken By, (c) = Cosigned By      Initials Name Provider Type    Sherrell Velásquez, PT Physical Therapist                  Therapy Charges for Today       Code Description Service Date Service Provider Modifiers Qty    05472178651  PT THERAPEUTIC ACT EA 15 MIN 6/11/2023 Sherrell Prater, PT GP 2    [id number] HC PT THER SUPP EA 15 MIN 6/11/2023 Sherrell Prater, PT GP 1            PT G-Codes  Outcome Measure Options: AM-PAC 6 Clicks Basic Mobility (PT)  AM-PAC 6 Clicks Score (PT): 12  PT Discharge Summary  Anticipated Discharge Disposition (PT): skilled nursing facility    Sherrell Prater PT  6/11/2023

## 2023-06-11 NOTE — PROGRESS NOTES
Name: Rose Cheema ADMIT: 2023   : 1940  PCP: Provider, No Known    MRN: [id number] LOS: 1 days   AGE/SEX: 82 y.o. female  ROOM: [facility]     Subjective   Subjective   Patient resting in bed, no complaints at the moment. D/w RN.     Objective   Objective   Vital Signs  Temp:  [97.1 °F (36.2 °C)-98.6 °F (37 °C)] 97.1 °F (36.2 °C)  Heart Rate:  [67-94] 69  Resp:  [16] 16  BP: ()/(52-71) 111/71  SpO2:  [92 %-99 %] 96 %  on  Flow (L/min):  [1] 1;   Device (Oxygen Therapy): nasal cannula  Body mass index is 36.91 kg/m².  Physical Exam  Constitutional:       General: She is not in acute distress.     Appearance: She is obese. She is ill-appearing.   Cardiovascular:      Rate and Rhythm: Normal rate and regular rhythm.   Pulmonary:      Effort: Pulmonary effort is normal. No respiratory distress.      Breath sounds: Normal breath sounds. No wheezing.   Abdominal:      General: There is no distension.      Palpations: Abdomen is soft.      Tenderness: There is no abdominal tenderness.   Musculoskeletal:         General: No swelling or tenderness.      Right lower leg: No edema.      Left lower leg: No edema.   Skin:     General: Skin is warm and dry.      Coloration: Skin is pale.      Findings: Rash present.   Neurological:      General: No focal deficit present.      Mental Status: She is alert and oriented to person, place, and time. Mental status is at baseline.       Results Review     I reviewed the patient's new clinical results.  Results from last 7 days   Lab Units 23  0552 06/10/23  0640 23  1055   WBC 10*3/mm3 10.66 11.63* 13.09*   HEMOGLOBIN g/dL 10.3* 11.4* 13.1   PLATELETS 10*3/mm3 155 168 189       Results from last 7 days   Lab Units 23  0552 06/10/23  0640 23  1055   SODIUM mmol/L 141 139 139   POTASSIUM mmol/L 3.3* 4.0 4.8   CHLORIDE mmol/L 104 100 100   CO2 mmol/L 27.1 27.5 28.5   BUN mg/dL 25* 27* 25*   CREATININE mg/dL 2.17* 2.39* 2.40*   GLUCOSE mg/dL 88 88  103*     Estimated Creatinine Clearance: 24.4 mL/min (A) (by C-G formula based on SCr of 2.17 mg/dL (H)).  Results from last 7 days   Lab Units 06/11/23  0552 06/10/23  0640 06/09/23  1055   ALBUMIN g/dL 2.5* 2.7* 2.7*   BILIRUBIN mg/dL  --  0.4 0.4   ALK PHOS U/L  --  80 86   AST (SGOT) U/L  --  7 17   ALT (SGPT) U/L  --  8 10       Results from last 7 days   Lab Units 06/11/23  0552 06/10/23  0640 06/09/23  1055   CALCIUM mg/dL 8.5* 8.5* 8.9   ALBUMIN g/dL 2.5* 2.7* 2.7*   MAGNESIUM mg/dL 1.9  --   --    PHOSPHORUS mg/dL 3.9  --   --        Results from last 7 days   Lab Units 06/10/23  0640 06/09/23  1055   PROCALCITONIN ng/mL  --  0.11   LACTATE mmol/L 1.4 1.3       COVID19   Date Value Ref Range Status   05/27/2023 Not Detected Not Detected - Ref. Range Final     SARS-CoV-2, ARLENE   Date Value Ref Range Status   10/21/2020 Not Detected Not Detected Final     Comment:     This nucleic acid amplification test was developed and its performance  characteristics determined by [facility]. Nucleic acid  amplification tests include PCR and TMA. This test has not been FDA  cleared or approved. This test has been authorized by FDA under an  Emergency Use Authorization (EUA). This test is only authorized for  the duration of time the declaration that circumstances exist  justifying the authorization of the emergency use of in vitro  diagnostic tests for detection of SARS-CoV-2 virus and/or diagnosis  of COVID-19 infection under section 564(b)(1) of the Act, 21 U.S.C.  360bbb-3(b) (1), unless the authorization is terminated or revoked  sooner.  When diagnostic testing is negative, the possibility of a false  negative result should be considered in the context of a patient's  recent exposures and the presence of clinical signs and symptoms  consistent with COVID-19. An individual without symptoms of COVID-19  and who is not shedding SARS-CoV-2 virus would expect to have a  negative (not detected) result in this  assay.     No results found for: HGBA1C, POCGLU    US Renal Bilateral  US RENAL BILATERAL-clinical: Acute renal insufficiency     FINDINGS: The right kidney is 11.6 cm in length and left kidney is 10.5  cm in length. The bladder was collapsed, cannot evaluate for either the  right or left ureteral jet.     No hydronephrosis. There is a 13 mm right upper pole renal calculus,  this was seen on previous 05/27/2023 CT. Renal cortical echotexture is  normal to slightly greater than anticipated, possible medical renal  disease.     CONCLUSION: Right upper pole renal calculus, no obstructive uropathy.  Bladder collapsed, cannot evaluate for either ureteral jet.     This report was finalized on 6/9/2023 9:00 PM by Dr. Dennis Burns M.D.       Scheduled Medications  apixaban, 2.5 mg, Oral, Q12H  aspirin, 81 mg, Oral, Daily  FLUoxetine, 60 mg, Oral, Daily  ipratropium-albuterol, 3 mL, Nebulization, Q8H - RT  lactobacillus acidophilus, 1 capsule, Oral, Daily  levothyroxine, 50 mcg, Oral, Q AM  Mirabegron ER, 25 mg, Oral, Daily  pantoprazole, 40 mg, Oral, Daily  potassium chloride, 10 mEq, Oral, TID With Meals  QUEtiapine, 100 mg, Oral, Nightly  sennosides-docusate, 2 tablet, Oral, BID  sodium chloride, 10 mL, Intravenous, Q12H    Infusions     Diet  Diet: Cardiac Diets; Healthy Heart (2-3 Na+); Texture: Regular Texture (IDDSI 7); Fluid Consistency: Thin (IDDSI 0)         I have personally reviewed:  [x]  Laboratory   []  Microbiology   []  Radiology   [x]  EKG/Telemetry   []  Cardiology/Vascular   []  Pathology   []  Records    Assessment/Plan     Active Hospital Problems    Diagnosis  POA    **TOI (acute kidney injury) [N17.9]  Yes    Moderate Malnutrition (HCC) [E44.0]  Yes    PAF (paroxysmal atrial fibrillation) [I48.0]  Yes    Bacteremia - aerococcus [R78.81]  Yes    CKD (chronic kidney disease) stage 3, GFR 30-59 ml/min [N18.30]  Yes    Anemia [D64.9]  Yes    Chronic pain syndrome [G89.4]  Yes    Hypothyroidism [E03.9]   Yes    Bilateral lower extremity edema (chronic) [R60.0]  Yes    Diastolic dysfunction [I51.89]  Yes    Benign essential hypertension [I10]  Yes    Obstructive sleep apnea [G47.33]  Yes      Resolved Hospital Problems   No resolved problems to display.       82 y.o. female admitted with TOI (acute kidney injury).    TOI on CKD  - renal following; demedex on hold, IVF per renal  - cr stable at 2.4, b/l 0.6  - BRITTANY unremarkable for cause of TOI  - Cr     Rash  - possible drug rash; hold off on steroids/avoid immunosuppression   - pt denies pain, pruritus, symptoms  - vanco has been Dc'd; monitor for improvement    Paroxysmal atrial fibrillation  - continue low-dose eliquis, no rate control meds    Hypothyroidism  - levothyroxine    Recent ESBL E. Coli UTI  - s/p ertapenem    Hypertension  - hx of, normotensive off of meds    Recent aerococcus bacteremia  - vanco dcd 2/2 to rash and TOI  - agree with admitting provider risk of continuing vanco outweigh benefits, she has completed all but 3 days of tx    Obesity  - rec wt loss    GERSON  - ok for home CPAP    Eliquis (home med) for DVT prophylaxis.  DNR.confirmed with patient  Discussed with nursing staff.  Anticipate discharge to SNU facility timing yet to be determined.    Copied text in this note has been reviewed and is accurate as of 06/11/23.     Candida Scott MD  [facility]  06/11/23  16:40 EDT    I wore protective equipment throughout this patient encounter including gloves and protective eyewear.  Hand hygiene was performed before donning protective equipment and after removal when leaving the room.

## 2023-06-11 NOTE — PROGRESS NOTES
Nephrology Follow Up Note    Patient Identification:  Name: Rose Cheema MRN: [id number]  Age: 82 y.o. : 1940  Sex: female  Date:2023    Requesting Physician: As per consult order.  Reason for Consultation: TOI   Information from:patient/ family/ chart      Interval hx : This is a 82 y.o. year old female with TOI and new rash.  CR is trending down and is now at 2.1.  She states her appetite is low but she is tolerating po.  Denies any itching.  Bp near goal but remains on low end.         The following medical history and medications personally reviewed by me:    Problem List:   Patient Active Problem List    Diagnosis     *TOI (acute kidney injury) [N17.9]     Moderate Malnutrition (HCC) [E44.0]     Bacteremia [R78.81]     PAF (paroxysmal atrial fibrillation) [I48.0]     Enterococcal septicemia [A41.81]     Asymptomatic bacteriuria [R82.71]     Bacteremia - aerococcus [R78.81]     Sepsis without acute organ dysfunction - present on admit [A41.9]     CKD (chronic kidney disease) stage 3, GFR 30-59 ml/min [N18.30]     Anemia of chronic disease [D63.8]     Hypokalemia [E87.6]     E. coli bacteremia [R78.81, B96.20]     Acute on chronic diastolic (congestive) heart failure [I50.33]     Opioid dependence [F11.20]     Venous stasis dermatitis of both lower extremities [I87.2]     Gram-negative bacteremia [R78.81]     Acute UTI (urinary tract infection) [N39.0]     Pneumonia of right lower lobe due to infectious organism [J18.9]     Hx of cholecystectomy [Z90.49]     Hypoglycemia [E16.2]     Fall [W19.XXXA]     Hypothyroidism [E03.9]     Chronic pain syndrome [G89.4]     Anemia [D64.9]     Generalized abdominal pain [R10.84]     Pulmonary hypertension  [I27.20]     Fibromyalgia [M79.7]     Hx SBO [Z87.19]     Bilateral lower extremity edema (chronic) [R60.0]     Urinary frequency [R35.0]     Urinary incontinence [R32]     CAD (coronary artery disease), native coronary artery [I25.10]     Diastolic  dysfunction [I51.89]     Benign essential hypertension [I10]     Class 3 severe obesity with serious comorbidity and body mass index (BMI) of 50.0 to 59.9 in adult [E66.01, Z68.43]     Obstructive sleep apnea [G47.33]     Secondary pulmonary arterial hypertension [I27.21]     H/O: hysterectomy [Z90.710]        Past Medical History:  Past Medical History:   Diagnosis Date    Anemia     Anxiety     Arthritis     CHF (congestive heart failure)     Coronary artery disease     Depression     Disease of thyroid gland     Fibromyalgia     GERD (gastroesophageal reflux disease)     Hypertension     Moderate tricuspid valve stenosis     Osteoporosis     Peripheral neuropathy     Pulmonary hypertension     SBO (small bowel obstruction)     Stenosis of mitral valve        Past Surgical History:  Past Surgical History:   Procedure Laterality Date    BILATERAL OOPHORECTOMY      CARDIAC CATHETERIZATION      CHOLECYSTECTOMY      ERCP N/A 2/15/2019    Procedure: ENDOSCOPIC RETROGRADE CHOLANGIOPANCREATOGRAPHY WITH PARTIAL CHOLANGIOGRAM;  Surgeon: Gerald Herr MD;  Location: [facility] ENDOSCOPY;  Service: Gastroenterology    EXPLORATORY LAPAROTOMY      GASTRIC BYPASS      HERNIA REPAIR      inguinal and ventral    HYSTERECTOMY      OVARIAN CYST REMOVAL          Home Meds:   Medications Prior to Admission   Medication Sig Dispense Refill Last Dose    albuterol sulfate  (90 Base) MCG/ACT inhaler Inhale 2 puffs Every 4 (Four) Hours As Needed for Wheezing.       apixaban (ELIQUIS) 5 MG tablet tablet Take 1 tablet by mouth Every 12 (Twelve) Hours. Indications: Atrial Fibrillation 60 tablet 0     aspirin 81 MG chewable tablet Chew 1 tablet Daily.       busPIRone (BUSPAR) 30 MG tablet Take 1 tablet by mouth 2 (Two) Times a Day.       diphenhydrAMINE (BENADRYL) 25 mg capsule Take 1 capsule by mouth Every 6 (Six) Hours As Needed for Itching.       fentaNYL (DURAGESIC) 25 MCG/HR patch Place 1 patch on the skin as directed by  provider Every 72 (Seventy-Two) Hours. 2 patch 0     FLUoxetine (PROzac) 60 MG tablet Take 1 tablet by mouth Daily.       HYDROcodone-acetaminophen (NORCO) 7.5-325 MG per tablet Take 1 tablet by mouth Every 6 (Six) Hours As Needed for Severe Pain. 18 tablet 0     levothyroxine (SYNTHROID, LEVOTHROID) 50 MCG tablet Take 1 tablet by mouth Daily.       LORazepam (ATIVAN) 0.5 MG tablet Take 1 tablet by mouth 2 (Two) Times a Day As Needed for Anxiety. 6 tablet 0     magnesium hydroxide (MILK OF MAGNESIA) 400 MG/5ML suspension Take 30 mL by mouth Daily As Needed for Constipation.       Mirabegron ER (MYRBETRIQ) 50 MG tablet sustained-release 24 hour 24 hr tablet Take 50 mg by mouth Daily.       nystatin (MYCOSTATIN) 670414 UNIT/GM powder Apply 1 application topically to the appropriate area as directed 3 (Three) Times a Day As Needed.       pantoprazole (PROTONIX) 40 MG EC tablet Take 1 tablet by mouth Daily.       potassium chloride (MICRO-K) 10 MEQ CR capsule Take 1 capsule by mouth 2 (Two) Times a Day.       Probiotic Product (Align) 4 MG capsule Take 1 capsule by mouth Daily.       QUEtiapine (SEROquel) 100 MG tablet Take 1 tablet by mouth Every Night.       sennosides-docusate (PERICOLACE) 8.6-50 MG per tablet Take 2 tablets by mouth 2 (Two) Times a Day. 120 tablet 0     torsemide (DEMADEX) 100 MG tablet Take 1 tablet by mouth Every Other Day.       umeclidinium-vilanterol (ANORO ELLIPTA) 62.5-25 MCG/INH aerosol powder  inhaler Inhale 1 puff Daily.       bisacodyl (DULCOLAX) 10 MG suppository Insert 1 suppository into the rectum As Needed for Constipation.       Sodium Phosphates (fleet enema) 7-19 GM/118ML enema Insert 1 enema into the rectum 1 (One) Time As Needed.          Current Meds:   Current Facility-Administered Medications   Medication Dose Route Frequency Provider Last Rate Last Admin    acetaminophen (TYLENOL) tablet 650 mg  650 mg Oral Q4H PRN Osvaldo Ponce MD        Or    acetaminophen (TYLENOL) 160  MG/5ML solution 650 mg  650 mg Oral Q4H PRN Osvaldo Ponce MD        Or    acetaminophen (TYLENOL) suppository 650 mg  650 mg Rectal Q4H PRN Osvaldo Ponce MD        albuterol (PROVENTIL) nebulizer solution 0.083% 2.5 mg/3mL  2.5 mg Nebulization Q6H PRN Osvaldo Ponce MD        apixaban (ELIQUIS) tablet 2.5 mg  2.5 mg Oral Q12H Osvaldo Ponce MD   2.5 mg at 06/10/23 2145    aspirin chewable tablet 81 mg  81 mg Oral Daily Osvaldo Ponce MD   81 mg at 06/11/23 0920    bisacodyl (DULCOLAX) suppository 10 mg  10 mg Rectal PRN Osvaldo Ponce MD        diphenhydrAMINE (BENADRYL) capsule 25 mg  25 mg Oral Q6H PRN Osvaldo Ponce MD        FLUoxetine (PROzac) capsule 60 mg  60 mg Oral Daily Osvaldo Ponce MD   60 mg at 06/11/23 0920    HYDROcodone-acetaminophen (NORCO) 7.5-325 MG per tablet 1 tablet  1 tablet Oral Q6H PRN Osvaldo Ponce MD   1 tablet at 06/10/23 2145    ipratropium-albuterol (DUO-NEB) nebulizer solution 3 mL  3 mL Nebulization Q8H - RT Osvaldo Ponce MD   3 mL at 06/11/23 0754    lactobacillus acidophilus (RISAQUAD) capsule 1 capsule  1 capsule Oral Daily Osvaldo Ponce MD   1 capsule at 06/11/23 0920    levothyroxine (SYNTHROID, LEVOTHROID) tablet 50 mcg  50 mcg Oral Q AM Osvaldo Ponce MD   50 mcg at 06/11/23 0647    LORazepam (ATIVAN) tablet 0.5 mg  0.5 mg Oral BID PRN Osvaldo Ponce MD   0.5 mg at 06/10/23 2145    Mirabegron ER (MYRBETRIQ) 24 hr tablet 25 mg  25 mg Oral Daily Osvaldo Ponce MD   25 mg at 06/11/23 0919    nitroglycerin (NITROSTAT) SL tablet 0.4 mg  0.4 mg Sublingual Q5 Min PRN Osvaldo Ponce MD        nystatin (MYCOSTATIN) powder 1 application  1 application Topical TID PRN Osvaldo Ponce MD        ondansetron (ZOFRAN) injection 4 mg  4 mg Intravenous Q6H PRN Osvaldo Ponce MD        pantoprazole (PROTONIX) EC tablet 40 mg  40 mg Oral Daily Osvaldo Ponec MD   40 mg at 06/11/23 0920    QUEtiapine (SEROquel) tablet 100 mg  100 mg Oral Nightly Osvaldo Ponce MD   100 mg at 06/10/23 3057    sennosides-docusate  "(PERICOLACE) 8.6-50 MG per tablet 2 tablet  2 tablet Oral BID Osvaldo Ponce MD   2 tablet at 06/10/23 2145    sodium chloride 0.9 % flush 10 mL  10 mL Intravenous Q12H Osvaldo Ponce MD   10 mL at 06/10/23 2145    sodium chloride 0.9 % flush 10 mL  10 mL Intravenous PRN Osvaldo Ponce MD        sodium chloride 0.9 % infusion 40 mL  40 mL Intravenous PRN Osvaldo Ponce MD        sodium chloride 0.9 % infusion  75 mL/hr Intravenous Continuous Osvaldo Ponce MD 75 mL/hr at 06/10/23 1117 75 mL/hr at 06/10/23 111       Allergies:  Allergies   Allergen Reactions    Sulfa Antibiotics Other (See Comments)     Per facility paperwork    Aspartame Other (See Comments)     CAUSES MIGRAINES    Cephalexin Rash     Tolerated piperacillin-tazobactam (Zosyn) and ampicillin-sulbactam (Unasyn) during 2019 admission.       Social History:   Social History     Socioeconomic History    Marital status:    Tobacco Use    Smoking status: Former    Smokeless tobacco: Never   Vaping Use    Vaping Use: Never used   Substance and Sexual Activity    Alcohol use: No    Drug use: No    Sexual activity: Never        Family History:  History reviewed. No pertinent family history.     Review of Systems: as per HPI, in addition:    ROS   No chest pain/palpataions   No cough congestion   No syncope or seizure   + rash with no worsening   No diarrhea/vomiting/melena               Physical Exam:  Vitals:   Temp (24hrs), Av.3 °F (36.8 °C), Min:97.8 °F (36.6 °C), Max:98.6 °F (37 °C)    BP 98/54 (BP Location: Left arm, Patient Position: Lying)   Pulse 67   Temp 97.8 °F (36.6 °C) (Oral)   Resp 16   Ht 165.1 cm (65\")   Wt 101 kg (221 lb 12.5 oz)   SpO2 93%   BMI 36.91 kg/m²   Intake/Output:     Intake/Output Summary (Last 24 hours) at 2023 1111  Last data filed at 6/10/2023 1842  Gross per 24 hour   Intake 120 ml   Output 200 ml   Net -80 ml        Wt Readings from Last 1 Encounters:   06/10/23 0124 101 kg (221 lb 12.5 oz)   23 " 1535 101 kg (221 lb 12.5 oz)   06/09/23 0916 127 kg (281 lb)       Exam:  Alert and oriented NAD   eomi no icterus   Moist mucus membranes   No jvd reg s1s2 no murmur   Diminished bases  no rales/rhonchi/wheeze   Soft NTND + bs   Unable to fully eval strength bilaterally, moves all extremities   + edema   Warm dry +erythematous  rash   Normal mood and judgement           DATA:  Radiology and Labs:  The following labs and radiology results independently reviewed by me              Labs:   Recent Results (from the past 24 hour(s))   Urinalysis With Microscopic If Indicated (No Culture) - Urine, Clean Catch    Collection Time: 06/10/23  3:00 PM    Specimen: Urine, Clean Catch   Result Value Ref Range    Color, UA Yellow Yellow, Straw    Appearance, UA Clear Clear    pH, UA <=5.0 5.0 - 8.0    Specific Gravity, UA 1.022 1.005 - 1.030    Glucose, UA Negative Negative    Ketones, UA Trace (A) Negative    Bilirubin, UA Negative Negative    Blood, UA Trace (A) Negative    Protein, UA Trace (A) Negative    Leuk Esterase, UA Small (1+) (A) Negative    Nitrite, UA Negative Negative    Urobilinogen, UA 0.2 E.U./dL 0.2 - 1.0 E.U./dL   Urinalysis, Microscopic Only - Urine, Clean Catch    Collection Time: 06/10/23  3:00 PM    Specimen: Urine, Clean Catch   Result Value Ref Range    RBC, UA 0-2 None Seen, 0-2 /HPF    WBC, UA 3-5 (A) None Seen, 0-2 /HPF    Bacteria, UA None Seen None Seen /HPF    Squamous Epithelial Cells, UA 0-2 None Seen, 0-2 /HPF    Yeast, UA Small/1+ Budding Yeast None Seen /HPF    Hyaline Casts, UA None Seen None Seen /LPF    Methodology Manual Light Microscopy    Uric Acid    Collection Time: 06/11/23  5:52 AM    Specimen: Blood   Result Value Ref Range    Uric Acid 8.3 (H) 2.4 - 5.7 mg/dL   Renal Function Panel    Collection Time: 06/11/23  5:52 AM    Specimen: Blood   Result Value Ref Range    Glucose 88 65 - 99 mg/dL    BUN 25 (H) 8 - 23 mg/dL    Creatinine 2.17 (H) 0.57 - 1.00 mg/dL    Sodium 141 136 - 145  mmol/L    Potassium 3.3 (L) 3.5 - 5.2 mmol/L    Chloride 104 98 - 107 mmol/L    CO2 27.1 22.0 - 29.0 mmol/L    Calcium 8.5 (L) 8.6 - 10.5 mg/dL    Albumin 2.5 (L) 3.5 - 5.2 g/dL    Phosphorus 3.9 2.5 - 4.5 mg/dL    Anion Gap 9.9 5.0 - 15.0 mmol/L    BUN/Creatinine Ratio 11.5 7.0 - 25.0    eGFR 22.5 (L) >60.0 mL/min/1.73   Magnesium    Collection Time: 06/11/23  5:52 AM    Specimen: Blood   Result Value Ref Range    Magnesium 1.9 1.6 - 2.4 mg/dL   CBC Auto Differential    Collection Time: 06/11/23  5:52 AM    Specimen: Blood   Result Value Ref Range    WBC 10.66 3.40 - 10.80 10*3/mm3    RBC 3.29 (L) 3.77 - 5.28 10*6/mm3    Hemoglobin 10.3 (L) 12.0 - 15.9 g/dL    Hematocrit 31.7 (L) 34.0 - 46.6 %    MCV 96.4 79.0 - 97.0 fL    MCH 31.3 26.6 - 33.0 pg    MCHC 32.5 31.5 - 35.7 g/dL    RDW 12.8 12.3 - 15.4 %    RDW-SD 46.7 37.0 - 54.0 fl    MPV 11.3 6.0 - 12.0 fL    Platelets 155 140 - 450 10*3/mm3    Neutrophil % 81.2 (H) 42.7 - 76.0 %    Lymphocyte % 9.8 (L) 19.6 - 45.3 %    Monocyte % 5.8 5.0 - 12.0 %    Eosinophil % 2.4 0.3 - 6.2 %    Basophil % 0.4 0.0 - 1.5 %    Immature Grans % 0.4 0.0 - 0.5 %    Neutrophils, Absolute 8.65 (H) 1.70 - 7.00 10*3/mm3    Lymphocytes, Absolute 1.05 0.70 - 3.10 10*3/mm3    Monocytes, Absolute 0.62 0.10 - 0.90 10*3/mm3    Eosinophils, Absolute 0.26 0.00 - 0.40 10*3/mm3    Basophils, Absolute 0.04 0.00 - 0.20 10*3/mm3    Immature Grans, Absolute 0.04 0.00 - 0.05 10*3/mm3    nRBC 0.0 0.0 - 0.2 /100 WBC       Radiology:  Renal US reviewed  US RENAL BILATERAL-clinical: Acute renal insufficiency     FINDINGS: The right kidney is 11.6 cm in length and left kidney is 10.5  cm in length. The bladder was collapsed, cannot evaluate for either the  right or left ureteral jet.     No hydronephrosis. There is a 13 mm right upper pole renal calculus,  this was seen on previous 05/27/2023 CT. Renal cortical echotexture is  normal to slightly greater than anticipated, possible medical renal  disease.      CONCLUSION: Right upper pole renal calculus, no obstructive uropathy.  Bladder collapsed, cannot evaluate for either ureteral jet.     This report was finalized on 6/9/2023 9:00 PM by Dr. Dennis Burns M.D.             ASSESSMENT:   Problem List:   TOI - Cr trending down today but remains up from her baseline Cr of 0.6  likely vancomycin toxicity  Interstitial nephritis seems less likely given no urine eosinophils  No obstruction per renal US       Rash   HTN   Edema   Chronic diastolic heart failure   Afib   Hypoalbuminemia   Hypokalemia         PLAN:   Cr now 2.1   Will dc fluids today   Replace K   Continue on protein supplement   Dc renal diet so pt gets more K, will continue on 2G Na diet   No indication for steroids at this time from renal standpoint     Monitor electrolytes and volume closely   Dose all medications for GFR <50   Limit IV dye, NSAIDS and nephrotoxic medications   I appreciate the opportunity to participate in this patient's care.  Please call with any questions or concerns.     Sarah Jenkins M.D  [facility]  Office phone number: [phone]  Answering service phone number: [phone]

## 2023-06-12 LAB
ALBUMIN SERPL-MCNC: 2.9 G/DL (ref 3.5–5.2)
ANION GAP SERPL CALCULATED.3IONS-SCNC: 8 MMOL/L (ref 5–15)
BASOPHILS # BLD AUTO: 0.04 10*3/MM3 (ref 0–0.2)
BASOPHILS NFR BLD AUTO: 0.5 % (ref 0–1.5)
BUN SERPL-MCNC: 26 MG/DL (ref 8–23)
BUN/CREAT SERPL: 12 (ref 7–25)
CALCIUM SPEC-SCNC: 8.2 MG/DL (ref 8.6–10.5)
CHLORIDE SERPL-SCNC: 104 MMOL/L (ref 98–107)
CO2 SERPL-SCNC: 29 MMOL/L (ref 22–29)
CREAT SERPL-MCNC: 2.16 MG/DL (ref 0.57–1)
DEPRECATED RDW RBC AUTO: 45.9 FL (ref 37–54)
EGFRCR SERPLBLD CKD-EPI 2021: 22.6 ML/MIN/1.73
EOSINOPHIL # BLD AUTO: 0.31 10*3/MM3 (ref 0–0.4)
EOSINOPHIL NFR BLD AUTO: 3.6 % (ref 0.3–6.2)
ERYTHROCYTE [DISTWIDTH] IN BLOOD BY AUTOMATED COUNT: 12.8 % (ref 12.3–15.4)
GLUCOSE SERPL-MCNC: 82 MG/DL (ref 65–99)
HCT VFR BLD AUTO: 31.4 % (ref 34–46.6)
HGB BLD-MCNC: 10.3 G/DL (ref 12–15.9)
LYMPHOCYTES # BLD AUTO: 1.1 10*3/MM3 (ref 0.7–3.1)
LYMPHOCYTES NFR BLD AUTO: 12.9 % (ref 19.6–45.3)
MAGNESIUM SERPL-MCNC: 1.9 MG/DL (ref 1.6–2.4)
MCH RBC QN AUTO: 31.8 PG (ref 26.6–33)
MCHC RBC AUTO-ENTMCNC: 32.8 G/DL (ref 31.5–35.7)
MCV RBC AUTO: 96.9 FL (ref 79–97)
MONOCYTES # BLD AUTO: 0.53 10*3/MM3 (ref 0.1–0.9)
MONOCYTES NFR BLD AUTO: 6.2 % (ref 5–12)
NEUTROPHILS NFR BLD AUTO: 6.52 10*3/MM3 (ref 1.7–7)
NEUTROPHILS NFR BLD AUTO: 76.3 % (ref 42.7–76)
PHOSPHATE SERPL-MCNC: 3.6 MG/DL (ref 2.5–4.5)
PLATELET # BLD AUTO: 135 10*3/MM3 (ref 140–450)
PMV BLD AUTO: 11.5 FL (ref 6–12)
POTASSIUM SERPL-SCNC: 3.6 MMOL/L (ref 3.5–5.2)
RBC # BLD AUTO: 3.24 10*6/MM3 (ref 3.77–5.28)
SODIUM SERPL-SCNC: 141 MMOL/L (ref 136–145)
WBC NRBC COR # BLD: 8.54 10*3/MM3 (ref 3.4–10.8)

## 2023-06-12 PROCEDURE — 94799 UNLISTED PULMONARY SVC/PX: CPT

## 2023-06-12 PROCEDURE — 80069 RENAL FUNCTION PANEL: CPT | Performed by: INTERNAL MEDICINE

## 2023-06-12 PROCEDURE — 94664 DEMO&/EVAL PT USE INHALER: CPT

## 2023-06-12 PROCEDURE — 94761 N-INVAS EAR/PLS OXIMETRY MLT: CPT

## 2023-06-12 PROCEDURE — 87102 FUNGUS ISOLATION CULTURE: CPT | Performed by: STUDENT IN AN ORGANIZED HEALTH CARE EDUCATION/TRAINING PROGRAM

## 2023-06-12 PROCEDURE — 97166 OT EVAL MOD COMPLEX 45 MIN: CPT

## 2023-06-12 PROCEDURE — 83735 ASSAY OF MAGNESIUM: CPT | Performed by: STUDENT IN AN ORGANIZED HEALTH CARE EDUCATION/TRAINING PROGRAM

## 2023-06-12 PROCEDURE — 97530 THERAPEUTIC ACTIVITIES: CPT

## 2023-06-12 PROCEDURE — 85025 COMPLETE CBC W/AUTO DIFF WBC: CPT | Performed by: STUDENT IN AN ORGANIZED HEALTH CARE EDUCATION/TRAINING PROGRAM

## 2023-06-12 RX ADMIN — IPRATROPIUM BROMIDE AND ALBUTEROL SULFATE 3 ML: .5; 3 SOLUTION RESPIRATORY (INHALATION) at 00:43

## 2023-06-12 RX ADMIN — APIXABAN 2.5 MG: 2.5 TABLET, FILM COATED ORAL at 08:23

## 2023-06-12 RX ADMIN — ASPIRIN 81 MG: 81 TABLET, CHEWABLE ORAL at 08:23

## 2023-06-12 RX ADMIN — Medication 1 CAPSULE: at 09:50

## 2023-06-12 RX ADMIN — QUETIAPINE FUMARATE 100 MG: 100 TABLET ORAL at 20:48

## 2023-06-12 RX ADMIN — FLUOXETINE HYDROCHLORIDE 60 MG: 20 CAPSULE ORAL at 09:50

## 2023-06-12 RX ADMIN — IPRATROPIUM BROMIDE AND ALBUTEROL SULFATE 3 ML: .5; 3 SOLUTION RESPIRATORY (INHALATION) at 07:03

## 2023-06-12 RX ADMIN — LEVOTHYROXINE SODIUM 50 MCG: 0.05 TABLET ORAL at 05:52

## 2023-06-12 RX ADMIN — POTASSIUM CHLORIDE 10 MEQ: 750 TABLET, EXTENDED RELEASE ORAL at 11:26

## 2023-06-12 RX ADMIN — PANTOPRAZOLE SODIUM 40 MG: 40 TABLET, DELAYED RELEASE ORAL at 08:23

## 2023-06-12 RX ADMIN — HYDROCODONE BITARTRATE AND ACETAMINOPHEN 1 TABLET: 7.5; 325 TABLET ORAL at 02:02

## 2023-06-12 RX ADMIN — HYDROCODONE BITARTRATE AND ACETAMINOPHEN 1 TABLET: 7.5; 325 TABLET ORAL at 09:50

## 2023-06-12 RX ADMIN — POTASSIUM CHLORIDE 10 MEQ: 750 TABLET, EXTENDED RELEASE ORAL at 17:23

## 2023-06-12 RX ADMIN — IPRATROPIUM BROMIDE AND ALBUTEROL SULFATE 3 ML: .5; 3 SOLUTION RESPIRATORY (INHALATION) at 15:19

## 2023-06-12 RX ADMIN — HYDROCODONE BITARTRATE AND ACETAMINOPHEN 1 TABLET: 7.5; 325 TABLET ORAL at 20:49

## 2023-06-12 RX ADMIN — POTASSIUM CHLORIDE 10 MEQ: 750 TABLET, EXTENDED RELEASE ORAL at 08:23

## 2023-06-12 RX ADMIN — MIRABEGRON 25 MG: 25 TABLET, FILM COATED, EXTENDED RELEASE ORAL at 08:23

## 2023-06-12 RX ADMIN — APIXABAN 2.5 MG: 2.5 TABLET, FILM COATED ORAL at 20:49

## 2023-06-12 RX ADMIN — Medication 10 ML: at 08:24

## 2023-06-12 RX ADMIN — Medication 10 ML: at 20:26

## 2023-06-12 RX ADMIN — DOCUSATE SODIUM 50MG AND SENNOSIDES 8.6MG 2 TABLET: 8.6; 5 TABLET, FILM COATED ORAL at 08:23

## 2023-06-12 NOTE — PLAN OF CARE
Problem: Adult Inpatient Plan of Care  Goal: Plan of Care Review  Outcome: Ongoing, Progressing  Goal: Patient-Specific Goal (Individualized)  Outcome: Ongoing, Progressing  Goal: Absence of Hospital-Acquired Illness or Injury  Outcome: Ongoing, Progressing  Intervention: Identify and Manage Fall Risk  Recent Flowsheet Documentation  Taken 6/12/2023 1225 by Gloria East RN  Safety Promotion/Fall Prevention:   fall prevention program maintained   safety round/check completed  Taken 6/12/2023 1037 by Gloria East RN  Safety Promotion/Fall Prevention:   fall prevention program maintained   safety round/check completed  Taken 6/12/2023 0830 by Gloria East RN  Safety Promotion/Fall Prevention:   activity supervised   clutter free environment maintained   fall prevention program maintained   safety round/check completed   room organization consistent  Taken 6/12/2023 0800 by Gloria East RN  Safety Promotion/Fall Prevention:   safety round/check completed   assistive device/personal items within reach   fall prevention program maintained   clutter free environment maintained  Intervention: Prevent Skin Injury  Recent Flowsheet Documentation  Taken 6/12/2023 0830 by Gloria East RN  Body Position:   turned   left   weight shifting  Skin Protection:   adhesive use limited   incontinence pads utilized   skin-to-device areas padded   tubing/devices free from skin contact  Intervention: Prevent and Manage VTE (Venous Thromboembolism) Risk  Recent Flowsheet Documentation  Taken 6/12/2023 0830 by Gloria East RN  Activity Management: bedrest  VTE Prevention/Management: (see MAR) other (see comments)  Intervention: Prevent Infection  Recent Flowsheet Documentation  Taken 6/12/2023 0830 by Gloria East RN  Infection Prevention:   environmental surveillance performed   hand hygiene promoted   rest/sleep promoted   single patient room provided  Taken 6/12/2023 0800 by Gloria East  RN  Infection Prevention:   environmental surveillance performed   hand hygiene promoted   rest/sleep promoted   single patient room provided  Goal: Optimal Comfort and Wellbeing  Outcome: Ongoing, Progressing  Intervention: Monitor Pain and Promote Comfort  Recent Flowsheet Documentation  Taken 6/12/2023 0830 by Gloria East RN  Pain Management Interventions:   see MAR   quiet environment facilitated   position adjusted   care clustered  Intervention: Provide Person-Centered Care  Recent Flowsheet Documentation  Taken 6/12/2023 0830 by Gloria East RN  Trust Relationship/Rapport:   care explained   choices provided   questions answered   questions encouraged  Goal: Readiness for Transition of Care  Outcome: Ongoing, Progressing     Problem: Skin Injury Risk Increased  Goal: Skin Health and Integrity  Outcome: Ongoing, Progressing  Intervention: Optimize Skin Protection  Recent Flowsheet Documentation  Taken 6/12/2023 0830 by Gloria East RN  Pressure Reduction Techniques:   frequent weight shift encouraged   weight shift assistance provided  Head of Bed (HOB) Positioning: HOB elevated  Pressure Reduction Devices:   alternating pressure pump (ADD)   pressure-redistributing mattress utilized  Skin Protection:   adhesive use limited   incontinence pads utilized   skin-to-device areas padded   tubing/devices free from skin contact     Problem: Hypertension Comorbidity  Goal: Blood Pressure in Desired Range  Outcome: Ongoing, Progressing  Intervention: Maintain Blood Pressure Management  Recent Flowsheet Documentation  Taken 6/12/2023 0830 by Gloria East RN  Medication Review/Management: medications reviewed     Problem: Pain Chronic (Persistent) (Comorbidity Management)  Goal: Acceptable Pain Control and Functional Ability  Outcome: Ongoing, Progressing  Intervention: Manage Persistent Pain  Recent Flowsheet Documentation  Taken 6/12/2023 0830 by Gloria East RN  Medication Review/Management:  medications reviewed  Intervention: Develop Pain Management Plan  Recent Flowsheet Documentation  Taken 6/12/2023 0830 by Gloria East, RN  Pain Management Interventions:   see MAR   quiet environment facilitated   position adjusted   care clustered  Intervention: Optimize Psychosocial Wellbeing  Recent Flowsheet Documentation  Taken 6/12/2023 0830 by Gloria East, RN  Diversional Activities: television  Family/Support System Care:   involvement promoted   self-care encouraged   support provided     Problem: Fall Injury Risk  Goal: Absence of Fall and Fall-Related Injury  Outcome: Ongoing, Progressing  Intervention: Identify and Manage Contributors  Recent Flowsheet Documentation  Taken 6/12/2023 0830 by Gloria East, RN  Medication Review/Management: medications reviewed  Self-Care Promotion: independence encouraged  Intervention: Promote Injury-Free Environment  Recent Flowsheet Documentation  Taken 6/12/2023 1225 by Gloria East, RN  Safety Promotion/Fall Prevention:   fall prevention program maintained   safety round/check completed  Taken 6/12/2023 1037 by Gloria East RN  Safety Promotion/Fall Prevention:   fall prevention program maintained   safety round/check completed  Taken 6/12/2023 0830 by Gloria East RN  Safety Promotion/Fall Prevention:   activity supervised   clutter free environment maintained   fall prevention program maintained   safety round/check completed   room organization consistent  Taken 6/12/2023 0800 by Gloria East, RN  Safety Promotion/Fall Prevention:   safety round/check completed   assistive device/personal items within reach   fall prevention program maintained   clutter free environment maintained   Goal Outcome Evaluation:

## 2023-06-12 NOTE — PROGRESS NOTES
Name: Rose Cheema ADMIT: 2023   : 1940  PCP: Provider, No Known    MRN: [id number] LOS: 2 days   AGE/SEX: 82 y.o. female  ROOM: [facility]     Subjective   Subjective   Patient resting in bed, rash across chest is significantly improved. Some redness noted on RUE where PICC had been.      Objective   Objective   Vital Signs  Temp:  [97.6 °F (36.4 °C)-98.2 °F (36.8 °C)] 98.2 °F (36.8 °C)  Heart Rate:  [53-85] 54  Resp:  [16-20] 20  BP: (101-110)/(44-63) 110/44  SpO2:  [93 %-100 %] 97 %  on  Flow (L/min):  [1] 1;   Device (Oxygen Therapy): nasal cannula  Body mass index is 36.91 kg/m².  Physical Exam  Constitutional:       General: She is not in acute distress.     Appearance: She is obese. She is ill-appearing.   Cardiovascular:      Rate and Rhythm: Normal rate and regular rhythm.   Pulmonary:      Effort: Pulmonary effort is normal. No respiratory distress.      Breath sounds: Normal breath sounds. No wheezing.   Abdominal:      General: There is no distension.      Palpations: Abdomen is soft.      Tenderness: There is no abdominal tenderness.   Musculoskeletal:         General: No swelling or tenderness.      Right lower leg: No edema.      Left lower leg: No edema.      Comments: RUE   Skin:     General: Skin is warm and dry.      Coloration: Skin is pale.      Findings: Rash present.   Neurological:      General: No focal deficit present.      Mental Status: She is alert and oriented to person, place, and time. Mental status is at baseline.       Results Review     I reviewed the patient's new clinical results.  Results from last 7 days   Lab Units 23  0531 23  0552 06/10/23  0640 23  1055   WBC 10*3/mm3 8.54 10.66 11.63* 13.09*   HEMOGLOBIN g/dL 10.3* 10.3* 11.4* 13.1   PLATELETS 10*3/mm3 135* 155 168 189       Results from last 7 days   Lab Units 23  0531 23  0552 06/10/23  0640 23  1055   SODIUM mmol/L 141 141 139 139   POTASSIUM mmol/L 3.6 3.3* 4.0 4.8    CHLORIDE mmol/L 104 104 100 100   CO2 mmol/L 29.0 27.1 27.5 28.5   BUN mg/dL 26* 25* 27* 25*   CREATININE mg/dL 2.16* 2.17* 2.39* 2.40*   GLUCOSE mg/dL 82 88 88 103*     Estimated Creatinine Clearance: 24.5 mL/min (A) (by C-G formula based on SCr of 2.16 mg/dL (H)).  Results from last 7 days   Lab Units 06/12/23 0531 06/11/23  0552 06/10/23  0640 06/09/23  1055   ALBUMIN g/dL 2.9* 2.5* 2.7* 2.7*   BILIRUBIN mg/dL  --   --  0.4 0.4   ALK PHOS U/L  --   --  80 86   AST (SGOT) U/L  --   --  7 17   ALT (SGPT) U/L  --   --  8 10       Results from last 7 days   Lab Units 06/12/23 0531 06/11/23 0552 06/10/23  0640 06/09/23  1055   CALCIUM mg/dL 8.2* 8.5* 8.5* 8.9   ALBUMIN g/dL 2.9* 2.5* 2.7* 2.7*   MAGNESIUM mg/dL 1.9 1.9  --   --    PHOSPHORUS mg/dL 3.6 3.9  --   --        Results from last 7 days   Lab Units 06/10/23  0640 06/09/23  1055   PROCALCITONIN ng/mL  --  0.11   LACTATE mmol/L 1.4 1.3       COVID19   Date Value Ref Range Status   05/27/2023 Not Detected Not Detected - Ref. Range Final     SARS-CoV-2, ARLENE   Date Value Ref Range Status   10/21/2020 Not Detected Not Detected Final     Comment:     This nucleic acid amplification test was developed and its performance  characteristics determined by "[employer]. Nucleic acid  amplification tests include PCR and TMA. This test has not been FDA  cleared or approved. This test has been authorized by FDA under an  Emergency Use Authorization (EUA). This test is only authorized for  the duration of time the declaration that circumstances exist  justifying the authorization of the emergency use of in vitro  diagnostic tests for detection of SARS-CoV-2 virus and/or diagnosis  of COVID-19 infection under section 564(b)(1) of the Act, 21 U.S.C.  360bbb-3(b) (1), unless the authorization is terminated or revoked  sooner.  When diagnostic testing is negative, the possibility of a false  negative result should be considered in the context of a patient's  recent  exposures and the presence of clinical signs and symptoms  consistent with COVID-19. An individual without symptoms of COVID-19  and who is not shedding SARS-CoV-2 virus would expect to have a  negative (not detected) result in this assay.     No results found for: HGBA1C, POCGLU    US Renal Bilateral  US RENAL BILATERAL-clinical: Acute renal insufficiency     FINDINGS: The right kidney is 11.6 cm in length and left kidney is 10.5  cm in length. The bladder was collapsed, cannot evaluate for either the  right or left ureteral jet.     No hydronephrosis. There is a 13 mm right upper pole renal calculus,  this was seen on previous 05/27/2023 CT. Renal cortical echotexture is  normal to slightly greater than anticipated, possible medical renal  disease.     CONCLUSION: Right upper pole renal calculus, no obstructive uropathy.  Bladder collapsed, cannot evaluate for either ureteral jet.     This report was finalized on 6/9/2023 9:00 PM by Dr. Dennis Burns M.D.       Scheduled Medications  apixaban, 2.5 mg, Oral, Q12H  aspirin, 81 mg, Oral, Daily  FLUoxetine, 60 mg, Oral, Daily  ipratropium-albuterol, 3 mL, Nebulization, Q8H - RT  lactobacillus acidophilus, 1 capsule, Oral, Daily  levothyroxine, 50 mcg, Oral, Q AM  Mirabegron ER, 25 mg, Oral, Daily  pantoprazole, 40 mg, Oral, Daily  potassium chloride, 10 mEq, Oral, TID With Meals  QUEtiapine, 100 mg, Oral, Nightly  sennosides-docusate, 2 tablet, Oral, BID  sodium chloride, 10 mL, Intravenous, Q12H    Infusions     Diet  Diet: Cardiac Diets; Healthy Heart (2-3 Na+); Texture: Regular Texture (IDDSI 7); Fluid Consistency: Thin (IDDSI 0)         I have personally reviewed:  [x]  Laboratory   []  Microbiology   []  Radiology   [x]  EKG/Telemetry   []  Cardiology/Vascular   []  Pathology   []  Records    Assessment/Plan     Active Hospital Problems    Diagnosis  POA    **TOI (acute kidney injury) [N17.9]  Yes    Moderate Malnutrition (HCC) [E44.0]  Yes    PAF (paroxysmal  atrial fibrillation) [I48.0]  Yes    Bacteremia - aerococcus [R78.81]  Yes    CKD (chronic kidney disease) stage 3, GFR 30-59 ml/min [N18.30]  Yes    Anemia [D64.9]  Yes    Chronic pain syndrome [G89.4]  Yes    Hypothyroidism [E03.9]  Yes    Bilateral lower extremity edema (chronic) [R60.0]  Yes    Diastolic dysfunction [I51.89]  Yes    Benign essential hypertension [I10]  Yes    Obstructive sleep apnea [G47.33]  Yes      Resolved Hospital Problems   No resolved problems to display.       82 y.o. female admitted with TOI (acute kidney injury).    TOI on CKD  - renal following; demedex on hold, IVF per renal  - cr stable at 2.4, b/l 0.6  - BRITTANY unremarkable for cause of TOI  - Cr stable at 2.1    Rash  - possible drug rash; hold off on steroids/avoid immunosuppression   - pt denies pain, pruritus, symptoms  - vanco has been Dc'd; monitor for improvement  - rash improving    RUE redness/swelling  - pt on low-dose apixaban but did have PICC in place which could have exacerbated  - check doppler    Paroxysmal atrial fibrillation  - continue low-dose eliquis, no rate control meds    Hypothyroidism  - levothyroxine    Recent ESBL E. Coli UTI  - s/p ertapenem    Hypertension  - hx of, normotensive off of meds    Recent aerococcus bacteremia  - vanco dcd 2/2 to rash and TOI  - agree with admitting provider risk of continuing vanco outweigh benefits, she has completed all but 3 days of tx    Obesity  - rec wt loss    GERSON  - ok for home CPAP    Eliquis (home med) for DVT prophylaxis.  DNR.confirmed with patient  Discussed with nursing staff.  Anticipate discharge to SNU facility timing yet to be determined.    Copied text in this note has been reviewed and is accurate as of 06/12/23.     Candida Scott MD  [facility]  06/12/23  15:51 EDT    I wore protective equipment throughout this patient encounter including gloves and protective eyewear.  Hand hygiene was performed before donning protective  equipment and after removal when leaving the room.

## 2023-06-12 NOTE — PLAN OF CARE
Problem: Adult Inpatient Plan of Care  Goal: Plan of Care Review  Flowsheets (Taken 6/12/2023 0248)  Progress: no change  Plan of Care Reviewed With: patient  Outcome Evaluation: No acute changes. VSS. Medicated for pain per MAR. Decreased appetite. Takes pills in applesauce. Makes needs known. O2 in use @ 1L via NC. Q2hr turns. Bed locked and in lowest position. Side rails up x2. Call light within reach.  Goal: Absence of Hospital-Acquired Illness or Injury  Intervention: Identify and Manage Fall Risk  Flowsheets  Taken 6/12/2023 0216  Safety Promotion/Fall Prevention:   activity supervised   assistive device/personal items within reach   clutter free environment maintained   fall prevention program maintained   lighting adjusted   nonskid shoes/slippers when out of bed   room organization consistent   safety round/check completed  Taken 6/12/2023 0058  Safety Promotion/Fall Prevention:   activity supervised   clutter free environment maintained   assistive device/personal items within reach   fall prevention program maintained   lighting adjusted   nonskid shoes/slippers when out of bed   room organization consistent   safety round/check completed  Taken 6/11/2023 2239  Safety Promotion/Fall Prevention:   assistive device/personal items within reach   activity supervised   clutter free environment maintained   fall prevention program maintained   lighting adjusted   nonskid shoes/slippers when out of bed   room organization consistent   safety round/check completed  Taken 6/11/2023 2043  Safety Promotion/Fall Prevention:   activity supervised   assistive device/personal items within reach   clutter free environment maintained   fall prevention program maintained   lighting adjusted   nonskid shoes/slippers when out of bed   room organization consistent   safety round/check completed  Intervention: Prevent Skin Injury  Flowsheets  Taken 6/12/2023 0216 by Colton Hobbs, RN  Body Position:   turned   tilted    right  Taken 6/12/2023 0058 by Colton Hobbs RN  Body Position: supine  Taken 6/11/2023 2239 by Colton Hobbs RN  Body Position:   tilted   left  Taken 6/11/2023 2043 by Colton Hobbs RN  Body Position:   tilted   right  Taken 6/9/2023 1518 by Sasha Chandler RN  Skin Protection:   adhesive use limited   tubing/devices free from skin contact  Intervention: Prevent and Manage VTE (Venous Thromboembolism) Risk  Flowsheets  Taken 6/12/2023 0216  Activity Management: bedrest  Taken 6/12/2023 0058  Activity Management: bedrest  Taken 6/11/2023 2239  Activity Management: bedrest  Taken 6/11/2023 2043  Activity Management: bedrest  VTE Prevention/Management: (takes eliquis)   bilateral   sequential compression devices off   patient refused intervention   other (see comments)  Range of Motion:   active ROM (range of motion) encouraged   ROM (range of motion) performed  Intervention: Prevent Infection  Flowsheets (Taken 6/12/2023 0100 by Ruth Flowers)  Infection Prevention:   environmental surveillance performed   personal protective equipment utilized   rest/sleep promoted  Goal: Optimal Comfort and Wellbeing  Intervention: Monitor Pain and Promote Comfort  Flowsheets (Taken 6/12/2023 0216)  Pain Management Interventions: see MAR  Intervention: Provide Person-Centered Care  Flowsheets (Taken 6/11/2023 2043)  Trust Relationship/Rapport:   care explained   choices provided   empathic listening provided   emotional support provided   questions answered   questions encouraged   reassurance provided   thoughts/feelings acknowledged  Goal: Readiness for Transition of Care  Intervention: Mutually Develop Transition Plan  Flowsheets  Taken 6/9/2023 1624 by Lilian Ferguson, RN  Equipment Currently Used at Home:   wheelchair, motorized   rollator   walker, rolling   oxygen  Taken 6/9/2023 1621 by Lilian Ferguson, DAYAN  Transportation Anticipated: health plan transportation  Patient/Family Anticipated Services at Transition:  rehabilitation services  Patient/Family Anticipates Transition to: inpatient rehabilitation facility     Problem: Skin Injury Risk Increased  Goal: Skin Health and Integrity  Intervention: Promote and Optimize Oral Intake  Flowsheets (Taken 6/12/2023 0248)  Oral Nutrition Promotion:   medicated   rest periods promoted  Intervention: Optimize Skin Protection  Flowsheets  Taken 6/12/2023 0216 by Colton Hobbs RN  Head of Bed (HOB) Positioning: HOB elevated  Taken 6/12/2023 0058 by Colton Hobbs RN  Head of Bed (HOB) Positioning: HOB elevated  Taken 6/11/2023 2239 by Colton Hobbs RN  Head of Bed (HOB) Positioning: HOB elevated  Taken 6/11/2023 2043 by Colton Hobbs RN  Head of Bed (HOB) Positioning: HOB elevated  Taken 6/11/2023 1400 by Sarah Major RN  Pressure Reduction Techniques: heels elevated off bed  Pressure Reduction Devices: alternating pressure pump (ADD)  Taken 6/9/2023 1518 by Sasha Chandler RN  Skin Protection:   adhesive use limited   tubing/devices free from skin contact     Problem: Hypertension Comorbidity  Goal: Blood Pressure in Desired Range  Intervention: Maintain Blood Pressure Management  Flowsheets (Taken 6/11/2023 2043)  Medication Review/Management: medications reviewed     Problem: Pain Chronic (Persistent) (Comorbidity Management)  Goal: Acceptable Pain Control and Functional Ability  Intervention: Manage Persistent Pain  Flowsheets  Taken 6/11/2023 2043 by Colton Hobbs RN  Medication Review/Management: medications reviewed  Taken 6/9/2023 1545 by Sasha Chandler RN  Sleep/Rest Enhancement:   relaxation techniques promoted   regular sleep/rest pattern promoted   noise level reduced  Intervention: Develop Pain Management Plan  Flowsheets (Taken 6/12/2023 0216)  Pain Management Interventions: see MAR  Intervention: Optimize Psychosocial Wellbeing  Flowsheets (Taken 6/11/2023 2043)  Supportive Measures:   active listening utilized   relaxation techniques promoted   self-care encouraged    self-reflection promoted   self-responsibility promoted   verbalization of feelings encouraged  Diversional Activities: television  Family/Support System Care:   self-care encouraged   support provided     Problem: Fall Injury Risk  Goal: Absence of Fall and Fall-Related Injury  Intervention: Identify and Manage Contributors  Flowsheets (Taken 6/11/2023 2043)  Medication Review/Management: medications reviewed  Intervention: Promote Injury-Free Environment  Flowsheets  Taken 6/12/2023 0216  Safety Promotion/Fall Prevention:   activity supervised   assistive device/personal items within reach   clutter free environment maintained   fall prevention program maintained   lighting adjusted   nonskid shoes/slippers when out of bed   room organization consistent   safety round/check completed  Taken 6/12/2023 0058  Safety Promotion/Fall Prevention:   activity supervised   clutter free environment maintained   assistive device/personal items within reach   fall prevention program maintained   lighting adjusted   nonskid shoes/slippers when out of bed   room organization consistent   safety round/check completed  Taken 6/11/2023 2239  Safety Promotion/Fall Prevention:   assistive device/personal items within reach   activity supervised   clutter free environment maintained   fall prevention program maintained   lighting adjusted   nonskid shoes/slippers when out of bed   room organization consistent   safety round/check completed  Taken 6/11/2023 2043  Safety Promotion/Fall Prevention:   activity supervised   assistive device/personal items within reach   clutter free environment maintained   fall prevention program maintained   lighting adjusted   nonskid shoes/slippers when out of bed   room organization consistent   safety round/check completed   Goal Outcome Evaluation:  Plan of Care Reviewed With: patient        Progress: no change  Outcome Evaluation: No acute changes. VSS. Medicated for pain per MAR. Decreased  appetite. Takes pills in applesauce. Makes needs known. O2 in use @ 1L via NC. Q2hr turns. Bed locked and in lowest position. Side rails up x2. Call light within reach.

## 2023-06-12 NOTE — PLAN OF CARE
Goal Outcome Evaluation:  Plan of Care Reviewed With: patient           Outcome Evaluation: Pt participated with PT this AM. Pt completed supine>sit with min A, stood with mod A x2 twice and tolerates standing approx 30 sec at a time. Unable to take any side steps this date due to weakness. Returned to bed with mod A x2. Will continue to follow and progress as able.

## 2023-06-12 NOTE — PLAN OF CARE
Goal Outcome Evaluation:  Plan of Care Reviewed With: patient           Outcome Evaluation: Pt seen for OT eval this date. Pt admitted with TOI, rash. Pt came from rehab, had been there following recent hopsitalization for UTI. [facility] and reports a decline in functional abilities over the past few months. Pt now considering nursing home for permnant placement. Pt was agreeable to participate with some encouragement. Performed bed mobility with min/mod assist x2. Performed 2 sit/stands with mod assist x2 for about 30 seconds. Pt fatigued quickly. Required total assist with LE dressing, adls. Pt demonstrating below baseline functional endurance and performance of adls and fm, will benefit from cont acute OT. Recommending snf at time of dc.  OT wore appropriate PPE during session and performed hand hygiene before/after session.

## 2023-06-12 NOTE — THERAPY EVALUATION
Patient Name: Rose Cheema  : 1940    MRN: [id number]                              Today's Date: 2023       Admit Date: 2023    Visit Dx:     ICD-10-CM ICD-9-CM   1. TOI (acute kidney injury)  N17.9 584.9   2. Leukocytosis, unspecified type  D72.829 288.60   3. Rash  R21 782.1     Patient Active Problem List   Diagnosis    Urinary incontinence    Urinary frequency    Generalized abdominal pain    Pulmonary hypertension     Benign essential hypertension    Bilateral lower extremity edema (chronic)    CAD (coronary artery disease), native coronary artery    Diastolic dysfunction    Class 3 severe obesity with serious comorbidity and body mass index (BMI) of 50.0 to 59.9 in adult    Obstructive sleep apnea    Secondary pulmonary arterial hypertension    H/O: hysterectomy    Fibromyalgia    Hx SBO    Hx of cholecystectomy    Hypoglycemia    Fall    Hypothyroidism    Chronic pain syndrome    Anemia    Pneumonia of right lower lobe due to infectious organism    Acute UTI (urinary tract infection)    Acute on chronic diastolic (congestive) heart failure    Opioid dependence    Venous stasis dermatitis of both lower extremities    Gram-negative bacteremia    Hypokalemia    E. coli bacteremia    Anemia of chronic disease    Sepsis without acute organ dysfunction - present on admit    CKD (chronic kidney disease) stage 3, GFR 30-59 ml/min    Bacteremia - aerococcus    Enterococcal septicemia    Asymptomatic bacteriuria    Bacteremia    PAF (paroxysmal atrial fibrillation)    TOI (acute kidney injury)    Moderate Malnutrition (HCC)     Past Medical History:   Diagnosis Date    Anemia     Anxiety     Arthritis     CHF (congestive heart failure)     Coronary artery disease     Depression     Disease of thyroid gland     Fibromyalgia     GERD (gastroesophageal reflux disease)     Hypertension     Moderate tricuspid valve stenosis     Osteoporosis     Peripheral neuropathy     Pulmonary hypertension     SBO (small  bowel obstruction)     Stenosis of mitral valve      Past Surgical History:   Procedure Laterality Date    BILATERAL OOPHORECTOMY      CARDIAC CATHETERIZATION      CHOLECYSTECTOMY      ERCP N/A 2/15/2019    Procedure: ENDOSCOPIC RETROGRADE CHOLANGIOPANCREATOGRAPHY WITH PARTIAL CHOLANGIOGRAM;  Surgeon: Gerald Herr MD;  Location: [facility] ENDOSCOPY;  Service: Gastroenterology    EXPLORATORY LAPAROTOMY      GASTRIC BYPASS      HERNIA REPAIR      inguinal and ventral    HYSTERECTOMY      OVARIAN CYST REMOVAL        General Information       Row Name 06/12/23 1249          OT Time and Intention    Document Type evaluation  -KB     Mode of Treatment co-treatment;occupational therapy  -KB       Row Name 06/12/23 1249          General Information    Patient Profile Reviewed yes  -KB     Prior Level of Function independent:;ADL's;min assist:  -KB     Existing Precautions/Restrictions fall;oxygen therapy device and L/min  -KB     Barriers to Rehab previous functional deficit  -KB       Row Name 06/12/23 1249          Living Environment    People in Home alone  -KB       Row Name 06/12/23 1249          Cognition    Orientation Status (Cognition) oriented x 3  -KB       Row Name 06/12/23 1249          Safety Issues, Functional Mobility    Impairments Affecting Function (Mobility) balance;endurance/activity tolerance;strength;pain  -KB               User Key  (r) = Recorded By, (t) = Taken By, (c) = Cosigned By      Initials Name Provider Type    KB Amina Hess OT Occupational Therapist                     Mobility/ADL's       Row Name 06/12/23 1250          Bed Mobility    Bed Mobility supine-sit;sit-supine;scooting/bridging  -KB     Scooting/Bridging [place] (Bed Mobility) dependent (less than 25% patient effort);2 person assist  -KB     Supine-Sit [place] (Bed Mobility) minimum assist (75% patient effort);1 person assist;verbal cues  -KB     Sit-Supine [place] (Bed Mobility) moderate assist (50%  patient effort);2 person assist;verbal cues  -     Supine-Sit-Supine [place] (Bed Mobility) minimum assist (75% patient effort);moderate assist (50% patient effort);verbal cues  -     Assistive Device (Bed Mobility) bed rails;head of bed elevated;draw sheet  -       Row Name 06/12/23 1250          Sit-Stand Transfer    Sit-Stand [place] (Transfers) moderate assist (50% patient effort);2 person assist;verbal cues;nonverbal cues (demo/gesture)  -     Assistive Device (Sit-Stand Transfers) walker, front-wheeled  -     Comment, (Sit-Stand Transfer) stood two times with mod assist to come to stand  -       Row Name 06/12/23 1250          Functional Mobility    Functional Mobility- Ind. Level unable to perform  -[facility] Name 06/12/23 1250          Activities of Daily Living    BADL Assessment/Intervention lower body dressing  -[facility] Name 06/12/23 1250          Lower Body Dressing Assessment/Training    [place] Level (Lower Body Dressing) don;socks;dependent (less than 25% patient effort)  -               User Key  (r) = Recorded By, (t) = Taken By, (c) = Cosigned By      Initials Name Provider Type     Amina Hess OT Occupational Therapist                   Obj/Interventions       Row Name 06/12/23 1254          Sensory Assessment (Somatosensory)    Sensory Assessment (Somatosensory) sensation intact  -[facility] Name 06/12/23 1254          Vision Assessment/Intervention    Visual Impairment/Limitations WNL  -[facility] Name 06/12/23 1254          Range of Motion Comprehensive    Comment, General Range of Motion R UE limited rom, L UE wfl  -[facility] Name 06/12/23 1254          Strength Comprehensive (MMT)    Comment, General Manual Muscle Testing (MMT) Assessment B UE grossly 4-/5, generalized weakness  -       Row Name 06/12/23 1254          Motor Skills    Motor Skills functional endurance  -     Functional Endurance poor  -       Row Name 06/12/23 1254           Balance    Balance Assessment sitting static balance;sitting dynamic balance;standing static balance  -KB     Static Sitting Balance standby assist  -KB     Dynamic Sitting Balance contact guard  -KB     Static Standing Balance minimal assist;2-person assist  -KB               User Key  (r) = Recorded By, (t) = Taken By, (c) = Cosigned By      Initials Name Provider Type    Amina Campuzano, OT Occupational Therapist                   Goals/Plan       Row Name 06/12/23 1300          Bed Mobility Goal 1 (OT)    Activity/Assistive Device (Bed Mobility Goal 1, OT) sit to supine/supine to sit  -KB     [place] Level/Cues Needed (Bed Mobility Goal 1, OT) standby assist  -KB     Time Frame (Bed Mobility Goal 1, OT) 2 weeks  -KB     Progress/Outcomes (Bed Mobility Goal 1, OT) goal ongoing  -       Row Name 06/12/23 1300          Transfer Goal 1 (OT)    Activity/Assistive Device (Transfer Goal 1, OT) toilet  -KB     [place] Level/Cues Needed (Transfer Goal 1, OT) standby assist  -KB     Time Frame (Transfer Goal 1, OT) 2 weeks  -KB     Progress/Outcome (Transfer Goal 1, OT) goal ongoing  -       Row Name 06/12/23 1300          Dressing Goal 1 (OT)    Activity/Device (Dressing Goal 1, OT) lower body dressing  -KB     [place]/Cues Needed (Dressing Goal 1, OT) standby assist  -KB     Time Frame (Dressing Goal 1, OT) 2 weeks  -KB     Progress/Outcome (Dressing Goal 1, OT) goal ongoing  -       Row Name 06/12/23 1300          Toileting Goal 1 (OT)    Activity/Device (Toileting Goal 1, OT) adjust/manage clothing;perform perineal hygiene  -KB     [place] Level/Cues Needed (Toileting Goal 1, OT) standby assist  -KB     Time Frame (Toileting Goal 1, OT) 2 weeks  -KB     Progress/Outcome (Toileting Goal 1, OT) goal ongoing  -       Row Name 06/12/23 1300          Therapy Assessment/Plan (OT)    Planned Therapy Interventions (OT) activity tolerance training;BADL retraining;transfer/mobility  retraining;strengthening exercise;patient/caregiver education/training  -               User Key  (r) = Recorded By, (t) = Taken By, (c) = Cosigned By      Initials Name Provider Type    Amina Campuzano OT Occupational Therapist                   Clinical Impression       Row Name 06/12/23 8772          Plan of Care Review    Plan of Care Reviewed With patient  -     Outcome Evaluation Pt seen for OT eval this date. Pt admitted with TOI, rash. Pt came from rehab, had been there following recent hopsitalization for UTI. Pt typically lives in custodial and reports a decline in functional abilities over the past few months. Pt now considering nursing home for permnant placement. Pt was agreeable to participate with some encouragement. Performed bed mobility with min/mod assist x2. Performed 2 sit/stands with mod assist x2 for about 30 seconds. Pt fatigued quickly. Required total assist with LE dressing, adls. Pt demonstrating below baseline functional endurance and performance of adls and fm, will benefit from cont acute OT. Recommending snf at time of dc.  -       Row Name 06/12/23 2679          Therapy Assessment/Plan (OT)    Rehab Potential (OT) good, to achieve stated therapy goals  -     Criteria for Skilled Therapeutic Interventions Met (OT) yes;meets criteria;skilled treatment is necessary  -     Therapy Frequency (OT) 5 times/wk  -       Row Name 06/12/23 7277          Therapy Plan Review/Discharge Plan (OT)    Anticipated Discharge Disposition (OT) skilled nursing facility  -       Row Name 06/12/23 1251          Positioning and Restraints    Pre-Treatment Position in bed  -     Post Treatment Position bed  -KB     In Bed notified nsg;supine;call light within reach;encouraged to call for assist;exit alarm on  -KB               User Key  (r) = Recorded By, (t) = Taken By, (c) = Cosigned By      Initials Name Provider Type    Amina Campuzano OT Occupational Therapist                   Outcome  Measures       Row Name 06/12/23 1301          How much help from another is currently needed...    Putting on and taking off regular lower body clothing? 1  -KB     Bathing (including washing, rinsing, and drying) 1  -KB     Toileting (which includes using toilet bed pan or urinal) 1  -KB     Putting on and taking off regular upper body clothing 2  -KB     Taking care of personal grooming (such as brushing teeth) 3  -KB     Eating meals 3  -KB     AM-PAC 6 Clicks Score (OT) 11  -KB       Row Name 06/12/23 1153 06/12/23 0830       How much help from another person do you currently need...    Turning from your back to your side while in flat bed without using bedrails? 3  -CW 3  -KH    Moving from lying on back to sitting on the side of a flat bed without bedrails? 2  -CW 2  -KH    Moving to and from a bed to a chair (including a wheelchair)? 2  -CW 2  -KH    Standing up from a chair using your arms (e.g., wheelchair, bedside chair)? 2  -CW 2  -KH    Climbing 3-5 steps with a railing? 1  -CW 1  -KH    To walk in hospital room? 1  -CW 1  -KH    AM-PAC 6 Clicks Score (PT) 11  -CW 11  -KH    Highest level of mobility 4 --> Transferred to chair/commode  -CW 4 --> Transferred to chair/commode  -KH      Row Name 06/12/23 1301 06/12/23 1153       Functional Assessment    Outcome Measure Options AM-PAC 6 Clicks Daily Activity (OT)  -KB AM-PAC 6 Clicks Basic Mobility (PT)  -CW              User Key  (r) = Recorded By, (t) = Taken By, (c) = Cosigned By      Initials Name Provider Type    Lisa Will, PT Physical Therapist    Gloria Summers RN Registered Nurse    Amina Campuzano, OT Occupational Therapist                    Occupational Therapy Education       Title: PT OT SLP Therapies (Done)       Topic: Occupational Therapy (Done)       Point: ADL training (Done)       Description:   Instruct learner(s) on proper safety adaptation and remediation techniques during self care or transfers.   Instruct in proper  use of assistive devices.                  Learning Progress Summary             Patient Eager, E, VU by KB at 6/12/2023 1302    Comment: ed on safety with adls, fm    Acceptance, E,TB, VU by LORENA at 6/12/2023 0247                         Point: Home exercise program (Done)       Description:   Instruct learner(s) on appropriate technique for monitoring, assisting and/or progressing therapeutic exercises/activities.                  Learning Progress Summary             Patient Acceptance, E,TB, VU by LORENA at 6/12/2023 0247                         Point: Precautions (Done)       Description:   Instruct learner(s) on prescribed precautions during self-care and functional transfers.                  Learning Progress Summary             Patient Acceptance, E,TB, VU by  at 6/12/2023 0247                         Point: Body mechanics (Done)       Description:   Instruct learner(s) on proper positioning and spine alignment during self-care, functional mobility activities and/or exercises.                  Learning Progress Summary             Patient Acceptance, E,TB, VU by  at 6/12/2023 0247                                         User Key       Initials Effective Dates Name Provider Type Discipline    LORENA 03/02/23 -  Colton Hobbs, RN Registered Nurse Nurse    JAG 01/03/23 -  Amina Hess OT Occupational Therapist OT                  OT Recommendation and Plan  Planned Therapy Interventions (OT): activity tolerance training, BADL retraining, transfer/mobility retraining, strengthening exercise, patient/caregiver education/training  Therapy Frequency (OT): 5 times/wk  Plan of Care Review  Plan of Care Reviewed With: patient  Outcome Evaluation: Pt seen for OT eval this date. Pt admitted with TOI, rash. Pt came from rehab, had been there following recent hopsitalization for UTI. Pt typically lives in [place] and reports a decline in functional abilities over the past few months. [facility] Pt was agreeable to participate with some encouragement. Performed bed mobility with min/mod assist x2. Performed 2 sit/stands with mod assist x2 for about 30 seconds. Pt fatigued quickly. Required total assist with LE dressing, adls. Pt demonstrating below baseline functional endurance and performance of adls and fm, will benefit from cont acute OT. Recommending snf at time of dc.     Time Calculation:    Time Calculation- OT       Row Name 06/12/23 1303             Time Calculation- OT    OT Start Time 1025  -KB      OT Stop Time 1049  -KB      OT Time Calculation (min) 24 min  -KB      Total Timed Code Minutes- OT 16 minute(s)  -KB      OT Received On 06/12/23  -KB      OT - Next Appointment 06/13/23  -KB      OT Goal Re-Cert Due Date 06/26/23  -KB         Timed Charges    19820 - OT Therapeutic Activity Minutes 16  -KB         Untimed Charges    OT Eval/Re-eval Minutes 8  -KB         Total Minutes    Timed Charges Total Minutes 16  -KB      Untimed Charges Total Minutes 8  -KB       Total Minutes 24  -KB                User Key  (r) = Recorded By, (t) = Taken By, (c) = Cosigned By      Initials Name Provider Type    KB Amina Hess OT Occupational Therapist                  Therapy Charges for Today       Code Description Service Date Service Provider Modifiers Qty    76327714872 HC OT THERAPEUTIC ACT EA 15 MIN 6/12/2023 Amina Hess OT GO 1    [id number] HC OT EVAL MOD COMPLEXITY 2 6/12/2023 Amina Hess OT GO 1                 Amina Hess OT  6/12/2023

## 2023-06-12 NOTE — THERAPY EVALUATION
Patient Name: Rose Cheema  : 1940    MRN: [id number]                              Today's Date: 2023       Admit Date: 2023    Visit Dx:     ICD-10-CM ICD-9-CM   1. TOI (acute kidney injury)  N17.9 584.9   2. Leukocytosis, unspecified type  D72.829 288.60   3. Rash  R21 782.1     Patient Active Problem List   Diagnosis    Urinary incontinence    Urinary frequency    Generalized abdominal pain    Pulmonary hypertension     Benign essential hypertension    Bilateral lower extremity edema (chronic)    CAD (coronary artery disease), native coronary artery    Diastolic dysfunction    Class 3 severe obesity with serious comorbidity and body mass index (BMI) of 50.0 to 59.9 in adult    Obstructive sleep apnea    Secondary pulmonary arterial hypertension    H/O: hysterectomy    Fibromyalgia    Hx SBO    Hx of cholecystectomy    Hypoglycemia    Fall    Hypothyroidism    Chronic pain syndrome    Anemia    Pneumonia of right lower lobe due to infectious organism    Acute UTI (urinary tract infection)    Acute on chronic diastolic (congestive) heart failure    Opioid dependence    Venous stasis dermatitis of both lower extremities    Gram-negative bacteremia    Hypokalemia    E. coli bacteremia    Anemia of chronic disease    Sepsis without acute organ dysfunction - present on admit    CKD (chronic kidney disease) stage 3, GFR 30-59 ml/min    Bacteremia - aerococcus    Enterococcal septicemia    Asymptomatic bacteriuria    Bacteremia    PAF (paroxysmal atrial fibrillation)    TOI (acute kidney injury)    Moderate Malnutrition (HCC)     Past Medical History:   Diagnosis Date    Anemia     Anxiety     Arthritis     CHF (congestive heart failure)     Coronary artery disease     Depression     Disease of thyroid gland     Fibromyalgia     GERD (gastroesophageal reflux disease)     Hypertension     Moderate tricuspid valve stenosis     Osteoporosis     Peripheral neuropathy     Pulmonary hypertension     SBO (small  bowel obstruction)     Stenosis of mitral valve      Past Surgical History:   Procedure Laterality Date    BILATERAL OOPHORECTOMY      CARDIAC CATHETERIZATION      CHOLECYSTECTOMY      ERCP N/A 2/15/2019    Procedure: ENDOSCOPIC RETROGRADE CHOLANGIOPANCREATOGRAPHY WITH PARTIAL CHOLANGIOGRAM;  Surgeon: Gerald Herr MD;  Location: [facility] ENDOSCOPY;  Service: Gastroenterology    EXPLORATORY LAPAROTOMY      GASTRIC BYPASS      HERNIA REPAIR      inguinal and ventral    HYSTERECTOMY      OVARIAN CYST REMOVAL        General Information       Row Name 06/12/23 1146          Physical Therapy Time and Intention    Document Type therapy note (daily note)  -CW     Mode of Treatment physical therapy;individual therapy  -CW       Row Name 06/12/23 1146          General Information    Patient Profile Reviewed yes  -CW     Existing Precautions/Restrictions fall;oxygen therapy device and L/min  -CW       Row Name 06/12/23 1146          Cognition    Orientation Status (Cognition) oriented x 3  -CW       Row Name 06/12/23 1146          Safety Issues, Functional Mobility    Impairments Affecting Function (Mobility) balance;endurance/activity tolerance;strength;pain  -CW               User Key  (r) = Recorded By, (t) = Taken By, (c) = Cosigned By      Initials Name Provider Type    CW Lisa Garcia PT Physical Therapist                   Mobility       Row Name 06/12/23 1148          Bed Mobility    Bed Mobility supine-sit;sit-supine;scooting/bridging  -CW     Scooting/Bridging [place] (Bed Mobility) dependent (less than 25% patient effort);2 person assist  -CW     Supine-Sit [place] (Bed Mobility) minimum assist (75% patient effort);1 person assist;verbal cues  -CW     Sit-Supine [place] (Bed Mobility) moderate assist (50% patient effort);2 person assist;verbal cues  -CW     Assistive Device (Bed Mobility) bed rails;head of bed elevated;draw sheet  -       Row Name 06/12/23 1148          Sit-Stand  Transfer    Sit-Stand [place] (Transfers) moderate assist (50% patient effort);2 person assist;verbal cues;nonverbal cues (demo/gesture)  -     Assistive Device (Sit-Stand Transfers) walker, front-wheeled  -CW     Comment, (Sit-Stand Transfer) STS x2, tolerates standing approx 30 sec only each trial  -CW       Row Name 06/12/23 1148          Gait/Stairs (Locomotion)    [place] Level (Gait) unable to assess  -               User Key  (r) = Recorded By, (t) = Taken By, (c) = Cosigned By      Initials Name Provider Type    Lisa Will PT Physical Therapist                   Obj/Interventions       Row Name 06/12/23 1149          Range of Motion Comprehensive    General Range of Motion bilateral lower extremity ROM WFL  -       Row Name 06/12/23 1149          Strength Comprehensive (MMT)    Comment, General Manual Muscle Testing (MMT) Assessment generalized weakness BLE  -       Row Name 06/12/23 1149          Balance    Balance Assessment standing static balance;sitting dynamic balance;sitting static balance  -CW     Static Sitting Balance verbal cues;standby assist  -CW     Dynamic Sitting Balance verbal cues;contact guard  -CW     Position, Sitting Balance sitting edge of bed  -CW     Static Standing Balance minimal assist;2-person assist  -CW     Position/Device Used, Standing Balance supported;walker, front-wheeled  -CW       Row Name 06/12/23 1149          Sensory Assessment (Somatosensory)    Sensory Assessment (Somatosensory) sensation intact  -CW               User Key  (r) = Recorded By, (t) = Taken By, (c) = Cosigned By      Initials Name Provider Type    Lisa Will PT Physical Therapist                   Goals/Plan    No documentation.                  Clinical Impression       Row Name 06/12/23 1151          Plan of Care Review    Plan of Care Reviewed With patient  -CW     Outcome Evaluation Pt participated with PT this AM. Pt completed supine>sit with min A, stood  with mod A x2 twice and tolerates standing approx 30 sec at a time. Unable to take any side steps this date due to weakness. Returned to bed with mod A x2. Will continue to follow and progress as able.  -CW       Row Name 06/12/23 1151          Vital Signs    O2 Delivery Pre Treatment supplemental O2  -CW       Row Name 06/12/23 1151          Positioning and Restraints    Pre-Treatment Position in bed  -CW     Post Treatment Position bed  -CW     In Bed notified nsg;call light within reach;encouraged to call for assist;exit alarm on;supine  with aide for bed level bath  -CW               User Key  (r) = Recorded By, (t) = Taken By, (c) = Cosigned By      Initials Name Provider Type    Lisa Will PT Physical Therapist                   Outcome Measures       Row Name 06/12/23 1153 06/12/23 0830       How much help from another person do you currently need...    Turning from your back to your side while in flat bed without using bedrails? 3  -CW 3  -KH    Moving from lying on back to sitting on the side of a flat bed without bedrails? 2  -CW 2  -KH    Moving to and from a bed to a chair (including a wheelchair)? 2  -CW 2  -KH    Standing up from a chair using your arms (e.g., wheelchair, bedside chair)? 2  -CW 2  -KH    Climbing 3-5 steps with a railing? 1  -CW 1  -KH    To walk in hospital room? 1  -CW 1  -KH    AM-PAC 6 Clicks Score (PT) 11  -CW 11  -KH    Highest level of mobility 4 --> Transferred to chair/commode  -CW 4 --> Transferred to chair/commode  -KH      Row Name 06/12/23 1153          Functional Assessment    Outcome Measure Options AM-PAC 6 Clicks Basic Mobility (PT)  -CW               User Key  (r) = Recorded By, (t) = Taken By, (c) = Cosigned By      Initials Name Provider Type    Lisa Will PT Physical Therapist    Gloria Summers RN Registered Nurse                                 Physical Therapy Education       Title: PT OT SLP Therapies (Done)       Topic: Physical  Therapy (Done)       Point: Mobility training (Done)       Learning Progress Summary             Patient Acceptance, E,TB, VU by  at 6/12/2023 0247    Acceptance, E, VU,NR by  at 6/11/2023 1447                         Point: Home exercise program (Done)       Learning Progress Summary             Patient Acceptance, E,TB, VU by  at 6/12/2023 0247                         Point: Body mechanics (Done)       Learning Progress Summary             Patient Acceptance, E,TB, VU by  at 6/12/2023 0247    Acceptance, E, VU,NR by  at 6/11/2023 1447                         Point: Precautions (Done)       Learning Progress Summary             Patient Acceptance, E,TB, VU by  at 6/12/2023 0247    Acceptance, E, VU,NR by  at 6/11/2023 1447                                         User Key       Initials Effective Dates Name Provider Type Discipline     06/16/21 -  Sherrell Prater, PT Physical Therapist PT     03/02/23 -  Colton Hobbs, RN Registered Nurse Nurse                  PT Recommendation and Plan     Plan of Care Reviewed With: patient  Outcome Evaluation: Pt participated with PT this AM. Pt completed supine>sit with min A, stood with mod A x2 twice and tolerates standing approx 30 sec at a time. Unable to take any side steps this date due to weakness. Returned to bed with mod A x2. Will continue to follow and progress as able.     Time Calculation:    PT Charges       Row Name 06/12/23 1146             Time Calculation    Start Time 1025  -CW      Stop Time 1050  -CW      Time Calculation (min) 25 min  -CW      PT Received On 06/12/23  -CW      PT - Next Appointment 06/13/23  -                User Key  (r) = Recorded By, (t) = Taken By, (c) = Cosigned By      Initials Name Provider Type    Lisa Will, PT Physical Therapist                  Therapy Charges for Today       Code Description Service Date Service Provider Modifiers Qty    65990904683 HC PT THERAPEUTIC ACT EA 15 MIN 6/12/2023 Jose  Lisa, PT GP 2            PT G-Codes  Outcome Measure Options: AM-PAC 6 Clicks Basic Mobility (PT)  AM-PAC 6 Clicks Score (PT): 11  PT Discharge Summary  Anticipated Discharge Disposition (PT): skilled nursing facility    Lisa Garcia, MARTA  6/12/2023

## 2023-06-12 NOTE — PROGRESS NOTES
"   LOS: 2 days     Chief Complaint/ Reason for encounter: TOI    Subjective   06/12/23 : Patient is doing well today with no new complaints  Good appetite with no nausea or vomiting  No shortness of breath chest pain or edema  Voiding well with no dysuria      Medical history reviewed:  History of Present Illness    Subjective    History taken from: Patient and chart    Vital Signs  Temp:  [97.1 °F (36.2 °C)-98.2 °F (36.8 °C)] 98.2 °F (36.8 °C)  Heart Rate:  [53-85] 53  Resp:  [16-20] 20  BP: (101-111)/(48-71) 101/48       Wt Readings from Last 1 Encounters:   06/10/23 0124 101 kg (221 lb 12.5 oz)   06/09/23 1535 101 kg (221 lb 12.5 oz)   06/09/23 0916 127 kg (281 lb)       Objective:  Vital signs: (most recent): Blood pressure 101/48, pulse 53, temperature 98.2 °F (36.8 °C), temperature source Oral, resp. rate 20, height 165.1 cm (65\"), weight 101 kg (221 lb 12.5 oz), SpO2 96 %, not currently breastfeeding.              Objective:  General Appearance:  Comfortable, obese, well-appearing, in no acute distress and not in pain.  Awake, alert, oriented  HEENT: Mucous membranes moist, no injury, oropharynx clear  Lungs:  Normal effort and normal respiratory rate.  Breath sounds clear to auscultation.  No  respiratory distress.  No rales, decreased breath sounds or rhonchi.    Heart: Normal rate.  Regular rhythm.  S1, S2 normal.  No murmur.   Abdomen: Abdomen is soft.  Bowel sounds are normal, no abdominal tenderness.  There is no rebound or guarding  Extremities: Trace edema of bilateral lower extremities  Neurological: No focal motor or sensory deficits, pupils reactive  Skin:  Warm and dry.  Erythematous rash upper trunk and legs      Results Review:    Intake/Output:     Intake/Output Summary (Last 24 hours) at 6/12/2023 1244  Last data filed at 6/12/2023 0558  Gross per 24 hour   Intake 360 ml   Output 600 ml   Net -240 ml         DATA:  Radiology and Labs:  The following labs independently reviewed by me. " Additional labs ordered for tomorrow a.m.  Interval notes, chart personally reviewed by me.   Old records independently reviewed showing baseline creatinine 0.6  The following radiologic studies independently viewed by me, findings renal ultrasound showed right upper pole stone no obstructive uropathy  New problems include vancomycin toxicity induced renal failure  Discussed with patient herself at bedside    Risk/ complexity of medical care/ medical decision making moderate complexity, TOI    Labs:   Recent Results (from the past 24 hour(s))   Renal Function Panel    Collection Time: 06/12/23  5:31 AM    Specimen: Blood   Result Value Ref Range    Glucose 82 65 - 99 mg/dL    BUN 26 (H) 8 - 23 mg/dL    Creatinine 2.16 (H) 0.57 - 1.00 mg/dL    Sodium 141 136 - 145 mmol/L    Potassium 3.6 3.5 - 5.2 mmol/L    Chloride 104 98 - 107 mmol/L    CO2 29.0 22.0 - 29.0 mmol/L    Calcium 8.2 (L) 8.6 - 10.5 mg/dL    Albumin 2.9 (L) 3.5 - 5.2 g/dL    Phosphorus 3.6 2.5 - 4.5 mg/dL    Anion Gap 8.0 5.0 - 15.0 mmol/L    BUN/Creatinine Ratio 12.0 7.0 - 25.0    eGFR 22.6 (L) >60.0 mL/min/1.73   Magnesium    Collection Time: 06/12/23  5:31 AM    Specimen: Blood   Result Value Ref Range    Magnesium 1.9 1.6 - 2.4 mg/dL   CBC Auto Differential    Collection Time: 06/12/23  5:31 AM    Specimen: Blood   Result Value Ref Range    WBC 8.54 3.40 - 10.80 10*3/mm3    RBC 3.24 (L) 3.77 - 5.28 10*6/mm3    Hemoglobin 10.3 (L) 12.0 - 15.9 g/dL    Hematocrit 31.4 (L) 34.0 - 46.6 %    MCV 96.9 79.0 - 97.0 fL    MCH 31.8 26.6 - 33.0 pg    MCHC 32.8 31.5 - 35.7 g/dL    RDW 12.8 12.3 - 15.4 %    RDW-SD 45.9 37.0 - 54.0 fl    MPV 11.5 6.0 - 12.0 fL    Platelets 135 (L) 140 - 450 10*3/mm3    Neutrophil % 76.3 (H) 42.7 - 76.0 %    Lymphocyte % 12.9 (L) 19.6 - 45.3 %    Monocyte % 6.2 5.0 - 12.0 %    Eosinophil % 3.6 0.3 - 6.2 %    Basophil % 0.5 0.0 - 1.5 %    Neutrophils, Absolute 6.52 1.70 - 7.00 10*3/mm3    Lymphocytes, Absolute 1.10 0.70 - 3.10  10*3/mm3    Monocytes, Absolute 0.53 0.10 - 0.90 10*3/mm3    Eosinophils, Absolute 0.31 0.00 - 0.40 10*3/mm3    Basophils, Absolute 0.04 0.00 - 0.20 10*3/mm3       Radiology:  Pertinent radiology studies were reviewed as described above      Medications have been reviewed separately in chart overview      ASSESSMENT:  TOI (acute kidney injury), vancomycin toxicity versus interstitial nephritis    Benign essential hypertension    Bilateral lower extremity edema (chronic)    Diastolic dysfunction    Obstructive sleep apnea    Hypothyroidism    Chronic pain syndrome    Anemia  Diffuse rash, possible drug rash    CKD (chronic kidney disease) stage 3, GFR 30-59 ml/min    Bacteremia - aerococcus    PAF (paroxysmal atrial fibrillation)           DISCUSSION/PLAN:   Her renal function is fairly stable today but improved since admission.  Euvolemic on exam  Interstitial nephritis seems less likely given a relatively bland UA with minimal proteinuria and only 3-5 RBCs, no RBCs in the urine  UA with minimal active sediment and negative urine eosinophils, no eosinophilia  No indication for steroids at this time and would be high risk with her recent bacteremia  IV antibiotics per infectious disease  Agree with discontinuation of vancomycin  Hold Demadex at this time.  Encourage oral fluid intake  Renal ultrasound with no obstruction       Continue to monitor electrolytes and volume closely, avoid IV contrast and nephrotoxic medications        Antione Jenkins MD   [facility]   Office phone number: [phone]  Answering service phone number: [phone]    06/12/23  12:44 EDT    Dictation performed using Dragon dictation software

## 2023-06-13 ENCOUNTER — APPOINTMENT (OUTPATIENT)
Dept: CARDIOLOGY | Facility: HOSPITAL | Age: 83
End: 2023-06-13
Payer: MEDICARE

## 2023-06-13 LAB
ALBUMIN SERPL-MCNC: 2.8 G/DL (ref 3.5–5.2)
ANION GAP SERPL CALCULATED.3IONS-SCNC: 8.3 MMOL/L (ref 5–15)
BASOPHILS # BLD AUTO: 0.03 10*3/MM3 (ref 0–0.2)
BASOPHILS NFR BLD AUTO: 0.5 % (ref 0–1.5)
BH CV UPPER VENOUS LEFT INTERNAL JUGULAR AUGMENT: NORMAL
BH CV UPPER VENOUS LEFT INTERNAL JUGULAR COMPRESS: NORMAL
BH CV UPPER VENOUS LEFT INTERNAL JUGULAR PHASIC: NORMAL
BH CV UPPER VENOUS LEFT INTERNAL JUGULAR SPONT: NORMAL
BH CV UPPER VENOUS LEFT SUBCLAVIAN AUGMENT: NORMAL
BH CV UPPER VENOUS LEFT SUBCLAVIAN COMPRESS: NORMAL
BH CV UPPER VENOUS LEFT SUBCLAVIAN PHASIC: NORMAL
BH CV UPPER VENOUS LEFT SUBCLAVIAN SPONT: NORMAL
BH CV UPPER VENOUS RIGHT AXILLARY AUGMENT: NORMAL
BH CV UPPER VENOUS RIGHT AXILLARY COMPRESS: NORMAL
BH CV UPPER VENOUS RIGHT AXILLARY PHASIC: NORMAL
BH CV UPPER VENOUS RIGHT AXILLARY SPONT: NORMAL
BH CV UPPER VENOUS RIGHT BASILIC FOREARM COMPRESS: NORMAL
BH CV UPPER VENOUS RIGHT BASILIC UPPER COMPRESS: NORMAL
BH CV UPPER VENOUS RIGHT BRACHIAL COMPRESS: NORMAL
BH CV UPPER VENOUS RIGHT CEPHALIC FOREARM COMPRESS: NORMAL
BH CV UPPER VENOUS RIGHT CEPHALIC UPPER COMPRESS: NORMAL
BH CV UPPER VENOUS RIGHT INTERNAL JUGULAR AUGMENT: NORMAL
BH CV UPPER VENOUS RIGHT INTERNAL JUGULAR COMPRESS: NORMAL
BH CV UPPER VENOUS RIGHT INTERNAL JUGULAR PHASIC: NORMAL
BH CV UPPER VENOUS RIGHT INTERNAL JUGULAR SPONT: NORMAL
BH CV UPPER VENOUS RIGHT RADIAL COMPRESS: NORMAL
BH CV UPPER VENOUS RIGHT SUBCLAVIAN AUGMENT: NORMAL
BH CV UPPER VENOUS RIGHT SUBCLAVIAN COMPRESS: NORMAL
BH CV UPPER VENOUS RIGHT SUBCLAVIAN PHASIC: NORMAL
BH CV UPPER VENOUS RIGHT SUBCLAVIAN SPONT: NORMAL
BH CV UPPER VENOUS RIGHT ULNAR COMPRESS: NORMAL
BUN SERPL-MCNC: 23 MG/DL (ref 8–23)
BUN/CREAT SERPL: 12.8 (ref 7–25)
CALCIUM SPEC-SCNC: 8.7 MG/DL (ref 8.6–10.5)
CHLORIDE SERPL-SCNC: 104 MMOL/L (ref 98–107)
CO2 SERPL-SCNC: 26.7 MMOL/L (ref 22–29)
CREAT SERPL-MCNC: 1.79 MG/DL (ref 0.57–1)
DEPRECATED RDW RBC AUTO: 44.9 FL (ref 37–54)
EGFRCR SERPLBLD CKD-EPI 2021: 28.4 ML/MIN/1.73
EOSINOPHIL # BLD AUTO: 0.24 10*3/MM3 (ref 0–0.4)
EOSINOPHIL NFR BLD AUTO: 4 % (ref 0.3–6.2)
ERYTHROCYTE [DISTWIDTH] IN BLOOD BY AUTOMATED COUNT: 12.8 % (ref 12.3–15.4)
GLUCOSE SERPL-MCNC: 81 MG/DL (ref 65–99)
HCT VFR BLD AUTO: 30.6 % (ref 34–46.6)
HGB BLD-MCNC: 9.9 G/DL (ref 12–15.9)
LYMPHOCYTES # BLD AUTO: 0.87 10*3/MM3 (ref 0.7–3.1)
LYMPHOCYTES NFR BLD AUTO: 14.5 % (ref 19.6–45.3)
MAGNESIUM SERPL-MCNC: 2 MG/DL (ref 1.6–2.4)
MCH RBC QN AUTO: 31.2 PG (ref 26.6–33)
MCHC RBC AUTO-ENTMCNC: 32.4 G/DL (ref 31.5–35.7)
MCV RBC AUTO: 96.5 FL (ref 79–97)
MONOCYTES # BLD AUTO: 0.41 10*3/MM3 (ref 0.1–0.9)
MONOCYTES NFR BLD AUTO: 6.9 % (ref 5–12)
NEUTROPHILS NFR BLD AUTO: 4.41 10*3/MM3 (ref 1.7–7)
NEUTROPHILS NFR BLD AUTO: 73.8 % (ref 42.7–76)
PHOSPHATE SERPL-MCNC: 3.5 MG/DL (ref 2.5–4.5)
PLATELET # BLD AUTO: 114 10*3/MM3 (ref 140–450)
PMV BLD AUTO: 11.6 FL (ref 6–12)
POTASSIUM SERPL-SCNC: 3.7 MMOL/L (ref 3.5–5.2)
RBC # BLD AUTO: 3.17 10*6/MM3 (ref 3.77–5.28)
SODIUM SERPL-SCNC: 139 MMOL/L (ref 136–145)
WBC NRBC COR # BLD: 5.98 10*3/MM3 (ref 3.4–10.8)

## 2023-06-13 PROCEDURE — 94761 N-INVAS EAR/PLS OXIMETRY MLT: CPT

## 2023-06-13 PROCEDURE — 97530 THERAPEUTIC ACTIVITIES: CPT

## 2023-06-13 PROCEDURE — 83735 ASSAY OF MAGNESIUM: CPT | Performed by: STUDENT IN AN ORGANIZED HEALTH CARE EDUCATION/TRAINING PROGRAM

## 2023-06-13 PROCEDURE — 94799 UNLISTED PULMONARY SVC/PX: CPT

## 2023-06-13 PROCEDURE — 94664 DEMO&/EVAL PT USE INHALER: CPT

## 2023-06-13 PROCEDURE — 97110 THERAPEUTIC EXERCISES: CPT

## 2023-06-13 PROCEDURE — 93971 EXTREMITY STUDY: CPT

## 2023-06-13 PROCEDURE — 85025 COMPLETE CBC W/AUTO DIFF WBC: CPT | Performed by: STUDENT IN AN ORGANIZED HEALTH CARE EDUCATION/TRAINING PROGRAM

## 2023-06-13 PROCEDURE — 80069 RENAL FUNCTION PANEL: CPT | Performed by: INTERNAL MEDICINE

## 2023-06-13 RX ADMIN — ASPIRIN 81 MG: 81 TABLET, CHEWABLE ORAL at 09:06

## 2023-06-13 RX ADMIN — Medication 10 ML: at 20:11

## 2023-06-13 RX ADMIN — FLUOXETINE HYDROCHLORIDE 60 MG: 20 CAPSULE ORAL at 09:08

## 2023-06-13 RX ADMIN — APIXABAN 2.5 MG: 2.5 TABLET, FILM COATED ORAL at 20:11

## 2023-06-13 RX ADMIN — HYDROCODONE BITARTRATE AND ACETAMINOPHEN 1 TABLET: 7.5; 325 TABLET ORAL at 09:05

## 2023-06-13 RX ADMIN — DOCUSATE SODIUM 50MG AND SENNOSIDES 8.6MG 2 TABLET: 8.6; 5 TABLET, FILM COATED ORAL at 20:11

## 2023-06-13 RX ADMIN — HYDROCODONE BITARTRATE AND ACETAMINOPHEN 1 TABLET: 7.5; 325 TABLET ORAL at 20:12

## 2023-06-13 RX ADMIN — HYDROCODONE BITARTRATE AND ACETAMINOPHEN 1 TABLET: 7.5; 325 TABLET ORAL at 02:03

## 2023-06-13 RX ADMIN — POTASSIUM CHLORIDE 10 MEQ: 750 TABLET, EXTENDED RELEASE ORAL at 12:04

## 2023-06-13 RX ADMIN — PANTOPRAZOLE SODIUM 40 MG: 40 TABLET, DELAYED RELEASE ORAL at 09:05

## 2023-06-13 RX ADMIN — APIXABAN 2.5 MG: 2.5 TABLET, FILM COATED ORAL at 09:05

## 2023-06-13 RX ADMIN — QUETIAPINE FUMARATE 100 MG: 100 TABLET ORAL at 20:11

## 2023-06-13 RX ADMIN — IPRATROPIUM BROMIDE AND ALBUTEROL SULFATE 3 ML: .5; 3 SOLUTION RESPIRATORY (INHALATION) at 07:29

## 2023-06-13 RX ADMIN — Medication 10 ML: at 09:08

## 2023-06-13 RX ADMIN — IPRATROPIUM BROMIDE AND ALBUTEROL SULFATE 3 ML: .5; 3 SOLUTION RESPIRATORY (INHALATION) at 23:34

## 2023-06-13 RX ADMIN — MIRABEGRON 25 MG: 25 TABLET, FILM COATED, EXTENDED RELEASE ORAL at 09:06

## 2023-06-13 RX ADMIN — POTASSIUM CHLORIDE 10 MEQ: 750 TABLET, EXTENDED RELEASE ORAL at 09:05

## 2023-06-13 RX ADMIN — POTASSIUM CHLORIDE 10 MEQ: 750 TABLET, EXTENDED RELEASE ORAL at 20:11

## 2023-06-13 RX ADMIN — Medication 1 CAPSULE: at 09:06

## 2023-06-13 NOTE — PROGRESS NOTES
"RENAL/KCC:     LOS: 3 days    Patient Care Team:  Provider, No Known as PCP - General (Family Medicine)    Chief Complaint:  TOI    Subjective     Interval History:   Chart reviewed  Sitting up in bed, eating breakfast  Feeling better    Objective     Vital Sign Min/Max for last 24 hours  Temp  Min: 97.7 °F (36.5 °C)  Max: 98.6 °F (37 °C)   BP  Min: 103/48  Max: 116/53   Pulse  Min: 54  Max: 91   Resp  Min: 18  Max: 20   SpO2  Min: 93 %  Max: 97 %   Flow (L/min)  Min: 1  Max: 1.5   No data recorded     Flowsheet Rows      Flowsheet Row First Filed Value   Admission Height 160 cm (63\") Documented at 06/09/2023 0916   Admission Weight 127 kg (281 lb) Documented at 06/09/2023 0916            I/O this shift:  In: 0   Out: 100 [Urine:100]  I/O last 3 completed shifts:  In: -   Out: 975 [Urine:975]    Physical Exam:  GEN: Awake, NAD  ENT: PERRL, EOMI, MMM  NECK: Supple, no JVD  CHEST: CTAB, no W/R/C  CV: RRR, no M/G/R  ABD: Soft, NT, +BS  SKIN: Warm and Dry  NEURO: CN's intact      WBC WBC   Date Value Ref Range Status   06/13/2023 5.98 3.40 - 10.80 10*3/mm3 Final   06/12/2023 8.54 3.40 - 10.80 10*3/mm3 Final   06/11/2023 10.66 3.40 - 10.80 10*3/mm3 Final      HGB Hemoglobin   Date Value Ref Range Status   06/13/2023 9.9 (L) 12.0 - 15.9 g/dL Final   06/12/2023 10.3 (L) 12.0 - 15.9 g/dL Final   06/11/2023 10.3 (L) 12.0 - 15.9 g/dL Final      HCT Hematocrit   Date Value Ref Range Status   06/13/2023 30.6 (L) 34.0 - 46.6 % Final   06/12/2023 31.4 (L) 34.0 - 46.6 % Final   06/11/2023 31.7 (L) 34.0 - 46.6 % Final      Platlets No results found for: LABPLAT   MCV MCV   Date Value Ref Range Status   06/13/2023 96.5 79.0 - 97.0 fL Final   06/12/2023 96.9 79.0 - 97.0 fL Final   06/11/2023 96.4 79.0 - 97.0 fL Final          Sodium Sodium   Date Value Ref Range Status   06/13/2023 139 136 - 145 mmol/L Final   06/12/2023 141 136 - 145 mmol/L Final   06/11/2023 141 136 - 145 mmol/L Final      Potassium Potassium   Date Value Ref Range " Status   06/13/2023 3.7 3.5 - 5.2 mmol/L Final   06/12/2023 3.6 3.5 - 5.2 mmol/L Final   06/11/2023 3.3 (L) 3.5 - 5.2 mmol/L Final      Chloride Chloride   Date Value Ref Range Status   06/13/2023 104 98 - 107 mmol/L Final   06/12/2023 104 98 - 107 mmol/L Final   06/11/2023 104 98 - 107 mmol/L Final      CO2 CO2   Date Value Ref Range Status   06/13/2023 26.7 22.0 - 29.0 mmol/L Final   06/12/2023 29.0 22.0 - 29.0 mmol/L Final   06/11/2023 27.1 22.0 - 29.0 mmol/L Final      BUN BUN   Date Value Ref Range Status   06/13/2023 23 8 - 23 mg/dL Final   06/12/2023 26 (H) 8 - 23 mg/dL Final   06/11/2023 25 (H) 8 - 23 mg/dL Final      Creatinine Creatinine   Date Value Ref Range Status   06/13/2023 1.79 (H) 0.57 - 1.00 mg/dL Final   06/12/2023 2.16 (H) 0.57 - 1.00 mg/dL Final   06/11/2023 2.17 (H) 0.57 - 1.00 mg/dL Final      Calcium Calcium   Date Value Ref Range Status   06/13/2023 8.7 8.6 - 10.5 mg/dL Final   06/12/2023 8.2 (L) 8.6 - 10.5 mg/dL Final   06/11/2023 8.5 (L) 8.6 - 10.5 mg/dL Final      PO4 No results found for: CAPO4   Albumin Albumin   Date Value Ref Range Status   06/13/2023 2.8 (L) 3.5 - 5.2 g/dL Final   06/12/2023 2.9 (L) 3.5 - 5.2 g/dL Final   06/11/2023 2.5 (L) 3.5 - 5.2 g/dL Final      Magnesium Magnesium   Date Value Ref Range Status   06/13/2023 2.0 1.6 - 2.4 mg/dL Final   06/12/2023 1.9 1.6 - 2.4 mg/dL Final   06/11/2023 1.9 1.6 - 2.4 mg/dL Final      Uric Acid Uric Acid   Date Value Ref Range Status   06/11/2023 8.3 (H) 2.4 - 5.7 mg/dL Final           Results Review:     I reviewed the patient's new clinical results.    apixaban, 2.5 mg, Oral, Q12H  aspirin, 81 mg, Oral, Daily  FLUoxetine, 60 mg, Oral, Daily  ipratropium-albuterol, 3 mL, Nebulization, Q8H - RT  lactobacillus acidophilus, 1 capsule, Oral, Daily  levothyroxine, 50 mcg, Oral, Q AM  Mirabegron ER, 25 mg, Oral, Daily  pantoprazole, 40 mg, Oral, Daily  potassium chloride, 10 mEq, Oral, TID With Meals  QUEtiapine, 100 mg, Oral,  Nightly  sennosides-docusate, 2 tablet, Oral, BID  sodium chloride, 10 mL, Intravenous, Q12H           Medication Review: Reviewed    Assessment & Plan       TOI (acute kidney injury)    Benign essential hypertension    Bilateral lower extremity edema (chronic)    Diastolic dysfunction    Obstructive sleep apnea    Hypothyroidism    Chronic pain syndrome    Anemia    CKD (chronic kidney disease) stage 3, GFR 30-59 ml/min    Bacteremia - aerococcus    PAF (paroxysmal atrial fibrillation)    Moderate Malnutrition (HCC)    Hypokalemia - improved    Plan: Patient with Vanc toxicity vs infectious GN.  Cr continues to improve - down to 1.79.  K up to 3.7 and Mag WNL.  Meds reviewed.  Avoid nephrotoxins.  Will follow.      Jerome Celis MD  [facility]  06/13/23  10:37 EDT

## 2023-06-13 NOTE — THERAPY TREATMENT NOTE
Patient Name: Rose Cheema  : 1940    MRN: [id number]                              Today's Date: 2023       Admit Date: 2023    Visit Dx:     ICD-10-CM ICD-9-CM   1. TOI (acute kidney injury)  N17.9 584.9   2. Leukocytosis, unspecified type  D72.829 288.60   3. Rash  R21 782.1     Patient Active Problem List   Diagnosis    Urinary incontinence    Urinary frequency    Generalized abdominal pain    Pulmonary hypertension     Benign essential hypertension    Bilateral lower extremity edema (chronic)    CAD (coronary artery disease), native coronary artery    Diastolic dysfunction    Class 3 severe obesity with serious comorbidity and body mass index (BMI) of 50.0 to 59.9 in adult    Obstructive sleep apnea    Secondary pulmonary arterial hypertension    H/O: hysterectomy    Fibromyalgia    Hx SBO    Hx of cholecystectomy    Hypoglycemia    Fall    Hypothyroidism    Chronic pain syndrome    Anemia    Pneumonia of right lower lobe due to infectious organism    Acute UTI (urinary tract infection)    Acute on chronic diastolic (congestive) heart failure    Opioid dependence    Venous stasis dermatitis of both lower extremities    Gram-negative bacteremia    Hypokalemia    E. coli bacteremia    Anemia of chronic disease    Sepsis without acute organ dysfunction - present on admit    CKD (chronic kidney disease) stage 3, GFR 30-59 ml/min    Bacteremia - aerococcus    Enterococcal septicemia    Asymptomatic bacteriuria    Bacteremia    PAF (paroxysmal atrial fibrillation)    TOI (acute kidney injury)    Moderate Malnutrition (HCC)     Past Medical History:   Diagnosis Date    Anemia     Anxiety     Arthritis     CHF (congestive heart failure)     Coronary artery disease     Depression     Disease of thyroid gland     Fibromyalgia     GERD (gastroesophageal reflux disease)     Hypertension     Moderate tricuspid valve stenosis     Osteoporosis     Peripheral neuropathy     Pulmonary hypertension     SBO (small  bowel obstruction)     Stenosis of mitral valve      Past Surgical History:   Procedure Laterality Date    BILATERAL OOPHORECTOMY      CARDIAC CATHETERIZATION      CHOLECYSTECTOMY      ERCP N/A 2/15/2019    Procedure: ENDOSCOPIC RETROGRADE CHOLANGIOPANCREATOGRAPHY WITH PARTIAL CHOLANGIOGRAM;  Surgeon: Gerald Herr MD;  Location: [facility] ENDOSCOPY;  Service: Gastroenterology    EXPLORATORY LAPAROTOMY      GASTRIC BYPASS      HERNIA REPAIR      inguinal and ventral    HYSTERECTOMY      OVARIAN CYST REMOVAL        General Information       Row Name 06/13/23 1211          OT Time and Intention    Document Type therapy note (daily note)  -KB     Mode of Treatment occupational therapy  -       Row Name 06/13/23 1211          General Information    Patient Profile Reviewed yes  -KB     Existing Precautions/Restrictions fall;oxygen therapy device and L/min  -KB       Row Name 06/13/23 1211          Cognition    Orientation Status (Cognition) oriented x 3  -KB       Row Name 06/13/23 1211          Safety Issues, Functional Mobility    Impairments Affecting Function (Mobility) balance;endurance/activity tolerance;strength;pain  -KB               User Key  (r) = Recorded By, (t) = Taken By, (c) = Cosigned By      Initials Name Provider Type    KB Amina Hess OT Occupational Therapist                     Mobility/ADL's       Row Name 06/13/23 1211          Bed Mobility    Bed Mobility supine-sit;sit-supine  -KB     Scooting/Bridging Rich (Bed Mobility) dependent (less than 25% patient effort);2 person assist  -KB     Supine-Sit Rich (Bed Mobility) minimum assist (75% patient effort);moderate assist (50% patient effort);1 person assist;verbal cues  -     Sit-Supine Rich (Bed Mobility) maximum assist (25% patient effort);2 person assist;verbal cues  -     Assistive Device (Bed Mobility) bed rails;head of bed elevated;draw sheet  -       Row Name 06/13/23 1211          Bed-Chair Transfer     Bed-Chair Pender (Transfers) unable to assess  -[facility] Name 06/13/23 1211          Sit-Stand Transfer    Sit-Stand Pender (Transfers) maximum assist (25% patient effort);2 person assist;verbal cues  -     Assistive Device (Sit-Stand Transfers) walker, front-wheeled  -[facility] Name 06/13/23 1211          Functional Mobility    Functional Mobility- Ind. Level unable to perform  -KB       Row Name 06/13/23 1211          Activities of Daily Living    BADL Assessment/Intervention lower body dressing  -KB       Row Name 06/13/23 [facility]          Lower Body Dressing Assessment/Training    Pender Level (Lower Body Dressing) don;socks;dependent (less than 25% patient effort)  -               User Key  (r) = Recorded By, (t) = Taken By, (c) = Cosigned By      Initials Name Provider Type    Amina Campuzano OT Occupational Therapist                   Obj/Interventions       [facility] Name 06/13/23 1212          Motor Skills    Motor Skills functional endurance  -     Functional Endurance poor  -KB       Row Name 06/13/23 1212          Balance    Balance Assessment sitting static balance  -     Static Sitting Balance standby assist  -     Static Standing Balance minimal assist;2-person assist  -               User Key  (r) = Recorded By, (t) = Taken By, (c) = Cosigned By      Initials Name Provider Type    Amina Campuzano OT Occupational Therapist                   Goals/Plan    No documentation.                  Clinical Impression       Row Name 06/13/23 1213          Plan of Care Review    Plan of Care Reviewed With patient  -     Outcome Evaluation Pt seen for OT treatment this date. Pt agreeable to participate but needs encouragement and motivation to move. Pt required max assist x2 for bed mobility. Sat edge of bed and performed grooming with set up. Pt tends to procrastinate and self limit during session. Stood with max assist x2 and quickly sat, only standing 5 seconds then 15  seconds with second attempt. Pt cont to demo below baseline adls and fm, will cont to benefit from acute OT. Recommending snf at [place].  -KB       Row Name 06/13/23 1213          Therapy Plan Review/Discharge Plan (OT)    Anticipated Discharge Disposition (OT) skilled nursing facility  -       Row Name 06/13/23 1213          Positioning and Restraints    Pre-Treatment Position in bed  -KB     Post Treatment Position bed  -KB     In Bed notified nsg;supine;call light within reach;encouraged to call for assist  -KB               User Key  (r) = Recorded By, (t) = Taken By, (c) = Cosigned By      Initials Name Provider Type    Amina Campuzano, JANET Occupational Therapist                   Outcome Measures       Row Name 06/13/23 1220          How much help from another is currently needed...    Putting on and taking off regular lower body clothing? 1  -KB     Bathing (including washing, rinsing, and drying) 1  -KB     Toileting (which includes using toilet bed pan or urinal) 1  -KB     Putting on and taking off regular upper body clothing 2  -KB     Taking care of personal grooming (such as brushing teeth) 3  -KB     Eating meals 3  -KB     AM-PAC 6 Clicks Score (OT) 11  -KB       Row Name 06/13/23 1122          How much help from another person do you currently need...    Turning from your back to your side while in flat bed without using bedrails? 3  -CW     Moving from lying on back to sitting on the side of a flat bed without bedrails? 2  -CW     Moving to and from a bed to a chair (including a wheelchair)? 1  -CW     Standing up from a chair using your arms (e.g., wheelchair, bedside chair)? 2  -CW     Climbing 3-5 steps with a railing? 1  -CW     To walk in hospital room? 1  -CW     AM-PAC 6 Clicks Score (PT) 10  -CW     Highest level of mobility 4 --> Transferred to chair/commode  -CW       Row Name 06/13/23 1122          Functional Assessment    Outcome Measure Options AM-PAC 6 Clicks Basic Mobility (PT)  -CW                User Key  (r) = Recorded By, (t) = Taken By, (c) = Cosigned By      Initials Name Provider Type    Lisa Will, PT Physical Therapist    Amina Campuzano, OT Occupational Therapist                    Occupational Therapy Education       Title: PT OT SLP Therapies (Done)       Topic: Occupational Therapy (Done)       Point: ADL training (Done)       Description:   Instruct learner(s) on proper safety adaptation and remediation techniques during self care or transfers.   Instruct in proper use of assistive devices.                  Learning Progress Summary             Patient Acceptance, E,TB, VU by CB at 6/13/2023 0332    Eager, E, VU by JAG at 6/12/2023 1302    Comment: ed on safety with adls, fm    Acceptance, E,TB, VU by CB at 6/12/2023 0247                         Point: Home exercise program (Done)       Description:   Instruct learner(s) on appropriate technique for monitoring, assisting and/or progressing therapeutic exercises/activities.                  Learning Progress Summary             Patient Acceptance, E,TB, VU by CB at 6/13/2023 0332    Acceptance, E,TB, VU by CB at 6/12/2023 0247                         Point: Precautions (Done)       Description:   Instruct learner(s) on prescribed precautions during self-care and functional transfers.                  Learning Progress Summary             Patient Acceptance, E,TB, VU by CB at 6/13/2023 0332    Acceptance, E,TB, VU by CB at 6/12/2023 0247                         Point: Body mechanics (Done)       Description:   Instruct learner(s) on proper positioning and spine alignment during self-care, functional mobility activities and/or exercises.                  Learning Progress Summary             Patient Acceptance, E,TB, VU by CB at 6/13/2023 0332    Acceptance, E,TB, VU by CB at 6/12/2023 0247                                         User Key       Initials Effective Dates Name Provider Type Discipline    LORENA 03/02/23 -  Anjel  Colton, RN Registered Nurse Nurse     01/03/23 -  Amina Hess OT Occupational Therapist OT                  OT Recommendation and Plan  Planned Therapy Interventions (OT): activity tolerance training, BADL retraining, transfer/mobility retraining, strengthening exercise, patient/caregiver education/training  Therapy Frequency (OT): 5 times/wk  Plan of Care Review  Plan of Care Reviewed With: patient  Outcome Evaluation: Pt seen for OT treatment this date. Pt agreeable to participate but needs encouragement and motivation to move. Pt required max assist x2 for bed mobility. Sat edge of bed and performed grooming with set up. Pt tends to procrastinate and self limit during session. Stood with max assist x2 and quickly sat, only standing 5 seconds then 15 seconds with second attempt. Pt cont to demo below baseline adls and fm, will cont to benefit from acute OT. Recommending snf at [place].     Time Calculation:    Time Calculation- OT       Row Name 06/13/23 1221             Time Calculation- OT    OT Start Time 1000  -KB      OT Stop Time 1023  -KB      OT Time Calculation (min) 23 min  -KB      Total Timed Code Minutes- OT 23 minute(s)  -KB      OT Received On 06/13/23  -KB      OT - Next Appointment 06/14/23  -KB      OT Goal Re-Cert Due Date 06/26/23  -KB         Timed Charges    80550 - OT Therapeutic Activity Minutes 23  -KB         Total Minutes    Timed Charges Total Minutes 23  -KB       Total Minutes 23  -KB                User Key  (r) = Recorded By, (t) = Taken By, (c) = Cosigned By      Initials Name Provider Type     Amina Hess OT Occupational Therapist                  Therapy Charges for Today       Code Description Service Date Service Provider Modifiers Qty    33752131650 HC OT THERAPEUTIC ACT EA 15 MIN 6/12/2023 Amina Hess OT GO 1    34507288153 HC OT EVAL MOD COMPLEXITY 2 6/12/2023 Amina Hess OT GO 1    97174747687 HC OT THERAPEUTIC ACT EA 15 MIN 6/13/2023 Amina Hess OT GO 2                  Amina Hess, OT  6/13/2023

## 2023-06-13 NOTE — PLAN OF CARE
"Goal Outcome Evaluation:  Plan of Care Reviewed With: patient           Outcome Evaluation: Pt seen for PT session this AM. Pt requires encouragement to participate and admits she likes to \"procrastinate\" when it comes to mobility. She required mod A for supine>sit. At length time sitting EOB for ADL task and to encourage her to attempt standing. She stood with max A x2 for 5 seconds on first attempt then 15 seconds on second attempt. Pt wanting to be able to use the bathroom for bowel movement - discussed with pt that she will need to be stronger and able to tolerate more activity before that is safe to do. Recommending SNF at d/c.         "

## 2023-06-13 NOTE — DISCHARGE PLACEMENT REQUEST
"Rose Cheema (82 y.o. Female)       Date of Birth   1940    Social Security Number       Address   [address] [place] [address]    Home Phone   [phone]    MRN   [id number]       Sabianist   [employer]    Marital Status                               Admission Date   6/9/23    Admission Type   Emergency    Admitting Provider   Osvaldo Ponce MD    Attending Provider   Candida Scott MD    Department, Room/Bed   [address], [address]       Discharge Date       Discharge Disposition       Discharge Destination                                 Attending Provider: Candida Scott MD    Allergies: Sulfa Antibiotics, Aspartame, Cephalexin    Isolation: Contact   Infection: Candida Auris (rule out) (06/12/23)   Code Status: No CPR    Ht: 165.1 cm (65\")   Wt: 101 kg (221 lb 12.5 oz)    Admission Cmt: None   Principal Problem: TOI (acute kidney injury) [N17.9]                   Active Insurance as of 6/9/2023       Primary Coverage       Payor Plan Insurance Group Employer/Plan Group    MEDICARE MEDICARE A & B        Payor Plan Address Payor Plan Phone Number Payor Plan Fax Number Effective Dates    [address] [phone]  5/1/2008 - None Entered    [facility] [id number]         Subscriber Name Subscriber Birth Date Member ID       ROSE CHEEMA 1940 [id number]               Secondary Coverage       Payor Plan Insurance Group Employer/Plan Group    [facility] [id number]       Payor Plan Address Payor Plan Phone Number Payor Plan Fax Number Effective Dates    [address]   9/1/2004 - None Entered    [facility] [id number]         Subscriber Name Subscriber Birth Date Member ID       ROSE CHEEMA 1940 [id number]                     Emergency Contacts        (Rel.) Home Phone Work Phone Mobile Phone    Mario Cheema (Son) [phone] -- [phone]    Anette(inLaw)Olga (Daughter) " [phone] -- [phone]              {Outbreak/Travel/Exposure Documentation......;  Question Available Choices Patient Response   COVID-19 Outbreak Screen:  Do you currently have a new onset of the following symptoms?        Fever/Chills, Cough, Shortness of air, Loss of taste or smell, No, Unknown  No (06/09/23 1608)   COVID-19 Outbreak Screen: In the last 14 days, have you had contact with anyone who is ill, has show any of the symptoms listed above and/or has been diagnosis with the 2019 Novel Coronavirus? This includes any immediate household members but excludes any patients with whom you have been in contact within your normal work duties wearing proper PPE, if you are a healthcare worker.  Yes, No, Unknown              No (06/09/23 1608)   COVID-19 Outbreak Screen: Who was notified? Free text (not recorded)   Ebola Screening Outbreak Screen: Have you traveled to the [place] the [place] or [place] within the past 21 days?  Yes, No, Unknown (not recorded)   Ebola Screening Outbreak Screen: Do you have ANY of the following symptoms: Fever/Chills, Vomiting, Diarrhea, Fatigue, Headache, Muscle pain, Unexplained bleeding, Abdominal (stomach) pain, No, Unknown (not recorded)   Ebola Screening Outbreak Screen: Name of Person notified Free text (not recorded)   Travel Screen: Have you traveled in the last month? If so, to what country have you traveled? If [place] what state? Yes, No, Unknown  List of all countries  List of all States No (06/09/23 1620)  (not recorded)  (not recorded)   Infection Risk: Do you currently have the following symptoms?  (If cough is selected, the Tuberculosis Screen is performed.) Cough, Fever, Rash, No (!) Rash (06/09/23 1620)   Tuberculosis Screen: Do you have any of the following Tuberculosis Risks?  Have you lived or spent time with anyone who had or may have TB?  Have you lived in or visited any of the following areas for more than one month: [place], [place], [place], [place]  or [place], the [place] or [place]?  Do you have HIV/AIDS?  Have you lived in or worked in a nursing home, homeless shelter, correctional facility, or substance abuse treatment facility?   No    If Yes do you have any of the following symptoms? Yes responses display to the right    If Yes, symptoms listed are:  Cough greater than or equal to 3 weeks, Loss of appetite, Unexplained weight loss, Night sweats, Bloody sputum or hemoptysis, Hoarseness, Fever, Fatigue, Chest pain, No (not recorded)  (not recorded)   Exposure Screen: Have you been exposed to any of these contagious diseases in the last month? Measles, Chickenpox, Meningitis, Pertussis, Whooping Cough, No No (06/09/23 5269)

## 2023-06-13 NOTE — CASE MANAGEMENT/SOCIAL WORK
Discharge Planning Assessment  [facility]     Patient Name: Rose Cheema  MRN: [id number]  Today's Date: 6/13/2023    Admit Date: 6/9/2023    Plan: Return to [facility] SNF- awaiting return call from Carlos to confirm   Discharge Needs Assessment       Row Name 06/13/23 1137       Living Environment    People in Home facility resident       Resource/Environmental Concerns    Resource/Environmental Concerns none       Transition Planning    Patient/Family Anticipates Transition to inpatient rehabilitation facility    Patient/Family Anticipated Services at Transition skilled nursing    Transportation Anticipated health plan transportation       Discharge Needs Assessment    Readmission Within the Last 30 Days unable to assess    Equipment Currently Used at Home rollator;oxygen    Concerns to be Addressed discharge planning    Discharge Facility/Level of Care Needs nursing facility, skilled                   Discharge Plan       Row Name 06/13/23 1138       Plan    Plan Return to [facility] SNF- awaiting return call from Carlos to confirm    Patient/Family in Agreement with Plan yes    Plan Comments CCP met with the patient at the bedside to discuss discharge planning. CCP also called and spoke with patient's son, Mario, per patient's request. CCP role explained and facehseet verified. The patient admitted from [facility] for short term rehab per patient and plans to return at discharge. Mario also confirms this plan. CCP left [facility] for Mansi[facility]Rosa to confirm eligibility to return. The patient resides at [facility] at baseline and uses a rollator and continuous 02 via [employer]. She had a private caregiver assist her with bathing x1/week prior to SNF. Patient will need transport arranged at discharge. Mario requested MD to call and update him, CCP notified VITALIY WONG. CCP will continue following. Ann DENIS RN CCP                  Continued Care and Services - Admitted Since  6/9/2023       Destination       Service Provider Request Status Selected Services Address Phone Fax Patient Preferred    [facility] Pending - [facility] Sent N/A [address] [phone] --                  Selected Continued Care - Prior Encounters Includes continued care and service providers with selected services from prior encounters from 3/11/2023 to 6/13/2023      Discharged on 6/2/2023 Admission date: 5/26/2023 - Discharge disposition: Skilled Nursing Facility (DC - External)      Destination       Service Provider Selected Services Address Phone Fax Patient Preferred    [facility] [address] [phone] --                          Expected Discharge Date and Time       Expected Discharge Date Expected Discharge Time    Jun 14, 2023            Demographic Summary       Row Name 06/13/23 1137       General Information    Arrived From subacute/long-term acute care    Preferred Language English                   Functional Status       Row Name 06/13/23 1137       Functional Status    Usual Activity Tolerance good    Current Activity Tolerance moderate       Functional Status, IADL    Medications completely dependent    Meal Preparation completely dependent    Housekeeping completely dependent    Laundry completely dependent    Shopping completely dependent                   Psychosocial    No documentation.                  Abuse/Neglect    No documentation.                  Legal    No documentation.                  Substance Abuse    No documentation.                  Patient Forms    No documentation.                     Ann Duenas RN

## 2023-06-13 NOTE — PLAN OF CARE
Goal Outcome Evaluation:  Plan of Care Reviewed With: patient           Outcome Evaluation: Pt seen for OT treatment this date. Pt agreeable to participate but needs encouragement and motivation to move. Pt required max assist x2 for bed mobility. Sat edge of bed and performed grooming with set up. Pt tends to procrastinate and self limit during session. Stood with max assist x2 and quickly sat, only standing 5 seconds then 15 seconds with second attempt. Pt cont to demo below baseline adls and fm, will cont to benefit from acute OT. Recommending snf at [place].  OT wore appropriate PPE during session and performed hand hygiene before/after session.       Evaluation Complexity (OT)  Review Occupational Profile/Medical/Therapy History Complexity: expanded/moderate complexity  Assessment, Occupational Performance/Identification of Deficit Complexity: 3-5 performance deficits  Clinical Decision Making Complexity (OT): detailed assessment/moderate complexity  Overall Complexity of Evaluation (OT): moderate complexity

## 2023-06-13 NOTE — THERAPY TREATMENT NOTE
Patient Name: Rose Cheema  : 1940    MRN: [id number]                              Today's Date: 2023       Admit Date: 2023    Visit Dx:     ICD-10-CM ICD-9-CM   1. TOI (acute kidney injury)  N17.9 584.9   2. Leukocytosis, unspecified type  D72.829 288.60   3. Rash  R21 782.1     Patient Active Problem List   Diagnosis    Urinary incontinence    Urinary frequency    Generalized abdominal pain    Pulmonary hypertension     Benign essential hypertension    Bilateral lower extremity edema (chronic)    CAD (coronary artery disease), native coronary artery    Diastolic dysfunction    Class 3 severe obesity with serious comorbidity and body mass index (BMI) of 50.0 to 59.9 in adult    Obstructive sleep apnea    Secondary pulmonary arterial hypertension    H/O: hysterectomy    Fibromyalgia    Hx SBO    Hx of cholecystectomy    Hypoglycemia    Fall    Hypothyroidism    Chronic pain syndrome    Anemia    Pneumonia of right lower lobe due to infectious organism    Acute UTI (urinary tract infection)    Acute on chronic diastolic (congestive) heart failure    Opioid dependence    Venous stasis dermatitis of both lower extremities    Gram-negative bacteremia    Hypokalemia    E. coli bacteremia    Anemia of chronic disease    Sepsis without acute organ dysfunction - present on admit    CKD (chronic kidney disease) stage 3, GFR 30-59 ml/min    Bacteremia - aerococcus    Enterococcal septicemia    Asymptomatic bacteriuria    Bacteremia    PAF (paroxysmal atrial fibrillation)    TOI (acute kidney injury)    Moderate Malnutrition (HCC)     Past Medical History:   Diagnosis Date    Anemia     Anxiety     Arthritis     CHF (congestive heart failure)     Coronary artery disease     Depression     Disease of thyroid gland     Fibromyalgia     GERD (gastroesophageal reflux disease)     Hypertension     Moderate tricuspid valve stenosis     Osteoporosis     Peripheral neuropathy     Pulmonary hypertension     SBO (small  bowel obstruction)     Stenosis of mitral valve      Past Surgical History:   Procedure Laterality Date    BILATERAL OOPHORECTOMY      CARDIAC CATHETERIZATION      CHOLECYSTECTOMY      ERCP N/A 2/15/2019    Procedure: ENDOSCOPIC RETROGRADE CHOLANGIOPANCREATOGRAPHY WITH PARTIAL CHOLANGIOGRAM;  Surgeon: Gerald Herr MD;  Location: [facility] ENDOSCOPY;  Service: Gastroenterology    EXPLORATORY LAPAROTOMY      GASTRIC BYPASS      HERNIA REPAIR      inguinal and ventral    HYSTERECTOMY      OVARIAN CYST REMOVAL        General Information       Row Name 06/13/23 1117          Physical Therapy Time and Intention    Document Type therapy note (daily note)  -CW     Mode of Treatment physical therapy  -CW       Row Name 06/13/23 1117          General Information    Patient Profile Reviewed yes  -CW     Existing Precautions/Restrictions fall;oxygen therapy device and L/min  -CW       Row Name 06/13/23 1117          Cognition    Orientation Status (Cognition) oriented x 3  -CW       Row Name 06/13/23 1117          Safety Issues, Functional Mobility    Impairments Affecting Function (Mobility) balance;endurance/activity tolerance;strength;pain  -CW               User Key  (r) = Recorded By, (t) = Taken By, (c) = Cosigned By      Initials Name Provider Type    CW Lisa Garcia PT Physical Therapist                   Mobility       Row Name 06/13/23 1118          Bed Mobility    Bed Mobility supine-sit;sit-supine  -CW     Supine-Sit [place] (Bed Mobility) minimum assist (75% patient effort);moderate assist (50% patient effort);1 person assist;verbal cues  -CW     Sit-Supine [place] (Bed Mobility) maximum assist (25% patient effort);2 person assist;verbal cues  -CW     Assistive Device (Bed Mobility) bed rails;head of bed elevated;draw sheet  -       Row Name 06/13/23 1118          Bed-Chair Transfer    Bed-Chair [place] (Transfers) unable to assess  -       Row Name 06/13/23 1118          Sit-Stand  "Transfer    Sit-Stand [place] (Transfers) maximum assist (25% patient effort);2 person assist;verbal cues  -CW     Assistive Device (Sit-Stand Transfers) walker, front-wheeled  -CW     Comment, (Sit-Stand Transfer) STS x2, 5 sec then 15 sec  -CW       Row Name 06/13/23 1118          Gait/Stairs (Locomotion)    [place] Level (Gait) unable to assess  -CW               User Key  (r) = Recorded By, (t) = Taken By, (c) = Cosigned By      Initials Name Provider Type    Lisa Will PT Physical Therapist                   Obj/Interventions       Row Name 06/13/23 1118          Balance    Balance Assessment standing static balance  -CW     Static Standing Balance minimal assist;2-person assist  -CW               User Key  (r) = Recorded By, (t) = Taken By, (c) = Cosigned By      Initials Name Provider Type    Lisa Will PT Physical Therapist                   Goals/Plan    No documentation.                  Clinical Impression       Row Name 06/13/23 1119          Pain    Pretreatment Pain Rating 0/10 - no pain  -CW       Row Name 06/13/23 1119          Plan of Care Review    Plan of Care Reviewed With patient  -CW     Outcome Evaluation Pt seen for PT session this AM. Pt requires encouragement to participate and admits she likes to \"procrastinate\" when it comes to mobility. She required mod A for supine>sit. At length time sitting EOB for ADL task and to encourage her to attempt standing. She stood with max A x2 for 5 seconds on first attempt then 15 seconds on second attempt. Pt wanting to be able to use the bathroom for bowel movement - discussed with pt that she will need to be stronger and able to tolerate more activity before that is safe to do. Recommending SNF at d/c.  -CW       Row Name 06/13/23 1119          Positioning and Restraints    Pre-Treatment Position in bed  -CW     Post Treatment Position bed  -CW     In Bed notified nsg;call light within reach;encouraged to call for " assist;exit alarm on;fowlers  -CW               User Key  (r) = Recorded By, (t) = Taken By, (c) = Cosigned By      Initials Name Provider Type    Lisa Will PT Physical Therapist                   Outcome Measures       Row Name 06/13/23 1122          How much help from another person do you currently need...    Turning from your back to your side while in flat bed without using bedrails? 3  -CW     Moving from lying on back to sitting on the side of a flat bed without bedrails? 2  -CW     Moving to and from a bed to a chair (including a wheelchair)? 1  -CW     Standing up from a chair using your arms (e.g., wheelchair, bedside chair)? 2  -CW     Climbing 3-5 steps with a railing? 1  -CW     To walk in hospital room? 1  -CW     AM-PAC 6 Clicks Score (PT) 10  -CW     Highest level of mobility 4 --> Transferred to chair/commode  -CW       Row Name 06/13/23 1122          Functional Assessment    Outcome Measure Options AM-PAC 6 Clicks Basic Mobility (PT)  -CW               User Key  (r) = Recorded By, (t) = Taken By, (c) = Cosigned By      Initials Name Provider Type    Lisa Will PT Physical Therapist                                 Physical Therapy Education       Title: PT OT SLP Therapies (Done)       Topic: Physical Therapy (Done)       Point: Mobility training (Done)       Learning Progress Summary             Patient Acceptance, E, VU,NR by YUNIER at 6/13/2023 1123    Acceptance, E,TB, VU by CB at 6/13/2023 0332    Eager, E, VU by JAG at 6/12/2023 1302    Comment: ed on safety with adls, fm    Acceptance, E,TB, VU by CB at 6/12/2023 0247    Acceptance, E, VU,NR by RIGO at 6/11/2023 1447                         Point: Home exercise program (Done)       Learning Progress Summary             Patient Acceptance, E,TB, VU by CB at 6/13/2023 0332    Eager, E, VU by JAG at 6/12/2023 1302    Comment: ed on safety with adls, fm    Acceptance, E,TB, VU by CB at 6/12/2023 0247                         Point:  "Body mechanics (Done)       Learning Progress Summary             Patient Acceptance, E,TB, VU by CB at 6/13/2023 0332    Eager, E, VU by KB at 6/12/2023 1302    Comment: ed on safety with adls, fm    Acceptance, E,TB, VU by CB at 6/12/2023 0247    Acceptance, E, VU,NR by  at 6/11/2023 1447                         Point: Precautions (Done)       Learning Progress Summary             Patient Acceptance, E,TB, VU by CB at 6/13/2023 0332    Eager, E, VU by KB at 6/12/2023 1302    Comment: ed on safety with adls, fm    Acceptance, E,TB, VU by CB at 6/12/2023 0247    Acceptance, E, VU,NR by  at 6/11/2023 1447                                         User Key       Initials Effective Dates Name Provider Type Discipline     06/16/21 -  Sherrell Prater, PT Physical Therapist PT     12/13/22 -  Lisa Garcia, PT Physical Therapist PT     03/02/23 -  Colton Hobbs, RN Registered Nurse Nurse     01/03/23 -  Amina Hess, JANET Occupational Therapist OT                  PT Recommendation and Plan     Plan of Care Reviewed With: patient  Outcome Evaluation: Pt seen for PT session this AM. Pt requires encouragement to participate and admits she likes to \"procrastinate\" when it comes to mobility. She required mod A for supine>sit. At length time sitting EOB for ADL task and to encourage her to attempt standing. She stood with max A x2 for 5 seconds on first attempt then 15 seconds on second attempt. Pt wanting to be able to use the bathroom for bowel movement - discussed with pt that she will need to be stronger and able to tolerate more activity before that is safe to do. Recommending SNF at d/c.     Time Calculation:    PT Charges       Row Name 06/13/23 1115             Time Calculation    Start Time 1000  -CW      Stop Time 1023  -CW      Time Calculation (min) 23 min  -CW      PT Received On 06/13/23  -CW      PT - Next Appointment 06/14/23  -CW                User Key  (r) = Recorded By, (t) = Taken By, (c) = " Cosigned By      Initials Name Provider Type    CW Lisa Garcia, PT Physical Therapist                  Therapy Charges for Today       Code Description Service Date Service Provider Modifiers Qty    91803303778 HC PT THERAPEUTIC ACT EA 15 MIN 6/12/2023 Lisa Garcia, PT GP 2    39802604431 HC PT THERAPEUTIC ACT EA 15 MIN 6/13/2023 Lisa Garcia, PT GP 1    23631578039 HC PT THER PROC EA 15 MIN 6/13/2023 Lisa Garcia, PT GP 1            PT G-Codes  Outcome Measure Options: AM-PAC 6 Clicks Basic Mobility (PT)  AM-PAC 6 Clicks Score (PT): 10  AM-PAC 6 Clicks Score (OT): 11  PT Discharge Summary  Anticipated Discharge Disposition (PT): skilled nursing facility    Lisa Garcia PT  6/13/2023

## 2023-06-13 NOTE — PLAN OF CARE
Problem: Adult Inpatient Plan of Care  Goal: Plan of Care Review  Flowsheets (Taken 6/13/2023 0328)  Progress: improving  Plan of Care Reviewed With: patient  Outcome Evaluation: No acute changes. VSS. AOx4. Forgetful @ times. O2 in use @ 1L via NC. Medicated for pain per MAR. Makes needs known. Bed locked and in lowest position. Side rails up x2. Call light within reach. Will continue to assess.  Goal: Absence of Hospital-Acquired Illness or Injury  Intervention: Identify and Manage Fall Risk  Flowsheets  Taken 6/13/2023 0300 by Ruth Flowers  Safety Promotion/Fall Prevention:   activity supervised   fall prevention program maintained   safety round/check completed  Taken 6/13/2023 0227 by Colton Hobbs, DAYAN  Safety Promotion/Fall Prevention:   assistive device/personal items within reach   activity supervised   clutter free environment maintained   fall prevention program maintained   lighting adjusted   nonskid shoes/slippers when out of bed   room organization consistent   safety round/check completed  Taken 6/13/2023 0046 by Colton Hobbs, DAYAN  Safety Promotion/Fall Prevention:   activity supervised   assistive device/personal items within reach   clutter free environment maintained   fall prevention program maintained   lighting adjusted   nonskid shoes/slippers when out of bed   safety round/check completed   room organization consistent  Taken 6/12/2023 2235 by Colton Hobbs, RN  Safety Promotion/Fall Prevention:   activity supervised   assistive device/personal items within reach   clutter free environment maintained   fall prevention program maintained   lighting adjusted   nonskid shoes/slippers when out of bed   room organization consistent   safety round/check completed  Taken 6/12/2023 2038 by Colton Hobbs, RN  Safety Promotion/Fall Prevention:   activity supervised   assistive device/personal items within reach   clutter free environment maintained   fall prevention program maintained   lighting adjusted    nonskid shoes/slippers when out of bed   room organization consistent   safety round/check completed  Intervention: Prevent Skin Injury  Flowsheets  Taken 6/13/2023 0300 by Ruth Flowers  Body Position:   turned   side-lying   left  Taken 6/13/2023 0227 by Colton Hobbs RN  Body Position:   tilted   right  Taken 6/13/2023 0046 by Colton Hobbs RN  Body Position:   tilted   right  Taken 6/12/2023 2235 by Colton Hobbs RN  Body Position:   tilted   left  Taken 6/12/2023 2038 by Colton Hobbs RN  Body Position: supine  Skin Protection:   adhesive use limited   incontinence pads utilized   transparent dressing maintained   tubing/devices free from skin contact  Intervention: Prevent and Manage VTE (Venous Thromboembolism) Risk  Flowsheets  Taken 6/13/2023 0300 by Ruth Flowers  Activity Management: bedrest  Taken 6/13/2023 0227 by Colton Hobbs RN  Activity Management: bedrest  Taken 6/13/2023 0046 by Colton Hobbs RN  Activity Management: bedrest  Taken 6/12/2023 2235 by Colton Hobbs RN  Activity Management: bedrest  Taken 6/12/2023 2038 by Colton Hobbs RN  Activity Management: bedrest  Range of Motion:   active ROM (range of motion) encouraged   ROM (range of motion) performed  Taken 6/12/2023 0830 by Gloria East RN  VTE Prevention/Management: (see MAR) other (see comments)  Intervention: Prevent Infection  Flowsheets (Taken 6/12/2023 1100 by José Miguel Chacon PCT)  Infection Prevention: environmental surveillance performed  Goal: Optimal Comfort and Wellbeing  Intervention: Monitor Pain and Promote Comfort  Flowsheets (Taken 6/12/2023 0830 by Gloria East RN)  Pain Management Interventions:   see MAR   quiet environment facilitated   position adjusted   care clustered  Intervention: Provide Person-Centered Care  Flowsheets (Taken 6/12/2023 2038)  Trust Relationship/Rapport:   care explained   choices provided   emotional support provided   empathic listening provided   questions answered   questions encouraged    reassurance provided   thoughts/feelings acknowledged  Goal: Readiness for Transition of Care  Intervention: Mutually Develop Transition Plan  Flowsheets  Taken 6/9/2023 1624 by Lilian Ferguson RN  Equipment Currently Used at Home:   wheelchair, motorized   rollator   walker, rolling   oxygen  Taken 6/9/2023 1621 by Lilian Ferguson RN  Transportation Anticipated: health plan transportation  Patient/Family Anticipated Services at Transition: rehabilitation services  Patient/Family Anticipates Transition to: inpatient rehabilitation facility     Problem: Skin Injury Risk Increased  Goal: Skin Health and Integrity  Intervention: Promote and Optimize Oral Intake  Flowsheets (Taken 6/12/2023 2038)  Oral Nutrition Promotion:   medicated   rest periods promoted  Intervention: Optimize Skin Protection  Flowsheets  Taken 6/13/2023 0300 by Ruth Flowers  Head of Bed (HOB) Positioning: HOB elevated  Taken 6/13/2023 0227 by Colton Hobbs RN  Head of Bed (HOB) Positioning: HOB elevated  Taken 6/13/2023 0046 by Colton Hobbs RN  Head of Bed (HOB) Positioning: HOB elevated  Taken 6/12/2023 2235 by Colton Hobbs RN  Head of Bed (HOB) Positioning: HOB elevated  Taken 6/12/2023 2038 by Colton Hobbs RN  Pressure Reduction Techniques:   frequent weight shift encouraged   weight shift assistance provided  Head of Bed (HOB) Positioning: HOB elevated  Pressure Reduction Devices:   alternating pressure pump (ADD)   pressure-redistributing mattress utilized  Skin Protection:   adhesive use limited   incontinence pads utilized   transparent dressing maintained   tubing/devices free from skin contact     Problem: Hypertension Comorbidity  Goal: Blood Pressure in Desired Range  Intervention: Maintain Blood Pressure Management  Flowsheets (Taken 6/12/2023 2235)  Medication Review/Management: medications reviewed     Problem: Pain Chronic (Persistent) (Comorbidity Management)  Goal: Acceptable Pain Control and Functional  Ability  Intervention: Manage Persistent Pain  Flowsheets  Taken 6/12/2023 2235 by Colton Hobbs RN  Medication Review/Management: medications reviewed  Taken 6/9/2023 1545 by Sasha Chandler RN  Sleep/Rest Enhancement:   relaxation techniques promoted   regular sleep/rest pattern promoted   noise level reduced  Intervention: Develop Pain Management Plan  Flowsheets (Taken 6/12/2023 0830 by Gloria East, RN)  Pain Management Interventions:   see MAR   quiet environment facilitated   position adjusted   care clustered  Intervention: Optimize Psychosocial Wellbeing  Flowsheets (Taken 6/12/2023 2038)  Supportive Measures:   active listening utilized   relaxation techniques promoted   self-care encouraged   self-reflection promoted   self-responsibility promoted   verbalization of feelings encouraged  Diversional Activities: television  Family/Support System Care:   self-care encouraged   support provided     Problem: Fall Injury Risk  Goal: Absence of Fall and Fall-Related Injury  Intervention: Identify and Manage Contributors  Flowsheets  Taken 6/12/2023 2235 by Colton Hobbs RN  Medication Review/Management: medications reviewed  Taken 6/12/2023 0830 by Gloria East, RN  Self-Care Promotion: independence encouraged  Intervention: Promote Injury-Free Environment  Flowsheets  Taken 6/13/2023 0300 by Ruth Flowers  Safety Promotion/Fall Prevention:   activity supervised   fall prevention program maintained   safety round/check completed  Taken 6/13/2023 0227 by Colton Hobbs RN  Safety Promotion/Fall Prevention:   assistive device/personal items within reach   activity supervised   clutter free environment maintained   fall prevention program maintained   lighting adjusted   nonskid shoes/slippers when out of bed   room organization consistent   safety round/check completed  Taken 6/13/2023 0046 by Colton Hobbs RN  Safety Promotion/Fall Prevention:   activity supervised   assistive device/personal items within  reach   clutter free environment maintained   fall prevention program maintained   lighting adjusted   nonskid shoes/slippers when out of bed   safety round/check completed   room organization consistent  Taken 6/12/2023 2235 by Colton Hobbs, RN  Safety Promotion/Fall Prevention:   activity supervised   assistive device/personal items within reach   clutter free environment maintained   fall prevention program maintained   lighting adjusted   nonskid shoes/slippers when out of bed   room organization consistent   safety round/check completed  Taken 6/12/2023 2038 by Colton Hobbs, RN  Safety Promotion/Fall Prevention:   activity supervised   assistive device/personal items within reach   clutter free environment maintained   fall prevention program maintained   lighting adjusted   nonskid shoes/slippers when out of bed   room organization consistent   safety round/check completed   Goal Outcome Evaluation:  Plan of Care Reviewed With: patient        Progress: improving  Outcome Evaluation: No acute changes. VSS. AOx4. Forgetful @ times. O2 in use @ 1L via NC. Medicated for pain per MAR. Makes needs known. Bed locked and in lowest position. Side rails up x2. Call light within reach. Will continue to assess.

## 2023-06-13 NOTE — PROGRESS NOTES
"Nutrition Services    Patient Name:  Rose Cheema  YOB: 1940  MRN: [id number]  Admit Date:  6/9/2023    FOLLOW UP - CLINICAL NUTRITION    Assessment Date:  06/13/23    Encounter Information         Reason for Encounter RD f/u.     Current Issues Pt continues to report poor appetite. Eating 25% of less of meals. Does not like Novasource Renal supplement. Will change to Boost Plus as K/Phos labs WNL. No BM since 6/8 per chart. Pt reports constipation.     Recommendations  Change ONS to Boost Plus TID  Recommend optimize bowel regimen to promote BM     Current Nutrition Orders & Evaluation of Intake       Oral Nutrition     Current PO Diet Diet: Cardiac Diets; Healthy Heart (2-3 Na+); Texture: Regular Texture (IDDSI 7); Fluid Consistency: Thin (IDDSI 0)   Supplement Novasource Renal , BID   PO Evaluation     % PO Intake 0-25% per pt    # of Days Evaluated     Factors Affecting Intake  decreased appetite   --  Anthropometrics          Height    Weight Height: 165.1 cm (65\")  Weight: 101 kg (221 lb 12.5 oz) (06/10/23 0124)    BMI kg/m2 Body mass index is 36.91 kg/m².  Obese, Class II (35 - 39.9)    Weight trend Loss     Labs        Pertinent Labs Reviewed, listed below     Results from last 7 days   Lab Units 06/13/23  0613 06/12/23  0531 06/11/23  0552 06/10/23  0640 06/09/23  1055   SODIUM mmol/L 139 141 141 139 139   POTASSIUM mmol/L 3.7 3.6 3.3* 4.0 4.8   CHLORIDE mmol/L 104 104 104 100 100   CO2 mmol/L 26.7 29.0 27.1 27.5 28.5   BUN mg/dL 23 26* 25* 27* 25*   CREATININE mg/dL 1.79* 2.16* 2.17* 2.39* 2.40*   CALCIUM mg/dL 8.7 8.2* 8.5* 8.5* 8.9   BILIRUBIN mg/dL  --   --   --  0.4 0.4   ALK PHOS U/L  --   --   --  80 86   ALT (SGPT) U/L  --   --   --  8 10   AST (SGOT) U/L  --   --   --  7 17   GLUCOSE mg/dL 81 82 88 88 103*     Results from last 7 days   Lab Units 06/13/23  0613 06/12/23  0531 06/11/23  0552   MAGNESIUM mg/dL 2.0 1.9 1.9   PHOSPHORUS mg/dL 3.5 3.6 3.9   HEMOGLOBIN g/dL 9.9* 10.3* " 10.3*   HEMATOCRIT % 30.6* 31.4* 31.7*   WBC 10*3/mm3 5.98 8.54 10.66   ALBUMIN g/dL 2.8* 2.9* 2.5*     Results from last 7 days   Lab Units 06/13/23  0613 06/12/23  0531 06/11/23  0552 06/10/23  0640 06/09/23  1055   INR   --   --   --   --  1.36*   APTT seconds  --   --   --   --  31.7   PLATELETS 10*3/mm3 114* 135* 155 168 189     COVID19   Date Value Ref Range Status   05/27/2023 Not Detected Not Detected - Ref. Range Final     No results found for: HGBA1C       Medications            Scheduled Medications apixaban, 2.5 mg, Oral, Q12H  aspirin, 81 mg, Oral, Daily  FLUoxetine, 60 mg, Oral, Daily  ipratropium-albuterol, 3 mL, Nebulization, Q8H - RT  lactobacillus acidophilus, 1 capsule, Oral, Daily  levothyroxine, 50 mcg, Oral, Q AM  Mirabegron ER, 25 mg, Oral, Daily  pantoprazole, 40 mg, Oral, Daily  potassium chloride, 10 mEq, Oral, TID With Meals  QUEtiapine, 100 mg, Oral, Nightly  sennosides-docusate, 2 tablet, Oral, BID  sodium chloride, 10 mL, Intravenous, Q12H        Infusions      PRN Medications   acetaminophen **OR** acetaminophen **OR** acetaminophen    albuterol    bisacodyl    diphenhydrAMINE    HYDROcodone-acetaminophen    LORazepam    nitroglycerin    nystatin    ondansetron    sodium chloride    sodium chloride     Physical Findings          Physical Appearance alert, obese, oriented, on oxygen therapy   Oral/Mouth Cavity tooth or teeth missing   Edema  1+ (trace), 2+ (mild)   Gastrointestinal constipation, last bowel movement:6/8   Skin  other: R anterior thigh   Tubes/Drains/Lines none   NFPE See Malnutrition Severity Assessment, Date Completed: 6/10   --  NUTRITION INTERVENTION / PLAN OF CARE  Intervention Goal         Intervention Goal(s) Maintain nutrition status, Disease management/therapy, Increase intake, and Maintain weight     Nutrition Intervention         RD Action Adjusted supplement, Encourage intake, and Follow Tx Progress     Nutrition Prescription         Diet Prescription      Supplement Prescription Boost Plus, TID   EN/PN Prescription    New Prescription Ordered? Yes   --  Monitor/Evaluation        Monitor Per protocol   Discharge Needs Pending clinical course   Education Will instruct as appropriate   --    RD to follow up per protocol.    Electronically signed by:  Erum Covington RD  06/13/23 11:02 EDT

## 2023-06-13 NOTE — PROGRESS NOTES
Name: Rose Cheema ADMIT: 2023   : 1940  PCP: Provider, No Known    MRN: [id number] LOS: 3 days   AGE/SEX: 82 y.o. female  ROOM: [facility]     Subjective   Subjective   Patient resting in bed, rash across chest continues to improve.  Redness of right upper extremity is also improved today.     Objective   Objective   Vital Signs  Temp:  [97.5 °F (36.4 °C)-98.6 °F (37 °C)] 97.5 °F (36.4 °C)  Heart Rate:  [60-96] 96  Resp:  [18-20] 18  BP: ()/(48-57) 92/51  SpO2:  [93 %-96 %] 95 %  on  Flow (L/min):  [1-1.5] 1;   Device (Oxygen Therapy): nasal cannula  Body mass index is 36.91 kg/m².  Physical Exam  Constitutional:       General: She is not in acute distress.     Appearance: She is obese. She is ill-appearing.   Cardiovascular:      Rate and Rhythm: Normal rate and regular rhythm.   Pulmonary:      Effort: Pulmonary effort is normal. No respiratory distress.      Breath sounds: Normal breath sounds. No wheezing.   Abdominal:      General: There is no distension.      Palpations: Abdomen is soft.      Tenderness: There is no abdominal tenderness.   Musculoskeletal:         General: No swelling or tenderness.      Right lower leg: No edema.      Left lower leg: No edema.      Comments: RUE   Skin:     General: Skin is warm and dry.      Coloration: Skin is pale.      Findings: Rash present.   Neurological:      General: No focal deficit present.      Mental Status: She is alert and oriented to person, place, and time. Mental status is at baseline.       Results Review     I reviewed the patient's new clinical results.  Results from last 7 days   Lab Units 23  0613 23  0531 23  0552 06/10/23  0640   WBC 10*3/mm3 5.98 8.54 10.66 11.63*   HEMOGLOBIN g/dL 9.9* 10.3* 10.3* 11.4*   PLATELETS 10*3/mm3 114* 135* 155 168       Results from last 7 days   Lab Units 23  0613 23  0531 23  0552 06/10/23  0640   SODIUM mmol/L 139 141 141 139   POTASSIUM mmol/L 3.7 3.6 3.3* 4.0    CHLORIDE mmol/L 104 104 104 100   CO2 mmol/L 26.7 29.0 27.1 27.5   BUN mg/dL 23 26* 25* 27*   CREATININE mg/dL 1.79* 2.16* 2.17* 2.39*   GLUCOSE mg/dL 81 82 88 88     Estimated Creatinine Clearance: 29.5 mL/min (A) (by C-G formula based on SCr of 1.79 mg/dL (H)).  Results from last 7 days   Lab Units 06/13/23 0613 06/12/23 0531 06/11/23  0552 06/10/23  0640 06/09/23  1055   ALBUMIN g/dL 2.8* 2.9* 2.5* 2.7* 2.7*   BILIRUBIN mg/dL  --   --   --  0.4 0.4   ALK PHOS U/L  --   --   --  80 86   AST (SGOT) U/L  --   --   --  7 17   ALT (SGPT) U/L  --   --   --  8 10       Results from last 7 days   Lab Units 06/13/23  0613 06/12/23 0531 06/11/23  0552 06/10/23  0640   CALCIUM mg/dL 8.7 8.2* 8.5* 8.5*   ALBUMIN g/dL 2.8* 2.9* 2.5* 2.7*   MAGNESIUM mg/dL 2.0 1.9 1.9  --    PHOSPHORUS mg/dL 3.5 3.6 3.9  --        Results from last 7 days   Lab Units 06/10/23  0640 06/09/23  1055   PROCALCITONIN ng/mL  --  0.11   LACTATE mmol/L 1.4 1.3       COVID19   Date Value Ref Range Status   05/27/2023 Not Detected Not Detected - Ref. Range Final     SARS-CoV-2, ARLENE   Date Value Ref Range Status   10/21/2020 Not Detected Not Detected Final     Comment:     This nucleic acid amplification test was developed and its performance  characteristics determined by [employer]. Nucleic acid  amplification tests include PCR and TMA. This test has not been FDA  cleared or approved. This test has been authorized by FDA under an  Emergency Use Authorization (EUA). This test is only authorized for  the duration of time the declaration that circumstances exist  justifying the authorization of the emergency use of in vitro  diagnostic tests for detection of SARS-CoV-2 virus and/or diagnosis  of COVID-19 infection under section 564(b)(1) of the Act, 21 U.S.C.  360bbb-3(b) (1), unless the authorization is terminated or revoked  sooner.  When diagnostic testing is negative, the possibility of a false  negative result should be considered in  the context of a patient's  recent exposures and the presence of clinical signs and symptoms  consistent with COVID-19. An individual without symptoms of COVID-19  and who is not shedding SARS-CoV-2 virus would expect to have a  negative (not detected) result in this assay.     No results found for: HGBA1C, POCGLU    Duplex Venous Upper Extremity - Right CAR    Normal right upper extremity venous duplex scan.    Scheduled Medications  apixaban, 2.5 mg, Oral, Q12H  aspirin, 81 mg, Oral, Daily  FLUoxetine, 60 mg, Oral, Daily  ipratropium-albuterol, 3 mL, Nebulization, Q8H - RT  lactobacillus acidophilus, 1 capsule, Oral, Daily  levothyroxine, 50 mcg, Oral, Q AM  Mirabegron ER, 25 mg, Oral, Daily  pantoprazole, 40 mg, Oral, Daily  potassium chloride, 10 mEq, Oral, TID With Meals  QUEtiapine, 100 mg, Oral, Nightly  sennosides-docusate, 2 tablet, Oral, BID  sodium chloride, 10 mL, Intravenous, Q12H    Infusions     Diet  Diet: Cardiac Diets; Healthy Heart (2-3 Na+); Texture: Regular Texture (IDDSI 7); Fluid Consistency: Thin (IDDSI 0)         I have personally reviewed:  [x]  Laboratory   []  Microbiology   []  Radiology   [x]  EKG/Telemetry   []  Cardiology/Vascular   []  Pathology   []  Records    Assessment/Plan     Active Hospital Problems    Diagnosis  POA    **TOI (acute kidney injury) [N17.9]  Yes    Moderate Malnutrition (HCC) [E44.0]  Yes    PAF (paroxysmal atrial fibrillation) [I48.0]  Yes    Bacteremia - aerococcus [R78.81]  Yes    CKD (chronic kidney disease) stage 3, GFR 30-59 ml/min [N18.30]  Yes    Anemia [D64.9]  Yes    Chronic pain syndrome [G89.4]  Yes    Hypothyroidism [E03.9]  Yes    Bilateral lower extremity edema (chronic) [R60.0]  Yes    Diastolic dysfunction [I51.89]  Yes    Benign essential hypertension [I10]  Yes    Obstructive sleep apnea [G47.33]  Yes      Resolved Hospital Problems   No resolved problems to display.       82 y.o. female admitted with TOI (acute kidney injury).    TOI on  CKD  - renal following; demedex on hold, IVF discontinued per renal  - cr on admission 2.4, b/l 0.6  - BRITTANY unremarkable for cause of TOI  - differential includes vanc toxicity vs infectious GN  - Cr improved to 1.8     Rash  - possible drug rash; hold off on steroids/avoid immunosuppression   - pt denies pain, pruritus, symptoms  - vanco has been Dc'd; monitor for improvement  - rash improving    RUE redness/swelling  - pt on low-dose apixaban but did have PICC in place which could have exacerbated  -Doppler negative; redness improving; continue to monitor, if worsening- consider treating for cellulitis    Paroxysmal atrial fibrillation  - continue low-dose eliquis, no rate control meds    Hypothyroidism  - levothyroxine    Recent ESBL E. Coli UTI  - s/p ertapenem    Hypertension  - hx of, normotensive off of meds    Recent aerococcus bacteremia  - vanco dcd 2/2 to rash and TOI  - agree with admitting provider risk of continuing vanco outweigh benefits, she has completed all but 3 days of tx    Obesity  - rec wt loss    GERSON  - ok for home CPAP    Eliquis (home med) for DVT prophylaxis.  DNR.confirmed with patient  Discussed with nursing staff.  Anticipate discharge to SNU facility timing yet to be determined.  Attempted to call son at 4:58 per request, sent to [facility].     Copied text in this note has been reviewed and is accurate as of 06/13/23.     Candida Scott MD  [facility]  06/13/23  16:41 EDT    I wore protective equipment throughout this patient encounter including gloves and protective eyewear.  Hand hygiene was performed before donning protective equipment and after removal when leaving the room.

## 2023-06-14 VITALS
TEMPERATURE: 97.8 F | HEIGHT: 65 IN | SYSTOLIC BLOOD PRESSURE: 117 MMHG | OXYGEN SATURATION: 98 % | DIASTOLIC BLOOD PRESSURE: 57 MMHG | WEIGHT: 221.78 LBS | RESPIRATION RATE: 16 BRPM | HEART RATE: 85 BPM | BODY MASS INDEX: 36.95 KG/M2

## 2023-06-14 LAB
ALBUMIN SERPL-MCNC: 2.9 G/DL (ref 3.5–5.2)
ANION GAP SERPL CALCULATED.3IONS-SCNC: 8 MMOL/L (ref 5–15)
BACTERIA SPEC AEROBE CULT: NORMAL
BACTERIA SPEC AEROBE CULT: NORMAL
BASOPHILS # BLD AUTO: 0.04 10*3/MM3 (ref 0–0.2)
BASOPHILS NFR BLD AUTO: 0.6 % (ref 0–1.5)
BUN SERPL-MCNC: 22 MG/DL (ref 8–23)
BUN/CREAT SERPL: 12.2 (ref 7–25)
CALCIUM SPEC-SCNC: 8.1 MG/DL (ref 8.6–10.5)
CHLORIDE SERPL-SCNC: 107 MMOL/L (ref 98–107)
CO2 SERPL-SCNC: 26 MMOL/L (ref 22–29)
CREAT SERPL-MCNC: 1.81 MG/DL (ref 0.57–1)
DEPRECATED RDW RBC AUTO: 44.9 FL (ref 37–54)
EGFRCR SERPLBLD CKD-EPI 2021: 28 ML/MIN/1.73
EOSINOPHIL # BLD AUTO: 0.26 10*3/MM3 (ref 0–0.4)
EOSINOPHIL NFR BLD AUTO: 4.2 % (ref 0.3–6.2)
ERYTHROCYTE [DISTWIDTH] IN BLOOD BY AUTOMATED COUNT: 12.7 % (ref 12.3–15.4)
GLUCOSE SERPL-MCNC: 87 MG/DL (ref 65–99)
HCT VFR BLD AUTO: 31.1 % (ref 34–46.6)
HGB BLD-MCNC: 10.2 G/DL (ref 12–15.9)
LYMPHOCYTES # BLD AUTO: 1.08 10*3/MM3 (ref 0.7–3.1)
LYMPHOCYTES NFR BLD AUTO: 17.3 % (ref 19.6–45.3)
MAGNESIUM SERPL-MCNC: 2.1 MG/DL (ref 1.6–2.4)
MCH RBC QN AUTO: 31.6 PG (ref 26.6–33)
MCHC RBC AUTO-ENTMCNC: 32.8 G/DL (ref 31.5–35.7)
MCV RBC AUTO: 96.3 FL (ref 79–97)
MONOCYTES # BLD AUTO: 0.57 10*3/MM3 (ref 0.1–0.9)
MONOCYTES NFR BLD AUTO: 9.1 % (ref 5–12)
NEUTROPHILS NFR BLD AUTO: 4.24 10*3/MM3 (ref 1.7–7)
NEUTROPHILS NFR BLD AUTO: 68.2 % (ref 42.7–76)
PHOSPHATE SERPL-MCNC: 3.7 MG/DL (ref 2.5–4.5)
PLATELET # BLD AUTO: 107 10*3/MM3 (ref 140–450)
PMV BLD AUTO: 11.8 FL (ref 6–12)
POTASSIUM SERPL-SCNC: 4 MMOL/L (ref 3.5–5.2)
RBC # BLD AUTO: 3.23 10*6/MM3 (ref 3.77–5.28)
SODIUM SERPL-SCNC: 141 MMOL/L (ref 136–145)
WBC NRBC COR # BLD: 6.23 10*3/MM3 (ref 3.4–10.8)

## 2023-06-14 PROCEDURE — 94761 N-INVAS EAR/PLS OXIMETRY MLT: CPT

## 2023-06-14 PROCEDURE — 83735 ASSAY OF MAGNESIUM: CPT | Performed by: STUDENT IN AN ORGANIZED HEALTH CARE EDUCATION/TRAINING PROGRAM

## 2023-06-14 PROCEDURE — 94799 UNLISTED PULMONARY SVC/PX: CPT

## 2023-06-14 PROCEDURE — 94760 N-INVAS EAR/PLS OXIMETRY 1: CPT

## 2023-06-14 PROCEDURE — 80069 RENAL FUNCTION PANEL: CPT | Performed by: INTERNAL MEDICINE

## 2023-06-14 PROCEDURE — 94664 DEMO&/EVAL PT USE INHALER: CPT

## 2023-06-14 PROCEDURE — 85025 COMPLETE CBC W/AUTO DIFF WBC: CPT | Performed by: STUDENT IN AN ORGANIZED HEALTH CARE EDUCATION/TRAINING PROGRAM

## 2023-06-14 RX ORDER — HYDROCODONE BITARTRATE AND ACETAMINOPHEN 7.5; 325 MG/1; MG/1
1 TABLET ORAL EVERY 6 HOURS PRN
Qty: 10 TABLET | Refills: 0 | Status: SHIPPED | OUTPATIENT
Start: 2023-06-14

## 2023-06-14 RX ORDER — LORAZEPAM 0.5 MG/1
0.5 TABLET ORAL 2 TIMES DAILY PRN
Qty: 6 TABLET | Refills: 0 | Status: SHIPPED | OUTPATIENT
Start: 2023-06-14

## 2023-06-14 RX ORDER — ACETAMINOPHEN 325 MG/1
650 TABLET ORAL EVERY 4 HOURS PRN
Start: 2023-06-14

## 2023-06-14 RX ADMIN — HYDROCODONE BITARTRATE AND ACETAMINOPHEN 1 TABLET: 7.5; 325 TABLET ORAL at 18:50

## 2023-06-14 RX ADMIN — HYDROCODONE BITARTRATE AND ACETAMINOPHEN 1 TABLET: 7.5; 325 TABLET ORAL at 02:47

## 2023-06-14 RX ADMIN — APIXABAN 2.5 MG: 2.5 TABLET, FILM COATED ORAL at 08:36

## 2023-06-14 RX ADMIN — Medication 1 CAPSULE: at 08:36

## 2023-06-14 RX ADMIN — HYDROCODONE BITARTRATE AND ACETAMINOPHEN 1 TABLET: 7.5; 325 TABLET ORAL at 08:36

## 2023-06-14 RX ADMIN — DOCUSATE SODIUM 50MG AND SENNOSIDES 8.6MG 2 TABLET: 8.6; 5 TABLET, FILM COATED ORAL at 08:36

## 2023-06-14 RX ADMIN — POTASSIUM CHLORIDE 10 MEQ: 750 TABLET, EXTENDED RELEASE ORAL at 12:40

## 2023-06-14 RX ADMIN — IPRATROPIUM BROMIDE AND ALBUTEROL SULFATE 3 ML: .5; 3 SOLUTION RESPIRATORY (INHALATION) at 06:56

## 2023-06-14 RX ADMIN — FLUOXETINE HYDROCHLORIDE 60 MG: 20 CAPSULE ORAL at 08:36

## 2023-06-14 RX ADMIN — Medication 10 ML: at 08:36

## 2023-06-14 RX ADMIN — PANTOPRAZOLE SODIUM 40 MG: 40 TABLET, DELAYED RELEASE ORAL at 08:36

## 2023-06-14 RX ADMIN — IPRATROPIUM BROMIDE AND ALBUTEROL SULFATE 3 ML: .5; 3 SOLUTION RESPIRATORY (INHALATION) at 14:51

## 2023-06-14 RX ADMIN — MIRABEGRON 25 MG: 25 TABLET, FILM COATED, EXTENDED RELEASE ORAL at 08:36

## 2023-06-14 RX ADMIN — LEVOTHYROXINE SODIUM 50 MCG: 0.05 TABLET ORAL at 06:29

## 2023-06-14 RX ADMIN — ASPIRIN 81 MG: 81 TABLET, CHEWABLE ORAL at 08:36

## 2023-06-14 RX ADMIN — POTASSIUM CHLORIDE 10 MEQ: 750 TABLET, EXTENDED RELEASE ORAL at 08:36

## 2023-06-14 NOTE — PLAN OF CARE
Problem: Adult Inpatient Plan of Care  Goal: Plan of Care Review  Flowsheets (Taken 6/14/2023 0329)  Progress: improving  Plan of Care Reviewed With: patient  Outcome Evaluation: No acute changes. VSS. O2 in use @ 1L via NC. Makes needs known. Medicated for pain per MAR. Bed locked and in lowest position. Side rails up x2. Call light within reach. Will continue to assess.  Goal: Absence of Hospital-Acquired Illness or Injury  Intervention: Identify and Manage Fall Risk  Flowsheets  Taken 6/14/2023 0221  Safety Promotion/Fall Prevention:   activity supervised   assistive device/personal items within reach   clutter free environment maintained   fall prevention program maintained   lighting adjusted   nonskid shoes/slippers when out of bed   room organization consistent   safety round/check completed  Taken 6/14/2023 0018  Safety Promotion/Fall Prevention:   activity supervised   assistive device/personal items within reach   clutter free environment maintained   fall prevention program maintained   lighting adjusted   nonskid shoes/slippers when out of bed   room organization consistent   safety round/check completed  Taken 6/13/2023 2219  Safety Promotion/Fall Prevention:   activity supervised   assistive device/personal items within reach   clutter free environment maintained   fall prevention program maintained   lighting adjusted   nonskid shoes/slippers when out of bed   room organization consistent   safety round/check completed  Taken 6/13/2023 2002  Safety Promotion/Fall Prevention:   activity supervised   assistive device/personal items within reach   clutter free environment maintained   fall prevention program maintained   lighting adjusted   nonskid shoes/slippers when out of bed   room organization consistent   safety round/check completed  Intervention: Prevent Skin Injury  Flowsheets  Taken 6/14/2023 0221  Body Position: supine  Taken 6/14/2023 0018  Body Position:   tilted   right  Taken 6/13/2023  2219  Body Position:   left   supine  Taken 6/13/2023 2002  Body Position: supine  Skin Protection:   adhesive use limited   incontinence pads utilized   transparent dressing maintained   tubing/devices free from skin contact  Intervention: Prevent and Manage VTE (Venous Thromboembolism) Risk  Flowsheets  Taken 6/14/2023 0221 by Colton Hobbs RN  Activity Management: bedrest  Taken 6/14/2023 0018 by Colton Hobbs RN  Activity Management: bedrest  Taken 6/13/2023 2219 by Colton Hobbs RN  Activity Management: bedrest  Taken 6/13/2023 2002 by Colton Hobbs RN  Activity Management: bedrest  Range of Motion:   ROM (range of motion) performed   active ROM (range of motion) encouraged  Taken 6/13/2023 0905 by Gloria Eats RN  VTE Prevention/Management: (see MAR) other (see comments)  Intervention: Prevent Infection  Flowsheets (Taken 6/14/2023 0100 by Ruth Flowers)  Infection Prevention:   environmental surveillance performed   personal protective equipment utilized   rest/sleep promoted  Goal: Optimal Comfort and Wellbeing  Intervention: Monitor Pain and Promote Comfort  Flowsheets  Taken 6/14/2023 0221  Pain Management Interventions: see MAR  Taken 6/13/2023 2002  Pain Management Interventions: see MAR  Intervention: Provide Person-Centered Care  Flowsheets (Taken 6/13/2023 2002)  Trust Relationship/Rapport:   care explained   choices provided   empathic listening provided   emotional support provided   questions answered   questions encouraged   reassurance provided   thoughts/feelings acknowledged  Goal: Readiness for Transition of Care  Intervention: Mutually Develop Transition Plan  Flowsheets (Taken 6/13/2023 1137 by Ann Duenas, DAYAN)  Discharge Facility/Level of Care Needs: nursing facility, skilled  Equipment Currently Used at Home:   rollator   oxygen  Transportation Anticipated: health plan transportation  Concerns to be Addressed: discharge planning  Readmission Within the Last 30 Days: unable to  assess  Patient/Family Anticipated Services at Transition: skilled nursing  Patient/Family Anticipates Transition to: inpatient rehabilitation facility     Problem: Skin Injury Risk Increased  Goal: Skin Health and Integrity  Intervention: Promote and Optimize Oral Intake  Flowsheets (Taken 6/13/2023 2002)  Oral Nutrition Promotion:   medicated   rest periods promoted  Intervention: Optimize Skin Protection  Flowsheets  Taken 6/14/2023 0221  Head of Bed (HOB) Positioning: HOB elevated  Taken 6/14/2023 0018  Head of Bed (HOB) Positioning: HOB elevated  Taken 6/13/2023 2219  Head of Bed (HOB) Positioning: HOB elevated  Taken 6/13/2023 2002  Pressure Reduction Techniques:   frequent weight shift encouraged   weight shift assistance provided  Head of Bed (HOB) Positioning: HOB elevated  Pressure Reduction Devices:   alternating pressure pump (ADD)   pressure-redistributing mattress utilized  Skin Protection:   adhesive use limited   incontinence pads utilized   transparent dressing maintained   tubing/devices free from skin contact     Problem: Hypertension Comorbidity  Goal: Blood Pressure in Desired Range  Intervention: Maintain Blood Pressure Management  Flowsheets (Taken 6/13/2023 2002)  Medication Review/Management: medications reviewed     Problem: Pain Chronic (Persistent) (Comorbidity Management)  Goal: Acceptable Pain Control and Functional Ability  Intervention: Manage Persistent Pain  Flowsheets  Taken 6/13/2023 2002 by Colton Hobbs, RN  Medication Review/Management: medications reviewed  Taken 6/9/2023 1545 by Sasha Chandler, RN  Sleep/Rest Enhancement:   relaxation techniques promoted   regular sleep/rest pattern promoted   noise level reduced  Intervention: Develop Pain Management Plan  Flowsheets  Taken 6/14/2023 0221  Pain Management Interventions: see MAR  Taken 6/13/2023 2002  Pain Management Interventions: see MAR  Intervention: Optimize Psychosocial Wellbeing  Flowsheets (Taken 6/13/2023  2002)  Supportive Measures:   active listening utilized   self-care encouraged   relaxation techniques promoted   self-reflection promoted   self-responsibility promoted   verbalization of feelings encouraged  Diversional Activities: television  Family/Support System Care:   self-care encouraged   support provided     Problem: Fall Injury Risk  Goal: Absence of Fall and Fall-Related Injury  Intervention: Identify and Manage Contributors  Flowsheets  Taken 6/13/2023 2002 by Colton Hobbs RN  Medication Review/Management: medications reviewed  Taken 6/12/2023 0830 by Gloria East RN  Self-Care Promotion: independence encouraged  Intervention: Promote Injury-Free Environment  Flowsheets  Taken 6/14/2023 0221  Safety Promotion/Fall Prevention:   activity supervised   assistive device/personal items within reach   clutter free environment maintained   fall prevention program maintained   lighting adjusted   nonskid shoes/slippers when out of bed   room organization consistent   safety round/check completed  Taken 6/14/2023 0018  Safety Promotion/Fall Prevention:   activity supervised   assistive device/personal items within reach   clutter free environment maintained   fall prevention program maintained   lighting adjusted   nonskid shoes/slippers when out of bed   room organization consistent   safety round/check completed  Taken 6/13/2023 2219  Safety Promotion/Fall Prevention:   activity supervised   assistive device/personal items within reach   clutter free environment maintained   fall prevention program maintained   lighting adjusted   nonskid shoes/slippers when out of bed   room organization consistent   safety round/check completed  Taken 6/13/2023 2002  Safety Promotion/Fall Prevention:   activity supervised   assistive device/personal items within reach   clutter free environment maintained   fall prevention program maintained   lighting adjusted   nonskid shoes/slippers when out of bed   room organization  consistent   safety round/check completed   Goal Outcome Evaluation:  Plan of Care Reviewed With: patient        Progress: improving  Outcome Evaluation: No acute changes. VSS. O2 in use @ 1L via NC. Makes needs known. Medicated for pain per MAR. Bed locked and in lowest position. Side rails up x2. Call light within reach. Will continue to assess.

## 2023-06-14 NOTE — DISCHARGE PLACEMENT REQUEST
"Rose Cheema (82 y.o. Female)       Date of Birth   1940    Social Security Number       Address   [address] [facility] [place] [address]    Home Phone   [phone]    MRN   [id number]       [employer]   [employer]    Marital Status                               Admission Date   6/9/23    Admission Type   Emergency    Admitting Provider   Osvaldo Ponce MD    Attending Provider   Luis Enrique Hall MD    Department, Room/Bed   [address], [address]1       Discharge Date       Discharge Disposition   Skilled Nursing Facility (DC - External)    Discharge Destination                                 Attending Provider: Luis Enrique Hall MD    Allergies: Sulfa Antibiotics, Aspartame, Cephalexin    Isolation: Contact   Infection: Candida Auris (rule out) (06/12/23)   Code Status: No CPR    Ht: 165.1 cm (65\")   Wt: 101 kg (221 lb 12.5 oz)    Admission Cmt: None   Principal Problem: TOI (acute kidney injury) [N17.9]                   Active Insurance as of 6/9/2023       Primary Coverage       Payor Plan Insurance Group Employer/Plan Group    MEDICARE MEDICARE A & B        Payor Plan Address Payor Plan Phone Number Payor Plan Fax Number Effective Dates    [address] [phone]  5/1/2008 - None Entered    [facility] [place] [address]         Subscriber Name Subscriber Birth Date Member ROSE WELSH 1940 [id number]               Secondary Coverage       Payor Plan Insurance Group Employer/Plan Group    [place] [id number]       Payor Plan Address Payor Plan Phone Number Payor Plan Fax Number Effective Dates    [address]   9/1/2004 - None Entered    [facility] [id number]         Subscriber Name Subscriber Birth Date Member ROSE WELSH 1940 [id number]                     Emergency Contacts        (Rel.) Home Phone Work Phone Mobile Phone    Mario Cheema (Son) [phone] -- [phone]    " Anette(inLaw)Olga (Daughter) [phone] -- [phone]                 Discharge Summary        Luis Enrique Hall MD at 23 1419                                                                             PHYSICIAN DISCHARGE SUMMARY                                                                        [facility]    Patient Identification:  Name: Rose Cheema  Age: 82 y.o.  Sex: female  :  1940  MRN: [id number]  Primary Care Physician: Provider, No Known    Admit date: 2023  Discharge date and time:2023  Discharged Condition: good    Discharge Diagnoses:  Active Hospital Problems    Diagnosis  POA    **TOI (acute kidney injury) [N17.9]  Yes    Moderate Malnutrition (HCC) [E44.0]  Yes    PAF (paroxysmal atrial fibrillation) [I48.0]  Yes    Bacteremia - aerococcus [R78.81]  Yes    CKD (chronic kidney disease) stage 3, GFR 30-59 ml/min [N18.30]  Yes    Anemia [D64.9]  Yes    Chronic pain syndrome [G89.4]  Yes    Hypothyroidism [E03.9]  Yes    Bilateral lower extremity edema (chronic) [R60.0]  Yes    Diastolic dysfunction [I51.89]  Yes    Benign essential hypertension [I10]  Yes    Obstructive sleep apnea [G47.33]  Yes      Resolved Hospital Problems   No resolved problems to display.          PMHX:   Past Medical History:   Diagnosis Date    Anemia     Anxiety     Arthritis     CHF (congestive heart failure)     Coronary artery disease     Depression     Disease of thyroid gland     Fibromyalgia     GERD (gastroesophageal reflux disease)     Hypertension     Moderate tricuspid valve stenosis     Osteoporosis     Peripheral neuropathy     Pulmonary hypertension     SBO (small bowel obstruction)     Stenosis of mitral valve      PSHX:   Past Surgical History:   Procedure Laterality Date    BILATERAL OOPHORECTOMY      CARDIAC CATHETERIZATION      CHOLECYSTECTOMY      ERCP N/A 2/15/2019    Procedure: ENDOSCOPIC RETROGRADE CHOLANGIOPANCREATOGRAPHY WITH PARTIAL CHOLANGIOGRAM;  Surgeon:  Gerald Herr MD;  Location: [facility] ENDOSCOPY;  Service: Gastroenterology    EXPLORATORY LAPAROTOMY      GASTRIC BYPASS      HERNIA REPAIR      inguinal and ventral    HYSTERECTOMY      OVARIAN CYST REMOVAL         Hospital Course: Rose Cheema  is a 82-year-old female who was recently hospitalized from 5/26/2023 through 6/2/2023 when she initially presented with shortness of breath and nausea. Patient was found to have ESBL positive E. coli urinary tract infection and also blood culture positive for Aerococcus viridans. Patient completed the treatment for urinary tract infection with ertapenem while in the hospital and was discharged on IV vancomycin for 2 weeks from 5/30/2023. Patient is a poor historian and is not clear when did the rash started since her discharge to the rehabilitation facility. Work-up in the emergency room revealed maculopapular rash involving her torso extensor surface of the arm and legs. Her creatinine was noted to be 2.4 from the baseline of 0.7 at the time of discharge. Patient currently feels otherwise okay and denies any cough shortness of breath fever or chills. She has preserved appetite.  The patient was admitted to the hospital and seen by nephrology and her renal function was improving after being in the hospital for a few days.  She looked well enough to go back to her skilled nursing facility.  Nephrology recommended she stay off her diuretics for a while and have follow-up labs to recheck her renal function.  Creatinine was improving but still not at baseline.      Consults:     Consults       Date and Time Order Name Status Description    6/9/2023  7:10 PM Inpatient Nephrology Consult Completed     6/9/2023 12:41 PM [facility] (on-call MD unless specified) Details      5/28/2023  6:57 PM Inpatient Infectious Diseases Consult Completed     5/27/2023  2:52 PM Inpatient Cardiology Consult Completed     5/27/2023 12:43 PM Inpatient Psychiatrist Consult Completed           Results  from last 7 days   Lab Units 06/14/23  0533   WBC 10*3/mm3 6.23   HEMOGLOBIN g/dL 10.2*   HEMATOCRIT % 31.1*   PLATELETS 10*3/mm3 107*     Results from last 7 days   Lab Units 06/14/23  0533   SODIUM mmol/L 141   POTASSIUM mmol/L 4.0   CHLORIDE mmol/L 107   CO2 mmol/L 26.0   BUN mg/dL 22   CREATININE mg/dL 1.81*   GLUCOSE mg/dL 87   CALCIUM mg/dL 8.1*     Significant Diagnostic Studies:   WBC   Date Value Ref Range Status   06/14/2023 6.23 3.40 - 10.80 10*3/mm3 Final     Hemoglobin   Date Value Ref Range Status   06/14/2023 10.2 (L) 12.0 - 15.9 g/dL Final     Hematocrit   Date Value Ref Range Status   06/14/2023 31.1 (L) 34.0 - 46.6 % Final     Platelets   Date Value Ref Range Status   06/14/2023 107 (L) 140 - 450 10*3/mm3 Final     Sodium   Date Value Ref Range Status   06/14/2023 141 136 - 145 mmol/L Final     Potassium   Date Value Ref Range Status   06/14/2023 4.0 3.5 - 5.2 mmol/L Final     Chloride   Date Value Ref Range Status   06/14/2023 107 98 - 107 mmol/L Final     CO2   Date Value Ref Range Status   06/14/2023 26.0 22.0 - 29.0 mmol/L Final     BUN   Date Value Ref Range Status   06/14/2023 22 8 - 23 mg/dL Final     Creatinine   Date Value Ref Range Status   06/14/2023 1.81 (H) 0.57 - 1.00 mg/dL Final     Glucose   Date Value Ref Range Status   06/14/2023 87 65 - 99 mg/dL Final     Calcium   Date Value Ref Range Status   06/14/2023 8.1 (L) 8.6 - 10.5 mg/dL Final     Magnesium   Date Value Ref Range Status   06/14/2023 2.1 1.6 - 2.4 mg/dL Final     Phosphorus   Date Value Ref Range Status   06/14/2023 3.7 2.5 - 4.5 mg/dL Final     No results found for: AST, ALT, ALKPHOS  No results found for: APTT, INR  No results found for: COLORU, CLARITYU, SPECGRAV, PHUR, PROTEINUR, GLUCOSEU, KETONESU, BLOODU, NITRITE, LEUKOCYTESUR, BILIRUBINUR, UROBILINOGEN, RBCUA, WBCUA, BACTERIA, UACOMMENT  No results found for: TROPONINT, TROPONINI, BNP  No components found for: HGBA1C;2  No components found for: TSH;2  Imaging  Results (All)       Procedure Component Value Units Date/Time    US Renal Bilateral [[id number]] Collected: 06/09/23 2058     Updated: 06/09/23 2104    Narrative:      US RENAL BILATERAL-clinical: Acute renal insufficiency     FINDINGS: The right kidney is 11.6 cm in length and left kidney is 10.5  cm in length. The bladder was collapsed, cannot evaluate for either the  right or left ureteral jet.     No hydronephrosis. There is a 13 mm right upper pole renal calculus,  this was seen on previous 05/27/2023 CT. Renal cortical echotexture is  normal to slightly greater than anticipated, possible medical renal  disease.     CONCLUSION: Right upper pole renal calculus, no obstructive uropathy.  Bladder collapsed, cannot evaluate for either ureteral jet.     This report was finalized on 6/9/2023 9:00 PM by Dr. Dennis Burns M.D.             Lab Results (last 7 days)       Procedure Component Value Units Date/Time    Blood Culture - Blood, Arm, Left [[id number]]  (Normal) Collected: 06/09/23 1109    Specimen: Blood from Arm, Left Updated: 06/14/23 1116     Blood Culture No growth at 5 days    Blood Culture - Blood, Hand, Right [[id number]]  (Normal) Collected: 06/09/23 1055    Specimen: Blood from Hand, Right Updated: 06/14/23 1116     Blood Culture No growth at 5 days    Renal Function Panel [[id number]]  (Abnormal) Collected: 06/14/23 0533    Specimen: Blood Updated: 06/14/23 0643     Glucose 87 mg/dL      BUN 22 mg/dL      Creatinine 1.81 mg/dL      Sodium 141 mmol/L      Potassium 4.0 mmol/L      Chloride 107 mmol/L      CO2 26.0 mmol/L      Calcium 8.1 mg/dL      Albumin 2.9 g/dL      Phosphorus 3.7 mg/dL      Anion Gap 8.0 mmol/L      BUN/Creatinine Ratio 12.2     eGFR 28.0 mL/min/1.73     Narrative:      GFR Normal >60  Chronic Kidney Disease <60  Kidney Failure <15    The GFR formula is only valid for adults with stable renal function between ages 18 and 70.    Magnesium [[id number]]  (Normal) Collected: 06/14/23 0533     Specimen: Blood Updated: 06/14/23 0643     Magnesium 2.1 mg/dL     CBC & Differential [[id number]]  (Abnormal) Collected: 06/14/23 0533    Specimen: Blood Updated: 06/14/23 0639    Narrative:      The following orders were created for panel order CBC & Differential.  Procedure                               Abnormality         Status                     ---------                               -----------         ------                     CBC Auto Differential[[id number]]        Abnormal            Final result                 Please view results for these tests on the individual orders.    CBC Auto Differential [[id number]]  (Abnormal) Collected: 06/14/23 0533    Specimen: Blood Updated: 06/14/23 0639     WBC 6.23 10*3/mm3      RBC 3.23 10*6/mm3      Hemoglobin 10.2 g/dL      Hematocrit 31.1 %      MCV 96.3 fL      MCH 31.6 pg      MCHC 32.8 g/dL      RDW 12.7 %      RDW-SD 44.9 fl      MPV 11.8 fL      Platelets 107 10*3/mm3      Neutrophil % 68.2 %      Lymphocyte % 17.3 %      Monocyte % 9.1 %      Eosinophil % 4.2 %      Basophil % 0.6 %      Neutrophils, Absolute 4.24 10*3/mm3      Lymphocytes, Absolute 1.08 10*3/mm3      Monocytes, Absolute 0.57 10*3/mm3      Eosinophils, Absolute 0.26 10*3/mm3      Basophils, Absolute 0.04 10*3/mm3     CANDIDA AURIS SCREEN - Swab, Axilla Right, Axilla Left and Groin [[id number]]  (Normal) Collected: 06/12/23 1449    Specimen: Swab from Axilla Right, Axilla Left and Groin Updated: 06/13/23 1501     Candida Auris Screen Culture No Candida auris isolated at 24 hours    Renal Function Panel [[id number]]  (Abnormal) Collected: 06/13/23 0613    Specimen: Blood Updated: 06/13/23 0740     Glucose 81 mg/dL      BUN 23 mg/dL      Creatinine 1.79 mg/dL      Sodium 139 mmol/L      Potassium 3.7 mmol/L      Chloride 104 mmol/L      CO2 26.7 mmol/L      Calcium 8.7 mg/dL      Albumin 2.8 g/dL      Phosphorus 3.5 mg/dL      Anion Gap 8.3 mmol/L      BUN/Creatinine Ratio 12.8     eGFR 28.4  mL/min/1.73     Narrative:      GFR Normal >60  Chronic Kidney Disease <60  Kidney Failure <15    The GFR formula is only valid for adults with stable renal function between ages 18 and 70.    Magnesium [[id number]]  (Normal) Collected: 06/13/23 0613    Specimen: Blood Updated: 06/13/23 0740     Magnesium 2.0 mg/dL     CBC & Differential [[id number]]  (Abnormal) Collected: 06/13/23 0613    Specimen: Blood Updated: 06/13/23 0740    Narrative:      The following orders were created for panel order CBC & Differential.  Procedure                               Abnormality         Status                     ---------                               -----------         ------                     CBC Auto Differential[[id number]]        Abnormal            Final result                 Please view results for these tests on the individual orders.    CBC Auto Differential [[id number]]  (Abnormal) Collected: 06/13/23 0613    Specimen: Blood Updated: 06/13/23 0740     WBC 5.98 10*3/mm3      RBC 3.17 10*6/mm3      Hemoglobin 9.9 g/dL      Hematocrit 30.6 %      MCV 96.5 fL      MCH 31.2 pg      MCHC 32.4 g/dL      RDW 12.8 %      RDW-SD 44.9 fl      MPV 11.6 fL      Platelets 114 10*3/mm3      Neutrophil % 73.8 %      Lymphocyte % 14.5 %      Monocyte % 6.9 %      Eosinophil % 4.0 %      Basophil % 0.5 %      Neutrophils, Absolute 4.41 10*3/mm3      Lymphocytes, Absolute 0.87 10*3/mm3      Monocytes, Absolute 0.41 10*3/mm3      Eosinophils, Absolute 0.24 10*3/mm3      Basophils, Absolute 0.03 10*3/mm3     Renal Function Panel [[id number]]  (Abnormal) Collected: 06/12/23 0531    Specimen: Blood Updated: 06/12/23 0644     Glucose 82 mg/dL      BUN 26 mg/dL      Creatinine 2.16 mg/dL      Sodium 141 mmol/L      Potassium 3.6 mmol/L      Chloride 104 mmol/L      CO2 29.0 mmol/L      Calcium 8.2 mg/dL      Albumin 2.9 g/dL      Phosphorus 3.6 mg/dL      Anion Gap 8.0 mmol/L      BUN/Creatinine Ratio 12.0     eGFR 22.6 mL/min/1.73      Narrative:      GFR Normal >60  Chronic Kidney Disease <60  Kidney Failure <15    The GFR formula is only valid for adults with stable renal function between ages 18 and 70.    Magnesium [[id number]]  (Normal) Collected: 06/12/23 0531    Specimen: Blood Updated: 06/12/23 0644     Magnesium 1.9 mg/dL     CBC & Differential [[id number]]  (Abnormal) Collected: 06/12/23 0531    Specimen: Blood Updated: 06/12/23 0637    Narrative:      The following orders were created for panel order CBC & Differential.  Procedure                               Abnormality         Status                     ---------                               -----------         ------                     CBC Auto Differential[[id number]]        Abnormal            Final result                 Please view results for these tests on the individual orders.    CBC Auto Differential [[id number]]  (Abnormal) Collected: 06/12/23 0531    Specimen: Blood Updated: 06/12/23 0637     WBC 8.54 10*3/mm3      RBC 3.24 10*6/mm3      Hemoglobin 10.3 g/dL      Hematocrit 31.4 %      MCV 96.9 fL      MCH 31.8 pg      MCHC 32.8 g/dL      RDW 12.8 %      RDW-SD 45.9 fl      MPV 11.5 fL      Platelets 135 10*3/mm3      Neutrophil % 76.3 %      Lymphocyte % 12.9 %      Monocyte % 6.2 %      Eosinophil % 3.6 %      Basophil % 0.5 %      Neutrophils, Absolute 6.52 10*3/mm3      Lymphocytes, Absolute 1.10 10*3/mm3      Monocytes, Absolute 0.53 10*3/mm3      Eosinophils, Absolute 0.31 10*3/mm3      Basophils, Absolute 0.04 10*3/mm3     Eosinophil Smear - Urine, Urine, Clean Catch [[id number]]  (Normal) Collected: 06/10/23 0500    Specimen: Urine, Clean Catch Updated: 06/11/23 1032     Eosinophil Smear 0 % EOS/100 Cells     Renal Function Panel [[id number]]  (Abnormal) Collected: 06/11/23 0552    Specimen: Blood Updated: 06/11/23 0702     Glucose 88 mg/dL      BUN 25 mg/dL      Creatinine 2.17 mg/dL      Sodium 141 mmol/L      Potassium 3.3 mmol/L      Chloride 104 mmol/L      CO2  27.1 mmol/L      Calcium 8.5 mg/dL      Albumin 2.5 g/dL      Phosphorus 3.9 mg/dL      Anion Gap 9.9 mmol/L      BUN/Creatinine Ratio 11.5     eGFR 22.5 mL/min/1.73     Narrative:      GFR Normal >60  Chronic Kidney Disease <60  Kidney Failure <15    The GFR formula is only valid for adults with stable renal function between ages 18 and 70.    Uric Acid [[id number]]  (Abnormal) Collected: 06/11/23 0552    Specimen: Blood Updated: 06/11/23 0650     Uric Acid 8.3 mg/dL     Magnesium [[id number]]  (Normal) Collected: 06/11/23 0552    Specimen: Blood Updated: 06/11/23 0650     Magnesium 1.9 mg/dL     CBC & Differential [[id number]]  (Abnormal) Collected: 06/11/23 0552    Specimen: Blood Updated: 06/11/23 0628    Narrative:      The following orders were created for panel order CBC & Differential.  Procedure                               Abnormality         Status                     ---------                               -----------         ------                     CBC Auto Differential[[id number]]        Abnormal            Final result                 Please view results for these tests on the individual orders.    CBC Auto Differential [[id number]]  (Abnormal) Collected: 06/11/23 0552    Specimen: Blood Updated: 06/11/23 0628     WBC 10.66 10*3/mm3      RBC 3.29 10*6/mm3      Hemoglobin 10.3 g/dL      Hematocrit 31.7 %      MCV 96.4 fL      MCH 31.3 pg      MCHC 32.5 g/dL      RDW 12.8 %      RDW-SD 46.7 fl      MPV 11.3 fL      Platelets 155 10*3/mm3      Neutrophil % 81.2 %      Lymphocyte % 9.8 %      Monocyte % 5.8 %      Eosinophil % 2.4 %      Basophil % 0.4 %      Immature Grans % 0.4 %      Neutrophils, Absolute 8.65 10*3/mm3      Lymphocytes, Absolute 1.05 10*3/mm3      Monocytes, Absolute 0.62 10*3/mm3      Eosinophils, Absolute 0.26 10*3/mm3      Basophils, Absolute 0.04 10*3/mm3      Immature Grans, Absolute 0.04 10*3/mm3      nRBC 0.0 /100 WBC     Urinalysis, Microscopic Only - Urine, Clean Catch  [[id number]]  (Abnormal) Collected: 06/10/23 1500    Specimen: Urine, Clean Catch Updated: 06/10/23 1533     RBC, UA 0-2 /HPF      WBC, UA 3-5 /HPF      Bacteria, UA None Seen /HPF      Squamous Epithelial Cells, UA 0-2 /HPF      Yeast, UA Small/1+ Budding Yeast /HPF      Hyaline Casts, UA None Seen /LPF      Methodology Manual Light Microscopy    Urinalysis With Microscopic If Indicated (No Culture) - Urine, Clean Catch [[id number]]  (Abnormal) Collected: 06/10/23 1500    Specimen: Urine, Clean Catch Updated: 06/10/23 1511     Color, UA Yellow     Appearance, UA Clear     pH, UA <=5.0     Specific Gravity, UA 1.022     Glucose, UA Negative     Ketones, UA Trace     Bilirubin, UA Negative     Blood, UA Trace     Protein, UA Trace     Leuk Esterase, UA Small (1+)     Nitrite, UA Negative     Urobilinogen, UA 0.2 E.U./dL    Sodium, Urine, Random - Urine, Clean Catch [[id number]] Collected: 06/09/23 1312    Specimen: Urine, Clean Catch Updated: 06/10/23 1042     Sodium, Urine 68 mmol/L     Narrative:      Reference intervals for random urine have not been established.  Clinical usage is dependent upon physician's interpretation in combination with other laboratory tests.       Protein, Urine, Random - Urine, Clean Catch [[id number]] Collected: 06/09/23 1312    Specimen: Urine, Clean Catch Updated: 06/10/23 1042     Total Protein, Urine 14.9 mg/dL     Narrative:      Reference intervals for random urine have not been established.  Clinical usage is dependent upon physician's interpretation in combination with other laboratory tests.       Creatinine Urine Random (kidney function) GFR component - Urine, Clean Catch [[id number]] Collected: 06/09/23 1312    Specimen: Urine, Clean Catch Updated: 06/10/23 1042     Creatinine, Urine 105.1 mg/dL     Narrative:      Reference intervals for random urine have not been established.  Clinical usage is dependent upon physician's interpretation in combination with other laboratory tests.        Chloride, Urine, Random - Urine, Clean Catch [[id number]] Collected: 06/09/23 1312    Specimen: Urine, Clean Catch Updated: 06/10/23 1042     Chloride, Urine 72 mmol/L     Narrative:      Reference intervals for random urine have not been established.  Clinical usage is dependent upon physician's interpretation in combination with other laboratory tests.       CK [[id number]]  (Abnormal) Collected: 06/10/23 0640    Specimen: Blood Updated: 06/10/23 1000     Creatine Kinase 19 U/L     Comprehensive Metabolic Panel [[id number]]  (Abnormal) Collected: 06/10/23 0640    Specimen: Blood Updated: 06/10/23 0823     Glucose 88 mg/dL      BUN 27 mg/dL      Creatinine 2.39 mg/dL      Sodium 139 mmol/L      Potassium 4.0 mmol/L      Chloride 100 mmol/L      CO2 27.5 mmol/L      Calcium 8.5 mg/dL      Total Protein 4.9 g/dL      Albumin 2.7 g/dL      ALT (SGPT) 8 U/L      AST (SGOT) 7 U/L      Alkaline Phosphatase 80 U/L      Total Bilirubin 0.4 mg/dL      Globulin 2.2 gm/dL      A/G Ratio 1.2 g/dL      BUN/Creatinine Ratio 11.3     Anion Gap 11.5 mmol/L      eGFR 20.1 mL/min/1.73     Narrative:      GFR Normal >60  Chronic Kidney Disease <60  Kidney Failure <15    The GFR formula is only valid for adults with stable renal function between ages 18 and 70.    Lactic Acid, Plasma [[id number]]  (Normal) Collected: 06/10/23 0640    Specimen: Blood Updated: 06/10/23 0740     Lactate 1.4 mmol/L     CBC Auto Differential [[id number]]  (Abnormal) Collected: 06/10/23 0640    Specimen: Blood Updated: 06/10/23 0727     WBC 11.63 10*3/mm3      RBC 3.65 10*6/mm3      Hemoglobin 11.4 g/dL      Hematocrit 35.1 %      MCV 96.2 fL      MCH 31.2 pg      MCHC 32.5 g/dL      RDW 12.7 %      RDW-SD 44.4 fl      MPV 11.4 fL      Platelets 168 10*3/mm3      Neutrophil % 80.0 %      Lymphocyte % 11.3 %      Monocyte % 5.6 %      Eosinophil % 2.4 %      Basophil % 0.3 %      Immature Grans % 0.4 %      Neutrophils, Absolute 9.29 10*3/mm3       "Lymphocytes, Absolute 1.32 10*3/mm3      Monocytes, Absolute 0.65 10*3/mm3      Eosinophils, Absolute 0.28 10*3/mm3      Basophils, Absolute 0.04 10*3/mm3      Immature Grans, Absolute 0.05 10*3/mm3      nRBC 0.0 /100 WBC     Urinalysis, Microscopic Only - Urine, Clean Catch [[id number]]  (Abnormal) Collected: 06/09/23 1312    Specimen: Urine, Clean Catch Updated: 06/09/23 1336     RBC, UA 0-2 /HPF      WBC, UA 3-5 /HPF      Bacteria, UA None Seen /HPF      Squamous Epithelial Cells, UA 0-2 /HPF      Hyaline Casts, UA None Seen /LPF      Methodology Manual Light Microscopy    Urinalysis With Microscopic If Indicated (No Culture) - Urine, Clean Catch [[id number]]  (Abnormal) Collected: 06/09/23 1312    Specimen: Urine, Clean Catch Updated: 06/09/23 1325     Color, UA Yellow     Appearance, UA Clear     pH, UA <=5.0     Specific Gravity, UA 1.017     Glucose, UA Negative     Ketones, UA Negative     Bilirubin, UA Negative     Blood, UA Trace     Protein, UA Trace     Leuk Esterase, UA Moderate (2+)     Nitrite, UA Negative     Urobilinogen, UA 0.2 E.U./dL    Procalcitonin [[id number]]  (Normal) Collected: 06/09/23 1055    Specimen: Blood Updated: 06/09/23 1132     Procalcitonin 0.11 ng/mL     Narrative:      As a Marker for Sepsis (Non-Neonates):    1. <0.5 ng/mL represents a low risk of severe sepsis and/or septic shock.  2. >2 ng/mL represents a high risk of severe sepsis and/or septic shock.    As a Marker for Lower Respiratory Tract Infections that require antibiotic therapy:    PCT on Admission    Antibiotic Therapy       6-12 Hrs later    >0.5                Strongly Recommended  >0.25 - <0.5        Recommended   0.1 - 0.25          Discouraged              Remeasure/reassess PCT  <0.1                Strongly Discouraged     Remeasure/reassess PCT    As 28 day mortality risk marker: \"Change in Procalcitonin Result\" (>80% or <=80%) if Day 0 (or Day 1) and Day 4 values are available. Refer to " http://www.[facility]-pct-calculator.com    Change in PCT <=80%  A decrease of PCT levels below or equal to 80% defines a positive change in PCT test result representing a higher risk for 28-day all-cause mortality of patients diagnosed with severe sepsis for septic shock.    Change in PCT >80%  A decrease of PCT levels of more than 80% defines a negative change in PCT result representing a lower risk for 28-day all-cause mortality of patients diagnosed with severe sepsis or septic shock.       Comprehensive Metabolic Panel [[id number]]  (Abnormal) Collected: 06/09/23 1055    Specimen: Blood Updated: 06/09/23 1129     Glucose 103 mg/dL      BUN 25 mg/dL      Creatinine 2.40 mg/dL      Sodium 139 mmol/L      Potassium 4.8 mmol/L      Comment: Specimen hemolyzed.  Results may be affected.        Chloride 100 mmol/L      CO2 28.5 mmol/L      Calcium 8.9 mg/dL      Total Protein 5.8 g/dL      Albumin 2.7 g/dL      ALT (SGPT) 10 U/L      Comment: Specimen hemolyzed.  Results may be affected.        AST (SGOT) 17 U/L      Comment: Specimen hemolyzed.  Results may be affected.        Alkaline Phosphatase 86 U/L      Total Bilirubin 0.4 mg/dL      Globulin 3.1 gm/dL      A/G Ratio 0.9 g/dL      BUN/Creatinine Ratio 10.4     Anion Gap 10.5 mmol/L      eGFR 20.0 mL/min/1.73     Narrative:      GFR Normal >60  Chronic Kidney Disease <60  Kidney Failure <15    The GFR formula is only valid for adults with stable renal function between ages 18 and 70.    Lactic Acid, Plasma [[id number]]  (Normal) Collected: 06/09/23 1055    Specimen: Blood Updated: 06/09/23 1124     Lactate 1.3 mmol/L     Protime-INR [[id number]]  (Abnormal) Collected: 06/09/23 1055    Specimen: Blood Updated: 06/09/23 1122     Protime 17.0 Seconds      INR 1.36    aPTT [[id number]]  (Normal) Collected: 06/09/23 1055    Specimen: Blood Updated: 06/09/23 1122     PTT 31.7 seconds     CBC & Differential [[id number]]  (Abnormal) Collected: 06/09/23 1055    Specimen: Blood  "Updated: 06/09/23 1107    Narrative:      The following orders were created for panel order CBC & Differential.  Procedure                               Abnormality         Status                     ---------                               -----------         ------                     CBC Auto Differential[[id number]]        Abnormal            Final result                 Please view results for these tests on the individual orders.    CBC Auto Differential [[id number]]  (Abnormal) Collected: 06/09/23 1055    Specimen: Blood Updated: 06/09/23 1107     WBC 13.09 10*3/mm3      RBC 4.17 10*6/mm3      Hemoglobin 13.1 g/dL      Hematocrit 39.8 %      MCV 95.4 fL      MCH 31.4 pg      MCHC 32.9 g/dL      RDW 12.9 %      RDW-SD 45.0 fl      MPV 11.4 fL      Platelets 189 10*3/mm3      Neutrophil % 82.2 %      Lymphocyte % 8.9 %      Monocyte % 5.5 %      Eosinophil % 2.5 %      Basophil % 0.4 %      Immature Grans % 0.5 %      Neutrophils, Absolute 10.77 10*3/mm3      Lymphocytes, Absolute 1.16 10*3/mm3      Monocytes, Absolute 0.72 10*3/mm3      Eosinophils, Absolute 0.33 10*3/mm3      Basophils, Absolute 0.05 10*3/mm3      Immature Grans, Absolute 0.06 10*3/mm3      nRBC 0.0 /100 WBC           /57 (BP Location: Left arm, Patient Position: Lying)   Pulse 56   Temp 97.8 °F (36.6 °C) (Oral)   Resp 16   Ht 165.1 cm (65\")   Wt 101 kg (221 lb 12.5 oz)   SpO2 98%   BMI 36.91 kg/m²     Discharge Exam:  General Appearance:    Alert, cooperative, no distress                          Head:    Normocephalic, without obvious abnormality, atraumatic                          Eyes:                            Throat:   Lips, tongue, gums normal                          Neck:   Supple, symmetrical, trachea midline, no JVD                        Lungs:     Clear to auscultation bilaterally, respirations unlabored                Chest Wall:    No tenderness or deformity                        Heart:    Regular rate and rhythm, " S1 and S2 normal, no murmur,no  Rub  or gallop                  Abdomen:     Soft, non-tender, bowel sounds active, no masses, no organomegaly                  Extremities:   Extremities normal, atraumatic, no cyanosis or edema                             Skin:   Skin is warm and dry,  no rashes or palpable lesions                  Neurologic:   no focal deficits noted     Disposition:  Skilled nursing facility    Activity as tolerated    Diet as tolerated  Diet Order   Procedures    Diet: Cardiac Diets; Healthy Heart (2-3 Na+); Texture: Regular Texture (IDDSI 7); Fluid Consistency: Thin (IDDSI 0)       Patient Instructions:      Discharge Medications        New Medications        Instructions Start Date   acetaminophen 325 MG tablet  Commonly known as: TYLENOL   650 mg, Oral, Every 4 Hours PRN             Changes to Medications        Instructions Start Date   apixaban 2.5 MG tablet tablet  Commonly known as: ELIQUIS  What changed:   medication strength  how much to take   2.5 mg, Oral, Every 12 Hours Scheduled             Continue These Medications        Instructions Start Date   albuterol sulfate  (90 Base) MCG/ACT inhaler  Commonly known as: PROVENTIL HFA;VENTOLIN HFA;PROAIR HFA   2 puffs, Inhalation, Every 4 Hours PRN      Align 4 MG capsule   1 capsule, Oral, Daily      aspirin 81 MG chewable tablet   81 mg, Oral, Daily      bisacodyl 10 MG suppository  Commonly known as: DULCOLAX   10 mg, Rectal, As Needed      diphenhydrAMINE 25 mg capsule  Commonly known as: BENADRYL   25 mg, Oral, Every 6 Hours PRN      FLUoxetine 60 MG tablet  Commonly known as: PROzac   60 mg, Oral, Daily      HYDROcodone-acetaminophen 7.5-325 MG per tablet  Commonly known as: NORCO   1 tablet, Oral, Every 6 Hours PRN      levothyroxine 50 MCG tablet  Commonly known as: SYNTHROID, LEVOTHROID   50 mcg, Oral, Daily      LORazepam 0.5 MG tablet  Commonly known as: ATIVAN   0.5 mg, Oral, 2 Times Daily PRN      magnesium hydroxide 400  MG/5ML suspension  Commonly known as: MILK OF MAGNESIA   30 mL, Oral, Daily PRN      Mirabegron ER 50 MG tablet sustained-release 24 hour 24 hr tablet  Commonly known as: MYRBETRIQ   50 mg, Oral, Daily      nystatin 462348 UNIT/GM powder  Commonly known as: MYCOSTATIN   1 application, Topical, 3 Times Daily PRN      pantoprazole 40 MG EC tablet  Commonly known as: PROTONIX   40 mg, Oral, Daily      QUEtiapine 100 MG tablet  Commonly known as: SEROquel   100 mg, Oral, Nightly      sennosides-docusate 8.6-50 MG per tablet  Commonly known as: PERICOLACE   2 tablets, Oral, 2 Times Daily      umeclidinium-vilanterol 62.5-25 MCG/INH aerosol powder  inhaler  Commonly known as: Anoro Ellipta   1 puff, Inhalation, Daily - RT             Stop These Medications      busPIRone 30 MG tablet  Commonly known as: BUSPAR     fentaNYL 25 MCG/HR patch  Commonly known as: DURAGESIC     fleet enema 7-19 GM/118ML enema     potassium chloride 10 MEQ CR capsule  Commonly known as: MICRO-K     torsemide 100 MG tablet  Commonly known as: DEMADEX            No future appointments.   Contact information for follow-up providers       Provider, No Known Follow up.    Why: After released from rehab follow-up with primary care provider  Contact information:  [facility] [id number] [phone]                       Contact information for after-discharge care       Destination       [facility] .    Service: Skilled Nursing  Contact information:  [address] [facility] [address] [phone]                                 Discharge Order (From admission, onward)       Start     Ordered    06/14/23 1358  Discharge patient  Once        Expected Discharge Date: 06/14/23    Discharge Disposition: Skilled Nursing Facility (DC - External)    Physician of Record for Attribution - Please select from Treatment Team: CONRADO DICKENS [[id number]]    Review needed by CMO to determine Physician of Record: No        Question Answer Comment   Physician of Record for Attribution - Please select from Treatment Team LUIS ENRIQUE HALL    Review needed by CMO to determine Physician of Record No        06/14/23 2216                    Total time spent discharging patient including evaluation,post hospitalization follow up,  medication and post hospitalization instructions and education total time exceeds 30 minutes.    Signed:  Luis Enrique Hall MD  6/14/2023  14:19 EDT    Electronically signed by Luis Enrique Hall MD at 06/14/23 7105

## 2023-06-14 NOTE — NURSING NOTE
WOCN: Scabs noted right foot, 3rd 4th 5th toe. Maybe due to shoes.  Please call if any other issues.

## 2023-06-14 NOTE — PROGRESS NOTES
"   LOS: 4 days     Chief Complaint/ Reason for encounter: TOI    Subjective   06/12/23 : Patient is doing well today with no new complaints  Good appetite with no nausea or vomiting  No shortness of breath chest pain or edema  Voiding well with no dysuria    6/14: No new complaints she looks and feels well, rash slowly improved no shortness of breath, decreased edema, voiding well    Medical history reviewed:  History of Present Illness    Subjective    History taken from: Patient and chart    Vital Signs  Temp:  [97.5 °F (36.4 °C)-98.1 °F (36.7 °C)] 97.6 °F (36.4 °C)  Heart Rate:  [54-96] 54  Resp:  [16-18] 16  BP: ()/(51-77) 113/60       Wt Readings from Last 1 Encounters:   06/10/23 0124 101 kg (221 lb 12.5 oz)   06/09/23 1535 101 kg (221 lb 12.5 oz)   06/09/23 0916 127 kg (281 lb)       Objective:  Vital signs: (most recent): Blood pressure 113/60, pulse 54, temperature 97.6 °F (36.4 °C), temperature source Oral, resp. rate 16, height 165.1 cm (65\"), weight 101 kg (221 lb 12.5 oz), SpO2 99 %, not currently breastfeeding.              Objective:  General Appearance:  Comfortable, obese, well-appearing, in no acute distress and not in pain.  Awake, alert, oriented  HEENT: Mucous membranes moist, no injury, oropharynx clear  Lungs:  Normal effort and normal respiratory rate.  Breath sounds clear to auscultation.  No  respiratory distress.  No rales, decreased breath sounds or rhonchi.    Heart: Normal rate.  Regular rhythm.  S1, S2 normal.  No murmur.   Abdomen: Abdomen is soft.  Bowel sounds are normal, no abdominal tenderness.  There is no rebound or guarding  Extremities: Trace edema of bilateral lower extremities  Neurological: No focal motor or sensory deficits, pupils reactive  Skin:  Warm and dry.  Improved erythematous rash upper trunk and legs      Results Review:    Intake/Output:     Intake/Output Summary (Last 24 hours) at 6/14/2023 1246  Last data filed at 6/14/2023 0800  Gross per 24 hour "   Intake --   Output 450 ml   Net -450 ml           DATA:  Radiology and Labs:  The following labs independently reviewed by me. Additional labs ordered for tomorrow a.m.  Interval notes, chart personally reviewed by me.   Old records independently reviewed showing baseline creatinine 0.6  Discussed with patient herself at bedside    Risk/ complexity of medical care/ medical decision making moderate complexity, TOI    Labs:   Recent Results (from the past 24 hour(s))   Duplex Venous Upper Extremity - Right CAR    Collection Time: 06/13/23  3:06 PM   Result Value Ref Range    Right Internal Jugular Spont Y     Right Internal Jugular Phasic Y     Right Internal Jugular Compress C     Right Internal Jugular Augment Y     Right Subclavian Spont Y     Right Subclavian Phasic Y     Right Subclavian Compress C     Right Subclavian Augment Y     Right Axillary Spont Y     Right Axillary Phasic Y     Right Axillary Compress C     Right Axillary Augment Y     Right Brachial Compress C     Right Radial Compress C     Right Ulnar Compress C     Right Basilic Upper Compress C     Right Basilic Forearm Compress C     Right Cephalic Upper Compress C     Right Cephalic Forearm Compress C     Left Internal Jugular Spont Y     Left Internal Jugular Phasic Y     Left Internal Jugular Compress C     Left Internal Jugular Augment Y     Left Subclavian Spont Y     Left Subclavian Phasic Y     Left Subclavian Compress C     Left Subclavian Augment Y    Renal Function Panel    Collection Time: 06/14/23  5:33 AM    Specimen: Blood   Result Value Ref Range    Glucose 87 65 - 99 mg/dL    BUN 22 8 - 23 mg/dL    Creatinine 1.81 (H) 0.57 - 1.00 mg/dL    Sodium 141 136 - 145 mmol/L    Potassium 4.0 3.5 - 5.2 mmol/L    Chloride 107 98 - 107 mmol/L    CO2 26.0 22.0 - 29.0 mmol/L    Calcium 8.1 (L) 8.6 - 10.5 mg/dL    Albumin 2.9 (L) 3.5 - 5.2 g/dL    Phosphorus 3.7 2.5 - 4.5 mg/dL    Anion Gap 8.0 5.0 - 15.0 mmol/L    BUN/Creatinine Ratio 12.2 7.0  - 25.0    eGFR 28.0 (L) >60.0 mL/min/1.73   Magnesium    Collection Time: 06/14/23  5:33 AM    Specimen: Blood   Result Value Ref Range    Magnesium 2.1 1.6 - 2.4 mg/dL   CBC Auto Differential    Collection Time: 06/14/23  5:33 AM    Specimen: Blood   Result Value Ref Range    WBC 6.23 3.40 - 10.80 10*3/mm3    RBC 3.23 (L) 3.77 - 5.28 10*6/mm3    Hemoglobin 10.2 (L) 12.0 - 15.9 g/dL    Hematocrit 31.1 (L) 34.0 - 46.6 %    MCV 96.3 79.0 - 97.0 fL    MCH 31.6 26.6 - 33.0 pg    MCHC 32.8 31.5 - 35.7 g/dL    RDW 12.7 12.3 - 15.4 %    RDW-SD 44.9 37.0 - 54.0 fl    MPV 11.8 6.0 - 12.0 fL    Platelets 107 (L) 140 - 450 10*3/mm3    Neutrophil % 68.2 42.7 - 76.0 %    Lymphocyte % 17.3 (L) 19.6 - 45.3 %    Monocyte % 9.1 5.0 - 12.0 %    Eosinophil % 4.2 0.3 - 6.2 %    Basophil % 0.6 0.0 - 1.5 %    Neutrophils, Absolute 4.24 1.70 - 7.00 10*3/mm3    Lymphocytes, Absolute 1.08 0.70 - 3.10 10*3/mm3    Monocytes, Absolute 0.57 0.10 - 0.90 10*3/mm3    Eosinophils, Absolute 0.26 0.00 - 0.40 10*3/mm3    Basophils, Absolute 0.04 0.00 - 0.20 10*3/mm3       Radiology:  Pertinent radiology studies were reviewed as described above      Medications have been reviewed separately in chart overview      ASSESSMENT:  TOI (acute kidney injury), vancomycin toxicity versus interstitial nephritis, improving with discontinuation of antibiotics    Benign essential hypertension    Bilateral lower extremity edema (chronic)    Diastolic dysfunction    Obstructive sleep apnea    Hypothyroidism    Chronic pain syndrome    Anemia  Diffuse rash, possible drug rash    CKD (chronic kidney disease) stage 3, GFR 30-59 ml/min    Bacteremia - aerococcus    PAF (paroxysmal atrial fibrillation)           DISCUSSION/PLAN:   Renal function continues to slowly improve, expect further improvement with discontinuation of vancomycin  No need for steroids at this time  Antibiotic recommendations per ID, currently off antibiotics    UA with minimal active sediment and  negative urine eosinophils, no eosinophilia  Agree with discontinuation of vancomycin  Continue to hold Demadex until she follows up with us in the office  Renal ultrasound with no obstruction  Probably best to stop the oral potassium  Stable for discharge home from renal standpoint to rehab          Antione Jenkins MD   [facility]   Office phone number: [phone]  Answering service phone number: [phone]    06/14/23  12:46 EDT    Dictation performed using Dragon dictation software

## 2023-06-14 NOTE — CASE MANAGEMENT/SOCIAL WORK
Continued Stay Note  [facility]     Patient Name: Rose Cheema  MRN: [id number]  Today's Date: 6/14/2023    Admit Date: 6/9/2023    Plan: Return to [facility] SNF   Discharge Plan       Row Name 06/14/23 1445       Plan    Plan Return to [facility] SNF    Plan Comments Spoke with Dr. Jenkins, pt is cleared from renal standpoint to d/c today, updated Dr. Hall, d/c summary faxed to facility, scheduled [facility] for 1900, pkt to RN. Spoke to pt son Mario, he is agreeable to d/c. Based on interdisciplinary assessments, the recommended discharge plan is SNF. -Rose AVILA    Final Discharge Disposition Code 03 - skilled nursing facility (SNF)                   Discharge Codes    No documentation.                 Expected Discharge Date and Time       Expected Discharge Date Expected Discharge Time    Jun 14, 2023               Rose Padilla RN

## 2023-06-14 NOTE — DISCHARGE SUMMARY
PHYSICIAN DISCHARGE SUMMARY                                                                        [facility]    Patient Identification:  Name: Rose Cheema  Age: 82 y.o.  Sex: female  :  1940  MRN: [id number]  Primary Care Physician: Provider, No Known    Admit date: 2023  Discharge date and time:2023  Discharged Condition: good    Discharge Diagnoses:  Active Hospital Problems    Diagnosis  POA    **TOI (acute kidney injury) [N17.9]  Yes    Moderate Malnutrition (HCC) [E44.0]  Yes    PAF (paroxysmal atrial fibrillation) [I48.0]  Yes    Bacteremia - aerococcus [R78.81]  Yes    CKD (chronic kidney disease) stage 3, GFR 30-59 ml/min [N18.30]  Yes    Anemia [D64.9]  Yes    Chronic pain syndrome [G89.4]  Yes    Hypothyroidism [E03.9]  Yes    Bilateral lower extremity edema (chronic) [R60.0]  Yes    Diastolic dysfunction [I51.89]  Yes    Benign essential hypertension [I10]  Yes    Obstructive sleep apnea [G47.33]  Yes      Resolved Hospital Problems   No resolved problems to display.   TIO due to vancomycin toxicity      PMHX:   Past Medical History:   Diagnosis Date    Anemia     Anxiety     Arthritis     CHF (congestive heart failure)     Coronary artery disease     Depression     Disease of thyroid gland     Fibromyalgia     GERD (gastroesophageal reflux disease)     Hypertension     Moderate tricuspid valve stenosis     Osteoporosis     Peripheral neuropathy     Pulmonary hypertension     SBO (small bowel obstruction)     Stenosis of mitral valve      PSHX:   Past Surgical History:   Procedure Laterality Date    BILATERAL OOPHORECTOMY      CARDIAC CATHETERIZATION      CHOLECYSTECTOMY      ERCP N/A 2/15/2019    Procedure: ENDOSCOPIC RETROGRADE CHOLANGIOPANCREATOGRAPHY WITH PARTIAL CHOLANGIOGRAM;  Surgeon: Gerald Herr MD;  Location: [facility] ENDOSCOPY;  Service: Gastroenterology    EXPLORATORY LAPAROTOMY       GASTRIC BYPASS      HERNIA REPAIR      inguinal and ventral    HYSTERECTOMY      OVARIAN CYST REMOVAL         Hospital Course: Rose Cheema  is a 82-year-old female who was recently hospitalized from 5/26/2023 through 6/2/2023 when she initially presented with shortness of breath and nausea. Patient was found to have ESBL positive E. coli urinary tract infection and also blood culture positive for Aerococcus viridans. Patient completed the treatment for urinary tract infection with ertapenem while in the hospital and was discharged on IV vancomycin for 2 weeks from 5/30/2023. Patient is a poor historian and is not clear when did the rash started since her discharge to the rehabilitation facility. Work-up in the emergency room revealed maculopapular rash involving her torso extensor surface of the arm and legs. Her creatinine was noted to be 2.4 from the baseline of 0.7 at the time of discharge. Patient currently feels otherwise okay and denies any cough shortness of breath fever or chills. She has preserved appetite.  The patient was admitted to the hospital and seen by nephrology and her renal function was improving after being in the hospital for a few days.  She looked well enough to go back to her skilled nursing facility.  Nephrology recommended she stay off her diuretics for a while and have follow-up labs to recheck her renal function.  Creatinine was improving but still not at baseline.      Consults:     Consults       Date and Time Order Name Status Description    6/9/2023  7:10 PM Inpatient Nephrology Consult Completed     6/9/2023 12:41 PM [facility] (on-call MD unless specified) Details      5/28/2023  6:57 PM Inpatient Infectious Diseases Consult Completed     5/27/2023  2:52 PM Inpatient Cardiology Consult Completed     5/27/2023 12:43 PM Inpatient Psychiatrist Consult Completed           Results from last 7 days   Lab Units 06/14/23  0533   WBC 10*3/mm3 6.23   HEMOGLOBIN g/dL 10.2*   HEMATOCRIT % 31.1*    PLATELETS 10*3/mm3 107*     Results from last 7 days   Lab Units 06/14/23  0533   SODIUM mmol/L 141   POTASSIUM mmol/L 4.0   CHLORIDE mmol/L 107   CO2 mmol/L 26.0   BUN mg/dL 22   CREATININE mg/dL 1.81*   GLUCOSE mg/dL 87   CALCIUM mg/dL 8.1*     Significant Diagnostic Studies:   WBC   Date Value Ref Range Status   06/14/2023 6.23 3.40 - 10.80 10*3/mm3 Final     Hemoglobin   Date Value Ref Range Status   06/14/2023 10.2 (L) 12.0 - 15.9 g/dL Final     Hematocrit   Date Value Ref Range Status   06/14/2023 31.1 (L) 34.0 - 46.6 % Final     Platelets   Date Value Ref Range Status   06/14/2023 107 (L) 140 - 450 10*3/mm3 Final     Sodium   Date Value Ref Range Status   06/14/2023 141 136 - 145 mmol/L Final     Potassium   Date Value Ref Range Status   06/14/2023 4.0 3.5 - 5.2 mmol/L Final     Chloride   Date Value Ref Range Status   06/14/2023 107 98 - 107 mmol/L Final     CO2   Date Value Ref Range Status   06/14/2023 26.0 22.0 - 29.0 mmol/L Final     BUN   Date Value Ref Range Status   06/14/2023 22 8 - 23 mg/dL Final     Creatinine   Date Value Ref Range Status   06/14/2023 1.81 (H) 0.57 - 1.00 mg/dL Final     Glucose   Date Value Ref Range Status   06/14/2023 87 65 - 99 mg/dL Final     Calcium   Date Value Ref Range Status   06/14/2023 8.1 (L) 8.6 - 10.5 mg/dL Final     Magnesium   Date Value Ref Range Status   06/14/2023 2.1 1.6 - 2.4 mg/dL Final     Phosphorus   Date Value Ref Range Status   06/14/2023 3.7 2.5 - 4.5 mg/dL Final     No results found for: AST, ALT, ALKPHOS  No results found for: APTT, INR  No results found for: COLORU, CLARITYU, SPECGRAV, PHUR, PROTEINUR, GLUCOSEU, KETONESU, BLOODU, NITRITE, LEUKOCYTESUR, BILIRUBINUR, UROBILINOGEN, RBCUA, WBCUA, BACTERIA, UACOMMENT  No results found for: TROPONINT, TROPONINI, BNP  No components found for: HGBA1C;2  No components found for: TSH;2  Imaging Results (All)       Procedure Component Value Units Date/Time    US Renal Bilateral [[id number]] Collected:  06/09/23 2058     Updated: 06/09/23 2104    Narrative:      US RENAL BILATERAL-clinical: Acute renal insufficiency     FINDINGS: The right kidney is 11.6 cm in length and left kidney is 10.5  cm in length. The bladder was collapsed, cannot evaluate for either the  right or left ureteral jet.     No hydronephrosis. There is a 13 mm right upper pole renal calculus,  this was seen on previous 05/27/2023 CT. Renal cortical echotexture is  normal to slightly greater than anticipated, possible medical renal  disease.     CONCLUSION: Right upper pole renal calculus, no obstructive uropathy.  Bladder collapsed, cannot evaluate for either ureteral jet.     This report was finalized on 6/9/2023 9:00 PM by Dr. Dennis Burns M.D.             Lab Results (last 7 days)       Procedure Component Value Units Date/Time    Blood Culture - Blood, Arm, Left [[id number]]  (Normal) Collected: 06/09/23 1109    Specimen: Blood from Arm, Left Updated: 06/14/23 1116     Blood Culture No growth at 5 days    Blood Culture - Blood, Hand, Right [[id number]]  (Normal) Collected: 06/09/23 1055    Specimen: Blood from Hand, Right Updated: 06/14/23 1116     Blood Culture No growth at 5 days    Renal Function Panel [[id number]]  (Abnormal) Collected: 06/14/23 0533    Specimen: Blood Updated: 06/14/23 0643     Glucose 87 mg/dL      BUN 22 mg/dL      Creatinine 1.81 mg/dL      Sodium 141 mmol/L      Potassium 4.0 mmol/L      Chloride 107 mmol/L      CO2 26.0 mmol/L      Calcium 8.1 mg/dL      Albumin 2.9 g/dL      Phosphorus 3.7 mg/dL      Anion Gap 8.0 mmol/L      BUN/Creatinine Ratio 12.2     eGFR 28.0 mL/min/1.73     Narrative:      GFR Normal >60  Chronic Kidney Disease <60  Kidney Failure <15    The GFR formula is only valid for adults with stable renal function between ages 18 and 70.    Magnesium [[id number]]  (Normal) Collected: 06/14/23 0533    Specimen: Blood Updated: 06/14/23 0643     Magnesium 2.1 mg/dL     CBC & Differential [[id number]]   (Abnormal) Collected: 06/14/23 0533    Specimen: Blood Updated: 06/14/23 0639    Narrative:      The following orders were created for panel order CBC & Differential.  Procedure                               Abnormality         Status                     ---------                               -----------         ------                     CBC Auto Differential[[id number]]        Abnormal            Final result                 Please view results for these tests on the individual orders.    CBC Auto Differential [[id number]]  (Abnormal) Collected: 06/14/23 0533    Specimen: Blood Updated: 06/14/23 0639     WBC 6.23 10*3/mm3      RBC 3.23 10*6/mm3      Hemoglobin 10.2 g/dL      Hematocrit 31.1 %      MCV 96.3 fL      MCH 31.6 pg      MCHC 32.8 g/dL      RDW 12.7 %      RDW-SD 44.9 fl      MPV 11.8 fL      Platelets 107 10*3/mm3      Neutrophil % 68.2 %      Lymphocyte % 17.3 %      Monocyte % 9.1 %      Eosinophil % 4.2 %      Basophil % 0.6 %      Neutrophils, Absolute 4.24 10*3/mm3      Lymphocytes, Absolute 1.08 10*3/mm3      Monocytes, Absolute 0.57 10*3/mm3      Eosinophils, Absolute 0.26 10*3/mm3      Basophils, Absolute 0.04 10*3/mm3     CANDIDA AURIS SCREEN - Swab, Axilla Right, Axilla Left and Groin [[id number]]  (Normal) Collected: 06/12/23 1449    Specimen: Swab from Axilla Right, Axilla Left and Groin Updated: 06/13/23 1501     Candida Auris Screen Culture No Candida auris isolated at 24 hours    Renal Function Panel [[id number]]  (Abnormal) Collected: 06/13/23 0613    Specimen: Blood Updated: 06/13/23 0740     Glucose 81 mg/dL      BUN 23 mg/dL      Creatinine 1.79 mg/dL      Sodium 139 mmol/L      Potassium 3.7 mmol/L      Chloride 104 mmol/L      CO2 26.7 mmol/L      Calcium 8.7 mg/dL      Albumin 2.8 g/dL      Phosphorus 3.5 mg/dL      Anion Gap 8.3 mmol/L      BUN/Creatinine Ratio 12.8     eGFR 28.4 mL/min/1.73     Narrative:      GFR Normal >60  Chronic Kidney Disease <60  Kidney Failure <15    The GFR  formula is only valid for adults with stable renal function between ages 18 and 70.    Magnesium [[id number]]  (Normal) Collected: 06/13/23 0613    Specimen: Blood Updated: 06/13/23 0740     Magnesium 2.0 mg/dL     CBC & Differential [[id number]]  (Abnormal) Collected: 06/13/23 0613    Specimen: Blood Updated: 06/13/23 0740    Narrative:      The following orders were created for panel order CBC & Differential.  Procedure                               Abnormality         Status                     ---------                               -----------         ------                     CBC Auto Differential[[id number]]        Abnormal            Final result                 Please view results for these tests on the individual orders.    CBC Auto Differential [[id number]]  (Abnormal) Collected: 06/13/23 0613    Specimen: Blood Updated: 06/13/23 0740     WBC 5.98 10*3/mm3      RBC 3.17 10*6/mm3      Hemoglobin 9.9 g/dL      Hematocrit 30.6 %      MCV 96.5 fL      MCH 31.2 pg      MCHC 32.4 g/dL      RDW 12.8 %      RDW-SD 44.9 fl      MPV 11.6 fL      Platelets 114 10*3/mm3      Neutrophil % 73.8 %      Lymphocyte % 14.5 %      Monocyte % 6.9 %      Eosinophil % 4.0 %      Basophil % 0.5 %      Neutrophils, Absolute 4.41 10*3/mm3      Lymphocytes, Absolute 0.87 10*3/mm3      Monocytes, Absolute 0.41 10*3/mm3      Eosinophils, Absolute 0.24 10*3/mm3      Basophils, Absolute 0.03 10*3/mm3     Renal Function Panel [[id number]]  (Abnormal) Collected: 06/12/23 0531    Specimen: Blood Updated: 06/12/23 0644     Glucose 82 mg/dL      BUN 26 mg/dL      Creatinine 2.16 mg/dL      Sodium 141 mmol/L      Potassium 3.6 mmol/L      Chloride 104 mmol/L      CO2 29.0 mmol/L      Calcium 8.2 mg/dL      Albumin 2.9 g/dL      Phosphorus 3.6 mg/dL      Anion Gap 8.0 mmol/L      BUN/Creatinine Ratio 12.0     eGFR 22.6 mL/min/1.73     Narrative:      GFR Normal >60  Chronic Kidney Disease <60  Kidney Failure <15    The GFR formula is only valid  for adults with stable renal function between ages 18 and 70.    Magnesium [[id number]]  (Normal) Collected: 06/12/23 0531    Specimen: Blood Updated: 06/12/23 0644     Magnesium 1.9 mg/dL     CBC & Differential [[id number]]  (Abnormal) Collected: 06/12/23 0531    Specimen: Blood Updated: 06/12/23 0637    Narrative:      The following orders were created for panel order CBC & Differential.  Procedure                               Abnormality         Status                     ---------                               -----------         ------                     CBC Auto Differential[[id number]]        Abnormal            Final result                 Please view results for these tests on the individual orders.    CBC Auto Differential [[id number]]  (Abnormal) Collected: 06/12/23 0531    Specimen: Blood Updated: 06/12/23 0637     WBC 8.54 10*3/mm3      RBC 3.24 10*6/mm3      Hemoglobin 10.3 g/dL      Hematocrit 31.4 %      MCV 96.9 fL      MCH 31.8 pg      MCHC 32.8 g/dL      RDW 12.8 %      RDW-SD 45.9 fl      MPV 11.5 fL      Platelets 135 10*3/mm3      Neutrophil % 76.3 %      Lymphocyte % 12.9 %      Monocyte % 6.2 %      Eosinophil % 3.6 %      Basophil % 0.5 %      Neutrophils, Absolute 6.52 10*3/mm3      Lymphocytes, Absolute 1.10 10*3/mm3      Monocytes, Absolute 0.53 10*3/mm3      Eosinophils, Absolute 0.31 10*3/mm3      Basophils, Absolute 0.04 10*3/mm3     Eosinophil Smear - Urine, Urine, Clean Catch [[id number]]  (Normal) Collected: 06/10/23 0500    Specimen: Urine, Clean Catch Updated: 06/11/23 1032     Eosinophil Smear 0 % EOS/100 Cells     Renal Function Panel [[id number]]  (Abnormal) Collected: 06/11/23 0552    Specimen: Blood Updated: 06/11/23 0702     Glucose 88 mg/dL      BUN 25 mg/dL      Creatinine 2.17 mg/dL      Sodium 141 mmol/L      Potassium 3.3 mmol/L      Chloride 104 mmol/L      CO2 27.1 mmol/L      Calcium 8.5 mg/dL      Albumin 2.5 g/dL      Phosphorus 3.9 mg/dL      Anion Gap 9.9 mmol/L       BUN/Creatinine Ratio 11.5     eGFR 22.5 mL/min/1.73     Narrative:      GFR Normal >60  Chronic Kidney Disease <60  Kidney Failure <15    The GFR formula is only valid for adults with stable renal function between ages 18 and 70.    Uric Acid [[id number]]  (Abnormal) Collected: 06/11/23 0552    Specimen: Blood Updated: 06/11/23 0650     Uric Acid 8.3 mg/dL     Magnesium [[id number]]  (Normal) Collected: 06/11/23 0552    Specimen: Blood Updated: 06/11/23 0650     Magnesium 1.9 mg/dL     CBC & Differential [[id number]]  (Abnormal) Collected: 06/11/23 0552    Specimen: Blood Updated: 06/11/23 0628    Narrative:      The following orders were created for panel order CBC & Differential.  Procedure                               Abnormality         Status                     ---------                               -----------         ------                     CBC Auto Differential[[id number]]        Abnormal            Final result                 Please view results for these tests on the individual orders.    CBC Auto Differential [[id number]]  (Abnormal) Collected: 06/11/23 0552    Specimen: Blood Updated: 06/11/23 0628     WBC 10.66 10*3/mm3      RBC 3.29 10*6/mm3      Hemoglobin 10.3 g/dL      Hematocrit 31.7 %      MCV 96.4 fL      MCH 31.3 pg      MCHC 32.5 g/dL      RDW 12.8 %      RDW-SD 46.7 fl      MPV 11.3 fL      Platelets 155 10*3/mm3      Neutrophil % 81.2 %      Lymphocyte % 9.8 %      Monocyte % 5.8 %      Eosinophil % 2.4 %      Basophil % 0.4 %      Immature Grans % 0.4 %      Neutrophils, Absolute 8.65 10*3/mm3      Lymphocytes, Absolute 1.05 10*3/mm3      Monocytes, Absolute 0.62 10*3/mm3      Eosinophils, Absolute 0.26 10*3/mm3      Basophils, Absolute 0.04 10*3/mm3      Immature Grans, Absolute 0.04 10*3/mm3      nRBC 0.0 /100 WBC     Urinalysis, Microscopic Only - Urine, Clean Catch [[id number]]  (Abnormal) Collected: 06/10/23 1500    Specimen: Urine, Clean Catch Updated: 06/10/23 1533     RBC, UA 0-2  /HPF      WBC, UA 3-5 /HPF      Bacteria, UA None Seen /HPF      Squamous Epithelial Cells, UA 0-2 /HPF      Yeast, UA Small/1+ Budding Yeast /HPF      Hyaline Casts, UA None Seen /LPF      Methodology Manual Light Microscopy    Urinalysis With Microscopic If Indicated (No Culture) - Urine, Clean Catch [[id number]]  (Abnormal) Collected: 06/10/23 1500    Specimen: Urine, Clean Catch Updated: 06/10/23 1511     Color, UA Yellow     Appearance, UA Clear     pH, UA <=5.0     Specific Gravity, UA 1.022     Glucose, UA Negative     Ketones, UA Trace     Bilirubin, UA Negative     Blood, UA Trace     Protein, UA Trace     Leuk Esterase, UA Small (1+)     Nitrite, UA Negative     Urobilinogen, UA 0.2 E.U./dL    Sodium, Urine, Random - Urine, Clean Catch [[id number]] Collected: 06/09/23 1312    Specimen: Urine, Clean Catch Updated: 06/10/23 1042     Sodium, Urine 68 mmol/L     Narrative:      Reference intervals for random urine have not been established.  Clinical usage is dependent upon physician's interpretation in combination with other laboratory tests.       Protein, Urine, Random - Urine, Clean Catch [[id number]] Collected: 06/09/23 1312    Specimen: Urine, Clean Catch Updated: 06/10/23 1042     Total Protein, Urine 14.9 mg/dL     Narrative:      Reference intervals for random urine have not been established.  Clinical usage is dependent upon physician's interpretation in combination with other laboratory tests.       Creatinine Urine Random (kidney function) GFR component - Urine, Clean Catch [[id number]] Collected: 06/09/23 1312    Specimen: Urine, Clean Catch Updated: 06/10/23 1042     Creatinine, Urine 105.1 mg/dL     Narrative:      Reference intervals for random urine have not been established.  Clinical usage is dependent upon physician's interpretation in combination with other laboratory tests.       Chloride, Urine, Random - Urine, Clean Catch [[id number]] Collected: 06/09/23 1312    Specimen: Urine, Clean Catch  Updated: 06/10/23 1042     Chloride, Urine 72 mmol/L     Narrative:      Reference intervals for random urine have not been established.  Clinical usage is dependent upon physician's interpretation in combination with other laboratory tests.       CK [[id number]]  (Abnormal) Collected: 06/10/23 0640    Specimen: Blood Updated: 06/10/23 1000     Creatine Kinase 19 U/L     Comprehensive Metabolic Panel [[id number]]  (Abnormal) Collected: 06/10/23 0640    Specimen: Blood Updated: 06/10/23 0823     Glucose 88 mg/dL      BUN 27 mg/dL      Creatinine 2.39 mg/dL      Sodium 139 mmol/L      Potassium 4.0 mmol/L      Chloride 100 mmol/L      CO2 27.5 mmol/L      Calcium 8.5 mg/dL      Total Protein 4.9 g/dL      Albumin 2.7 g/dL      ALT (SGPT) 8 U/L      AST (SGOT) 7 U/L      Alkaline Phosphatase 80 U/L      Total Bilirubin 0.4 mg/dL      Globulin 2.2 gm/dL      A/G Ratio 1.2 g/dL      BUN/Creatinine Ratio 11.3     Anion Gap 11.5 mmol/L      eGFR 20.1 mL/min/1.73     Narrative:      GFR Normal >60  Chronic Kidney Disease <60  Kidney Failure <15    The GFR formula is only valid for adults with stable renal function between ages 18 and 70.    Lactic Acid, Plasma [[id number]]  (Normal) Collected: 06/10/23 0640    Specimen: Blood Updated: 06/10/23 0740     Lactate 1.4 mmol/L     CBC Auto Differential [[id number]]  (Abnormal) Collected: 06/10/23 0640    Specimen: Blood Updated: 06/10/23 0727     WBC 11.63 10*3/mm3      RBC 3.65 10*6/mm3      Hemoglobin 11.4 g/dL      Hematocrit 35.1 %      MCV 96.2 fL      MCH 31.2 pg      MCHC 32.5 g/dL      RDW 12.7 %      RDW-SD 44.4 fl      MPV 11.4 fL      Platelets 168 10*3/mm3      Neutrophil % 80.0 %      Lymphocyte % 11.3 %      Monocyte % 5.6 %      Eosinophil % 2.4 %      Basophil % 0.3 %      Immature Grans % 0.4 %      Neutrophils, Absolute 9.29 10*3/mm3      Lymphocytes, Absolute 1.32 10*3/mm3      Monocytes, Absolute 0.65 10*3/mm3      Eosinophils, Absolute 0.28 10*3/mm3       "Basophils, Absolute 0.04 10*3/mm3      Immature Grans, Absolute 0.05 10*3/mm3      nRBC 0.0 /100 WBC     Urinalysis, Microscopic Only - Urine, Clean Catch [[id number]]  (Abnormal) Collected: 06/09/23 1312    Specimen: Urine, Clean Catch Updated: 06/09/23 1336     RBC, UA 0-2 /HPF      WBC, UA 3-5 /HPF      Bacteria, UA None Seen /HPF      Squamous Epithelial Cells, UA 0-2 /HPF      Hyaline Casts, UA None Seen /LPF      Methodology Manual Light Microscopy    Urinalysis With Microscopic If Indicated (No Culture) - Urine, Clean Catch [[id number]]  (Abnormal) Collected: 06/09/23 1312    Specimen: Urine, Clean Catch Updated: 06/09/23 1325     Color, UA Yellow     Appearance, UA Clear     pH, UA <=5.0     Specific Gravity, UA 1.017     Glucose, UA Negative     Ketones, UA Negative     Bilirubin, UA Negative     Blood, UA Trace     Protein, UA Trace     Leuk Esterase, UA Moderate (2+)     Nitrite, UA Negative     Urobilinogen, UA 0.2 E.U./dL    Procalcitonin [[id number]]  (Normal) Collected: 06/09/23 1055    Specimen: Blood Updated: 06/09/23 1132     Procalcitonin 0.11 ng/mL     Narrative:      As a Marker for Sepsis (Non-Neonates):    1. <0.5 ng/mL represents a low risk of severe sepsis and/or septic shock.  2. >2 ng/mL represents a high risk of severe sepsis and/or septic shock.    As a Marker for Lower Respiratory Tract Infections that require antibiotic therapy:    PCT on Admission    Antibiotic Therapy       6-12 Hrs later    >0.5                Strongly Recommended  >0.25 - <0.5        Recommended   0.1 - 0.25          Discouraged              Remeasure/reassess PCT  <0.1                Strongly Discouraged     Remeasure/reassess PCT    As 28 day mortality risk marker: \"Change in Procalcitonin Result\" (>80% or <=80%) if Day 0 (or Day 1) and Day 4 values are available. Refer to http://www.[facility]-pct-calculator.com    Change in PCT <=80%  A decrease of PCT levels below or equal to 80% defines a positive change in PCT test " result representing a higher risk for 28-day all-cause mortality of patients diagnosed with severe sepsis for septic shock.    Change in PCT >80%  A decrease of PCT levels of more than 80% defines a negative change in PCT result representing a lower risk for 28-day all-cause mortality of patients diagnosed with severe sepsis or septic shock.       Comprehensive Metabolic Panel [[id number]]  (Abnormal) Collected: 06/09/23 1055    Specimen: Blood Updated: 06/09/23 1129     Glucose 103 mg/dL      BUN 25 mg/dL      Creatinine 2.40 mg/dL      Sodium 139 mmol/L      Potassium 4.8 mmol/L      Comment: Specimen hemolyzed.  Results may be affected.        Chloride 100 mmol/L      CO2 28.5 mmol/L      Calcium 8.9 mg/dL      Total Protein 5.8 g/dL      Albumin 2.7 g/dL      ALT (SGPT) 10 U/L      Comment: Specimen hemolyzed.  Results may be affected.        AST (SGOT) 17 U/L      Comment: Specimen hemolyzed.  Results may be affected.        Alkaline Phosphatase 86 U/L      Total Bilirubin 0.4 mg/dL      Globulin 3.1 gm/dL      A/G Ratio 0.9 g/dL      BUN/Creatinine Ratio 10.4     Anion Gap 10.5 mmol/L      eGFR 20.0 mL/min/1.73     Narrative:      GFR Normal >60  Chronic Kidney Disease <60  Kidney Failure <15    The GFR formula is only valid for adults with stable renal function between ages 18 and 70.    Lactic Acid, Plasma [[id number]]  (Normal) Collected: 06/09/23 1055    Specimen: Blood Updated: 06/09/23 1124     Lactate 1.3 mmol/L     Protime-INR [[id number]]  (Abnormal) Collected: 06/09/23 1055    Specimen: Blood Updated: 06/09/23 1122     Protime 17.0 Seconds      INR 1.36    aPTT [[id number]]  (Normal) Collected: 06/09/23 1055    Specimen: Blood Updated: 06/09/23 1122     PTT 31.7 seconds     CBC & Differential [[id number]]  (Abnormal) Collected: 06/09/23 1055    Specimen: Blood Updated: 06/09/23 1107    Narrative:      The following orders were created for panel order CBC & Differential.  Procedure                           "     Abnormality         Status                     ---------                               -----------         ------                     CBC Auto Differential[[id number]]        Abnormal            Final result                 Please view results for these tests on the individual orders.    CBC Auto Differential [[id number]]  (Abnormal) Collected: 06/09/23 1055    Specimen: Blood Updated: 06/09/23 1107     WBC 13.09 10*3/mm3      RBC 4.17 10*6/mm3      Hemoglobin 13.1 g/dL      Hematocrit 39.8 %      MCV 95.4 fL      MCH 31.4 pg      MCHC 32.9 g/dL      RDW 12.9 %      RDW-SD 45.0 fl      MPV 11.4 fL      Platelets 189 10*3/mm3      Neutrophil % 82.2 %      Lymphocyte % 8.9 %      Monocyte % 5.5 %      Eosinophil % 2.5 %      Basophil % 0.4 %      Immature Grans % 0.5 %      Neutrophils, Absolute 10.77 10*3/mm3      Lymphocytes, Absolute 1.16 10*3/mm3      Monocytes, Absolute 0.72 10*3/mm3      Eosinophils, Absolute 0.33 10*3/mm3      Basophils, Absolute 0.05 10*3/mm3      Immature Grans, Absolute 0.06 10*3/mm3      nRBC 0.0 /100 WBC           /57 (BP Location: Left arm, Patient Position: Lying)   Pulse 56   Temp 97.8 °F (36.6 °C) (Oral)   Resp 16   Ht 165.1 cm (65\")   Wt 101 kg (221 lb 12.5 oz)   SpO2 98%   BMI 36.91 kg/m²     Discharge Exam:  General Appearance:    Alert, cooperative, no distress                          Head:    Normocephalic, without obvious abnormality, atraumatic                          Eyes:                            Throat:   Lips, tongue, gums normal                          Neck:   Supple, symmetrical, trachea midline, no JVD                        Lungs:     Clear to auscultation bilaterally, respirations unlabored                Chest Wall:    No tenderness or deformity                        Heart:    Regular rate and rhythm, S1 and S2 normal, no murmur,no  Rub  or gallop                  Abdomen:     Soft, non-tender, bowel sounds active, no masses, no organomegaly       "            Extremities:   Extremities normal, atraumatic, no cyanosis or edema                             Skin:   Skin is warm and dry,  no rashes or palpable lesions                  Neurologic:   no focal deficits noted     Disposition:  Skilled nursing facility    Activity as tolerated    Diet as tolerated  Diet Order   Procedures    Diet: Cardiac Diets; Healthy Heart (2-3 Na+); Texture: Regular Texture (IDDSI 7); Fluid Consistency: Thin (IDDSI 0)       Patient Instructions:      Discharge Medications        New Medications        Instructions Start Date   acetaminophen 325 MG tablet  Commonly known as: TYLENOL   650 mg, Oral, Every 4 Hours PRN             Changes to Medications        Instructions Start Date   apixaban 2.5 MG tablet tablet  Commonly known as: ELIQUIS  What changed:   medication strength  how much to take   2.5 mg, Oral, Every 12 Hours Scheduled             Continue These Medications        Instructions Start Date   albuterol sulfate  (90 Base) MCG/ACT inhaler  Commonly known as: PROVENTIL HFA;VENTOLIN HFA;PROAIR HFA   2 puffs, Inhalation, Every 4 Hours PRN      Align 4 MG capsule   1 capsule, Oral, Daily      aspirin 81 MG chewable tablet   81 mg, Oral, Daily      bisacodyl 10 MG suppository  Commonly known as: DULCOLAX   10 mg, Rectal, As Needed      diphenhydrAMINE 25 mg capsule  Commonly known as: BENADRYL   25 mg, Oral, Every 6 Hours PRN      FLUoxetine 60 MG tablet  Commonly known as: PROzac   60 mg, Oral, Daily      HYDROcodone-acetaminophen 7.5-325 MG per tablet  Commonly known as: NORCO   1 tablet, Oral, Every 6 Hours PRN      levothyroxine 50 MCG tablet  Commonly known as: SYNTHROID, LEVOTHROID   50 mcg, Oral, Daily      LORazepam 0.5 MG tablet  Commonly known as: ATIVAN   0.5 mg, Oral, 2 Times Daily PRN      magnesium hydroxide 400 MG/5ML suspension  Commonly known as: MILK OF MAGNESIA   30 mL, Oral, Daily PRN      Mirabegron ER 50 MG tablet sustained-release 24 hour 24 hr  tablet  Commonly known as: MYRBETRIQ   50 mg, Oral, Daily      nystatin 591999 UNIT/GM powder  Commonly known as: MYCOSTATIN   1 application, Topical, 3 Times Daily PRN      pantoprazole 40 MG EC tablet  Commonly known as: PROTONIX   40 mg, Oral, Daily      QUEtiapine 100 MG tablet  Commonly known as: SEROquel   100 mg, Oral, Nightly      sennosides-docusate 8.6-50 MG per tablet  Commonly known as: PERICOLACE   2 tablets, Oral, 2 Times Daily      umeclidinium-vilanterol 62.5-25 MCG/INH aerosol powder  inhaler  Commonly known as: Anoro Ellipta   1 puff, Inhalation, Daily - RT             Stop These Medications      busPIRone 30 MG tablet  Commonly known as: BUSPAR     fentaNYL 25 MCG/HR patch  Commonly known as: DURAGESIC     fleet enema 7-19 GM/118ML enema     potassium chloride 10 MEQ CR capsule  Commonly known as: MICRO-K     torsemide 100 MG tablet  Commonly known as: DEMADEX            No future appointments.   Contact information for follow-up providers       Provider, No Known Follow up.    Why: After released from rehab follow-up with primary care provider  Contact information:  [facility] [address] [phone]                       Contact information for after-discharge care       Destination       [facility] .    Service: Skilled Nursing  Contact information:  [address] [facility] [address] [phone]                                 Discharge Order (From admission, onward)       Start     Ordered    06/14/23 1358  Discharge patient  Once        Expected Discharge Date: 06/14/23    Discharge Disposition: Skilled Nursing Facility (DC - External)    Physician of Record for Attribution - Please select from Treatment Team: CONRADO DICKENS [[id number]]    Review needed by CMO to determine Physician of Record: No       Question Answer Comment   Physician of Record for Attribution - Please select from Treatment Team CONRADO DICKENS    Review needed by CMO to  determine Physician of Record No        06/14/23 1408                    Total time spent discharging patient including evaluation,post hospitalization follow up,  medication and post hospitalization instructions and education total time exceeds 30 minutes.    Signed:  Luis Enrique Hall MD  6/14/2023  14:19 EDT

## 2023-06-14 NOTE — CASE MANAGEMENT/SOCIAL WORK
"Physicians Statement of Medical Necessity for  Ambulance Transportation    GENERAL INFORMATION     Name: Rose Cheema  YOB: 1940  Medicare #: [id number]  Transport Date:    (Valid for round trips this date, or for scheduled repetitive trips for 60 days from the date signed below.)  Origin: [facility]  Destination: [facility]  Is the Patient's stay covered under Medicare Part A (PPS/DRG?)Yes  Closest appropriate facility? Yes  If this a hosp-hosp transfer? No  Is this a hospice patient? No    MEDICAL NECESSITY QUESTIONAIRE    Ambulance Transportation is medically necessary only if other means of transportation are contraindicated or would be potentially harmful to the patient.  To meet this requirement, the patient must be either \"bed confined\" or suffer from a condition such that transport by means other than an ambulance is contraindicated by the patient's condition.  The following questions must be answered by the healthcare professional signing below for this form to be valid:     1) Describe the MEDICAL CONDITION (physical and/or mental) of this patient AT THE TIME OF AMBULANCE TRANSPORT that requires the patient to be transported in an ambulance, and why transport by other means is contraindicated by the patient's condition: unable to transfer  Past Medical History:   Diagnosis Date    Anemia     Anxiety     Arthritis     CHF (congestive heart failure)     Coronary artery disease     Depression     Disease of thyroid gland     Fibromyalgia     GERD (gastroesophageal reflux disease)     Hypertension     Moderate tricuspid valve stenosis     Osteoporosis     Peripheral neuropathy     Pulmonary hypertension     SBO (small bowel obstruction)     Stenosis of mitral valve       Past Surgical History:   Procedure Laterality Date    BILATERAL OOPHORECTOMY      CARDIAC CATHETERIZATION      CHOLECYSTECTOMY      ERCP N/A 2/15/2019    Procedure: ENDOSCOPIC RETROGRADE CHOLANGIOPANCREATOGRAPHY WITH PARTIAL " "CHOLANGIOGRAM;  Surgeon: Gerald Herr MD;  Location: [facility] ENDOSCOPY;  Service: Gastroenterology    EXPLORATORY LAPAROTOMY      GASTRIC BYPASS      HERNIA REPAIR      inguinal and ventral    HYSTERECTOMY      OVARIAN CYST REMOVAL        2) Is this patient \"bed confined\" as defined below?Yes   To be \"bed confined\" the patient must satisfy all three of the following criteria:  (1) unable to get up from bed without assistance; AND (2) unable to ambulate;  AND (3) unable to sit in a chair or wheelchair.  3) Can this patient safely be transported by car or wheelchair van (I.e., may safely sit during transport, without an attendant or monitoring?)No   4. In addition to completing questions 1-3 above, please check any of the following conditions that apply*:          *Note: supporting documentation for any boxes checked must be maintained in the patient's medical records Moderate/severe pain on movement and Unable to tolerate seated position for time needed to transport      SIGNATURE OF PHYSICIAN OR OTHER AUTHORIZED HEALTHCARE PROFESSIONAL    I certify that the above information is true and correct based on my evaluation of this patient, and represent that the patient requires transport by ambulance and that other forms of transport are contraindicated.  I understand that this information will be used by the Centers for Medicare and Medicaid Services (CMS) to support the determiniation of medical necessity for ambulance services, and I represent that I have personal knowledge of the patient's condition at the time of transport.       If this box is checked, I also certify that the patient is physically or mentally incapable of signing the ambulance service's claim form and that the institution with which I am affiliated has furnished care, services or assistance to the patient.  My signature below is made on behalf of the patient pursuant to 42 .36(b)(4). In accordance with 42 .37, the specific " reason(s) that the patient is physically or mentally incapable of signing the claim for is as follows:   Signature of Physician or Healthcare Professional  Date/Time:        (For Scheduled repetitive transport, this form is not valid for transports performed more than 60 days after this date).                                                                                                                                            --------------------------------------------------------------------------------------------  Printed Name and Credentials of Physician or Authorized Healthcare Professional     *Form must be signed by patient's attending physician for scheduled, repetitive transports,.  For non-repetitive ambulance transports, if unable to obtain the signature of the attending physician, any of the following may sign (please select below):     Physician  Clinical Nurse Specialist  Registered Nurse     Physician Assistant  Discharge Planner  Licensed Practical Nurse     Nurse Practitioner

## 2023-06-15 NOTE — CASE MANAGEMENT/SOCIAL WORK
Continued Stay Note  [facility]     Patient Name: Rose Cheema  MRN: [id number]  Today's Date: 6/15/2023    Admit Date: 6/9/2023    Plan: Return to [facility]   Discharge Plan     Row Name 06/15/23 1001       Plan    Plan Comments Spoke to [facility], bed pt admitted from [facility] there, she doesn't need pre-cert and can return anytime. She will need transportation at d/c. CCP will follow - Rose REINABobo               Discharge Codes    No documentation.               Expected Discharge Date and Time     Expected Discharge Date Expected Discharge Time    Jun 14, 2023             Rose Padilla RN

## 2023-06-17 LAB — BACTERIA ISLT: NORMAL

## (undated) DEVICE — DEV LK WIREGUIDE FUSN OLYMP SCP

## (undated) DEVICE — CANN NASL CO2 TRULINK W/O2 A/

## (undated) DEVICE — Device

## (undated) DEVICE — SPHINCTEROTOME: Brand: HYDRATOME RX 44

## (undated) DEVICE — TUBING, SUCTION, 1/4" X 10', STRAIGHT: Brand: MEDLINE

## (undated) DEVICE — Device: Brand: DEFENDO AIR/WATER/SUCTION AND BIOPSY VALVE

## (undated) DEVICE — BITEBLOCK OMNI BLOC

## (undated) DEVICE — ERBE NESSY®PLATE 170 SPLIT; 168CM²; CABLE 3M: Brand: ERBE